# Patient Record
Sex: MALE | Race: WHITE | NOT HISPANIC OR LATINO | Employment: OTHER | URBAN - METROPOLITAN AREA
[De-identification: names, ages, dates, MRNs, and addresses within clinical notes are randomized per-mention and may not be internally consistent; named-entity substitution may affect disease eponyms.]

---

## 2017-04-04 ENCOUNTER — HOSPITAL ENCOUNTER (OUTPATIENT)
Dept: RADIOLOGY | Facility: HOSPITAL | Age: 52
Discharge: HOME | End: 2017-04-04
Attending: PSYCHIATRY & NEUROLOGY
Payer: MEDICARE

## 2017-04-04 DIAGNOSIS — G35 MULTIPLE SCLEROSIS: ICD-10-CM

## 2017-04-04 PROCEDURE — 25500020 PHARM REV CODE 255: Performed by: PSYCHIATRY & NEUROLOGY

## 2017-04-04 PROCEDURE — 70553 MRI BRAIN STEM W/O & W/DYE: CPT | Mod: TC

## 2017-04-04 PROCEDURE — 72157 MRI CHEST SPINE W/O & W/DYE: CPT | Mod: TC

## 2017-04-04 PROCEDURE — 72156 MRI NECK SPINE W/O & W/DYE: CPT | Mod: 26,,, | Performed by: RADIOLOGY

## 2017-04-04 PROCEDURE — A9585 GADOBUTROL INJECTION: HCPCS | Performed by: PSYCHIATRY & NEUROLOGY

## 2017-04-04 PROCEDURE — 72156 MRI NECK SPINE W/O & W/DYE: CPT | Mod: TC

## 2017-04-04 PROCEDURE — 70553 MRI BRAIN STEM W/O & W/DYE: CPT | Mod: 26,,, | Performed by: RADIOLOGY

## 2017-04-04 PROCEDURE — 72157 MRI CHEST SPINE W/O & W/DYE: CPT | Mod: 26,,, | Performed by: RADIOLOGY

## 2017-04-04 RX ORDER — GADOBUTROL 604.72 MG/ML
10 INJECTION INTRAVENOUS
Status: COMPLETED | OUTPATIENT
Start: 2017-04-04 | End: 2017-04-04

## 2017-04-04 RX ADMIN — GADOBUTROL 10 ML: 604.72 INJECTION INTRAVENOUS at 08:04

## 2017-05-30 RX ORDER — LACTULOSE 10 G/15ML
SOLUTION ORAL; RECTAL
Qty: 4050 ML | Refills: 1 | Status: SHIPPED | OUTPATIENT
Start: 2017-05-30 | End: 2017-09-01 | Stop reason: SDUPTHER

## 2017-08-02 RX ORDER — LINACLOTIDE 145 UG/1
CAPSULE, GELATIN COATED ORAL
Qty: 30 CAPSULE | Refills: 6 | Status: SHIPPED | OUTPATIENT
Start: 2017-08-02 | End: 2017-08-07 | Stop reason: SDUPTHER

## 2017-08-07 DIAGNOSIS — K59.00 CONSTIPATION, UNSPECIFIED CONSTIPATION TYPE: Primary | ICD-10-CM

## 2017-09-01 ENCOUNTER — TELEPHONE (OUTPATIENT)
Dept: GASTROENTEROLOGY | Facility: CLINIC | Age: 52
End: 2017-09-01

## 2017-09-01 RX ORDER — LACTULOSE 10 G/15ML
SOLUTION ORAL; RECTAL
Qty: 4050 ML | Refills: 1 | OUTPATIENT
Start: 2017-09-01

## 2017-09-01 RX ORDER — LACTULOSE 10 G/15ML
SOLUTION ORAL; RECTAL
Qty: 4050 ML | Refills: 3 | Status: SHIPPED | OUTPATIENT
Start: 2017-09-01 | End: 2018-04-02 | Stop reason: SDUPTHER

## 2017-09-01 NOTE — TELEPHONE ENCOUNTER
----- Message from Samantha Juarez sent at 9/1/2017 10:25 AM CDT -----  Contact: Self- [phone]  Patient came into office requesting refill on  Lactulose 10 gm/15 ml, take 45 ml by mouth three times a day. [facility], [phone].

## 2017-09-05 RX ORDER — SUCRALFATE 1 G/10ML
1 SUSPENSION ORAL
Qty: 3 BOTTLE | Refills: 3 | Status: SHIPPED | OUTPATIENT
Start: 2017-09-05 | End: 2018-02-15 | Stop reason: SDUPTHER

## 2018-02-08 ENCOUNTER — TELEPHONE (OUTPATIENT)
Dept: GASTROENTEROLOGY | Facility: CLINIC | Age: 53
End: 2018-02-08

## 2018-02-08 RX ORDER — POLYETHYLENE GLYCOL 3350, SODIUM SULFATE ANHYDROUS, SODIUM BICARBONATE, SODIUM CHLORIDE, POTASSIUM CHLORIDE 236; 22.74; 6.74; 5.86; 2.97 G/4L; G/4L; G/4L; G/4L; G/4L
4 POWDER, FOR SOLUTION ORAL ONCE
Qty: 4000 ML | Refills: 0 | Status: SHIPPED | OUTPATIENT
Start: 2018-02-08 | End: 2018-02-08

## 2018-02-08 NOTE — TELEPHONE ENCOUNTER
Presents to office reporting severe constipation x 1 week despite his regular medications.    He used mag citrate this week with minimal results.    Will rx golytely and he will contact us if this does not help

## 2018-02-15 RX ORDER — SUCRALFATE 1 G/10ML
SUSPENSION ORAL
Qty: 1260 ML | Refills: 3 | Status: SHIPPED | OUTPATIENT
Start: 2018-02-15 | End: 2018-02-16 | Stop reason: SDUPTHER

## 2018-02-16 RX ORDER — SUCRALFATE 1 G/10ML
SUSPENSION ORAL
Qty: 3600 ML | Refills: 3 | Status: SHIPPED | OUTPATIENT
Start: 2018-02-16 | End: 2018-12-21 | Stop reason: SDUPTHER

## 2018-02-27 ENCOUNTER — TELEPHONE (OUTPATIENT)
Dept: GASTROENTEROLOGY | Facility: CLINIC | Age: 53
End: 2018-02-27

## 2018-02-27 RX ORDER — POLYETHYLENE GLYCOL 3350, SODIUM SULFATE ANHYDROUS, SODIUM BICARBONATE, SODIUM CHLORIDE, POTASSIUM CHLORIDE 236; 22.74; 6.74; 5.86; 2.97 G/4L; G/4L; G/4L; G/4L; G/4L
POWDER, FOR SOLUTION ORAL
Refills: 0 | OUTPATIENT
Start: 2018-02-27

## 2018-02-27 NOTE — TELEPHONE ENCOUNTER
Spoke to pharmacist.  Carafate suspension is not on formulary and will not be covered.    I gave auth for carafate tablets to substitute.      Please let him know.  Also remind him that this med is for his upper abdominal pain, reflux, nausea complaints.  If he is not having these, he does not even have to use this medication.  This is not a constipation medication.

## 2018-03-01 NOTE — TELEPHONE ENCOUNTER
Informed Rosi that Jennifer authorized Carafate tablets. Also that this medication is for upper abd pain, nausea, and reflux.  Verbal Understanding.

## 2018-03-05 NOTE — TELEPHONE ENCOUNTER
Patient stopped by the clinic today. He said that he can not swallow the Sucralfate tablet.       LMOVM for Rosi to call the clinic back.     Patient can crush this pill into a medicine cup with a small amount of water. His insurance no longer covers the liquid form of this medication.

## 2018-03-06 ENCOUNTER — TELEPHONE (OUTPATIENT)
Dept: GASTROENTEROLOGY | Facility: CLINIC | Age: 53
End: 2018-03-06

## 2018-03-06 NOTE — TELEPHONE ENCOUNTER
Informed Rosi that patients insurance company no longer covers the liquid form of the Carafate. He will have to crush this medication in a medicine cup with a small amount of water. Also that this medication is not for constipation but for abd pain, nausea, and reflux. Verbal Understanding.

## 2018-03-06 NOTE — TELEPHONE ENCOUNTER
----- Message from STEPHEN Beltre sent at 3/6/2018 12:39 PM CST -----  Contact: Rosi/[phone]      ----- Message -----  From: Aicha Sinclair  Sent: 3/5/2018   1:50 PM  To: STEPHEN Beltre    Rosi is returning Kezia's call. Please advise.

## 2018-04-10 ENCOUNTER — TELEPHONE (OUTPATIENT)
Dept: GASTROENTEROLOGY | Facility: CLINIC | Age: 53
End: 2018-04-10

## 2018-04-10 RX ORDER — LACTULOSE 10 G/15ML
SOLUTION ORAL; RECTAL
Qty: 4050 ML | Refills: 3 | Status: SHIPPED | OUTPATIENT
Start: 2018-04-10 | End: 2018-07-09 | Stop reason: SDUPTHER

## 2018-04-10 NOTE — TELEPHONE ENCOUNTER
Pt stopped by the clinic today requesting a refill for the Lactulose.     Refill called into the [facility]     Disp 4050 mL  Refill 3

## 2018-05-18 RX ORDER — ALBUTEROL SULFATE 90 UG/1
AEROSOL, METERED RESPIRATORY (INHALATION) EVERY 4 HOURS
Qty: 18 G | Refills: 6 | Status: CANCELLED | OUTPATIENT
Start: 2018-05-18 | End: 2019-05-18

## 2018-05-21 RX ORDER — ALBUTEROL SULFATE 90 UG/1
AEROSOL, METERED RESPIRATORY (INHALATION) EVERY 4 HOURS
Qty: 18 G | Refills: 6 | Status: CANCELLED | OUTPATIENT
Start: 2018-05-18 | End: 2019-05-18

## 2018-06-05 DIAGNOSIS — G35 MULTIPLE SCLEROSIS: Primary | ICD-10-CM

## 2018-06-05 RX ORDER — LACTULOSE 10 G/15ML
SOLUTION ORAL; RECTAL 3 TIMES DAILY
Qty: 4050 ML | Refills: 3 | Status: SHIPPED | OUTPATIENT
Start: 2018-06-05 | End: 2018-11-15 | Stop reason: SDUPTHER

## 2018-07-09 ENCOUNTER — TELEPHONE (OUTPATIENT)
Dept: GASTROENTEROLOGY | Facility: CLINIC | Age: 53
End: 2018-07-09

## 2018-07-09 RX ORDER — DICYCLOMINE HYDROCHLORIDE 10 MG/1
CAPSULE ORAL
Qty: 90 CAPSULE | Refills: 3 | Status: CANCELLED | OUTPATIENT
Start: 2018-07-09 | End: 2019-07-09

## 2018-07-09 RX ORDER — LACTULOSE 10 G/15ML
SOLUTION ORAL; RECTAL
Qty: 4050 ML | Refills: 3 | OUTPATIENT
Start: 2018-07-09 | End: 2018-11-05 | Stop reason: SDUPTHER

## 2018-07-09 NOTE — TELEPHONE ENCOUNTER
----- Message from Alejandra Stover sent at 7/9/2018  1:22 PM CDT -----  Patient needs a refill on his lactulose

## 2018-08-01 ENCOUNTER — HOSPITAL ENCOUNTER (OUTPATIENT)
Dept: RADIOLOGY | Facility: HOSPITAL | Age: 53
Discharge: HOME | End: 2018-08-01
Attending: INTERNAL MEDICINE
Payer: MEDICARE

## 2018-08-01 DIAGNOSIS — G35 MULTIPLE SCLEROSIS: ICD-10-CM

## 2018-08-01 PROCEDURE — 70553 MRI BRAIN STEM W/O & W/DYE: CPT | Mod: TC

## 2018-08-01 PROCEDURE — 70553 MRI BRAIN STEM W/O & W/DYE: CPT | Mod: 26,,, | Performed by: RADIOLOGY

## 2018-08-01 PROCEDURE — 72156 MRI NECK SPINE W/O & W/DYE: CPT | Mod: TC

## 2018-08-01 PROCEDURE — A9585 GADOBUTROL INJECTION: HCPCS | Performed by: INTERNAL MEDICINE

## 2018-08-01 PROCEDURE — 25500020 PHARM REV CODE 255: Performed by: INTERNAL MEDICINE

## 2018-08-01 PROCEDURE — 72156 MRI NECK SPINE W/O & W/DYE: CPT | Mod: 26,,, | Performed by: RADIOLOGY

## 2018-08-01 RX ORDER — GADOBUTROL 604.72 MG/ML
10 INJECTION INTRAVENOUS
Status: COMPLETED | OUTPATIENT
Start: 2018-08-01 | End: 2018-08-01

## 2018-08-01 RX ADMIN — GADOBUTROL 10 ML: 604.72 INJECTION INTRAVENOUS at 10:08

## 2018-08-27 RX ORDER — TRAMADOL HYDROCHLORIDE 50 MG/1
50 TABLET ORAL EVERY 6 HOURS PRN
Qty: 10 TABLET | Refills: 0 | Status: CANCELLED | OUTPATIENT
Start: 2018-08-27

## 2018-09-04 DIAGNOSIS — G35 MULTIPLE SCLEROSIS: Primary | ICD-10-CM

## 2018-09-14 ENCOUNTER — HOSPITAL ENCOUNTER (OUTPATIENT)
Dept: RADIOLOGY | Facility: HOSPITAL | Age: 53
Discharge: HOME | End: 2018-09-14
Attending: PSYCHIATRY & NEUROLOGY
Payer: MEDICARE

## 2018-09-14 DIAGNOSIS — G35 MULTIPLE SCLEROSIS: ICD-10-CM

## 2018-09-14 PROCEDURE — A9585 GADOBUTROL INJECTION: HCPCS | Performed by: PSYCHIATRY & NEUROLOGY

## 2018-09-14 PROCEDURE — 72157 MRI CHEST SPINE W/O & W/DYE: CPT | Mod: 26,,, | Performed by: RADIOLOGY

## 2018-09-14 PROCEDURE — 25500020 PHARM REV CODE 255: Performed by: PSYCHIATRY & NEUROLOGY

## 2018-09-14 PROCEDURE — 72157 MRI CHEST SPINE W/O & W/DYE: CPT | Mod: TC

## 2018-09-14 RX ORDER — GADOBUTROL 604.72 MG/ML
10 INJECTION INTRAVENOUS
Status: COMPLETED | OUTPATIENT
Start: 2018-09-14 | End: 2018-09-14

## 2018-09-14 RX ADMIN — GADOBUTROL 10 ML: 604.72 INJECTION INTRAVENOUS at 09:09

## 2018-09-27 DIAGNOSIS — M62.838 MUSCLE SPASM: ICD-10-CM

## 2018-09-27 RX ORDER — BACLOFEN 10 MG/1
TABLET ORAL
Qty: 90 TABLET | Refills: 5 | Status: CANCELLED | OUTPATIENT
Start: 2018-09-27

## 2018-10-02 DIAGNOSIS — M62.838 MUSCLE SPASM: ICD-10-CM

## 2018-10-02 RX ORDER — BACLOFEN 10 MG/1
TABLET ORAL
Qty: 90 TABLET | Refills: 5 | Status: CANCELLED | OUTPATIENT
Start: 2018-09-27

## 2018-10-03 ENCOUNTER — LAB VISIT (OUTPATIENT)
Dept: LAB | Facility: HOSPITAL | Age: 53
End: 2018-10-03
Attending: PSYCHIATRY & NEUROLOGY
Payer: MEDICARE

## 2018-10-03 ENCOUNTER — OFFICE VISIT (OUTPATIENT)
Dept: NEUROLOGY | Facility: CLINIC | Age: 53
End: 2018-10-03
Payer: MEDICARE

## 2018-10-03 VITALS — HEART RATE: 80 BPM | DIASTOLIC BLOOD PRESSURE: 89 MMHG | SYSTOLIC BLOOD PRESSURE: 145 MMHG

## 2018-10-03 DIAGNOSIS — Z51.81 THERAPEUTIC DRUG MONITORING: ICD-10-CM

## 2018-10-03 DIAGNOSIS — Z00.00 PREVENTATIVE HEALTH CARE: ICD-10-CM

## 2018-10-03 DIAGNOSIS — G35 MULTIPLE SCLEROSIS: ICD-10-CM

## 2018-10-03 DIAGNOSIS — M54.50 LUMBAR BACK PAIN: Primary | ICD-10-CM

## 2018-10-03 DIAGNOSIS — M54.6 THORACIC SPINE PAIN: ICD-10-CM

## 2018-10-03 DIAGNOSIS — R29.898 WEAKNESS OF BOTH LOWER EXTREMITIES: ICD-10-CM

## 2018-10-03 LAB
ALBUMIN SERPL BCP-MCNC: 4.1 G/DL
ALP SERPL-CCNC: 76 U/L
ALT SERPL W/O P-5'-P-CCNC: 25 U/L
ANION GAP SERPL CALC-SCNC: 10 MMOL/L
AST SERPL-CCNC: 29 U/L
BASOPHILS # BLD AUTO: 0.01 K/UL
BASOPHILS NFR BLD: 0.2 %
BILIRUB SERPL-MCNC: 0.5 MG/DL
BUN SERPL-MCNC: 6 MG/DL
CALCIUM SERPL-MCNC: 9.8 MG/DL
CHLORIDE SERPL-SCNC: 103 MMOL/L
CO2 SERPL-SCNC: 25 MMOL/L
CREAT SERPL-MCNC: 0.9 MG/DL
DIFFERENTIAL METHOD: ABNORMAL
EOSINOPHIL # BLD AUTO: 0.2 K/UL
EOSINOPHIL NFR BLD: 4.1 %
ERYTHROCYTE [DISTWIDTH] IN BLOOD BY AUTOMATED COUNT: 16.4 %
EST. GFR  (AFRICAN AMERICAN): >60 ML/MIN/1.73 M^2
EST. GFR  (NON AFRICAN AMERICAN): >60 ML/MIN/1.73 M^2
GLUCOSE SERPL-MCNC: 87 MG/DL
HCT VFR BLD AUTO: 52 %
HGB BLD-MCNC: 17 G/DL
LYMPHOCYTES # BLD AUTO: 1 K/UL
LYMPHOCYTES NFR BLD: 23.5 %
MCH RBC QN AUTO: 25.3 PG
MCHC RBC AUTO-ENTMCNC: 32.7 G/DL
MCV RBC AUTO: 77 FL
MONOCYTES # BLD AUTO: 0.5 K/UL
MONOCYTES NFR BLD: 11.5 %
NEUTROPHILS # BLD AUTO: 2.7 K/UL
NEUTROPHILS NFR BLD: 60.5 %
PLATELET # BLD AUTO: 172 K/UL
PMV BLD AUTO: 9.6 FL
POTASSIUM SERPL-SCNC: 5.3 MMOL/L
PROT SERPL-MCNC: 7.5 G/DL
RBC # BLD AUTO: 6.72 M/UL
SODIUM SERPL-SCNC: 138 MMOL/L
WBC # BLD AUTO: 4.42 K/UL

## 2018-10-03 PROCEDURE — 3077F SYST BP >= 140 MM HG: CPT | Mod: CPTII,,, | Performed by: PSYCHIATRY & NEUROLOGY

## 2018-10-03 PROCEDURE — 99204 OFFICE O/P NEW MOD 45 MIN: CPT | Mod: S$PBB,,, | Performed by: PSYCHIATRY & NEUROLOGY

## 2018-10-03 PROCEDURE — 85025 COMPLETE CBC W/AUTO DIFF WBC: CPT

## 2018-10-03 PROCEDURE — 80053 COMPREHEN METABOLIC PANEL: CPT

## 2018-10-03 PROCEDURE — 3079F DIAST BP 80-89 MM HG: CPT | Mod: CPTII,,, | Performed by: PSYCHIATRY & NEUROLOGY

## 2018-10-03 PROCEDURE — 36415 COLL VENOUS BLD VENIPUNCTURE: CPT

## 2018-10-03 PROCEDURE — 99213 OFFICE O/P EST LOW 20 MIN: CPT | Mod: PBBFAC,PO | Performed by: PSYCHIATRY & NEUROLOGY

## 2018-10-03 PROCEDURE — 99999 PR PBB SHADOW E&M-EST. PATIENT-LVL III: CPT | Mod: PBBFAC,,, | Performed by: PSYCHIATRY & NEUROLOGY

## 2018-10-03 NOTE — LETTER
October 3, 2018      Bayron Ferguson MD  [address]  [address]  [facility] [address]           [facility]rology  [address] [address]  Phone: [phone]  Fax: [phone]          Patient: Tj Trammell   MR Number: [id number]   YOB: 1965   Date of Visit: 10/3/2018       Dear Dr. Bayron Ferguson:    Thank you for referring Tj Trammell to me for evaluation. Attached you will find relevant portions of my assessment and plan of care.    If you have questions, please do not hesitate to call me. I look forward to following Tj Trammell along with you.    Sincerely,    Michael Boyle MD    Enclosure  CC:  No Recipients    If you would like to receive this communication electronically, please contact [email or web address] or [phone] to request more information on [email or web address] Link access.    For providers and/or their staff who would like to refer a patient to [facility], please contact us through our one-stop-shop provider referral line, [facility], at [phone].    If you feel you have received this communication in error or would no longer like to receive these types of communications, please e-mail [email or web address]

## 2018-10-03 NOTE — ASSESSMENT & PLAN NOTE
-- questionably taking aubaugio, not clear who is prescribing  -- checking CBC/CMP today  -- neuraxis MRI brain, C, T spine reviewed today, multifocal demyelinating lesions throughout, persoanlly reviewed

## 2018-10-03 NOTE — PROGRESS NOTES
[facility]UROLOGY  [facility] [place]    Date: 10/3/18  Patient Name: Tj Trammell   MRN: [id number]   PCP: Krys Harrison (Inactive)  Referring Provider: Bayron Ferguson MD    Assessment:   Tj Trammell is a 53 y.o. male presenting for history of multiple sclerosis.  Patient is extremely difficult historian and is difficult to ascertain if he has experienced FLAIR recently.  Imaging appears overall stable.  Will make no changes to his Aubagio.  Attempting to determine who was prescribing this for him.  Reviewed imaging as below.  Obtaining lumbar spine imaging given persistent lower extremity weakness and pain. May ultimately benefit from involvement of pain management.  Plan:     Problem List Items Addressed This Visit        Neuro    Multiple sclerosis    Current Assessment & Plan     -- questionably taking aubaugio, not clear who is prescribing  -- checking CBC/CMP today  -- neuraxis MRI brain, C, T spine reviewed today, multifocal demyelinating lesions throughout, persoanlly reviewed            Orthopedic    Weakness of both lower extremities    Current Assessment & Plan     -- MRI L spine           Other Visit Diagnoses     Lumbar back pain    -  Primary    Thoracic spine pain        Relevant Orders    MRI Thoracic Spine Without Contrast    Therapeutic drug monitoring        Relevant Orders    CBC auto differential (Completed)    Comprehensive metabolic panel (Completed)    Preventative health care        Relevant Orders    Ambulatory consult to Family Practice          Michael Boyle MD  [facility]   Department of Neurology    Patient note was created using MModal Dictation.  Any errors in syntax or even information may not have been identified and edited on initial review prior to signing this note.  Subjective:   Patient seen in consultation at the request of Bayron Ferguson MD for the evaluation of multiple sclerosis. A copy of this note will be sent to  the referring physician.        HPI:   Mr. Tj Trammell is a 53 y.o. male  presenting for evaluation of multiple sclerosis.  The patient follows with Dr. oro on for primary care.  The patient is an extremely difficult historian.  He is deaf with marked dysphonia and dysarthria  and spends the entirety of his visit complaining that various doctors and family members have taken his money.  I am able to ascertain that he lives alone with a for [employer] on him.  Unable to offer any meaningful history regarding his multiple sclerosis.  Review of the record reveals a spinal tap positive for 6 oligoclonal bands in 2012.  He was initially evaluated by Neurology in 2012 per records at that time and was started on Avonex.  It is unclear when he was switched to Aubagio and who is prescribing it. He seems to indicate that he may not be taking it regularly. Today, he endorses extreme pain throughout the entirety of his body.  He focuses on this stating that the only thing that helps it is Norco.  He expresses his frustration that he has been unable to get refills of this medication.  He states that his pain is worse in his lower back but is unable to elaborate further.  He does exhibit left lower extremity weakness however it is unclear how much of this may be attributable to his multiple sclerosis.  He is unable to relate any specific symptoms or history consistent with flare.    PAST MEDICAL HISTORY:  Past Medical History:   Diagnosis Date    Asthma     Bilateral hearing loss 12/12/2012    Biliary acute pancreatitis     GERD (gastroesophageal reflux disease)     High cholesterol     Hypertension     Multiple sclerosis        PAST SURGICAL HISTORY:  Past Surgical History:   Procedure Laterality Date    ARTHROSCOPY, SHOULDER Left 4/24/2014    [facility]    CHOLECYSTECTOMY      CHOLECYSTECTOMY, LAPAROSCOPIC N/A 12/16/2013    [facility] at [facility] OR     ERCP N/A 9/2/2014    [facility] at [facility] ENDO    ERCP WITH STENT EXCHANGE N/A 4/8/2015    [facility] at [facility] ENDO    ESOPHAGOGASTRODUODENOSCOPY (EGD) N/A 7/13/2015    [facility]formed by [facility] at [facility] ENDO    FIXATION, TENDON Left 4/24/2014    [facility] by [facility] at [facility] OR    JOINT REPLACEMENT      arm    REPAIR ROTATOR CUFF ARTHROSCOPIC Left 4/30/2015    [facility]formed by [facility] at [facility] OR    REPAIR, ROTATOR CUFF, ARTHROSCOPIC Left 4/24/2014    [facility]formed by [facility] at [facility] OR    ULTRASOUND, UPPER GI TRACT, ENDOSCOPIC N/A 11/26/2013    Performed by L[facility]clean MD at [facility] ENDO       CURRENT MEDS:  Current Outpatient Medications   Medication Sig Dispense Refill    albuterol (VENTOLIN HFA) 90 mcg/actuation inhaler Inhale 2 puffs into the lungs every 4 (four) hours as needed for Wheezing or Shortness of Breath. 18 g 0    albuterol (VENTOLIN HFA) 90 mcg/actuation inhaler INHALE 2 PUFFS BY MOUTH EVERY 4 HOURS 18 g 5    alprazolam (XANAX) 0.5 MG tablet Take 0.5 mg by mouth 2 (two) times daily as needed.       atorvastatin (LIPITOR) 40 MG tablet Take 40 mg by mouth once daily.       baclofen (LIORESAL) 10 MG tablet TAKE ONE TABLET BY MOUTH THREE TIMES DAILY WITH FOOD OR MILK 90 tablet 5    baclofen (LIORESAL) 10 MG tablet TAKE ONE TABLET BY MOUTH THREE TIMES A DAY WITH FOOD OR MILK   90 270 tablet 4    baclofen (LIORESAL) 20 MG tablet TAKE 1 TABLET BY MOUTH AT BEDTIME 30 tablet 11    baclofen (LIORESAL) 20 MG tablet TAKE 1 TABLET BEDTIME 30 tablet 11    buPROPion (WELLBUTRIN SR) 150 MG TBSR 12 hr tablet TAKE 1 TABLET EVERY 12 HOURS DAILY. 60 tablet 11    buPROPion (WELLBUTRIN SR) 150 MG TBSR 12 hr tablet TAKE 1 TABLET EVERY 12 HOURS DAILY. 60 tablet 11    ciprofloxacin HCl (CILOXAN) 0.3 % ophthalmic solution       dicyclomine (BENTYL) 10 MG capsule 3 (three) times daily as needed (abominal pain).        dicyclomine (BENTYL) 10 MG capsule take one capsule by mouth  three times daily  for abdominal pain 90 capsule 3    dicyclomine (BENTYL) 10 MG capsule TAKE 1 CAPSULE BY MOUTH THREE TIMES A DAY FOR ABDOMINAL PAIN 90 capsule 4    docusate sodium (COLACE) 100 MG capsule Take 1 capsule (100 mg total) by mouth 3 (three) times daily as needed for Constipation. 60 capsule 6    docusate sodium (COLACE) 100 MG capsule Take 1 capsule by mouth as needed Once a day 30 capsule 4    DUREZOL 0.05 % Drop ophthalmic solution       FLU VACCIN [id number] 5YR UP/PF (AFLURIA [id number], PF, IM)       gabapentin (NEURONTIN) 300 MG capsule Take 300 mg by mouth every evening.       gabapentin (NEURONTIN) 300 MG capsule TAKE ONE CAPSULE BY MOUTH ONCE DAILY AT BEDTIME 30 capsule 11    gabapentin (NEURONTIN) 300 MG capsule TAKE ONE CAPSULE BY MOUTH ONCE DAILY AT BEDTIME 30 capsule 11    gabapentin (NEURONTIN) 300 MG capsule TAKE ONE CAPSULE BY MOUTH ONCE DAILY AT BEDTIME 30 capsule 11    HYDROcodone-acetaminophen (NORCO) 7.5-325 mg per tablet 1 tablet twice a day as needed for pain by mouth  60 tablet 0    lactulose (CHRONULAC) 10 gram/15 mL solution TAKE 45 ML  BY  MOUTH THREE TIMES DAILY 4050 mL 3    lactulose (CHRONULAC) 10 gram/15 mL solution TAKE 45 ML BY MOUTH THREE TIMES A DAY 4050 mL 3    linaclotide (LINZESS) 145 mcg Cap capsule Take 1 capsule (145 mcg total) by mouth once daily. 30 capsule 6    linaclotide (LINZESS) 145 mcg Cap capsule TAKE 1 CAPSULE BY MOUTH ONCE DAILY 30 capsule 6    lisinopril (PRINIVIL,ZESTRIL) 5 MG tablet Take 5 mg by mouth once daily.       lisinopril (PRINIVIL,ZESTRIL) 5 MG tablet TAKE 1 TABLET BY MOUTH DAILY 90 tablet 3    naproxen (NAPROSYN) 500 MG tablet Take 500 mg by mouth every 12 (twelve) hours as needed.       naproxen (NAPROSYN) 500 MG tablet TAKE 1 TABLET BY MOUTH TWO TIMES A  tablet 3    naproxen (NAPROSYN) 500 MG tablet TAKE 1 TABLET BY MOUTH TWO TIMES A  tablet 4     oxybutynin (DITROPAN-XL) 5 MG TR24 Take 1 tablet (5 mg total) by mouth once daily. 30 tablet 10    pantoprazole (PROTONIX) 40 MG tablet Take 1 tablet (40 mg total) by mouth once daily. 30 tablet 6    pantoprazole (PROTONIX) 40 MG tablet TAKE ONE TABLET BY MOUTH  ONCE DAILY 90 tablet 4    polyethylene glycol (GLYCOLAX) 17 gram/dose powder Take 17 g by mouth once daily. 510 g 3    sodium chloride 0.9% 0.9 % SolP 100 mL with natalizumab 300 mg/15 mL Soln 300 mg Inject 300 mg into the vein every 30 days.      sucralfate (CARAFATE) 100 mg/mL suspension TAKE  10 ML BY MOUTH 4 TIMES DAILY WITH MEALS AND  NIGHTLY 3600 mL 3    teriflunomide (AUBAGIO) 14 mg Tab Take by mouth.      tramadol (ULTRAM) 50 mg tablet Take 1 tablet (50 mg total) by mouth every 6 (six) hours as needed for Pain (pain). 10 tablet 0    triamcinolone acetonide 0.025% (KENALOG) 0.025 % cream        No current facility-administered medications for this visit.      Facility-Administered Medications Ordered in Other Visits   Medication Dose Route Frequency Provider Last Rate Last Dose    clonidine 1,000 mcg/10 mL (100 mcg/mL) injection    PRN Alexx Claire MD   25 mcg at 04/24/14 1103    dexamethasone sodium phos (PF) 10 mg/mL injection    PRN Alexx Claire MD   1 mg at 04/24/14 1103    midazolam injection   Intravenous PRN Alexx Claire MD   2 mg at 04/30/15 0700    ropivacaine (PF) 5 mg/ml (0.5%) injection    PRJOHNATHAN Claire MD   20 mL at 04/24/14 1103       ALLERGIES:  Review of patient's allergies indicates:  No Known Allergies    FAMILY HISTORY:  Family History   Problem Relation Age of Onset    Heart disease Mother     Heart disease Father        SOCIAL HISTORY:  Social History     Tobacco Use    Smoking status: Current Every Day Smoker     Packs/day: 1.00     Years: 32.00     Pack years: 32.00    Smokeless tobacco: Never Used   Substance Use Topics    Alcohol use: No    Drug use: No       Review of Systems:  12  review of systems is negative except for the symptoms mentioned in HPI.      Objective:     Vitals:    10/03/18 0925   BP: (!) 145/89   Pulse: 80     General: NAD, well nourished   Eyes: no tearing, discharge, no erythema   ENT: moist mucous membranes of the oral cavity, nares patent    Neck: Supple, full range of motion  Cardiovascular: Warm and well perfused, pulses equal and symmetrical  Lungs: Normal work of breathing, normal chest wall excursions  Skin: No rash, lesions, or breakdown on exposed skin  Psychiatry: Mood and affect are appropriate   Abdomen: soft, non tender, non distended  Extremeties: No cyanosis, clubbing or edema.    Neurological   MENTAL STATUS: Alert and oriented to person only. Speech dysphonic and dysarthric. Does not cooperate with naming/reptetion/memory assessment. Highly distractible.   CRANIAL NERVES: Visual fields grossly intact. PERRL. EOMI. Facial sensation intact. Face symmetrical. Hearing grossly intact. Full shoulder shrug bilaterally. Tongue protrudes midline   SENSORY: Sensation is intact to light touch throughout.   MOTOR: Normal bulk and tone. 5/5 deltoid, biceps, triceps, interosseous, hand  bilaterally. 5/5 R and 4/5 L iliopsoas, knee extension/flexion, foot dorsi/plantarflexion.    REFLEXES: Symmetric and 1+ throughout.   CEREBELLAR/COORDINATION/GAIT: Gait wide based and unsteady    th.  Finger to nose intact. Normal rapid alternating movements.

## 2018-10-10 ENCOUNTER — TELEPHONE (OUTPATIENT)
Dept: NEUROLOGY | Facility: CLINIC | Age: 53
End: 2018-10-10

## 2018-10-10 ENCOUNTER — HOSPITAL ENCOUNTER (OUTPATIENT)
Dept: RADIOLOGY | Facility: HOSPITAL | Age: 53
Discharge: HOME | End: 2018-10-10
Attending: PSYCHIATRY & NEUROLOGY
Payer: MEDICARE

## 2018-10-10 DIAGNOSIS — M54.6 THORACIC SPINE PAIN: ICD-10-CM

## 2018-10-10 PROCEDURE — 72146 MRI CHEST SPINE W/O DYE: CPT | Mod: TC

## 2018-10-10 PROCEDURE — 72146 MRI CHEST SPINE W/O DYE: CPT | Mod: 26,,, | Performed by: RADIOLOGY

## 2018-10-17 ENCOUNTER — HOSPITAL ENCOUNTER (EMERGENCY)
Facility: HOSPITAL | Age: 53
Discharge: HOME | End: 2018-10-17
Attending: EMERGENCY MEDICINE
Payer: MEDICARE

## 2018-10-17 VITALS
DIASTOLIC BLOOD PRESSURE: 82 MMHG | BODY MASS INDEX: 34.66 KG/M2 | OXYGEN SATURATION: 96 % | HEIGHT: 69 IN | HEART RATE: 74 BPM | TEMPERATURE: 98 F | WEIGHT: 234 LBS | SYSTOLIC BLOOD PRESSURE: 128 MMHG | RESPIRATION RATE: 18 BRPM

## 2018-10-17 DIAGNOSIS — S93.401A SPRAIN OF RIGHT ANKLE, UNSPECIFIED LIGAMENT, INITIAL ENCOUNTER: Primary | ICD-10-CM

## 2018-10-17 DIAGNOSIS — M79.673 FOOT PAIN: ICD-10-CM

## 2018-10-17 DIAGNOSIS — M25.579 ANKLE PAIN: ICD-10-CM

## 2018-10-17 PROCEDURE — 99283 EMERGENCY DEPT VISIT LOW MDM: CPT

## 2018-10-17 NOTE — DISCHARGE INSTRUCTIONS
Please keep walking boot until you follow-up with ortho. Follow-up with ortho tomorrow. Return to ED for any concerns or worsening symptoms.

## 2018-10-17 NOTE — ED PROVIDER NOTES
Encounter Date: 10/17/2018       History     Chief Complaint   Patient presents with    Ankle Injury     Twisted left ankle this morning, and complaints of swelling, and tenderness to area     Patient is a 53-year-old male with past medical history of asthma, bilateral hearing loss, GERD, high cholesterol, hypertension, and multiple sclerosis who presents to ED with left ankle pain from injury prior to arrival.  Patient reports that he normally rides his motorized wheelchair, was at [employer] getting some water, accidentally dropped the water and twisted left ankle. Denies falling and hitting head and LOC.  Reports pain is worse with palpation.  Denies any alleviating factors.  Denies fever, chills, neck pain/stiffness, numbness, and tingling.  Denies any other complaints at this time.      The history is provided by the patient.     Review of patient's allergies indicates:  No Known Allergies  Past Medical History:   Diagnosis Date    Asthma     Bilateral hearing loss 12/12/2012    Biliary acute pancreatitis     GERD (gastroesophageal reflux disease)     High cholesterol     Hypertension     Multiple sclerosis      Past Surgical History:   Procedure Laterality Date    ARTHROSCOPY, SHOULDER Left 4/24/2014    [facility]    CHOLECYSTECTOMY      CHOLECYSTECTOMY, LAPAROSCOPIC N/A 12/16/2013    [facility] at [facility] OR    ERCP N/A 9/2/2014    [facility] ENDO    ERCP WITH STENT EXCHANGE N/A 4/8/2015    [facility] at [facility] ENDO    ESOPHAGOGASTRODUODENOSCOPY (EGD) N/A 7/13/2015    [facility] at [facility] ENDO    FIXATION, TENDON Left 4/24/2014    [facility] at [facility] OR    JOINT REPLACEMENT      arm    REPAIR ROTATOR CUFF ARTHROSCOPIC Left 4/30/2015    [facility] at [facility] OR    REPAIR, ROTATOR CUFF, ARTHROSCOPIC Left 4/24/2014    Performed  by [facility] at [facility] OR    ULTRASOUND, UPPER GI TRACT, ENDOSCOPIC N/A 11/26/2013    Performed by [facility]clean MD at [facility] ENDO     Family History   Problem Relation Age of Onset    Heart disease Mother     Heart disease Father      Social History     Tobacco Use    Smoking status: Current Every Day Smoker     Packs/day: 1.00     Years: 32.00     Pack years: 32.00    Smokeless tobacco: Never Used   Substance Use Topics    Alcohol use: No    Drug use: No     Review of Systems   Constitutional: Negative for fever.   Musculoskeletal: Positive for arthralgias (left ankle pain) and gait problem. Negative for back pain, neck pain and neck stiffness.   Neurological: Negative for weakness and numbness.   Hematological: Does not bruise/bleed easily.   All other systems reviewed and are negative.      Physical Exam     Initial Vitals [10/17/18 1020]   BP Pulse Resp Temp SpO2   125/71 86 16 98.1 °F (36.7 °C) 96 %      MAP       --         Physical Exam    Vitals reviewed.  Constitutional: He appears well-developed and well-nourished. He is not diaphoretic.  Non-toxic appearance. He does not have a sickly appearance.   Hard of hearing, hearing aid noted in right ear.  Rides motorized wheelchair.     HENT:   Head: Atraumatic.   Mouth/Throat: Oropharynx is clear and moist.   Eyes: Pupils are equal, round, and reactive to light.   Neck: Normal range of motion, full passive range of motion without pain and phonation normal. Neck supple.   Cardiovascular:   Pulses:       Dorsalis pedis pulses are 2+ on the right side, and 2+ on the left side.   Pulmonary/Chest: No respiratory distress.   Musculoskeletal:   TTP to left lateral malleolus.  No swelling, warmth, or surrounding erythema.  Dorsalis pedis equal bilaterally.  Sensory-motor equal bilaterally.   Neurological: He is alert and oriented to person, place, and time. He has normal strength. No sensory deficit.   Skin: Skin is warm. No rash noted.   Psychiatric:  He has a normal mood and affect.         ED Course   Procedures  Labs Reviewed - No data to display       Imaging Results          X-Ray Foot Complete Left (Final result)  Result time 10/17/18 11:34:40    Final result by Max Rosales MD (10/17/18 11:34:40)                 Impression:      Soft tissue swelling at ankle.  No definite fracture.      Electronically signed by: Max Rosales MD  Date:    10/17/2018  Time:    11:34             Narrative:    EXAMINATION:  XR ANKLE COMPLETE 3 VIEW LEFT; XR FOOT COMPLETE 3 VIEW LEFT    CLINICAL HISTORY:  Pain in unspecified ankle and joints of unspecified foot; Pain in unspecified foot    TECHNIQUE:  AP, lateral and oblique views of the left ankle and foot were performed.    COMPARISON:  None    FINDINGS:  Skeletal structures at the ankle and foot are intact without dislocation or definite fracture.  Alignment and joint spaces are satisfactory at the ankle mortise with minimal bony degenerative changes.  Slight degenerative change is noted in the midfoot and 1st MTP joint.  The proximal phalanx of the great toe has a questionable nondisplaced fracture line on one view but not confirmed on the other views nor any adjacent soft tissue swelling.  The lesser toes have flexion consistent with hammertoe position.  No foreign body is detected.  Soft tissue swelling is seen at the ankle on the lateral side.                               X-Ray Ankle Complete Left (Final result)  Result time 10/17/18 11:34:40    Final result by Max Rosales MD (10/17/18 11:34:40)                 Impression:      Soft tissue swelling at ankle.  No definite fracture.      Electronically signed by: Max Rosales MD  Date:    10/17/2018  Time:    11:34             Narrative:    EXAMINATION:  XR ANKLE COMPLETE 3 VIEW LEFT; XR FOOT COMPLETE 3 VIEW LEFT    CLINICAL HISTORY:  Pain in unspecified ankle and joints of unspecified foot; Pain in unspecified foot    TECHNIQUE:  AP, lateral  and oblique views of the left ankle and foot were performed.    COMPARISON:  None    FINDINGS:  Skeletal structures at the ankle and foot are intact without dislocation or definite fracture.  Alignment and joint spaces are satisfactory at the ankle mortise with minimal bony degenerative changes.  Slight degenerative change is noted in the midfoot and 1st MTP joint.  The proximal phalanx of the great toe has a questionable nondisplaced fracture line on one view but not confirmed on the other views nor any adjacent soft tissue swelling.  The lesser toes have flexion consistent with hammertoe position.  No foreign body is detected.  Soft tissue swelling is seen at the ankle on the lateral side.                                 Medical Decision Making:   History:   Old Medical Records: I decided to obtain old medical records.  Initial Assessment:   Patient presents to ED with left ankle pain from injury prior to arrival.  Appears well, nontoxic.  Afebrile.  TTP to left lateral malleolus.  No swelling, warmth, or surrounding erythema.  Dorsalis pedis-equal bilaterally. Sensory-motor equal bilaterally.  Differential Diagnosis:   Sprain, strain, fracture  Clinical Tests:   Radiological Study: Ordered and Reviewed  ED Management:  Xrays, walking boot   X-ray normal.  No signs of cellulitis or septic joint.  Although x-ray was normal, patient has pain to area and will be placed in walking boot and instructed to follow up with Ortho tomorrow.  Patient is stable will be DC home.  Patient instructed to return to ED for any concerns.                      Clinical Impression:   The primary encounter diagnosis was Sprain of right ankle, unspecified ligament, initial encounter. Diagnoses of Ankle pain and Foot pain were also pertinent to this visit.                             Nelson Ortiz NP  10/17/18 9000

## 2018-10-18 ENCOUNTER — HOSPITAL ENCOUNTER (EMERGENCY)
Facility: HOSPITAL | Age: 53
Discharge: HOME | End: 2018-10-18
Attending: EMERGENCY MEDICINE
Payer: MEDICARE

## 2018-10-18 VITALS
TEMPERATURE: 99 F | SYSTOLIC BLOOD PRESSURE: 140 MMHG | WEIGHT: 234 LBS | HEART RATE: 86 BPM | HEIGHT: 69 IN | BODY MASS INDEX: 34.66 KG/M2 | DIASTOLIC BLOOD PRESSURE: 84 MMHG | OXYGEN SATURATION: 97 % | RESPIRATION RATE: 16 BRPM

## 2018-10-18 DIAGNOSIS — S60.512A ABRASION, HAND, LEFT, INITIAL ENCOUNTER: Primary | ICD-10-CM

## 2018-10-18 PROCEDURE — 99282 EMERGENCY DEPT VISIT SF MDM: CPT

## 2018-10-18 NOTE — ED PROVIDER NOTES
Encounter Date: 10/18/2018    SCRIBE #1 NOTE: I, Chasity Camacho, am scribing for, and in the presence of,  Dr. Campoverde. I have scribed the entire note.       History     Chief Complaint   Patient presents with    Fall     Seen in ED yesterday, placed in walking boot for L ankle sprain.  reports fell again, laceration to back of L hand, increased pain to L ankle.     SEEN YESTERDAY FOR TRIP AND FALL, X-RAY NO FRACTURE      The history is provided by the patient.   Fall   The accident occurred yesterday. The fall occurred while walking (WAS USING BOOT TO WALK). He fell from a height of 3 to 5 ft. He landed on carpet. The volume of blood lost was minimal. The point of impact was the left knee. The pain is present in the left hand. He was ambulatory at the scene. There was no entrapment after the fall. Pertinent negatives include no neck pain, no back pain and no fever. The symptoms are aggravated by pressure on the injury. He has tried nothing for the symptoms.     Review of patient's allergies indicates:  No Known Allergies  Past Medical History:   Diagnosis Date    Asthma     Bilateral hearing loss 12/12/2012    Biliary acute pancreatitis     GERD (gastroesophageal reflux disease)     High cholesterol     Hypertension     Multiple sclerosis      Past Surgical History:   Procedure Laterality Date    ARTHROSCOPY, SHOULDER Left 4/24/2014    [facility]    CHOLECYSTECTOMY      CHOLECYSTECTOMY, LAPAROSCOPIC N/A 12/16/2013    [facility] OR    ERCP N/A 9/2/2014    [facility] ENDO    ERCP WITH STENT EXCHANGE N/A 4/8/2015    [facility]    ESOPHAGOGASTRODUODENOSCOPY (EGD) N/A 7/13/2015    [facility] END[facility]    FIXATION, TENDON Left 4/24/2014    [facility] OR    JOINT REPLACEMENT      arm    REPAIR ROTATOR CUFF ARTHROSCOPIC  Left 4/30/2015    [facility]    REPAIR, ROTATOR CUFF, ARTHROSCOPIC Left 4/24/2014    [facility]    ULTRASOUND, UPPER GI TRACT, ENDOSCOPIC N/A 11/26/2013    [facility]     Family History   Problem Relation Age of Onset    Heart disease Mother     Heart disease Father      Social History     Tobacco Use    Smoking status: Current Every Day Smoker     Packs/day: 1.00     Years: 32.00     Pack years: 32.00    Smokeless tobacco: Never Used   Substance Use Topics    Alcohol use: No    Drug use: No     Review of Systems   Constitutional: Negative for fever.   Musculoskeletal: Negative for back pain and neck pain.        LEFT KNEE PAIN   Skin: Positive for wound (LEFT HAND).   Neurological: Negative for syncope.   All other systems reviewed and are negative.      Physical Exam     Initial Vitals [10/18/18 0914]   BP Pulse Resp Temp SpO2   (!) 141/90 90 19 98.7 °F (37.1 °C) 95 %      MAP       --         Physical Exam    Vitals reviewed.  Constitutional: He appears well-nourished.   HENT:   Head: Atraumatic.   Eyes: EOM are normal.   Neck: Neck supple.   NONTENDER MIDLINE CTL  PELVIS STABLE  STANDS UP WITHOUT ASSISTANCE   Cardiovascular: Normal rate.   Pulmonary/Chest: No respiratory distress.   Abdominal: Soft. There is no tenderness.   Musculoskeletal: Normal range of motion.   NONTENDER MALLEOLI DORSUM 5TH METATARSAL FIBULAR HEAD  EQUAL LEG LENGTH  FULL RANGE OF MOTION       Neurological: He is alert and oriented to person, place, and time. GCS score is 15. GCS eye subscore is 4. GCS verbal subscore is 5. GCS motor subscore is 6.   Skin: Skin is dry.   ABRASION TO LEFT DORSAL HAND, SKIN TEAR WITH FLAP, BONE MUSCLE NOT VISUALIZED APPROXIMATELY 2 CM.  NO PULSATILE BLEEDING  EQUAL  STRENGTH   Psychiatric: He has a normal mood and affect.         ED Course   Procedures  Labs Reviewed - No data to display        Imaging Results    None          Medical Decision Making:   ACE WRAPPED ANKLE FOR SUPPORT, BOOT IS UNCOMFORTABLE  NO SIGNS OF BASILAR FRACTURE OR ACUTE AIRWAY COMPROMISE                        Clinical Impression:    HAND ABRASION LEFT  NON SYNCOPAL FALL   Disposition:   Disposition: Discharged  Condition: Stable    I, Dr. Graeme Campoverde, personally performed the services described in this documentation. All medical record entries made by the scribe were at my direction and in my presence.  I have reviewed the chart and agree that the record reflects my personal performance.                    Graeme Campoverde, DO  10/18/18 1100

## 2018-10-18 NOTE — ED NOTES
Pt states he was seen yesterday in ED and given walking boot. Pt states boot is too heavy and he fell, on L side. Pt denies hitting head or LOC. Pt c/o skin tear to top of L hand.

## 2018-10-30 ENCOUNTER — OFFICE VISIT (OUTPATIENT)
Dept: FAMILY MEDICINE | Facility: HOSPITAL | Age: 53
End: 2018-10-30
Attending: FAMILY MEDICINE
Payer: MEDICARE

## 2018-10-30 VITALS
BODY MASS INDEX: 31.68 KG/M2 | DIASTOLIC BLOOD PRESSURE: 87 MMHG | WEIGHT: 214.5 LBS | SYSTOLIC BLOOD PRESSURE: 132 MMHG | HEART RATE: 98 BPM

## 2018-10-30 DIAGNOSIS — S93.402D SPRAIN OF LEFT ANKLE, UNSPECIFIED LIGAMENT, SUBSEQUENT ENCOUNTER: Primary | ICD-10-CM

## 2018-10-30 PROCEDURE — 96372 THER/PROPH/DIAG INJ SC/IM: CPT

## 2018-10-30 PROCEDURE — 99213 OFFICE O/P EST LOW 20 MIN: CPT | Mod: 25 | Performed by: STUDENT IN AN ORGANIZED HEALTH CARE EDUCATION/TRAINING PROGRAM

## 2018-10-30 RX ORDER — NAPROXEN 500 MG/1
500 TABLET ORAL 2 TIMES DAILY
Qty: 90 TABLET | Refills: 3 | Status: SHIPPED | OUTPATIENT
Start: 2018-10-30 | End: 2019-01-16

## 2018-10-30 RX ORDER — SUCRALFATE 1 G/1
TABLET ORAL
COMMUNITY
Start: 2018-09-10 | End: 2019-01-16 | Stop reason: SDUPTHER

## 2018-10-30 RX ORDER — KETOROLAC TROMETHAMINE 30 MG/ML
30 INJECTION, SOLUTION INTRAMUSCULAR; INTRAVENOUS
Status: COMPLETED | OUTPATIENT
Start: 2018-10-30 | End: 2018-10-30

## 2018-10-30 RX ORDER — NABUMETONE 500 MG/1
TABLET, FILM COATED ORAL
COMMUNITY
Start: 2018-09-28 | End: 2019-01-16 | Stop reason: SDUPTHER

## 2018-10-30 RX ADMIN — KETOROLAC TROMETHAMINE 30 MG: 30 INJECTION, SOLUTION INTRAMUSCULAR; INTRAVENOUS at 02:10

## 2018-10-30 NOTE — PROGRESS NOTES
I have reviewed the notes, assessments, and/or procedures performed by Dr. Dunn, I concur with her/his documentation of Tj Trammell.

## 2018-10-30 NOTE — PROGRESS NOTES
Subjective:       Patient ID: Tj Trammell is a 53 y.o. male.    Chief Complaint: ankle pain    HPI   54 yo WM with hx of MS, deafness, chronic knee pain, HTN, GERD presenting to clinic c/o ankle pain x 3 months. Pt was seen at ED with imaging negative for frax/dislocations. He was recommended nsaids and ice and has been taking naproxen on occasion and iced for first few days. He is able to bear weight. Pain is 6/10 with weight and 1/10 at rest, aching and located over lateral ankle and dorsum of foot. Improving somewhat since initial injury 3 weeks ago.   He has no other complaints.     Review of Systems   Constitutional: Negative for activity change, chills, fatigue and fever.   HENT: Negative for congestion, rhinorrhea and sore throat.    Eyes: Negative for visual disturbance.   Respiratory: Negative for cough, chest tightness and shortness of breath.    Cardiovascular: Negative for chest pain, palpitations and leg swelling.   Gastrointestinal: Negative for abdominal pain, constipation, diarrhea, nausea and vomiting.   Genitourinary: Negative for dysuria and hematuria.   Musculoskeletal: Positive for arthralgias and gait problem. Negative for myalgias.   Skin: Negative for rash.   Neurological: Negative for dizziness, light-headedness and headaches.   Psychiatric/Behavioral: Negative for agitation. The patient is not nervous/anxious.        Objective:      Vitals:    10/30/18 1346   BP: 132/87   Pulse: 98     Physical Exam   Constitutional: He is oriented to person, place, and time. He appears well-developed and well-nourished. No distress.   Siting in scooter   HENT:   Head: Normocephalic and atraumatic.   Mouth/Throat: Oropharynx is clear and moist.   Eyes: Conjunctivae and EOM are normal. Pupils are equal, round, and reactive to light.   Neck: Normal range of motion. Neck supple.   Cardiovascular: Normal rate, regular rhythm, normal heart sounds and intact distal pulses.   Pulmonary/Chest: Effort normal  and breath sounds normal.   Abdominal: Soft. Bowel sounds are normal. He exhibits no distension. There is no tenderness.   Musculoskeletal: Normal range of motion. He exhibits edema and tenderness. He exhibits no deformity.   Left ankle mild edema and ttp over lateral aspect  Full rom  Able to bear weight.    Neurological: He is alert and oriented to person, place, and time. He has normal reflexes.   Skin: Skin is warm and dry. He is not diaphoretic.   Nursing note and vitals reviewed.      Assessment:       1. Sprain of left ankle, unspecified ligament, subsequent encounter        Plan:       Sprain of left ankle, unspecified ligament, subsequent encounter  -     ketorolac injection 30 mg  -     naproxen (EC NAPROSYN) 500 MG EC tablet; Take 1 tablet (500 mg total) by mouth 2 (two) times daily.  Dispense: 90 tablet; Refill: 3  -     Ambulatory Referral to Physical/Occupational Therapy  -     ACE wrapped applied in clinic  -     RICE and naproxen 500mg BID x 2 weeks  -     Ankle exercise handout provided and exercises demonstrated.     RTC in 2-4 weeks or prn    Leonora Dunn D.O.  [facility] HO-3  10/30/2018

## 2018-11-05 ENCOUNTER — TELEPHONE (OUTPATIENT)
Dept: GASTROENTEROLOGY | Facility: CLINIC | Age: 53
End: 2018-11-05

## 2018-11-05 RX ORDER — LACTULOSE 10 G/15ML
SOLUTION ORAL; RECTAL
Qty: 4050 ML | Refills: 3 | OUTPATIENT
Start: 2018-11-05 | End: 2019-04-23 | Stop reason: SDUPTHER

## 2018-11-05 NOTE — TELEPHONE ENCOUNTER
Patient came into clinic today asking for a refill of lactulose.     Called [facility] and approved Lactulose with 3 refills again.

## 2018-11-07 RX ORDER — LISINOPRIL 5 MG/1
5 TABLET ORAL DAILY
Qty: 90 TABLET | Refills: 3 | Status: CANCELLED | OUTPATIENT
Start: 2018-11-07 | End: 2019-11-07

## 2018-11-15 ENCOUNTER — OFFICE VISIT (OUTPATIENT)
Dept: FAMILY MEDICINE | Facility: HOSPITAL | Age: 53
End: 2018-11-15
Attending: FAMILY MEDICINE
Payer: MEDICARE

## 2018-11-15 VITALS
SYSTOLIC BLOOD PRESSURE: 131 MMHG | BODY MASS INDEX: 32.03 KG/M2 | WEIGHT: 216.25 LBS | HEART RATE: 78 BPM | DIASTOLIC BLOOD PRESSURE: 79 MMHG | HEIGHT: 69 IN

## 2018-11-15 DIAGNOSIS — K59.00 CONSTIPATION, UNSPECIFIED CONSTIPATION TYPE: ICD-10-CM

## 2018-11-15 DIAGNOSIS — I10 ESSENTIAL HYPERTENSION: ICD-10-CM

## 2018-11-15 DIAGNOSIS — S93.401D SPRAIN OF RIGHT ANKLE, UNSPECIFIED LIGAMENT, SUBSEQUENT ENCOUNTER: Primary | ICD-10-CM

## 2018-11-15 DIAGNOSIS — G35 MULTIPLE SCLEROSIS: ICD-10-CM

## 2018-11-15 PROCEDURE — 99215 OFFICE O/P EST HI 40 MIN: CPT | Performed by: STUDENT IN AN ORGANIZED HEALTH CARE EDUCATION/TRAINING PROGRAM

## 2018-11-15 RX ORDER — FINGOLIMOD HCL 0.5 MG/1
CAPSULE ORAL
COMMUNITY
Start: 2018-10-30 | End: 2021-05-06 | Stop reason: SDUPTHER

## 2018-11-15 NOTE — PROGRESS NOTES
Subjective:       Patient ID: Tj Trammell is a 53 y.o. male.    Chief Complaint: Ankle sprain follow up    HPI   Patient is a 52 yo WM with PMH of MS, hearing loss, HTN, GERD, and chronic joint pain who presents for follow up after left ankle sprain about 4 months ago. He reports he has been performing exercises and RICE, as well as taking Naproxen as instructed with subsequent improvement. He reports the ankle is still sore and swollen but has gotten better over time, rates pain at 5/10. He is able to bear weight on the ankle, but spends much of his time in his scooter.    He has no other complaints at this time. He is still smoking, we discussed cessation but he is not ready to try at this time. He says he is taking all of his medicines but does not have a list or medication bottles. He will bring them next time.    Review of Systems   Constitutional: Negative for activity change, chills, fatigue and fever.   HENT: Negative for congestion, rhinorrhea and sore throat.    Eyes: Negative for visual disturbance.   Respiratory: Negative for cough, chest tightness and shortness of breath.    Cardiovascular: Negative for chest pain, palpitations and leg swelling.   Gastrointestinal: Negative for abdominal pain, constipation, diarrhea, nausea and vomiting.   Genitourinary: Negative for dysuria and hematuria.   Musculoskeletal: Positive for arthralgias (left ankle), gait problem (2/2 MS) and joint swelling (left ankle). Negative for myalgias.   Skin: Negative for rash.   Neurological: Negative for dizziness, light-headedness and headaches.   Psychiatric/Behavioral: Negative for agitation. The patient is not nervous/anxious.        Objective:      Vitals:    11/15/18 0851   BP: 131/79   Pulse: 78     Physical Exam   Constitutional: He is oriented to person, place, and time. He appears well-developed and well-nourished. No distress.   Seated in scooter   HENT:   Head: Normocephalic and atraumatic.   Mouth/Throat:  Oropharynx is clear and moist.   Eyes: Conjunctivae are normal. Pupils are equal, round, and reactive to light.   Neck: Normal range of motion. Neck supple.   Cardiovascular: Normal rate, regular rhythm, normal heart sounds and intact distal pulses.   Pulmonary/Chest: Effort normal and breath sounds normal.   Abdominal: Soft. Bowel sounds are normal. He exhibits no distension. There is no tenderness.   Musculoskeletal: Normal range of motion. He exhibits tenderness (L ankle mildly TTP) and deformity (Swelling of L ankle present). He exhibits no edema.   Neurological: He is alert and oriented to person, place, and time. He has normal reflexes.   Speech slurred, at baseline   Skin: Skin is warm and dry. He is not diaphoretic.   Psychiatric: He has a normal mood and affect. His behavior is normal. Judgment and thought content normal.   Nursing note and vitals reviewed.      Assessment:       1. Sprain of right ankle, unspecified ligament, subsequent encounter    2. Essential hypertension    3. Multiple sclerosis    4. Constipation, unspecified constipation type        Plan:       Sprain of right ankle, unspecified ligament, subsequent encounter  - pain mostly resolved with RICE and ankle exercises  - c/w current regimen  - pt declining pt/ot    Multiple sclerosis  - followed by Dr. Boy[facility]    Essential hypertension  - bp wnl  - stable  - c/w current rx   - The patient is asked to make an attempt to improve diet and exercise patterns to aid in medical management of this problem.    Constipation  - pt followed by GI  - will defer care to GI      - patient asked to bring all medication to clinic at next visit.     RTC in 2-3 months with all medications.    Leonora Dunn D.O.  [facility] HO-3  11/15/2018

## 2018-11-19 ENCOUNTER — HOSPITAL ENCOUNTER (EMERGENCY)
Facility: HOSPITAL | Age: 53
Discharge: HOME | End: 2018-11-19
Attending: EMERGENCY MEDICINE | Admitting: EMERGENCY MEDICINE
Payer: MEDICARE

## 2018-11-19 VITALS
TEMPERATURE: 98 F | HEART RATE: 85 BPM | BODY MASS INDEX: 31.99 KG/M2 | DIASTOLIC BLOOD PRESSURE: 69 MMHG | RESPIRATION RATE: 15 BRPM | OXYGEN SATURATION: 99 % | HEIGHT: 69 IN | WEIGHT: 216 LBS | SYSTOLIC BLOOD PRESSURE: 141 MMHG

## 2018-11-19 DIAGNOSIS — Z76.0 PRESCRIPTION REFILL: ICD-10-CM
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
## 2018-11-19 DIAGNOSIS — T07.XXXA MULTIPLE ABRASIONS: ICD-10-CM

## 2018-11-19 PROCEDURE — 99283 EMERGENCY DEPT VISIT LOW MDM: CPT

## 2018-11-19 RX ORDER — TRAMADOL HYDROCHLORIDE 50 MG/1
100 TABLET ORAL EVERY 6 HOURS PRN
Qty: 60 TABLET | Refills: 0 | Status: SHIPPED | OUTPATIENT
Start: 2018-11-19 | End: 2019-01-16 | Stop reason: SDUPTHER

## 2018-11-19 NOTE — ED PROVIDER NOTES
Encounter Date: 11/19/2018    SCRIBE #1 NOTE: I, Kristina Jauregui, am scribing for, and in the presence of,  Dr. Dewayne Pugh. I have scribed the entire note.       History     Chief Complaint   Patient presents with    Fall     Tripped and fell two days ago.  Has abrasions and redness to bilateral lower legs and right arm.  Out of Tramadol and is asking for refill     Tj Trammell is a 53 y.o. male who  has a past medical history of Asthma, Bilateral hearing loss (12/12/2012), Biliary acute pancreatitis, GERD (gastroesophageal reflux disease), High cholesterol, Hypertension, and Multiple sclerosis.    Pt is a 53 year old male who presents in the ED today with complaints of pain to right arm and left lower leg after he tripped and fell 2 days ago. He denies head trauma or LOC.  Pt has a previous Tramadol prescription that he is requesting refills for at this time to help w/ the pain.           The history is provided by the patient.     Review of patient's allergies indicates:  No Known Allergies  Past Medical History:   Diagnosis Date    Asthma     Bilateral hearing loss 12/12/2012    Biliary acute pancreatitis     GERD (gastroesophageal reflux disease)     High cholesterol     Hypertension     Multiple sclerosis      Past Surgical History:   Procedure Laterality Date    ARTHROSCOPY, SHOULDER Left 4/24/2014    [facility]    CHOLECYSTECTOMY      CHOLECYSTECTOMY, LAPAROSCOPIC N/A 12/16/2013    [facility] OR    ERCP N/A 9/2/2014    [facility] ENDO    ERCP WITH STENT EXCHANGE N/A 4/8/2015    [facility] END[facility]    ESOPHAGOGASTRODUODENOSCOPY (EGD) N/A 7/13/2015    [facility] END[facility]    FIXATION, TENDON Left 4/24/2014    [facility] OR    JOINT REPLACEMENT      arm    REPAIR ROTATOR CUFF ARTHROSCOPIC Left 4/30/2015     [facility]    REPAIR, ROTATOR CUFF, ARTHROSCOPIC Left 4/24/2014    [facility]    ULTRASOUND, UPPER GI TRACT, ENDOSCOPIC N/A 11/26/2013    Per[facility] by [facility] at [facility] ENDO     Family History   Problem Relation Age of Onset    Heart disease Mother     Heart disease Father      Social History     Tobacco Use    Smoking status: Current Every Day Smoker     Packs/day: 1.00     Years: 32.00     Pack years: 32.00     Types: Cigarettes    Smokeless tobacco: Never Used   Substance Use Topics    Alcohol use: No    Drug use: No     Review of Systems   Constitutional: Negative for chills and fever.   HENT: Negative for congestion, ear pain, rhinorrhea and sore throat.    Respiratory: Negative for cough, shortness of breath and wheezing.    Cardiovascular: Negative for chest pain and palpitations.   Gastrointestinal: Negative for abdominal pain, diarrhea, nausea and vomiting.   Genitourinary: Negative for dysuria and hematuria.   Musculoskeletal: Negative for back pain, myalgias and neck pain.        Right arm and left leg pain   Skin: Negative for rash.   Neurological: Negative for dizziness, weakness, light-headedness and headaches.   Psychiatric/Behavioral: Negative for confusion.   All other systems reviewed and are negative.      Physical Exam     Initial Vitals [11/19/18 1049]   BP Pulse Resp Temp SpO2   (!) 141/69 85 15 98.4 °F (36.9 °C) 99 %      MAP       --         Physical Exam    Nursing note and vitals reviewed.  Constitutional: He appears well-developed and well-nourished. He is not diaphoretic. No distress.   HENT:   Head: Normocephalic and atraumatic.   Mouth/Throat: Oropharynx is clear and moist.   Eyes: Conjunctivae and EOM are normal.   Neck: Normal range of motion. Neck supple.   Cardiovascular: Normal rate, regular rhythm and normal heart sounds. Exam reveals no gallop and no friction rub.    No murmur  heard.  Pulmonary/Chest: Breath sounds normal. He has no wheezes. He has no rhonchi. He has no rales.   Abdominal: Soft. There is no tenderness. There is no rebound and no guarding.   Musculoskeletal: Normal range of motion. He exhibits no edema.   No bony tenderness.    Lymphadenopathy:     He has no cervical adenopathy.   Neurological: He is alert and oriented to person, place, and time. He has normal strength.   Skin: Skin is warm and dry. No rash noted.   Abrasions on right forearm and legs bilaterally.         ED Course   Procedures  Labs Reviewed - No data to display       Imaging Results    None          Medical Decision Making:   ED Management:  53-year-old male who sustained a mechanical fall 2 days ago.  Patient has no appreciable bony tenderness and I do not see the need for any imaging at this time.  He is requesting a refill of his Ultram prescription which he takes for chronic pain. I will write him a prescription for this, but also urged to follow up with his primary physician for further refills.                      Clinical Impression:     1. Fall, initial encounter    2. Multiple abrasions    3. Prescription refill                  I, Dr. Dewayne Pugh, personally performed the services described in this documentation. All medical record entries made by the scribe were at my direction and in my presence. I have reviewed the chart and agree that the record reflects my personal performance and is accurate and complete. Dewayne Pugh MD.  11:39 AM 11/19/2018                             Dewayne Pugh MD  11/19/18 3122

## 2018-11-19 NOTE — ED TRIAGE NOTES
52 Y/O M with CC of fall. Pt reports fell 2 days ago. Has small abrasions to R shin and NOHEMI. States had gone to [facility] to refill Tramadol RX and was unable to get filled. Wants refill of medication. No other complaints verbalized. NAD noted. Will continue to monitor.

## 2018-12-04 NOTE — PROGRESS NOTES
I have reviewed the notes, assessments, and/or procedures performed, I concur with her/his documentation of Tj Trammell.

## 2018-12-12 ENCOUNTER — TELEPHONE (OUTPATIENT)
Dept: NEUROLOGY | Facility: CLINIC | Age: 53
End: 2018-12-12

## 2018-12-12 NOTE — TELEPHONE ENCOUNTER
----- Message from Wilian Capone sent at 12/12/2018  8:07 AM CST -----  Contact: self/[phone]  Patient needs to call back as soon as possible regarding some paperwork that he needs to have filled out.      He will not be at his appointment today due to the predicted weather.    Please call and advise.

## 2018-12-14 ENCOUNTER — OFFICE VISIT (OUTPATIENT)
Dept: GASTROENTEROLOGY | Facility: CLINIC | Age: 53
End: 2018-12-14
Payer: MEDICARE

## 2018-12-14 VITALS — HEART RATE: 81 BPM | DIASTOLIC BLOOD PRESSURE: 87 MMHG | SYSTOLIC BLOOD PRESSURE: 138 MMHG

## 2018-12-14 DIAGNOSIS — R14.0 ABDOMINAL BLOATING: ICD-10-CM

## 2018-12-14 DIAGNOSIS — K59.04 CHRONIC IDIOPATHIC CONSTIPATION: ICD-10-CM

## 2018-12-14 DIAGNOSIS — Z12.11 COLON CANCER SCREENING: Primary | ICD-10-CM

## 2018-12-14 PROCEDURE — 99203 OFFICE O/P NEW LOW 30 MIN: CPT | Mod: S$GLB,,, | Performed by: NURSE PRACTITIONER

## 2018-12-14 PROCEDURE — 3079F DIAST BP 80-89 MM HG: CPT | Mod: CPTII,S$GLB,, | Performed by: NURSE PRACTITIONER

## 2018-12-14 PROCEDURE — 3075F SYST BP GE 130 - 139MM HG: CPT | Mod: CPTII,S$GLB,, | Performed by: NURSE PRACTITIONER

## 2018-12-14 PROCEDURE — 99999 PR PBB SHADOW E&M-EST. PATIENT-LVL III: CPT | Mod: PBBFAC,,, | Performed by: NURSE PRACTITIONER

## 2018-12-14 RX ORDER — POLYETHYLENE GLYCOL 3350, SODIUM SULFATE ANHYDROUS, SODIUM BICARBONATE, SODIUM CHLORIDE, POTASSIUM CHLORIDE 236; 22.74; 6.74; 5.86; 2.97 G/4L; G/4L; G/4L; G/4L; G/4L
4 POWDER, FOR SOLUTION ORAL SEE ADMIN INSTRUCTIONS
Qty: 4000 ML | Refills: 0 | Status: SHIPPED | OUTPATIENT
Start: 2018-12-14 | End: 2019-01-14

## 2018-12-14 NOTE — PROGRESS NOTES
Subjective:       Patient ID: Tj Trammell is a 53 y.o. male.    Chief Complaint: Constipation and Bloated    HPI Continues to report constipation with difficulty having bowel movements and for getting complete emptying.  He reports he is currently only getting results with use of an Enema along with lactulose TID.  He has used MOM, miralax, Linzess in the past.    He continues to have abdominal discomfort, fullness and pressure    He denies blood with bowel movements or black stools.  Currently denies any reflux, heartburn, nausea, vomiting.  He has never had colonoscopy.     Review of Systems   Constitutional: Negative.  Negative for activity change, appetite change, fatigue, fever and unexpected weight change.   HENT: Positive for hearing loss.    Respiratory: Negative for shortness of breath.    Cardiovascular: Negative for chest pain.   Gastrointestinal: Positive for abdominal distention and constipation. Negative for diarrhea, nausea and vomiting.   Skin: Negative.        Objective:      Physical Exam   Constitutional: He is oriented to person, place, and time. He appears well-developed and well-nourished.   Eyes: No scleral icterus.   Cardiovascular: Normal rate.   Pulmonary/Chest: Effort normal. No respiratory distress.   Abdominal: Soft. Bowel sounds are normal. He exhibits no distension and no mass. There is tenderness (vague generalized discomfort wtih palpation). There is no guarding.   Neurological: He is alert and oriented to person, place, and time.   Skin: Skin is warm and dry. He is not diaphoretic.   Psychiatric: He has a normal mood and affect. His behavior is normal. Judgment and thought content normal.   Vitals reviewed.      Assessment:       1. Colon cancer screening    2. Abdominal bloating    3. Chronic idiopathic constipation        Plan:         Tj was seen today for constipation and bloated.    Diagnoses and all orders for this visit:    Colon cancer screening  -     Case request  GI: COLONOSCOPY 2 day  golytely    Abdominal bloating    Chronic idiopathic constipation    Other orders  -     polyethylene glycol (GOLYTELY,NULYTELY) 236-22.74-6.74 -5.86 gram suspension; Take 4,000 mLs (4 L total) by mouth As instructed.         I have explained the planned procedures to the patient.The risks, benefits and alternatives of the procedure were also explained in detail. Patient verbalized understanding, all questions were answered. The patient agrees to proceed as planned    Continue current medications.

## 2018-12-14 NOTE — PATIENT INSTRUCTIONS
2 DAY GOLYTELY Instructions    You are scheduled for a colonoscopy with Dr. Waddell on 1/22/19 at [facility] located at [address] [place] [address].  Check in at the admit desk, first floor of the hospital (which is the building on the left).     You will receive a call 2-3 days before your colonoscopy to tell you the time to arrive.  If you have not received a call by the day before your procedure, call the Endoscopy Lab at [phone].    To ensure that your test is accurate and complete, you MUST follow these instructions listed below.  If you have any questions, please call our office at [phone].  Plan on being at the hospital for your procedure for 3-4 hours.    2 DAYS BEFORE YOUR COLONOSCOPY:   1.  Eat a normal breakfast and lunch, but after lunch you will start your CLEAR LIQUID DIET.  FOR SUPPER/DINNER YOU WILL HAVE CLEAR LIQUIDS.    CLEAR LIQUID DIET:   - Avoid Red, Orange, Purple, and/or Blue food coloring   - NO DAIRY   - You can have:  Coffee with sugar (no creamer), tea, water, soda, apple or white grape juice, chicken or beef broth/bouillon (no meat, noodles, or veggies), green/yellow popsicles, green/yellow Jell-O, lemonade.     2.  At 4pm, drink ONE ENTIRE BOTTLE OF MAGNESIUM CITRATE     1 DAY BEFORE YOUR COLONOSCOPY:  1.  Follow a CLEAR LIQUID DIET for the entire day before your scheduled colonoscopy. This means no solid food the entire day starting when you wake.  You may have as much of the clear liquids as you want throughout the day.   CLEAR LIQUID DIET:   - Avoid Red, Orange, Purple, and/or Blue food coloring   - NO DAIRY   - You can have:  Coffee with sugar (no creamer), tea, water, soda, apple or white grape juice, chicken or beef broth/bouillon (no meat, noodles, or veggies), green/yellow popsicles, green/yellow Jell-O, lemonade.    2.  MIX GOLYTELY/COLYTE/NULYTELY (all names for same product) WITH ONE (1) GALLON OF WATER.  YOU MAY ADD A FLAVOR PACKET OR YELLOW/GREEN POWDER  DRINK MIX TO THIS.  PUT IN REFRIGERATOR.  This is easier to drink if this solution is cold, so you can mix the solution one day ahead of time and place in the refrigerator prior to drinking.  You have to drink the solution within 24-36 hours of mixing it.  Do NOT put this solution over ice.  It IS ok to drink with a straw.    3. AT 5 PM THE DAY BEFORE YOUR COLONOSCOPY, DRINK ONE (1) 8 OUNCE GLASS OF MIXTURE EVERY 10 MINUTES UNTIL HALF OF THE GALLON IS CONSUMED.  Keep this mixture cold and in refrigerator as much as you can while drinking it.  Place the remaining half of mixture in the refrigerator when you finish the first half.    4.  The endoscopy department will call you 2 days before your colonoscopy to tell you the exact time to arrive, AND to tell you the exact time to drink the 2nd portion of your prep (which will be FIVE HOURS BEFORE YOUR ARRIVAL TIME).  At this time given to you, DRINK ONE (1) 8 OUNCE GLASS OF MIXTURE EVERY 10 MINUTES UNTIL THE OTHER HALF IS CONSUMED. Keep the mixture cold while you are drinking it. Once this is complete, you may not have ANYTHING else by mouth!    5.  You must have someone with you to DRIVE YOU HOME since you will be receiving IV sedation for the colonoscopy.    6.  It is ok to take your heart, blood pressure, and seizure medications in the morning of your test with a SIP of water.  Hold other medications until after your procedure.  Do NOT have anything else to eat or drink the morning of your colonoscopy.  It is ok to brush your teeth.    7.  If you are on blood thinners THAT YOU HAVE BEEN INSTRUCTED TO HOLD BY YOUR DOCTOR FOR THIS PROCEDURE, then do NOT take this the morning of your colonoscopy.  Do NOT stop these medications on your own, they must be approved to be held by your doctor.  Your colonoscopy can NOT be done if you are on these medications.  Examples of blood thinners include: Coumadin, Aggrenox, Plavix, Pradaxa, Reapro, Pletal, Xarelto, Ticagrelor,  Brilinta, Eliquis, and high dose aspirin (325 mg).  You do not have to stop baby aspirin 81 mg.    8.  IF YOU ARE DIABETIC:  NO INSULIN OR ORAL MEDICATIONS THE MORNING OF THE COLONOSCOPY.  TAKE ONLY HALF THE DOSE OF YOUR INSULIN THE DAY BEFORE THE COLONOSCOPY.  DO NOT TAKE ANY ORAL DIABETIC MEDICATIONS THE DAY BEFORE THE COLONOSCOPY.  IF YOU ARE AN INSULIN DEPENDENT DIABETIC WITH UNSTABLE BLOOD SUGARDS, NOTIFY YOUR PRIMARY CARE PHYSICIAN FOR INSTRUCTIONS.

## 2018-12-17 ENCOUNTER — OFFICE VISIT (OUTPATIENT)
Dept: NEUROLOGY | Facility: CLINIC | Age: 53
End: 2018-12-17
Payer: MEDICARE

## 2018-12-17 VITALS
SYSTOLIC BLOOD PRESSURE: 136 MMHG | WEIGHT: 216.06 LBS | HEART RATE: 88 BPM | DIASTOLIC BLOOD PRESSURE: 92 MMHG | HEIGHT: 69 IN | BODY MASS INDEX: 32 KG/M2

## 2018-12-17 DIAGNOSIS — G35 MULTIPLE SCLEROSIS: ICD-10-CM

## 2018-12-17 PROCEDURE — 3008F BODY MASS INDEX DOCD: CPT | Mod: CPTII,S$GLB,, | Performed by: PSYCHIATRY & NEUROLOGY

## 2018-12-17 PROCEDURE — 99214 OFFICE O/P EST MOD 30 MIN: CPT | Mod: S$GLB,,, | Performed by: PSYCHIATRY & NEUROLOGY

## 2018-12-17 PROCEDURE — 3075F SYST BP GE 130 - 139MM HG: CPT | Mod: CPTII,S$GLB,, | Performed by: PSYCHIATRY & NEUROLOGY

## 2018-12-17 PROCEDURE — 3080F DIAST BP >= 90 MM HG: CPT | Mod: CPTII,S$GLB,, | Performed by: PSYCHIATRY & NEUROLOGY

## 2018-12-17 PROCEDURE — 99999 PR PBB SHADOW E&M-EST. PATIENT-LVL V: CPT | Mod: PBBFAC,,, | Performed by: PSYCHIATRY & NEUROLOGY

## 2018-12-17 NOTE — PATIENT INSTRUCTIONS
Fall Prevention  Falls often occur due to slipping, tripping or losing your balance. Millions of people fall every year and injure themselves. Here are ways to reduce your risk of falling again.  · Think about your fall, was there anything that caused your fall that can be fixed, removed, or replaced?  · Make your home safe by keeping walkways clear of objects you may trip over.  · Use non-slip pads under rugs. Do not use area rugs or small throw rugs.  · Use non-slip mats in bathtubs and showers.  · Install handrails and lights on staircases.  · Do not walk in poorly lit areas.  · Do not stand on chairs or wobbly ladders.  · Use caution when reaching overhead or looking upward. This position can cause a loss of balance.  · Be sure your shoes fit properly, have non-slip bottoms and are in good condition.   · Wear shoes both inside and out. Avoid going barefoot or wearing slippers.  · Be cautious when going up and down stairs, curbs, and when walking on uneven sidewalks.  · If your balance is poor, consider using a cane or walker.  · If your fall was related to alcohol use, stop or limit alcohol intake.   · If your fall was related to use of sleeping medicines, talk to your doctor about this. You may need to reduce your dosage at bedtime if you awaken during the night to go to the bathroom.    · To reduce the need for nighttime bathroom trips:  ¨ Avoid drinking fluids for several hours before going to bed  ¨ Empty your bladder before going to bed  ¨ Men can keep a urinal at the bedside  · Stay as active as you can. Balance, flexibility, strength, and endurance all come from exercise. They all play a role in preventing falls. Ask your healthcare provider which types of activity are right for you.  · Get your vision checked on a regular basis.  · If you have pets, know where they are before you stand up or walk so you don't trip over them.  · Use night lights.  Date Last Reviewed: 11/5/2015  © 8632-3823 [employer]. [address] [place] [address], [place] [address]. All rights reserved. This information is not intended as a substitute for professional medical care. Always follow your healthcare professional's instructions.

## 2018-12-17 NOTE — ASSESSMENT & PLAN NOTE
Remain unable to verify if patient is taking Aubagio  Attempted to call patient caregiver however number is disconnected  Patient declines [facility] Referral due to transportation issues
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
## 2018-12-21 RX ORDER — SUCRALFATE 1 G/10ML
SUSPENSION ORAL
Qty: 3600 ML | Refills: 3 | Status: CANCELLED | OUTPATIENT
Start: 2018-12-21

## 2018-12-21 RX ORDER — SUCRALFATE 1 G/1
TABLET ORAL
Qty: 360 TABLET | Refills: 3 | Status: SHIPPED | OUTPATIENT
Start: 2018-12-21 | End: 2021-09-20

## 2019-01-14 NOTE — TELEPHONE ENCOUNTER
Patient showed up in clinic today. Patient stated that he already drink the Golytely prep. Explained the 2day Golytely prep to him again and informed him that he should  the Prep on Friday and start with the Magnesium citrate on Sunday 1/20/19. Clear liquids on Monday 1/21/19 and start Drinking the Golytely at 5pm.    Informed him that we would send in the Golytely again and to pick it up Friday.

## 2019-01-15 RX ORDER — POLYETHYLENE GLYCOL 3350, SODIUM SULFATE ANHYDROUS, SODIUM BICARBONATE, SODIUM CHLORIDE, POTASSIUM CHLORIDE 236; 22.74; 6.74; 5.86; 2.97 G/4L; G/4L; G/4L; G/4L; G/4L
4 POWDER, FOR SOLUTION ORAL SEE ADMIN INSTRUCTIONS
Qty: 4000 ML | Refills: 0 | Status: SHIPPED | OUTPATIENT
Start: 2019-01-15 | End: 2019-04-09 | Stop reason: ALTCHOICE

## 2019-01-16 ENCOUNTER — OFFICE VISIT (OUTPATIENT)
Dept: FAMILY MEDICINE | Facility: HOSPITAL | Age: 54
End: 2019-01-16
Attending: FAMILY MEDICINE
Payer: MEDICARE

## 2019-01-16 VITALS
HEIGHT: 69 IN | BODY MASS INDEX: 31.38 KG/M2 | WEIGHT: 211.88 LBS | DIASTOLIC BLOOD PRESSURE: 82 MMHG | SYSTOLIC BLOOD PRESSURE: 133 MMHG | HEART RATE: 75 BPM

## 2019-01-16 DIAGNOSIS — R26.89 DECREASED FUNCTIONAL MOBILITY: ICD-10-CM

## 2019-01-16 DIAGNOSIS — G35 MULTIPLE SCLEROSIS: Primary | ICD-10-CM

## 2019-01-16 PROCEDURE — 99215 OFFICE O/P EST HI 40 MIN: CPT | Performed by: STUDENT IN AN ORGANIZED HEALTH CARE EDUCATION/TRAINING PROGRAM
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
## 2019-01-18 ENCOUNTER — TELEPHONE (OUTPATIENT)
Dept: ENDOSCOPY | Facility: HOSPITAL | Age: 54
End: 2019-01-18

## 2019-01-18 NOTE — TELEPHONE ENCOUNTER
Left message for Roosevelt instructing to call dept @ [phone] between 8am-4pm.  Patients care taker is roosevelt.

## 2019-01-21 ENCOUNTER — TELEPHONE (OUTPATIENT)
Dept: ENDOSCOPY | Facility: HOSPITAL | Age: 54
End: 2019-01-21

## 2019-01-21 NOTE — TELEPHONE ENCOUNTER
Spoke with patient.  He is coming tomorrow at 0900.  He does have prep and knows that he can't eat.  He has someone bringing him to the procedure.

## 2019-01-22 ENCOUNTER — ANESTHESIA (OUTPATIENT)
Dept: ENDOSCOPY | Facility: HOSPITAL | Age: 54
End: 2019-01-22
Payer: MEDICARE

## 2019-01-22 ENCOUNTER — ANESTHESIA EVENT (OUTPATIENT)
Dept: ENDOSCOPY | Facility: HOSPITAL | Age: 54
End: 2019-01-22
Payer: MEDICARE

## 2019-01-22 ENCOUNTER — HOSPITAL ENCOUNTER (OUTPATIENT)
Facility: HOSPITAL | Age: 54
Discharge: HOME | End: 2019-01-22
Attending: INTERNAL MEDICINE | Admitting: INTERNAL MEDICINE
Payer: MEDICARE

## 2019-01-22 VITALS
RESPIRATION RATE: 16 BRPM | BODY MASS INDEX: 34.8 KG/M2 | WEIGHT: 235 LBS | DIASTOLIC BLOOD PRESSURE: 93 MMHG | OXYGEN SATURATION: 97 % | HEIGHT: 69 IN | TEMPERATURE: 98 F | SYSTOLIC BLOOD PRESSURE: 143 MMHG | HEART RATE: 65 BPM

## 2019-01-22 DIAGNOSIS — Z12.11 SCREENING FOR MALIGNANT NEOPLASM OF COLON: ICD-10-CM

## 2019-01-22 PROCEDURE — 63600175 PHARM REV CODE 636 W HCPCS: Performed by: NURSE ANESTHETIST, CERTIFIED REGISTERED

## 2019-01-22 PROCEDURE — 88305 TISSUE EXAM BY PATHOLOGIST: CPT | Mod: 26,,, | Performed by: PATHOLOGY

## 2019-01-22 PROCEDURE — 37000009 HC ANESTHESIA EA ADD 15 MINS: Performed by: INTERNAL MEDICINE

## 2019-01-22 PROCEDURE — 45385 COLONOSCOPY W/LESION REMOVAL: CPT | Mod: PT,,, | Performed by: INTERNAL MEDICINE

## 2019-01-22 PROCEDURE — 37000008 HC ANESTHESIA 1ST 15 MINUTES: Performed by: INTERNAL MEDICINE

## 2019-01-22 PROCEDURE — 88305 TISSUE SPECIMEN TO PATHOLOGY - SURGERY: ICD-10-PCS | Mod: 26,,, | Performed by: PATHOLOGY

## 2019-01-22 PROCEDURE — 27201089 HC SNARE, DISP (ANY): Performed by: INTERNAL MEDICINE

## 2019-01-22 PROCEDURE — 45385 PR COLONOSCOPY,REMV LESN,SNARE: ICD-10-PCS | Mod: PT,,, | Performed by: INTERNAL MEDICINE

## 2019-01-22 PROCEDURE — 88305 TISSUE EXAM BY PATHOLOGIST: CPT | Performed by: PATHOLOGY

## 2019-01-22 PROCEDURE — 45385 COLONOSCOPY W/LESION REMOVAL: CPT | Performed by: INTERNAL MEDICINE

## 2019-01-22 PROCEDURE — 25000003 PHARM REV CODE 250: Performed by: INTERNAL MEDICINE

## 2019-01-22 RX ORDER — SODIUM CHLORIDE 9 MG/ML
INJECTION, SOLUTION INTRAVENOUS CONTINUOUS
Status: DISCONTINUED | OUTPATIENT
Start: 2019-01-22 | End: 2019-04-09

## 2019-01-22 RX ORDER — LIDOCAINE HCL/PF 100 MG/5ML
SYRINGE (ML) INTRAVENOUS
Status: DISCONTINUED | OUTPATIENT
Start: 2019-01-22 | End: 2019-01-22

## 2019-01-22 RX ORDER — PROPOFOL 10 MG/ML
VIAL (ML) INTRAVENOUS
Status: DISCONTINUED | OUTPATIENT
Start: 2019-01-22 | End: 2019-01-22

## 2019-01-22 RX ORDER — SODIUM CHLORIDE 0.9 % (FLUSH) 0.9 %
3 SYRINGE (ML) INJECTION
Status: DISCONTINUED | OUTPATIENT
Start: 2019-01-22 | End: 2019-04-09

## 2019-01-22 RX ORDER — PROPOFOL 10 MG/ML
VIAL (ML) INTRAVENOUS CONTINUOUS PRN
Status: DISCONTINUED | OUTPATIENT
Start: 2019-01-22 | End: 2019-01-22

## 2019-01-22 RX ADMIN — SODIUM CHLORIDE: 0.9 INJECTION, SOLUTION INTRAVENOUS at 09:01

## 2019-01-22 RX ADMIN — PROPOFOL 50 MG: 10 INJECTION, EMULSION INTRAVENOUS at 10:01

## 2019-01-22 RX ADMIN — LIDOCAINE HYDROCHLORIDE 50 MG: 20 INJECTION, SOLUTION INTRAVENOUS at 10:01

## 2019-01-22 RX ADMIN — PROPOFOL 30 MG: 10 INJECTION, EMULSION INTRAVENOUS at 10:01

## 2019-01-22 RX ADMIN — PROPOFOL 150 MCG/KG/MIN: 10 INJECTION, EMULSION INTRAVENOUS at 10:01

## 2019-01-22 NOTE — ANESTHESIA PREPROCEDURE EVALUATION
01/22/2019  Tj Trammell is a 53 y.o., male for screening colonoscopy.    PRIOR ANES (Epic)  20150713 EGD    prop 80 -> 150 mcg/kg/min VSS [facility]  [id number] Rotator_Cuff interscalblock/GA    [MAC for block; prop 160]    [sevo, prop 40. fent 250, ephed 25]    [easy mask; ETT 7.5, Mac4, 3 attempts lg floppy epiglottis]  [id number] ERCP GA    [mid 2, fent 50, prop 100] VSS    [sevo] VSS    [easy mask; CMAC intubation, large floppy epiglottis, Grade III]  other cases in epic    Patient Active Problem List   Diagnosis    Multiple sclerosis    Generalized osteoarthritis of multiple sites    Bilateral hearing loss    Incontinence of urine    Deaf    Tobacco abuse    HTN (hypertension)    HLD (hyperlipidemia)    Constipation    Pain, abdominal    Biliary stricture    GERD (gastroesophageal reflux disease)    Chronic pain of left knee    Weakness of both lower extremities    Decreased functional mobility        ~ Sicca syndrome is dry eyes & mouth; sometimes associated with arthritis & esophageal problems    Past Medical History:   Diagnosis Date    Asthma     Bilateral hearing loss 12/12/2012    Biliary acute pancreatitis     GERD (gastroesophageal reflux disease)     High cholesterol     Hypertension     Multiple sclerosis      Past Surgical History:   Procedure Laterality Date    ARTHROSCOPY, SHOULDER Left 4/24/2014    [facility]    CHOLECYSTECTOMY      CHOLECYSTECTOMY, LAPAROSCOPIC N/A 12/16/2013    [facility]on [facility] at [facility] OR    ERCP N/A 9/2/2014    [facility] at [facility] ENDO    ERCP WITH STENT EXCHANGE N/A 4/8/2015    [facility] at [facility] END[facility]    ESOPHAGOGASTRODUODENOSCOPY (EGD) N/A 7/13/2015    [facility] at [facility] END[facility]    FIXATION, TENDON Left 4/24/2014    Performed by  [facility] at [facility]    JOINT REPLACEMENT      arm    REPAIR ROTATOR CUFF ARTHROSCOPIC Left 4/30/2015    [facility] by [facility] at [facility]    REPAIR, ROTATOR CUFF, ARTHROSCOPIC Left 4/24/2014    [facility] by [facility] at [facility] OR    ULTRASOUND, UPPER GI TRACT, ENDOSCOPIC N/A 11/26/2013    Performed by L[facility] at [facility] ENDO     Review of patient's allergies indicates:  No Known Allergies    ANES-RELATED HOME Rx [id number]  lisinopril albuterol    Pantoprazole  sucralfate  Pre-op Assessment    I have reviewed the Patient Summary Reports.         Review of Systems  Anesthesia Hx:  No previous Anesthesia Denies Hx of Anesthetic complications  History of prior surgery of interest to airway management or planning:  Denies Personal Hx of Anesthesia complications.   Social:  Smoker    Hematology/Oncology:  Hematology Normal        EENT/Dental:   Edentulous  Hearing aid R ear   Cardiovascular:   Exercise tolerance: good Hypertension Cannot exercise (neurological disease)   Pulmonary:   [id number] denies current lung symptoms   Renal/:  Renal/ Normal     Hepatic/GI:  Hepatic/GI Normal pancreatitis   Neurological:   Neuromuscular Disease, (multiple sclerosis) Cannot walk  Very hard of hearing   Endocrine:  Endocrine Normal      Wt Readings from Last 1 Encounters:   01/16/19 96.1 kg (211 lb 13.8 oz)     Temp Readings from Last 1 Encounters:   11/19/18 36.9 °C (98.4 °F) (Oral)     BP Readings from Last 1 Encounters:   01/16/19 133/82     Pulse Readings from Last 1 Encounters:   01/16/19 75     SpO2 Readings from Last 1 Encounters:   11/19/18 99%         Physical Exam  General:  Obesity    Airway/Jaw/Neck:  AIRWAY FINDINGS: Normal Mallampati: II        Dental:  Dental Findings: Edentulous    Chest/Lungs:  Chest/Lungs Clear    Heart/Vascular:  Heart Findings: Normal       Mental Status:  Mental Status Findings: Normal      Lab Results   Component Value Date    WBC 4.42  10/03/2018    HGB 17.0 10/03/2018    HCT 52.0 10/03/2018     10/03/2018    CHOL 141 06/02/2016    TRIG 149 06/02/2016    HDL 31 (L) 06/02/2016    ALT 25 10/03/2018    AST 29 10/03/2018     10/03/2018    K 5.3 (H) 10/03/2018     10/03/2018    CREATININE 0.9 10/03/2018    BUN 6 10/03/2018    CO2 25 10/03/2018    TSH 0.829 07/15/2014    INR 1.0 07/15/2014    HGBA1C 6.1 06/02/2016     Lab Results   Component Value Date    ALBUMIN 4.1 10/03/2018     EKG 2015-04-30  Sinus rhythm with marked sinus arrhythmia 67  Nonspecific ST and T wave abnormality  Abnormal ECG  When compared with ECG of 15-JUL-2014 13:46,  Vent. rate has decreased BY 56 BPM  Confirmed by Maurizio     DOBUTAMINE STRESS TEST 2013-03-13  EKG portion  1 . ...suspicious for ischemia at a peak heart rate of 142 bpm (83% of predicted).   2. Blood pressure remained stable throughout the protocol (Presenting BP: 114/88 Peak BP: 182/59).   3. No significant arrhythmias were present.   4. There were no symptoms of chest discomfort or significant dyspnea throughout the protocol   ECHO portion  1 - Normal left ventricular function (EF 55%).   2 - Negative dobutamine stress echo by echocardiographic criteria.   Overall assessment  No evidence of stress-induced myocardial ischemia    Anesthesia Plan  Type of Anesthesia, risks & benefits discussed:  Anesthesia Type:  MAC  Patient's Preference:   Intra-op Monitoring Plan:   Intra-op Monitoring Plan Comments:   Post Op Pain Control Plan:   Post Op Pain Control Plan Comments:   Induction:   IV  Beta Blocker:  Patient is not currently on a Beta-Blocker (No further documentation required).       Informed Consent: Patient understands risks and agrees with Anesthesia plan.  Questions answered. Anesthesia consent signed with patient.  ASA Score: 3     Day of Surgery Review of History & Physical:            Ready For Surgery From Anesthesia Perspective.

## 2019-01-22 NOTE — TRANSFER OF CARE
"Anesthesia Transfer of Care Note    Patient: Tj Trammell    Procedure(s) Performed: Procedure(s) (LRB):  COLONOSCOPY 2 day  golytely (N/A)    Patient location: GI    Anesthesia Type: MAC    Transport from OR: Transported from OR on room air with adequate spontaneous ventilation    Post pain: adequate analgesia    Post assessment: no apparent anesthetic complications and tolerated procedure well    Post vital signs: stable    Level of consciousness: awake, alert and oriented    Nausea/Vomiting: no nausea/vomiting    Complications: none    Transfer of care protocol was followed      Last vitals:   Visit Vitals  BP (!) 158/85   Pulse 83   Temp 36.4 °C (97.6 °F)   Resp 18   Ht 5' 9" (1.753 m)   Wt 106.6 kg (235 lb)   SpO2 97%   BMI 34.70 kg/m²     "

## 2019-01-22 NOTE — ANESTHESIA POSTPROCEDURE EVALUATION
"Anesthesia Post Evaluation    Patient: Tj Trammell    Procedure(s) Performed: Procedure(s) (LRB):  COLONOSCOPY 2 day  golytely (N/A)    Final Anesthesia Type: MAC  Patient location during evaluation: GI PACU  Patient participation: Yes- Able to Participate  Level of consciousness: awake and alert and oriented  Post-procedure vital signs: reviewed and stable  Pain management: adequate  Airway patency: patent  PONV status at discharge: No PONV  Anesthetic complications: no      Cardiovascular status: blood pressure returned to baseline and hemodynamically stable  Respiratory status: unassisted, spontaneous ventilation and room air  Hydration status: euvolemic  Follow-up not needed.        Visit Vitals  BP (!) 158/85   Pulse 83   Temp 36.4 °C (97.6 °F)   Resp 18   Ht 5' 9" (1.753 m)   Wt 106.6 kg (235 lb)   SpO2 97%   BMI 34.70 kg/m²       Pain/Suki Score: No Data Recorded      "

## 2019-01-22 NOTE — [employer] PATIENT INSTRUCTIONS
Discharge Summary/Instructions after an Endoscopic Procedure  Patient Name: Tj Trammell  Patient MRN: [id number]  Patient YOB: 1965 Tuesday, January 22, 2019  Nathan Waddell MD  RESTRICTIONS:  During your procedure today, you received medications for sedation.  These   medications may affect your judgment, balance and coordination.  Therefore,   for 24 hours, you have the following restrictions:   - DO NOT drive a car, operate machinery, make legal/financial decisions,   sign important papers or drink alcohol.    ACTIVITY:  Today: no heavy lifting, straining or running due to procedural   sedation/anesthesia.  The following day: return to full activity including work.  DIET:  Eat and drink normally unless instructed otherwise.     TREATMENT FOR COMMON SIDE EFFECTS:  - Mild abdominal pain, nausea, belching, bloating or excessive gas:  rest,   eat lightly and use a heating pad.  - Sore Throat: treat with throat lozenges and/or gargle with warm salt   water.  - Because air was used during the procedure, expelling large amounts of air   from your rectum or belching is normal.  - If a bowel prep was taken, you may not have a bowel movement for 1-3 days.    This is normal.  SYMPTOMS TO WATCH FOR AND REPORT TO YOUR PHYSICIAN:  1. Abdominal pain or bloating, other than gas cramps.  2. Chest pain.  3. Back pain.  4. Signs of infection such as: chills or fever occurring within 24 hours   after the procedure.  5. Rectal bleeding, which would show as bright red, maroon, or black stools.   (A tablespoon of blood from the rectum is not serious, especially if   hemorrhoids are present.)  6. Vomiting.  7. Weakness or dizziness.  GO DIRECTLY TO THE NEAREST EMERGENCY ROOM IF YOU HAVE ANY OF THE FOLLOWING:      Difficulty breathing              Chills and/or fever over 101 F   Persistent vomiting and/or vomiting blood   Severe abdominal pain   Severe chest pain   Black, tarry stools   Bleeding- more than one  tablespoon   Any other symptom or condition that you feel may need urgent attention  Your doctor recommends these additional instructions:  If any biopsies were taken, your doctors clinic will contact you in 1 to 2   weeks with any results.  - Discharge patient to home (via cart).   - Patient has a contact number available for emergencies.  The signs and   symptoms of potential delayed complications were discussed with the   patient.  Return to normal activities tomorrow.  Written discharge   instructions were provided to the patient.   - Resume previous diet.   - Continue present medications.   - Await pathology results.   - Repeat colonoscopy date to be determined after pending pathology results   are reviewed for surveillance.  For questions, problems or results please call your physician - Nathan Waddell MD at Work:  ( [phone].  EMERGENCY PHONE NUMBER: [phone],  LAB RESULTS: [phone]  IF A COMPLICATION OR EMERGENCY SITUATION ARISES AND YOU ARE UNABLE TO REACH   YOUR PHYSICIAN - GO DIRECTLY TO THE EMERGENCY ROOM.  Nathan Waddell MD  1/22/2019 11:03:23 AM  This report has been verified and signed electronically.  [employer]

## 2019-01-22 NOTE — H&P
[facility]  Gastroenterology  H&P    Patient Name: Tj Trammell  MRN: [id number]  Admission Date: 1/22/2019  Code Status: Full Code    Attending Provider: Nathan Waddell MD   Primary Care Physician: Leonora Dunn DO  Principal Problem:<principal problem not specified>    Subjective:     History of Present Illness: Colon cancer screening    Past Medical History:   Diagnosis Date    Asthma     Bilateral hearing loss 12/12/2012    Biliary acute pancreatitis     GERD (gastroesophageal reflux disease)     High cholesterol     Hypertension     Multiple sclerosis        Past Surgical History:   Procedure Laterality Date    ARTHROSCOPY, SHOULDER Left 4/24/2014    [facility] OR    CHOLECYSTECTOMY      CHOLECYSTECTOMY, LAPAROSCOPIC N/A 12/16/2013    [facility] by [facility] at [facility] OR    ERCP N/A 9/2/2014    [facility]formed by L[facility] at [facility] ENDO    ERCP WITH STENT EXCHANGE N/A 4/8/2015    [facility]formed by [facility] at [facility] ENDO    ESOPHAGOGASTRODUODENOSCOPY (EGD) N/A 7/13/2015    [facility]formed by [facility] at [facility] ENDO    FIXATION, TENDON Left 4/24/2014    [facility]formed by [facility] at [facility] OR    JOINT REPLACEMENT      arm    REPAIR ROTATOR CUFF ARTHROSCOPIC Left 4/30/2015    [facility] by [facility] at [facility] OR    REPAIR, ROTATOR CUFF, ARTHROSCOPIC Left 4/24/2014    [facility]formed by [facility] at [facility] OR    ULTRASOUND, UPPER GI TRACT, ENDOSCOPIC N/A 11/26/2013    Performed by L[facility] at [facility] ENDO       Review of patient's allergies indicates:  No Known Allergies  Family History     Problem Relation (Age of Onset)    Heart disease Mother, Father        Tobacco Use    Smoking status: Current Every Day Smoker     Packs/day: 1.00     Years: 32.00     Pack years: 32.00     Types: Cigarettes    Smokeless tobacco: Never Used   Substance and Sexual Activity    Alcohol use: No    Drug use:  No    Sexual activity: Not on file     Review of Systems   Constitutional: Negative for appetite change and unexpected weight change.   Cardiovascular: Negative for chest pain and palpitations.   Gastrointestinal: Negative for abdominal distention and abdominal pain.     Objective:     Vital Signs (Most Recent):  Temp: 97.6 °F (36.4 °C) (01/22/19 0948)  Pulse: 83 (01/22/19 0948)  Resp: 18 (01/22/19 0948)  BP: (!) 158/85 (01/22/19 0948)  SpO2: 97 % (01/22/19 0948) Vital Signs (24h Range):  Temp:  [97.6 °F (36.4 °C)] 97.6 °F (36.4 °C)  Pulse:  [83] 83  Resp:  [18] 18  SpO2:  [97 %] 97 %  BP: (158)/(85) 158/85     Weight: 106.6 kg (235 lb) (01/22/19 0948)  Body mass index is 34.7 kg/m².    No intake or output data in the 24 hours ending 01/22/19 1010    Lines/Drains/Airways     Peripheral Intravenous Line                 Peripheral IV - Single Lumen 01/22/19 0951 Right Hand less than 1 day                Physical Exam   Constitutional: He is oriented to person, place, and time. He appears well-developed and well-nourished. No distress.   HENT:   Head: Normocephalic.   Eyes: Conjunctivae are normal. No scleral icterus.   Neck: No tracheal deviation present. No thyromegaly present.   Cardiovascular: Normal rate, regular rhythm and normal heart sounds. Exam reveals no gallop and no friction rub.   No murmur heard.  Pulmonary/Chest: Effort normal and breath sounds normal. He has no wheezes. He has no rales.   Abdominal: Soft. Bowel sounds are normal. He exhibits no distension. There is no tenderness. There is no rebound and no guarding.   Musculoskeletal: Normal range of motion. He exhibits no edema or tenderness.   Neurological: He is alert and oriented to person, place, and time.   Skin: He is not diaphoretic.   Psychiatric: He has a normal mood and affect. His behavior is normal.           Assessment/Plan:     Active Diagnoses:    Diagnosis Date Noted POA    Screening for malignant neoplasm of colon [Z12.11]  01/22/2019 Not Applicable      Problems Resolved During this Admission:       Plan  1. Colonoscopy    Nathan Waddell MD  Gastroenterology  [facility]

## 2019-01-31 RX ORDER — ALPRAZOLAM 0.5 MG/1
TABLET ORAL
Qty: 60 TABLET | Refills: 3 | Status: CANCELLED | OUTPATIENT
Start: 2019-01-31

## 2019-04-09 ENCOUNTER — INITIAL CONSULT (OUTPATIENT)
Dept: PHYSICAL MEDICINE AND REHAB | Facility: CLINIC | Age: 54
End: 2019-04-09
Payer: MEDICARE

## 2019-04-09 VITALS — SYSTOLIC BLOOD PRESSURE: 119 MMHG | HEART RATE: 88 BPM | DIASTOLIC BLOOD PRESSURE: 81 MMHG

## 2019-04-09 DIAGNOSIS — G35 MULTIPLE SCLEROSIS: Primary | ICD-10-CM

## 2019-04-09 DIAGNOSIS — R26.89 DECREASED FUNCTIONAL MOBILITY: ICD-10-CM

## 2019-04-09 DIAGNOSIS — R26.9 NEUROLOGIC GAIT DYSFUNCTION: ICD-10-CM

## 2019-04-09 PROCEDURE — 3074F SYST BP LT 130 MM HG: CPT | Mod: CPTII,S$GLB,, | Performed by: PHYSICAL MEDICINE & REHABILITATION

## 2019-04-09 PROCEDURE — 99204 OFFICE O/P NEW MOD 45 MIN: CPT | Mod: S$GLB,,, | Performed by: PHYSICAL MEDICINE & REHABILITATION

## 2019-04-09 PROCEDURE — 3074F PR MOST RECENT SYSTOLIC BLOOD PRESSURE < 130 MM HG: ICD-10-PCS | Mod: CPTII,S$GLB,, | Performed by: PHYSICAL MEDICINE & REHABILITATION

## 2019-04-09 PROCEDURE — 3079F DIAST BP 80-89 MM HG: CPT | Mod: CPTII,S$GLB,, | Performed by: PHYSICAL MEDICINE & REHABILITATION

## 2019-04-09 PROCEDURE — 99999 PR PBB SHADOW E&M-EST. PATIENT-LVL III: ICD-10-PCS | Mod: PBBFAC,,, | Performed by: PHYSICAL MEDICINE & REHABILITATION

## 2019-04-09 PROCEDURE — 99204 PR OFFICE/OUTPT VISIT, NEW, LEVL IV, 45-59 MIN: ICD-10-PCS | Mod: S$GLB,,, | Performed by: PHYSICAL MEDICINE & REHABILITATION

## 2019-04-09 PROCEDURE — 3079F PR MOST RECENT DIASTOLIC BLOOD PRESSURE 80-89 MM HG: ICD-10-PCS | Mod: CPTII,S$GLB,, | Performed by: PHYSICAL MEDICINE & REHABILITATION

## 2019-04-09 PROCEDURE — 99999 PR PBB SHADOW E&M-EST. PATIENT-LVL III: CPT | Mod: PBBFAC,,, | Performed by: PHYSICAL MEDICINE & REHABILITATION

## 2019-04-09 NOTE — PROGRESS NOTES
"Initial PM&R Clinic Evaluation  CC: Multipe Sclerosis, decreased functional mobility  Referral: Leonora Dunn DO    HPI: Tj Trammell is a left hand dominant 53 y.o. male who presents today as an initial consultation from consulting provider, Leonora Dunn DO, for multiple sclerosis and decreased functional mobility. He arrives with his brother today. "Born deaf", very hard of hearing, tries to read lips, but can understand what is being said. Dysarthric, can be difficult to understand at times, especially when off topic or speaking fast. He is a poor historian, does not know many details regarding his doctors or medications. A woman who used to help with his medications moved out of the house he shares with his brothers a few months ago. His brother states has had his own medical problems and is just now getting back to being able to help with his brother and figure out his doctors and medications he is taking currently. Went through his medication list with patient and he says he takes the tramadol and xanax prescribed by Dr. Ferguson, unclear when he last saw Dr. Ferguson though. His brother drives him to appointments, but can take him on Tuesdays. Unable to be seen in multidisciplinary MS clinic on Mondays due to this. Sees Dr. Michael Boyle for his MS but at last visit she was unsure if he was taking MS medications and, if so, who was prescribing them. Denies recent flare. Describes a mostly relapsing remitting course.    Biggest complaint today is falls he relates to poor balance. Has a cane he uses. Has walker at home but says it makes him fall more than the cane. Also has a scooter . Says he falls most days, describes mostly mechanical falls and sometimes feels his left leg isn't where he thinks it will be and he falls to his left. Has had 2-3 ED visits in past 6 months for fall and MSK complaints. States he is afraid of falling so he uses the scooter most frequently, likes his scooter. Brother made him " "use cane today to try to encourage him to walk more. Does not complain of pain today. Per last note by Dr. Dunn he had 10/10 LLE pain that is chronic. When asked about his pain he reports it is there, but today is a good day, and happy with treatments he is on. When asked what he would like most out of this visit, he is unsure as he did not really know what this visit was for. When asked if he would like to try to walk better, he says "that would be great."       Past Medical History:   Diagnosis Date    Asthma     Bilateral hearing loss 12/12/2012    Biliary acute pancreatitis     GERD (gastroesophageal reflux disease)     High cholesterol     Hypertension     Multiple sclerosis      Past Surgical History:   Procedure Laterality Date    ARTHROSCOPY, SHOULDER Left 4/24/2014    [facility]    CHOLECYSTECTOMY      CHOLECYSTECTOMY, LAPAROSCOPIC N/A 12/16/2013    [facility]    COLONOSCOPY 2 day  golytely N/A 1/22/2019    [facility]ell [facility] ENDO    ERCP N/A 9/2/2014    [facility]an [facility]    ERCP WITH STENT EXCHANGE N/A 4/8/2015    [facility]    ESOPHAGOGASTRODUODENOSCOPY (EGD) N/A 7/13/2015    [facility]    FIXATION, TENDON Left 4/24/2014    [facility]    JOINT REPLACEMENT      arm    REPAIR ROTATOR CUFF ARTHROSCOPIC Left 4/30/2015    [facility]    REPAIR, ROTATOR CUFF, ARTHROSCOPIC Left 4/24/2014    [facility]    ULTRASOUND, UPPER GI TRACT, ENDOSCOPIC N/A 11/26/2013    [facility]     Current Outpatient Medications on File Prior to Visit   Medication Sig Dispense Refill    albuterol (VENTOLIN HFA) 90 mcg/actuation inhaler INHALE 2 PUFFS BY MOUTH EVERY 4 HOURS 18 g 5    " ALPRAZolam (XANAX) 0.5 MG tablet TAKE ONE TABLET BY MOUTH TWICE DAILY AS NEEDED FOR ANXIETY    60 tablet 3    atorvastatin (LIPITOR) 40 MG tablet Take 1 tablet by mouth Once a day 90 tablet 4    baclofen (LIORESAL) 10 MG tablet TAKE ONE TABLET BY MOUTH THREE TIMES A DAY WITH FOOD OR MILK 270 tablet 4    buPROPion (WELLBUTRIN SR) 150 MG TBSR 12 hr tablet TAKE 1 TABLET EVERY 12 HOURS DAILY. 60 tablet 11    dicyclomine (BENTYL) 10 MG capsule TAKE 1 CAPSULE BY MOUTH THREE TIMES A DAY FOR ABDOMINAL PAIN 90 capsule 3    docusate sodium (COLACE) 100 MG capsule Take 1 capsule (100 mg total) by mouth once daily. 30 capsule 4    DUREZOL 0.05 % Drop ophthalmic solution       gabapentin (NEURONTIN) 300 MG capsule TAKE ONE CAPSULE BY MOUTH ONCE DAILY AT BEDTIME 30 capsule 11    GILENYA 0.5 mg Cap       lactulose (CHRONULAC) 10 gram/15 mL solution Take 45ml by mouth 3 times a day 1920 mL 3    linaclotide (LINZESS) 145 mcg Cap capsule TAKE 1 CAPSULE BY MOUTH ONCE DAILY 30 capsule 6    lisinopril (PRINIVIL,ZESTRIL) 5 MG tablet TAKE 1 TABLET BY MOUTH DAILY 90 tablet 3    nabumetone (RELAFEN) 500 MG tablet Take 1 tablet by mouth twice a day as needed for pain. 60 tablet 3    naproxen (NAPROSYN) 500 MG tablet Take 1 Tablet by mouth Twice a day 180 tablet 4    oxybutynin (DITROPAN-XL) 5 MG TR24 TAKE ONE TABLET BY MOUTH ONCE DAILY. 90 tablet 3    pantoprazole (PROTONIX) 40 MG tablet TAKE ONE TABLET BY MOUTH  ONCE DAILY 90 tablet 4    polyethylene glycol (GLYCOLAX) 17 gram/dose powder Take 17 g by mouth once daily. 510 g 3    sucralfate (CARAFATE) 1 gram tablet TAKE 1 TABLET BY MOUTH FOUR TIMES A  tablet 3    teriflunomide (AUBAGIO) 14 mg Tab Take by mouth.      traMADol (ULTRAM) 50 mg tablet Take 1 tablet (50 mg total) by mouth every 6 (six) hours as needed for pain 120 tablet 5    triamcinolone acetonide 0.025% (KENALOG) 0.025 % cream       [DISCONTINUED] ALPRAZolam (XANAX) 0.5 MG tablet TAKE 1 TABLET BY  MOUTH TWICE DAILY AS NEEDED FOR ANXIETY 60 tablet 3    [DISCONTINUED] baclofen (LIORESAL) 10 MG tablet TAKE ONE TABLET BY MOUTH THREE TIMES A DAY WITH FOOD OR MILK   90 270 tablet 4    [DISCONTINUED] FLUARIX QUAD 8243-1818, PF, 60 mcg (15 mcg x 4)/0.5 mL Syrg vaccine       [DISCONTINUED] HYDROcodone-acetaminophen (NORCO) 7.5-325 mg per tablet take 1 tablet by mouth  twice a day as needed for pain 60 tablet 0    [DISCONTINUED] lactulose (CHRONULAC) 10 gram/15 mL solution TAKE 45 ML BY MOUTH THREE TIMES A DAY 4050 mL 3    [DISCONTINUED] polyethylene glycol (GOLYTELY,NULYTELY) 236-22.74-6.74 -5.86 gram suspension Take 4,000 mLs (4 L total) by mouth As instructed. 4000 mL 0    [DISCONTINUED] sodium chloride 0.9% 0.9 % SolP 100 mL with natalizumab 300 mg/15 mL Soln 300 mg Inject 300 mg into the vein every 30 days.       Current Facility-Administered Medications on File Prior to Visit   Medication Dose Route Frequency Provider Last Rate Last Dose    [DISCONTINUED] 0.9%  NaCl infusion   Intravenous Continuous Nathan Waddell MD   Stopped at 01/22/19 1102    [DISCONTINUED] clonidine 1,000 mcg/10 mL (100 mcg/mL) injection    PRN Alexx Claire MD   25 mcg at 04/24/14 1103    [DISCONTINUED] dexamethasone sodium phos (PF) 10 mg/mL injection    PRN Alexx Claire MD   1 mg at 04/24/14 1103    [DISCONTINUED] midazolam injection   Intravenous PRN Alexx Claire MD   2 mg at 04/30/15 0700    [DISCONTINUED] ropivacaine (PF) 5 mg/ml (0.5%) injection    PRN Alexx Claire MD   20 mL at 04/24/14 1103    [DISCONTINUED] sodium chloride 0.9% flush 3 mL  3 mL Intravenous PRN Nathan Waddell MD         Review of patient's allergies indicates:  No Known Allergies    Family History:   Family History   Problem Relation Age of Onset    Heart disease Mother     Heart disease Father         Social History: Lives with his brothers in a house with 0 steps to enter and 0 to the bedroom. One brother on disability, one  just had MI; between them he has 24hr SV    He does not work    Mod I for mobility using cane, walker, or scooter depending on the day; Ind ADLs but requires assistance for iADLs     Social History     Tobacco Use    Smoking status: Current Every Day Smoker     Packs/day: 1.00     Years: 32.00     Pack years: 32.00     Types: Cigarettes    Smokeless tobacco: Never Used   Substance Use Topics    Alcohol use: Not Currently    Drug use: No       ROS:   CONSTITUTIONAL:  (-) weight change, fever, chills, night sweats   EYES:  (-)  double vision (-) blurry vision  EARS, NOSE, THROAT: (-) dysphagia (-) hearing loss  RESPIRATORY: (-)   shortness of breath  (-) cough  CARDIOVASCULAR (-) chest pain  (-) swelling  GENITOURINARY  (-) bladder incontinence  (-) hematuria  GASTROINTESTINAL(-)  bowel incontinence (-) nausea/vomiting   ENDOCRINE  (-)  heat or cold intolerance  (-) hair loss  PSYCHIATRIC  (-) depression  (-) anxiety  HEMATOL/LYMPHATIC (-) easy bruising (-) enlarged lymph nodes  ALLERGIC/IMMUN  (-) seasonal allergies (-) allergies to environmental stimuli    SKIN (-) changes (-) rashes (-) wounds  The rest of the review of systems is unremarkable.      (+) neuromusculoskeletal review (see Subjective).      Pertinent Prior Work Up (Imaging/EMGs):  Study Date Pertinent findings   MRI Brain and Spine 8/1/18 Findings consistent with known MS, no new lesions; degenerative changes in cervical and thoracolumbar spine                   Physical Exam  /81   Pulse 88   Gen: Well developed male in no acute distress  Cardiovascular: RRR, no edema  Pulmonary: comfortable on room air  Abdomen: soft, nt/nd  Skin: no rashes, erythema, ecchymosis, abrasions  Psych: calm and cooperative    Neuro: Awake, alert, oriented to self, place, but not month or year   Cerebellar: Finger to nose: intention tremor on left  CN: CN II-XII grossly intact with exception of hearing greatly diminished (since birth per history) and dysphonia  and dysarthria (which may have component of near deafness since birth)  Motor:      Right   Left   Shoulder Abduction (C5,6)   5/5   5/5   Elbow Flexion  (C5, 6)   5/5   5/5   Elbow Extension (C7, 8)  5/5   5/5   Wrist Extension (C6, 7)   5/5   5/5   Finger Flexion (C7,8)   5/5   5/5   Finger Abduction (C8, T1)  5/5   5/5     Hip flexion (L2, 3)   4+/5   4-/5   Knee extension (L3, 4)  5/5   4/5    Knee flexion (L5, S1)  5/5   4/5   Ankle dorsiflexion (L4, 5)  5/5   4/5   Great toe extension (L5)  5/5   4/5   Ankle plantarflexion (L5, S1)  5/5   4/5  Sensory   Light touch:  Intact in all major dermatomes of bilateral upper and lower limbs    Reflexes: 1+ and symmetric bilaterally  Tone: Normal tone in bilateral upper and lower extremities.   Gait: wide based with cane in left hand, unsteady; asked to switch hands with cane and remained wide based but slightly steadier    Musculoskeletal:   Inspection: No asymmetry, defects, masses, or atrophy noted in bilateral upper and lower extremities.   Palpation: no tenderness   ROM: within functional limits bilaterally and painless      Impression: 53 y.o. male with gait abnormality related to multiple sclerosis    Plan:    Therapy: Order trial of PT for gait, balance, determine LRAD, and strengthening. Instructed brother to join him for appointments to help with communication and keeping him on task when attention wanes. Chance that he will not tolerate therapy due to attention problems but is motivated to walk better and it is worth a shot    Medications: No new prescriptions. Instructed brother to determine list of every medication patient is taking and who is prescribing them, assist brother in medication management, and ensure f/u appointments with all his doctors. Especially Dr. Boyle for MS as he is likely not on anything for MS treatment currently.      Follow up: 3 months    A copy of this note with impression and plan will be sent to Leonora Dunn DO, consulting  provider      Rufus King MD

## 2019-04-09 NOTE — LETTER
April 12, 2019      Leonora Dunn, DO  [address]  [place] [facility] [address]           [facility]  [address]  [facility] [address]  Phone: [phone]          Patient: Tj Trammell   MR Number: [id number]   YOB: 1965   Date of Visit: 4/9/2019       Dear Dr. Leonora Dunn:    Thank you for referring Tj Trammell to me for evaluation. Attached you will find relevant portions of my assessment and plan of care.    If you have questions, please do not hesitate to call me. I look forward to following Tj Trammell along with you.    Sincerely,    Rufus King MD    Enclosure  CC:  No Recipients    If you would like to receive this communication electronically, please contact [email or web address] or [phone] to request more information on [employer] access.    For providers and/or their staff who would like to refer a patient to [facility], please contact us through our one-stop-shop provider referral line, [facility], at [phone].    If you feel you have received this communication in error or would no longer like to receive these types of communications, please e-mail [email or web address]

## 2019-04-22 ENCOUNTER — HOSPITAL ENCOUNTER (EMERGENCY)
Facility: HOSPITAL | Age: 54
Discharge: HOME | End: 2019-04-22
Attending: EMERGENCY MEDICINE
Payer: MEDICARE

## 2019-04-22 VITALS
SYSTOLIC BLOOD PRESSURE: 140 MMHG | HEIGHT: 67 IN | TEMPERATURE: 97 F | BODY MASS INDEX: 36.88 KG/M2 | WEIGHT: 235 LBS | DIASTOLIC BLOOD PRESSURE: 76 MMHG | OXYGEN SATURATION: 100 % | HEART RATE: 74 BPM | RESPIRATION RATE: 15 BRPM
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
## 2019-04-22 DIAGNOSIS — S01.01XA LACERATION OF SCALP, INITIAL ENCOUNTER: Primary | ICD-10-CM

## 2019-04-22 PROCEDURE — 25000003 PHARM REV CODE 250: Performed by: EMERGENCY MEDICINE

## 2019-04-22 PROCEDURE — 99284 EMERGENCY DEPT VISIT MOD MDM: CPT | Mod: 25

## 2019-04-22 PROCEDURE — 12002 RPR S/N/AX/GEN/TRNK2.6-7.5CM: CPT

## 2019-04-22 RX ORDER — LIDOCAINE HYDROCHLORIDE AND EPINEPHRINE 10; 10 MG/ML; UG/ML
1 INJECTION, SOLUTION INFILTRATION; PERINEURAL ONCE
Status: COMPLETED | OUTPATIENT
Start: 2019-04-22 | End: 2019-04-22

## 2019-04-22 RX ADMIN — LIDOCAINE HYDROCHLORIDE AND EPINEPHRINE 1 ML: 10; 10 INJECTION, SOLUTION INFILTRATION; PERINEURAL at 02:04

## 2019-04-22 NOTE — ED NOTES
Pt states to me his dog got in his way while he walking to the bathroom and tripped him. He denies dizziness or lightheadedness prior to falling. Hist his head-has laceration with small amount of bleeding. Had some dizziness and lightheadedness post fall-but denies any now. denies fevers,chest pain, SOB or other problems this week. States tetanus shot last given was about 1 year ago. Mild pain to laceration area. Cleansed area with chlorhexidine and saline and patted dry. Small amount of bleeding.

## 2019-04-22 NOTE — ED PROVIDER NOTES
Encounter Date: 4/22/2019    SCRIBE #1 NOTE: I, Fernando Cardona, am scribing for, and in the presence of,  Dr. Anderson. I have scribed the entire note.       History     Chief Complaint   Patient presents with    Laceration     Reports fell and hit head, denies LOC but had some dizziness. Laceration to L head, bleeding stopped. No other complaints verbalized. Also complaining of pain to LLE.      Time seen by provider: 2:05 PM    This is a 53 y.o. male with PMHx of MS who presents with complaint of laceration to left side of head s/p fall today. He tripped over his dog while ambulating with his walker, causing him to fall forward and hit his head on the corner of a wall. He reports no definite LOC, but does report some dizziness immediately following the fall. Patient denies any LOC, nausea, vomiting, or HA. He does not report any other complaints.    The history is provided by the patient.     Review of patient's allergies indicates:  No Known Allergies  Past Medical History:   Diagnosis Date    Asthma     Bilateral hearing loss 12/12/2012    Biliary acute pancreatitis     GERD (gastroesophageal reflux disease)     High cholesterol     Hypertension     Multiple sclerosis      Past Surgical History:   Procedure Laterality Date    ARTHROSCOPY, SHOULDER Left 4/24/2014    [facility]    CHOLECYSTECTOMY      CHOLECYSTECTOMY, LAPAROSCOPIC N/A 12/16/2013    [facility]    COLONOSCOPY 2 day  golytely N/A 1/22/2019    [facility] END[facility]    ERCP N/A 9/2/2014    [facility] END[facility]    ERCP WITH STENT EXCHANGE N/A 4/8/2015    [facility]    ESOPHAGOGASTRODUODENOSCOPY (EGD) N/A 7/13/2015    [facility]    FIXATION, TENDON Left 4/24/2014    [facility]    JOINT REPLACEMENT      arm    REPAIR  ROTATOR CUFF ARTHROSCOPIC Left 4/30/2015    [facility]    REPAIR, ROTATOR CUFF, ARTHROSCOPIC Left 4/24/2014    [facility]    ULTRASOUND, UPPER GI TRACT, ENDOSCOPIC N/A 11/26/2013    [facility] END[facility]     Family History   Problem Relation Age of Onset    Heart disease Mother     Heart disease Father     Heart disease Brother      Social History     Tobacco Use    Smoking status: Current Every Day Smoker     Packs/day: 1.00     Years: 32.00     Pack years: 32.00     Types: Cigarettes    Smokeless tobacco: Never Used   Substance Use Topics    Alcohol use: Not Currently    Drug use: No     Review of Systems   Skin: Positive for wound.   Neurological: Positive for dizziness.   All other systems reviewed and are negative.    Physical Exam     Initial Vitals [04/22/19 1238]   BP Pulse Resp Temp SpO2   (!) 142/83 96 17 98.4 °F (36.9 °C) 95 %      MAP       --         Physical Exam    Nursing note and vitals reviewed.  Constitutional: He appears well-developed and well-nourished. He is not diaphoretic. No distress.   HENT:   Head: Normocephalic.   Mouth/Throat: Oropharynx is clear and moist.   3 cm subcutaneous laceration to the left parietal scalp; no active bleeding   Eyes: Conjunctivae and EOM are normal.   Neck: Normal range of motion. Neck supple.   Cardiovascular: Normal rate, regular rhythm and normal heart sounds.   Pulmonary/Chest: Breath sounds normal. No respiratory distress.   Abdominal: Soft. There is no tenderness.   Musculoskeletal: Normal range of motion. He exhibits no edema or tenderness.   No posterior C-spine TTP   Neurological: He is alert and oriented to person, place, and time. A sensory deficit is present.   Confined to wheelchair  Bilateral LE weakness   Skin: Skin is warm and dry.       ED Course   Lac Repair  Date/Time: 4/22/2019 2:33 PM  Performed by: Jhon Anderson MD  Authorized by: Jhon  MORENITA Anderson MD   Body area: head/neck  Location details: scalp  Laceration length: 3 cm  Foreign bodies: no foreign bodies  Tendon involvement: none  Nerve involvement: none  Vascular damage: no  Anesthesia: local infiltration    Anesthesia:  Local Anesthetic: lidocaine 1% with epinephrine  Anesthetic total: 4 mL  Patient sedated: no  Preparation: Patient was prepped and draped in the usual sterile fashion.  Irrigation solution: saline  Irrigation method: syringe  Amount of cleaning: standard  Debridement: none  Degree of undermining: none  Skin closure: staples  Number of sutures: 4  Approximation: close  Approximation difficulty: simple  Dressing: antibiotic ointment and dressing applied  Patient tolerance: Patient tolerated the procedure well with no immediate complications        Labs Reviewed - No data to display       X-Rays:   Independently Interpreted Readings:   Other Readings:  Reviewed by myself, read by radiology.    Imaging Results          CT Head Without Contrast (Final result)  Result time 04/22/19 17:28:10    Final result by Kirt Bautista MD (04/22/19 17:28:10)                 Impression:      1. No acute intracranial abnormalities, noting soft tissue injury involving the left parietooccipital scalp.  2. Stable periventricular white matter changes, better evaluated on previous MRI.      Electronically signed by: Kirt Bautista MD  Date:    04/22/2019  Time:    17:28             Narrative:    EXAMINATION:  CT HEAD WITHOUT CONTRAST    CLINICAL HISTORY:  Headache, post trauma;    TECHNIQUE:  Low dose axial images were obtained through the head.  Coronal and sagittal reformations were also performed. Contrast was not administered.    COMPARISON:  MRI 08/01/2018    FINDINGS:  There is generalized cerebral volume loss.  There is hypoattenuation in a periventricular fashion, likely sequela of chronic microvascular ischemic change.  There is no evidence of acute major vascular territory infarct,  hemorrhage, or mass.  There is no hydrocephalus.  There are no abnormal extra-axial fluid collections.  The paranasal sinuses and mastoid air cells are clear, and there is no evidence of calvarial fracture.  The visualized soft tissues are remarkable for surgical staples involving the left posterior parietooccipital soft tissues..                              Medical Decision Making:   Clinical Tests:   Radiological Study: Ordered and Reviewed  ED Management:  5:48 PM  GCS remains 15. Will discharge to home and recommend close follow-up with PCP.               Attending Attestation:           Physician Attestation for Scribe:  Physician Attestation Statement for Scribe #1: I, Dr. Anderson, reviewed documentation, as scribed by Fernando Cardona in my presence, and it is both accurate and complete.                    Clinical Impression:       ICD-10-CM ICD-9-CM   1. Laceration of scalp, initial encounter S01.01XA 873.0   2. Fall, initial encounter W19.XXXA E888.9       Disposition:   Disposition: Discharged  Condition: Stable         Jhon Anderson MD  04/22/19 1507

## 2019-04-22 NOTE — DISCHARGE INSTRUCTIONS
Have staples removed in 7 days, return promptly if concerning symptoms arise, keep wound clean and dry

## 2019-04-23 RX ORDER — LACTULOSE 10 G/15ML
SOLUTION ORAL; RECTAL
Qty: 4050 ML | Refills: 5 | OUTPATIENT
Start: 2019-04-23 | End: 2020-01-13

## 2019-04-26 ENCOUNTER — HOSPITAL ENCOUNTER (EMERGENCY)
Facility: HOSPITAL | Age: 54
Discharge: HOME | End: 2019-04-26
Attending: EMERGENCY MEDICINE
Payer: MEDICARE

## 2019-04-26 VITALS
SYSTOLIC BLOOD PRESSURE: 142 MMHG | WEIGHT: 230 LBS | HEART RATE: 79 BPM | TEMPERATURE: 98 F | RESPIRATION RATE: 18 BRPM | DIASTOLIC BLOOD PRESSURE: 78 MMHG | BODY MASS INDEX: 34.07 KG/M2 | HEIGHT: 69 IN | OXYGEN SATURATION: 96 %

## 2019-04-26 DIAGNOSIS — Z48.02 ENCOUNTER FOR STAPLE REMOVAL: Primary | ICD-10-CM

## 2019-04-26 PROCEDURE — 99281 EMR DPT VST MAYX REQ PHY/QHP: CPT

## 2019-04-26 NOTE — ED TRIAGE NOTES
Pt presents to ED for removal of staples to L posterior scalp, placed last Monday after fall.  Dried blood noted to wound, edges well approximated, no s/s of infection.  Bruising noted to L lateral upper leg.

## 2019-04-26 NOTE — ED PROVIDER NOTES
Encounter Date: 4/26/2019       History     Chief Complaint   Patient presents with    Suture / Staple Removal     pt states staples noted to top of head need to be removed      Tj Trammell, a 53 y.o. male  has a past medical history of Asthma, Bilateral hearing loss (12/12/2012), Biliary acute pancreatitis, GERD (gastroesophageal reflux disease), High cholesterol, Hypertension, and Multiple sclerosis.     He presents to the ED evaluation of staples placed to the left posterior aspect of his scalp on 04/22.  He denies any drainage or redness noted.  Denies any fevers.  Has no complaints at this time.          The history is provided by the patient.     Review of patient's allergies indicates:  No Known Allergies  Past Medical History:   Diagnosis Date    Asthma     Bilateral hearing loss 12/12/2012    Biliary acute pancreatitis     GERD (gastroesophageal reflux disease)     High cholesterol     Hypertension     Multiple sclerosis      Past Surgical History:   Procedure Laterality Date    ARTHROSCOPY, SHOULDER Left 4/24/2014    Performed by [facility] at [facility] OR    CHOLECYSTECTOMY      CHOLECYSTECTOMY, LAPAROSCOPIC N/A 12/16/2013    Performed by JAS Calderon [facility] at [facility] OR    COLONOSCOPY 2 day  golytely N/A 1/22/2019    Performed by Nathan Waddell MD at [facility] ENDO    ERCP N/A 9/2/2014    Performed by Lalo Mclean MD at [facility] ENDO    ERCP WITH STENT EXCHANGE N/A 4/8/2015    Performed by [facility]clean [facility] at [facility] ENDO    ESOPHAGOGASTRODUODENOSCOPY (EGD) N/A 7/13/2015    Performed by [facility]alo [facility]clean [facility] at [facility] ENDO    FIXATION, TENDON Left 4/24/2014    [facility] by [facility] at [facility] OR    JOINT REPLACEMENT      arm    REPAIR ROTATOR CUFF ARTHROSCOPIC Left 4/30/2015    Per[facility] by [facility] at [facility] OR    REPAIR, ROTATOR CUFF, ARTHROSCOPIC Left 4/24/2014    [facility] by [facility] at [facility] OR    ULTRASOUND, UPPER GI TRACT, ENDOSCOPIC  N/A 11/26/2013    Performed by Lalo Mclean MD at [facility] ENDO     Family History   Problem Relation Age of Onset    Heart disease Mother     Heart disease Father     Heart disease Brother      Social History     Tobacco Use    Smoking status: Current Every Day Smoker     Packs/day: 1.00     Years: 32.00     Pack years: 32.00     Types: Cigarettes    Smokeless tobacco: Never Used   Substance Use Topics    Alcohol use: Not Currently    Drug use: No     Review of Systems   Constitutional: Negative for fever.   Musculoskeletal: Negative for neck pain and neck stiffness.   Skin: Positive for wound (To left scalp). Negative for color change.   All other systems reviewed and are negative.      Physical Exam     Initial Vitals [04/26/19 1348]   BP Pulse Resp Temp SpO2   (!) 142/78 79 18 98 °F (36.7 °C) 96 %      MAP       --         Physical Exam    Nursing note and vitals reviewed.  Constitutional: He appears well-developed and well-nourished.   HENT:   Head: Normocephalic and atraumatic.       Right Ear: External ear normal.   Left Ear: External ear normal.   Nose: Nose normal.   Healing laceration to left posterior scalp with 4 staples in place.  No induration or fluctuance noted.   Eyes: EOM are normal.   Neck: Normal range of motion. Neck supple.   Cardiovascular: Normal rate and regular rhythm.   Pulmonary/Chest: Breath sounds normal. No respiratory distress.   Musculoskeletal: Normal range of motion.   Neurological: He is alert.   Skin: Skin is warm and dry. No rash and no abscess noted. No erythema.   Psychiatric: He has a normal mood and affect. Thought content normal.         ED Course   Suture Removal  Date/Time: 4/26/2019 5:59 PM  Location procedure was performed: [facility] EMERGENCY DEPARTMENT  Performed by: Rose Mary Vasquez PA-C  Authorized by: Fam Ayala MD   Body area: head/neck  Location details: scalp  Wound Appearance: clean, well healed, nontender and no drainage  Staples Removed:  4  Facility: sutures placed in this facility  Complications: No  Specimens: No  Implants: No  Patient tolerance: Patient tolerated the procedure well with no immediate complications        Labs Reviewed - No data to display       Imaging Results    None          Medical Decision Making:   Initial Assessment:   Wound check  Differential Diagnosis:   Healing versus infected laceration to scalp  ED Management:  Patient presents to the ER for evaluation of staples placed to scalp 01/04/2022.  Patient has no complaints.  Area looks well healed.  Staples were removed without consequence to the patient.  No further follow-up needed at this time.                      Clinical Impression:       ICD-10-CM ICD-9-CM   1. Encounter for staple removal Z48.02 V58.32                                Rose Mary Vasquez PA-C  04/26/19 1800

## 2019-05-13 ENCOUNTER — OFFICE VISIT (OUTPATIENT)
Dept: FAMILY MEDICINE | Facility: HOSPITAL | Age: 54
End: 2019-05-13
Attending: SPECIALIST
Payer: MEDICARE

## 2019-05-13 VITALS
DIASTOLIC BLOOD PRESSURE: 87 MMHG | BODY MASS INDEX: 30.17 KG/M2 | SYSTOLIC BLOOD PRESSURE: 138 MMHG | HEART RATE: 88 BPM | HEIGHT: 69 IN | WEIGHT: 203.69 LBS

## 2019-05-13 DIAGNOSIS — R26.89 DECREASED FUNCTIONAL MOBILITY: ICD-10-CM

## 2019-05-13 DIAGNOSIS — G35 MULTIPLE SCLEROSIS: ICD-10-CM

## 2019-05-13 DIAGNOSIS — S01.01XD LACERATION OF SCALP, SUBSEQUENT ENCOUNTER: Primary | ICD-10-CM

## 2019-05-13 PROCEDURE — 99215 OFFICE O/P EST HI 40 MIN: CPT | Performed by: STUDENT IN AN ORGANIZED HEALTH CARE EDUCATION/TRAINING PROGRAM

## 2019-05-13 NOTE — PROGRESS NOTES
Subjective:       Patient ID: Tj Trammell is a 53 y.o. male.    Chief Complaint: Medication Refill    HPI   54 yo WM presenting for medication refill of tramadol.  Pt's PCP is Dr. Ferguson. Pt states that he slipped and fell on 4/22 and sustained a laceration to top of head. He was seen by ED, Ct head negative,  and required 4 staples and followed up for staple removal. He states that he feels full body pains and has been taking tramadol (prescibed by Dr. Ferguson) will minimal benefit. He was also seen by Pain Physical medicine on 4/9 and recommended to follow up with PT.   Laceration is well healing with out signs of infection.   He has no other complaints.       Review of Systems   Constitutional: Negative for activity change, chills, fatigue and fever.   HENT: Negative for congestion, rhinorrhea and sore throat.    Eyes: Negative for visual disturbance.   Respiratory: Negative for cough, chest tightness and shortness of breath.    Cardiovascular: Negative for chest pain, palpitations and leg swelling.   Gastrointestinal: Negative for abdominal pain, constipation, diarrhea, nausea and vomiting.   Genitourinary: Negative for dysuria and hematuria.   Musculoskeletal: Negative for arthralgias and myalgias.   Skin: Positive for wound. Negative for rash.   Neurological: Negative for dizziness, light-headedness and headaches.   Psychiatric/Behavioral: Negative for agitation. The patient is not nervous/anxious.        Objective:      Vitals:    05/13/19 0842   BP: 138/87   Pulse: 88     Physical Exam   Constitutional: He is oriented to person, place, and time. He appears well-developed and well-nourished. No distress.   Sitting in electric wheel chair   HENT:   Head: Normocephalic and atraumatic.   Mouth/Throat: Oropharynx is clear and moist.   Eyes: Pupils are equal, round, and reactive to light. Conjunctivae and EOM are normal.   Neck: Normal range of motion. Neck supple.   Cardiovascular: Normal rate, regular  rhythm, normal heart sounds and intact distal pulses.   Pulmonary/Chest: Effort normal and breath sounds normal.   Abdominal: Soft. Bowel sounds are normal. He exhibits no distension. There is no tenderness.   Musculoskeletal: Normal range of motion. He exhibits no edema, tenderness or deformity.   Neurological: He is alert and oriented to person, place, and time. He has normal reflexes.   Skin: Skin is warm and dry. He is not diaphoretic.   Well healing laceration on left side head   Psychiatric: He has a normal mood and affect. His behavior is normal. Judgment and thought content normal.   Nursing note and vitals reviewed.      Assessment:       1. Laceration of scalp, subsequent encounter    2. Multiple sclerosis    3. Decreased functional mobility        Plan:       Laceration of scalp, subsequent encounter  - wound healing well  - c/w nsaids and tramadol for pain as per pcp    Multiple sclerosis  Decreased functional mobility  - pt seen by PM&R with recs for PT, will order now  -     Ambulatory Referral to Physical/Occupational Therapy      Discussed with patient that he only needs one PCP and he would like to continue with Dr. Ferguson.  Advised patient to make all future appointments with PCP when possible.       RTC STEFANO Dunn D.O.  [facility] HO-3  05/13/2019

## 2019-05-17 ENCOUNTER — TELEPHONE (OUTPATIENT)
Dept: FAMILY MEDICINE | Facility: HOSPITAL | Age: 54
End: 2019-05-17

## 2019-05-17 NOTE — TELEPHONE ENCOUNTER
----- Message from Taylor Mcdaniel MA sent at 5/17/2019 10:34 AM CDT -----  Contact: Yohan Trammell brother  [phone]  Yohan called to say patient had a missed call; patient is deaf.  Please call Yohan at number above to discuss reason for call.  Thanks.

## 2019-06-04 ENCOUNTER — CLINICAL SUPPORT (OUTPATIENT)
Dept: REHABILITATION | Facility: HOSPITAL | Age: 54
End: 2019-06-04
Payer: MEDICARE

## 2019-06-04 DIAGNOSIS — R26.9 IMPAIRED GAIT: ICD-10-CM

## 2019-06-04 DIAGNOSIS — R29.898 LEFT LEG WEAKNESS: ICD-10-CM
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
## 2019-06-04 PROBLEM — R29.6 FALLS: Status: ACTIVE | Noted: 2019-06-04

## 2019-06-04 PROCEDURE — G8979 MOBILITY GOAL STATUS: HCPCS | Mod: CK,PN

## 2019-06-04 PROCEDURE — G8978 MOBILITY CURRENT STATUS: HCPCS | Mod: CK,PN

## 2019-06-04 PROCEDURE — 97163 PT EVAL HIGH COMPLEX 45 MIN: CPT | Mod: PN

## 2019-06-04 NOTE — PLAN OF CARE
[facility]  Physical Therapy Initial Evaluation    Name: Tj Trammell  Clinic Number: [id number]    Therapy Diagnosis:   Encounter Diagnoses   Name Primary?    Fall, sequela     Left leg weakness     Impaired gait      Physician: Leonora Dunn DO    Physician Orders: PT Eval and Treat   Medical Diagnosis from Referral:   G35 (ICD-10-CM) - Multiple sclerosis   R26.89 (ICD-10-CM) - Decreased functional mobility     Evaluation Date: 6/4/2019  Authorization Period Expiration: 08/4/19  Plan of Care Expiration: 8/2/19  Visit # / Visits authorized: 1/ 12    Time In: 14:00  Time Out: 1445  Total Billable Time: 45 minutes    Precautions: Fall, sensitivity to heat    Subjective   Date of onset: 2012 dx with MS but recent fall 4/22/19 with head laceration  History of current condition - TJ reports: he falls a lot and has L LE weakness, bucking,     Medical History:   Past Medical History:   Diagnosis Date    Asthma     Bilateral hearing loss 12/12/2012    Biliary acute pancreatitis     GERD (gastroesophageal reflux disease)     High cholesterol     Hypertension     Multiple sclerosis        Surgical History:   Tj Trammell  has a past surgical history that includes Joint replacement; Cholecystectomy; and Colonoscopy (N/A, 1/22/2019).    Medications:   Tj has a current medication list which includes the following prescription(s): albuterol, alprazolam, atorvastatin, baclofen, bupropion, dicyclomine, docusate sodium, durezol, gabapentin, gilenya, lactulose, linaclotide, lisinopril, nabumetone, nabumetone, naproxen, oxybutynin, pantoprazole, polyethylene glycol, sucralfate, teriflunomide, tramadol, tramadol, and triamcinolone acetonide 0.025%.    Allergies:   Review of patient's allergies indicates:  No Known Allergies     Imaging, CT scan films:   4/22/19   Impression       1. No acute intracranial abnormalities, noting soft tissue injury involving the left parietooccipital  "scalp.  2. Stable periventricular white matter changes, better evaluated on previous MRI.       DME: power chair, cane, RW  Prior Therapy: years ago for weakness 2016  Social History:  lives alone  Occupation: disabled since early 2000  Prior Level of Function: BRIANDA with SPC and Scooter  Current Level of Function: BRIANDA with SPC or scooter     Pain:  Current 8/10, worst 10/10, best 5/10   Location: left knee   Description: Sharp  Aggravating Factors: walking  Easing Factors: rest    Pts goals: walk short distances without falling    Objective     TINETTI BALANCE ASSESSMENT TOOL  BALANCE SECTION  1.Sitting Balance:   Steady; safe = 1  2.Rises from chair :  Able, uses arms to help = 1  3.Attempts to arise :  Able to arise, 1 attempt = 2  4.Immediate standing Balance (first 5 seconds) : Unsteady (swaggers, moves feet, trunk sway) = 0  5.Standing balance: Steady but wide stance (medal heel>4" apart) & uses cane or other support = 1  6.Nudged : Staggers, grabs, catches self = 1  7.Eyes closed: Unsteady = 0  8.Turning 360 degrees:  Continuous = 1 and Unsteady (grabs, staggers) = 0  9.Sitting Down : Uses arms or not a smooth motion = 1  Balance Score:  8/16    GAIT SECTION  10.Initiation of Gait (Immediately after told to go.): Any hesitancy or multiple attempts to start = 0  11.Step length and height :  Step through R=1 and Step through L=1  12.Foot Clearance :  R foot clears floor=1   13.Step symmetry: Right & Left step length appear equal = 1  14.Step continuity : Steps appear continuous = 1  15.Path: Mild/moderate deviation or uses walking aid = 1  16.Trunk : Marked sway or uses walking aid = 0  17.Walking Time: Heels amost touching while walking = 1    Gait Score: 7/12  Balance score:8/16  Total Score=Balance + Gait Score: 15/28      Risk Indicators:    Tinetti Tool Score   Risk of Falls   ?18    High     Range of Motion/Strength:      Hip Right   Left   Pain/Dysfunction with Movement     AROM MMT AROM MMT   "   Flexion WFL 4/5 limited 3-/5     Abduction NT 4-/5 limited 3-/5       Knee Right   Left   Pain/Dysfunction with Movement     AROM MMT AROM MMT     Flexion WFL 5/5 WFL 3/5     Extension 0 5/5 0 3/5        Ankle Right   Left   Pain/Dysfunction with Movement     AROM MMT AROM MMT     Plantarflexion WFL 5/5 WFL 3-/5     Dorsiflexion WFL 5/5 WFL 3+/5           GERMAIN  BALANCE ASSESSMENT TOOL  1. Sitting to Standing   3 - able to stand independely using hands  2. Standing Unsupported   4 - able to stand safely 2 minutes without hold  3. Sitting Unsupported   4 - able to sit safely and securely 2 minutes  4. Standing to Sitting   3 - controls descent by using hands  5. Pivot Transfer   3 - able to transfer safely with definite use of hands  6. Standing with Eyes Closed   2 - able to stand 3 seconds  7. Standing with Feet Together   1 - needs help to attain position but able to stand 15 seconds feet together  8. Reaching Forward with Outstretched Arm   2 - can reach forward 5 cm/2 inches safely  9. Retrieving Object from Floor   1 - unable to  and needs supervision while trying  10. Turning to Look Behind   2 - turns sideways only but maintains balance  11. Turning 360 Degrees   1 - needs close supervision or verbal cueing  12. Placing Alternate Foot on Step   1 - able to complete > 2 steps needs min assist  13. Standing with One Foot in Front   1 - need help to step but can hold 15 seconds  14. Standing on One Foot   0 - unable to try or needs assistance to prevent fall    TOTAL SCORE: 28  Maximum: 56  Score:   21-40 moderate fall risk     Fall risk cut-off scores:   Elderly and History of falls: <51/56     2 minute walk test: with SPC: 210 ft with cues and supervision, lots of sway    CMS Impairment/Limitation/Restriction for FOTO Multiple Sclerosis Survey    Therapist reviewed FOTO scores for Tj Guerinwiliam on 6/4/2019.   FOTO documents entered into [employer] - see Media section.    Limitation Score: 50%  Category:  Mobility       TREATMENT   Therapeutic exercise to be initiated at follow-up visit:    Supine LE strengthening, low eval balance and stability training  Home Exercises and Patient Education Provided    Education provided:   - need for PT  - PT role    Assessment   Tj is a 54 y.o. male referred to outpatient Physical Therapy with a medical diagnosis of MS and decreased mobility. Pt presents with L LE weakness, decreased L LE stability, impaired gait and moderate fall risk without AD.  He falls often and has had a head injury with laceration.  He would benefit from PT to reduce fall risk to prevent injury after falls.     Pt prognosis is Good.   Pt will benefit from skilled outpatient Physical Therapy to address the deficits stated above and in the chart below, provide pt/family education, and to maximize pt's level of independence.     Plan of care discussed with patient: Yes  Pt's spiritual, cultural and educational needs considered and patient is agreeable to the plan of care and goals as stated below:     Anticipated Barriers for therapy: none    Medical Necessity is demonstrated by the following  History  Co-morbidities and personal factors that may impact the plan of care Co-morbidities:   Asthma   Bilateral hearing loss   Biliary acute pancreatitis   GERD (gastroesophageal reflux disease)   High cholesterol   Hypertension   Multiple sclerosis     Personal Factors:   deafness  Moderate fall risk  Lives alone   high   Examination  Body Structures and Functions, activity limitations and participation restrictions that may impact the plan of care Body Regions:   lower extremities  trunk    Body Systems:    gross symmetry  ROM  strength  gross coordinated movement  balance  gait  transfers  transitions  motor control  motor learning    Participation Restrictions:   Driving, higher level ADL,     Activity limitations:   Learning and applying knowledge  learning to read    General Tasks and Commands  undertaking  multiple tasks    Communication  communicating with/receiving spoken language  communicating with/receiving non-verbal language    Mobility  lifting and carrying objects  walking  driving (bike, car, motorcycle)    Self care  washing oneself (bathing, drying, washing hands)    Domestic Life  shopping  cooking  doing house work (cleaning house, washing dishes, laundry)    Interactions/Relationships  family relationships    Life Areas  employment    Community and Social Life  community life  recreation and leisure       high   Clinical Presentation unstable clinical presentation with unpredictable characteristics high   Decision Making/ Complexity Score: high     Short Term Goals (4 weeks)   1.  Independent with initial HEP.  2.  Pt will score greater than or equal to 19/28 on the Tinetti balance scale with use of AD  demonstrating overall improved functional mobility and balance.   3. Pt will walk < than or equal to 230 ft on the 2 minute walk test indoor without AD with 0 LOB.     Long Term Goals (8 Weeks):   1. Pt will walk < than or equal to 600 ft on the 6 minute walk test indoor with AD with 0 LOB.  2.  Pt will be able to safely perform and tolerate high level ADL's like cleaning without LOB.   5. Pt will have 0 falls from start of PT sessions.  6. Pt will have MMT score of 4-/5 in all major ms groups in [facility].  7.Patient will be able to achieve greater than or equal to 40/56 on the Olivo balance test without AD decreasing patient's risk for falling and improving balance  8. Pt will report 43% on the FOTO Functional Assessment indicating increased functional balance and mobility. [facility] 8979 40-60%       Plan   Plan of care Certification: 6/4/2019 to 8/2/19.    Outpatient Physical Therapy 2 times weekly for 8 weeks to include the following interventions: Gait Training, Manual Therapy, Moist Heat/ Ice, Neuromuscular Re-ed, Patient Education, Self Care, Therapeutic Activites and Therapeutic Exercise.     Teresa SEVILLA  Wily, PT

## 2019-06-12 ENCOUNTER — CLINICAL SUPPORT (OUTPATIENT)
Dept: REHABILITATION | Facility: HOSPITAL | Age: 54
End: 2019-06-12
Payer: MEDICARE

## 2019-06-12 DIAGNOSIS — R26.9 IMPAIRED GAIT: ICD-10-CM
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
## 2019-06-12 DIAGNOSIS — R29.898 LEFT LEG WEAKNESS: ICD-10-CM

## 2019-06-12 PROCEDURE — 97110 THERAPEUTIC EXERCISES: CPT | Mod: PN

## 2019-06-12 NOTE — PROGRESS NOTES
"  Physical Therapy Daily Treatment Note     Name: Rick Trammell  Clinic Number: [id number]    Therapy Diagnosis:   Encounter Diagnoses   Name Primary?    Fall, sequela     Left leg weakness     Impaired gait      Physician: Leonora Dunn DO    Visit Date: 6/12/2019    Physician Orders: PT Eval and Treat   Medical Diagnosis from Referral:   G35 (ICD-10-CM) - Multiple sclerosis   R26.89 (ICD-10-CM) - Decreased functional mobility      Evaluation Date: 6/4/2019  Authorization Period Expiration: 08/4/19  Plan of Care Expiration: 8/2/19  Visit # / Visits authorized: 2/ 12  FOTO: 1/5  Cost: 90.96  Total:173.84    Time In: 5:00 PM   Time Out: 5:50 PM   Total Billable Time: 50 minutes    Precautions:  Fall, sensitivity to heat, [place]    Subjective   Samreen present as sign language translation.   Pt reports: "Peggy fallen a few times". Pt inquired about a knee brace for Left knee to prevent hyperextension  He Will be compliant with home exercise program.  Response to previous treatment: evaluation last session   Functional change: none stated    Pain: 5/10  Location: left knee      Objective     RICK received therapeutic exercises to develop strength, endurance and ROM for 42 minutes including:  - Steamboats   2x10 RTB B //  - heel raises   Seated 1 min B on rocker board  - SLR    2x10 B  - Short Arc Quadriceps 2x10 B  - Long Arc Quadriceps 2x10 B  - hip adduction isometric 5"x20  - hip abduction   Supine tband, 2x15  RICK participated in neuromuscular re-education activities to improve: Balance, Coordination and Proprioception for 8 minutes. The following activities were included:  -Nustep x 8 min for B UE / LE reciprocal AROM for over 500 steps target. (prevent hyperextension)  -    Home Exercises Provided and Patient Education Provided     Education provided:   - initiate HEP  - ambulate with caution and safety to prevent Left knee hyperextension.     Written Home Exercises Provided: yes.  Exercises were reviewed and " RICK was able to demonstrate them prior to the end of the session.  RICK demonstrated good  understanding of the education provided.     See EMR under Patient Instructions for exercises provided 6/12/2019.    Assessment     Pt able to complete session with reports of Left knee pain due to hyperextension with standing activities.  present for session. Pt required verbal instruction to complete session with slow and proper technique.   RICK is progressing well towards his goals.   Pt prognosis is Good.     Pt will continue to benefit from skilled outpatient physical therapy to address the deficits listed in the problem list box on initial evaluation, provide pt/family education and to maximize pt's level of independence in the home and community environment.     Pt's spiritual, cultural and educational needs considered and pt agreeable to plan of care and goals.     Anticipated barriers to physical therapy: see precautions    Short Term Goals (4 weeks)   1.  Independent with initial HEP.  2.  Pt will score greater than or equal to 19/28 on the Tinetti balance scale with use of AD  demonstrating overall improved functional mobility and balance.   3. Pt will walk < than or equal to 230 ft on the 2 minute walk test indoor without AD with 0 LOB.     Long Term Goals (8 Weeks):   1. Pt will walk < than or equal to 600 ft on the 6 minute walk test indoor with AD with 0 LOB.  2.  Pt will be able to safely perform and tolerate high level ADL's like cleaning without LOB.   5. Pt will have 0 falls from start of PT sessions.  6. Pt will have MMT score of 4-/5 in all major ms groups in L LE.  7.Patient will be able to achieve greater than or equal to 40/56 on the Olivo balance test without AD decreasing patient's risk for falling and improving balance  8. Pt will report 43% on the FOTO Functional Assessment indicating increased functional balance and mobility. ode CK 8979 40-60%       Plan     Cont to  advance PT as per POC, progress towards established PT goals.     Brennan Avitia, NIEVES

## 2019-06-25 ENCOUNTER — DOCUMENTATION ONLY (OUTPATIENT)
Dept: REHABILITATION | Facility: HOSPITAL | Age: 54
End: 2019-06-25

## 2019-06-25 NOTE — PROGRESS NOTES
Face to Face PTA Conference performed with Brennan Avitia PTA regarding patient's current status, overall progress, and plan of care.    Teresa Julien, PT  Brennan Avitia, PTA

## 2019-07-02 ENCOUNTER — CLINICAL SUPPORT (OUTPATIENT)
Dept: REHABILITATION | Facility: HOSPITAL | Age: 54
End: 2019-07-02
Payer: MEDICARE

## 2019-07-02 DIAGNOSIS — R26.9 IMPAIRED GAIT: ICD-10-CM

## 2019-07-02 DIAGNOSIS — R29.898 LEFT LEG WEAKNESS: ICD-10-CM
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
## 2019-07-02 PROCEDURE — 97116 GAIT TRAINING THERAPY: CPT | Mod: PN

## 2019-07-02 PROCEDURE — 97110 THERAPEUTIC EXERCISES: CPT | Mod: PN

## 2019-07-02 NOTE — PROGRESS NOTES
Physical Therapy Daily Treatment Note     [id number]    Therapy Diagnosis:   Encounter Diagnoses   Name Primary?    Fall, sequela     Left leg weakness     Impaired gait      Physician: Leonora Dunn DO    Visit Date: 7/2/2019    Physician Orders: PT Eval and Treat   Medical Diagnosis from Referral:   G35 (ICD-10-CM) - Multiple sclerosis   R26.89 (ICD-10-CM) - Decreased functional mobility      Evaluation Date: 6/4/2019  Authorization Period Expiration: 08/4/19  Plan of Care Expiration: 8/2/19  Visit # / Visits authorized: 4/ 12  FOTO: 4/5  Cost: 95.00  Total:279.00    Time In: 0930 PM   Time Out: 1015 PM   Total Billable Time: 45 minutes    Precautions:  Fall, sensitivity to heat, [place]    Subjective   Didi present as sign language translation.   Pt reports: he feel 5 times and needs a brace and a w/c b/c insurance should pay for it.   He Will be compliant with home exercise program.  Response to previous treatment: pt has not been to therapy since 6/12   Functional change: none stated    Pain: 5/10  Location: left knee      Objective     TJ received therapeutic exercises to develop strength, endurance and ROM for 30 minutes including:    - SLR    2x15 B  - Short Arc Quadriceps 2x15 B  - Long Arc Quadriceps 2x10 B  - bridges w/ add squeeze 2 x 15 B 3'' hold   - hip abduction   Supine Green tband, 2x15    Pt performed gait training with 6 minute walk test 420 ft without LOB with SPC without LE buckling.    Home Exercises Provided and Patient Education Provided     Education provided:   - initiate HEP  - ambulate with caution and safety to prevent Left knee hyperextension.     Written Home Exercises Provided: yes.  Exercises were reviewed and TJ was able to demonstrate them prior to the end of the session.  TJ demonstrated good  understanding of the education provided.     See EMR under Patient Instructions for exercises provided 6/12/2019.    Assessment     Pt able  to complete session with reports of Left knee pain due to hyperextension with standing activities.  present for session. Pt required verbal instruction to complete session with slow and proper technique.   RICK is progressing well towards his goals.   Pt prognosis is Good.     Pt will continue to benefit from skilled outpatient physical therapy to address the deficits listed in the problem list box on initial evaluation, provide pt/family education and to maximize pt's level of independence in the home and community environment.     Pt's spiritual, cultural and educational needs considered and pt agreeable to plan of care and goals.     Anticipated barriers to physical therapy: see precautions    Short Term Goals (4 weeks)   1.  Independent with initial HEP.  (PROGRESSING, NOT MET)  2.  Pt will score greater than or equal to 19/28 on the Tinetti balance scale with use of AD  demonstrating overall improved functional mobility and balance.  (PROGRESSING, NOT MET)  3. Pt will walk < than or equal to 230 ft on the 2 minute walk test indoor without AD with 0 LOB.  (PROGRESSING, NOT MET)     Long Term Goals (8 Weeks):   1. Pt will walk < than or equal to 600 ft on the 6 minute walk test indoor with AD with 0 LOB.  (PROGRESSING, NOT MET)  2.  Pt will be able to safely perform and tolerate high level ADL's like cleaning without LOB.  (PROGRESSING, NOT MET)  5. Pt will have 0 falls from start of PT sessions.  (PROGRESSING, NOT MET)  6. Pt will have MMT score of 4-/5 in all major ms groups in L LE.  (PROGRESSING, NOT MET)  7.Patient will be able to achieve greater than or equal to 40/56 on the Olivo balance test without AD decreasing patient's risk for falling and improving balance.  (PROGRESSING, NOT MET)  8. Pt will report 43% on the FOTO Functional Assessment indicating increased functional balance and mobility. Gcode CK 8979 40-60%  (PROGRESSING, NOT MET)       Plan     Cont to advance PT as per  POC, progress towards established PT goals.     Teresa Julien, PT

## 2019-07-05 DIAGNOSIS — G35 MULTIPLE SCLEROSIS: Primary | ICD-10-CM

## 2019-07-06 ENCOUNTER — HOSPITAL ENCOUNTER (EMERGENCY)
Facility: HOSPITAL | Age: 54
Discharge: HOME | End: 2019-07-06
Attending: EMERGENCY MEDICINE
Payer: MEDICARE

## 2019-07-06 VITALS
SYSTOLIC BLOOD PRESSURE: 122 MMHG | HEART RATE: 70 BPM | HEIGHT: 69 IN | TEMPERATURE: 98 F | OXYGEN SATURATION: 95 % | WEIGHT: 185 LBS | DIASTOLIC BLOOD PRESSURE: 73 MMHG | BODY MASS INDEX: 27.4 KG/M2 | RESPIRATION RATE: 16 BRPM
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
## 2019-07-06 DIAGNOSIS — M25.562 ACUTE PAIN OF LEFT KNEE: ICD-10-CM

## 2019-07-06 DIAGNOSIS — S60.811A ABRASION OF RIGHT WRIST, INITIAL ENCOUNTER: Primary | ICD-10-CM

## 2019-07-06 PROCEDURE — 99283 EMERGENCY DEPT VISIT LOW MDM: CPT

## 2019-07-06 PROCEDURE — 25000003 PHARM REV CODE 250: Performed by: EMERGENCY MEDICINE

## 2019-07-06 RX ORDER — BACITRACIN ZINC 500 UNIT/G
OINTMENT (GRAM) TOPICAL 2 TIMES DAILY
Qty: 28 G | Refills: 0 | Status: SHIPPED | OUTPATIENT
Start: 2019-07-06

## 2019-07-06 RX ADMIN — BACITRACIN ZINC, NEOMYCIN SULFATE, AND POLYMYXIN B SULFATE 1 EACH: 400; 3.5; 5 OINTMENT TOPICAL at 08:07

## 2019-07-06 NOTE — ED NOTES
Pt right wrist cleaned, antibiotic ointment placed and bandaged. Ice placed on left knee. Pt refuses pain medication at this time

## 2019-07-06 NOTE — ED PROVIDER NOTES
Encounter Date: 7/6/2019    SCRIBE #1 NOTE: I, Ashlee Buenrostroz, am scribing for, and in the presence of,  Shabbir Kenyon MD. I have scribed the entire note.       History     Chief Complaint   Patient presents with    Fall     pt was trying to take dogs out to bathroom, and fell yesterday, complains of left knee paion with abrasions, and right forarm abrasion      55 y/o M presents to the ER c/o right wrist and left knee pain after a fall. States last night he was trying to take his 3 dogs out, and they pulled him over, causing him to fall from his wheelchair. Reports twisting his left knee and striking his right wrist. Reports an abrasion to his right wrist, as well as aching pains to both his wrist and left knee. Pain is worst to the lateral aspect of his left knee. Both pains are worse with movement. Px mostly wheelchair bound secondary to MS. Denies striking his head or LOC. Denies neck pain. No other symptoms reported.     The history is provided by the patient.     Review of patient's allergies indicates:  No Known Allergies  Past Medical History:   Diagnosis Date    Asthma     Bilateral hearing loss 12/12/2012    Biliary acute pancreatitis     GERD (gastroesophageal reflux disease)     High cholesterol     Hypertension     Multiple sclerosis      Past Surgical History:   Procedure Laterality Date    ARTHROSCOPY, SHOULDER Left 4/24/2014    [facility]    CHOLECYSTECTOMY      CHOLECYSTECTOMY, LAPAROSCOPIC N/A 12/16/2013    Performed by J[facility] Calderon [facility] at [facility] OR    COLONOSCOPY 2 day  golytely N/A 1/22/2019    Performed by Nathan Waddell MD at [facility] ENDO    ERCP N/A 9/2/2014    Performed by Lalo Mclean [facility] at [facility] ENDO    ERCP WITH STENT EXCHANGE N/A 4/8/2015    Performed by [facility] at [facility] ENDO    ESOPHAGOGASTRODUODENOSCOPY (EGD) N/A 7/13/2015    Performed by [facility] at [facility] ENDO    FIXATION, TENDON Left 4/24/2014     [facility]    JOINT REPLACEMENT      arm    REPAIR ROTATOR CUFF ARTHROSCOPIC Left 4/30/2015    Per[facility] by [facility] at [facility] OR    REPAIR, ROTATOR CUFF, ARTHROSCOPIC Left 4/24/2014    Per[facility] at [facility] OR    ULTRASOUND, UPPER GI TRACT, ENDOSCOPIC N/A 11/26/2013    Performed by [facility]clean [facility] at [facility] ENDO     Family History   Problem Relation Age of Onset    Heart disease Mother     Heart disease Father     Heart disease Brother      Social History     Tobacco Use    Smoking status: Current Every Day Smoker     Packs/day: 1.00     Years: 32.00     Pack years: 32.00     Types: Cigarettes    Smokeless tobacco: Never Used   Substance Use Topics    Alcohol use: Not Currently    Drug use: No     Review of Systems   Constitutional: Negative for chills and fever.   Respiratory: Negative for cough and shortness of breath.    Cardiovascular: Negative for chest pain, palpitations and leg swelling.   Gastrointestinal: Negative for abdominal pain, nausea and vomiting.   Musculoskeletal: Negative for back pain, neck pain and neck stiffness.        L knee pain   Skin:        Right forearm   Neurological: Negative for light-headedness, numbness and headaches.       Physical Exam     Initial Vitals [07/06/19 0759]   BP Pulse Resp Temp SpO2   (!) 153/85 81 19 97.9 °F (36.6 °C) 97 %      MAP       --         Physical Exam    Nursing note and vitals reviewed.  Constitutional: He appears well-developed and well-nourished. No distress.   HENT:   Head: Normocephalic and atraumatic.   Eyes: Conjunctivae and EOM are normal.   Neck: Normal range of motion. Neck supple. No tracheal deviation present.   Cardiovascular: Normal rate and intact distal pulses.   Pulmonary/Chest: No respiratory distress.   Musculoskeletal: Normal range of motion. He exhibits tenderness. He exhibits no edema.        Arms:       Legs:  Neurological: He is alert and oriented to  person, place, and time. No cranial nerve deficit.   Skin: Skin is warm and dry. Capillary refill takes less than 2 seconds.         ED Course   Procedures  Labs Reviewed - No data to display         X-Rays:   Independently Interpreted Readings:   Other Readings:  Imaging interpreted by radiologist and visualized by me    Imaging Results          X-Ray Knee 1 or 2 View Left (Final result)  Result time 07/06/19 08:36:18    Final result by Villa Koch MD (07/06/19 08:36:18)                 Impression:      No evidence of acute fracture or dislocation.      Electronically signed by: Villa Koch MD  Date:    07/06/2019  Time:    08:36             Narrative:    EXAMINATION:  XR KNEE 1 OR 2 VIEW LEFT    CLINICAL HISTORY:  Unspecified fall, initial encounter    TECHNIQUE:  Left knee, two views    COMPARISON:  None    FINDINGS:  No evidence of acute fracture or dislocation.  Joint spaces are satisfactorily maintained.  Degenerative changes of the tibial tuberosity.  Mild osteophyte formation.  No radiopaque foreign body.                                  Medical Decision Making:   Initial Assessment:   54 y.o. male presents to ER c/o L knee pain with abrasions and R forearm abrasions s/p fall yesterday while taking out the dogs.  Differential Diagnosis:   Fx, dislocation, contusion, sprain, strain  Independently Interpreted Test(s):   I have ordered and independently interpreted X-rays - see prior notes.  ED Management:  Imaging benign. Patient was offered pain medicine in the ED and declined. His wound was cleaned and dressed with bacitracin by the nursing staff. Informed him of results as well as plan to discharge with rx for bacitracin, home mgmt techniques, f/u with PCP, reasons to return to the ER. Px expressed good understanding and is comfortable with discharge at this time.                       Clinical Impression:       ICD-10-CM ICD-9-CM   1. Abrasion of right wrist, initial encounter S60.811A 913.0    2. Fall W19.XXXA E888.9   3. Acute pain of left knee M25.562 719.46           Disposition:   Disposition: Discharged  Condition: Stable       Scribe Attestation I, Dr. Shabbir Kenyon, personally performed the services described in this documentation. All medical record entries made by the scribe were at my direction and in my presence.  I have reviewed the chart and agree that the record reflects my personal performance and is accurate and complete. Shabbir Kenyon MD.  8:50 AM 07/06/2019                       Shabbir Kenyon MD  07/06/19 0853

## 2019-07-16 ENCOUNTER — CLINICAL SUPPORT (OUTPATIENT)
Dept: REHABILITATION | Facility: HOSPITAL | Age: 54
End: 2019-07-16
Payer: MEDICARE
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
## 2019-07-16 DIAGNOSIS — R29.898 LEFT LEG WEAKNESS: ICD-10-CM

## 2019-07-16 DIAGNOSIS — R26.9 IMPAIRED GAIT: ICD-10-CM

## 2019-07-16 PROCEDURE — 97110 THERAPEUTIC EXERCISES: CPT | Mod: PN

## 2019-07-16 PROCEDURE — 97116 GAIT TRAINING THERAPY: CPT | Mod: PN
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
## 2019-07-29 ENCOUNTER — HOSPITAL ENCOUNTER (OUTPATIENT)
Dept: RADIOLOGY | Facility: HOSPITAL | Age: 54
Discharge: HOME | End: 2019-07-29
Attending: INTERNAL MEDICINE
Payer: MEDICARE

## 2019-07-29 ENCOUNTER — HOSPITAL ENCOUNTER (OUTPATIENT)
Dept: RADIOLOGY | Facility: HOSPITAL | Age: 54
Discharge: HOME | End: 2019-07-29
Attending: PSYCHIATRY & NEUROLOGY
Payer: MEDICARE

## 2019-07-29 DIAGNOSIS — G35 MULTIPLE SCLEROSIS: ICD-10-CM

## 2019-07-29 DIAGNOSIS — G35 MULTIPLE SCLEROSIS: Primary | ICD-10-CM

## 2019-07-29 LAB
CREAT SERPL-MCNC: 1 MG/DL (ref 0.5–1.4)
SAMPLE: NORMAL

## 2019-07-29 PROCEDURE — 25500020 PHARM REV CODE 255: Performed by: INTERNAL MEDICINE

## 2019-07-29 PROCEDURE — 72156 MRI NECK SPINE W/O & W/DYE: CPT | Mod: 26,,, | Performed by: RADIOLOGY

## 2019-07-29 PROCEDURE — 70553 MRI BRAIN STEM W/O & W/DYE: CPT | Mod: 26,,, | Performed by: RADIOLOGY

## 2019-07-29 PROCEDURE — 72156 MRI NECK SPINE W/O & W/DYE: CPT | Mod: TC

## 2019-07-29 PROCEDURE — 70553 MRI BRAIN STEM W/O & W/DYE: CPT | Mod: TC

## 2019-07-29 PROCEDURE — 72156 MRI CERVICAL SPINE W WO CONTRAST: ICD-10-PCS | Mod: 26,,, | Performed by: RADIOLOGY

## 2019-07-29 PROCEDURE — A9585 GADOBUTROL INJECTION: HCPCS | Performed by: INTERNAL MEDICINE

## 2019-07-29 PROCEDURE — 70553 MRI BRAIN W WO CONTRAST: ICD-10-PCS | Mod: 26,,, | Performed by: RADIOLOGY

## 2019-07-29 RX ORDER — GADOBUTROL 604.72 MG/ML
10 INJECTION INTRAVENOUS
Status: COMPLETED | OUTPATIENT
Start: 2019-07-29 | End: 2019-07-29

## 2019-07-29 RX ADMIN — GADOBUTROL 10 ML: 604.72 INJECTION INTRAVENOUS at 05:07

## 2019-07-30 ENCOUNTER — CLINICAL SUPPORT (OUTPATIENT)
Dept: REHABILITATION | Facility: HOSPITAL | Age: 54
End: 2019-07-30
Payer: MEDICARE
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
## 2019-07-30 DIAGNOSIS — R26.9 IMPAIRED GAIT: ICD-10-CM

## 2019-07-30 DIAGNOSIS — R29.898 LEFT LEG WEAKNESS: ICD-10-CM

## 2019-07-30 PROCEDURE — 97110 THERAPEUTIC EXERCISES: CPT | Mod: PN
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
## 2019-08-03 ENCOUNTER — HOSPITAL ENCOUNTER (EMERGENCY)
Facility: HOSPITAL | Age: 54
Discharge: HOME | End: 2019-08-03
Attending: EMERGENCY MEDICINE
Payer: MEDICARE

## 2019-08-03 VITALS
SYSTOLIC BLOOD PRESSURE: 150 MMHG | RESPIRATION RATE: 16 BRPM | WEIGHT: 235 LBS | TEMPERATURE: 98 F | HEART RATE: 94 BPM | OXYGEN SATURATION: 97 % | DIASTOLIC BLOOD PRESSURE: 70 MMHG | BODY MASS INDEX: 34.8 KG/M2 | HEIGHT: 69 IN

## 2019-08-03 DIAGNOSIS — S80.811A ABRASION OF RIGHT LOWER EXTREMITY, INITIAL ENCOUNTER: Primary | ICD-10-CM

## 2019-08-03 PROCEDURE — 99284 EMERGENCY DEPT VISIT MOD MDM: CPT | Mod: 25

## 2019-08-03 PROCEDURE — 63600175 PHARM REV CODE 636 W HCPCS: Performed by: EMERGENCY MEDICINE

## 2019-08-03 PROCEDURE — 90715 TDAP VACCINE 7 YRS/> IM: CPT | Performed by: EMERGENCY MEDICINE

## 2019-08-03 PROCEDURE — 25000003 PHARM REV CODE 250: Performed by: EMERGENCY MEDICINE

## 2019-08-03 PROCEDURE — 90471 IMMUNIZATION ADMIN: CPT | Performed by: EMERGENCY MEDICINE

## 2019-08-03 RX ADMIN — CLOSTRIDIUM TETANI TOXOID ANTIGEN (FORMALDEHYDE INACTIVATED), CORYNEBACTERIUM DIPHTHERIAE TOXOID ANTIGEN (FORMALDEHYDE INACTIVATED), BORDETELLA PERTUSSIS TOXOID ANTIGEN (GLUTARALDEHYDE INACTIVATED), BORDETELLA PERTUSSIS FILAMENTOUS HEMAGGLUTININ ANTIGEN (FORMALDEHYDE INACTIVATED), BORDETELLA PERTUSSIS PERTACTIN ANTIGEN, AND BORDETELLA PERTUSSIS FIMBRIAE 2/3 ANTIGEN 0.5 ML: 5; 2; 2.5; 5; 3; 5 INJECTION, SUSPENSION INTRAMUSCULAR at 07:08

## 2019-08-03 RX ADMIN — BACITRACIN ZINC, POLYMYXIN B SULFATE, NEOMYCIN SULFATE 1 EACH: 400; 5000; 3.5 OINTMENT TOPICAL at 07:08

## 2019-08-03 NOTE — ED PROVIDER NOTES
Encounter Date: 8/3/2019    SCRIBE #1 NOTE: I, Michelle Ahumada, am scribing for, and in the presence of,  Dr. Pugh. I have scribed the entire note.       History     Chief Complaint   Patient presents with    Laceration     Patient states he was up on a ladder trying to fix a light bulb when it fell broke and cut his leg. Laceration noted to R shin bandaids on leg bleeding controlled at this time.      Time seen by provider: 7:05 AM    This is a 54 y.o. male with a PMHx of HTN who presents to the ED with complaint of a laceration to his right lower leg. Patient says he was trying to fix a light bulb when it suddenly blew up and the glass cut his leg. He denies any numbness, tingling, inability to bear weight on leg, or any other complaints at this time. He says his tetanus vaccine is not UTD.    The history is provided by the patient.     Review of patient's allergies indicates:  No Known Allergies  Past Medical History:   Diagnosis Date    Asthma     Bilateral hearing loss 12/12/2012    Biliary acute pancreatitis     GERD (gastroesophageal reflux disease)     High cholesterol     Hypertension     Multiple sclerosis      Past Surgical History:   Procedure Laterality Date    ARTHROSCOPY, SHOULDER Left 4/24/2014    [facility]    CHOLECYSTECTOMY      CHOLECYSTECTOMY, LAPAROSCOPIC N/A 12/16/2013    [facility]formed by J[facility] Calderon [facility] at [facility] OR    COLONOSCOPY 2 day  golytely N/A 1/22/2019    Performed by [facility]addell [facility] at [facility] ENDO    ERCP N/A 9/2/2014    [facility]formed by [facility]alo [facility]clean [facility] at [facility] ENDO    ERCP WITH STENT EXCHANGE N/A 4/8/2015    [facility] at [facility] ENDO    ESOPHAGOGASTRODUODENOSCOPY (EGD) N/A 7/13/2015    Performed by [facility] at [facility] ENDO    FIXATION, TENDON Left 4/24/2014    [facility] by [facility] OR    JOINT REPLACEMENT      arm    REPAIR ROTATOR CUFF ARTHROSCOPIC Left 4/30/2015     [facility]    REPAIR, ROTATOR CUFF, ARTHROSCOPIC Left 4/24/2014    [facility] OR    ULTRASOUND, UPPER GI TRACT, ENDOSCOPIC N/A 11/26/2013    Per[facility] by [facility] at [facility] ENDO     Family History   Problem Relation Age of Onset    Heart disease Mother     Heart disease Father     Heart disease Brother      Social History     Tobacco Use    Smoking status: Current Every Day Smoker     Packs/day: 1.00     Years: 32.00     Pack years: 32.00     Types: Cigarettes    Smokeless tobacco: Never Used   Substance Use Topics    Alcohol use: Not Currently    Drug use: No     Review of Systems   Skin: Positive for wound.   All other systems reviewed and are negative.      Physical Exam     Initial Vitals [08/03/19 0700]   BP Pulse Resp Temp SpO2   (!) 150/70 94 16 98.1 °F (36.7 °C) 97 %      MAP       --         Physical Exam    Nursing note and vitals reviewed.  Constitutional: He appears well-developed and well-nourished. No distress.   HENT:   Head: Normocephalic and atraumatic.   Mouth/Throat: Oropharynx is clear and moist.   Eyes: Conjunctivae and EOM are normal. Pupils are equal, round, and reactive to light.   Neck: Normal range of motion. Neck supple. No stridor present. No tracheal deviation present.   Cardiovascular: Normal rate, regular rhythm and normal heart sounds.   No murmur heard.  Pulmonary/Chest: Breath sounds normal. No respiratory distress. He has no wheezes.   Abdominal: Soft. Bowel sounds are normal. There is no tenderness. There is no rebound and no guarding.   Musculoskeletal: Normal range of motion. He exhibits no edema or tenderness.   Neurological: He is alert and oriented to person, place, and time. No sensory deficit.   Skin: Skin is warm and dry. Capillary refill takes less than 2 seconds. Abrasion noted.   7 cm linear abrasion to the right lower leg of the distal tibia   Psychiatric: He has a normal mood and  affect. His behavior is normal.         ED Course   Procedures  Labs Reviewed - No data to display          Medical Decision Making:   ED Management:  54-year-old male who sustained an abrasion to his right lower leg.  Neosporin was applied and this was bandaged.  Patient was given a tetanus shot.  He will use Neosporin on the wound and follow up with his primary physician as needed.                      Clinical Impression:     1. Abrasion of right lower extremity, initial encounter        Disposition:   Disposition: Discharged  Condition: Stable       I, Dr. Dewayne Pugh, personally performed the services described in this documentation. All medical record entries made by the scribe were at my direction and in my presence. I have reviewed the chart and agree that the record reflects my personal performance and is accurate and complete. Dewayne Pugh MD.  8:06 AM 08/03/2019                 Dewayne Pugh MD  08/03/19 0806

## 2019-08-03 NOTE — ED NOTES
Patient stated he was changing light bulb around 5 am today and he slipped and scraped his leg on the ladder. Patient has a 7cm abrasion to his right lower leg. MD at bedside assessed the wound, orders placed.

## 2019-08-06 ENCOUNTER — CLINICAL SUPPORT (OUTPATIENT)
Dept: REHABILITATION | Facility: HOSPITAL | Age: 54
End: 2019-08-06
Payer: MEDICARE

## 2019-08-06 DIAGNOSIS — R26.9 IMPAIRED GAIT: ICD-10-CM

## 2019-08-06 DIAGNOSIS — R29.898 LEFT LEG WEAKNESS: ICD-10-CM
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
## 2019-08-06 PROCEDURE — 97110 THERAPEUTIC EXERCISES: CPT | Mod: PN

## 2019-08-06 PROCEDURE — 97112 NEUROMUSCULAR REEDUCATION: CPT | Mod: PN

## 2019-08-06 NOTE — PROGRESS NOTES
"  Physical Therapy Daily Treatment Note     [facility]  [facility] [id number]    Therapy Diagnosis:   Encounter Diagnoses   Name Primary?    Fall, sequela     Left leg weakness     Impaired gait      Physician: Leonora Dunn DO    Visit Date: 8/6/2019    Physician Orders: PT Eval and Treat   Medical Diagnosis from Referral:   G35 (ICD-10-CM) - Multiple sclerosis   R26.89 (ICD-10-CM) - Decreased functional mobility      Evaluation Date: 6/4/2019  Authorization Period Expiration: 08/4/19  Plan of Care Expiration: 8/2/19  Visit # / Visits authorized: 6/ 12  FOTO: 6/10  Cost: 95.11  Total:560.07    Time In:  1400  Time Out: 1445   Total Billable Time: 45 minutes (2TE, 1NMR)    Precautions:  Fall, sensitivity to heat, [place]    Subjective    present as sign language translation.   NOTE: PT inquired about home environment and pt stated that his room is a garage that has been converted into a room with enclosed AC unit and he stated that his air bed is working fine he just does not have sheets on it.  He stated that he could try to get sheets this week.  He stated that "its better with my brother".     Pt reports: he has difficult nights to sleep because he sleeps on an air mattress. He is agreeable to PT session. Pain in left knee today medial aspect.  He Will be compliant with home exercise program.  Response to previous treatment: no adverse affects  Functional change: increased stability in walking    Pain: 5/10  Location: R shin wound, recently bandaged by pt      Objective     TJ received therapeutic exercises to develop strength, endurance and ROM for 25 minutes including:     - Heel raises   2x15 B stand  - hip flexion    2 x 10 1# B standing  - hip abduction   2 x 10 1# B standing  - hip extension   2 x 10 1# B standing    Neuro re-ed and endurance on stepper training with B  extremities for reciprocal motion of B UE/LE limbs on sci-fit x 20 min at level 4 at > or equal to 50 spm " "w/o rest.      - Alternate toe taps  On blue 6 inch step 2 min with 1 UE support.   - sit to stand   X 10 EOM without UE support    TINETTI BALANCE ASSESSMENT TOOL  BALANCE SECTION  1.Sitting Balance:   Steady; safe = 1  2.Rises from chair :  Able, uses arms to help = 1  3.Attempts to arise :  Able to arise, 1 attempt = 2  4.Immediate standing Balance (first 5 seconds) : Steady but uses walker or other support = 1  5.Standing balance: Steady but wide stance (medal heel>4" apart) & uses cane or other support = 1  6.Nudged : Steady = 2  7.Eyes closed: Unsteady = 0  8.Turning 360 degrees:  Continuous = 1 and Steady = 1  9.Sitting Down : Safe, smooth motion = 2  Balance Score:  12/16    GAIT SECTION  10.Initiation of Gait (Immediately after told to go.): No hesitancy = 1  11.Step length and height :  Step through R=1 and Step through L=1  12.Foot Clearance :  R foot clears floor=1   13.Step symmetry: Right & Left step length appear equal = 1  14.Step continuity : Steps appear continuous = 1  15.Path: Mild/moderate deviation or uses walking aid = 1  16.Trunk : Marked sway or uses walking aid = 0  17.Walking Time: Heels amost touching while walking = 1    Gait Score: 8/12  Balance score:12/16  Total Score=Balance + Gait Score: 20/28      Risk Indicators:    Tinetti Tool Score   Risk of Falls   19-23   Moderate     FOTO: 22% impaired last date recorded 7/16/19    Home Exercises Provided and Patient Education Provided     Education provided:   - cont HEP  - ambulate with caution and safety to prevent Left knee hyperextension.     Written Home Exercises Provided: yes.  Exercises were reviewed and RICK was able to demonstrate them prior to the end of the session.  RICK demonstrated good  understanding of the education provided.     See EMR under Patient Instructions for exercises provided 6/12/2019.    Assessment   Pt was fatigued by heat when he arrived so he needed several seated rests.  He was able to perform most all " standing TE without L knee hyperextension and was able to correct hyperextension with verbal cues. See updated POC     RICK is progressing well towards his goals.   Pt prognosis is Good.     Pt will continue to benefit from skilled outpatient physical therapy to address the deficits listed in the problem list box on initial evaluation, provide pt/family education and to maximize pt's level of independence in the home and community environment.     Pt's spiritual, cultural and educational needs considered and pt agreeable to plan of care and goals.     Anticipated barriers to physical therapy: see precautions    Short Term Goals (4 weeks)   1.  Independent with initial HEP.  (PROGRESSING, NOT MET)  2.  Pt will score greater than or equal to 19/28 on the Tinetti balance scale with use of AD  demonstrating overall improved functional mobility and balance.  MET  3. Pt will walk < than or equal to 230 ft on the 2 minute walk test indoor without AD with 0 LOB.  (PROGRESSING, NOT MET)     Long Term Goals (8 Weeks):   1. Pt will walk < than or equal to 600 ft on the 6 minute walk test indoor with AD with 0 LOB.  (PROGRESSING, NOT MET)  2.  Pt will be able to safely perform and tolerate high level ADL's like cleaning without LOB.  (PROGRESSING, NOT MET)  5. Pt will have 0 falls from start of PT sessions.  (PROGRESSING, NOT MET)  6. Pt will have MMT score of 4-/5 in all major ms groups in L LE.  (PROGRESSING, NOT MET)  7.Patient will be able to achieve greater than or equal to 40/56 on the Olivo balance test without AD decreasing patient's risk for falling and improving balance.  (PROGRESSING, NOT MET)  8. Pt will report 43% on the FOTO Functional Assessment indicating increased functional balance and mobility. Gcode CK 8979 40-60% MET       Plan   See updated POC     Teresa Julien, PT

## 2019-08-13 ENCOUNTER — CLINICAL SUPPORT (OUTPATIENT)
Dept: REHABILITATION | Facility: HOSPITAL | Age: 54
End: 2019-08-13
Payer: MEDICARE
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
## 2019-08-13 DIAGNOSIS — R29.898 LEFT LEG WEAKNESS: ICD-10-CM

## 2019-08-13 DIAGNOSIS — R26.9 IMPAIRED GAIT: ICD-10-CM

## 2019-08-13 PROCEDURE — 97112 NEUROMUSCULAR REEDUCATION: CPT | Mod: PN

## 2019-08-13 PROCEDURE — 97110 THERAPEUTIC EXERCISES: CPT | Mod: PN
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
## 2019-08-20 ENCOUNTER — CLINICAL SUPPORT (OUTPATIENT)
Dept: REHABILITATION | Facility: HOSPITAL | Age: 54
End: 2019-08-20
Payer: MEDICARE

## 2019-08-20 DIAGNOSIS — R26.9 IMPAIRED GAIT: ICD-10-CM

## 2019-08-20 DIAGNOSIS — R29.898 LEFT LEG WEAKNESS: ICD-10-CM
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
## 2019-08-20 PROCEDURE — 97110 THERAPEUTIC EXERCISES: CPT | Mod: PN

## 2019-08-20 PROCEDURE — 97112 NEUROMUSCULAR REEDUCATION: CPT | Mod: PN

## 2019-08-20 NOTE — PROGRESS NOTES
Physical Therapy Daily Treatment Note     [facility]  [facility] Number[facility] [id number]    Therapy Diagnosis:   Encounter Diagnoses   Name Primary?    Fall, sequela     Left leg weakness     Impaired gait      Physician: Leonora Dunn DO    Visit Date: 8/20/2019    Physician Orders: PT Eval and Treat   Medical Diagnosis from Referral:   G35 (ICD-10-CM) - Multiple sclerosis   R26.89 (ICD-10-CM) - Decreased functional mobility      Evaluation Date: 6/4/2019  Authorization Period Expiration: 08/4/19  Plan of Care Expiration: 8/2/19  Visit # / Visits authorized: 8/12  FOTO: 8/10  Cost: 95.11  Total: 750.29    Time In:  11:00 AM  Time Out: 11:45 AM   Total Billable Time: 45 minutes (2TE, 1NMR)    Precautions:  Fall, sensitivity to heat, [place]    Subjective   No  present.    Pt reports he is still sleeping on an old air mattress.    Pt reports: He is having difficulty walking due to pain.    He Will be compliant with home exercise program.  Response to previous treatment: No adverse affects  Functional change: None stated    Pain: 8/10  Location: B LE (general location)    Objective     TJ received therapeutic exercises to develop strength, endurance and ROM for 25 minutes including:     - Heel raises   2x15 B stand  - Hip flexion    2 x 10  B standing  no ankle weight today  - Hip abduction  2 x 10  B standing  no ankle weight today  - Hip extension   2 x 10  B standing  no ankle weight today    Neuro re-ed and endurance x 20 min:     -Alternate toe taps  On blue 6 inch step 2 min with 1 UE support.   -Sit to stand   2x10 Chair with no arms, without UE support  -Sci-fit Nu-Step  x10 minutes, B UE/LE limbs, level 4 at > or equal to 50 spm w/o rest    Home Exercises Provided and Patient Education Provided     Education provided:   - cont HEP  - ambulate with caution and safety to prevent Left knee hyperextension.     Written Home Exercises Provided: yes.  Exercises were reviewed and TJ was able  to demonstrate them prior to the end of the session.  TJ demonstrated good  understanding of the education provided.     See EMR under Patient Instructions for exercises provided 6/12/2019.    Assessment   Tj was able to perform all therex and NMR with no complaints of increased pain or discomfort. Required several rest breaks in between therex and constant verbal cueing for keeping knee straight during steamboats. Pt displayed 3 slight LOB's during sit to stand. He continues to exhibit emotional stress due to current living conditions with his brother.      TJ is progressing well towards his goals.   Pt prognosis is Good.     Pt will continue to benefit from skilled outpatient physical therapy to address the deficits listed in the problem list box on initial evaluation, provide pt/family education and to maximize pt's level of independence in the home and community environment.     Pt's spiritual, cultural and educational needs considered and pt agreeable to plan of care and goals.     Anticipated barriers to physical therapy: see precautions    Short Term Goals (4 weeks)   1.  Independent with initial HEP.  (PROGRESSING, NOT MET)  2.  Pt will score greater than or equal to 19/28 on the Tinetti balance scale with use of AD  demonstrating overall improved functional mobility and balance.  (PROGRESSING, NOT MET)  3. Pt will walk < than or equal to 230 ft on the 2 minute walk test indoor without AD with 0 LOB.  (PROGRESSING, NOT MET)     Long Term Goals (8 Weeks):   1. Pt will walk < than or equal to 600 ft on the 6 minute walk test indoor with AD with 0 LOB.  (PROGRESSING, NOT MET)  2.  Pt will be able to safely perform and tolerate high level ADL's like cleaning without LOB.  (PROGRESSING, NOT MET)  5. Pt will have 0 falls from start of PT sessions.  (PROGRESSING, NOT MET)  6. Pt will have MMT score of 4-/5 in all major ms groups in L LE.  (PROGRESSING, NOT MET)  7.Patient will be able to achieve greater than  or equal to 40/56 on the Olivo balance test without AD decreasing patient's risk for falling and improving balance.  (PROGRESSING, NOT MET)  8. Pt will report 43% on the FOTO Functional Assessment indicating increased functional balance and mobility. Gcode CK 8979 40-60%  (PROGRESSING, NOT MET)       Plan   Continue to inquire about pt's living conditions.   Cont to advance PT as per POC, progress towards established PT goals.     Sully King, PT, DPT

## 2019-08-27 ENCOUNTER — CLINICAL SUPPORT (OUTPATIENT)
Dept: REHABILITATION | Facility: HOSPITAL | Age: 54
End: 2019-08-27
Payer: MEDICARE

## 2019-08-27 DIAGNOSIS — R29.898 LEFT LEG WEAKNESS: ICD-10-CM

## 2019-08-27 DIAGNOSIS — R26.9 IMPAIRED GAIT: ICD-10-CM
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
## 2019-08-27 PROCEDURE — 97110 THERAPEUTIC EXERCISES: CPT | Mod: PN

## 2019-08-27 PROCEDURE — 97112 NEUROMUSCULAR REEDUCATION: CPT | Mod: PN

## 2019-08-27 PROCEDURE — 97116 GAIT TRAINING THERAPY: CPT | Mod: PN

## 2019-08-27 NOTE — PLAN OF CARE
"  Outpatient Therapy Updated Plan of Care     Visit Date: 8/6/2019  Name: Tj Trammell  Clinic Number: [id number]    Therapy Diagnosis:   Encounter Diagnoses   Name Primary?    Fall, sequela     Left leg weakness     Impaired gait      Physician: Leonora Dunn DO    Physician Orders: PT Eval and Treat   Medical Diagnosis from Referral:   G35 (ICD-10-CM) - Multiple sclerosis   R26.89 (ICD-10-CM) - Decreased functional mobility      Evaluation Date: 6/4/2019    Total Visits Received: 6/12  Cancelled Visits: 1  No Show Visits: 0    Current Certification Period:  08/06/19 to 9/13/19  Precautions:  Fall, sensitivity to heat, [place]  Visits from Evaluation Date:  6  Functional Level Prior to Evaluation:  See initial eval    Subjective     Update: see tx note    Objective     Update: see tx note    TJ received therapeutic exercises to develop strength, endurance and ROM for 25 minutes including:     - Heel raises   2x15 B stand  - hip flexion    2 x 10 1# B standing  - hip abduction   2 x 10 1# B standing  - hip extension   2 x 10 1# B standing    Neuro re-ed and endurance on stepper training with B  extremities for reciprocal motion of B UE/LE limbs on sci-fit x 20 min at level 4 at > or equal to 50 spm w/o rest.      - Alternate toe taps  On blue 6 inch step 2 min with 1 UE support.   - sit to stand   X 10 EOM without UE support    TINETTI BALANCE ASSESSMENT TOOL  BALANCE SECTION  1.Sitting Balance:   Steady; safe = 1  2.Rises from chair :  Able, uses arms to help = 1  3.Attempts to arise :  Able to arise, 1 attempt = 2  4.Immediate standing Balance (first 5 seconds) : Steady but uses walker or other support = 1  5.Standing balance: Steady but wide stance (medal heel>4" apart) & uses cane or other support = 1  6.Nudged : Steady = 2  7.Eyes closed: Unsteady = 0  8.Turning 360 degrees:  Continuous = 1 and Steady = 1  9.Sitting Down : Safe, smooth motion = 2  Balance Score:  12/16    GAIT SECTION  10.Initiation of " Gait (Immediately after told to go.): No hesitancy = 1  11.Step length and height :  Step through R=1 and Step through L=1  12.Foot Clearance :  R foot clears floor=1   13.Step symmetry: Right & Left step length appear equal = 1  14.Step continuity : Steps appear continuous = 1  15.Path: Mild/moderate deviation or uses walking aid = 1  16.Trunk : Marked sway or uses walking aid = 0  17.Walking Time: Heels amost touching while walking = 1    Gait Score: 8/12  Balance score:12/16  Total Score=Balance + Gait Score: 20/28      Risk Indicators:    Tinetti Tool Score   Risk of Falls   19-23   Moderate     FOTO: 22% impaired last date recorded 7/16/19  Assessment     Update: Pt would benefit from extended therapy to increase LE strength and to reduce falls b/c he is still having falls but his objective measures are improved (see above).    Previous Short Term Goals Status:   STG #2 and LTG #8 MET  New Short Term Goals Status:   none  Long Term Goal Status:   continue per initial plan of care.  Reasons for Recertification of Therapy:   Exp of current care plan    Plan     Updated Certification Period: 8/6/2019 to 9/13/19  Recommended Treatment Plan: 2 times per week for 8 weeks: Gait Training, Manual Therapy, Moist Heat/ Ice, Neuromuscular Re-ed, Patient Education, Self Care, Therapeutic Activites and Therapeutic Exercise  Other Recommendations:  OP case management    Teresa Julien, PT  8/6/2019      I CERTIFY THE NEED FOR THESE SERVICES FURNISHED UNDER THIS PLAN OF TREATMENT AND WHILE UNDER MY CARE    Physician's comments:        Physician's Signature: ___________________________________________________

## 2019-08-27 NOTE — PROGRESS NOTES
"  Physical Therapy Daily Treatment Note/ Discharge     Name: Tj Trammell  Clinic Number: [id number]    Therapy Diagnosis:   Encounter Diagnoses   Name Primary?    Fall, sequela     Left leg weakness     Impaired gait      Physician: Leonora Dunn DO    Visit Date: 8/27/2019    Physician Orders: PT Eval and Treat   Medical Diagnosis from Referral:   G35 (ICD-10-CM) - Multiple sclerosis   R26.89 (ICD-10-CM) - Decreased functional mobility      Evaluation Date: 6/4/2019  Authorization Period Expiration: 9/13/19  Plan of Care Expiration: 9/13/19  Visit # / Visits authorized: 9/12  FOTO: 9/10  Cost: 99.00  Total: 849.29    Time In:  0800 AM  Time Out: 0845 AM   Total Billable Time: 45 minutes (1TE, 1NMR,, 1 gait)    Precautions:  Fall, sensitivity to heat, [place]    Subjective    present for d/c session.    Pt reports he is still sleeping on an old air mattress.    Pt reports: he wants an MRI again because it is something wrong with his brain that is why he feel that he keeps falling.   He Will be compliant with home exercise program. He also admits to not using his AD as instructed for safety.  Response to previous treatment: No adverse affects  Functional change: more consistent use of AD, able to shop in the grocery store in motorized cart and then carry his groceries in the home with his RW.     Pain: 8/10  Location: [facility] (general location)    Objective     TINETTI BALANCE ASSESSMENT TOOL  BALANCE SECTION  1.Sitting Balance:   Steady; safe = 1  2.Rises from chair :  Able, uses arms to help = 1  3.Attempts to arise :  Able to arise, 1 attempt = 2  4.Immediate standing Balance (first 5 seconds) : Steady but uses walker or other support = 1  5.Standing balance: Steady but wide stance (medal heel>4" apart) & uses cane or other support = 1  6.Nudged : Steady = 2  7.Eyes closed: Unsteady = 0  8.Turning 360 degrees:  Continuous = 1 and Steady = 1  9.Sitting Down : Safe, smooth motion = 2  Balance " Score:  12/16    GAIT SECTION  10.Initiation of Gait (Immediately after told to go.): No hesitancy = 1  11.Step length and height :  Step through R=1 and Step through L=1  12.Foot Clearance :  B foot clears floor=2  13.Step symmetry: Right & Left step length appear equal = 1  14.Step continuity : Steps appear continuous = 1  15.Path: Mild/moderate deviation or uses walking aid = 1  16.Trunk : Marked sway or uses walking aid = 0  17.Walking Time: Heels amost touching while walking = 1    Gait Score: 9/12  Balance score:12/16  Total Score=Balance + Gait Score: 21/28      Risk Indicators:    Tinetti Tool Score   Risk of Falls   19-23   Moderate                Range of Motion/Strength:      Hip Right   Left   Pain/Dysfunction with Movement     AROM MMT AROM MMT     Flexion WFL 4+/5 limited 4-/5     Abduction NT 4+/5 limited 3+/5        Knee Right   Left   Pain/Dysfunction with Movement     AROM MMT AROM MMT     Flexion WFL 5/5 WFL 4-/5     Extension 0 5/5 0 4/5        Ankle Right   Left   Pain/Dysfunction with Movement     AROM MMT AROM MMT     Plantarflexion WFL 5/5 WFL 3+/5     Dorsiflexion WFL 5/5 WFL 4-/5           GERMAIN  BALANCE ASSESSMENT TOOL  1. Sitting to Standing              4  2. Standing Unsupported              4 - able to stand safely 2 minutes without hold  3. Sitting Unsupported              4 - able to sit safely and securely 2 minutes  4. Standing to Sitting              3 - controls descent by using hands  5. Pivot Transfer              3 - able to transfer safely with definite use of hands  6. Standing with Eyes Closed              3  7. Standing with Feet Together              1 - needs help to attain position but able to stand 15 seconds feet together  8. Reaching Forward with Outstretched Arm             3 `  9. Retrieving Object from Floor             2  10. Turning to Look Behind              2 - turns sideways only but maintains balance  11. Turning 360 Degrees              2  12. Placing  "Alternate Foot on Step              1 - able to complete > 2 steps needs min assist  13. Standing with One Foot in Front              1 - need help to step but can hold 15 seconds  14. Standing on One Foot              0 - unable to try or needs assistance to prevent fall     TOTAL SCORE: 33  Maximum: 56  Score:   21-40 moderate fall risk      Fall risk cut-off scores:   Elderly and History of falls: <51/56      2 minute walk test: with SPC: 240 ft with supervision, 1 moment of sway self corrected  6 minute walk test: with SPC: 650 ft with supervision       Home Exercises Provided and Patient Education Provided     Education provided:   - cont HEP daily  - ambulate with caution and safety to prevent Left knee hyperextension.   - ambulate with AD at all times  - the lack of need for a knee brace due to increased strength in his weak leg  - need for an OP     Written Home Exercises Provided: yes.  Exercises were reviewed and TJ was able to demonstrate them prior to the end of the session.  TJ demonstrated good  understanding of the education provided.     See EMR under Patient Instructions for exercises provided 6/12/2019.    Assessment   Tj has many social and environmental issues impacting his home life outside of therapy.  He seems to have a very tense relationship with his brother who is his primary caregiver. He has improved in balance, function, strength and gait but he continue do ambulate and home and in the community without an AD and that is when he is falling.  He has been educated several times on fall prevention and safety with use of AD and he always responds "Ill try".  He needs constant reminders of the same things and he tends to perseverate only on the things that he wants to talk about.  PT is concerned about his home life and environment. Pt was educated to continue HEP daily.        TJ is progressing well towards his goals.   Pt prognosis is Good.     Pt will continue to benefit " from skilled outpatient physical therapy to address the deficits listed in the problem list box on initial evaluation, provide pt/family education and to maximize pt's level of independence in the home and community environment.     Pt's spiritual, cultural and educational needs considered and pt agreeable to plan of care and goals.     Anticipated barriers to physical therapy: see precautions    Short Term Goals (4 weeks)   1.  Independent with initial HEP. MET  2.  Pt will score greater than or equal to 19/28 on the Tinetti balance scale with use of AD  demonstrating overall improved functional mobility and balance.  MET  3. Pt will walk < than or equal to 230 ft on the 2 minute walk test indoor without AD with 0 LOB.  MET     Long Term Goals (8 Weeks):   1. Pt will walk < than or equal to 600 ft on the 6 minute walk test indoor with AD with 0 LOB.  650 ft with SPC  2.  Pt will be able to safely perform and tolerate high level ADL's like cleaning without LOB. MET  5. Pt will have 0 falls from start of PT sessions.  NOT MET  6. Pt will have MMT score of 4-/5 in all major ms groups in L LE.  (PROGRESSING, NOT MET)  7.Patient will be able to achieve greater than or equal to 40/56 on the Olivo balance test without AD decreasing patient's risk for falling and improving balance.  MET   8. Pt will report 43% on the FOTO Functional Assessment indicating increased functional balance and mobility. Gcode CK 8979 40-60%  MET     Plan   D/C today and PT recommends OP  for pts    Teresa Julien, PT, DPT

## 2019-10-03 DIAGNOSIS — F33.1 MAJOR DEPRESSIVE DISORDER, RECURRENT, MODERATE: ICD-10-CM

## 2019-10-03 RX ORDER — BUPROPION HYDROCHLORIDE 150 MG/1
TABLET, EXTENDED RELEASE ORAL
Qty: 60 TABLET | Refills: 11 | Status: CANCELLED | OUTPATIENT
Start: 2019-10-03

## 2019-10-09 ENCOUNTER — IMMUNIZATION (OUTPATIENT)
Dept: PHARMACY | Facility: CLINIC | Age: 54
End: 2019-10-09
Payer: MEDICARE

## 2019-10-11 ENCOUNTER — HOSPITAL ENCOUNTER (EMERGENCY)
Facility: HOSPITAL | Age: 54
Discharge: HOME | End: 2019-10-11
Attending: EMERGENCY MEDICINE
Payer: MEDICARE

## 2019-10-11 VITALS
BODY MASS INDEX: 34.07 KG/M2 | RESPIRATION RATE: 18 BRPM | HEART RATE: 74 BPM | HEIGHT: 69 IN | DIASTOLIC BLOOD PRESSURE: 79 MMHG | TEMPERATURE: 98 F | OXYGEN SATURATION: 95 % | SYSTOLIC BLOOD PRESSURE: 158 MMHG | WEIGHT: 230 LBS

## 2019-10-11 DIAGNOSIS — M25.512 LEFT SHOULDER PAIN: Primary | ICD-10-CM

## 2019-10-11 DIAGNOSIS — M79.602 PAIN OF LEFT UPPER EXTREMITY: Primary | ICD-10-CM

## 2019-10-11 PROCEDURE — 99283 EMERGENCY DEPT VISIT LOW MDM: CPT | Mod: 25

## 2019-10-11 PROCEDURE — 93010 EKG 12-LEAD: ICD-10-PCS | Mod: ,,, | Performed by: INTERNAL MEDICINE

## 2019-10-11 PROCEDURE — 93010 ELECTROCARDIOGRAM REPORT: CPT | Mod: ,,, | Performed by: INTERNAL MEDICINE

## 2019-10-11 PROCEDURE — 25000003 PHARM REV CODE 250: Performed by: PHYSICIAN ASSISTANT

## 2019-10-11 PROCEDURE — 93005 ELECTROCARDIOGRAM TRACING: CPT

## 2019-10-11 RX ORDER — KETOROLAC TROMETHAMINE 10 MG/1
10 TABLET, FILM COATED ORAL
Status: COMPLETED | OUTPATIENT
Start: 2019-10-11 | End: 2019-10-11

## 2019-10-11 RX ADMIN — KETOROLAC TROMETHAMINE 10 MG: 10 TABLET, FILM COATED ORAL at 11:10

## 2019-10-11 NOTE — ED PROVIDER NOTES
Encounter Date: 10/11/2019       History     Chief Complaint   Patient presents with    Arm Pain     Pain to L arm x 1 month.      Tj Trammell, a 54 y.o. male  has a past medical history of Asthma, Bilateral hearing loss (12/12/2012), Biliary acute pancreatitis, GERD (gastroesophageal reflux disease), High cholesterol, Hypertension, and Multiple sclerosis.     He presents to the ED evaluation of left arm pain x 1 -2 months.  Patient states that he sustained a fall about 4 years and was supposed to have surgery to this arm, however he is not sure what type of surgery he needed.  States that he had a fall about 2 months ago and the pain to this arm return.  Pain with reaching overhead.  He is left hand dominant.  No treatments tried at home.  History is somewhat limited d/t patient's hearing loss.  PEPperPRINT was used for ASL.        The history is provided by the patient.     Review of patient's allergies indicates:  No Known Allergies  Past Medical History:   Diagnosis Date    Asthma     Bilateral hearing loss 12/12/2012    Biliary acute pancreatitis     GERD (gastroesophageal reflux disease)     High cholesterol     Hypertension     Multiple sclerosis      Past Surgical History:   Procedure Laterality Date    CHOLECYSTECTOMY      COLONOSCOPY N/A 1/22/2019    Procedure: COLONOSCOPY 2 day  golytely;  Surgeon: Nathan Waddell MD;  Location: [facility];  Service: Endoscopy;  Laterality: N/A;    JOINT REPLACEMENT      arm     Family History   Problem Relation Age of Onset    Heart disease Mother     Heart disease Father     Heart disease Brother      Social History     Tobacco Use    Smoking status: Current Every Day Smoker     Packs/day: 1.00     Years: 32.00     Pack years: 32.00     Types: Cigarettes    Smokeless tobacco: Never Used   Substance Use Topics    Alcohol use: Not Currently    Drug use: No     Review of Systems   Constitutional: Negative for fever.   Gastrointestinal: Negative for  nausea and vomiting.   Musculoskeletal: Positive for arthralgias (right arm pain).   Skin: Negative for color change and wound.   Neurological: Negative for weakness and numbness.   All other systems reviewed and are negative.      Physical Exam     Initial Vitals [10/11/19 1020]   BP Pulse Resp Temp SpO2   (!) 158/79 74 18 97.8 °F (36.6 °C) 95 %      MAP       --         Physical Exam    Nursing note and vitals reviewed.  Constitutional: He appears well-developed and well-nourished.   HENT:   Head: Normocephalic and atraumatic.   Right Ear: External ear normal.   Left Ear: External ear normal.   Nose: Nose normal.   Eyes: EOM are normal.   Neck: Normal range of motion. Neck supple.   Cardiovascular: Normal rate and regular rhythm.   Pulmonary/Chest: Breath sounds normal. No respiratory distress.   Musculoskeletal: Normal range of motion.        Left shoulder: He exhibits tenderness, bony tenderness and pain. He exhibits normal range of motion, no swelling, no effusion, no crepitus, no deformity, no laceration, no spasm, normal pulse and normal strength.        Left elbow: Normal.   Right shoulder:  NVI.  Increased pain with overhead movements.  Negative drop arm.    Neurological: He is alert and oriented to person, place, and time. No cranial nerve deficit.   Skin: Skin is warm and dry. Capillary refill takes less than 2 seconds. No rash and no abscess noted. No erythema.   Psychiatric: He has a normal mood and affect. Thought content normal.         ED Course   Procedures  Labs Reviewed - No data to display       Imaging Results          X-Ray Shoulder Trauma Left (Final result)  Result time 10/11/19 11:21:13    Final result by Ronald Wilson MD (10/11/19 11:21:13)                 Impression:      1. No acute displaced fracture, dislocation or suspicious osseous lesion.      Electronically signed by: Ronald Wilson  Date:    10/11/2019  Time:    11:21             Narrative:    EXAMINATION:  XR SHOULDER TRAUMA 3  VIEW LEFT    CLINICAL HISTORY:  Left shoulder pain    TECHNIQUE:  3 views of the left shoulder    COMPARISON  None    FINDINGS:  No acute displaced fracture, dislocation or suspicious osseous lesion. Postsurgical changes of the proximal humerus, unchanged.  Anatomic alignment is maintained.    Regional soft tissues are grossly unremarkable.                                 Medical Decision Making:   Initial Assessment:   Left arm pain   Differential Diagnosis:   Fracture, dislocation, RCT   Clinical Tests:   Radiological Study: Ordered and Reviewed  ED Management:  Patient presents to the ER for evaluation of chronic right arm pain. Increased pain with overhead movements.  Negative drop-arm.  Patient is neurovascularly intact. Toradol given in the ER.  Upon  review, patient had 120 tramadol 50 mg tablets prescribed on September 17th.  Patient stated that he was taking no prescriptions for pain. He was instructed to continue taking the tramadol for his pain and follow up with his PCP for further evaluation.  Patient stated that his primary care doctor was mad with him and he did not know why.  Stated he had difficulty communicating with the primary care doctor due to his hearing loss.  Offered referral to separate office, patient stated that he likes his primary care and he was a good doctor.  Continued to enforce further follow-up.  Patient expressed understanding and agreement plan.                      Clinical Impression:       ICD-10-CM ICD-9-CM   1. Left shoulder pain M25.512 719.41                                Rose Mary Vasquez PA-C  10/11/19 1578

## 2019-10-11 NOTE — ED NOTES
Patient identifiers for Tj Trammell checked and correct.    LOC: The patient is awake, alert and aware of environment with an appropriate affect, the patient is oriented x 3 and speaking appropriately.  APPEARANCE: Patient resting comfortably and in no acute distress, patient is clean and well groomed, patient's clothing are properly fastened.  SKIN: The skin is warm and dry, patient has age appropriate skin turgor and moist mucus membranes, skin intact, no breakdown or bruising noted.  MUSCULOSKELETAL: Patient moving all extremities well, no obvious swelling or deformities noted.  RESPIRATORY: Airway is open and patent, respirations are spontaneous, patient has a normal effort and rate, no accessory muscle use noted.  Clear breath sounds bilaterally.  CARDIAC: Patient has a normal rate and rhythm, no periphreal edema noted, capillary refill < 3 seconds.  ABDOMEN: Soft and non tender to palpation, no distention noted.  Normoactive bowel sounds x4.  NEUROLOGIC: PERRL, facial expression is symmetrical, bilateral hand grasp equal and even, normal sensation in all extremities when touched with a finger.

## 2019-10-15 ENCOUNTER — OFFICE VISIT (OUTPATIENT)
Dept: FAMILY MEDICINE | Facility: HOSPITAL | Age: 54
End: 2019-10-15
Attending: FAMILY MEDICINE
Payer: MEDICARE

## 2019-10-15 VITALS
HEIGHT: 70 IN | SYSTOLIC BLOOD PRESSURE: 124 MMHG | WEIGHT: 212.31 LBS | DIASTOLIC BLOOD PRESSURE: 70 MMHG | BODY MASS INDEX: 30.39 KG/M2 | HEART RATE: 68 BPM

## 2019-10-15 DIAGNOSIS — S43.402A SPRAIN OF LEFT SHOULDER, UNSPECIFIED SHOULDER SPRAIN TYPE, INITIAL ENCOUNTER: Primary | ICD-10-CM

## 2019-10-15 PROCEDURE — 99215 OFFICE O/P EST HI 40 MIN: CPT | Performed by: STUDENT IN AN ORGANIZED HEALTH CARE EDUCATION/TRAINING PROGRAM

## 2019-10-15 NOTE — PROGRESS NOTES
I have reviewed the notes, assessments, and/or procedures performed by Dr. Castellon, I concur with her/his documentation of Tj Trammell.

## 2019-10-15 NOTE — PROGRESS NOTES
Subjective:       Patient ID: Tj Trammell is a 54 y.o. male.    Chief Complaint: left upper arm pain.    HPI     The patient said he had a fall and landed on his left shoulder 1~2 mos ago. He went to ED 4 days ago and presents today for a followup. He said he still has tramadol at home and does want anything. He is using his scooter. He rated the pain 6/10. Kind of radiating to his elbow. History taken is difficult because he is hard hearing and has multiple sclerosis. He said he wants to see a PT instead of an orthopedist.     Review of Systems   Constitutional: Negative for activity change, chills, fatigue and fever.   HENT: Negative for hearing loss.    Eyes: Negative for visual disturbance.   Respiratory: Negative for cough, chest tightness and shortness of breath.    Cardiovascular: Negative for chest pain, palpitations and leg swelling.   Gastrointestinal: Negative for abdominal distention, abdominal pain, blood in stool, constipation, diarrhea, nausea and vomiting.   Genitourinary: Negative for dysuria.   Musculoskeletal: Positive for arthralgias and myalgias.   Neurological: Negative for headaches.         Objective:      Vitals:    10/15/19 1534   BP: 124/70   Pulse: 68     Physical Exam   Constitutional: He appears well-developed and well-nourished. No distress.   HENT:   Head: Normocephalic and atraumatic.   Nose: Nose normal.   Eyes: Conjunctivae and EOM are normal. No scleral icterus.   Neck: Normal range of motion. Neck supple.   Cardiovascular: Normal rate, regular rhythm and normal heart sounds. Exam reveals no gallop and no friction rub.   No murmur heard.  Pulmonary/Chest: Effort normal and breath sounds normal. No respiratory distress. He has no wheezes. He has no rales. He exhibits no tenderness.   Abdominal: Soft. Bowel sounds are normal. He exhibits no distension and no mass. There is no tenderness. There is no rebound and no guarding.   Musculoskeletal: He exhibits tenderness. He  exhibits no edema or deformity.   Limited ROM to pain. Cannot go above 90 degrees on abduction. Flexion and extension of the elbow are intact. Point tenderness along the inner upper extremity.   Neurological: He is alert. No cranial nerve deficit.   Skin: Skin is warm. No rash noted. He is not diaphoretic. No erythema.       Assessment:       1. Sprain of left shoulder, unspecified shoulder sprain type, initial encounter        Plan:       Sprain of left shoulder, unspecified shoulder sprain type, initial encounter  -     Ambulatory Referral to Physical/Occupational Therapy      Follow up for f/u w Dr. Ferguson.

## 2019-11-05 ENCOUNTER — CLINICAL SUPPORT (OUTPATIENT)
Dept: REHABILITATION | Facility: HOSPITAL | Age: 54
End: 2019-11-05
Payer: MEDICARE

## 2019-11-05 DIAGNOSIS — M79.602 PAIN OF LEFT UPPER EXTREMITY: ICD-10-CM

## 2019-11-05 DIAGNOSIS — M25.60 STIFFNESS DUE TO IMMOBILITY: ICD-10-CM

## 2019-11-05 DIAGNOSIS — R53.1 WEAKNESS: ICD-10-CM

## 2019-11-05 DIAGNOSIS — Z74.09 STIFFNESS DUE TO IMMOBILITY: ICD-10-CM

## 2019-11-05 PROCEDURE — G8987 SELF CARE CURRENT STATUS: HCPCS | Mod: CK,PN

## 2019-11-05 PROCEDURE — 97165 OT EVAL LOW COMPLEX 30 MIN: CPT | Mod: PN

## 2019-11-05 PROCEDURE — G8988 SELF CARE GOAL STATUS: HCPCS | Mod: CI,PN

## 2019-11-05 PROCEDURE — 97110 THERAPEUTIC EXERCISES: CPT | Mod: PN

## 2019-11-05 NOTE — PLAN OF CARE
[facility] Occupational Therapy  Initial Evaluation     Date: 2019  Patient: Tj Trammell  Chart Number: [id number]    Therapy Diagnosis:   Encounter Diagnoses   Name Primary?    Pain of left upper extremity     Weakness     Stiffness due to immobility      Physician: Yuniel Castellon MD    Physician Orders: OT evaluate and treat  Medical Diagnosis: S43.402A (ICD-10-CM) - Sprain of left shoulder, unspecified shoulder sprain type, initial encounter  Evaluation Date: 2019  Insurance Authorization period Expiration: 2020  Plan of Care Expiration Period: 1/3/2020  Next MD appointment:none scheduled    Visit # / Visits Authorized:   Time In:1100  Time Out: 1145  Total Billable Time: 45 minutes  LCE1 TE  Medicare: $120    Precautions: Standard and Fall and MS, ASL    Subjective     Involved Side: Left side  Dominant Side: Right  Date of Onset: 10/11/2019  Mechanism of Injury: s/p fall onto left side resulting in shoulder pain, weakness and stiffness   History of Current Condition: pt presents today with decreased ROM, weakness and pain all of which limit him functionally   Surgical Procedure: none  Imagin. No acute displaced fracture, dislocation or suspicious osseous lesion.  Previous Therapy: none for shoulder. He has received therapy for gait and LE weakness secondary to MS    Patient's Goals for Therapy: to decrease pain and increase functional use Left shoulder    Pain:  Functional Pain Scale Rating 0-10:   6/10 on average  4/10 at best  8/10 at worst  Location: Biceps left side  Description: Aching  Aggravating Factors: Night Time and Lifting  Easing Factors: pain medication    Occupation:  On disability  Working presently: disability    Functional Limitations/Social History:    Previous functional status includes: Mod Independent with all ADLs.     Current FunctionalStatus   Home/Living environment : lives alone      Limitation of Functional Status as  follows:   ADLs/IADLs:     - Feeding: Independent    - Bathing: mod Independent    - Dressing/Grooming: mod Independent    - Driving: brother drives him     Leisure: not appicable    Past Medical History/Physical Systems Review:   Tj Trammell  has a past medical history of Asthma, Bilateral hearing loss, Biliary acute pancreatitis, GERD (gastroesophageal reflux disease), High cholesterol, Hypertension, and Multiple sclerosis.    Tj Trammell  has a past surgical history that includes Joint replacement; Cholecystectomy; and Colonoscopy (N/A, 1/22/2019).    Tj has a current medication list which includes the following prescription(s): acetazolamide, albuterol, alprazolam, atorvastatin, bacitracin, baclofen, bupropion, dicyclomine, dicyclomine, dicyclomine, difluprednate, docusate sodium, durezol, ergocalciferol, gabapentin, gilenya, lactulose, linaclotide, lisinopril, nabumetone, nabumetone, naproxen, erythromycin, ofloxacin, oxybutynin, pantoprazole, polyethylene glycol, sucralfate, teriflunomide, tramadol, tramadol, tramadol, tramadol, and triamcinolone acetonide 0.025%.    Review of patient's allergies indicates:  No Known Allergies     Objective     Sensation Test: Patient denies any numbness/tingling    Observation/Inspection: rounded shoulders, forward head    Range of Motion/Strength:   Shoulder  Left   Right  Pain/Dysfunction with Movement    AROM PROM MMT AROM PROM MMT    Flexion 85 WNL 3-/5 WNL WNL WNL    Extension 60 WNL 4-/5 WNL WNL WNL    Abduction 70 WNL 3-/5 WNL WNL WNL    HorizAdduction 10 WNL 3-/5 WNL WNL WNL    Internal rotation PSIS 40 3-/5 WNL WNL WNL    ER at 90° abd NT WNL NT WNL WNL WNL    ER at 0° abd 50 WNL 3-/5 WNL WNL WNL    NT=Not tested    ROM Comments: Pain at end range    Painful Arc: none noted    Tenderness upon Palpation:      Positive: Bicipital Groove    Special Tests:  AC Joint Left Right   Empty Can Test + -   Drop Arm test - -   Hawkin's Kenndy + -   Neer's Test +  "-     Scapular Control/Dyskinesis:    Normal / Subtle / Obvious  Comments    Left  obvious -    Right  Subtle -       CMS Impairment/Limitation/Restriction for FOTO Shoulder Survey    Therapist reviewed FOTO scores for Tj Trammell on 11/5/2019.   FOTO documents entered into [employer] - see Media section.    Limitation Score: 44%  Category: Self Care    Current : CK = at least 40% but < 60% impaired, limited or restricted  Goal: CI = at least 1% but < 20% impaired, limited or restricted         Treatment     Treatment Time In: 1135  Treatment Time Out: 1145  Total Treatment time separate from Evaluation time:10    TJ received therapeutic exercises for 10 minutes including:  -Shoulder flexion with the wand 5 repetitions, Sidelying Abduction 5 repetitions, Sidelying External Rotation 5 repetitions, Theraband pull downs 5 repetitions, Theraband Rows 5 repetitions, Theraband External Rotation 5 repetitions, Theraband Internal Rotation 5 repetitions, Internal rotation pulley stretch 1/30"    Home Exercise Program/Education:  Issued HEP (see patient instructions in EMR) and educated on modality use for pain management . Exercises were reviewed and TJ was able to demonstrate them prior to the end of the session.   Pt received a written copy of exercises to perform at home. TJ demonstrated good  understanding of the education provided.  Pt was advised to perform these exercises free of pain, and to stop performing them if pain occurs.    Patient/Family Education: role of OT, goals for OT, scheduling/cancellations - pt verbalized understanding. Discussed insurance limitations with patient.    Assessment     Tj Trammell is a 54 y.o. male referred to outpatient occupational therapy and presents with a medical diagnosis of Left shoulder pain, resulting in Decreased ROM, Decreased muscle strength, Increased pain and Joint Stiffness and demonstrates limitations as described in the chart below. Following medical " record review it is determined that pt will benefit from occupational therapy services in order to maximize pain free and/or functional use of left shoulder. The following goals were discussed with the patient and patient is in agreement with them as to be addressed in the treatment plan. The patient's rehab potential is Good.     Anticipated barriers to occupational therapy: none  Pt has no cultural, educational or language barriers to learning provided.    Profile and History Assessment of Occupational Performance Level of Clinical Decision Making Complexity Score   Occupational Profile:   Tj Trammell is a 54 y.o. male who lives alone and is currently disability. Tj Trammell has difficulty with  grooming and dressing  driving/transportation management and housework/household chores  affecting his/her daily functional abilities. His/her main goal for therapy is to decrease pain and increase functional use Left shoulder.     Comorbidities:   COPD/asthma, HTN and multiple sclerosis    Medical and Therapy History Review:   Expanded               Performance Deficits    Physical:  Joint Mobility  Joint Stability  Muscle Power/Strength  Muscle Endurance  Muscle Tone  Postural Control  Pain    Cognitive:  No Deficits    Psychosocial:    No Deficits     Clinical Decision Making:  low    Assessment Process:  Problem-Focused Assessments    Modification/Need for Assistance:  Not Necessary    Intervention Selection:  Several Treatment Options       low  Based on PMHX, co morbidities , data from assessments and functional level of assistance required with task and clinical presentation directly impacting function.       The following goals were discussed with the patient and patient is in agreement with them as to be addressed in the treatment plan.     Goals:     Short Term Goals to be met in 4 weeks: (12/5/2019)  1) Initiate Hep   2) Pt will increase Left shoulder AROM by 10 degrees grossly for improved  performance with overhead ADL's  3) Pt will report 5/10 pain in (Left)shoulder at worst  4) Pt will demonstrate increased MMT to 4-/5 grossly Left shoulder  5) Patient will be able to achieve less than or equal to 35% on FOTO shoulder survey demonstrating overall improved functional ability with upper extremity.     Long Term Goals to be met by discharge:  1) Independent with HEP  2) Pt will demonstrate (Left) shoulder AROM WFL grossly for [place] with ADL's  3) Pt will demonstrate (Left) shoulder MMT WFL grossly for [place] with functional activities  4) Independent and pain free with ADL's and IADL's  5) Patient will be able to achieve less than or equal to 15% on FOTO shoulder survey demonstrating overall improved functional ability with upper extremity.       Plan   Certification Period/Plan of care expiration: 11/5/2019 to 1/3/2020.    Outpatient Occupational Therapy 1 times weekly for 8 weeks to include the following interventions: Manual therapy/joint mobilizations, Modalities for pain management, Therapeutic exercises/activities., Strengthening, Joint Protection and Energy Conservation.      MOISE Franks

## 2019-11-19 ENCOUNTER — CLINICAL SUPPORT (OUTPATIENT)
Dept: REHABILITATION | Facility: HOSPITAL | Age: 54
End: 2019-11-19
Payer: MEDICARE

## 2019-11-19 DIAGNOSIS — M79.602 PAIN OF LEFT UPPER EXTREMITY: ICD-10-CM

## 2019-11-19 DIAGNOSIS — R53.1 WEAKNESS: ICD-10-CM

## 2019-11-19 DIAGNOSIS — M25.60 STIFFNESS DUE TO IMMOBILITY: ICD-10-CM

## 2019-11-19 DIAGNOSIS — Z74.09 STIFFNESS DUE TO IMMOBILITY: ICD-10-CM

## 2019-11-19 PROCEDURE — 97110 THERAPEUTIC EXERCISES: CPT | Mod: PN

## 2019-11-19 PROCEDURE — 97140 MANUAL THERAPY 1/> REGIONS: CPT | Mod: PN

## 2019-11-19 NOTE — PROGRESS NOTES
"  Occupational Therapy Daily Treatment Note     [facility]  [facility] [id number]    Therapy Diagnosis: No diagnosis found.  Physician: Yuniel Castellon MD    Visit Date: 11/19/2019  Physician Orders: OT evaluate and treat  Medical Diagnosis: S43.402A (ICD-10-CM) - Sprain of left shoulder, unspecified shoulder sprain type, initial encounter  Evaluation Date: 11/5/2019  Insurance Authorization period Expiration: 1/5/2020  Plan of Care Expiration Period: 1/3/2020  Next MD appointment:none scheduled     Visit # / Visits Authorized: 2 / 12  Time In:2:45 pm   Time Out: 3:30 pm  Total Billable Time: 45 minutes  1 MT 2 TE  Medicare: $208     Precautions: Standard and Fall and MS, ASL      Subjective     Pt reports: " At home I been doing the exercises it just hurts so bad and I had a few falls since then"  he was compliant with home exercise program given last session.   Response to previous treatment:Poor  Functional change: None    Pain: 8/10  Location: left shoulder all over    Objective     TJ received the following supervised modalities after being cleared for contradictions for 5 minutes:   -MHP to L x 5 minutes in order to increase extensibility of tissues prior to MT.    TJ received the following manual therapy techniques for 10 minutes:   -Mt: Pt received manual therapy consisting of PROM in all planes, joint mobilization (grades I-II) with gentle oscillations at acromioclavicular and glenohumeral joint along with myofascial release and STM to surrounding musculature (biceps, pects, deltoids, traps, triceps etc.) to decrease stiffness and pain with movements. LALI CABRERA received therapeutic exercises for 30 minutes including:  -  Exercises        PROM (L) Shoulder Flexion/Abduction/Internal rotation/External Rotation 10x   Supine dowel Flexion/Chest Press 1#  2/10   Sidelying Abduction 0#  2/15   Sidelying External Rotation 0#  2/15           pulleys 3'   Wall slides towel  2/15         " "  Theraband Lats Red  2/15   Theraband Rows Red  2/15       Internal Rotation pulley stretch 3/30"           Home Exercises and Education Provided     Education provided:   - Continue HEP   - Progress towards goals     Written Home Exercises Provided: Patient instructed to cont prior HEP.  Exercises were reviewed and RICK was able to demonstrate them prior to the end of the session.  RICK demonstrated fair  understanding of the HEP provided.   .   See EMR under Patient Instructions for exercises provided prior visit.        Assessment     Pt would continue to benefit from skilled OT. Pt tolerated session fairly well. Still having increased pain and weakness in the L shoulder. He states not being able to do many exercises due to the pain and has been falling lately. He tolerated exercises during session well with only min pain. He was educated of pain free range via . Only HEP exercises completed to ensure good carryover. Pt would benefit from continued strengthening and AROM exercises in future sessions.      RICK is progressing well towards his goals and there are no updates to goals at this time. Pt prognosis is Fair.     Pt will continue to benefit from skilled outpatient occupational therapy to address the deficits listed in the problem list on initial evaluation provide pt/family education and to maximize pt's level of independence in the home and community environment.     Anticipated barriers to occupational therapy: falls, communication barrier    Pt's spiritual, cultural and educational needs considered and pt agreeable to plan of care and goals.    Goals     Short Term Goals to be met in 4 weeks: (12/5/2019)  1) Initiate Hep MET 11/5/19  2) Pt will increase Left shoulder AROM by 10 degrees grossly for improved performance with overhead ADL's Progressing 11/19/2019  3) Pt will report 5/10 pain in (Left)shoulder at worst Progressing 11/19/2019  4) Pt will demonstrate increased MMT to 4-/5 grossly " Left shoulder Progressing 11/19/2019  5) Patient will be able to achieve less than or equal to 35% on FOTO shoulder survey demonstrating overall improved functional ability with upper extremity. Progressing 11/19/2019      Long Term Goals to be met by discharge:  1) Independent with HEP Progressing 11/19/2019  2) Pt will demonstrate (Left) shoulder AROM WFL grossly for [place] with ADL's Progressing 11/19/2019  3) Pt will demonstrate (Left) shoulder MMT WFL grossly for [place] with functional activities Progressing 11/19/2019  4) Independent and pain free with ADL's and IADL's Progressing 11/19/2019  5) Patient will be able to achieve less than or equal to 15% on FOTO shoulder survey demonstrating overall improved functional ability with upper extremity. Progressing 11/19/2019        Plan   Continue per initial POC.  Updates/Grading for next session: Progress as tolerated.       Enrique Powell, OT

## 2019-12-02 DIAGNOSIS — E55.9 VITAMIN D DEFICIENCY: ICD-10-CM

## 2019-12-02 RX ORDER — ERGOCALCIFEROL 1.25 MG/1
50000 CAPSULE ORAL
Qty: 12 CAPSULE | Refills: 0 | Status: CANCELLED | OUTPATIENT
Start: 2019-12-02

## 2019-12-10 ENCOUNTER — CLINICAL SUPPORT (OUTPATIENT)
Dept: REHABILITATION | Facility: HOSPITAL | Age: 54
End: 2019-12-10
Payer: MEDICARE

## 2019-12-10 DIAGNOSIS — Z78.9 IMPAIRED MOBILITY AND ACTIVITIES OF DAILY LIVING: Primary | ICD-10-CM

## 2019-12-10 DIAGNOSIS — Z74.09 IMPAIRED MOBILITY AND ACTIVITIES OF DAILY LIVING: Primary | ICD-10-CM

## 2019-12-10 DIAGNOSIS — M62.81 MUSCLE WEAKNESS: ICD-10-CM

## 2019-12-10 NOTE — PROGRESS NOTES
Pt unable to be seen this day. He had shown up for session but left before treatment was provided.

## 2020-03-14 ENCOUNTER — HOSPITAL ENCOUNTER (EMERGENCY)
Facility: HOSPITAL | Age: 55
Discharge: HOME | End: 2020-03-14
Attending: EMERGENCY MEDICINE
Payer: MEDICARE

## 2020-03-14 VITALS
HEIGHT: 70 IN | OXYGEN SATURATION: 96 % | SYSTOLIC BLOOD PRESSURE: 130 MMHG | WEIGHT: 212 LBS | DIASTOLIC BLOOD PRESSURE: 72 MMHG | TEMPERATURE: 98 F | RESPIRATION RATE: 18 BRPM | BODY MASS INDEX: 30.35 KG/M2 | HEART RATE: 75 BPM

## 2020-03-14 DIAGNOSIS — M25.512 CHRONIC LEFT SHOULDER PAIN: Primary | ICD-10-CM

## 2020-03-14 DIAGNOSIS — G89.29 CHRONIC LEFT SHOULDER PAIN: Primary | ICD-10-CM

## 2020-03-14 PROCEDURE — 99284 EMERGENCY DEPT VISIT MOD MDM: CPT | Mod: 25

## 2020-03-14 PROCEDURE — 96372 THER/PROPH/DIAG INJ SC/IM: CPT

## 2020-03-14 PROCEDURE — 63600175 PHARM REV CODE 636 W HCPCS: Performed by: EMERGENCY MEDICINE

## 2020-03-14 RX ORDER — KETOROLAC TROMETHAMINE 30 MG/ML
60 INJECTION, SOLUTION INTRAMUSCULAR; INTRAVENOUS
Status: COMPLETED | OUTPATIENT
Start: 2020-03-14 | End: 2020-03-14

## 2020-03-14 RX ADMIN — KETOROLAC TROMETHAMINE 60 MG: 30 INJECTION, SOLUTION INTRAMUSCULAR at 08:03

## 2020-03-14 NOTE — ED PROVIDER NOTES
Encounter Date: 3/14/2020    SCRIBE #1 NOTE: I, Michelle Ahumada, am scribing for, and in the presence of,  Dr. Pugh. I have scribed the entire note.       History     Chief Complaint   Patient presents with    Shoulder Pain     Pt presents to ED today c/o chronic left shoulder pain, he denies taking medication for pain today        Time seen by provider: 8:29 AM on 03/14/2020    The patient is a 54 y.o. male with a history of HTN and HLD who presents to the ED with complaint of worsening left shoulder pain for about a week. Patient says he has been working at home more than usual and feels he exacerbated his shoulder pain. He denies pain radiates. Patient is currently in physical therapy for his shoulder. Reports he is taking Tramadol for his pain but has minimal temporary relief. Patient has a history of chronic left shoulder pain.    The history is provided by the patient.     Review of patient's allergies indicates:  No Known Allergies  Past Medical History:   Diagnosis Date    Asthma     Bilateral hearing loss 12/12/2012    Biliary acute pancreatitis     GERD (gastroesophageal reflux disease)     High cholesterol     Hypertension     Multiple sclerosis      Past Surgical History:   Procedure Laterality Date    CHOLECYSTECTOMY      COLONOSCOPY N/A 1/22/2019    Procedure: COLONOSCOPY 2 day  golytely;  Surgeon: Nathan Waddell MD;  Location: [facility];  Service: Endoscopy;  Laterality: N/A;    JOINT REPLACEMENT      arm     Family History   Problem Relation Age of Onset    Heart disease Mother     Heart disease Father     Heart disease Brother      Social History     Tobacco Use    Smoking status: Current Every Day Smoker     Packs/day: 1.00     Years: 32.00     Pack years: 32.00     Types: Cigarettes    Smokeless tobacco: Never Used   Substance Use Topics    Alcohol use: Not Currently    Drug use: No     Review of Systems   Musculoskeletal: Positive for arthralgias.   All other systems  reviewed and are negative.      Physical Exam     Initial Vitals [03/14/20 0808]   BP Pulse Resp Temp SpO2   132/78 78 19 97.6 °F (36.4 °C) 96 %      MAP       --         Physical Exam    Nursing note and vitals reviewed.  Constitutional: He appears well-developed and well-nourished. No distress.   HENT:   Head: Normocephalic and atraumatic.   Mouth/Throat: Oropharynx is clear and moist.   Eyes: Conjunctivae and EOM are normal. Pupils are equal, round, and reactive to light.   Neck: Normal range of motion. Neck supple. No stridor present. No tracheal deviation present.   Cardiovascular: Normal rate, regular rhythm and normal heart sounds.   No murmur heard.  Pulmonary/Chest: Breath sounds normal. No respiratory distress.   Abdominal: Soft. Bowel sounds are normal. There is no tenderness. There is no rebound and no guarding.   Musculoskeletal: Normal range of motion. He exhibits tenderness. He exhibits no edema.   Tenderness to superior aspect of left shoulder. Left shoulder with full ROM. No obvious deformity.   Neurological: He is alert and oriented to person, place, and time. He has normal strength. No cranial nerve deficit or sensory deficit.   Skin: Skin is warm and dry. Capillary refill takes less than 2 seconds.   Psychiatric: He has a normal mood and affect. His behavior is normal.         ED Course   Procedures  Labs Reviewed - No data to display          Medical Decision Making:   Initial Assessment:   Past medical records queried and reviewed, including  a history of chronic left shoulder pain, HTN and HLD.     54 year old male presents to the ED complaining of worsening left shoulder pain for about a week. The patient was seen and examined. The history and physical exam was obtained. The nursing notes and vital signs were reviewed.     Secondary to symptoms and exam findings we ordered:    Labs: N/A    Imaging: N/A      Patient will be administered ketorolac injection.  Patient will be monitored here.                                  Clinical Impression:     1. Chronic left shoulder pain                  I, Dr. Dewayne Pugh, personally performed the services described in this documentation. All medical record entries made by the scribe were at my direction and in my presence. I have reviewed the chart and agree that the record reflects my personal performance and is accurate and complete. Dewayne Pugh MD.  4:25 PM 03/14/2020               Dewayne Pugh MD  03/14/20 [id number]

## 2020-03-14 NOTE — ED NOTES
Left shoulder pain since shoulder surgery. Pt has not taken any at home medications for pain. Pt requesting a new orthopedist since last one who completed surgery moved. Pt denies numbness or tingling to extremety

## 2020-03-25 ENCOUNTER — DOCUMENTATION ONLY (OUTPATIENT)
Dept: REHABILITATION | Facility: HOSPITAL | Age: 55
End: 2020-03-25

## 2020-03-25 DIAGNOSIS — M25.60 STIFFNESS DUE TO IMMOBILITY: ICD-10-CM

## 2020-03-25 DIAGNOSIS — R53.1 WEAKNESS: ICD-10-CM

## 2020-03-25 DIAGNOSIS — Z74.09 STIFFNESS DUE TO IMMOBILITY: ICD-10-CM

## 2020-03-25 DIAGNOSIS — M79.602 PAIN OF LEFT UPPER EXTREMITY: ICD-10-CM

## 2020-03-25 NOTE — PROGRESS NOTES
Outpatient Therapy Discharge Summary     Name: Tj Trammell  Clinic Number: [id number]    Therapy Diagnosis:   Encounter Diagnoses   Name Primary?    Pain of left upper extremity     Weakness     Stiffness due to immobility      Physician: Yuniel Castellon MD     Visit Date: 11/19/2019  Physician Orders: OT evaluate and treat  Medical Diagnosis: S43.402A (ICD-10-CM) - Sprain of left shoulder, unspecified shoulder sprain type, initial encounter  Evaluation Date: 11/5/2019  Insurance Authorization period Expiration: 1/5/2020  Plan of Care Expiration Period: 1/3/2020  Next MD appointment:none scheduled     Visit # / Visits Authorized: 2 / 12     Precautions: Standard and Fall and MS, ASL  Date of Last visit: 11/19/2019  Total Visits Received: 2    No Show Visits: multiple    Assessment    Goals: Short Term Goals to be met in 4 weeks: (12/5/2019)  1) Initiate Hep MET 11/5/19  2) Pt will increase Left shoulder AROM by 10 degrees grossly for improved performance with overhead ADL's Progressing 11/19/2019  3) Pt will report 5/10 pain in (Left)shoulder at worst Progressing 11/19/2019  4) Pt will demonstrate increased MMT to 4-/5 grossly Left shoulder Progressing 11/19/2019  5) Patient will be able to achieve less than or equal to 35% on FOTO shoulder survey demonstrating overall improved functional ability with upper extremity. Progressing 11/19/2019      Long Term Goals to be met by discharge:  1) Independent with HEP Progressing 11/19/2019  2) Pt will demonstrate (Left) shoulder AROM WFL grossly for [place] with ADL's Progressing 11/19/2019  3) Pt will demonstrate (Left) shoulder MMT WFL grossly for [place] with functional activities Progressing 11/19/2019  4) Independent and pain free with ADL's and IADL's Progressing 11/19/2019  5) Patient will be able to achieve less than or equal to 15% on FOTO shoulder survey demonstrating overall improved functional ability with upper extremity. Progressing  11/19/2019     Discharge reason: Patient has not attended therapy since 11/19/2019    Plan   This patient is discharged from Occupational Therapy

## 2020-04-02 ENCOUNTER — TELEPHONE (OUTPATIENT)
Dept: PHARMACY | Facility: CLINIC | Age: 55
End: 2020-04-02

## 2020-04-02 NOTE — TELEPHONE ENCOUNTER
LVM for callback to inform patient that [facility] received prescription for Gilenya and benefits investigation is required.  [facility] will be back in touch once insurance determination is received.

## 2020-04-16 ENCOUNTER — HOSPITAL ENCOUNTER (EMERGENCY)
Facility: HOSPITAL | Age: 55
Discharge: HOME | End: 2020-04-16
Attending: EMERGENCY MEDICINE
Payer: MEDICARE

## 2020-04-16 VITALS
OXYGEN SATURATION: 98 % | RESPIRATION RATE: 18 BRPM | WEIGHT: 165 LBS | DIASTOLIC BLOOD PRESSURE: 62 MMHG | TEMPERATURE: 98 F | BODY MASS INDEX: 24.44 KG/M2 | SYSTOLIC BLOOD PRESSURE: 138 MMHG | HEART RATE: 82 BPM | HEIGHT: 69 IN

## 2020-04-16 DIAGNOSIS — M25.539 WRIST PAIN: ICD-10-CM

## 2020-04-16 DIAGNOSIS — M25.332 SCAPHOLUNATE DISSOCIATION OF LEFT WRIST: ICD-10-CM
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
## 2020-04-16 DIAGNOSIS — T14.8XXA ABRASION: ICD-10-CM

## 2020-04-16 PROCEDURE — 99283 EMERGENCY DEPT VISIT LOW MDM: CPT | Mod: 25

## 2020-04-16 PROCEDURE — 29125 APPL SHORT ARM SPLINT STATIC: CPT | Mod: LT
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
## 2020-04-16 NOTE — ED NOTES
Pt abrasions cleaned w NS and wrapped in non-adhesive dressings. Pt tolerated well. Dressing c/d/i.

## 2020-04-16 NOTE — DISCHARGE INSTRUCTIONS
Tylenol or ibuprofen as needed for pain  Wear splint at all times except when bathing  You have been referred to hand orthopedist for further evaluation
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
## 2020-04-17 ENCOUNTER — TELEPHONE (OUTPATIENT)
Dept: INTERVENTIONAL RADIOLOGY/VASCULAR | Facility: HOSPITAL | Age: 55
End: 2020-04-17

## 2020-04-17 ENCOUNTER — HOSPITAL ENCOUNTER (EMERGENCY)
Facility: HOSPITAL | Age: 55
Discharge: HOME | End: 2020-04-17
Attending: EMERGENCY MEDICINE
Payer: MEDICARE

## 2020-04-17 VITALS
HEIGHT: 69 IN | SYSTOLIC BLOOD PRESSURE: 127 MMHG | RESPIRATION RATE: 18 BRPM | DIASTOLIC BLOOD PRESSURE: 75 MMHG | BODY MASS INDEX: 34.07 KG/M2 | WEIGHT: 230 LBS | HEART RATE: 88 BPM | OXYGEN SATURATION: 98 % | TEMPERATURE: 99 F
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
## 2020-04-17 DIAGNOSIS — S82.001A CLOSED NONDISPLACED FRACTURE OF RIGHT PATELLA, UNSPECIFIED FRACTURE MORPHOLOGY, INITIAL ENCOUNTER: Primary | ICD-10-CM

## 2020-04-17 PROCEDURE — 29505 APPLICATION LONG LEG SPLINT: CPT | Mod: RT

## 2020-04-17 PROCEDURE — 99284 EMERGENCY DEPT VISIT MOD MDM: CPT | Mod: 25

## 2020-04-17 NOTE — ED NOTES
PT c/o R Knee pain after mechanical trip and fall. PT denies hitting head/LOC. Hx of MS, hearing loss. Pt uses electric wheelcahir at baseline. PT instructed not to ambulate without assistance. Call bell within reach
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
## 2020-04-28 ENCOUNTER — TELEPHONE (OUTPATIENT)
Dept: INTERVENTIONAL RADIOLOGY/VASCULAR | Facility: HOSPITAL | Age: 55
End: 2020-04-28

## 2020-05-01 ENCOUNTER — TELEPHONE (OUTPATIENT)
Dept: PHARMACY | Facility: CLINIC | Age: 55
End: 2020-05-01

## 2020-05-08 ENCOUNTER — LAB VISIT (OUTPATIENT)
Dept: PRIMARY CARE CLINIC | Facility: CLINIC | Age: 55
End: 2020-05-08
Payer: MEDICARE

## 2020-05-08 DIAGNOSIS — R05.9 COUGH: Primary | ICD-10-CM

## 2020-05-08 PROCEDURE — U0002 COVID-19 LAB TEST NON-CDC: HCPCS

## 2020-05-09 LAB — SARS-COV-2 RNA RESP QL NAA+PROBE: NOT DETECTED

## 2020-05-15 ENCOUNTER — HOSPITAL ENCOUNTER (EMERGENCY)
Facility: HOSPITAL | Age: 55
Discharge: HOME | End: 2020-05-15
Attending: EMERGENCY MEDICINE
Payer: MEDICARE

## 2020-05-15 VITALS
SYSTOLIC BLOOD PRESSURE: 141 MMHG | TEMPERATURE: 98 F | DIASTOLIC BLOOD PRESSURE: 81 MMHG | HEIGHT: 69 IN | RESPIRATION RATE: 20 BRPM | WEIGHT: 229.94 LBS | BODY MASS INDEX: 34.06 KG/M2 | OXYGEN SATURATION: 97 % | HEART RATE: 81 BPM

## 2020-05-15 DIAGNOSIS — T16.1XXA ACUTE FOREIGN BODY OF RIGHT EAR CANAL, INITIAL ENCOUNTER: Primary | ICD-10-CM

## 2020-05-15 PROCEDURE — 69200 CLEAR OUTER EAR CANAL: CPT

## 2020-05-15 PROCEDURE — 99282 EMERGENCY DEPT VISIT SF MDM: CPT | Mod: 25

## 2020-05-15 NOTE — ED PROVIDER NOTES
Encounter Date: 5/15/2020       History     Chief Complaint   Patient presents with    Foreign Body in Ear     pt reports piece of hearing aid got stuck in right ear      The patient is a 54-year-old male.  He presents with a foreign body sensation to his right ear canal after getting a piece of his hearing aid stuck in the canal.    The history is provided by the patient. No  was used.     Review of patient's allergies indicates:  No Known Allergies  Past Medical History:   Diagnosis Date    Asthma     Bilateral hearing loss 12/12/2012    Biliary acute pancreatitis     GERD (gastroesophageal reflux disease)     High cholesterol     Hypertension     Multiple sclerosis      Past Surgical History:   Procedure Laterality Date    CHOLECYSTECTOMY      COLONOSCOPY N/A 1/22/2019    Procedure: COLONOSCOPY 2 day  golytely;  Surgeon: Nathan Waddell MD;  Location[facility];  Service: Endoscopy;  Laterality: N/A;    JOINT REPLACEMENT      arm     Family History   Problem Relation Age of Onset    Heart disease Mother     Heart disease Father     Heart disease Brother      Social History     Tobacco Use    Smoking status: Current Every Day Smoker     Packs/day: 1.00     Years: 32.00     Pack years: 32.00     Types: Cigarettes    Smokeless tobacco: Never Used   Substance Use Topics    Alcohol use: Not Currently    Drug use: No     Review of Systems   HENT:        + foreign body sensation right ear canal       Physical Exam     Initial Vitals [05/15/20 1619]   BP Pulse Resp Temp SpO2   (!) 141/81 81 20 98.3 °F (36.8 °C) 97 %      MAP       --         Physical Exam    Nursing note and vitals reviewed.  Constitutional: He appears well-developed and well-nourished. He is not diaphoretic. No distress.   Neurological: He is alert and oriented to person, place, and time. Coordination and gait normal. GCS score is 15. GCS eye subscore is 4. GCS verbal subscore is 5. GCS motor subscore is 6.          ED Course   Procedures  Labs Reviewed - No data to display       Imaging Results    None          Medical Decision Making:   Foreign body easily visualized and removed by the patient's nurse.                                 Clinical Impression:       ICD-10-CM ICD-9-CM   1. Acute foreign body of right ear canal, initial encounter T16.1XXA 931     E915         Disposition:   Disposition: Discharged  Condition: Stable                        Sloan Villanueva III, MD  05/18/20 7228

## 2020-05-26 ENCOUNTER — TELEPHONE (OUTPATIENT)
Dept: PHARMACY | Facility: CLINIC | Age: 55
End: 2020-05-26

## 2020-05-26 NOTE — TELEPHONE ENCOUNTER
Refill and followup call for Jose. Patient confirmed need of the refill. Will deliver via [employer] on  to arrive on  with patient consent. Copay $0.00 at 004. Address confirmed. Patient has 4 doses remaining (4 day supply). Patient denies missed doses and no side effects.  No new medications/allergies/medical conditions. Labs are stable. No ER/Urgent care visits in past month. Patient taking the medication as directed. Patient denies any further questions. Confirmed 2 patient identifiers - Name and .    Jorden Mills, PharmD  Clinical Pharmacist  [facility]  P: [phone]

## 2020-05-27 ENCOUNTER — TELEPHONE (OUTPATIENT)
Dept: OTOLARYNGOLOGY | Facility: CLINIC | Age: 55
End: 2020-05-27
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
## 2020-05-27 NOTE — TELEPHONE ENCOUNTER
----- Message from Ana María Hogue sent at 5/27/2020  3:27 PM CDT -----  Contact: Pt's Friend Lilian [phone]  Pt's Friend Lilian is requesting a callback in regards to scheduling a NP appt with the Doctor.    Please call and advise.

## 2020-06-14 ENCOUNTER — HOSPITAL ENCOUNTER (EMERGENCY)
Facility: HOSPITAL | Age: 55
Discharge: HOME | End: 2020-06-14
Attending: EMERGENCY MEDICINE
Payer: MEDICARE

## 2020-06-14 VITALS
DIASTOLIC BLOOD PRESSURE: 95 MMHG | SYSTOLIC BLOOD PRESSURE: 122 MMHG | BODY MASS INDEX: 34.07 KG/M2 | WEIGHT: 230 LBS | TEMPERATURE: 99 F | RESPIRATION RATE: 17 BRPM | OXYGEN SATURATION: 98 % | HEART RATE: 89 BPM | HEIGHT: 69 IN

## 2020-06-14 DIAGNOSIS — M25.561 ACUTE PAIN OF RIGHT KNEE: Primary | ICD-10-CM

## 2020-06-14 PROCEDURE — 99284 EMERGENCY DEPT VISIT MOD MDM: CPT | Mod: 25

## 2020-06-14 PROCEDURE — 63600175 PHARM REV CODE 636 W HCPCS: Performed by: NURSE PRACTITIONER

## 2020-06-14 PROCEDURE — 96372 THER/PROPH/DIAG INJ SC/IM: CPT

## 2020-06-14 RX ORDER — DICLOFENAC SODIUM 10 MG/G
2 GEL TOPICAL 4 TIMES DAILY
Qty: 1 TUBE | Refills: 0 | Status: SHIPPED | OUTPATIENT
Start: 2020-06-14 | End: 2021-06-01 | Stop reason: ALTCHOICE

## 2020-06-14 RX ORDER — DICLOFENAC SODIUM 10 MG/G
2 GEL TOPICAL 4 TIMES DAILY
Qty: 1 TUBE | Refills: 0 | Status: SHIPPED | OUTPATIENT
Start: 2020-06-14 | End: 2020-06-14 | Stop reason: SDUPTHER

## 2020-06-14 RX ORDER — KETOROLAC TROMETHAMINE 30 MG/ML
30 INJECTION, SOLUTION INTRAMUSCULAR; INTRAVENOUS
Status: COMPLETED | OUTPATIENT
Start: 2020-06-14 | End: 2020-06-14

## 2020-06-14 RX ADMIN — KETOROLAC TROMETHAMINE 30 MG: 30 INJECTION, SOLUTION INTRAMUSCULAR at 02:06

## 2020-06-14 NOTE — ED PROVIDER NOTES
Encounter Date: 6/14/2020       History     Chief Complaint   Patient presents with    Knee Pain     rihgt knee pain x 2 weeks no relief from pain meds, denies trauma      55-year-old male presents to the ED for right knee pain for 2 weeks.  The patient reports that he awoke around 3:00 a.m. in the morning about 2 weeks ago with right lateral knee pain.  Since that time the pain has persisted.  It is worse with walking.  He has tried an over-the-counter medication for the pain although he cannot recall the name of the medication.  He denies fever/chills, weakness, numbness/tingling, and swelling.  No known trauma.  No other complaints at this time.    The history is provided by the patient.     Review of patient's allergies indicates:  No Known Allergies  Past Medical History:   Diagnosis Date    Asthma     Bilateral hearing loss 12/12/2012    Biliary acute pancreatitis     GERD (gastroesophageal reflux disease)     High cholesterol     Hypertension     Multiple sclerosis      Past Surgical History:   Procedure Laterality Date    CHOLECYSTECTOMY      COLONOSCOPY N/A 1/22/2019    Procedure: COLONOSCOPY 2 day  golytely;  Surgeon: Nathan Waddell MD;  Location: [facility];  Service: Endoscopy;  Laterality: N/A;    JOINT REPLACEMENT      arm     Family History   Problem Relation Age of Onset    Heart disease Mother     Heart disease Father     Heart disease Brother      Social History     Tobacco Use    Smoking status: Current Every Day Smoker     Packs/day: 1.00     Years: 32.00     Pack years: 32.00     Types: Cigarettes    Smokeless tobacco: Never Used   Substance Use Topics    Alcohol use: Not Currently    Drug use: No     Review of Systems   Constitutional: Negative for activity change and fever.   HENT: Negative for congestion.    Respiratory: Negative for cough.    Cardiovascular: Negative for leg swelling.   Musculoskeletal: Positive for arthralgias. Negative for back pain, joint swelling,  myalgias and neck pain.   Skin: Negative.  Negative for rash and wound.   Neurological: Negative for weakness and numbness.   Psychiatric/Behavioral: Negative for confusion.   All other systems reviewed and are negative.      Physical Exam     Initial Vitals [06/14/20 1437]   BP Pulse Resp Temp SpO2   (!) 122/95 89 17 98.5 °F (36.9 °C) 98 %      MAP       --         Physical Exam    Nursing note and vitals reviewed.  Constitutional: Vital signs are normal. He appears well-developed and well-nourished. He is active and cooperative. He is easily aroused.  Non-toxic appearance. He does not have a sickly appearance. He does not appear ill. No distress.   HENT:   Head: Normocephalic and atraumatic.   Mouth/Throat: Mucous membranes are normal.   Eyes: Conjunctivae are normal.   Neck: Normal range of motion and phonation normal.   Cardiovascular: Normal rate.   Pulses:       Posterior tibial pulses are 2+ on the right side and 2+ on the left side.   Pulmonary/Chest: Effort normal.   Abdominal: Normal appearance.   Musculoskeletal:      Right knee: He exhibits normal range of motion, no swelling, no effusion, no ecchymosis, no deformity, no laceration, no erythema, normal alignment, no LCL laxity, normal patellar mobility, no bony tenderness, normal meniscus and no MCL laxity. No tenderness found.      Right lower leg: Normal.        Legs:       Comments: Negative Homans sign bilaterally.  No joint swelling.  Full active range of motion of bilateral knees.     Neurological: He is alert, oriented to person, place, and time and easily aroused. He has normal strength. No sensory deficit. Coordination normal. GCS eye subscore is 4. GCS verbal subscore is 5. GCS motor subscore is 6.   Skin: Skin is warm, dry and intact. Capillary refill takes less than 2 seconds. No bruising and no rash noted. No erythema. No pallor.   Psychiatric: He has a normal mood and affect. His speech is normal and behavior is normal. Judgment and thought  content normal. Cognition and memory are normal.         ED Course   Procedures  Labs Reviewed - No data to display       Imaging Results    None          Medical Decision Making:   Differential Diagnosis:   Strain, sprain, spasm, OA  ED Management:  Toradol    No indication for labs or imaging at this time.  There is no sign of septic joint, compartment syndrome, or DVT.  Likely knee sprain as he reports he woke with knee pain while sleeping 1 night.  He likely twisted it while sleeping.  Encouraged RICE therapy.  Pt to follow up with PCP within 5 days.  I reviewed strict return precautions. In addition, pt is to return to the ED if condition changes, progresses, or if there are any concerns.  Pt verbalized understanding, compliance, and agreement with the treatment plan.                                     Clinical Impression:       ICD-10-CM ICD-9-CM   1. Acute pain of right knee  M25.561 719.46                                Clara Mills, STEPHEN  06/14/20 1508

## 2020-06-14 NOTE — ED NOTES
Patient presents to ER with c/o right knee pain x 2 weeks. Pt states that he took pain medication by mouth and it didn't work, and that the only thing that work in an injection in the knee. 10/10 on pain scale

## 2020-06-14 NOTE — ED NOTES
APPEARANCE: Alert, oriented and in no acute distress.  CARDIAC: Normal rate and rhythm, no murmur heard.   PERIPHERAL VASCULAR: peripheral pulses present. Normal cap refill. No edema. Warm to touch.    RESPIRATORY:Normal rate and effort, breath sounds clear bilaterally throughout chest. Respirations are equal and unlabored no obvious signs of distress.  GASTRO: soft, bowel sounds normal, no tenderness, no abdominal distention.  MUSC: Full ROM. No bony tenderness or soft tissue tenderness. No obvious deformity.  SKIN: Skin is warm and dry, normal skin turgor, mucous membranes moist.  NEURO: 5/5 strength major flexors/extensors bilaterally. Sensory intact to light touch bilaterally. [place] coma scale: eyes open spontaneously-4, oriented & converses-5, obeys commands-6. No neurological abnormalities.   MENTAL STATUS: awake, alert and aware of environment.  EYE: PERRL, both eyes: pupils brisk and reactive to light. Normal size.  ENT: EARS: no obvious drainage. NOSE: no active bleeding.

## 2020-06-23 ENCOUNTER — TELEPHONE (OUTPATIENT)
Dept: PHARMACY | Facility: CLINIC | Age: 55
End: 2020-06-23

## 2020-06-23 NOTE — TELEPHONE ENCOUNTER
Refill and followup call for Jose. No answer, LVM.    Jorden Mills, PharmD  Clinical Pharmacist  [facility]  P: [phone]

## 2020-06-24 NOTE — TELEPHONE ENCOUNTER
Patient's brother Yohan called [facility] who transferred to [facility]. Brother verified patient name and . Brother states that patient has about 9 Gilenya left. He states that he does not think that patient has started any new medication. Will deliver  via [employer] for arrival on  with brother's consent. Address confirmed. No other questions or concerns.     Jorden Mills, PharmD  Clinical Pharmacist  [facility]  P: [phone]

## 2020-06-30 DIAGNOSIS — G35 MULTIPLE SCLEROSIS: Primary | ICD-10-CM

## 2020-07-22 ENCOUNTER — TELEPHONE (OUTPATIENT)
Dept: PHARMACY | Facility: CLINIC | Age: 55
End: 2020-07-22

## 2020-07-22 NOTE — TELEPHONE ENCOUNTER
Refill and followup call for Jose. No answer, LVM.     Jorden Mills, PharmD  Clinical Pharmacist  [facility]  P: [phone]

## 2020-07-24 NOTE — TELEPHONE ENCOUNTER
Refill and followup call for Jose. Patient and his brother Alon confirmed need of the refill. Will deliver via [employer] on  to arrive on  with patient consent. Copay $0.00 at 004. Address confirmed. Patient has 12 doses remaining (12 day supply). Patient denies missed doses and no side effects. Patient reports that he had a fall recently, but it was not serious. Patient's brother states that patient is having some memory issues, as he will forget things on the stove. Labs are stable. No ER/Urgent care visits in past month. Patient taking the medication as directed. Patient denies any further questions. Confirmed 2 patient identifiers - Name and .     Jorden Mills, PharmD  Clinical Pharmacist  [facility]  P: [phone]

## 2020-08-21 NOTE — TELEPHONE ENCOUNTER
Call attempt 1-Gilenya refill. No Answer. LVM. MyChart not active.       Ryan Conde, PharmD  [facility]

## 2020-09-11 ENCOUNTER — HOSPITAL ENCOUNTER (OUTPATIENT)
Dept: RADIOLOGY | Facility: HOSPITAL | Age: 55
Discharge: HOME | End: 2020-09-11
Attending: PSYCHIATRY & NEUROLOGY
Payer: MEDICARE

## 2020-09-11 DIAGNOSIS — G35 MULTIPLE SCLEROSIS: ICD-10-CM

## 2020-09-11 PROCEDURE — 72141 MRI NECK SPINE W/O DYE: CPT | Mod: 26,,, | Performed by: RADIOLOGY

## 2020-09-11 PROCEDURE — 70551 MRI BRAIN STEM W/O DYE: CPT | Mod: TC

## 2020-09-11 PROCEDURE — 72141 MRI CERVICAL SPINE WITHOUT CONTRAST: ICD-10-PCS | Mod: 26,,, | Performed by: RADIOLOGY

## 2020-09-11 PROCEDURE — 72146 MRI CHEST SPINE W/O DYE: CPT | Mod: TC

## 2020-09-11 PROCEDURE — 72146 MRI THORACIC SPINE WITHOUT CONTRAST: ICD-10-PCS | Mod: 26,,, | Performed by: RADIOLOGY

## 2020-09-11 PROCEDURE — 70551 MRI BRAIN WITHOUT CONTRAST: ICD-10-PCS | Mod: 26,,, | Performed by: RADIOLOGY

## 2020-09-11 PROCEDURE — 70551 MRI BRAIN STEM W/O DYE: CPT | Mod: 26,,, | Performed by: RADIOLOGY

## 2020-09-11 PROCEDURE — 72146 MRI CHEST SPINE W/O DYE: CPT | Mod: 26,,, | Performed by: RADIOLOGY

## 2020-09-11 PROCEDURE — 72141 MRI NECK SPINE W/O DYE: CPT | Mod: TC

## 2020-09-18 ENCOUNTER — IMMUNIZATION (OUTPATIENT)
Dept: PHARMACY | Facility: CLINIC | Age: 55
End: 2020-09-18
Payer: MEDICARE

## 2020-09-18 ENCOUNTER — TELEPHONE (OUTPATIENT)
Dept: PHARMACY | Facility: CLINIC | Age: 55
End: 2020-09-18

## 2020-09-18 NOTE — TELEPHONE ENCOUNTER
Refill and followup call for Jose. No answer, LVM.     Jorden Mills, PharmD  Clinical Pharmacist  [facility]  P: [phone]

## 2020-09-21 ENCOUNTER — TELEPHONE (OUTPATIENT)
Dept: PHARMACY | Facility: CLINIC | Age: 55
End: 2020-09-21

## 2020-09-21 NOTE — TELEPHONE ENCOUNTER
Call for Gilenya refill. No answer, LVM.     Jorden Mills, PharmD  Clinical Pharmacist  [facility]  P: [phone]

## 2020-09-25 NOTE — TELEPHONE ENCOUNTER
2nd call for Gilenya refill. No answer, LVM.     Jorden Mills, PharmD  Clinical Pharmacist  [facility]  P: [phone]

## 2020-10-06 ENCOUNTER — DOCUMENTATION ONLY (OUTPATIENT)
Dept: NEUROLOGY | Facility: HOSPITAL | Age: 55
End: 2020-10-06

## 2020-10-15 ENCOUNTER — TELEPHONE (OUTPATIENT)
Dept: PHARMACY | Facility: CLINIC | Age: 55
End: 2020-10-15

## 2020-10-15 NOTE — TELEPHONE ENCOUNTER
"Call for Gilenya. Patient answered, verified name and ,. Patient reports no issues or side effects with Gilenya. Patient reports no new medications. Patient reports no missed doses. Patient reports that everything is "the same". Patient declined QoL questions and measures. Patient confirms that he has been following up with the pharmacy team at [facility]. Patient reports no change to his MS. No other questions or concerns.     Jorden Mills, PharmD  Clinical Pharmacist  [facility]  P: [phone]    "

## 2020-10-23 ENCOUNTER — SPECIALTY PHARMACY (OUTPATIENT)
Dept: PHARMACY | Facility: CLINIC | Age: 55
End: 2020-10-23

## 2020-10-23 DIAGNOSIS — G35 MULTIPLE SCLEROSIS: Primary | ICD-10-CM
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
## 2020-11-05 RX ORDER — TRAMADOL HYDROCHLORIDE 50 MG/1
50 TABLET ORAL EVERY 6 HOURS PRN
Qty: 120 TABLET | Refills: 4 | Status: SHIPPED | OUTPATIENT
Start: 2020-11-05 | End: 2021-05-04 | Stop reason: SDUPTHER

## 2020-11-10 RX ORDER — DICYCLOMINE HYDROCHLORIDE 10 MG/1
CAPSULE ORAL
Qty: 90 CAPSULE | Refills: 3 | Status: SHIPPED | OUTPATIENT
Start: 2020-11-10 | End: 2021-03-10

## 2020-11-13 ENCOUNTER — TELEPHONE (OUTPATIENT)
Dept: HEMATOLOGY/ONCOLOGY | Facility: CLINIC | Age: 55
End: 2020-11-13

## 2020-11-13 NOTE — TELEPHONE ENCOUNTER
appt scheduled 11/19----- Message from Nely Bowman sent at 11/13/2020 12:57 PM CST -----  Contact: [phone]/Self  Patient states he is returning your call. Please advise.

## 2020-11-19 ENCOUNTER — LAB VISIT (OUTPATIENT)
Dept: LAB | Facility: HOSPITAL | Age: 55
End: 2020-11-19
Attending: INTERNAL MEDICINE
Payer: MEDICARE

## 2020-11-19 ENCOUNTER — OFFICE VISIT (OUTPATIENT)
Dept: HEMATOLOGY/ONCOLOGY | Facility: CLINIC | Age: 55
End: 2020-11-19
Payer: MEDICARE

## 2020-11-19 VITALS
WEIGHT: 205 LBS | RESPIRATION RATE: 18 BRPM | HEART RATE: 91 BPM | SYSTOLIC BLOOD PRESSURE: 111 MMHG | OXYGEN SATURATION: 97 % | TEMPERATURE: 98 F | BODY MASS INDEX: 30.28 KG/M2 | DIASTOLIC BLOOD PRESSURE: 80 MMHG

## 2020-11-19 DIAGNOSIS — D75.1 POLYCYTHEMIA: Primary | ICD-10-CM

## 2020-11-19 DIAGNOSIS — R79.9 ABNORMAL FINDING OF BLOOD CHEMISTRY, UNSPECIFIED: ICD-10-CM

## 2020-11-19 DIAGNOSIS — G35 MULTIPLE SCLEROSIS: ICD-10-CM

## 2020-11-19 DIAGNOSIS — D75.1 POLYCYTHEMIA: ICD-10-CM

## 2020-11-19 LAB
ALBUMIN SERPL BCP-MCNC: 4.1 G/DL (ref 3.5–5.2)
ALP SERPL-CCNC: 94 U/L (ref 55–135)
ALT SERPL W/O P-5'-P-CCNC: 35 U/L (ref 10–44)
ANION GAP SERPL CALC-SCNC: 12 MMOL/L (ref 8–16)
AST SERPL-CCNC: 35 U/L (ref 10–40)
BASOPHILS # BLD AUTO: 0.03 K/UL (ref 0–0.2)
BASOPHILS NFR BLD: 0.6 % (ref 0–1.9)
BILIRUB SERPL-MCNC: 0.6 MG/DL (ref 0.1–1)
BUN SERPL-MCNC: 10 MG/DL (ref 6–20)
CALCIUM SERPL-MCNC: 9.3 MG/DL (ref 8.7–10.5)
CHLORIDE SERPL-SCNC: 101 MMOL/L (ref 95–110)
CO2 SERPL-SCNC: 22 MMOL/L (ref 23–29)
CREAT SERPL-MCNC: 1.1 MG/DL (ref 0.5–1.4)
DIFFERENTIAL METHOD: ABNORMAL
EOSINOPHIL # BLD AUTO: 0.2 K/UL (ref 0–0.5)
EOSINOPHIL NFR BLD: 4.2 % (ref 0–8)
ERYTHROCYTE [DISTWIDTH] IN BLOOD BY AUTOMATED COUNT: 17.4 % (ref 11.5–14.5)
ERYTHROCYTE [SEDIMENTATION RATE] IN BLOOD BY WESTERGREN METHOD: 2 MM/HR (ref 0–10)
EST. GFR  (AFRICAN AMERICAN): >60 ML/MIN/1.73 M^2
EST. GFR  (NON AFRICAN AMERICAN): >60 ML/MIN/1.73 M^2
FERRITIN SERPL-MCNC: 38 NG/ML (ref 20–300)
GLUCOSE SERPL-MCNC: 136 MG/DL (ref 70–110)
HCT VFR BLD AUTO: 56 % (ref 40–54)
HGB BLD-MCNC: 17.6 G/DL (ref 14–18)
IMM GRANULOCYTES # BLD AUTO: 0.01 K/UL (ref 0–0.04)
IMM GRANULOCYTES NFR BLD AUTO: 0.2 % (ref 0–0.5)
IRON SERPL-MCNC: 97 UG/DL (ref 45–160)
LYMPHOCYTES # BLD AUTO: 1.1 K/UL (ref 1–4.8)
LYMPHOCYTES NFR BLD: 21.3 % (ref 18–48)
MCH RBC QN AUTO: 23.8 PG (ref 27–31)
MCHC RBC AUTO-ENTMCNC: 31.4 G/DL (ref 32–36)
MCV RBC AUTO: 76 FL (ref 82–98)
MONOCYTES # BLD AUTO: 0.6 K/UL (ref 0.3–1)
MONOCYTES NFR BLD: 11.5 % (ref 4–15)
NEUTROPHILS # BLD AUTO: 3.1 K/UL (ref 1.8–7.7)
NEUTROPHILS NFR BLD: 62.2 % (ref 38–73)
NRBC BLD-RTO: 0 /100 WBC
PLATELET # BLD AUTO: 199 K/UL (ref 150–350)
PMV BLD AUTO: 10.2 FL (ref 9.2–12.9)
POTASSIUM SERPL-SCNC: 4.3 MMOL/L (ref 3.5–5.1)
PROT SERPL-MCNC: 7.5 G/DL (ref 6–8.4)
RBC # BLD AUTO: 7.39 M/UL (ref 4.6–6.2)
SATURATED IRON: 23 % (ref 20–50)
SODIUM SERPL-SCNC: 135 MMOL/L (ref 136–145)
TOTAL IRON BINDING CAPACITY: 428 UG/DL (ref 250–450)
TRANSFERRIN SERPL-MCNC: 289 MG/DL (ref 200–375)
WBC # BLD AUTO: 4.97 K/UL (ref 3.9–12.7)

## 2020-11-19 PROCEDURE — 99999 PR PBB SHADOW E&M-EST. PATIENT-LVL V: ICD-10-PCS | Mod: PBBFAC,,, | Performed by: INTERNAL MEDICINE

## 2020-11-19 PROCEDURE — 3008F BODY MASS INDEX DOCD: CPT | Mod: CPTII,S$GLB,, | Performed by: INTERNAL MEDICINE

## 2020-11-19 PROCEDURE — 99204 OFFICE O/P NEW MOD 45 MIN: CPT | Mod: S$GLB,,, | Performed by: INTERNAL MEDICINE

## 2020-11-19 PROCEDURE — 99999 PR PBB SHADOW E&M-EST. PATIENT-LVL V: CPT | Mod: PBBFAC,,, | Performed by: INTERNAL MEDICINE

## 2020-11-19 PROCEDURE — 83540 ASSAY OF IRON: CPT

## 2020-11-19 PROCEDURE — 3008F PR BODY MASS INDEX (BMI) DOCUMENTED: ICD-10-PCS | Mod: CPTII,S$GLB,, | Performed by: INTERNAL MEDICINE

## 2020-11-19 PROCEDURE — 3079F DIAST BP 80-89 MM HG: CPT | Mod: CPTII,S$GLB,, | Performed by: INTERNAL MEDICINE

## 2020-11-19 PROCEDURE — 80053 COMPREHEN METABOLIC PANEL: CPT

## 2020-11-19 PROCEDURE — 81270 JAK2 GENE: CPT

## 2020-11-19 PROCEDURE — 85652 RBC SED RATE AUTOMATED: CPT

## 2020-11-19 PROCEDURE — 3074F PR MOST RECENT SYSTOLIC BLOOD PRESSURE < 130 MM HG: ICD-10-PCS | Mod: CPTII,S$GLB,, | Performed by: INTERNAL MEDICINE

## 2020-11-19 PROCEDURE — 3079F PR MOST RECENT DIASTOLIC BLOOD PRESSURE 80-89 MM HG: ICD-10-PCS | Mod: CPTII,S$GLB,, | Performed by: INTERNAL MEDICINE

## 2020-11-19 PROCEDURE — 1125F AMNT PAIN NOTED PAIN PRSNT: CPT | Mod: S$GLB,,, | Performed by: INTERNAL MEDICINE

## 2020-11-19 PROCEDURE — 3074F SYST BP LT 130 MM HG: CPT | Mod: CPTII,S$GLB,, | Performed by: INTERNAL MEDICINE

## 2020-11-19 PROCEDURE — 82668 ASSAY OF ERYTHROPOIETIN: CPT

## 2020-11-19 PROCEDURE — 85025 COMPLETE CBC W/AUTO DIFF WBC: CPT

## 2020-11-19 PROCEDURE — 1125F PR PAIN SEVERITY QUANTIFIED, PAIN PRESENT: ICD-10-PCS | Mod: S$GLB,,, | Performed by: INTERNAL MEDICINE

## 2020-11-19 PROCEDURE — 99204 PR OFFICE/OUTPT VISIT, NEW, LEVL IV, 45-59 MIN: ICD-10-PCS | Mod: S$GLB,,, | Performed by: INTERNAL MEDICINE

## 2020-11-19 PROCEDURE — 82728 ASSAY OF FERRITIN: CPT

## 2020-11-19 NOTE — LETTER
November 19, 2020      Dawood Castillo MD  [address] [facility]  [address] [facility] [address]           [facility]  [address], [facility] [address]  [facility] [address]  Phone: [phone]          Patient: Tj Trammell   MR Number: [id number]   YOB: 1965   Date of Visit: 11/19/2020       Dear Dr. Dawood Castillo:    Thank you for referring Tj Trammell to me for evaluation. Attached you will find relevant portions of my assessment and plan of care.    If you have questions, please do not hesitate to call me. I look forward to following Tj Trammell along with you.    Sincerely,    Bryson Valentin MD    Enclosure  CC:  No Recipients    If you would like to receive this communication electronically, please contact [email or web address] or [phone] to request more information on [employer] access.    For providers and/or their staff who would like to refer a patient to [facility], please contact us through our one-stop-shop provider referral line, [facility], at [phone].    If you feel you have received this communication in error or would no longer like to receive these types of communications, please e-mail [email or web address]

## 2020-11-19 NOTE — PROGRESS NOTES
PATIENT: Tj Trammell  MRN: [id number]  DATE: 11/19/2020    Diagnosis:   1. Polycythemia    2. Multiple sclerosis      Chief Complaint: polycythemia    Subjective:     History of Present Illness:     55-year-old male referred for polycythemia evaluation detected on routine labs.    CBC from July 2020 shows hemoglobin 17.2, hematocrit 54.8, platelets 180, WBC 5.4, differential unremarkable, glucose 90, creatinine 0.9, LFTs unremarkable, vitamin-D 43    He has relapsing remitting multiple sclerosis and sees Dr. Castillo and is on gilenya since feb 2017.    Component Hemoglobin Hematocrit   Latest Ref Rng & Units 14.0 - 18.0 g/dL 40.0 - 54.0 %   10/3/2018 17.0 52.0   5/25/2016 16.9 49.6   10/1/2015 16.7 50.4   6/19/2015 15.8 48.4   4/17/2015 16.7 50.8   3/18/2015 16.9 50.8   10/28/2014 15.8 48.4   7/31/2014 15.3 47.3   7/18/2014 16.3 48.6   7/17/2014 15.7 47.9   7/16/2014 14.7 44.5   7/15/2014 16.9 51.0   4/10/2014 16.7 49.9   12/21/2013 15.3 46.3   12/16/2013 14.2 44.2   11/27/2013 14.3 42.4   11/26/2013 14.9 45.6   11/25/2013 13.9 (L) 42.1   11/24/2013 14.8 44.1   11/23/2013 12.7 (L) 37.9 (L)   11/22/2013 15.8 47.4   11/21/2013 14.9 44.9   11/14/2013 16.5 49.0   11/3/2013 16.9 49.2   10/22/2013 16.6 48.7   3/13/2013 16.6 50.7   3/12/2013 17.0 50.8   12/12/2012 17.3 50.7   10/8/2012 16.7 49.2   9/11/2012 17.1 53.3   8/9/2012 15.7 47.2   10/11/2011 16.4 50.4       Past Medical History:   Past Medical History:   Diagnosis Date    Asthma     Bilateral hearing loss 12/12/2012    Biliary acute pancreatitis     GERD (gastroesophageal reflux disease)     High cholesterol     Hypertension     Multiple sclerosis        Past Surgical History:   Past Surgical History:   Procedure Laterality Date    CHOLECYSTECTOMY      COLONOSCOPY N/A 1/22/2019    Procedure: COLONOSCOPY 2 day  golytely;  Surgeon: Nathan Waddell MD;  [facility];  Service: Endoscopy;  Laterality: N/A;    JOINT REPLACEMENT      arm        Family History:   Family History   Problem Relation Age of Onset    Heart disease Mother     Heart disease Father     Heart disease Brother        Social History:  reports that he has been smoking cigarettes. He has a 32.00 pack-year smoking history. He has never used smokeless tobacco. He reports previous alcohol use. He reports that he does not use drugs.    Allergies:  Review of patient's allergies indicates:  No Known Allergies    Medications:  Current Outpatient Medications   Medication Sig Dispense Refill    acetaZOLAMIDE (DIAMOX) 250 MG tablet Take 2 TABLETS BY MOUTH ONLY 8 HOURS AFTER SURGERY 2 tablet 0    albuterol (PROVENTIL/VENTOLIN HFA) 90 mcg/actuation inhaler Inhale 2 puffs by mouth every 4 hours. 18 g 3    ALPRAZolam (XANAX) 0.5 MG tablet TAKE ONE TABLET BY MOUTH TWICE DAILY AS NEEDED FOR ANXIETY 60 tablet 4    atorvastatin (LIPITOR) 40 MG tablet Take 1 tablet (40 mg total) by mouth once daily. 90 tablet 4    bacitracin 500 unit/gram Oint Apply topically 2 (two) times daily. 28 g 0    baclofen (LIORESAL) 10 MG tablet TAKE 1 TABLET BY MOUTH 3 TIMES DAILY. 90 tablet 11    buPROPion (WELLBUTRIN SR) 150 MG TBSR 12 hr tablet TAKE 1 TABLET BY MOUTH EVERY 12 HOURS DAILY. 60 tablet 11    dicyclomine (BENTYL) 10 MG capsule TAKE 1 CAPSULE BY MOUTH THREE TIMES A DAY FOR ABDOMINAL PAIN 90 capsule 3    difluprednate (DUREZOL) 0.05 % Drop ophthalmic solution Instill one drop TO SURGERY EYE four times a day AFTER SURGERY X 30 DAYS 5 mL 4    docusate sodium (STOOL SOFTENER) 100 MG capsule Take 1 capsule (100 mg total) by mouth once daily. 30 capsule 5    ergocalciferol (ERGOCALCIFEROL) 50,000 unit Cap TAKE 1 CAPSULE BY MOUTH Weekly 12 capsule 0    fingolimod (GILENYA) 0.5 mg Cap TAKE ONE CAPSULE (0.5 MG) BY MOUTH ONCE DAILY. MAY TAKE WITH OR WITHOUT FOOD. STORE AT ROOM TEMPERATURE. 90 capsule 2    gabapentin (NEURONTIN) 300 MG capsule TAKE 1 CAPSULE BY MOUTH AT BEDTIME 30 capsule 11     lactulose (CHRONULAC) 10 gram/15 mL solution TAKE 45ML BY MOUTH THREE TIMES A DAY 4050 mL 3    linaclotide (LINZESS) 145 mcg Cap capsule TAKE 1 CAPSULE BY MOUTH ONCE DAILY 30 capsule 6    lisinopriL (PRINIVIL,ZESTRIL) 5 MG tablet TAKE 1 TABLET BY MOUTH DAILY 90 tablet 3    nabumetone (RELAFEN) 500 MG tablet TAKE 1 TABLET BY MOUTH TWICE DAILY AS NEEDED FOR PAIN 60 tablet 4    naproxen (NAPROSYN) 500 MG tablet Take 1 Tablet by mouth Twice a day 180 tablet 4    ofloxacin (OCUFLOX) 0.3 % ophthalmic solution Instill 1 drop TO SURGERY EYE four times a day STARTING 2 DAYS BEFORE SURGERY 5 mL 3    oxybutynin (DITROPAN-XL) 5 MG TR24 Take 1 tablet (5 mg total) by mouth once daily. 90 tablet 3    pantoprazole (PROTONIX) 40 MG tablet TAKE ONE TABLET BY MOUTH  ONCE DAILY 90 tablet 3    polyethylene glycol (GLYCOLAX) 17 gram/dose powder Take 17 g by mouth once daily. 510 g 3    sucralfate (CARAFATE) 1 gram tablet TAKE 1 TABLET BY MOUTH FOUR TIMES A  tablet 3    teriflunomide (AUBAGIO) 14 mg Tab Take by mouth.      traMADoL (ULTRAM) 50 mg tablet Take 1 tablet (50 mg total) by mouth every 6 (six) hours as needed for pain 120 tablet 4    baclofen (LIORESAL) 10 MG tablet TAKE 1 TABLET BY MOUTH THREE TIMES A DAY WITH FOOD OR MILK (Patient not taking: Reported on 11/19/2020) 270 tablet 3    buPROPion (WELLBUTRIN SR) 150 MG TBSR 12 hr tablet TAKE 1 TABLET EVERY 12 HOURS DAILY. (Patient not taking: Reported on 11/19/2020) 60 tablet 11    buPROPion (WELLBUTRIN SR) 150 MG TBSR 12 hr tablet TAKE 1 TABLET EVERY 12 HOURS DAILY. (Patient not taking: Reported on 11/19/2020) 60 tablet 11    buPROPion (WELLBUTRIN SR) 150 MG TBSR 12 hr tablet TAKE 1 BY MOUTH TABLET EVERY 12 HOURS DAILY. (Patient not taking: Reported on 11/19/2020) 60 tablet 11    diclofenac sodium (VOLTAREN) 1 % Gel Apply 2 g topically 4 (four) times daily. As needed for pain. 1 Tube 0    DUREZOL 0.05 % Drop ophthalmic solution       ergocalciferol  (ERGOCALCIFEROL) 50,000 unit Cap TAKE 1 CAPSULE  BY MOUTH ONCE  Weekly (Patient not taking: Reported on 11/19/2020) 12 capsule 3    ergocalciferol (ERGOCALCIFEROL) 50,000 unit Cap TAKE 1 CAPSULE  by mouth once Weekly (Patient not taking: Reported on 11/19/2020) 12 capsule 0    fingolimod (GILENYA) 0.5 mg Cap TAKE ONE CAPSULE (0.5 MG) BY MOUTH ONCE DAILY. MAY TAKE WITH OR WITHOUT FOOD. STORE AT ROOM TEMPERATURE. 90 capsule 2    fingolimod (GILENYA) 0.5 mg Cap TAKE ONE CAPSULE (0.5 MG) BY MOUTH ONCE DAILY. MAY TAKE WITH OR WITHOUT FOOD. STORE AT ROOM TEMPERATURE. 90 capsule 2    fingolimod (GILENYA) 0.5 mg Cap TAKE ONE CAPSULE (0.5 MG) BY MOUTH ONCE DAILY. MAY TAKE WITH OR WITHOUT FOOD. STORE AT ROOM TEMPERATURE. (Patient not taking: Reported on 11/19/2020) 90 capsule 2    gabapentin (NEURONTIN) 300 MG capsule TAKE ONE CAPSULE BY MOUTH ONCE DAILY AT BEDTIME (Patient not taking: Reported on 11/19/2020) 30 capsule 11    gabapentin (NEURONTIN) 300 MG capsule TAKE ONE CAPSULE BY MOUTH ONCE DAILY AT BEDTIME (Patient not taking: Reported on 11/19/2020) 30 capsule 11    gabapentin (NEURONTIN) 300 MG capsule TAKE ONE CAPSULE BY MOUTH ONCE DAILY AT BEDTIME (Patient not taking: Reported on 11/19/2020) 30 capsule 11    GILENYA 0.5 mg Cap       lisinopriL (PRINIVIL,ZESTRIL) 5 MG tablet TAKE 1 TABLET BY MOUTH DAILY (Patient not taking: Reported on 11/19/2020) 90 tablet 3    nabumetone (RELAFEN) 500 MG tablet Take 1 tablet by mouth twice a day as needed for pain. (Patient not taking: Reported on 11/19/2020) 60 tablet 3    nabumetone (RELAFEN) 500 MG tablet Take 1 tablet (500 mg total) by mouth 2 (two) times daily as needed. (Patient not taking: Reported on 11/19/2020) 60 tablet 3    nabumetone (RELAFEN) 500 MG tablet TAKE 1 TABLET BY MOUTH TWICE DAILY AS NEEDED FOR PAIN (Patient not taking: Reported on 11/19/2020) 60 tablet 4    nabumetone (RELAFEN) 500 MG tablet TAKE 1 TABLET BY MOUTH TWICE DAILY AS NEEDED (Patient  not taking: Reported on 11/19/2020) 60 tablet 4    nabumetone (RELAFEN) 500 MG tablet Take 1 tablet (500 mg total) by mouth 2 (two) times daily as needed. (Patient not taking: Reported on 11/19/2020) 60 tablet 3    triamcinolone acetonide 0.025% (KENALOG) 0.025 % cream        No current facility-administered medications for this visit.        Review of Systems   Constitutional: Negative for fever and unexpected weight change.   HENT: Negative for mouth sores and nosebleeds.    Eyes: Negative for photophobia and visual disturbance.   Cardiovascular: Negative for chest pain and palpitations.   Gastrointestinal: Negative for abdominal pain and vomiting.   Musculoskeletal: Positive for arthralgias.   Hematological: Negative for adenopathy. Does not bruise/bleed easily.   Psychiatric/Behavioral: The patient is nervous/anxious.        Objective:     Vitals:    11/19/20 0815   BP: 111/80   Pulse: 91   Resp: 18   Temp: 98 °F (36.7 °C)   SpO2: 97%   Weight: 93 kg (205 lb 0.4 oz)     BMI: Body mass index is 30.28 kg/m².    Physical Exam  Vitals signs reviewed.   Constitutional:       Appearance: He is not toxic-appearing.   HENT:      Mouth/Throat:      Mouth: No oral lesions.      Pharynx: No oropharyngeal exudate.   Neck:      Musculoskeletal: Neck supple.      Thyroid: No thyroid mass or thyromegaly.   Cardiovascular:      Rate and Rhythm: Normal rate and regular rhythm.   Pulmonary:      Effort: Pulmonary effort is normal. No accessory muscle usage or respiratory distress.      Breath sounds: Normal breath sounds. No wheezing or rales.   Abdominal:      General: Bowel sounds are normal.      Palpations: There is no mass.      Tenderness: There is no abdominal tenderness.   Lymphadenopathy:      Cervical: No cervical adenopathy.   Skin:     Findings: No bruising, lesion or rash.   Neurological:      Mental Status: He is alert.   Psychiatric:         Thought Content: Thought content normal.         Judgment: Judgment  normal.         Assessment:       1. Polycythemia    2. Multiple sclerosis      Plan:   Polycythemia  -recent and past labs reviewed  -he has polycythemia  -will initiate a workup to see if he has polycythemia vera  -will check EPO level, ESR, JAK2 mutation testing  -will draw these labs today and I will call him with the test results    All questions answered    Addendum 12/11/2020 - called patient to review test results.  EPO level within normal limits.  JAK2 testing negative.  Recommended he donate 2 units of blood, 1 unit sometime this month and 1 unit in January.  Will repeat labs and see him back for follow-up in February

## 2020-11-20 LAB — EPO SERPL-ACNC: 9.1 MIU/ML (ref 2.6–18.5)

## 2020-11-30 ENCOUNTER — SPECIALTY PHARMACY (OUTPATIENT)
Dept: PHARMACY | Facility: CLINIC | Age: 55
End: 2020-11-30

## 2020-11-30 NOTE — TELEPHONE ENCOUNTER
Specialty Pharmacy - Refill Coordination    Specialty Medication Orders Linked to Encounter      Most Recent Value   Medication #1  fingolimod (GILENYA) 0.5 mg Cap (Order#[id number], Rx#[id number])          Refill Questions - Documented Responses      Most Recent Value   Relationship to patient of person spoken to?  Self   HIPAA/medical authority confirmed?  Yes   Any changes in contact preferences or allowed representatives?  No   Has the patient had any insurance changes?  No   Has the patient had any changes to specialty medication, dose, or instructions?  No   Has the patient started taking any new medications, herbals, or supplements?  No   Has the patient been diagnosed with any new medical conditions?  No   Does the patient have any new allergies to medications or foods?  No   Does the patient have any concerns about side effects?  No   Can the patient store medication/sharps container properly (at the correct temperature, away from children/pets, etc.)?  Yes   Can the patient call emergency services (911) in the event of an emergency?  Yes   Does the patient have any concerns or questions about taking or administering this medication as prescribed?  No   How many doses did the patient miss in the past 4 weeks or since the last fill?  0   How many doses does the patient have on hand?  1   How many days does the patient report on hand quantity will last?  1   Does the number of doses/days supply remaining match pharmacy expected amounts?  Yes   Does the patient feel that this medication is effective?  Yes   During the past 4 weeks, has patient missed any activities due to condition or medication?  No   During the past 4 weeks, did patient have any of the following urgent care visits?  None   How will the patient receive the medication?  Mail   When does the patient need to receive the medication?  12/01/20   Shipping Address  Home   Address in [facility] confirmed and updated if neccessary?  Yes    Expected Copay ($)  0   Is the patient able to afford the medication copay?  Yes   Payment Method  zero copay   Days supply of Refill  30   Would patient like to speak to a pharmacist?  Yes   Do you want to trigger an intervention?  Yes   Do you want to trigger an additional referral task?  No   Refill activity completed?  Yes   Refill activity plan  Refill scheduled   Shipment/Pickup Date:  11/30/20          Current Outpatient Medications   Medication Sig    acetaZOLAMIDE (DIAMOX) 250 MG tablet Take 2 TABLETS BY MOUTH ONLY 8 HOURS AFTER SURGERY    albuterol (PROVENTIL/VENTOLIN HFA) 90 mcg/actuation inhaler Inhale 2 puffs by mouth every 4 hours.    ALPRAZolam (XANAX) 0.5 MG tablet TAKE ONE TABLET BY MOUTH TWICE DAILY AS NEEDED FOR ANXIETY    atorvastatin (LIPITOR) 40 MG tablet Take 1 tablet (40 mg total) by mouth once daily.    bacitracin 500 unit/gram Oint Apply topically 2 (two) times daily.    baclofen (LIORESAL) 10 MG tablet TAKE 1 TABLET BY MOUTH THREE TIMES A DAY WITH FOOD OR MILK (Patient not taking: Reported on 11/19/2020)    baclofen (LIORESAL) 10 MG tablet TAKE 1 TABLET BY MOUTH 3 TIMES DAILY.    buPROPion (WELLBUTRIN SR) 150 MG TBSR 12 hr tablet TAKE 1 TABLET BY MOUTH EVERY 12 HOURS DAILY.    buPROPion (WELLBUTRIN SR) 150 MG TBSR 12 hr tablet TAKE 1 TABLET EVERY 12 HOURS DAILY. (Patient not taking: Reported on 11/19/2020)    buPROPion (WELLBUTRIN SR) 150 MG TBSR 12 hr tablet TAKE 1 TABLET EVERY 12 HOURS DAILY. (Patient not taking: Reported on 11/19/2020)    buPROPion (WELLBUTRIN SR) 150 MG TBSR 12 hr tablet TAKE 1 BY MOUTH TABLET EVERY 12 HOURS DAILY. (Patient not taking: Reported on 11/19/2020)    diclofenac sodium (VOLTAREN) 1 % Gel Apply 2 g topically 4 (four) times daily. As needed for pain.    dicyclomine (BENTYL) 10 MG capsule TAKE 1 CAPSULE BY MOUTH THREE TIMES A DAY FOR ABDOMINAL PAIN    difluprednate (DUREZOL) 0.05 % Drop ophthalmic solution Instill one drop TO SURGERY EYE four  times a day AFTER SURGERY X 30 DAYS    docusate sodium (STOOL SOFTENER) 100 MG capsule Take 1 capsule (100 mg total) by mouth once daily.    DUREZOL 0.05 % Drop ophthalmic solution     ergocalciferol (ERGOCALCIFEROL) 50,000 unit Cap TAKE 1 CAPSULE  BY MOUTH ONCE  Weekly (Patient not taking: Reported on 11/19/2020)    ergocalciferol (ERGOCALCIFEROL) 50,000 unit Cap TAKE 1 CAPSULE  by mouth once Weekly (Patient not taking: Reported on 11/19/2020)    ergocalciferol (ERGOCALCIFEROL) 50,000 unit Cap TAKE 1 CAPSULE BY MOUTH Weekly    fingolimod (GILENYA) 0.5 mg Cap TAKE ONE CAPSULE (0.5 MG) BY MOUTH ONCE DAILY. MAY TAKE WITH OR WITHOUT FOOD. STORE AT ROOM TEMPERATURE.    fingolimod (GILENYA) 0.5 mg Cap TAKE ONE CAPSULE (0.5 MG) BY MOUTH ONCE DAILY. MAY TAKE WITH OR WITHOUT FOOD. STORE AT ROOM TEMPERATURE.    fingolimod (GILENYA) 0.5 mg Cap TAKE ONE CAPSULE (0.5 MG) BY MOUTH ONCE DAILY. MAY TAKE WITH OR WITHOUT FOOD. STORE AT ROOM TEMPERATURE. (Patient not taking: Reported on 11/19/2020)    fingolimod (GILENYA) 0.5 mg Cap TAKE ONE CAPSULE (0.5 MG) BY MOUTH ONCE DAILY. MAY TAKE WITH OR WITHOUT FOOD. STORE AT ROOM TEMPERATURE.    gabapentin (NEURONTIN) 300 MG capsule TAKE ONE CAPSULE BY MOUTH ONCE DAILY AT BEDTIME (Patient not taking: Reported on 11/19/2020)    gabapentin (NEURONTIN) 300 MG capsule TAKE ONE CAPSULE BY MOUTH ONCE DAILY AT BEDTIME (Patient not taking: Reported on 11/19/2020)    gabapentin (NEURONTIN) 300 MG capsule TAKE ONE CAPSULE BY MOUTH ONCE DAILY AT BEDTIME (Patient not taking: Reported on 11/19/2020)    gabapentin (NEURONTIN) 300 MG capsule TAKE 1 CAPSULE BY MOUTH AT BEDTIME    GILENYA 0.5 mg Cap     lactulose (CHRONULAC) 10 gram/15 mL solution TAKE 45ML BY MOUTH THREE TIMES A DAY    linaclotide (LINZESS) 145 mcg Cap capsule TAKE 1 CAPSULE BY MOUTH ONCE DAILY    lisinopriL (PRINIVIL,ZESTRIL) 5 MG tablet TAKE 1 TABLET BY MOUTH DAILY    lisinopriL (PRINIVIL,ZESTRIL) 5 MG tablet TAKE 1  TABLET BY MOUTH DAILY (Patient not taking: Reported on 11/19/2020)    nabumetone (RELAFEN) 500 MG tablet Take 1 tablet by mouth twice a day as needed for pain. (Patient not taking: Reported on 11/19/2020)    nabumetone (RELAFEN) 500 MG tablet Take 1 tablet (500 mg total) by mouth 2 (two) times daily as needed. (Patient not taking: Reported on 11/19/2020)    nabumetone (RELAFEN) 500 MG tablet TAKE 1 TABLET BY MOUTH TWICE DAILY AS NEEDED FOR PAIN (Patient not taking: Reported on 11/19/2020)    nabumetone (RELAFEN) 500 MG tablet TAKE 1 TABLET BY MOUTH TWICE DAILY AS NEEDED (Patient not taking: Reported on 11/19/2020)    nabumetone (RELAFEN) 500 MG tablet Take 1 tablet (500 mg total) by mouth 2 (two) times daily as needed. (Patient not taking: Reported on 11/19/2020)    nabumetone (RELAFEN) 500 MG tablet TAKE 1 TABLET BY MOUTH TWICE DAILY AS NEEDED FOR PAIN    naproxen (NAPROSYN) 500 MG tablet Take 1 Tablet by mouth Twice a day    ofloxacin (OCUFLOX) 0.3 % ophthalmic solution Instill 1 drop TO SURGERY EYE four times a day STARTING 2 DAYS BEFORE SURGERY    oxybutynin (DITROPAN-XL) 5 MG TR24 Take 1 tablet (5 mg total) by mouth once daily.    pantoprazole (PROTONIX) 40 MG tablet TAKE ONE TABLET BY MOUTH  ONCE DAILY    polyethylene glycol (GLYCOLAX) 17 gram/dose powder Take 17 g by mouth once daily.    sucralfate (CARAFATE) 1 gram tablet TAKE 1 TABLET BY MOUTH FOUR TIMES A DAY    teriflunomide (AUBAGIO) 14 mg Tab Take by mouth.    traMADoL (ULTRAM) 50 mg tablet Take 1 tablet (50 mg total) by mouth every 6 (six) hours as needed for pain    triamcinolone acetonide 0.025% (KENALOG) 0.025 % cream    Last reviewed on 11/19/2020  8:17 AM by Bryson Valentin MD    Review of patient's allergies indicates:  No Known Allergies Last reviewed on  11/19/2020 8:17 AM by Bryson Valentin      Tasks added this encounter   12/23/2020 - Refill Call (Auto Added)   Tasks due within next 3 months   No tasks due.     Jorden Mills,  PharmD  [facility]  [address] [facility] [address]  Phone: [phone]  Fax: [phone]

## 2020-12-01 LAB
MPNR  FINAL DIAGNOSIS: NORMAL
MPNR  SPECIMEN TYPE: NORMAL
MPNR RESULT: NORMAL

## 2020-12-16 RX ORDER — LACTULOSE 10 G/15ML
SOLUTION ORAL; RECTAL
Qty: 3784 ML | Refills: 3 | Status: SHIPPED | OUTPATIENT
Start: 2020-12-16 | End: 2021-05-04 | Stop reason: SDUPTHER

## 2020-12-24 ENCOUNTER — SPECIALTY PHARMACY (OUTPATIENT)
Dept: PHARMACY | Facility: CLINIC | Age: 55
End: 2020-12-24

## 2020-12-24 NOTE — TELEPHONE ENCOUNTER
Specialty Pharmacy - Refill Coordination    Specialty Medication Orders Linked to Encounter      Most Recent Value   Medication #1  fingolimod (GILENYA) 0.5 mg Cap (Order#[id number], Rx#[id number])          Refill Questions - Documented Responses      Most Recent Value   Relationship to patient of person spoken to?  Self   HIPAA/medical authority confirmed?  Yes   Any changes in contact preferences or allowed representatives?  No   Has the patient had any insurance changes?  No   Has the patient had any changes to specialty medication, dose, or instructions?  No   Has the patient started taking any new medications, herbals, or supplements?  No   Has the patient been diagnosed with any new medical conditions?  No   Does the patient have any new allergies to medications or foods?  No   Does the patient have any concerns about side effects?  No   Can the patient store medication/sharps container properly (at the correct temperature, away from children/pets, etc.)?  Yes   Can the patient call emergency services (911) in the event of an emergency?  Yes   Does the patient have any concerns or questions about taking or administering this medication as prescribed?  No   How many doses did the patient miss in the past 4 weeks or since the last fill?  0   How many doses does the patient have on hand?  8   How many days does the patient report on hand quantity will last?  8   Does the number of doses/days supply remaining match pharmacy expected amounts?  Yes   Does the patient feel that this medication is effective?  Yes   During the past 4 weeks, has patient missed any activities due to condition or medication?  No   During the past 4 weeks, did patient have any of the following urgent care visits?  None   How will the patient receive the medication?  Mail   When does the patient need to receive the medication?  12/29/20   Shipping Address  Home   Address in [facility] confirmed and updated if neccessary?  Yes    Expected Copay ($)  0   Is the patient able to afford the medication copay?  Yes   Payment Method  zero copay   Days supply of Refill  30   Would patient like to speak to a pharmacist?  No   Do you want to trigger an intervention?  No   Do you want to trigger an additional referral task?  No   Refill activity completed?  Yes   Refill activity plan  Refill scheduled   Shipment/Pickup Date:  12/28/20          Current Outpatient Medications   Medication Sig    acetaZOLAMIDE (DIAMOX) 250 MG tablet Take 2 TABLETS BY MOUTH ONLY 8 HOURS AFTER SURGERY    albuterol (PROVENTIL/VENTOLIN HFA) 90 mcg/actuation inhaler Inhale 2 puffs by mouth every 4 hours.    ALPRAZolam (XANAX) 0.5 MG tablet TAKE ONE TABLET BY MOUTH TWICE DAILY AS NEEDED FOR ANXIETY    atorvastatin (LIPITOR) 40 MG tablet Take 1 tablet (40 mg total) by mouth once daily.    bacitracin 500 unit/gram Oint Apply topically 2 (two) times daily.    baclofen (LIORESAL) 10 MG tablet TAKE 1 TABLET BY MOUTH THREE TIMES A DAY WITH FOOD OR MILK (Patient not taking: Reported on 11/19/2020)    baclofen (LIORESAL) 10 MG tablet TAKE 1 TABLET BY MOUTH 3 TIMES DAILY.    buPROPion (WELLBUTRIN SR) 150 MG TBSR 12 hr tablet TAKE 1 TABLET BY MOUTH EVERY 12 HOURS DAILY.    buPROPion (WELLBUTRIN SR) 150 MG TBSR 12 hr tablet TAKE 1 TABLET EVERY 12 HOURS DAILY. (Patient not taking: Reported on 11/19/2020)    buPROPion (WELLBUTRIN SR) 150 MG TBSR 12 hr tablet TAKE 1 TABLET EVERY 12 HOURS DAILY. (Patient not taking: Reported on 11/19/2020)    buPROPion (WELLBUTRIN SR) 150 MG TBSR 12 hr tablet TAKE 1 TABLET BY MOUTH EVERY 12 HOURS DAILY.    diclofenac sodium (VOLTAREN) 1 % Gel Apply 2 g topically 4 (four) times daily. As needed for pain.    dicyclomine (BENTYL) 10 MG capsule TAKE 1 CAPSULE BY MOUTH THREE TIMES A DAY FOR ABDOMINAL PAIN    difluprednate (DUREZOL) 0.05 % Drop ophthalmic solution Instill one drop TO SURGERY EYE four times a day AFTER SURGERY X 30 DAYS    docusate  sodium (STOOL SOFTENER) 100 MG capsule Take 1 capsule (100 mg total) by mouth once daily.    DUREZOL 0.05 % Drop ophthalmic solution     ergocalciferol (ERGOCALCIFEROL) 50,000 unit Cap TAKE 1 CAPSULE  BY MOUTH ONCE  Weekly (Patient not taking: Reported on 11/19/2020)    ergocalciferol (ERGOCALCIFEROL) 50,000 unit Cap TAKE 1 CAPSULE  by mouth once Weekly (Patient not taking: Reported on 11/19/2020)    ergocalciferol (ERGOCALCIFEROL) 50,000 unit Cap TAKE 1 CAPSULE BY MOUTH Weekly    fingolimod (GILENYA) 0.5 mg Cap TAKE ONE CAPSULE (0.5 MG) BY MOUTH ONCE DAILY. MAY TAKE WITH OR WITHOUT FOOD. STORE AT ROOM TEMPERATURE.    fingolimod (GILENYA) 0.5 mg Cap TAKE ONE CAPSULE (0.5 MG) BY MOUTH ONCE DAILY. MAY TAKE WITH OR WITHOUT FOOD. STORE AT ROOM TEMPERATURE.    fingolimod (GILENYA) 0.5 mg Cap TAKE ONE CAPSULE (0.5 MG) BY MOUTH ONCE DAILY. MAY TAKE WITH OR WITHOUT FOOD. STORE AT ROOM TEMPERATURE. (Patient not taking: Reported on 11/19/2020)    fingolimod (GILENYA) 0.5 mg Cap TAKE ONE CAPSULE (0.5 MG) BY MOUTH ONCE DAILY. MAY TAKE WITH OR WITHOUT FOOD. STORE AT ROOM TEMPERATURE.    gabapentin (NEURONTIN) 300 MG capsule TAKE ONE CAPSULE BY MOUTH ONCE DAILY AT BEDTIME (Patient not taking: Reported on 11/19/2020)    gabapentin (NEURONTIN) 300 MG capsule TAKE ONE CAPSULE BY MOUTH ONCE DAILY AT BEDTIME (Patient not taking: Reported on 11/19/2020)    gabapentin (NEURONTIN) 300 MG capsule TAKE ONE CAPSULE BY MOUTH ONCE DAILY AT BEDTIME (Patient not taking: Reported on 11/19/2020)    gabapentin (NEURONTIN) 300 MG capsule TAKE 1 CAPSULE BY MOUTH AT BEDTIME    GILENYA 0.5 mg Cap     lactulose (CHRONULAC) 10 gram/15 mL solution TAKE 45ML BY MOUTH THREE TIMES A DAY    linaclotide (LINZESS) 145 mcg Cap capsule TAKE 1 CAPSULE BY MOUTH ONCE DAILY    lisinopriL (PRINIVIL,ZESTRIL) 5 MG tablet TAKE 1 TABLET BY MOUTH DAILY    lisinopriL (PRINIVIL,ZESTRIL) 5 MG tablet TAKE 1 TABLET BY MOUTH DAILY (Patient not taking: Reported  on 11/19/2020)    nabumetone (RELAFEN) 500 MG tablet Take 1 tablet by mouth twice a day as needed for pain. (Patient not taking: Reported on 11/19/2020)    nabumetone (RELAFEN) 500 MG tablet Take 1 tablet (500 mg total) by mouth 2 (two) times daily as needed. (Patient not taking: Reported on 11/19/2020)    nabumetone (RELAFEN) 500 MG tablet TAKE 1 TABLET BY MOUTH TWICE DAILY AS NEEDED FOR PAIN (Patient not taking: Reported on 11/19/2020)    nabumetone (RELAFEN) 500 MG tablet TAKE 1 TABLET BY MOUTH TWICE DAILY AS NEEDED (Patient not taking: Reported on 11/19/2020)    nabumetone (RELAFEN) 500 MG tablet Take 1 tablet (500 mg total) by mouth 2 (two) times daily as needed. (Patient not taking: Reported on 11/19/2020)    nabumetone (RELAFEN) 500 MG tablet TAKE 1 TABLET BY MOUTH TWICE DAILY AS NEEDED FOR PAIN    naproxen (NAPROSYN) 500 MG tablet Take 1 Tablet by mouth Twice a day    ofloxacin (OCUFLOX) 0.3 % ophthalmic solution Instill 1 drop TO SURGERY EYE four times a day STARTING 2 DAYS BEFORE SURGERY    oxybutynin (DITROPAN-XL) 5 MG TR24 Take 1 tablet (5 mg total) by mouth once daily.    pantoprazole (PROTONIX) 40 MG tablet TAKE ONE TABLET BY MOUTH  ONCE DAILY    polyethylene glycol (GLYCOLAX) 17 gram/dose powder Take 17 g by mouth once daily.    sucralfate (CARAFATE) 1 gram tablet TAKE 1 TABLET BY MOUTH FOUR TIMES A DAY    teriflunomide (AUBAGIO) 14 mg Tab Take by mouth.    traMADoL (ULTRAM) 50 mg tablet Take 1 tablet (50 mg total) by mouth every 6 (six) hours as needed for pain    triamcinolone acetonide 0.025% (KENALOG) 0.025 % cream    Last reviewed on 11/19/2020  8:17 AM by Bryson Valentin MD    Review of patient's allergies indicates:  No Known Allergies Last reviewed on  11/19/2020 8:17 AM by Bryson Valentin      Tasks added this encounter   1/21/2021 - Refill Call (Auto Added)   Tasks due within next 3 months   No tasks due.     Jorden Mills, PharmD  [facility]  [address]  [facility] [address]  Phone: [phone]  Fax: [phone]

## 2021-01-05 RX ORDER — DOCUSATE SODIUM 100 MG/1
CAPSULE, LIQUID FILLED ORAL
Qty: 30 CAPSULE | Refills: 5 | Status: SHIPPED | OUTPATIENT
Start: 2021-01-05 | End: 2021-07-06 | Stop reason: SDUPTHER

## 2021-01-22 ENCOUNTER — SPECIALTY PHARMACY (OUTPATIENT)
Dept: PHARMACY | Facility: CLINIC | Age: 56
End: 2021-01-22

## 2021-02-03 ENCOUNTER — LAB VISIT (OUTPATIENT)
Dept: LAB | Facility: HOSPITAL | Age: 56
End: 2021-02-03
Attending: INTERNAL MEDICINE
Payer: MEDICARE

## 2021-02-03 DIAGNOSIS — D75.1 POLYCYTHEMIA: ICD-10-CM

## 2021-02-03 LAB
ALBUMIN SERPL BCP-MCNC: 4 G/DL (ref 3.5–5.2)
ALP SERPL-CCNC: 95 U/L (ref 55–135)
ALT SERPL W/O P-5'-P-CCNC: 34 U/L (ref 10–44)
ANION GAP SERPL CALC-SCNC: 10 MMOL/L (ref 8–16)
AST SERPL-CCNC: 31 U/L (ref 10–40)
BASOPHILS # BLD AUTO: 0.04 K/UL (ref 0–0.2)
BASOPHILS NFR BLD: 0.7 % (ref 0–1.9)
BILIRUB SERPL-MCNC: 0.5 MG/DL (ref 0.1–1)
BUN SERPL-MCNC: 9 MG/DL (ref 6–20)
CALCIUM SERPL-MCNC: 8.9 MG/DL (ref 8.7–10.5)
CHLORIDE SERPL-SCNC: 102 MMOL/L (ref 95–110)
CO2 SERPL-SCNC: 25 MMOL/L (ref 23–29)
CREAT SERPL-MCNC: 1.1 MG/DL (ref 0.5–1.4)
DIFFERENTIAL METHOD: ABNORMAL
EOSINOPHIL # BLD AUTO: 0.2 K/UL (ref 0–0.5)
EOSINOPHIL NFR BLD: 3.9 % (ref 0–8)
ERYTHROCYTE [DISTWIDTH] IN BLOOD BY AUTOMATED COUNT: 17.1 % (ref 11.5–14.5)
EST. GFR  (AFRICAN AMERICAN): >60 ML/MIN/1.73 M^2
EST. GFR  (NON AFRICAN AMERICAN): >60 ML/MIN/1.73 M^2
GLUCOSE SERPL-MCNC: 119 MG/DL (ref 70–110)
HCT VFR BLD AUTO: 54.2 % (ref 40–54)
HGB BLD-MCNC: 17.4 G/DL (ref 14–18)
IMM GRANULOCYTES # BLD AUTO: 0.01 K/UL (ref 0–0.04)
IMM GRANULOCYTES NFR BLD AUTO: 0.2 % (ref 0–0.5)
LYMPHOCYTES # BLD AUTO: 1.4 K/UL (ref 1–4.8)
LYMPHOCYTES NFR BLD: 25.4 % (ref 18–48)
MCH RBC QN AUTO: 24.2 PG (ref 27–31)
MCHC RBC AUTO-ENTMCNC: 32.1 G/DL (ref 32–36)
MCV RBC AUTO: 75 FL (ref 82–98)
MONOCYTES # BLD AUTO: 0.7 K/UL (ref 0.3–1)
MONOCYTES NFR BLD: 12.9 % (ref 4–15)
NEUTROPHILS # BLD AUTO: 3.2 K/UL (ref 1.8–7.7)
NEUTROPHILS NFR BLD: 56.9 % (ref 38–73)
NRBC BLD-RTO: 0 /100 WBC
PLATELET # BLD AUTO: 173 K/UL (ref 150–350)
PMV BLD AUTO: 10.1 FL (ref 9.2–12.9)
POTASSIUM SERPL-SCNC: 4.1 MMOL/L (ref 3.5–5.1)
PROT SERPL-MCNC: 7.1 G/DL (ref 6–8.4)
RBC # BLD AUTO: 7.19 M/UL (ref 4.6–6.2)
SODIUM SERPL-SCNC: 137 MMOL/L (ref 136–145)
WBC # BLD AUTO: 5.58 K/UL (ref 3.9–12.7)

## 2021-02-03 PROCEDURE — 36415 COLL VENOUS BLD VENIPUNCTURE: CPT

## 2021-02-03 PROCEDURE — 85025 COMPLETE CBC W/AUTO DIFF WBC: CPT

## 2021-02-03 PROCEDURE — 80053 COMPREHEN METABOLIC PANEL: CPT

## 2021-02-08 ENCOUNTER — OFFICE VISIT (OUTPATIENT)
Dept: HEMATOLOGY/ONCOLOGY | Facility: CLINIC | Age: 56
End: 2021-02-08
Payer: MEDICARE

## 2021-02-08 VITALS
TEMPERATURE: 98 F | RESPIRATION RATE: 18 BRPM | WEIGHT: 207.44 LBS | DIASTOLIC BLOOD PRESSURE: 69 MMHG | OXYGEN SATURATION: 97 % | BODY MASS INDEX: 30.64 KG/M2 | HEART RATE: 73 BPM | SYSTOLIC BLOOD PRESSURE: 112 MMHG

## 2021-02-08 DIAGNOSIS — D75.1 POLYCYTHEMIA: Primary | ICD-10-CM

## 2021-02-08 PROCEDURE — 3008F BODY MASS INDEX DOCD: CPT | Mod: CPTII,S$GLB,, | Performed by: INTERNAL MEDICINE

## 2021-02-08 PROCEDURE — 99999 PR PBB SHADOW E&M-EST. PATIENT-LVL III: ICD-10-PCS | Mod: PBBFAC,,, | Performed by: INTERNAL MEDICINE

## 2021-02-08 PROCEDURE — 3074F SYST BP LT 130 MM HG: CPT | Mod: CPTII,S$GLB,, | Performed by: INTERNAL MEDICINE

## 2021-02-08 PROCEDURE — 3008F PR BODY MASS INDEX (BMI) DOCUMENTED: ICD-10-PCS | Mod: CPTII,S$GLB,, | Performed by: INTERNAL MEDICINE

## 2021-02-08 PROCEDURE — 1126F AMNT PAIN NOTED NONE PRSNT: CPT | Mod: S$GLB,,, | Performed by: INTERNAL MEDICINE

## 2021-02-08 PROCEDURE — 99213 OFFICE O/P EST LOW 20 MIN: CPT | Mod: S$GLB,,, | Performed by: INTERNAL MEDICINE

## 2021-02-08 PROCEDURE — 99999 PR PBB SHADOW E&M-EST. PATIENT-LVL III: CPT | Mod: PBBFAC,,, | Performed by: INTERNAL MEDICINE

## 2021-02-08 PROCEDURE — 3078F PR MOST RECENT DIASTOLIC BLOOD PRESSURE < 80 MM HG: ICD-10-PCS | Mod: CPTII,S$GLB,, | Performed by: INTERNAL MEDICINE

## 2021-02-08 PROCEDURE — 99213 PR OFFICE/OUTPT VISIT, EST, LEVL III, 20-29 MIN: ICD-10-PCS | Mod: S$GLB,,, | Performed by: INTERNAL MEDICINE

## 2021-02-08 PROCEDURE — 3078F DIAST BP <80 MM HG: CPT | Mod: CPTII,S$GLB,, | Performed by: INTERNAL MEDICINE

## 2021-02-08 PROCEDURE — 3074F PR MOST RECENT SYSTOLIC BLOOD PRESSURE < 130 MM HG: ICD-10-PCS | Mod: CPTII,S$GLB,, | Performed by: INTERNAL MEDICINE

## 2021-02-08 PROCEDURE — 1126F PR PAIN SEVERITY QUANTIFIED, NO PAIN PRESENT: ICD-10-PCS | Mod: S$GLB,,, | Performed by: INTERNAL MEDICINE

## 2021-02-25 ENCOUNTER — SPECIALTY PHARMACY (OUTPATIENT)
Dept: PHARMACY | Facility: CLINIC | Age: 56
End: 2021-02-25

## 2021-03-25 RX ORDER — ATORVASTATIN CALCIUM 40 MG/1
40 TABLET, FILM COATED ORAL DAILY
Qty: 90 TABLET | Refills: 4 | Status: CANCELLED | OUTPATIENT
Start: 2021-03-25

## 2021-03-26 ENCOUNTER — IMMUNIZATION (OUTPATIENT)
Dept: PHARMACY | Facility: CLINIC | Age: 56
End: 2021-03-26
Payer: MEDICARE

## 2021-03-26 DIAGNOSIS — Z23 NEED FOR VACCINATION: Primary | ICD-10-CM

## 2021-03-29 ENCOUNTER — OFFICE VISIT (OUTPATIENT)
Dept: GASTROENTEROLOGY | Facility: CLINIC | Age: 56
End: 2021-03-29
Payer: MEDICARE

## 2021-03-29 ENCOUNTER — SPECIALTY PHARMACY (OUTPATIENT)
Dept: PHARMACY | Facility: CLINIC | Age: 56
End: 2021-03-29

## 2021-03-29 DIAGNOSIS — K59.00 CONSTIPATION, UNSPECIFIED CONSTIPATION TYPE: Primary | ICD-10-CM

## 2021-03-29 DIAGNOSIS — R14.0 ABDOMINAL BLOATING: ICD-10-CM

## 2021-03-29 PROCEDURE — 99999 PR PBB SHADOW E&M-EST. PATIENT-LVL IV: ICD-10-PCS | Mod: PBBFAC,,, | Performed by: NURSE PRACTITIONER

## 2021-03-29 PROCEDURE — 1126F PR PAIN SEVERITY QUANTIFIED, NO PAIN PRESENT: ICD-10-PCS | Mod: S$GLB,,, | Performed by: NURSE PRACTITIONER

## 2021-03-29 PROCEDURE — 1126F AMNT PAIN NOTED NONE PRSNT: CPT | Mod: S$GLB,,, | Performed by: NURSE PRACTITIONER

## 2021-03-29 PROCEDURE — 99999 PR PBB SHADOW E&M-EST. PATIENT-LVL IV: CPT | Mod: PBBFAC,,, | Performed by: NURSE PRACTITIONER

## 2021-03-29 PROCEDURE — 99214 OFFICE O/P EST MOD 30 MIN: CPT | Mod: S$GLB,,, | Performed by: NURSE PRACTITIONER

## 2021-03-29 PROCEDURE — 99214 PR OFFICE/OUTPT VISIT, EST, LEVL IV, 30-39 MIN: ICD-10-PCS | Mod: S$GLB,,, | Performed by: NURSE PRACTITIONER

## 2021-04-15 DIAGNOSIS — E55.9 VITAMIN D DEFICIENCY, UNSPECIFIED: ICD-10-CM

## 2021-04-15 RX ORDER — LISINOPRIL 5 MG/1
TABLET ORAL
Qty: 90 TABLET | Refills: 3 | Status: CANCELLED | OUTPATIENT
Start: 2021-04-15

## 2021-04-19 DIAGNOSIS — G35 MULTIPLE SCLEROSIS: ICD-10-CM

## 2021-04-19 DIAGNOSIS — E55.9 VITAMIN D DEFICIENCY: Primary | ICD-10-CM

## 2021-04-20 RX ORDER — ALBUTEROL SULFATE 90 UG/1
AEROSOL, METERED RESPIRATORY (INHALATION)
Qty: 8.5 G | Refills: 3 | Status: SHIPPED | OUTPATIENT
Start: 2021-04-20

## 2021-04-23 ENCOUNTER — IMMUNIZATION (OUTPATIENT)
Dept: PHARMACY | Facility: CLINIC | Age: 56
End: 2021-04-23
Payer: MEDICARE

## 2021-04-23 DIAGNOSIS — Z23 NEED FOR VACCINATION: Primary | ICD-10-CM

## 2021-04-26 ENCOUNTER — SPECIALTY PHARMACY (OUTPATIENT)
Dept: PHARMACY | Facility: CLINIC | Age: 56
End: 2021-04-26

## 2021-04-29 ENCOUNTER — TELEPHONE (OUTPATIENT)
Dept: NEUROLOGY | Facility: CLINIC | Age: 56
End: 2021-04-29

## 2021-05-04 RX ORDER — TRAMADOL HYDROCHLORIDE 50 MG/1
50 TABLET ORAL EVERY 6 HOURS PRN
Qty: 120 TABLET | Refills: 4 | Status: SHIPPED | OUTPATIENT
Start: 2021-05-04 | End: 2021-11-05

## 2021-05-04 RX ORDER — LACTULOSE 10 G/15ML
SOLUTION ORAL; RECTAL
Qty: 3784 ML | Refills: 3 | Status: SHIPPED | OUTPATIENT
Start: 2021-05-04 | End: 2021-09-09 | Stop reason: SDUPTHER

## 2021-05-06 PROBLEM — F11.20 OPIOID DEPENDENCE: Status: ACTIVE | Noted: 2017-11-10

## 2021-05-06 PROBLEM — E88.810 METABOLIC SYNDROME: Status: ACTIVE | Noted: 2017-11-10

## 2021-05-06 PROBLEM — F41.9 ANXIETY DISORDER: Status: ACTIVE | Noted: 2017-01-03

## 2021-05-19 ENCOUNTER — LAB VISIT (OUTPATIENT)
Dept: LAB | Facility: HOSPITAL | Age: 56
End: 2021-05-19
Attending: PSYCHIATRY & NEUROLOGY
Payer: MEDICARE

## 2021-05-19 DIAGNOSIS — G35 MULTIPLE SCLEROSIS: ICD-10-CM

## 2021-05-19 LAB
ALBUMIN SERPL BCP-MCNC: 4 G/DL (ref 3.5–5.2)
ALP SERPL-CCNC: 79 U/L (ref 55–135)
ALT SERPL W/O P-5'-P-CCNC: 33 U/L (ref 10–44)
ANION GAP SERPL CALC-SCNC: 9 MMOL/L (ref 8–16)
AST SERPL-CCNC: 32 U/L (ref 10–40)
BASOPHILS # BLD AUTO: 0.03 K/UL (ref 0–0.2)
BASOPHILS NFR BLD: 0.6 % (ref 0–1.9)
BILIRUB SERPL-MCNC: 0.6 MG/DL (ref 0.1–1)
BUN SERPL-MCNC: 8 MG/DL (ref 6–20)
CALCIUM SERPL-MCNC: 9.2 MG/DL (ref 8.7–10.5)
CHLORIDE SERPL-SCNC: 100 MMOL/L (ref 95–110)
CO2 SERPL-SCNC: 28 MMOL/L (ref 23–29)
CREAT SERPL-MCNC: 1.1 MG/DL (ref 0.5–1.4)
DIFFERENTIAL METHOD: ABNORMAL
EOSINOPHIL # BLD AUTO: 0.2 K/UL (ref 0–0.5)
EOSINOPHIL NFR BLD: 4.3 % (ref 0–8)
ERYTHROCYTE [DISTWIDTH] IN BLOOD BY AUTOMATED COUNT: 17.9 % (ref 11.5–14.5)
EST. GFR  (AFRICAN AMERICAN): >60 ML/MIN/1.73 M^2
EST. GFR  (NON AFRICAN AMERICAN): >60 ML/MIN/1.73 M^2
GLUCOSE SERPL-MCNC: 122 MG/DL (ref 70–110)
HCT VFR BLD AUTO: 55 % (ref 40–54)
HGB BLD-MCNC: 17.4 G/DL (ref 14–18)
IMM GRANULOCYTES # BLD AUTO: 0.01 K/UL (ref 0–0.04)
IMM GRANULOCYTES NFR BLD AUTO: 0.2 % (ref 0–0.5)
LYMPHOCYTES # BLD AUTO: 1.2 K/UL (ref 1–4.8)
LYMPHOCYTES NFR BLD: 22.9 % (ref 18–48)
MCH RBC QN AUTO: 23.8 PG (ref 27–31)
MCHC RBC AUTO-ENTMCNC: 31.6 G/DL (ref 32–36)
MCV RBC AUTO: 75 FL (ref 82–98)
MONOCYTES # BLD AUTO: 0.6 K/UL (ref 0.3–1)
MONOCYTES NFR BLD: 11.9 % (ref 4–15)
NEUTROPHILS # BLD AUTO: 3.2 K/UL (ref 1.8–7.7)
NEUTROPHILS NFR BLD: 60.1 % (ref 38–73)
NRBC BLD-RTO: 0 /100 WBC
PLATELET # BLD AUTO: 188 K/UL (ref 150–450)
PMV BLD AUTO: 10.6 FL (ref 9.2–12.9)
POTASSIUM SERPL-SCNC: 4.6 MMOL/L (ref 3.5–5.1)
PROT SERPL-MCNC: 7.4 G/DL (ref 6–8.4)
RBC # BLD AUTO: 7.3 M/UL (ref 4.6–6.2)
SODIUM SERPL-SCNC: 137 MMOL/L (ref 136–145)
WBC # BLD AUTO: 5.29 K/UL (ref 3.9–12.7)

## 2021-05-19 PROCEDURE — 36415 COLL VENOUS BLD VENIPUNCTURE: CPT | Performed by: PSYCHIATRY & NEUROLOGY

## 2021-05-19 PROCEDURE — 80053 COMPREHEN METABOLIC PANEL: CPT | Performed by: PSYCHIATRY & NEUROLOGY

## 2021-05-19 PROCEDURE — 85025 COMPLETE CBC W/AUTO DIFF WBC: CPT | Performed by: PSYCHIATRY & NEUROLOGY

## 2021-05-20 ENCOUNTER — SPECIALTY PHARMACY (OUTPATIENT)
Dept: PHARMACY | Facility: CLINIC | Age: 56
End: 2021-05-20

## 2021-06-17 ENCOUNTER — OFFICE VISIT (OUTPATIENT)
Dept: NEUROLOGY | Facility: CLINIC | Age: 56
End: 2021-06-17
Payer: MEDICARE

## 2021-06-17 VITALS
WEIGHT: 207.31 LBS | BODY MASS INDEX: 30.7 KG/M2 | SYSTOLIC BLOOD PRESSURE: 126 MMHG | DIASTOLIC BLOOD PRESSURE: 83 MMHG | HEIGHT: 69 IN | HEART RATE: 80 BPM

## 2021-06-17 DIAGNOSIS — E55.9 VITAMIN D DEFICIENCY, UNSPECIFIED: ICD-10-CM

## 2021-06-17 DIAGNOSIS — F41.9 ANXIETY DISORDER, UNSPECIFIED: ICD-10-CM

## 2021-06-17 DIAGNOSIS — G35 MULTIPLE SCLEROSIS: Primary | ICD-10-CM

## 2021-06-17 DIAGNOSIS — E55.9 VITAMIN D DEFICIENCY: ICD-10-CM

## 2021-06-17 PROCEDURE — 3008F BODY MASS INDEX DOCD: CPT | Mod: CPTII,S$GLB,, | Performed by: PSYCHIATRY & NEUROLOGY

## 2021-06-17 PROCEDURE — 3008F PR BODY MASS INDEX (BMI) DOCUMENTED: ICD-10-PCS | Mod: CPTII,S$GLB,, | Performed by: PSYCHIATRY & NEUROLOGY

## 2021-06-17 PROCEDURE — 99213 PR OFFICE/OUTPT VISIT, EST, LEVL III, 20-29 MIN: ICD-10-PCS | Mod: S$GLB,,, | Performed by: PSYCHIATRY & NEUROLOGY

## 2021-06-17 PROCEDURE — 99999 PR PBB SHADOW E&M-EST. PATIENT-LVL IV: CPT | Mod: PBBFAC,,, | Performed by: PSYCHIATRY & NEUROLOGY

## 2021-06-17 PROCEDURE — 99213 OFFICE O/P EST LOW 20 MIN: CPT | Mod: S$GLB,,, | Performed by: PSYCHIATRY & NEUROLOGY

## 2021-06-17 PROCEDURE — 99999 PR PBB SHADOW E&M-EST. PATIENT-LVL IV: ICD-10-PCS | Mod: PBBFAC,,, | Performed by: PSYCHIATRY & NEUROLOGY

## 2021-06-17 RX ORDER — GABAPENTIN 300 MG/1
CAPSULE ORAL
Qty: 30 CAPSULE | Refills: 11 | Status: SHIPPED | OUTPATIENT
Start: 2021-06-17 | End: 2021-08-04

## 2021-06-17 RX ORDER — ERGOCALCIFEROL 1.25 MG/1
50000 CAPSULE ORAL
Qty: 12 CAPSULE | Refills: 0 | Status: SHIPPED | OUTPATIENT
Start: 2021-06-17

## 2021-06-17 RX ORDER — FINGOLIMOD HYDROCHLORIDE 0.5 MG/1
CAPSULE ORAL
Qty: 90 CAPSULE | Refills: 2 | Status: SHIPPED | OUTPATIENT
Start: 2021-06-17 | End: 2022-03-11 | Stop reason: SDUPTHER

## 2021-06-17 RX ORDER — BACLOFEN 10 MG/1
10 TABLET ORAL 3 TIMES DAILY
Qty: 90 TABLET | Refills: 11 | Status: SHIPPED | OUTPATIENT
Start: 2021-06-17 | End: 2022-07-22 | Stop reason: SDUPTHER

## 2021-06-25 ENCOUNTER — SPECIALTY PHARMACY (OUTPATIENT)
Dept: PHARMACY | Facility: CLINIC | Age: 56
End: 2021-06-25

## 2021-07-13 ENCOUNTER — HOSPITAL ENCOUNTER (EMERGENCY)
Facility: HOSPITAL | Age: 56
Discharge: HOME | End: 2021-07-13
Attending: EMERGENCY MEDICINE
Payer: MEDICARE

## 2021-07-13 VITALS
DIASTOLIC BLOOD PRESSURE: 68 MMHG | SYSTOLIC BLOOD PRESSURE: 148 MMHG | HEART RATE: 82 BPM | WEIGHT: 207 LBS | HEIGHT: 69 IN | OXYGEN SATURATION: 99 % | RESPIRATION RATE: 16 BRPM | TEMPERATURE: 99 F | BODY MASS INDEX: 30.66 KG/M2

## 2021-07-13 DIAGNOSIS — L30.9 ECZEMA, UNSPECIFIED TYPE: Primary | ICD-10-CM

## 2021-07-13 LAB
ANION GAP SERPL CALC-SCNC: 11 MMOL/L (ref 8–16)
BASOPHILS # BLD AUTO: 0.02 K/UL (ref 0–0.2)
BASOPHILS NFR BLD: 0.4 % (ref 0–1.9)
BUN SERPL-MCNC: 8 MG/DL (ref 6–20)
CALCIUM SERPL-MCNC: 9.5 MG/DL (ref 8.7–10.5)
CHLORIDE SERPL-SCNC: 104 MMOL/L (ref 95–110)
CO2 SERPL-SCNC: 19 MMOL/L (ref 23–29)
CREAT SERPL-MCNC: 1 MG/DL (ref 0.5–1.4)
DIFFERENTIAL METHOD: ABNORMAL
EOSINOPHIL # BLD AUTO: 0.4 K/UL (ref 0–0.5)
EOSINOPHIL NFR BLD: 7.5 % (ref 0–8)
ERYTHROCYTE [DISTWIDTH] IN BLOOD BY AUTOMATED COUNT: 17.7 % (ref 11.5–14.5)
EST. GFR  (AFRICAN AMERICAN): >60 ML/MIN/1.73 M^2
EST. GFR  (NON AFRICAN AMERICAN): >60 ML/MIN/1.73 M^2
GLUCOSE SERPL-MCNC: 126 MG/DL (ref 70–110)
HCT VFR BLD AUTO: 52 % (ref 40–54)
HGB BLD-MCNC: 16.5 G/DL (ref 14–18)
IMM GRANULOCYTES # BLD AUTO: 0.01 K/UL (ref 0–0.04)
IMM GRANULOCYTES NFR BLD AUTO: 0.2 % (ref 0–0.5)
INR PPP: 1 (ref 0.8–1.2)
LYMPHOCYTES # BLD AUTO: 0.9 K/UL (ref 1–4.8)
LYMPHOCYTES NFR BLD: 19.8 % (ref 18–48)
MCH RBC QN AUTO: 23.5 PG (ref 27–31)
MCHC RBC AUTO-ENTMCNC: 31.7 G/DL (ref 32–36)
MCV RBC AUTO: 74 FL (ref 82–98)
MONOCYTES # BLD AUTO: 0.5 K/UL (ref 0.3–1)
MONOCYTES NFR BLD: 11.4 % (ref 4–15)
NEUTROPHILS # BLD AUTO: 2.8 K/UL (ref 1.8–7.7)
NEUTROPHILS NFR BLD: 60.7 % (ref 38–73)
NRBC BLD-RTO: 0 /100 WBC
PLATELET # BLD AUTO: 183 K/UL (ref 150–450)
PMV BLD AUTO: 10.1 FL (ref 9.2–12.9)
POTASSIUM SERPL-SCNC: 4.2 MMOL/L (ref 3.5–5.1)
PROTHROMBIN TIME: 10.7 SEC (ref 9–12.5)
RBC # BLD AUTO: 7.01 M/UL (ref 4.6–6.2)
SODIUM SERPL-SCNC: 134 MMOL/L (ref 136–145)
WBC # BLD AUTO: 4.64 K/UL (ref 3.9–12.7)

## 2021-07-13 PROCEDURE — 99283 EMERGENCY DEPT VISIT LOW MDM: CPT

## 2021-07-13 PROCEDURE — 85025 COMPLETE CBC W/AUTO DIFF WBC: CPT | Performed by: PHYSICIAN ASSISTANT

## 2021-07-13 PROCEDURE — 80048 BASIC METABOLIC PNL TOTAL CA: CPT | Performed by: PHYSICIAN ASSISTANT

## 2021-07-13 PROCEDURE — 85610 PROTHROMBIN TIME: CPT | Performed by: PHYSICIAN ASSISTANT

## 2021-07-13 RX ORDER — TRIAMCINOLONE ACETONIDE 1 MG/G
CREAM TOPICAL 2 TIMES DAILY
Qty: 80 G | Refills: 0 | Status: SHIPPED | OUTPATIENT
Start: 2021-07-13 | End: 2024-01-08

## 2021-07-21 ENCOUNTER — SPECIALTY PHARMACY (OUTPATIENT)
Dept: PHARMACY | Facility: CLINIC | Age: 56
End: 2021-07-21

## 2021-08-18 ENCOUNTER — SPECIALTY PHARMACY (OUTPATIENT)
Dept: PHARMACY | Facility: CLINIC | Age: 56
End: 2021-08-18

## 2021-09-04 ENCOUNTER — HOSPITAL ENCOUNTER (EMERGENCY)
Facility: HOSPITAL | Age: 56
Discharge: HOME | End: 2021-09-04
Attending: EMERGENCY MEDICINE
Payer: MEDICARE

## 2021-09-04 VITALS
WEIGHT: 207 LBS | HEART RATE: 75 BPM | HEIGHT: 69 IN | RESPIRATION RATE: 16 BRPM | TEMPERATURE: 99 F | OXYGEN SATURATION: 97 % | DIASTOLIC BLOOD PRESSURE: 70 MMHG | BODY MASS INDEX: 30.66 KG/M2 | SYSTOLIC BLOOD PRESSURE: 135 MMHG

## 2021-09-04 DIAGNOSIS — R05.9 COUGH: ICD-10-CM

## 2021-09-04 DIAGNOSIS — R10.9 ABDOMINAL PAIN: ICD-10-CM

## 2021-09-04 DIAGNOSIS — M62.82 NON-TRAUMATIC RHABDOMYOLYSIS: Primary | ICD-10-CM

## 2021-09-04 LAB
ALBUMIN SERPL BCP-MCNC: 4.5 G/DL (ref 3.5–5.2)
ALP SERPL-CCNC: 86 U/L (ref 55–135)
ALT SERPL W/O P-5'-P-CCNC: 47 U/L (ref 10–44)
ANION GAP SERPL CALC-SCNC: 15 MMOL/L (ref 8–16)
AST SERPL-CCNC: 83 U/L (ref 10–40)
BASOPHILS # BLD AUTO: 0.02 K/UL (ref 0–0.2)
BASOPHILS NFR BLD: 0.3 % (ref 0–1.9)
BILIRUB SERPL-MCNC: 1 MG/DL (ref 0.1–1)
BUN SERPL-MCNC: 22 MG/DL (ref 6–20)
CALCIUM SERPL-MCNC: 9.6 MG/DL (ref 8.7–10.5)
CHLORIDE SERPL-SCNC: 97 MMOL/L (ref 95–110)
CK SERPL-CCNC: 1101 U/L (ref 20–200)
CK SERPL-CCNC: 1450 U/L (ref 20–200)
CO2 SERPL-SCNC: 20 MMOL/L (ref 23–29)
CREAT SERPL-MCNC: 1.2 MG/DL (ref 0.5–1.4)
CTP QC/QA: YES
DIFFERENTIAL METHOD: ABNORMAL
EOSINOPHIL # BLD AUTO: 0.1 K/UL (ref 0–0.5)
EOSINOPHIL NFR BLD: 1.2 % (ref 0–8)
ERYTHROCYTE [DISTWIDTH] IN BLOOD BY AUTOMATED COUNT: 16.6 % (ref 11.5–14.5)
EST. GFR  (AFRICAN AMERICAN): >60 ML/MIN/1.73 M^2
EST. GFR  (NON AFRICAN AMERICAN): >60 ML/MIN/1.73 M^2
GLUCOSE SERPL-MCNC: 152 MG/DL (ref 70–110)
HCT VFR BLD AUTO: 52 % (ref 40–54)
HGB BLD-MCNC: 17.1 G/DL (ref 14–18)
IMM GRANULOCYTES # BLD AUTO: 0.02 K/UL (ref 0–0.04)
IMM GRANULOCYTES NFR BLD AUTO: 0.3 % (ref 0–0.5)
LIPASE SERPL-CCNC: 45 U/L (ref 4–60)
LYMPHOCYTES # BLD AUTO: 1 K/UL (ref 1–4.8)
LYMPHOCYTES NFR BLD: 13 % (ref 18–48)
MCH RBC QN AUTO: 24.2 PG (ref 27–31)
MCHC RBC AUTO-ENTMCNC: 32.9 G/DL (ref 32–36)
MCV RBC AUTO: 73 FL (ref 82–98)
MONOCYTES # BLD AUTO: 1 K/UL (ref 0.3–1)
MONOCYTES NFR BLD: 13 % (ref 4–15)
NEUTROPHILS # BLD AUTO: 5.5 K/UL (ref 1.8–7.7)
NEUTROPHILS NFR BLD: 72.2 % (ref 38–73)
NRBC BLD-RTO: 0 /100 WBC
PLATELET # BLD AUTO: 202 K/UL (ref 150–450)
PMV BLD AUTO: 10.7 FL (ref 9.2–12.9)
POTASSIUM SERPL-SCNC: 3.5 MMOL/L (ref 3.5–5.1)
PROT SERPL-MCNC: 8.2 G/DL (ref 6–8.4)
RBC # BLD AUTO: 7.08 M/UL (ref 4.6–6.2)
SARS-COV-2 RDRP RESP QL NAA+PROBE: NEGATIVE
SODIUM SERPL-SCNC: 132 MMOL/L (ref 136–145)
WBC # BLD AUTO: 7.54 K/UL (ref 3.9–12.7)

## 2021-09-04 PROCEDURE — 99285 EMERGENCY DEPT VISIT HI MDM: CPT | Mod: 25

## 2021-09-04 PROCEDURE — 96361 HYDRATE IV INFUSION ADD-ON: CPT

## 2021-09-04 PROCEDURE — 83690 ASSAY OF LIPASE: CPT | Performed by: EMERGENCY MEDICINE

## 2021-09-04 PROCEDURE — 80053 COMPREHEN METABOLIC PANEL: CPT | Performed by: EMERGENCY MEDICINE

## 2021-09-04 PROCEDURE — 85025 COMPLETE CBC W/AUTO DIFF WBC: CPT | Performed by: EMERGENCY MEDICINE

## 2021-09-04 PROCEDURE — 82550 ASSAY OF CK (CPK): CPT | Mod: 91 | Performed by: EMERGENCY MEDICINE

## 2021-09-04 PROCEDURE — 25000003 PHARM REV CODE 250: Performed by: EMERGENCY MEDICINE

## 2021-09-04 PROCEDURE — U0002 COVID-19 LAB TEST NON-CDC: HCPCS | Performed by: EMERGENCY MEDICINE

## 2021-09-04 PROCEDURE — 96360 HYDRATION IV INFUSION INIT: CPT

## 2021-09-04 RX ORDER — FAMOTIDINE 20 MG/1
20 TABLET, FILM COATED ORAL
Status: COMPLETED | OUTPATIENT
Start: 2021-09-04 | End: 2021-09-04

## 2021-09-04 RX ORDER — FAMOTIDINE 20 MG/1
20 TABLET, FILM COATED ORAL 2 TIMES DAILY
Qty: 20 TABLET | Refills: 0 | Status: SHIPPED | OUTPATIENT
Start: 2021-09-04 | End: 2021-09-20 | Stop reason: SDUPTHER

## 2021-09-04 RX ADMIN — SODIUM CHLORIDE 1000 ML: 0.9 INJECTION, SOLUTION INTRAVENOUS at 02:09

## 2021-09-04 RX ADMIN — SODIUM CHLORIDE 1000 ML: 0.9 INJECTION, SOLUTION INTRAVENOUS at 03:09

## 2021-09-04 RX ADMIN — FAMOTIDINE 20 MG: 20 TABLET ORAL at 02:09

## 2021-09-07 ENCOUNTER — TELEPHONE (OUTPATIENT)
Dept: GASTROENTEROLOGY | Facility: CLINIC | Age: 56
End: 2021-09-07

## 2021-09-08 ENCOUNTER — TELEPHONE (OUTPATIENT)
Dept: GASTROENTEROLOGY | Facility: CLINIC | Age: 56
End: 2021-09-08

## 2021-09-20 ENCOUNTER — OFFICE VISIT (OUTPATIENT)
Dept: GASTROENTEROLOGY | Facility: CLINIC | Age: 56
End: 2021-09-20
Payer: MEDICARE

## 2021-09-20 VITALS — BODY MASS INDEX: 30.66 KG/M2 | HEIGHT: 69 IN | WEIGHT: 207 LBS

## 2021-09-20 DIAGNOSIS — R14.0 ABDOMINAL BLOATING: ICD-10-CM

## 2021-09-20 DIAGNOSIS — K92.1 BLACK STOOL: ICD-10-CM

## 2021-09-20 DIAGNOSIS — K59.04 CHRONIC IDIOPATHIC CONSTIPATION: ICD-10-CM

## 2021-09-20 DIAGNOSIS — R10.9 ABDOMINAL PAIN, UNSPECIFIED ABDOMINAL LOCATION: Primary | ICD-10-CM

## 2021-09-20 PROCEDURE — 99999 PR PBB SHADOW E&M-EST. PATIENT-LVL IV: CPT | Mod: PBBFAC,,, | Performed by: NURSE PRACTITIONER

## 2021-09-20 PROCEDURE — 1159F MED LIST DOCD IN RCRD: CPT | Mod: CPTII,S$GLB,, | Performed by: NURSE PRACTITIONER

## 2021-09-20 PROCEDURE — 4010F ACE/ARB THERAPY RXD/TAKEN: CPT | Mod: CPTII,S$GLB,, | Performed by: NURSE PRACTITIONER

## 2021-09-20 PROCEDURE — 99999 PR PBB SHADOW E&M-EST. PATIENT-LVL IV: ICD-10-PCS | Mod: PBBFAC,,, | Performed by: NURSE PRACTITIONER

## 2021-09-20 PROCEDURE — 3008F PR BODY MASS INDEX (BMI) DOCUMENTED: ICD-10-PCS | Mod: CPTII,S$GLB,, | Performed by: NURSE PRACTITIONER

## 2021-09-20 PROCEDURE — 1159F PR MEDICATION LIST DOCUMENTED IN MEDICAL RECORD: ICD-10-PCS | Mod: CPTII,S$GLB,, | Performed by: NURSE PRACTITIONER

## 2021-09-20 PROCEDURE — 99214 OFFICE O/P EST MOD 30 MIN: CPT | Mod: S$GLB,,, | Performed by: NURSE PRACTITIONER

## 2021-09-20 PROCEDURE — 4010F PR ACE/ARB THEARPY RXD/TAKEN: ICD-10-PCS | Mod: CPTII,S$GLB,, | Performed by: NURSE PRACTITIONER

## 2021-09-20 PROCEDURE — 3008F BODY MASS INDEX DOCD: CPT | Mod: CPTII,S$GLB,, | Performed by: NURSE PRACTITIONER

## 2021-09-20 PROCEDURE — 99214 PR OFFICE/OUTPT VISIT, EST, LEVL IV, 30-39 MIN: ICD-10-PCS | Mod: S$GLB,,, | Performed by: NURSE PRACTITIONER

## 2021-09-20 RX ORDER — FAMOTIDINE 20 MG/1
20 TABLET, FILM COATED ORAL 2 TIMES DAILY
Qty: 60 TABLET | Refills: 3 | OUTPATIENT
Start: 2021-09-20 | End: 2021-09-20

## 2021-09-20 RX ORDER — FAMOTIDINE 20 MG/1
20 TABLET, FILM COATED ORAL 2 TIMES DAILY
Qty: 60 TABLET | Refills: 3 | Status: SHIPPED | OUTPATIENT
Start: 2021-09-20 | End: 2022-02-02 | Stop reason: SDUPTHER

## 2021-09-21 ENCOUNTER — SPECIALTY PHARMACY (OUTPATIENT)
Dept: PHARMACY | Facility: CLINIC | Age: 56
End: 2021-09-21

## 2021-09-23 ENCOUNTER — HOSPITAL ENCOUNTER (OUTPATIENT)
Dept: RADIOLOGY | Facility: HOSPITAL | Age: 56
Discharge: HOME | End: 2021-09-23
Attending: PSYCHIATRY & NEUROLOGY
Payer: MEDICARE

## 2021-09-23 DIAGNOSIS — G35 MULTIPLE SCLEROSIS: ICD-10-CM

## 2021-09-23 PROCEDURE — 70551 MRI BRAIN STEM W/O DYE: CPT | Mod: 26,,, | Performed by: RADIOLOGY

## 2021-09-23 PROCEDURE — 70551 MRI BRAIN STEM W/O DYE: CPT | Mod: TC

## 2021-09-23 PROCEDURE — 70551 MRI BRAIN WITHOUT CONTRAST: ICD-10-PCS | Mod: 26,,, | Performed by: RADIOLOGY

## 2021-09-27 ENCOUNTER — TELEPHONE (OUTPATIENT)
Dept: ENDOSCOPY | Facility: HOSPITAL | Age: 56
End: 2021-09-27

## 2021-09-27 ENCOUNTER — SPECIALTY PHARMACY (OUTPATIENT)
Dept: PHARMACY | Facility: CLINIC | Age: 56
End: 2021-09-27

## 2021-09-29 ENCOUNTER — TELEPHONE (OUTPATIENT)
Dept: GASTROENTEROLOGY | Facility: CLINIC | Age: 56
End: 2021-09-29

## 2021-09-29 ENCOUNTER — ANESTHESIA (OUTPATIENT)
Dept: ENDOSCOPY | Facility: HOSPITAL | Age: 56
End: 2021-09-29
Payer: MEDICARE

## 2021-09-29 ENCOUNTER — ANESTHESIA EVENT (OUTPATIENT)
Dept: ENDOSCOPY | Facility: HOSPITAL | Age: 56
End: 2021-09-29
Payer: MEDICARE

## 2021-09-29 ENCOUNTER — HOSPITAL ENCOUNTER (OUTPATIENT)
Facility: HOSPITAL | Age: 56
Discharge: HOME | End: 2021-09-29
Attending: INTERNAL MEDICINE | Admitting: INTERNAL MEDICINE
Payer: MEDICARE

## 2021-09-29 VITALS
BODY MASS INDEX: 29.62 KG/M2 | HEIGHT: 69 IN | DIASTOLIC BLOOD PRESSURE: 92 MMHG | TEMPERATURE: 98 F | RESPIRATION RATE: 16 BRPM | WEIGHT: 200 LBS | SYSTOLIC BLOOD PRESSURE: 111 MMHG | OXYGEN SATURATION: 98 % | HEART RATE: 74 BPM

## 2021-09-29 DIAGNOSIS — R14.0 ABDOMINAL BLOATING: ICD-10-CM

## 2021-09-29 LAB — SARS-COV-2 RDRP RESP QL NAA+PROBE: NEGATIVE

## 2021-09-29 PROCEDURE — 88342 CHG IMMUNOCYTOCHEMISTRY: ICD-10-PCS | Mod: 26,,, | Performed by: PATHOLOGY

## 2021-09-29 PROCEDURE — 88305 TISSUE EXAM BY PATHOLOGIST: CPT | Performed by: PATHOLOGY

## 2021-09-29 PROCEDURE — 27201012 HC FORCEPS, HOT/COLD, DISP: Performed by: INTERNAL MEDICINE

## 2021-09-29 PROCEDURE — 25000003 PHARM REV CODE 250: Performed by: INTERNAL MEDICINE

## 2021-09-29 PROCEDURE — 88305 TISSUE EXAM BY PATHOLOGIST: CPT | Mod: 26,,, | Performed by: PATHOLOGY

## 2021-09-29 PROCEDURE — 37000009 HC ANESTHESIA EA ADD 15 MINS: Performed by: INTERNAL MEDICINE

## 2021-09-29 PROCEDURE — 43239 PR EGD, FLEX, W/BIOPSY, SGL/MULTI: ICD-10-PCS | Mod: ,,, | Performed by: INTERNAL MEDICINE

## 2021-09-29 PROCEDURE — 37000008 HC ANESTHESIA 1ST 15 MINUTES: Performed by: INTERNAL MEDICINE

## 2021-09-29 PROCEDURE — 63600175 PHARM REV CODE 636 W HCPCS: Performed by: NURSE ANESTHETIST, CERTIFIED REGISTERED

## 2021-09-29 PROCEDURE — 25000003 PHARM REV CODE 250: Performed by: NURSE ANESTHETIST, CERTIFIED REGISTERED

## 2021-09-29 PROCEDURE — U0002 COVID-19 LAB TEST NON-CDC: HCPCS | Performed by: INTERNAL MEDICINE

## 2021-09-29 PROCEDURE — 88305 TISSUE EXAM BY PATHOLOGIST: ICD-10-PCS | Mod: 26,,, | Performed by: PATHOLOGY

## 2021-09-29 PROCEDURE — 43239 EGD BIOPSY SINGLE/MULTIPLE: CPT | Performed by: INTERNAL MEDICINE

## 2021-09-29 PROCEDURE — 88342 IMHCHEM/IMCYTCHM 1ST ANTB: CPT | Mod: 26,,, | Performed by: PATHOLOGY

## 2021-09-29 PROCEDURE — 88342 IMHCHEM/IMCYTCHM 1ST ANTB: CPT | Performed by: PATHOLOGY

## 2021-09-29 PROCEDURE — 43239 EGD BIOPSY SINGLE/MULTIPLE: CPT | Mod: ,,, | Performed by: INTERNAL MEDICINE

## 2021-09-29 RX ORDER — SODIUM CHLORIDE 9 MG/ML
INJECTION, SOLUTION INTRAVENOUS CONTINUOUS
Status: DISCONTINUED | OUTPATIENT
Start: 2021-09-29 | End: 2021-09-29 | Stop reason: HOSPADM

## 2021-09-29 RX ORDER — PROPOFOL 10 MG/ML
VIAL (ML) INTRAVENOUS
Status: DISCONTINUED | OUTPATIENT
Start: 2021-09-29 | End: 2021-09-29

## 2021-09-29 RX ORDER — LIDOCAINE HYDROCHLORIDE 20 MG/ML
INJECTION INTRAVENOUS
Status: DISCONTINUED | OUTPATIENT
Start: 2021-09-29 | End: 2021-09-29

## 2021-09-29 RX ORDER — SODIUM CHLORIDE 0.9 % (FLUSH) 0.9 %
3 SYRINGE (ML) INJECTION
Status: DISCONTINUED | OUTPATIENT
Start: 2021-09-29 | End: 2021-09-29 | Stop reason: HOSPADM

## 2021-09-29 RX ADMIN — SODIUM CHLORIDE: 0.9 INJECTION, SOLUTION INTRAVENOUS at 12:09

## 2021-09-29 RX ADMIN — PROPOFOL 80 MG: 10 INJECTION, EMULSION INTRAVENOUS at 12:09

## 2021-09-29 RX ADMIN — LIDOCAINE HYDROCHLORIDE 100 MG: 20 INJECTION, SOLUTION INTRAVENOUS at 12:09

## 2021-09-30 ENCOUNTER — TELEPHONE (OUTPATIENT)
Dept: ENDOSCOPY | Facility: HOSPITAL | Age: 56
End: 2021-09-30

## 2021-10-04 LAB
FINAL PATHOLOGIC DIAGNOSIS: NORMAL
GROSS: NORMAL
Lab: NORMAL

## 2021-10-06 ENCOUNTER — TELEPHONE (OUTPATIENT)
Dept: GASTROENTEROLOGY | Facility: CLINIC | Age: 56
End: 2021-10-06

## 2021-10-18 RX ORDER — PANTOPRAZOLE SODIUM 40 MG/1
TABLET, DELAYED RELEASE ORAL DAILY
Qty: 90 TABLET | Refills: 3 | Status: SHIPPED | OUTPATIENT
Start: 2021-10-18 | End: 2022-09-19 | Stop reason: SDUPTHER

## 2021-10-20 ENCOUNTER — SPECIALTY PHARMACY (OUTPATIENT)
Dept: PHARMACY | Facility: CLINIC | Age: 56
End: 2021-10-20
Payer: MEDICARE

## 2021-11-05 RX ORDER — TRAMADOL HYDROCHLORIDE 50 MG/1
50 TABLET ORAL EVERY 6 HOURS PRN
Qty: 120 TABLET | Refills: 4 | Status: CANCELLED | OUTPATIENT
Start: 2021-11-05

## 2021-11-08 ENCOUNTER — LAB VISIT (OUTPATIENT)
Dept: LAB | Facility: HOSPITAL | Age: 56
End: 2021-11-08
Attending: INTERNAL MEDICINE
Payer: MEDICARE

## 2021-11-08 DIAGNOSIS — E88.810 METABOLIC SYNDROME: ICD-10-CM

## 2021-11-08 DIAGNOSIS — Z12.5 PROSTATE CANCER SCREENING: ICD-10-CM

## 2021-11-08 DIAGNOSIS — R73.9 HYPERGLYCEMIA: ICD-10-CM

## 2021-11-08 DIAGNOSIS — I10 ESSENTIAL HYPERTENSION: ICD-10-CM

## 2021-11-08 DIAGNOSIS — E87.1 HYPONATREMIA: ICD-10-CM

## 2021-11-08 DIAGNOSIS — G35 MULTIPLE SCLEROSIS: ICD-10-CM

## 2021-11-08 LAB
ALBUMIN SERPL BCP-MCNC: 3.6 G/DL (ref 3.5–5.2)
ALP SERPL-CCNC: 83 U/L (ref 55–135)
ALT SERPL W/O P-5'-P-CCNC: 19 U/L (ref 10–44)
ANION GAP SERPL CALC-SCNC: 12 MMOL/L (ref 8–16)
AST SERPL-CCNC: 24 U/L (ref 10–40)
BASOPHILS # BLD AUTO: 0.03 K/UL (ref 0–0.2)
BASOPHILS NFR BLD: 0.7 % (ref 0–1.9)
BILIRUB SERPL-MCNC: 0.4 MG/DL (ref 0.1–1)
BUN SERPL-MCNC: 10 MG/DL (ref 6–20)
CALCIUM SERPL-MCNC: 8.8 MG/DL (ref 8.7–10.5)
CHLORIDE SERPL-SCNC: 105 MMOL/L (ref 95–110)
CHOLEST SERPL-MCNC: 164 MG/DL (ref 120–199)
CHOLEST/HDLC SERPL: 5.3 {RATIO} (ref 2–5)
CO2 SERPL-SCNC: 20 MMOL/L (ref 23–29)
COMPLEXED PSA SERPL-MCNC: 0.23 NG/ML (ref 0–4)
CREAT SERPL-MCNC: 1 MG/DL (ref 0.5–1.4)
DIFFERENTIAL METHOD: ABNORMAL
EOSINOPHIL # BLD AUTO: 0.3 K/UL (ref 0–0.5)
EOSINOPHIL NFR BLD: 5.7 % (ref 0–8)
ERYTHROCYTE [DISTWIDTH] IN BLOOD BY AUTOMATED COUNT: 17.9 % (ref 11.5–14.5)
EST. GFR  (AFRICAN AMERICAN): >60 ML/MIN/1.73 M^2
EST. GFR  (NON AFRICAN AMERICAN): >60 ML/MIN/1.73 M^2
ESTIMATED AVG GLUCOSE: 131 MG/DL (ref 68–131)
GLUCOSE SERPL-MCNC: 106 MG/DL (ref 70–110)
HBA1C MFR BLD: 6.2 % (ref 4–5.6)
HCT VFR BLD AUTO: 50.6 % (ref 40–54)
HDLC SERPL-MCNC: 31 MG/DL (ref 40–75)
HDLC SERPL: 18.9 % (ref 20–50)
HGB BLD-MCNC: 15.9 G/DL (ref 14–18)
IMM GRANULOCYTES # BLD AUTO: 0.02 K/UL (ref 0–0.04)
IMM GRANULOCYTES NFR BLD AUTO: 0.5 % (ref 0–0.5)
LDLC SERPL CALC-MCNC: 92.2 MG/DL (ref 63–159)
LYMPHOCYTES # BLD AUTO: 1.1 K/UL (ref 1–4.8)
LYMPHOCYTES NFR BLD: 24 % (ref 18–48)
MCH RBC QN AUTO: 23.5 PG (ref 27–31)
MCHC RBC AUTO-ENTMCNC: 31.4 G/DL (ref 32–36)
MCV RBC AUTO: 75 FL (ref 82–98)
MONOCYTES # BLD AUTO: 0.7 K/UL (ref 0.3–1)
MONOCYTES NFR BLD: 14.7 % (ref 4–15)
NEUTROPHILS # BLD AUTO: 2.4 K/UL (ref 1.8–7.7)
NEUTROPHILS NFR BLD: 54.4 % (ref 38–73)
NONHDLC SERPL-MCNC: 133 MG/DL
NRBC BLD-RTO: 0 /100 WBC
PLATELET # BLD AUTO: 190 K/UL (ref 150–450)
PMV BLD AUTO: 9.5 FL (ref 9.2–12.9)
POTASSIUM SERPL-SCNC: 4.1 MMOL/L (ref 3.5–5.1)
PROT SERPL-MCNC: 7 G/DL (ref 6–8.4)
RBC # BLD AUTO: 6.76 M/UL (ref 4.6–6.2)
SODIUM SERPL-SCNC: 137 MMOL/L (ref 136–145)
TRIGL SERPL-MCNC: 204 MG/DL (ref 30–150)
WBC # BLD AUTO: 4.42 K/UL (ref 3.9–12.7)

## 2021-11-08 PROCEDURE — 83036 HEMOGLOBIN GLYCOSYLATED A1C: CPT | Performed by: INTERNAL MEDICINE

## 2021-11-08 PROCEDURE — 80061 LIPID PANEL: CPT | Performed by: INTERNAL MEDICINE

## 2021-11-08 PROCEDURE — 85025 COMPLETE CBC W/AUTO DIFF WBC: CPT | Performed by: INTERNAL MEDICINE

## 2021-11-08 PROCEDURE — 84153 ASSAY OF PSA TOTAL: CPT | Performed by: INTERNAL MEDICINE

## 2021-11-08 PROCEDURE — 36415 COLL VENOUS BLD VENIPUNCTURE: CPT | Performed by: INTERNAL MEDICINE

## 2021-11-08 PROCEDURE — 80053 COMPREHEN METABOLIC PANEL: CPT | Performed by: INTERNAL MEDICINE

## 2021-11-18 ENCOUNTER — SPECIALTY PHARMACY (OUTPATIENT)
Dept: PHARMACY | Facility: CLINIC | Age: 56
End: 2021-11-18
Payer: MEDICARE

## 2021-12-15 RX ORDER — OXYBUTYNIN CHLORIDE 5 MG/1
5 TABLET, EXTENDED RELEASE ORAL DAILY
Qty: 90 TABLET | Refills: 3 | Status: CANCELLED | OUTPATIENT
Start: 2021-12-15

## 2021-12-17 ENCOUNTER — SPECIALTY PHARMACY (OUTPATIENT)
Dept: PHARMACY | Facility: CLINIC | Age: 56
End: 2021-12-17
Payer: MEDICARE

## 2022-01-05 ENCOUNTER — HOSPITAL ENCOUNTER (OUTPATIENT)
Dept: RADIOLOGY | Facility: HOSPITAL | Age: 57
Discharge: HOME | End: 2022-01-05
Attending: INTERNAL MEDICINE
Payer: MEDICARE

## 2022-01-05 DIAGNOSIS — R04.2 HEMOPTYSIS: ICD-10-CM

## 2022-01-05 DIAGNOSIS — J44.9 CHRONIC OBSTRUCTIVE PULMONARY DISEASE, UNSPECIFIED COPD TYPE: ICD-10-CM

## 2022-01-05 PROCEDURE — 71046 XR CHEST PA AND LATERAL: ICD-10-PCS | Mod: 26,,, | Performed by: RADIOLOGY

## 2022-01-05 PROCEDURE — 71046 X-RAY EXAM CHEST 2 VIEWS: CPT | Mod: 26,,, | Performed by: RADIOLOGY

## 2022-01-05 PROCEDURE — 71046 X-RAY EXAM CHEST 2 VIEWS: CPT | Mod: TC,FY

## 2022-01-20 ENCOUNTER — SPECIALTY PHARMACY (OUTPATIENT)
Dept: PHARMACY | Facility: CLINIC | Age: 57
End: 2022-01-20
Payer: MEDICARE

## 2022-01-20 NOTE — TELEPHONE ENCOUNTER
Specialty Pharmacy - Refill Coordination    Specialty Medication Orders Linked to Encounter    Flowsheet Row Most Recent Value   Medication #1 fingolimod (GILENYA) 0.5 mg Cap (Order#[id number], Rx#[id number])          Refill Questions - Documented Responses    Flowsheet Row Most Recent Value   Refill Screening Questions    Changes to allergies? No   Changes to medications? No   New conditions since last clinic visit? No   Unplanned office visit, urgent care, ED, or hospital admission in the last 4 weeks? No   How does patient/caregiver feel medication is working? Excellent   Financial problems or insurance changes? No   How many doses of your specialty medications were missed in the last 4 weeks? 0   Would patient like to speak to a pharmacist? No   When does the patient need to receive the medication? 01/21/22   Refill Delivery Questions    How will the patient receive the medication? Delivery [facility]   When does the patient need to receive the medication? 01/21/22   Shipping Address Home   Address in [facility] confirmed and updated if neccessary? Yes   Expected Copay ($) 0   Is the patient able to afford the medication copay? Yes   Payment Method zero copay   Days supply of Refill 30   Supplies needed? No supplies needed   Refill activity completed? Yes   Refill activity plan Refill scheduled   Shipment/Pickup Date: 01/21/22          Current Outpatient Medications   Medication Sig    acetaZOLAMIDE (DIAMOX) 250 MG tablet Take 2 TABLETS BY MOUTH ONLY 8 HOURS AFTER SURGERY    albuterol (PROAIR HFA) 90 mcg/actuation inhaler Inhale 2 puffs by mouth every 4 hours.    albuterol (PROVENTIL) 2.5 mg /3 mL (0.083 %) nebulizer solution Take 3 mLs (2.5 mg total) by nebulization every 6 (six) hours as needed for Wheezing or Shortness of Breath. Rescue    ALPRAZolam (XANAX) 0.5 MG tablet TAKE ONE TABLET BY MOUTH TWICE DAILY AS NEEDED FOR ANXIETY    atorvastatin (LIPITOR) 40 MG tablet Take 1 tablet (40 mg total) by  mouth once daily.    bacitracin 500 unit/gram Oint Apply topically 2 (two) times daily.    baclofen (LIORESAL) 10 MG tablet TAKE 1 TABLET BY MOUTH 3 TIMES DAILY.    buPROPion (WELLBUTRIN SR) 150 MG TBSR 12 hr tablet TAKE 1 TABLET BY MOUTH EVERY 12 HOURS DAILY.    dicyclomine (BENTYL) 10 MG capsule TAKE 1 CAPSULE BY MOUTH THREE TIMES A DAY FOR ABDOMINAL PAIN    difluprednate (DUREZOL) 0.05 % Drop ophthalmic solution Instill one drop TO SURGERY EYE four times a day AFTER SURGERY X 30 DAYS    docusate sodium (STOOL SOFTENER) 100 MG capsule Take 1 capsule (100 mg total) by mouth once daily.    ergocalciferol (ERGOCALCIFEROL) 50,000 unit Cap TAKE 1 CAPSULE BY MOUTH Weekly    famotidine (PEPCID) 20 MG tablet Take 1 tablet (20 mg total) by mouth 2 (two) times daily.    fingolimod (GILENYA) 0.5 mg Cap TAKE ONE CAPSULE (0.5 MG) BY MOUTH ONCE DAILY. MAY TAKE WITH OR WITHOUT FOOD. STORE AT ROOM TEMPERATURE.    gabapentin (NEURONTIN) 300 MG capsule TAKE 1 CAPSULE BY MOUTH AT BEDTIME    lactulose (CHRONULAC) 10 gram/15 mL solution TAKE 45ML BY MOUTH THREE TIMES A DAY    lisinopriL (PRINIVIL,ZESTRIL) 5 MG tablet TAKE 1 TABLET BY MOUTH DAILY    nabumetone (RELAFEN) 500 MG tablet Take 1 tablet (500 mg total) by mouth 2 (two) times daily as needed.    ofloxacin (OCUFLOX) 0.3 % ophthalmic solution Instill 1 drop TO SURGERY EYE four times a day STARTING 2 DAYS BEFORE SURGERY    oxybutynin (DITROPAN-XL) 5 MG TR24 Take 1 tablet (5 mg total) by mouth once daily.    pantoprazole (PROTONIX) 40 MG tablet TAKE ONE TABLET BY MOUTH  ONCE DAILY    polyethylene glycol (GLYCOLAX) 17 gram/dose powder Take 17 g by mouth once daily.    traMADoL (ULTRAM) 50 mg tablet Take 1 tablet (50 mg total) by mouth every 6 (six) hours as needed for pain    triamcinolone acetonide 0.1% (KENALOG) 0.1 % cream Apply topically 2 (two) times daily. for 10 days   Last reviewed on 1/5/2022 10:30 AM by Bayron Ferguson MD    Review of patient's  allergies indicates:  No Known Allergies Last reviewed on  1/5/2022 10:30 AM by Bayron Ferguson      Tasks added this encounter   2/13/2022 - Refill Call (Auto Added)   Tasks due within next 3 months   No tasks due.     Jorden Mills, PharmD  [facility]  [address] [place], [place] [address]  [facility] [address]  Phone: [phone]  Fax: [phone]

## 2022-02-02 DIAGNOSIS — R10.9 ABDOMINAL PAIN, UNSPECIFIED ABDOMINAL LOCATION: ICD-10-CM

## 2022-02-02 DIAGNOSIS — R14.0 ABDOMINAL BLOATING: ICD-10-CM

## 2022-02-02 RX ORDER — FAMOTIDINE 20 MG/1
20 TABLET, FILM COATED ORAL 2 TIMES DAILY
Qty: 60 TABLET | Refills: 3 | Status: SHIPPED | OUTPATIENT
Start: 2022-02-02 | End: 2022-06-07

## 2022-02-15 ENCOUNTER — SPECIALTY PHARMACY (OUTPATIENT)
Dept: PHARMACY | Facility: CLINIC | Age: 57
End: 2022-02-15
Payer: MEDICARE

## 2022-02-15 NOTE — TELEPHONE ENCOUNTER
Specialty Pharmacy - Refill Coordination    Specialty Medication Orders Linked to Encounter    Flowsheet Row Most Recent Value   Medication #1 fingolimod (GILENYA) 0.5 mg Cap (Order#[id number], Rx#[id number])          Refill Questions - Documented Responses    Flowsheet Row Most Recent Value   Patient Availability and HIPAA Verification    Does patient want to proceed with activity? Yes   HIPAA/medical authority confirmed? Yes   Relationship to patient of person spoken to? Self   Refill Screening Questions    Changes to allergies? No   Changes to medications? No   New conditions since last clinic visit? No   Unplanned office visit, urgent care, ED, or hospital admission in the last 4 weeks? No   How does patient/caregiver feel medication is working? Excellent   Financial problems or insurance changes? No   How many doses of your specialty medications were missed in the last 4 weeks? 0   Would patient like to speak to a pharmacist? No   When does the patient need to receive the medication? 02/17/22   Refill Delivery Questions    When does the patient need to receive the medication? 02/17/22          Current Outpatient Medications   Medication Sig    acetaZOLAMIDE (DIAMOX) 250 MG tablet Take 2 TABLETS BY MOUTH ONLY 8 HOURS AFTER SURGERY    albuterol (PROAIR HFA) 90 mcg/actuation inhaler Inhale 2 puffs by mouth every 4 hours.    albuterol (PROVENTIL) 2.5 mg /3 mL (0.083 %) nebulizer solution Take 3 mLs (2.5 mg total) by nebulization every 6 (six) hours as needed for Wheezing or Shortness of Breath. Rescue    ALPRAZolam (XANAX) 0.5 MG tablet TAKE ONE TABLET BY MOUTH TWICE DAILY AS NEEDED FOR ANXIETY    atorvastatin (LIPITOR) 40 MG tablet Take 1 tablet (40 mg total) by mouth once daily.    bacitracin 500 unit/gram Oint Apply topically 2 (two) times daily.    baclofen (LIORESAL) 10 MG tablet TAKE 1 TABLET BY MOUTH 3 TIMES DAILY.    buPROPion (WELLBUTRIN SR) 150 MG TBSR 12 hr tablet TAKE 1 TABLET BY MOUTH EVERY 12  HOURS DAILY.    dicyclomine (BENTYL) 10 MG capsule TAKE 1 CAPSULE BY MOUTH THREE TIMES A DAY FOR ABDOMINAL PAIN    difluprednate (DUREZOL) 0.05 % Drop ophthalmic solution Instill one drop TO SURGERY EYE four times a day AFTER SURGERY X 30 DAYS    docusate sodium (STOOL SOFTENER) 100 MG capsule Take 1 capsule (100 mg total) by mouth once daily.    ergocalciferol (ERGOCALCIFEROL) 50,000 unit Cap TAKE 1 CAPSULE BY MOUTH Weekly    famotidine (PEPCID) 20 MG tablet Take 1 tablet (20 mg total) by mouth 2 (two) times daily.    fingolimod (GILENYA) 0.5 mg Cap TAKE ONE CAPSULE (0.5 MG) BY MOUTH ONCE DAILY. MAY TAKE WITH OR WITHOUT FOOD. STORE AT ROOM TEMPERATURE.    gabapentin (NEURONTIN) 300 MG capsule TAKE 1 CAPSULE BY MOUTH AT BEDTIME    lactulose (CHRONULAC) 10 gram/15 mL solution TAKE 45ML BY MOUTH THREE TIMES A DAY    lisinopriL (PRINIVIL,ZESTRIL) 5 MG tablet TAKE 1 TABLET BY MOUTH DAILY    nabumetone (RELAFEN) 500 MG tablet Take 1 tablet (500 mg total) by mouth 2 (two) times daily as needed.    ofloxacin (OCUFLOX) 0.3 % ophthalmic solution Instill 1 drop TO SURGERY EYE four times a day STARTING 2 DAYS BEFORE SURGERY    oxybutynin (DITROPAN-XL) 5 MG TR24 Take 1 tablet (5 mg total) by mouth once daily.    pantoprazole (PROTONIX) 40 MG tablet TAKE ONE TABLET BY MOUTH  ONCE DAILY    polyethylene glycol (GLYCOLAX) 17 gram/dose powder Take 17 g by mouth once daily.    traMADoL (ULTRAM) 50 mg tablet Take 1 tablet (50 mg total) by mouth every 6 (six) hours as needed for pain    triamcinolone acetonide 0.1% (KENALOG) 0.1 % cream Apply topically 2 (two) times daily. for 10 days   Last reviewed on 1/21/2022  2:59 PM by Bayron Ferguson MD    Review of patient's allergies indicates:  No Known Allergies Last reviewed on  1/21/2022 2:59 PM by Bayron Ferguson      Tasks added this encounter   3/11/2022 - Refill Call (Auto Added)   Tasks due within next 3 months   No tasks due.     Jorden Mills, PharmD  [facility]  [facility] [facility] [address]  Phone: [phone]  Fax: [phone]

## 2022-03-04 ENCOUNTER — HOSPITAL ENCOUNTER (EMERGENCY)
Facility: HOSPITAL | Age: 57
Discharge: HOME | End: 2022-03-04
Attending: EMERGENCY MEDICINE
Payer: MEDICARE

## 2022-03-04 VITALS
RESPIRATION RATE: 20 BRPM | SYSTOLIC BLOOD PRESSURE: 129 MMHG | OXYGEN SATURATION: 98 % | HEART RATE: 86 BPM | TEMPERATURE: 98 F | DIASTOLIC BLOOD PRESSURE: 71 MMHG

## 2022-03-04 DIAGNOSIS — B35.1 ONYCHOMYCOSIS OF TOENAIL: ICD-10-CM

## 2022-03-04 DIAGNOSIS — M79.605 PAIN IN BOTH LOWER EXTREMITIES: Primary | ICD-10-CM

## 2022-03-04 DIAGNOSIS — M79.604 PAIN IN BOTH LOWER EXTREMITIES: Primary | ICD-10-CM

## 2022-03-04 LAB — POCT GLUCOSE: 145 MG/DL (ref 70–110)

## 2022-03-04 PROCEDURE — 82962 GLUCOSE BLOOD TEST: CPT

## 2022-03-04 PROCEDURE — 99282 EMERGENCY DEPT VISIT SF MDM: CPT | Mod: 25

## 2022-03-04 NOTE — ED TRIAGE NOTES
Complains of bilateral lower leg pain L> R, pt has discoloration noted to left foot and left lower extremity, pt also reports pain noted to left calf that began this am + 2 DP pulse noted, denies SOB

## 2022-03-04 NOTE — ED PROVIDER NOTES
Encounter Date: 3/4/2022       History     Chief Complaint   Patient presents with    Leg Pain     Pt c/o L foot pain for several weeks, but states now his calf is tender and noticed today it appeared red. Hx of DM. +2 pedal pulse present in LLE     Patient is a 56-year-old male who complains of pain to his left foot and left lower leg.  This pain has been present for the past several weeks.  Patient says he is unable to cut his toenails due to thickening.  No numbness.  No fever or chills.        Review of patient's allergies indicates:  No Known Allergies  Past Medical History:   Diagnosis Date    Asthma     Bilateral hearing loss 2012    Biliary acute pancreatitis     GERD (gastroesophageal reflux disease)     High cholesterol     Hypertension     Multiple sclerosis      Past Surgical History:   Procedure Laterality Date    APPENDECTOMY      CHOLECYSTECTOMY      COLONOSCOPY N/A 2019    Procedure: COLONOSCOPY 2 day  golytely;  Surgeon: Nathan Waddell MD;  Location: [facility];  Service: Endoscopy;  Laterality: N/A;    ESOPHAGOGASTRODUODENOSCOPY N/A 2021    Procedure: EGD (ESOPHAGOGASTRODUODENOSCOPY) covid test Rapid;  Surgeon: Jared Loredo MD;  Location: [facility];  Service: Endoscopy;  Laterality: N/A;    JOINT REPLACEMENT      arm    TONSILLECTOMY       Family History   Problem Relation Age of Onset    Heart disease Mother     Heart disease Father     Heart disease Brother      Social History     Tobacco Use    Smoking status: Former Smoker     Packs/day: 1.00     Years: 32.00     Pack years: 32.00     Types: Cigarettes     Quit date: 2022     Years since quittin.1    Smokeless tobacco: Never Used   Substance Use Topics    Alcohol use: Not Currently    Drug use: No     Review of Systems   Constitutional: Negative for fever.   Respiratory: Negative for shortness of breath.    Cardiovascular: Negative for chest pain.   Gastrointestinal: Negative for  abdominal pain.   Musculoskeletal:        Foot/leg pain.   Skin: Negative for rash.   Neurological: Negative for numbness.       Physical Exam     Initial Vitals [03/04/22 1108]   BP Pulse Resp Temp SpO2   129/71 86 20 98.4 °F (36.9 °C) 98 %      MAP       --         Physical Exam    Nursing note and vitals reviewed.  Constitutional: No distress.   HENT:   Head: Atraumatic.   Neck: Neck supple.   Cardiovascular: Normal rate, regular rhythm and normal heart sounds.   Pulmonary/Chest: Breath sounds normal.   Musculoskeletal:         General: No edema.      Cervical back: Neck supple.      Comments: All toenails of the left foot show thickening, discoloration and deformity.  There is no left foot or leg edema.  No erythema.     Neurological: He is alert.   Skin: Skin is warm and dry.   Psychiatric: Thought content normal.         ED Course   Procedures  Labs Reviewed   POCT GLUCOSE - Abnormal; Notable for the following components:       Result Value    POCT Glucose 145 (*)     All other components within normal limits          Imaging Results    None          Medications - No data to display  Medical Decision Making:   ED Management:  Ambulatory referral to podiatry placed for treatment and evaluation of severe onychomycosis.  I see no emergent medical condition requiring ED treatment.                        Clinical Impression:   Final diagnoses:  [M79.604, M79.605] Pain in both lower extremities (Primary)  [B35.1] Onychomycosis of toenail          ED Disposition Condition    Discharge Stable        ED Prescriptions     None        Follow-up Information     Follow up With Specialties Details Why Contact Info    Podiatrist  Schedule an appointment as soon as possible for a visit              Dewayne Pugh MD  03/04/22 0679

## 2022-03-11 ENCOUNTER — SPECIALTY PHARMACY (OUTPATIENT)
Dept: PHARMACY | Facility: CLINIC | Age: 57
End: 2022-03-11
Payer: MEDICARE

## 2022-03-11 DIAGNOSIS — G35 MULTIPLE SCLEROSIS: ICD-10-CM

## 2022-03-11 NOTE — TELEPHONE ENCOUNTER
Patient requires new Gilenya prescription. Rx out of refills. Sending refill request to provider.     Jorden Mills, PharmD  Clinical Pharmacist  [facility]  P: [phone]

## 2022-03-14 DIAGNOSIS — G35 MULTIPLE SCLEROSIS: Primary | ICD-10-CM

## 2022-03-14 RX ORDER — FINGOLIMOD HCL 0.5 MG/1
CAPSULE ORAL
Qty: 90 CAPSULE | Refills: 2 | Status: SHIPPED | OUTPATIENT
Start: 2022-03-14 | End: 2022-12-29 | Stop reason: SDUPTHER

## 2022-03-14 NOTE — TELEPHONE ENCOUNTER
Specialty Pharmacy - Refill Coordination    Specialty Medication Orders Linked to Encounter    Flowsheet Row Most Recent Value   Medication #1 fingolimod (GILENYA) 0.5 mg Cap (Order#[id number], Rx#[id number])          Refill Questions - Documented Responses    Flowsheet Row Most Recent Value   Refill Screening Questions    Changes to allergies? No   Changes to medications? No   New conditions since last clinic visit? No   Unplanned office visit, urgent care, ED, or hospital admission in the last 4 weeks? No   How does patient/caregiver feel medication is working? Excellent   Financial problems or insurance changes? No   How many doses of your specialty medications were missed in the last 4 weeks? 0   Would patient like to speak to a pharmacist? No   When does the patient need to receive the medication? 03/16/22   Refill Delivery Questions    How will the patient receive the medication? Delivery Yanet   When does the patient need to receive the medication? 03/16/22   Shipping Address Home   Address in [facility] confirmed and updated if neccessary? Yes   Expected Copay ($) 0   Is the patient able to afford the medication copay? Yes   Payment Method zero copay   Days supply of Refill 30   Supplies needed? No supplies needed   Refill activity completed? Yes   Refill activity plan Refill scheduled   Shipment/Pickup Date: 03/16/22          Current Outpatient Medications   Medication Sig    acetaZOLAMIDE (DIAMOX) 250 MG tablet Take 2 TABLETS BY MOUTH ONLY 8 HOURS AFTER SURGERY    albuterol (PROAIR HFA) 90 mcg/actuation inhaler Inhale 2 puffs by mouth every 4 hours.    albuterol (PROVENTIL) 2.5 mg /3 mL (0.083 %) nebulizer solution Take 3 mLs (2.5 mg total) by nebulization every 6 (six) hours as needed for Wheezing or Shortness of Breath. Rescue    ALPRAZolam (XANAX) 0.5 MG tablet TAKE ONE TABLET BY MOUTH TWICE DAILY AS NEEDED FOR ANXIETY    atorvastatin (LIPITOR) 40 MG tablet Take 1 tablet (40 mg total) by  mouth once daily.    bacitracin 500 unit/gram Oint Apply topically 2 (two) times daily.    baclofen (LIORESAL) 10 MG tablet TAKE 1 TABLET BY MOUTH 3 TIMES DAILY.    buPROPion (WELLBUTRIN SR) 150 MG TBSR 12 hr tablet TAKE 1 TABLET BY MOUTH EVERY 12 HOURS DAILY.    dicyclomine (BENTYL) 10 MG capsule TAKE 1 CAPSULE BY MOUTH THREE TIMES A DAY FOR ABDOMINAL PAIN    difluprednate (DUREZOL) 0.05 % Drop ophthalmic solution Instill one drop TO SURGERY EYE four times a day AFTER SURGERY X 30 DAYS    docusate sodium (STOOL SOFTENER) 100 MG capsule Take 1 capsule (100 mg total) by mouth once daily.    ergocalciferol (ERGOCALCIFEROL) 50,000 unit Cap TAKE 1 CAPSULE BY MOUTH Weekly    famotidine (PEPCID) 20 MG tablet Take 1 tablet (20 mg total) by mouth 2 (two) times daily.    fingolimod (GILENYA) 0.5 mg Cap TAKE ONE CAPSULE (0.5 MG) BY MOUTH ONCE DAILY. MAY TAKE WITH OR WITHOUT FOOD. STORE AT ROOM TEMPERATURE.    gabapentin (NEURONTIN) 300 MG capsule TAKE 1 CAPSULE BY MOUTH AT BEDTIME    lactulose (CHRONULAC) 10 gram/15 mL solution TAKE 45ML BY MOUTH THREE TIMES A DAY    lisinopriL (PRINIVIL,ZESTRIL) 5 MG tablet TAKE 1 TABLET BY MOUTH DAILY    nabumetone (RELAFEN) 500 MG tablet Take 1 tablet (500 mg total) by mouth 2 (two) times daily as needed.    nebulizer and compressor (HOME NEBULIZER PLUS SIDESTREAM) Lupe use as directed    ofloxacin (OCUFLOX) 0.3 % ophthalmic solution Instill 1 drop TO SURGERY EYE four times a day STARTING 2 DAYS BEFORE SURGERY    oxybutynin (DITROPAN-XL) 5 MG TR24 Take 1 tablet (5 mg total) by mouth once daily.    pantoprazole (PROTONIX) 40 MG tablet TAKE ONE TABLET BY MOUTH  ONCE DAILY    polyethylene glycol (GLYCOLAX) 17 gram/dose powder Take 17 g by mouth once daily.    traMADoL (ULTRAM) 50 mg tablet Take 1 tablet (50 mg total) by mouth every 6 (six) hours as needed for pain    triamcinolone acetonide 0.1% (KENALOG) 0.1 % cream Apply topically 2 (two) times daily. for 10 days   Last  reviewed on 3/4/2022 11:20 AM by Dewayne Pugh MD    Review of patient's allergies indicates:  No Known Allergies Last reviewed on  3/4/2022 11:20 AM by Dewayne Pugh      Tasks added this encounter   4/7/2022 - Refill Call (Auto Added)   Tasks due within next 3 months   No tasks due.     Jorden Mills, PharmD  [facility]  [address] [place], [place] [address]  [facility] [address]  Phone: [phone]  Fax: [phone]

## 2022-03-14 NOTE — TELEPHONE ENCOUNTER
New refill received from provider, calling patient for refill. Patient answered, informed patient that his provider wanted him to make an appointment for lab work. Patient acknowledged, he stated that he would call his clinic.     Jorden Mills, PharmD  Clinical Pharmacist  [facility]  P: [phone]

## 2022-04-08 ENCOUNTER — SPECIALTY PHARMACY (OUTPATIENT)
Dept: PHARMACY | Facility: CLINIC | Age: 57
End: 2022-04-08
Payer: MEDICARE

## 2022-04-08 NOTE — TELEPHONE ENCOUNTER
Call for Gilenya refill. Patient reports that he has plenty of medication for now, and will call [facility] or his [place] pharmacy. Advised patient that [facility] will call back next week instead to assess.    Jorden Mills, PharmD  Clinical Pharmacist  [facility]  P: [phone]

## 2022-04-18 NOTE — TELEPHONE ENCOUNTER
Call for Gilenya refill. Phone was busy, will return call.     Jorden Mills, PharmD  Clinical Pharmacist  [facility]  P: [phone]

## 2022-04-19 ENCOUNTER — OFFICE VISIT (OUTPATIENT)
Dept: PODIATRY | Facility: CLINIC | Age: 57
End: 2022-04-19
Payer: MEDICARE

## 2022-04-19 VITALS
HEIGHT: 69 IN | BODY MASS INDEX: 28.14 KG/M2 | SYSTOLIC BLOOD PRESSURE: 140 MMHG | HEART RATE: 71 BPM | WEIGHT: 190 LBS | DIASTOLIC BLOOD PRESSURE: 81 MMHG

## 2022-04-19 DIAGNOSIS — M25.511 RIGHT SHOULDER PAIN, UNSPECIFIED CHRONICITY: Primary | ICD-10-CM

## 2022-04-19 DIAGNOSIS — B35.1 ONYCHOMYCOSIS OF TOENAIL: ICD-10-CM

## 2022-04-19 DIAGNOSIS — G60.9 IDIOPATHIC PERIPHERAL NEUROPATHY: Primary | ICD-10-CM

## 2022-04-19 PROCEDURE — 99999 PR PBB SHADOW E&M-EST. PATIENT-LVL V: ICD-10-PCS | Mod: PBBFAC,,, | Performed by: STUDENT IN AN ORGANIZED HEALTH CARE EDUCATION/TRAINING PROGRAM

## 2022-04-19 PROCEDURE — 3008F PR BODY MASS INDEX (BMI) DOCUMENTED: ICD-10-PCS | Mod: CPTII,S$GLB,, | Performed by: STUDENT IN AN ORGANIZED HEALTH CARE EDUCATION/TRAINING PROGRAM

## 2022-04-19 PROCEDURE — 99203 PR OFFICE/OUTPT VISIT, NEW, LEVL III, 30-44 MIN: ICD-10-PCS | Mod: 25,S$GLB,, | Performed by: STUDENT IN AN ORGANIZED HEALTH CARE EDUCATION/TRAINING PROGRAM

## 2022-04-19 PROCEDURE — 3077F PR MOST RECENT SYSTOLIC BLOOD PRESSURE >= 140 MM HG: ICD-10-PCS | Mod: CPTII,S$GLB,, | Performed by: STUDENT IN AN ORGANIZED HEALTH CARE EDUCATION/TRAINING PROGRAM

## 2022-04-19 PROCEDURE — 99203 OFFICE O/P NEW LOW 30 MIN: CPT | Mod: 25,S$GLB,, | Performed by: STUDENT IN AN ORGANIZED HEALTH CARE EDUCATION/TRAINING PROGRAM

## 2022-04-19 PROCEDURE — 1160F RVW MEDS BY RX/DR IN RCRD: CPT | Mod: CPTII,S$GLB,, | Performed by: STUDENT IN AN ORGANIZED HEALTH CARE EDUCATION/TRAINING PROGRAM

## 2022-04-19 PROCEDURE — 4010F PR ACE/ARB THEARPY RXD/TAKEN: ICD-10-PCS | Mod: CPTII,S$GLB,, | Performed by: STUDENT IN AN ORGANIZED HEALTH CARE EDUCATION/TRAINING PROGRAM

## 2022-04-19 PROCEDURE — 3077F SYST BP >= 140 MM HG: CPT | Mod: CPTII,S$GLB,, | Performed by: STUDENT IN AN ORGANIZED HEALTH CARE EDUCATION/TRAINING PROGRAM

## 2022-04-19 PROCEDURE — 3079F PR MOST RECENT DIASTOLIC BLOOD PRESSURE 80-89 MM HG: ICD-10-PCS | Mod: CPTII,S$GLB,, | Performed by: STUDENT IN AN ORGANIZED HEALTH CARE EDUCATION/TRAINING PROGRAM

## 2022-04-19 PROCEDURE — 11721 ROUTINE FOOT CARE: ICD-10-PCS | Mod: Q9,S$GLB,, | Performed by: STUDENT IN AN ORGANIZED HEALTH CARE EDUCATION/TRAINING PROGRAM

## 2022-04-19 PROCEDURE — 1160F PR REVIEW ALL MEDS BY PRESCRIBER/CLIN PHARMACIST DOCUMENTED: ICD-10-PCS | Mod: CPTII,S$GLB,, | Performed by: STUDENT IN AN ORGANIZED HEALTH CARE EDUCATION/TRAINING PROGRAM

## 2022-04-19 PROCEDURE — 1159F PR MEDICATION LIST DOCUMENTED IN MEDICAL RECORD: ICD-10-PCS | Mod: CPTII,S$GLB,, | Performed by: STUDENT IN AN ORGANIZED HEALTH CARE EDUCATION/TRAINING PROGRAM

## 2022-04-19 PROCEDURE — 4010F ACE/ARB THERAPY RXD/TAKEN: CPT | Mod: CPTII,S$GLB,, | Performed by: STUDENT IN AN ORGANIZED HEALTH CARE EDUCATION/TRAINING PROGRAM

## 2022-04-19 PROCEDURE — 3008F BODY MASS INDEX DOCD: CPT | Mod: CPTII,S$GLB,, | Performed by: STUDENT IN AN ORGANIZED HEALTH CARE EDUCATION/TRAINING PROGRAM

## 2022-04-19 PROCEDURE — 3079F DIAST BP 80-89 MM HG: CPT | Mod: CPTII,S$GLB,, | Performed by: STUDENT IN AN ORGANIZED HEALTH CARE EDUCATION/TRAINING PROGRAM

## 2022-04-19 PROCEDURE — 1159F MED LIST DOCD IN RCRD: CPT | Mod: CPTII,S$GLB,, | Performed by: STUDENT IN AN ORGANIZED HEALTH CARE EDUCATION/TRAINING PROGRAM

## 2022-04-19 PROCEDURE — 11721 DEBRIDE NAIL 6 OR MORE: CPT | Mod: Q9,S$GLB,, | Performed by: STUDENT IN AN ORGANIZED HEALTH CARE EDUCATION/TRAINING PROGRAM

## 2022-04-19 PROCEDURE — 99999 PR PBB SHADOW E&M-EST. PATIENT-LVL V: CPT | Mod: PBBFAC,,, | Performed by: STUDENT IN AN ORGANIZED HEALTH CARE EDUCATION/TRAINING PROGRAM

## 2022-04-19 NOTE — PROGRESS NOTES
Subjective:      Patient ID: Tj Trammell is a 56 y.o. male.    Chief Complaint: Nail Problem    Tj is a 56 y.o. male who presents to the clinic for evaluation and treatment of high risk feet. Tj has a past medical history of Asthma, Bilateral hearing loss (12/12/2012), Biliary acute pancreatitis, GERD (gastroesophageal reflux disease), High cholesterol, Hypertension, and Multiple sclerosis. The patient's chief complaint is long, thick toenails. This patient has documented high risk feet requiring routine maintenance secondary to peripheral neuropathy.    PCP: Bayron Ferguson MD    Date Last Seen by PCP: 1/21/22    Current shoe gear:  Tennis shoes    Last encounter in this department: Visit date not found    Hemoglobin A1C   Date Value Ref Range Status   11/08/2021 6.2 (H) 4.0 - 5.6 % Final     Comment:     ADA Screening Guidelines:  5.7-6.4%  Consistent with prediabetes  >or=6.5%  Consistent with diabetes    High levels of fetal hemoglobin interfere with the HbA1C  assay. Heterozygous hemoglobin variants (HbS, HgC, etc)do  not significantly interfere with this assay.   However, presence of multiple variants may affect accuracy.     06/02/2016 6.1 4.5 - 6.2 % Final   07/15/2014 6.1 4.5 - 6.2 % Final       Review of Systems   Constitutional: Negative for chills, decreased appetite, diaphoresis and fever.   HENT: Negative for congestion and hearing loss.    Cardiovascular: Negative for chest pain, claudication, leg swelling and syncope.   Respiratory: Negative for cough and shortness of breath.    Skin: Positive for dry skin and nail changes. Negative for color change, flushing, itching, poor wound healing and rash.   Musculoskeletal: Negative for arthritis, back pain, joint pain and joint swelling.   Gastrointestinal: Negative for nausea and vomiting.   Neurological: Positive for numbness. Negative for focal weakness, paresthesias and weakness.   Psychiatric/Behavioral: Negative for altered mental status.  The patient is not nervous/anxious.            Objective:      Physical Exam  Constitutional:       General: He is not in acute distress.     Appearance: Normal appearance. He is well-developed. He is not diaphoretic.   Cardiovascular:      Comments: Dorsalis pedis and posterior tibial pulses are within normal limits. Skin temperature is within normal limits. Toes are cool to touch and feet are warm proximally. Hair growth is within normal limits. Skin is normotrophic and without hyperpigmentation. No edema noted. No varicosities or spider veins noted, bilaterally.     Musculoskeletal:         General: No tenderness.      Comments: Adequate joint range of motion without pain, limitation, nor crepitation to foot and ankle joints. Muscle strength is 4/5 in all groups bilaterally.       Feet:      Right foot:      Skin integrity: Dry skin present. No ulcer, blister, erythema or warmth.      Left foot:      Skin integrity: Dry skin present. No ulcer, blister, erythema or warmth.   Skin:     General: Skin is warm and dry.      Capillary Refill: Capillary refill takes less than 2 seconds.      Comments: Skin is warm and dry, no acute signs of infection noted bilaterally      Toenails are thickened by 2-4 mm's, dystrophic, and are darkened in coloration with subungual fungal debris.     Otherwise, no open wounds, macerations or hyperkeratotic lesions, bilaterally            Neurological:      Mental Status: He is alert and oriented to person, place, and time.      Sensory: Sensory deficit present.      Motor: No abnormal muscle tone.      Comments: Light touch is diminished. [place] 5.07 monofilamant testing is diminished. Vibratory sensation is diminished bilaterally.             Assessment:       Encounter Diagnoses   Name Primary?    Onychomycosis of toenail     Idiopathic peripheral neuropathy Yes         Plan:       Tj was seen today for nail problem.    Diagnoses and all orders for this  visit:    Idiopathic peripheral neuropathy    Onychomycosis of toenail  -     Ambulatory referral/consult to Podiatry      I counseled the patient on his conditions, their implications and medical management.    RFC per attached note    Shoe inspection. General Foot Education. Patient instructed on proper foot hygeine. We discussed wearing proper shoe gear, daily foot inspections, never walking without protective shoe gear, never putting sharp instruments to feet. Patient reminded of the importance of good nutrition, weight loss if needed to help alleviate foot pressure/pain     RTC in 2-3 mo, sooner PRN

## 2022-04-19 NOTE — PROCEDURES
"Routine Foot Care    Date/Time: 4/19/2022 8:15 AM  Performed by: Dari Finch DPM  Authorized by: Dari Finch DPM     Time out: Immediately prior to procedure a "time out" was called to verify the correct patient, procedure, equipment, support staff and site/side marked as required.    Consent Done?:  Yes (Verbal)  Hyperkeratotic Skin Lesions?: No      Nail Care Type:  Debride  Location(s): All  (Left 1st Toe, Left 3rd Toe, Left 2nd Toe, Left 4th Toe, Left 5th Toe, Right 1st Toe, Right 2nd Toe, Right 3rd Toe, Right 4th Toe and Right 5th Toe)  Patient tolerance:  Patient tolerated the procedure well with no immediate complications      "

## 2022-04-20 ENCOUNTER — HOSPITAL ENCOUNTER (OUTPATIENT)
Dept: RADIOLOGY | Facility: HOSPITAL | Age: 57
Discharge: HOME | End: 2022-04-20
Attending: ORTHOPAEDIC SURGERY
Payer: MEDICARE

## 2022-04-20 ENCOUNTER — OFFICE VISIT (OUTPATIENT)
Dept: ORTHOPEDICS | Facility: CLINIC | Age: 57
End: 2022-04-20
Payer: MEDICARE

## 2022-04-20 VITALS — BODY MASS INDEX: 28.14 KG/M2 | WEIGHT: 190 LBS | HEIGHT: 69 IN

## 2022-04-20 DIAGNOSIS — M25.522 LEFT ELBOW PAIN: ICD-10-CM

## 2022-04-20 DIAGNOSIS — M25.522 LEFT ELBOW PAIN: Primary | ICD-10-CM

## 2022-04-20 DIAGNOSIS — S50.02XA CONTUSION OF LEFT ELBOW, INITIAL ENCOUNTER: ICD-10-CM

## 2022-04-20 DIAGNOSIS — M25.511 RIGHT SHOULDER PAIN, UNSPECIFIED CHRONICITY: ICD-10-CM

## 2022-04-20 PROCEDURE — 73080 X-RAY EXAM OF ELBOW: CPT | Mod: TC,FY,LT

## 2022-04-20 PROCEDURE — 73030 X-RAY EXAM OF SHOULDER: CPT | Mod: TC,FY,LT

## 2022-04-20 PROCEDURE — 4010F PR ACE/ARB THEARPY RXD/TAKEN: ICD-10-PCS | Mod: CPTII,S$GLB,, | Performed by: ORTHOPAEDIC SURGERY

## 2022-04-20 PROCEDURE — 73030 X-RAY EXAM OF SHOULDER: CPT | Mod: 26,LT,, | Performed by: RADIOLOGY

## 2022-04-20 PROCEDURE — 99204 OFFICE O/P NEW MOD 45 MIN: CPT | Mod: S$GLB,,, | Performed by: ORTHOPAEDIC SURGERY

## 2022-04-20 PROCEDURE — 99999 PR PBB SHADOW E&M-EST. PATIENT-LVL IV: CPT | Mod: PBBFAC,,, | Performed by: ORTHOPAEDIC SURGERY

## 2022-04-20 PROCEDURE — 73080 X-RAY EXAM OF ELBOW: CPT | Mod: 26,LT,, | Performed by: RADIOLOGY

## 2022-04-20 PROCEDURE — 1160F RVW MEDS BY RX/DR IN RCRD: CPT | Mod: CPTII,S$GLB,, | Performed by: ORTHOPAEDIC SURGERY

## 2022-04-20 PROCEDURE — 1159F PR MEDICATION LIST DOCUMENTED IN MEDICAL RECORD: ICD-10-PCS | Mod: CPTII,S$GLB,, | Performed by: ORTHOPAEDIC SURGERY

## 2022-04-20 PROCEDURE — 3008F BODY MASS INDEX DOCD: CPT | Mod: CPTII,S$GLB,, | Performed by: ORTHOPAEDIC SURGERY

## 2022-04-20 PROCEDURE — 99204 PR OFFICE/OUTPT VISIT, NEW, LEVL IV, 45-59 MIN: ICD-10-PCS | Mod: S$GLB,,, | Performed by: ORTHOPAEDIC SURGERY

## 2022-04-20 PROCEDURE — 4010F ACE/ARB THERAPY RXD/TAKEN: CPT | Mod: CPTII,S$GLB,, | Performed by: ORTHOPAEDIC SURGERY

## 2022-04-20 PROCEDURE — 1159F MED LIST DOCD IN RCRD: CPT | Mod: CPTII,S$GLB,, | Performed by: ORTHOPAEDIC SURGERY

## 2022-04-20 PROCEDURE — 73080 XR ELBOW COMPLETE 3 VIEW LEFT: ICD-10-PCS | Mod: 26,LT,, | Performed by: RADIOLOGY

## 2022-04-20 PROCEDURE — 1160F PR REVIEW ALL MEDS BY PRESCRIBER/CLIN PHARMACIST DOCUMENTED: ICD-10-PCS | Mod: CPTII,S$GLB,, | Performed by: ORTHOPAEDIC SURGERY

## 2022-04-20 PROCEDURE — 73030 XR SHOULDER COMPLETE 2 OR MORE VIEWS LEFT: ICD-10-PCS | Mod: 26,LT,, | Performed by: RADIOLOGY

## 2022-04-20 PROCEDURE — 99999 PR PBB SHADOW E&M-EST. PATIENT-LVL IV: ICD-10-PCS | Mod: PBBFAC,,, | Performed by: ORTHOPAEDIC SURGERY

## 2022-04-20 PROCEDURE — 3008F PR BODY MASS INDEX (BMI) DOCUMENTED: ICD-10-PCS | Mod: CPTII,S$GLB,, | Performed by: ORTHOPAEDIC SURGERY

## 2022-04-20 NOTE — PROGRESS NOTES
Patient ID:   Tj Trammell is a 56 y.o. male.    Chief Complaint:   Left elbow pain    HPI:   Patient is a 56-year-old very poor historian who is very difficult to understand who is presenting today with apparent left elbow pain.  Operated on his left shoulder about 8 years ago.  He underwent rotator cuff repair and subpectoral biceps tenodesis.  The shoulder is doing well.  He is pointing to a bruised area over the medial aspect of his left elbow.  I asked him how he hurt himself but he cannot give me a straight answer.  It does keep pointing to his elbow.  He is booked today for left shoulder pain but denies any pain in the shoulder.    Medications:    Current Outpatient Medications:     acetaZOLAMIDE (DIAMOX) 250 MG tablet, Take 2 TABLETS BY MOUTH ONLY 8 HOURS AFTER SURGERY, Disp: 2 tablet, Rfl: 0    albuterol (PROAIR HFA) 90 mcg/actuation inhaler, Inhale 2 puffs by mouth every 4 hours., Disp: 8.5 g, Rfl: 3    albuterol (PROVENTIL) 2.5 mg /3 mL (0.083 %) nebulizer solution, Take 3 mLs (2.5 mg total) by nebulization every 6 (six) hours as needed for Wheezing or Shortness of Breath. Rescue, Disp: 180 mL, Rfl: 6    ALPRAZolam (XANAX) 0.5 MG tablet, TAKE ONE TABLET BY MOUTH TWICE DAILY AS NEEDED FOR ANXIETY, Disp: 60 tablet, Rfl: 4    atorvastatin (LIPITOR) 40 MG tablet, Take 1 tablet (40 mg total) by mouth once daily., Disp: 90 tablet, Rfl: 4    bacitracin 500 unit/gram Oint, Apply topically 2 (two) times daily., Disp: 28 g, Rfl: 0    baclofen (LIORESAL) 10 MG tablet, TAKE 1 TABLET BY MOUTH 3 TIMES DAILY., Disp: 90 tablet, Rfl: 11    buPROPion (WELLBUTRIN SR) 150 MG TBSR 12 hr tablet, TAKE 1 TABLET BY MOUTH EVERY 12 HOURS DAILY., Disp: 60 tablet, Rfl: 11    dicyclomine (BENTYL) 10 MG capsule, TAKE 1 CAPSULE BY MOUTH THREE TIMES A DAY FOR ABDOMINAL PAIN, Disp: 90 capsule, Rfl: 3    difluprednate (DUREZOL) 0.05 % Drop ophthalmic solution, Instill one drop TO SURGERY EYE four times a day AFTER SURGERY X  30 DAYS, Disp: 5 mL, Rfl: 4    docusate sodium (STOOL SOFTENER) 100 MG capsule, Take 1 capsule (100 mg total) by mouth once daily., Disp: 30 capsule, Rfl: 5    ergocalciferol (ERGOCALCIFEROL) 50,000 unit Cap, TAKE 1 CAPSULE BY MOUTH Weekly, Disp: 12 capsule, Rfl: 0    famotidine (PEPCID) 20 MG tablet, Take 1 tablet (20 mg total) by mouth 2 (two) times daily., Disp: 60 tablet, Rfl: 3    fingolimod (GILENYA) 0.5 mg Cap, TAKE ONE CAPSULE (0.5 MG) BY MOUTH ONCE DAILY. MAY TAKE WITH OR WITHOUT FOOD. STORE AT ROOM TEMPERATURE., Disp: 90 capsule, Rfl: 2    gabapentin (NEURONTIN) 300 MG capsule, TAKE 1 CAPSULE BY MOUTH AT BEDTIME, Disp: 30 capsule, Rfl: 11    lactulose (CHRONULAC) 10 gram/15 mL solution, TAKE 45ML BY MOUTH THREE TIMES A DAY, Disp: 3784 mL, Rfl: 3    lisinopriL (PRINIVIL,ZESTRIL) 5 MG tablet, TAKE 1 TABLET BY MOUTH DAILY, Disp: 90 tablet, Rfl: 3    nabumetone (RELAFEN) 500 MG tablet, Take 1 tablet (500 mg total) by mouth 2 (two) times daily as needed., Disp: 60 tablet, Rfl: 6    nebulizer and compressor (HOME NEBULIZER PLUS SIDESTREAM) Lupe, use as directed, Disp: 1 each, Rfl: 0    ofloxacin (OCUFLOX) 0.3 % ophthalmic solution, Instill 1 drop TO SURGERY EYE four times a day STARTING 2 DAYS BEFORE SURGERY, Disp: 5 mL, Rfl: 3    oxybutynin (DITROPAN-XL) 5 MG TR24, Take 1 tablet (5 mg total) by mouth once daily., Disp: 90 tablet, Rfl: 3    pantoprazole (PROTONIX) 40 MG tablet, TAKE ONE TABLET BY MOUTH  ONCE DAILY, Disp: 90 tablet, Rfl: 3    polyethylene glycol (GLYCOLAX) 17 gram/dose powder, Take 17 g by mouth once daily., Disp: 510 g, Rfl: 3    traMADoL (ULTRAM) 50 mg tablet, Take 1 tablet (50 mg total) by mouth every 6 (six) hours as needed for pain, Disp: 120 tablet, Rfl: 4    triamcinolone acetonide 0.1% (KENALOG) 0.1 % cream, Apply topically 2 (two) times daily. for 10 days, Disp: 80 g, Rfl: 0    Allergies:  Review of patient's allergies indicates:  No Known Allergies    Past Medical  "History:  Past Medical History:   Diagnosis Date    Asthma     Bilateral hearing loss 2012    Biliary acute pancreatitis     GERD (gastroesophageal reflux disease)     High cholesterol     Hypertension     Multiple sclerosis         Past Surgical History:  Past Surgical History:   Procedure Laterality Date    APPENDECTOMY      CHOLECYSTECTOMY      COLONOSCOPY N/A 2019    Procedure: COLONOSCOPY 2 day  golytely;  Surgeon: Nathan Waddell MD;  Location: [facility];  Service: Endoscopy;  Laterality: N/A;    ESOPHAGOGASTRODUODENOSCOPY N/A 2021    Procedure: EGD (ESOPHAGOGASTRODUODENOSCOPY) covid test Rapid;  Surgeon: Jared Loredo MD;  Location: [facility];  Service: Endoscopy;  Laterality: N/A;    JOINT REPLACEMENT      arm    TONSILLECTOMY         Social History:  Social History     Occupational History    Not on file   Tobacco Use    Smoking status: Former Smoker     Packs/day: 1.00     Years: 32.00     Pack years: 32.00     Types: Cigarettes     Quit date: 2022     Years since quittin.2    Smokeless tobacco: Never Used   Substance and Sexual Activity    Alcohol use: Not Currently    Drug use: No    Sexual activity: Not on file       Family History:  Family History   Problem Relation Age of Onset    Heart disease Mother     Heart disease Father     Heart disease Brother         ROS:  Review of Systems   Musculoskeletal: Positive for joint pain.   Neurological: Negative for numbness and paresthesias.   All other systems reviewed and are negative.      Vitals:  Ht 5' 9" (1.753 m)   Wt 86.2 kg (190 lb)   BMI 28.06 kg/m²     Physical Examination:  Comprehensive Orthopaedic Musculoskeletal Exam    General        Constitutional: appears stated age, well-developed and well-nourished    Scleral icterus: no    Labored breathing: no    Psychiatric: normal mood and affect and no acute distress    Neurological: alert and oriented x3    Skin: intact    Lymphadenopathy: " none     Ortho Exam   Left upper extremity exam:  There is ecchymoses present over the medial aspect of the distal arm, elbow, and forearm.  There is tenderness over the same distribution.  Elbow range of motion is about 0 degrees of extension to 140° of flexion.  Full pronation and supination of the forearm.  Negative hook test.  Biceps and triceps strength is normal.    Imaging:  I have ordered and independently reviewed the following imaging studies performed at [facility] today:    Left shoulder x-ray series demonstrates findings consistent with prior subacromial decompression, rotator cuff repair, and subpectoral biceps tenodesis.    Left elbow x-ray series are unremarkable.    Assessment:  1. Left elbow pain    2. Contusion of left elbow, initial encounter      Plan:  I recommended observation of the left elbow contusion.  He will follow up as needed.    Orders Placed This Encounter    X-Ray Elbow Complete Left     No follow-ups on file.

## 2022-04-21 NOTE — TELEPHONE ENCOUNTER
Call for Gilenya refill. No answer, LVM. Will message patient's pharmacist at [facility] to have patient call [facility] for refill.     Jorden Mills, PharmD  Clinical Pharmacist  [facility]  P: [phone]

## 2022-04-25 ENCOUNTER — IMMUNIZATION (OUTPATIENT)
Dept: PHARMACY | Facility: CLINIC | Age: 57
End: 2022-04-25
Payer: MEDICARE

## 2022-04-25 ENCOUNTER — SPECIALTY PHARMACY (OUTPATIENT)
Dept: PHARMACY | Facility: CLINIC | Age: 57
End: 2022-04-25
Payer: MEDICARE

## 2022-04-25 DIAGNOSIS — Z23 NEED FOR VACCINATION: Primary | ICD-10-CM

## 2022-04-25 NOTE — TELEPHONE ENCOUNTER
Specialty Pharmacy - Refill Coordination    Specialty Medication Orders Linked to Encounter    Flowsheet Row Most Recent Value   Medication #1 fingolimod (GILENYA) 0.5 mg Cap (Order#[id number], Rx#[id number])          Refill Questions - Documented Responses    Flowsheet Row Most Recent Value   Patient Availability and HIPAA Verification    Does patient want to proceed with activity? Yes   HIPAA/medical authority confirmed? Yes   Relationship to patient of person spoken to? Self   Refill Screening Questions    Changes to allergies? No   Changes to medications? No   New conditions since last clinic visit? No   Unplanned office visit, urgent care, ED, or hospital admission in the last 4 weeks? No   How does patient/caregiver feel medication is working? Excellent   Financial problems or insurance changes? No   How many doses of your specialty medications were missed in the last 4 weeks? 0   Would patient like to speak to a pharmacist? No   When does the patient need to receive the medication? 04/26/22   Refill Delivery Questions    How will the patient receive the medication? Delivery Yanet   When does the patient need to receive the medication? 04/26/22   Shipping Address Home   Address in [facility] confirmed and updated if neccessary? Yes   Expected Copay ($) 0   Is the patient able to afford the medication copay? Yes   Payment Method zero copay   Days supply of Refill 30   Supplies needed? No supplies needed   Refill activity completed? Yes   Refill activity plan Refill scheduled   Shipment/Pickup Date: 04/27/22          Current Outpatient Medications   Medication Sig    acetaZOLAMIDE (DIAMOX) 250 MG tablet Take 2 TABLETS BY MOUTH ONLY 8 HOURS AFTER SURGERY    albuterol (PROAIR HFA) 90 mcg/actuation inhaler Inhale 2 puffs by mouth every 4 hours.    albuterol (PROVENTIL) 2.5 mg /3 mL (0.083 %) nebulizer solution Take 3 mLs (2.5 mg total) by nebulization every 6 (six) hours as needed for Wheezing or Shortness  of Breath. Rescue    ALPRAZolam (XANAX) 0.5 MG tablet TAKE ONE TABLET BY MOUTH TWICE DAILY AS NEEDED FOR ANXIETY    atorvastatin (LIPITOR) 40 MG tablet Take 1 tablet (40 mg total) by mouth once daily.    bacitracin 500 unit/gram Oint Apply topically 2 (two) times daily.    baclofen (LIORESAL) 10 MG tablet TAKE 1 TABLET BY MOUTH 3 TIMES DAILY.    buPROPion (WELLBUTRIN SR) 150 MG TBSR 12 hr tablet TAKE 1 TABLET BY MOUTH EVERY 12 HOURS DAILY.    dicyclomine (BENTYL) 10 MG capsule TAKE 1 CAPSULE BY MOUTH THREE TIMES A DAY FOR ABDOMINAL PAIN    difluprednate (DUREZOL) 0.05 % Drop ophthalmic solution Instill one drop TO SURGERY EYE four times a day AFTER SURGERY X 30 DAYS    docusate sodium (STOOL SOFTENER) 100 MG capsule Take 1 capsule (100 mg total) by mouth once daily.    ergocalciferol (ERGOCALCIFEROL) 50,000 unit Cap TAKE 1 CAPSULE BY MOUTH Weekly    famotidine (PEPCID) 20 MG tablet Take 1 tablet (20 mg total) by mouth 2 (two) times daily.    fingolimod (GILENYA) 0.5 mg Cap TAKE ONE CAPSULE (0.5 MG) BY MOUTH ONCE DAILY. MAY TAKE WITH OR WITHOUT FOOD. STORE AT ROOM TEMPERATURE.    gabapentin (NEURONTIN) 300 MG capsule TAKE 1 CAPSULE BY MOUTH AT BEDTIME    lactulose (CHRONULAC) 10 gram/15 mL solution TAKE 45ML BY MOUTH THREE TIMES A DAY    lisinopriL (PRINIVIL,ZESTRIL) 5 MG tablet TAKE 1 TABLET BY MOUTH DAILY    nabumetone (RELAFEN) 500 MG tablet Take 1 tablet (500 mg total) by mouth 2 (two) times daily as needed.    nebulizer and compressor (HOME NEBULIZER PLUS SIDESTREAM) Lupe use as directed    ofloxacin (OCUFLOX) 0.3 % ophthalmic solution Instill 1 drop TO SURGERY EYE four times a day STARTING 2 DAYS BEFORE SURGERY    oxybutynin (DITROPAN-XL) 5 MG TR24 Take 1 tablet (5 mg total) by mouth once daily.    pantoprazole (PROTONIX) 40 MG tablet TAKE ONE TABLET BY MOUTH  ONCE DAILY    polyethylene glycol (GLYCOLAX) 17 gram/dose powder Take 17 g by mouth once daily.    sars-cov-2, covid-19, (MODERNA  COVID-19) 50 mcg/0.25 ml injection (BOOSTER) Inject 0.25 mLs into the muscle once. for 1 dose    traMADoL (ULTRAM) 50 mg tablet Take 1 tablet (50 mg total) by mouth every 6 (six) hours as needed for pain    triamcinolone acetonide 0.1% (KENALOG) 0.1 % cream Apply topically 2 (two) times daily. for 10 days   Last reviewed on 4/20/2022 12:31 PM by Lion Case MD    Review of patient's allergies indicates:  No Known Allergies Last reviewed on  4/20/2022 12:31 PM by Lion Case      Tasks added this encounter   No tasks added.   Tasks due within next 3 months   4/8/2022 - Refill Call (Auto Added)     Jorden Mills, PharmD  [facility]  [address] [place] [id number]  Phone: [phone]  Fax: [phone]

## 2022-04-25 NOTE — TELEPHONE ENCOUNTER
Call for Gilenya refill. No answer, LVM. Called to [facility] where patient picks up his other medications. Requested that pharmacy team there inform patient to call [facility] so that patient can continue Gilenya therapy.     Jorden Mills, PharmD  Clinical Pharmacist  [facility]  P: [phone]

## 2022-04-27 ENCOUNTER — HOSPITAL ENCOUNTER (EMERGENCY)
Facility: HOSPITAL | Age: 57
Discharge: HOME | End: 2022-04-27
Attending: EMERGENCY MEDICINE
Payer: MEDICARE

## 2022-04-27 VITALS
TEMPERATURE: 99 F | SYSTOLIC BLOOD PRESSURE: 139 MMHG | DIASTOLIC BLOOD PRESSURE: 70 MMHG | HEART RATE: 77 BPM | RESPIRATION RATE: 16 BRPM | BODY MASS INDEX: 30.42 KG/M2 | HEIGHT: 69 IN | OXYGEN SATURATION: 97 %

## 2022-04-27 DIAGNOSIS — S89.91XA INJURY OF RIGHT KNEE, INITIAL ENCOUNTER: ICD-10-CM

## 2022-04-27 DIAGNOSIS — R93.89 ABNORMAL X-RAY: ICD-10-CM
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
## 2022-04-27 DIAGNOSIS — T14.8XXA ABRASION: Primary | ICD-10-CM

## 2022-04-27 PROCEDURE — 99283 EMERGENCY DEPT VISIT LOW MDM: CPT

## 2022-04-27 RX ORDER — MUPIROCIN 20 MG/G
OINTMENT TOPICAL 3 TIMES DAILY
Qty: 22 G | Refills: 0 | Status: SHIPPED | OUTPATIENT
Start: 2022-04-27 | End: 2023-08-18

## 2022-04-27 NOTE — DISCHARGE INSTRUCTIONS

## 2022-04-27 NOTE — ED PROVIDER NOTES
Encounter Date: 4/27/2022    SCRIBE #1 NOTE: IRody and alina scribing for, and in the presence of, Ritu Zamudio NP.       History     Chief Complaint   Patient presents with    Fall     Pt presents to ED today reports trip and fall abrasion to right knee, covered by Band-Aid and pain to right arm. No noted deformity. Pt denies head trauma      Tj Trammell is a 56 y.o. male who  has a past medical history of Asthma, Bilateral hearing loss (12/12/2012), Biliary acute pancreatitis, GERD (gastroesophageal reflux disease), High cholesterol, Hypertension, and Multiple sclerosis.    The patient presents to the ED due to a fall.  Patient reports he tripped and fell just prior to arrival. Associated symptoms include bilateral  knee pain and a wound on his right knee cap. Patient denies head trauma, loss of consciousness, fever, neck stiffness, chest pain, or shortness of breath. The patient notes that he is up-to-date on his tetanus immunization.       The history is provided by the patient.     Review of patient's allergies indicates:  No Known Allergies  Past Medical History:   Diagnosis Date    Asthma     Bilateral hearing loss 12/12/2012    Biliary acute pancreatitis     GERD (gastroesophageal reflux disease)     High cholesterol     Hypertension     Multiple sclerosis      Past Surgical History:   Procedure Laterality Date    APPENDECTOMY      CHOLECYSTECTOMY      COLONOSCOPY N/A 1/22/2019    Procedure: COLONOSCOPY 2 day  golytely;  Surgeon: Nathan Waddell MD;  Location: [facility];  Service: Endoscopy;  Laterality: N/A;    ESOPHAGOGASTRODUODENOSCOPY N/A 9/29/2021    Procedure: EGD (ESOPHAGOGASTRODUODENOSCOPY) covid test Rapid;  Surgeon: Jared Loredo MD;  Location: [facility];  Service: Endoscopy;  Laterality: N/A;    JOINT REPLACEMENT      arm    TONSILLECTOMY       Family History   Problem Relation Age of Onset    Heart disease Mother     Heart disease Father      Heart disease Brother      Social History     Tobacco Use    Smoking status: Former Smoker     Packs/day: 1.00     Years: 32.00     Pack years: 32.00     Types: Cigarettes     Quit date: 2022     Years since quittin.3    Smokeless tobacco: Never Used   Substance Use Topics    Alcohol use: Not Currently    Drug use: No     Review of Systems   Constitutional: Negative for fever.   HENT: Negative for sore throat.         Negative head trauma   Respiratory: Negative for shortness of breath.    Cardiovascular: Negative for chest pain.   Gastrointestinal: Negative for nausea.   Genitourinary: Negative for dysuria.   Musculoskeletal: Positive for arthralgias. Negative for back pain and neck stiffness.   Skin: Positive for wound. Negative for rash.   Neurological: Negative for syncope and weakness.   Hematological: Does not bruise/bleed easily.       Physical Exam     Initial Vitals [22 1049]   BP Pulse Resp Temp SpO2   139/70 77 16 98.7 °F (37.1 °C) 97 %      MAP       --         Physical Exam    Nursing note and vitals reviewed.  Constitutional: He appears well-developed and well-nourished. He is not diaphoretic. No distress.   HENT:   Head: Normocephalic and atraumatic.   Mouth/Throat: Oropharynx is clear and moist.   Hard of hearing. Required hearing aids. Speech impediment.    Eyes: EOM are normal. Pupils are equal, round, and reactive to light.   Neck: Neck supple. No tracheal deviation present.   No erythema or edema.   Normal range of motion.  Cardiovascular: Normal rate, regular rhythm, normal heart sounds and intact distal pulses.   Pulmonary/Chest: Breath sounds normal. No stridor. No respiratory distress.   Abdominal: Abdomen is soft. He exhibits no distension and no mass. There is no abdominal tenderness.   Musculoskeletal:         General: No edema. Normal range of motion.      Cervical back: Normal range of motion and neck supple.      Comments: Full range of motion, yet painful, of the  right knee. + pt/dp noted    Full rom of both upper exts with no complaints at present. No gross deformities.      Neurological: He is alert and oriented to person, place, and time. No cranial nerve deficit or sensory deficit.   Skin: Skin is warm and dry. Capillary refill takes less than 2 seconds. No rash noted.   Abrasion to the right knee.   Psychiatric: He has a normal mood and affect. His behavior is normal. Thought content normal.         ED Course   Procedures  Labs Reviewed - No data to display       Imaging Results          X-Ray Knee 3 View Right (Final result)  Result time 04/27/22 13:26:56    Final result by Warren Murray MD (04/27/22 13:26:56)                 Impression:      As above.      Electronically signed by: Warren Murray MD  Date:    04/27/2022  Time:    13:26             Narrative:    EXAMINATION:  XR KNEE 3 VIEW RIGHT    CLINICAL HISTORY:  Unspecified fall, initial encounter    TECHNIQUE:  AP, lateral, and Merchant views of the right knee were performed.    COMPARISON:  Right knee series 04/17/2020 and CT right knee 04/17/2020    FINDINGS:  Redemonstration of a longitudinally oriented fracture line at the right aspect of the patella not simply changed from 04/17/2020 studies and likely nonunion; however, acute re-injury/fracture at this location not excluded on the basis of this examination.  Correlate for point tenderness at this region.    Otherwise, no additional acute displaced fracture, dislocation or destructive osseous process.  Mild tricompartmental degenerative change.  No large suprapatellar joint effusion.  Small enthesophyte at the inferior patellar pole.  No subcutaneous emphysema or radiodense retained foreign body.                                 Medications - No data to display  Medical Decision Making:   Initial Assessment:   The patient presents to the ED due to a fall. Physical exam is noted for full range of motion, yet painful, of the right knee. Abrasion to the right  knee.  Differential Diagnosis:   DDx includes but not limited to:   Fracture, dislocation, contusion, muscular strain, muscular sprain.     Clinical Tests:   Radiological Study: Ordered and Reviewed          Scribe Attestation:   Scribe #1: I performed the above scribed service and the documentation accurately describes the services I performed. I attest to the accuracy of the note.        ED Course as of 04/27/22 1607   Wed Apr 27, 2022   1408 Pt will be placed on knee immobilizer. Spoke with [facility] orth[facility] who states they will see the pt in clinic next week. Also bactroban for abrasion.  [DT]      ED Course User Index  [DT] Ritu Zamudio NP             Clinical Impression:   Final diagnoses:  [W19.XXXA] Fall  [T14.8XXA] Abrasion (Primary)  [S89.91XA] Injury of right knee, initial encounter  [R93.89] Abnormal x-ray          ED Disposition Condition    Discharge Stable        ED Prescriptions     Medication Sig Dispense Start Date End Date Auth. Provider    mupirocin (BACTROBAN) 2 % ointment Apply topically 3 (three) times daily. 22 g 4/27/2022  Ritu Zamudio NP        Follow-up Information     Follow up With Specialties Details Why Contact Info    Bayron Ferguson MD Internal Medicine Schedule an appointment as soon as possible for a visit in 2 days  [address]  [address]  [place] [id number] [phone]      Max Wallis MD Orthopedic Surgery Schedule an appointment as soon as possible for a visit in 1 week  [address]  [place] [id number] [phone]           I, Ritu Zamudio NP, personally performed the services described in this documentation.All medical record entries made by the scribe were at my direction and in my presence.I have reviewed the chart and agree that the record reflects my personal performance and is accurate and complete.        Ritu Zamudio NP  04/27/22 2294

## 2022-04-27 NOTE — ED NOTES
Patient complains of tripping and falling today on carpet inside his house today. Abrasion noted to right knee. Patient able to move and bend extremity.

## 2022-05-04 ENCOUNTER — OFFICE VISIT (OUTPATIENT)
Dept: ORTHOPEDICS | Facility: CLINIC | Age: 57
End: 2022-05-04
Payer: MEDICARE

## 2022-05-04 VITALS
SYSTOLIC BLOOD PRESSURE: 150 MMHG | DIASTOLIC BLOOD PRESSURE: 77 MMHG | BODY MASS INDEX: 30.51 KG/M2 | HEIGHT: 69 IN | WEIGHT: 206 LBS | HEART RATE: 116 BPM

## 2022-05-04 DIAGNOSIS — S89.91XA INJURY OF RIGHT KNEE, INITIAL ENCOUNTER: ICD-10-CM

## 2022-05-04 DIAGNOSIS — T14.8XXA ABRASION: ICD-10-CM

## 2022-05-04 DIAGNOSIS — S82.024A CLOSED NONDISPLACED LONGITUDINAL FRACTURE OF RIGHT PATELLA, INITIAL ENCOUNTER: Primary | ICD-10-CM

## 2022-05-04 PROCEDURE — 99214 PR OFFICE/OUTPT VISIT, EST, LEVL IV, 30-39 MIN: ICD-10-PCS | Mod: S$GLB,,, | Performed by: ORTHOPAEDIC SURGERY

## 2022-05-04 PROCEDURE — 99999 PR PBB SHADOW E&M-EST. PATIENT-LVL V: CPT | Mod: PBBFAC,,, | Performed by: ORTHOPAEDIC SURGERY

## 2022-05-04 PROCEDURE — 3078F PR MOST RECENT DIASTOLIC BLOOD PRESSURE < 80 MM HG: ICD-10-PCS | Mod: CPTII,S$GLB,, | Performed by: ORTHOPAEDIC SURGERY

## 2022-05-04 PROCEDURE — 3077F SYST BP >= 140 MM HG: CPT | Mod: CPTII,S$GLB,, | Performed by: ORTHOPAEDIC SURGERY

## 2022-05-04 PROCEDURE — 3078F DIAST BP <80 MM HG: CPT | Mod: CPTII,S$GLB,, | Performed by: ORTHOPAEDIC SURGERY

## 2022-05-04 PROCEDURE — 1159F PR MEDICATION LIST DOCUMENTED IN MEDICAL RECORD: ICD-10-PCS | Mod: CPTII,S$GLB,, | Performed by: ORTHOPAEDIC SURGERY

## 2022-05-04 PROCEDURE — 3077F PR MOST RECENT SYSTOLIC BLOOD PRESSURE >= 140 MM HG: ICD-10-PCS | Mod: CPTII,S$GLB,, | Performed by: ORTHOPAEDIC SURGERY

## 2022-05-04 PROCEDURE — 99214 OFFICE O/P EST MOD 30 MIN: CPT | Mod: S$GLB,,, | Performed by: ORTHOPAEDIC SURGERY

## 2022-05-04 PROCEDURE — 1160F RVW MEDS BY RX/DR IN RCRD: CPT | Mod: CPTII,S$GLB,, | Performed by: ORTHOPAEDIC SURGERY

## 2022-05-04 PROCEDURE — 4010F ACE/ARB THERAPY RXD/TAKEN: CPT | Mod: CPTII,S$GLB,, | Performed by: ORTHOPAEDIC SURGERY

## 2022-05-04 PROCEDURE — 4010F PR ACE/ARB THEARPY RXD/TAKEN: ICD-10-PCS | Mod: CPTII,S$GLB,, | Performed by: ORTHOPAEDIC SURGERY

## 2022-05-04 PROCEDURE — 99999 PR PBB SHADOW E&M-EST. PATIENT-LVL V: ICD-10-PCS | Mod: PBBFAC,,, | Performed by: ORTHOPAEDIC SURGERY

## 2022-05-04 PROCEDURE — 3008F PR BODY MASS INDEX (BMI) DOCUMENTED: ICD-10-PCS | Mod: CPTII,S$GLB,, | Performed by: ORTHOPAEDIC SURGERY

## 2022-05-04 PROCEDURE — 1159F MED LIST DOCD IN RCRD: CPT | Mod: CPTII,S$GLB,, | Performed by: ORTHOPAEDIC SURGERY

## 2022-05-04 PROCEDURE — 1160F PR REVIEW ALL MEDS BY PRESCRIBER/CLIN PHARMACIST DOCUMENTED: ICD-10-PCS | Mod: CPTII,S$GLB,, | Performed by: ORTHOPAEDIC SURGERY

## 2022-05-04 PROCEDURE — 3008F BODY MASS INDEX DOCD: CPT | Mod: CPTII,S$GLB,, | Performed by: ORTHOPAEDIC SURGERY

## 2022-05-04 NOTE — PROGRESS NOTES
Patient ID:   Tj Trammell is a 56 y.o. male.    Chief Complaint:   Right knee pain    HPI:   56-year-old male former smoker presents today for evaluation of right knee pain.  He was formally seen for elbow pain that has since resolved.  He is status post rotator cuff repair with subpectoral biceps tenodesis.  He had a mechanical fall from standing about 3-4 weeks ago.  Her x-rays on file, this appears to be closer to 1 week ago in 4/27.  He is accompanied by his family member.  He is a poor historian.  He is deaf.  He is in a knee immobilizer.  No other significant contributory past medical history.  He states he is able to walk.  He uses a motorized scooter for mobility.  He has some pain over the lateral aspect of the knee.  Currently, minimal pain.  No numbness or tingling.  No instability.    Medications:    Current Outpatient Medications:     acetaZOLAMIDE (DIAMOX) 250 MG tablet, Take 2 TABLETS BY MOUTH ONLY 8 HOURS AFTER SURGERY, Disp: 2 tablet, Rfl: 0    albuterol (PROAIR HFA) 90 mcg/actuation inhaler, Inhale 2 puffs by mouth every 4 hours., Disp: 8.5 g, Rfl: 3    albuterol (PROVENTIL) 2.5 mg /3 mL (0.083 %) nebulizer solution, Take 3 mLs (2.5 mg total) by nebulization every 6 (six) hours as needed for Wheezing or Shortness of Breath. Rescue, Disp: 180 mL, Rfl: 6    ALPRAZolam (XANAX) 0.5 MG tablet, TAKE ONE TABLET BY MOUTH TWICE DAILY AS NEEDED FOR ANXIETY, Disp: 60 tablet, Rfl: 4    atorvastatin (LIPITOR) 40 MG tablet, Take 1 tablet (40 mg total) by mouth once daily., Disp: 90 tablet, Rfl: 4    bacitracin 500 unit/gram Oint, Apply topically 2 (two) times daily., Disp: 28 g, Rfl: 0    baclofen (LIORESAL) 10 MG tablet, TAKE 1 TABLET BY MOUTH 3 TIMES DAILY., Disp: 90 tablet, Rfl: 11    buPROPion (WELLBUTRIN SR) 150 MG TBSR 12 hr tablet, TAKE 1 TABLET BY MOUTH EVERY 12 HOURS DAILY., Disp: 60 tablet, Rfl: 11    dicyclomine (BENTYL) 10 MG capsule, TAKE 1 CAPSULE BY MOUTH THREE TIMES A DAY FOR  ABDOMINAL PAIN, Disp: 90 capsule, Rfl: 3    difluprednate (DUREZOL) 0.05 % Drop ophthalmic solution, Instill one drop TO SURGERY EYE four times a day AFTER SURGERY X 30 DAYS, Disp: 5 mL, Rfl: 4    docusate sodium (STOOL SOFTENER) 100 MG capsule, Take 1 capsule (100 mg total) by mouth once daily., Disp: 30 capsule, Rfl: 5    ergocalciferol (ERGOCALCIFEROL) 50,000 unit Cap, TAKE 1 CAPSULE BY MOUTH Weekly, Disp: 12 capsule, Rfl: 0    famotidine (PEPCID) 20 MG tablet, Take 1 tablet (20 mg total) by mouth 2 (two) times daily., Disp: 60 tablet, Rfl: 3    fingolimod (GILENYA) 0.5 mg Cap, TAKE ONE CAPSULE (0.5 MG) BY MOUTH ONCE DAILY. MAY TAKE WITH OR WITHOUT FOOD. STORE AT ROOM TEMPERATURE., Disp: 90 capsule, Rfl: 2    gabapentin (NEURONTIN) 300 MG capsule, TAKE 1 CAPSULE BY MOUTH AT BEDTIME, Disp: 30 capsule, Rfl: 11    HYDROcodone-acetaminophen (NORCO) 7.5-325 mg per tablet, Take 1 tablet by mouth every 12 (twelve) hours as needed for Pain., Disp: 14 tablet, Rfl: 0    lactulose (CHRONULAC) 10 gram/15 mL solution, TAKE 45ML BY MOUTH THREE TIMES A DAY, Disp: 3784 mL, Rfl: 3    lisinopriL (PRINIVIL,ZESTRIL) 5 MG tablet, TAKE 1 TABLET BY MOUTH DAILY, Disp: 90 tablet, Rfl: 3    mupirocin (BACTROBAN) 2 % ointment, Apply topically 3 (three) times daily., Disp: 22 g, Rfl: 0    nabumetone (RELAFEN) 500 MG tablet, Take 1 tablet (500 mg total) by mouth 2 (two) times daily as needed., Disp: 60 tablet, Rfl: 6    nebulizer and compressor (HOME NEBULIZER PLUS SIDESTREAM) Lupe, use as directed, Disp: 1 each, Rfl: 0    ofloxacin (OCUFLOX) 0.3 % ophthalmic solution, Instill 1 drop TO SURGERY EYE four times a day STARTING 2 DAYS BEFORE SURGERY, Disp: 5 mL, Rfl: 3    oxybutynin (DITROPAN-XL) 5 MG TR24, Take 1 tablet (5 mg total) by mouth once daily., Disp: 90 tablet, Rfl: 3    pantoprazole (PROTONIX) 40 MG tablet, TAKE ONE TABLET BY MOUTH  ONCE DAILY, Disp: 90 tablet, Rfl: 3    polyethylene glycol (GLYCOLAX) 17 gram/dose  powder, Take 17 g by mouth once daily., Disp: 510 g, Rfl: 3    traMADoL (ULTRAM) 50 mg tablet, Take 1 tablet (50 mg total) by mouth every 6 (six) hours as needed for pain, Disp: 120 tablet, Rfl: 4    triamcinolone acetonide 0.1% (KENALOG) 0.1 % cream, Apply topically 2 (two) times daily. for 10 days, Disp: 80 g, Rfl: 0    Allergies:  Review of patient's allergies indicates:  No Known Allergies    Past Medical History:  Past Medical History:   Diagnosis Date    Asthma     Bilateral hearing loss 2012    Biliary acute pancreatitis     GERD (gastroesophageal reflux disease)     High cholesterol     Hypertension     Multiple sclerosis         Past Surgical History:  Past Surgical History:   Procedure Laterality Date    APPENDECTOMY      CHOLECYSTECTOMY      COLONOSCOPY N/A 2019    Procedure: COLONOSCOPY 2 day  golytely;  Surgeon: Nathan Waddell MD;  Location: [facility];  Service: Endoscopy;  Laterality: N/A;    ESOPHAGOGASTRODUODENOSCOPY N/A 2021    Procedure: EGD (ESOPHAGOGASTRODUODENOSCOPY) covid test Rapid;  Surgeon: Jared Loredo MD;  Location: [facility];  Service: Endoscopy;  Laterality: N/A;    JOINT REPLACEMENT      arm    TONSILLECTOMY         Social History:  Social History     Occupational History    Not on file   Tobacco Use    Smoking status: Former Smoker     Packs/day: 1.00     Years: 32.00     Pack years: 32.00     Types: Cigarettes     Quit date: 2022     Years since quittin.3    Smokeless tobacco: Never Used   Substance and Sexual Activity    Alcohol use: Not Currently    Drug use: No    Sexual activity: Not on file       Family History:  Family History   Problem Relation Age of Onset    Heart disease Mother     Heart disease Father     Heart disease Brother         ROS:  Review of Systems   Musculoskeletal: Positive for falls, joint pain and stiffness.   All other systems reviewed and are negative.      Vitals:  There were no vitals taken for  this visit.    Physical Examination:  Comprehensive Orthopaedic Musculoskeletal Exam    General        Constitutional: appears stated age, mildly obese, well-developed and well-nourished    Scleral icterus: no    Labored breathing: no    Psychiatric: normal mood and affect and no acute distress    Neurological: alert and oriented x3    Skin: intact    Lymphadenopathy: none     Ortho Exam       Constitutional: appears stated age, well-developed and well-nourished    Scleral icterus: no    Labored breathing: no    Psychiatric: normal mood and affect and no acute distress    Neurological: alert and oriented x3    Skin: intact    Lymphadenopathy: none    Right lower extremity    Two abrasions over the lateral aspect of the knee, 1 over the lateral femoral condyle, the other over the lateral knee adjacent to the patellar tendon.  No exposed tendon.  Superficial abrasions.  No purulence.  Some fibrinous exudate.  No active bleeding.  No effusion  Mild tenderness palpation lateral aspect of the patella  5/5 straight leg raise against resistance  Range of motion terminal extension to approximately 55° of flexion  No mediolateral joint line tenderness  Stable varus valgus stress  1A Lachman's  But intact EHL FHL tibia and gastrocs  Sensation to light touch saphenous sural deep peroneal superficial peroneal tibial distributions    Imaging:    I have independently reviewed the following imaging studies performed at [facility]:    X-rays demonstrate a longitudinal fracture of the lateral facet of the patella.  There is minimal displacement.  No patella [place] or [place].  No transverse component to the fracture.  Mild tricompartmental degenerative changes with spiking of the tibial eminences.    X-Ray Knee 3 View Right  Narrative: EXAMINATION:  XR KNEE 3 VIEW RIGHT    CLINICAL HISTORY:  Unspecified fall, initial encounter    TECHNIQUE:  AP, lateral, and Merchant views of the right knee were performed.    COMPARISON:  Right knee series  04/17/2020 and CT right knee 04/17/2020    FINDINGS:  Redemonstration of a longitudinally oriented fracture line at the right aspect of the patella not simply changed from 04/17/2020 studies and likely nonunion; however, acute re-injury/fracture at this location not excluded on the basis of this examination.  Correlate for point tenderness at this region.    Otherwise, no additional acute displaced fracture, dislocation or destructive osseous process.  Mild tricompartmental degenerative change.  No large suprapatellar joint effusion.  Small enthesophyte at the inferior patellar pole.  No subcutaneous emphysema or radiodense retained foreign body.  Impression: As above.    Electronically signed by: Warren Murray MD  Date:    04/27/2022  Time:    13:26      Assessment:  56-year-old male mechanical fall from standing with right longitudinal fracture patella lateral facet minimally displaced with intact extensor mechanism    Plan:  Concern by family member and patient about compliance and understanding treatment plan  Wear the side of caution and place him in a hinged knee brace locked in extension for approximately 2 more weeks  About 3 weeks from the date of injury we can start progressing his range of motion in 30 degree intervals every 2 weeks from 0-30, 0-60, 0-90.    We will plan on seeing him back in 4-5 weeks for repeat x-rays and clinical examination  Educated on wound care.  Instructed him to not put any more bacitracin or ointments on to the abrasions.  He can cover this and a Band-Aid daily the open area is scabbed over.  No signs or symptoms of infection.    A place a referral for physical therapy to start in approximately 3 weeks working on gentle range of motion.  Is out of abundance of precaution given his compliance and understanding of the treatment plan to make sure he is working on range of motion.     No follow-ups on file.

## 2022-05-09 DIAGNOSIS — M25.561 RIGHT KNEE PAIN, UNSPECIFIED CHRONICITY: Primary | ICD-10-CM

## 2022-05-23 ENCOUNTER — SPECIALTY PHARMACY (OUTPATIENT)
Dept: PHARMACY | Facility: CLINIC | Age: 57
End: 2022-05-23
Payer: MEDICARE

## 2022-05-23 NOTE — TELEPHONE ENCOUNTER
Specialty Pharmacy - Refill Coordination    Specialty Medication Orders Linked to Encounter    Flowsheet Row Most Recent Value   Medication #1 fingolimod (GILENYA) 0.5 mg Cap (Order#[id number], Rx#[id number])          Refill Questions - Documented Responses    Flowsheet Row Most Recent Value   Refill Screening Questions    Changes to allergies? No   Changes to medications? No   New conditions since last clinic visit? No   Unplanned office visit, urgent care, ED, or hospital admission in the last 4 weeks? No   How does patient/caregiver feel medication is working? Excellent   Financial problems or insurance changes? No   How many doses of your specialty medications were missed in the last 4 weeks? 0   Would patient like to speak to a pharmacist? No   When does the patient need to receive the medication? 05/27/22   Refill Delivery Questions    How will the patient receive the medication? Delivery Yanet   When does the patient need to receive the medication? 05/27/22   Shipping Address Home   Address in [facility] confirmed and updated if neccessary? Yes   Expected Copay ($) 0   Is the patient able to afford the medication copay? Yes   Payment Method zero copay   Days supply of Refill 30   Supplies needed? No supplies needed   Refill activity completed? Yes   Refill activity plan Refill scheduled   Shipment/Pickup Date: 05/27/22          Current Outpatient Medications   Medication Sig    acetaZOLAMIDE (DIAMOX) 250 MG tablet Take 2 TABLETS BY MOUTH ONLY 8 HOURS AFTER SURGERY    albuterol (PROAIR HFA) 90 mcg/actuation inhaler Inhale 2 puffs by mouth every 4 hours.    albuterol (PROVENTIL) 2.5 mg /3 mL (0.083 %) nebulizer solution Take 3 mLs (2.5 mg total) by nebulization every 6 (six) hours as needed for Wheezing or Shortness of Breath. Rescue    ALPRAZolam (XANAX) 0.5 MG tablet TAKE ONE TABLET BY MOUTH TWICE DAILY AS NEEDED FOR ANXIETY    atorvastatin (LIPITOR) 40 MG tablet Take 1 tablet (40 mg total) by  mouth once daily.    bacitracin 500 unit/gram Oint Apply topically 2 (two) times daily.    baclofen (LIORESAL) 10 MG tablet TAKE 1 TABLET BY MOUTH 3 TIMES DAILY.    buPROPion (WELLBUTRIN SR) 150 MG TBSR 12 hr tablet TAKE 1 TABLET BY MOUTH EVERY 12 HOURS DAILY.    dicyclomine (BENTYL) 10 MG capsule TAKE 1 CAPSULE BY MOUTH THREE TIMES A DAY FOR ABDOMINAL PAIN    difluprednate (DUREZOL) 0.05 % Drop ophthalmic solution Instill one drop TO SURGERY EYE four times a day AFTER SURGERY X 30 DAYS    docusate sodium (STOOL SOFTENER) 100 MG capsule Take 1 capsule (100 mg total) by mouth once daily.    ergocalciferol (ERGOCALCIFEROL) 50,000 unit Cap TAKE 1 CAPSULE BY MOUTH Weekly    famotidine (PEPCID) 20 MG tablet Take 1 tablet (20 mg total) by mouth 2 (two) times daily.    fingolimod (GILENYA) 0.5 mg Cap TAKE ONE CAPSULE (0.5 MG) BY MOUTH ONCE DAILY. MAY TAKE WITH OR WITHOUT FOOD. STORE AT ROOM TEMPERATURE.    gabapentin (NEURONTIN) 300 MG capsule TAKE 1 CAPSULE BY MOUTH AT BEDTIME    lactulose (CHRONULAC) 10 gram/15 mL solution TAKE 45ML BY MOUTH THREE TIMES A DAY    lisinopriL (PRINIVIL,ZESTRIL) 5 MG tablet TAKE 1 TABLET BY MOUTH DAILY    mupirocin (BACTROBAN) 2 % ointment Apply topically 3 (three) times daily.    nabumetone (RELAFEN) 500 MG tablet Take 1 tablet (500 mg total) by mouth 2 (two) times daily as needed.    nebulizer and compressor (HOME NEBULIZER PLUS SIDESTREAM) Lupe use as directed    ofloxacin (OCUFLOX) 0.3 % ophthalmic solution Instill 1 drop TO SURGERY EYE four times a day STARTING 2 DAYS BEFORE SURGERY    oxybutynin (DITROPAN-XL) 5 MG TR24 Take 1 tablet (5 mg total) by mouth once daily.    pantoprazole (PROTONIX) 40 MG tablet TAKE ONE TABLET BY MOUTH  ONCE DAILY    polyethylene glycol (GLYCOLAX) 17 gram/dose powder Take 17 g by mouth once daily.    traMADoL (ULTRAM) 50 mg tablet Take 1 tablet (50 mg total) by mouth every 6 (six) hours as needed for pain    triamcinolone acetonide 0.1%  (KENALOG) 0.1 % cream Apply topically 2 (two) times daily. for 10 days   Last reviewed on 5/4/2022  1:57 PM by Lion Case MD    Review of patient's allergies indicates:  No Known Allergies Last reviewed on  5/4/2022 1:57 PM by Lion Case      Tasks added this encounter   6/19/2022 - Refill Call (Auto Added)   Tasks due within next 3 months   No tasks due.     Jorden Mills, PharmD  [facility]  [address] [place] [address]  Phone: [phone]  Fax: [phone]

## 2022-06-03 ENCOUNTER — SPECIALTY PHARMACY (OUTPATIENT)
Dept: PHARMACY | Facility: CLINIC | Age: 57
End: 2022-06-03
Payer: MEDICARE

## 2022-06-03 NOTE — TELEPHONE ENCOUNTER
Specialty Pharmacy - Clinical Reassessment    Specialty Medication Orders Linked to Encounter    Flowsheet Row Most Recent Value   Medication #1 fingolimod (GILENYA) 0.5 mg Cap (Order#[id number], Rx#[id number])        Patient Diagnosis   G35 - Multiple sclerosis    Specialty clinical pharmacist review completed for an annual review of reassessment. Reviewed the following areas: current med list, reports of adverse effects, adherence and progress towards therapeutic goals.    Recommendations: none at this time.    Tasks added this encounter   3/3/2023 - Clinical - Follow Up Assesement (Annual)   Tasks due within next 3 months   6/19/2022 - Refill Call (Auto Added)     David Ochoa, [facility]  [address]  [facility] [address]  Phone: [phone]  Fax: [phone]

## 2022-06-06 DIAGNOSIS — R10.9 ABDOMINAL PAIN, UNSPECIFIED ABDOMINAL LOCATION: ICD-10-CM

## 2022-06-06 DIAGNOSIS — R14.0 ABDOMINAL BLOATING: ICD-10-CM

## 2022-06-06 RX ORDER — FAMOTIDINE 20 MG/1
20 TABLET, FILM COATED ORAL 2 TIMES DAILY
Qty: 60 TABLET | Refills: 3 | Status: CANCELLED | OUTPATIENT
Start: 2022-06-06 | End: 2022-07-06

## 2022-06-10 RX ORDER — ATORVASTATIN CALCIUM 40 MG/1
40 TABLET, FILM COATED ORAL DAILY
Qty: 90 TABLET | Refills: 4 | Status: SHIPPED | OUTPATIENT
Start: 2022-06-10 | End: 2023-05-25

## 2022-06-20 ENCOUNTER — SPECIALTY PHARMACY (OUTPATIENT)
Dept: PHARMACY | Facility: CLINIC | Age: 57
End: 2022-06-20
Payer: MEDICARE

## 2022-06-20 NOTE — TELEPHONE ENCOUNTER
Specialty Pharmacy - Refill Coordination    Specialty Medication Orders Linked to Encounter    Flowsheet Row Most Recent Value   Medication #1 fingolimod (GILENYA) 0.5 mg Cap (Order#[id number], Rx#[id number])          Refill Questions - Documented Responses    Flowsheet Row Most Recent Value   Refill Screening Questions    Changes to allergies? No   Changes to medications? No   New conditions since last clinic visit? No   How does patient/caregiver feel medication is working? Excellent   Financial problems or insurance changes? No   How many doses of your specialty medications were missed in the last 4 weeks? 0   Would patient like to speak to a pharmacist? No   When does the patient need to receive the medication? 06/22/22   Refill Delivery Questions    How will the patient receive the medication? Delivery Yanet   When does the patient need to receive the medication? 06/22/22   Shipping Address Home   Address in [facility] confirmed and updated if neccessary? Yes   Expected Copay ($) 0   Is the patient able to afford the medication copay? Yes   Payment Method zero copay   Days supply of Refill 30   Supplies needed? No supplies needed   Refill activity completed? Yes   Refill activity plan Refill scheduled   Shipment/Pickup Date: 06/22/22          Current Outpatient Medications   Medication Sig    acetaZOLAMIDE (DIAMOX) 250 MG tablet Take 2 TABLETS BY MOUTH ONLY 8 HOURS AFTER SURGERY    albuterol (PROAIR HFA) 90 mcg/actuation inhaler Inhale 2 puffs by mouth every 4 hours.    albuterol (PROVENTIL) 2.5 mg /3 mL (0.083 %) nebulizer solution Take 3 mLs (2.5 mg total) by nebulization every 6 (six) hours as needed for Wheezing or Shortness of Breath. Rescue    ALPRAZolam (XANAX) 0.5 MG tablet TAKE ONE TABLET BY MOUTH TWICE DAILY AS NEEDED FOR ANXIETY    atorvastatin (LIPITOR) 40 MG tablet Take 1 tablet (40 mg total) by mouth once daily.    bacitracin 500 unit/gram Oint Apply topically 2 (two) times daily.     baclofen (LIORESAL) 10 MG tablet TAKE 1 TABLET BY MOUTH 3 TIMES DAILY.    buPROPion (WELLBUTRIN SR) 150 MG TBSR 12 hr tablet TAKE 1 TABLET BY MOUTH EVERY 12 HOURS DAILY.    dicyclomine (BENTYL) 10 MG capsule TAKE 1 CAPSULE BY MOUTH THREE TIMES A DAY FOR ABDOMINAL PAIN    difluprednate (DUREZOL) 0.05 % Drop ophthalmic solution Instill one drop TO SURGERY EYE four times a day AFTER SURGERY X 30 DAYS    docusate sodium (STOOL SOFTENER) 100 MG capsule Take 1 capsule (100 mg total) by mouth once daily.    ergocalciferol (ERGOCALCIFEROL) 50,000 unit Cap TAKE 1 CAPSULE BY MOUTH Weekly    famotidine (PEPCID) 20 MG tablet Take 1 tablet (20 mg total) by mouth 2 (two) times daily.    fingolimod (GILENYA) 0.5 mg Cap TAKE ONE CAPSULE (0.5 MG) BY MOUTH ONCE DAILY. MAY TAKE WITH OR WITHOUT FOOD. STORE AT ROOM TEMPERATURE.    gabapentin (NEURONTIN) 300 MG capsule TAKE 1 CAPSULE BY MOUTH AT BEDTIME    lactulose (CHRONULAC) 10 gram/15 mL solution TAKE 45ML BY MOUTH THREE TIMES A DAY    lisinopriL (PRINIVIL,ZESTRIL) 5 MG tablet TAKE 1 TABLET BY MOUTH DAILY    mupirocin (BACTROBAN) 2 % ointment Apply topically 3 (three) times daily.    nabumetone (RELAFEN) 500 MG tablet Take 1 tablet (500 mg total) by mouth 2 (two) times daily as needed.    nebulizer and compressor (HOME NEBULIZER PLUS SIDESTREAM) Lupe use as directed    ofloxacin (OCUFLOX) 0.3 % ophthalmic solution Instill 1 drop TO SURGERY EYE four times a day STARTING 2 DAYS BEFORE SURGERY    oxybutynin (DITROPAN-XL) 5 MG TR24 Take 1 tablet (5 mg total) by mouth once daily.    pantoprazole (PROTONIX) 40 MG tablet TAKE ONE TABLET BY MOUTH  ONCE DAILY    polyethylene glycol (GLYCOLAX) 17 gram/dose powder Take 17 g by mouth once daily.    traMADoL (ULTRAM) 50 mg tablet Take 1 tablet (50 mg total) by mouth every 6 (six) hours as needed for pain    triamcinolone acetonide 0.1% (KENALOG) 0.1 % cream Apply topically 2 (two) times daily. for 10 days   Last reviewed on  5/4/2022  1:57 PM by Lion Case MD    Review of patient's allergies indicates:  No Known Allergies Last reviewed on  5/4/2022 1:57 PM by Lion Case      Tasks added this encounter   7/15/2022 - Refill Call (Auto Added)   Tasks due within next 3 months   No tasks due.     Jorden Mills, PharmD  [facility]  [address] [place] [address]  Phone: [phone]  Fax: [phone]

## 2022-06-30 DIAGNOSIS — K59.04 CHRONIC IDIOPATHIC CONSTIPATION: Primary | ICD-10-CM

## 2022-06-30 RX ORDER — LACTULOSE 10 G/15ML
SOLUTION ORAL; RECTAL
Qty: 3784 ML | Refills: 6 | Status: SHIPPED | OUTPATIENT
Start: 2022-06-30 | End: 2023-01-12 | Stop reason: SDUPTHER

## 2022-07-15 ENCOUNTER — SPECIALTY PHARMACY (OUTPATIENT)
Dept: PHARMACY | Facility: CLINIC | Age: 57
End: 2022-07-15
Payer: MEDICARE

## 2022-07-15 NOTE — TELEPHONE ENCOUNTER
Call for Gilenya refill. No answer, LVM.    Jorden Mills, PharmD  Clinical Pharmacist  [facility]  P: [phone]

## 2022-07-18 NOTE — TELEPHONE ENCOUNTER
Call for Gilenya refill. No answer, LVM.     Jorden Mills, PharmD  Clinical Pharmacist  [facility]  P: [phone]

## 2022-07-21 NOTE — TELEPHONE ENCOUNTER
Call for Gilenya refill. No answer, LVM. Reached out to staff at [facility] to get patient to contact [facility] if they see him.     Jorden Mills, PharmD  Clinical Pharmacist  [facility]  P: [phone]

## 2022-07-22 DIAGNOSIS — G35 MULTIPLE SCLEROSIS: ICD-10-CM

## 2022-07-22 RX ORDER — BACLOFEN 10 MG/1
10 TABLET ORAL 3 TIMES DAILY
Qty: 90 TABLET | Refills: 11 | Status: CANCELLED | OUTPATIENT
Start: 2022-07-22

## 2022-07-25 NOTE — TELEPHONE ENCOUNTER
Specialty Pharmacy - Refill Coordination    Specialty Medication Orders Linked to Encounter    Flowsheet Row Most Recent Value   Medication #1 fingolimod (GILENYA) 0.5 mg Cap (Order#[id number], Rx#[id number])        Refill Questions - Documented Responses    Flowsheet Row Most Recent Value   Patient Availability and HIPAA Verification    Does patient want to proceed with activity? Unable to Reach        We have had multiple attempts to the patient and have been unsuccessful to reach the patient. We will stop reaching out to the patient but in the event that the patient needs the med and contacts us, we will communicate and begin dispensing for the patient. At your next visit with the patient, please review the importance of being in contact with our specialty pharmacy as a part of our care team.      Jorden Mills, PharmD  [facility]  [address] [facility] [address]  Phone: [phone]  Fax: [phone]

## 2022-07-26 DIAGNOSIS — G35 MULTIPLE SCLEROSIS: ICD-10-CM

## 2022-07-26 RX ORDER — BACLOFEN 10 MG/1
10 TABLET ORAL 3 TIMES DAILY
Qty: 90 TABLET | Refills: 11 | Status: CANCELLED | OUTPATIENT
Start: 2022-07-22

## 2022-07-26 RX ORDER — BACLOFEN 10 MG/1
10 TABLET ORAL 3 TIMES DAILY
Qty: 90 TABLET | Refills: 11 | Status: SHIPPED | OUTPATIENT
Start: 2022-07-26 | End: 2023-08-29 | Stop reason: SDUPTHER

## 2022-07-27 ENCOUNTER — SPECIALTY PHARMACY (OUTPATIENT)
Dept: PHARMACY | Facility: CLINIC | Age: 57
End: 2022-07-27
Payer: MEDICARE

## 2022-07-27 DIAGNOSIS — G35 MULTIPLE SCLEROSIS: Primary | ICD-10-CM

## 2022-07-27 NOTE — TELEPHONE ENCOUNTER
Specialty Pharmacy - Refill Coordination    Specialty Medication Orders Linked to Encounter    Flowsheet Row Most Recent Value   Medication #1 fingolimod (GILENYA) 0.5 mg Cap (Order#[id number], Rx#[id number])          Refill Questions - Documented Responses    Flowsheet Row Most Recent Value   Patient Availability and HIPAA Verification    Does patient want to proceed with activity? Yes   HIPAA/medical authority confirmed? Yes   Relationship to patient of person spoken to? Self   Refill Screening Questions    Changes to allergies? No   Changes to medications? No   New conditions since last clinic visit? No   Unplanned office visit, urgent care, ED, or hospital admission in the last 4 weeks? No   How does patient/caregiver feel medication is working? Excellent   Financial problems or insurance changes? No   How many doses of your specialty medications were missed in the last 4 weeks? 0   Would patient like to speak to a pharmacist? No   When does the patient need to receive the medication? 07/28/22   Refill Delivery Questions    How will the patient receive the medication? Delivery Yanet   When does the patient need to receive the medication? 07/28/22   Shipping Address Home   Address in [facility] confirmed and updated if neccessary? Yes   Expected Copay ($) 0   Is the patient able to afford the medication copay? Yes   Payment Method zero copay   Days supply of Refill 30   Supplies needed? No supplies needed   Refill activity completed? Yes   Refill activity plan Refill scheduled   Shipment/Pickup Date: 07/27/22          Current Outpatient Medications   Medication Sig    acetaZOLAMIDE (DIAMOX) 250 MG tablet Take 2 TABLETS BY MOUTH ONLY 8 HOURS AFTER SURGERY    albuterol (PROAIR HFA) 90 mcg/actuation inhaler Inhale 2 puffs by mouth every 4 hours.    albuterol (PROVENTIL) 2.5 mg /3 mL (0.083 %) nebulizer solution Take 3 mLs (2.5 mg total) by nebulization every 6 (six) hours as needed for Wheezing or Shortness  of Breath. Rescue    ALPRAZolam (XANAX) 0.5 MG tablet TAKE ONE TABLET BY MOUTH TWICE DAILY AS NEEDED FOR ANXIETY    atorvastatin (LIPITOR) 40 MG tablet Take 1 tablet (40 mg total) by mouth once daily.    bacitracin 500 unit/gram Oint Apply topically 2 (two) times daily.    baclofen (LIORESAL) 10 MG tablet TAKE 1 TABLET BY MOUTH 3 TIMES DAILY.    buPROPion (WELLBUTRIN SR) 150 MG TBSR 12 hr tablet TAKE 1 TABLET BY MOUTH EVERY 12 HOURS DAILY.    dicyclomine (BENTYL) 10 MG capsule TAKE 1 CAPSULE BY MOUTH THREE TIMES A DAY FOR ABDOMINAL PAIN    difluprednate (DUREZOL) 0.05 % Drop ophthalmic solution Instill one drop TO SURGERY EYE four times a day AFTER SURGERY X 30 DAYS    docusate sodium (COLACE) 100 MG capsule Take 1 capsule (100 mg total) by mouth once daily.    ergocalciferol (ERGOCALCIFEROL) 50,000 unit Cap TAKE 1 CAPSULE BY MOUTH Weekly    famotidine (PEPCID) 20 MG tablet Take 1 tablet (20 mg total) by mouth 2 (two) times daily.    fingolimod (GILENYA) 0.5 mg Cap TAKE ONE CAPSULE (0.5 MG) BY MOUTH ONCE DAILY. MAY TAKE WITH OR WITHOUT FOOD. STORE AT ROOM TEMPERATURE.    gabapentin (NEURONTIN) 300 MG capsule TAKE 1 CAPSULE BY MOUTH AT BEDTIME    lactulose (CHRONULAC) 10 gram/15 mL solution TAKE 45ML BY MOUTH THREE TIMES A DAY    lisinopriL (PRINIVIL,ZESTRIL) 5 MG tablet TAKE 1 TABLET BY MOUTH DAILY    mupirocin (BACTROBAN) 2 % ointment Apply topically 3 (three) times daily.    nabumetone (RELAFEN) 500 MG tablet Take 1 tablet (500 mg total) by mouth 2 (two) times daily as needed.    nebulizer and compressor (HOME NEBULIZER PLUS SIDESTREAM) Lupe use as directed    ofloxacin (OCUFLOX) 0.3 % ophthalmic solution Instill 1 drop TO SURGERY EYE four times a day STARTING 2 DAYS BEFORE SURGERY    oxybutynin (DITROPAN-XL) 5 MG TR24 Take 1 tablet (5 mg total) by mouth once daily.    pantoprazole (PROTONIX) 40 MG tablet TAKE ONE TABLET BY MOUTH  ONCE DAILY    polyethylene glycol (GLYCOLAX) 17 gram/dose  powder Take 17 g by mouth once daily.    traMADoL (ULTRAM) 50 mg tablet Take 1 tablet (50 mg total) by mouth every 6 (six) hours as needed for pain    triamcinolone acetonide 0.1% (KENALOG) 0.1 % cream Apply topically 2 (two) times daily. for 10 days   Last reviewed on 7/19/2022  5:34 PM by Bayron Ferguson MD    Review of patient's allergies indicates:  No Known Allergies Last reviewed on  7/19/2022 5:34 PM by Bayron Ferguson      Tasks added this encounter   8/20/2022 - Refill Call (Auto Added)   Tasks due within next 3 months   No tasks due.     Jorden Mills, PharmD  [facility]  [address] [place] [id number]  Phone: [phone]  Fax: [phone]

## 2022-08-08 ENCOUNTER — HOSPITAL ENCOUNTER (EMERGENCY)
Facility: HOSPITAL | Age: 57
Discharge: HOME | End: 2022-08-08
Attending: EMERGENCY MEDICINE
Payer: MEDICARE

## 2022-08-08 VITALS
DIASTOLIC BLOOD PRESSURE: 74 MMHG | TEMPERATURE: 98 F | RESPIRATION RATE: 18 BRPM | OXYGEN SATURATION: 98 % | SYSTOLIC BLOOD PRESSURE: 150 MMHG | HEART RATE: 78 BPM

## 2022-08-08 DIAGNOSIS — S50.811A ABRASION OF RIGHT FOREARM, INITIAL ENCOUNTER: Primary | ICD-10-CM

## 2022-08-08 DIAGNOSIS — M25.512 LEFT SHOULDER PAIN: ICD-10-CM

## 2022-08-08 PROCEDURE — 99283 EMERGENCY DEPT VISIT LOW MDM: CPT

## 2022-08-08 PROCEDURE — 25000003 PHARM REV CODE 250: Performed by: EMERGENCY MEDICINE

## 2022-08-08 RX ORDER — ACETAMINOPHEN 325 MG/1
650 TABLET ORAL
Status: COMPLETED | OUTPATIENT
Start: 2022-08-08 | End: 2022-08-08

## 2022-08-08 RX ADMIN — ACETAMINOPHEN 650 MG: 325 TABLET ORAL at 09:08

## 2022-08-08 NOTE — DISCHARGE INSTRUCTIONS
Your x-ray shows no fracture or broken bones.  You may take Tylenol for pain.  Is important you follow-up with your PCP or orthopedics specialist for further evaluation of your chronic shoulder pain.  Make sure you keep your abrasions clean and dry until they heal.    Thank you for choosing [facility]!     Our goal in the Emergency Department is to always provide outstanding medical care. You may receive a survey by mail or e-mail in the next week regarding your experience today. We would greatly appreciate you completing and returning the survey. Your feedback provides us with a way to recognize our staff who provide very good care, and it helps us learn how to improve when your experience was below our aspiration of excellence.      We appreciate you trusting us with your medical care. We hope you feel better soon. We will be happy to take care of you for all of your future medical needs.    Sincerely,    Bayron Mcintyre Jr., MD  [facility]

## 2022-08-08 NOTE — ED PROVIDER NOTES
Encounter Date: 8/8/2022       History     Chief Complaint   Patient presents with    Fall     Pt states at 2100 last night he tripped over a box and fell, complains of right arm pain, + abrasion noted, no deformity, + slight swelling,  full ROM noted+ abrasion noted to right knee,denies LOC      Tj Trammell is a 57 y.o. male who  has a past medical history of Asthma, Bilateral hearing loss (12/12/2012), Biliary acute pancreatitis, GERD (gastroesophageal reflux disease), High cholesterol, Hypertension, and Multiple sclerosis.    The patient presents to the ED due to L shoulder pain and R forearm abrasions after fall last night. He states he tripped and landed on both arms. He scratched his R arm on a metal piece of his electric scooter.  He denies any head impact, LOC, neck/back pain, or additional extremity injury. He endorses chronic L shoulder pain but it worsened today after the fall. He did not take anything for his symptoms prior to arrival. No other complaints or concerns.         Review of patient's allergies indicates:  No Known Allergies  Past Medical History:   Diagnosis Date    Asthma     Bilateral hearing loss 12/12/2012    Biliary acute pancreatitis     GERD (gastroesophageal reflux disease)     High cholesterol     Hypertension     Multiple sclerosis      Past Surgical History:   Procedure Laterality Date    APPENDECTOMY      CHOLECYSTECTOMY      COLONOSCOPY N/A 1/22/2019    Procedure: COLONOSCOPY 2 day  golytely;  Surgeon: Nathan Waddell MD;  Location: [facility];  Service: Endoscopy;  Laterality: N/A;    ESOPHAGOGASTRODUODENOSCOPY N/A 9/29/2021    Procedure: EGD (ESOPHAGOGASTRODUODENOSCOPY) covid test Rapid;  Surgeon: Jared Loredo MD;  Location: [facility];  Service: Endoscopy;  Laterality: N/A;    JOINT REPLACEMENT      arm    TONSILLECTOMY       Family History   Problem Relation Age of Onset    Heart disease Mother     Heart disease Father     Heart disease  Brother      Social History     Tobacco Use    Smoking status: Former Smoker     Packs/day: 1.00     Years: 32.00     Pack years: 32.00     Types: Cigarettes     Quit date: 2022     Years since quittin.6    Smokeless tobacco: Never Used   Substance Use Topics    Alcohol use: Not Currently    Drug use: No     Review of Systems   Constitutional: Negative for chills and fever.   HENT: Negative for sore throat.    Respiratory: Negative for shortness of breath.    Cardiovascular: Negative for chest pain.   Gastrointestinal: Negative for constipation, diarrhea, nausea and vomiting.   Genitourinary: Negative for dysuria, frequency and urgency.   Musculoskeletal: Positive for arthralgias. Negative for back pain.   Skin: Positive for wound. Negative for rash.   Neurological: Negative for weakness.   Hematological: Does not bruise/bleed easily.   Psychiatric/Behavioral: Negative for agitation, behavioral problems and confusion.       Physical Exam     Initial Vitals [22 0853]   BP Pulse Resp Temp SpO2   (!) 150/74 78 18 98.1 °F (36.7 °C) 98 %      MAP       --         Physical Exam    Nursing note and vitals reviewed.  Constitutional: He appears well-developed and well-nourished. He is not diaphoretic. No distress.   Pleasant, but difficult to understand speech.   HENT:   Head: Normocephalic and atraumatic.   Mouth/Throat: Oropharynx is clear and moist.   Eyes: EOM are normal. Pupils are equal, round, and reactive to light.   Neck: No tracheal deviation present.   Cardiovascular: Normal rate, regular rhythm, normal heart sounds and intact distal pulses.   Pulmonary/Chest: Breath sounds normal. No stridor. No respiratory distress.   Abdominal: Abdomen is soft. He exhibits no distension and no mass. There is no abdominal tenderness.   Musculoskeletal:         General: No edema. Normal range of motion.      Comments: Full ROM all extremities.      Neurological: He is alert and oriented to person, place, and  time. No cranial nerve deficit or sensory deficit.   Skin: Skin is warm and dry. Capillary refill takes less than 2 seconds. Abrasion noted. No rash noted.   Superficial abrasions to R forearm, no active bleeding.    Psychiatric: He has a normal mood and affect. His behavior is normal. Thought content normal.         ED Course   Procedures  Labs Reviewed - No data to display       Imaging Results          X-Ray Shoulder Trauma Left (Final result)  Result time 08/08/22 10:28:13    Final result by Onesimo Gilmore MD (08/08/22 10:28:13)                 Impression:      No displaced fracture identified.      Electronically signed by: Onesimo Gilmore MD  Date:    08/08/2022  Time:    10:28             Narrative:    EXAMINATION:  XR SHOULDER TRAUMA 3 VIEW LEFT    CLINICAL HISTORY:  Pain in left shoulder.    TECHNIQUE:  Three views of the left shoulder were performed.    COMPARISON  10/11/2019.    FINDINGS:  Exam quality is mildly limited by suboptimal positioning.  No acute displaced fracture.  No dislocation.  There are degenerative changes in the acromioclavicular joint.  No unexpected radiopaque foreign body.                                 Medications   acetaminophen tablet 650 mg (650 mg Oral Given 8/8/22 0942)     Medical Decision Making:   History:   Old Medical Records: I decided to obtain old medical records.  Old Records Summarized: records from clinic visits and other records.       <> Summary of Records: History of chronic L shoulder pain, followed by PCP and orthopedics.   Initial Assessment:   56 yo M with L shoulder pain and R arm abrasions after mechanical fall.   Will obtain x-ray of L shoulder and reassess.  Differential Diagnosis:   Differential Diagnosis includes, but is not limited to:  Fracture, dislocation, compartment syndrome, nerve injury/palsy, vascular injury, DVT, rhabdomyolysis, hemarthrosis, septic joint, cellulitis, bursitis, muscle strain, ligament tear/sprain, laceration, foreign body,  abrasion, soft tissue contusion, osteoarthritis.    Clinical Tests:   Radiological Study: Ordered and Reviewed  ED Management:  X-ray negative for fracture or dislocation.  Will DC with supportive care. R forearm abrasions were dressed by ED nurse.     On re-evaluation, the patient's status has improved.  After complete ED evaluation, clinical impression is most consistent with L shoulder pain, R arm abrasions.  PCP/Ortho follow-up within 2-3 days was recommended.    After taking into careful account the patient's history, physical exam findings, as well as empirical and objective data obtained throughout ED workup, I feel no emergent medical condition has been identified. No further evaluation or admission was felt to be required, and the patient is stable for discharge from the ED. The patient and any additional family present were updated with test results, overall clinical impression, and recommended further plan of care, including discharge instructions as provided and outpatient follow-up for continued evaluation and management as needed. All questions were answered. The patient expressed understanding and agreed with current plan for discharge and follow-up plan of care. Strict ED return precautions were provided, including return/worsening of current symptoms, new symptoms, or any other concerns.                        Clinical Impression:   Final diagnoses:  [M25.512] Left shoulder pain  [S50.811A] Abrasion of right forearm, initial encounter (Primary)          ED Disposition Condition    Discharge Stable        ED Prescriptions     None        Follow-up Information     Follow up With Specialties Details Why Contact Info    Bayron Ferguson MD Internal Medicine Schedule an appointment as soon as possible for a visit   [address]  [address]  [facility] [address]  [phone]             Bayron Mcintyre MD  08/09/22 0930

## 2022-08-22 ENCOUNTER — SPECIALTY PHARMACY (OUTPATIENT)
Dept: PHARMACY | Facility: CLINIC | Age: 57
End: 2022-08-22
Payer: MEDICARE

## 2022-08-26 NOTE — TELEPHONE ENCOUNTER
Specialty Pharmacy - Refill Coordination    Specialty Medication Orders Linked to Encounter    Flowsheet Row Most Recent Value   Medication #1 fingolimod (GILENYA) 0.5 mg Cap (Order#[id number], Rx#[id number])          Refill Questions - Documented Responses    Flowsheet Row Most Recent Value   Patient Availability and HIPAA Verification    Does patient want to proceed with activity? Yes   HIPAA/medical authority confirmed? Yes   Relationship to patient of person spoken to? Self   Refill Screening Questions    Changes to allergies? No   Changes to medications? No   New conditions since last clinic visit? No   Unplanned office visit, urgent care, ED, or hospital admission in the last 4 weeks? No   How does patient/caregiver feel medication is working? Good   Financial problems or insurance changes? No   How many doses of your specialty medications were missed in the last 4 weeks? 0   Would patient like to speak to a pharmacist? No   When does the patient need to receive the medication? 08/29/22   Refill Delivery Questions    How will the patient receive the medication? Delivery Yanet   When does the patient need to receive the medication? 08/29/22   Shipping Address Home   Address in [facility] confirmed and updated if neccessary? Yes   Expected Copay ($) 0   Is the patient able to afford the medication copay? Yes   Payment Method zero copay   Days supply of Refill 30   Supplies needed? No supplies needed   Refill activity plan Refill scheduled   Shipment/Pickup Date: 08/26/22          Current Outpatient Medications   Medication Sig    acetaZOLAMIDE (DIAMOX) 250 MG tablet Take 2 TABLETS BY MOUTH ONLY 8 HOURS AFTER SURGERY    albuterol (PROAIR HFA) 90 mcg/actuation inhaler Inhale 2 puffs by mouth every 4 hours.    albuterol (PROVENTIL) 2.5 mg /3 mL (0.083 %) nebulizer solution Take 3 mLs (2.5 mg total) by nebulization every 6 (six) hours as needed for Wheezing or Shortness of Breath. Rescue    ALPRAZolam  (XANAX) 0.5 MG tablet TAKE ONE TABLET BY MOUTH TWICE DAILY AS NEEDED FOR ANXIETY    atorvastatin (LIPITOR) 40 MG tablet Take 1 tablet (40 mg total) by mouth once daily.    bacitracin 500 unit/gram Oint Apply topically 2 (two) times daily.    baclofen (LIORESAL) 10 MG tablet TAKE 1 TABLET BY MOUTH 3 TIMES DAILY.    buPROPion (WELLBUTRIN SR) 150 MG TBSR 12 hr tablet TAKE 1 TABLET BY MOUTH EVERY 12 HOURS DAILY.    dicyclomine (BENTYL) 10 MG capsule TAKE 1 CAPSULE BY MOUTH THREE TIMES A DAY FOR ABDOMINAL PAIN    difluprednate (DUREZOL) 0.05 % Drop ophthalmic solution Instill one drop TO SURGERY EYE four times a day AFTER SURGERY X 30 DAYS    docusate sodium (COLACE) 100 MG capsule Take 1 capsule (100 mg total) by mouth once daily.    ergocalciferol (ERGOCALCIFEROL) 50,000 unit Cap TAKE 1 CAPSULE BY MOUTH Weekly    famotidine (PEPCID) 20 MG tablet Take 1 tablet (20 mg total) by mouth 2 (two) times daily.    fingolimod (GILENYA) 0.5 mg Cap TAKE ONE CAPSULE (0.5 MG) BY MOUTH ONCE DAILY. MAY TAKE WITH OR WITHOUT FOOD. STORE AT ROOM TEMPERATURE.    gabapentin (NEURONTIN) 300 MG capsule TAKE 1 CAPSULE BY MOUTH AT BEDTIME    lactulose (CHRONULAC) 10 gram/15 mL solution TAKE 45ML BY MOUTH THREE TIMES A DAY    lisinopriL (PRINIVIL,ZESTRIL) 5 MG tablet TAKE 1 TABLET BY MOUTH DAILY    mupirocin (BACTROBAN) 2 % ointment Apply topically 3 (three) times daily.    nabumetone (RELAFEN) 500 MG tablet Take 1 tablet (500 mg total) by mouth 2 (two) times daily as needed.    nebulizer and compressor (HOME NEBULIZER PLUS SIDESTREAM) Lupe use as directed    ofloxacin (OCUFLOX) 0.3 % ophthalmic solution Instill 1 drop TO SURGERY EYE four times a day STARTING 2 DAYS BEFORE SURGERY    oxybutynin (DITROPAN-XL) 5 MG TR24 Take 1 tablet (5 mg total) by mouth once daily.    pantoprazole (PROTONIX) 40 MG tablet TAKE ONE TABLET BY MOUTH  ONCE DAILY    polyethylene glycol (GLYCOLAX) 17 gram/dose powder Take 17 g by mouth once daily.     traMADoL (ULTRAM) 50 mg tablet Take 1 tablet (50 mg total) by mouth every 6 (six) hours as needed for pain    triamcinolone acetonide 0.1% (KENALOG) 0.1 % cream Apply topically 2 (two) times daily. for 10 days   Last reviewed on 7/19/2022  5:34 PM by Bayron Ferguson MD    Review of patient's allergies indicates:  No Known Allergies Last reviewed on  8/8/2022 8:54 AM by Laureen Hardy      Tasks added this encounter   9/21/2022 - Refill Call (Auto Added)   Tasks due within next 3 months   No tasks due.     Jessica Parish, PharmD  [facility]  [address] [facility] [address]  Phone: [phone]  Fax: [phone]

## 2022-08-31 ENCOUNTER — OFFICE VISIT (OUTPATIENT)
Dept: ORTHOPEDICS | Facility: CLINIC | Age: 57
End: 2022-08-31
Payer: MEDICARE

## 2022-08-31 VITALS — BODY MASS INDEX: 31.19 KG/M2 | WEIGHT: 210.56 LBS | HEIGHT: 69 IN

## 2022-08-31 DIAGNOSIS — M25.512 CHRONIC LEFT SHOULDER PAIN: ICD-10-CM

## 2022-08-31 DIAGNOSIS — G89.29 CHRONIC LEFT SHOULDER PAIN: ICD-10-CM

## 2022-08-31 DIAGNOSIS — M54.16 LUMBAR RADICULOPATHY: Primary | ICD-10-CM

## 2022-08-31 PROCEDURE — 99999 PR PBB SHADOW E&M-EST. PATIENT-LVL V: CPT | Mod: PBBFAC,,, | Performed by: REGISTERED NURSE

## 2022-08-31 PROCEDURE — 3008F PR BODY MASS INDEX (BMI) DOCUMENTED: ICD-10-PCS | Mod: CPTII,S$GLB,, | Performed by: REGISTERED NURSE

## 2022-08-31 PROCEDURE — 99214 OFFICE O/P EST MOD 30 MIN: CPT | Mod: S$GLB,,, | Performed by: REGISTERED NURSE

## 2022-08-31 PROCEDURE — 99999 PR PBB SHADOW E&M-EST. PATIENT-LVL V: ICD-10-PCS | Mod: PBBFAC,,, | Performed by: REGISTERED NURSE

## 2022-08-31 PROCEDURE — 1159F PR MEDICATION LIST DOCUMENTED IN MEDICAL RECORD: ICD-10-PCS | Mod: CPTII,S$GLB,, | Performed by: REGISTERED NURSE

## 2022-08-31 PROCEDURE — 4010F PR ACE/ARB THEARPY RXD/TAKEN: ICD-10-PCS | Mod: CPTII,S$GLB,, | Performed by: REGISTERED NURSE

## 2022-08-31 PROCEDURE — 1159F MED LIST DOCD IN RCRD: CPT | Mod: CPTII,S$GLB,, | Performed by: REGISTERED NURSE

## 2022-08-31 PROCEDURE — 3008F BODY MASS INDEX DOCD: CPT | Mod: CPTII,S$GLB,, | Performed by: REGISTERED NURSE

## 2022-08-31 PROCEDURE — 4010F ACE/ARB THERAPY RXD/TAKEN: CPT | Mod: CPTII,S$GLB,, | Performed by: REGISTERED NURSE

## 2022-08-31 PROCEDURE — 1160F PR REVIEW ALL MEDS BY PRESCRIBER/CLIN PHARMACIST DOCUMENTED: ICD-10-PCS | Mod: CPTII,S$GLB,, | Performed by: REGISTERED NURSE

## 2022-08-31 PROCEDURE — 1160F RVW MEDS BY RX/DR IN RCRD: CPT | Mod: CPTII,S$GLB,, | Performed by: REGISTERED NURSE

## 2022-08-31 PROCEDURE — 99214 PR OFFICE/OUTPT VISIT, EST, LEVL IV, 30-39 MIN: ICD-10-PCS | Mod: S$GLB,,, | Performed by: REGISTERED NURSE

## 2022-08-31 NOTE — PROGRESS NOTES
DATE: 8/31/2022  PATIENT: Tj Trammell    Supervising Physician: Ernie Denton M.D.    CHIEF COMPLAINT: left leg pain and weakness    HISTORY:  Tj Trammell is a 57 y.o. male with pmhx of MS here for initial evaluation of low back and left leg pain (Back - 2, Leg - 5).  The pain in the left leg is what bothers him most.  The pain has been present for a few years but has progressively worsened over the past few months. The patient describes the pain as sharp.  The pain is worse with lifting the left leg and walking and improved by nothing in particular. There is associated numbness and tingling. There is subjective weakness. The patient ambulates with a Rolator. Prior treatments have included Tramadol and gabapentin, but no PT, JEVON or surgery.    The patient denies myelopathic symptoms such as handwriting changes or difficulty with buttons/coins/keys. Denies perineal paresthesias, bowel/bladder dysfunction.    PAST MEDICAL/SURGICAL HISTORY:  Past Medical History:   Diagnosis Date    Asthma     Bilateral hearing loss 12/12/2012    Biliary acute pancreatitis     GERD (gastroesophageal reflux disease)     High cholesterol     Hypertension     Multiple sclerosis      Past Surgical History:   Procedure Laterality Date    APPENDECTOMY      CHOLECYSTECTOMY      COLONOSCOPY N/A 1/22/2019    Procedure: COLONOSCOPY 2 day  golytely;  Surgeon: Nathan Waddell MD;  Location: [facility];  Service: Endoscopy;  Laterality: N/A;    ESOPHAGOGASTRODUODENOSCOPY N/A 9/29/2021    Procedure: EGD (ESOPHAGOGASTRODUODENOSCOPY) covid test Rapid;  Surgeon: Jared Loredo MD;  Location: [facility];  Service: Endoscopy;  Laterality: N/A;    JOINT REPLACEMENT      arm    TONSILLECTOMY         Medications:   Current Outpatient Medications on File Prior to Visit   Medication Sig Dispense Refill    acetaZOLAMIDE (DIAMOX) 250 MG tablet Take 2 TABLETS BY MOUTH ONLY 8 HOURS AFTER SURGERY 2 tablet 0    albuterol (PROAIR HFA) 90  mcg/actuation inhaler Inhale 2 puffs by mouth every 4 hours. 8.5 g 3    albuterol (PROVENTIL) 2.5 mg /3 mL (0.083 %) nebulizer solution Take 3 mLs (2.5 mg total) by nebulization every 6 (six) hours as needed for Wheezing or Shortness of Breath. Rescue 180 mL 6    ALPRAZolam (XANAX) 0.5 MG tablet TAKE ONE TABLET BY MOUTH TWICE DAILY AS NEEDED FOR ANXIETY 60 tablet 4    atorvastatin (LIPITOR) 40 MG tablet Take 1 tablet (40 mg total) by mouth once daily. 90 tablet 4    bacitracin 500 unit/gram Oint Apply topically 2 (two) times daily. 28 g 0    baclofen (LIORESAL) 10 MG tablet TAKE 1 TABLET BY MOUTH 3 TIMES DAILY. 90 tablet 11    buPROPion (WELLBUTRIN SR) 150 MG TBSR 12 hr tablet TAKE 1 TABLET BY MOUTH EVERY 12 HOURS DAILY. 60 tablet 11    dicyclomine (BENTYL) 10 MG capsule TAKE 1 CAPSULE BY MOUTH THREE TIMES A DAY FOR ABDOMINAL PAIN 90 capsule 3    difluprednate (DUREZOL) 0.05 % Drop ophthalmic solution Instill one drop TO SURGERY EYE four times a day AFTER SURGERY X 30 DAYS 5 mL 4    docusate sodium (COLACE) 100 MG capsule Take 1 capsule (100 mg total) by mouth once daily. 30 capsule 5    ergocalciferol (ERGOCALCIFEROL) 50,000 unit Cap TAKE 1 CAPSULE BY MOUTH Weekly 12 capsule 0    famotidine (PEPCID) 20 MG tablet Take 1 tablet (20 mg total) by mouth 2 (two) times daily. 60 tablet 3    fingolimod (GILENYA) 0.5 mg Cap TAKE ONE CAPSULE (0.5 MG) BY MOUTH ONCE DAILY. MAY TAKE WITH OR WITHOUT FOOD. STORE AT ROOM TEMPERATURE. 90 capsule 2    gabapentin (NEURONTIN) 300 MG capsule TAKE 1 CAPSULE BY MOUTH AT BEDTIME 30 capsule 11    lactulose (CHRONULAC) 10 gram/15 mL solution TAKE 45ML BY MOUTH THREE TIMES A DAY 3784 mL 6    lisinopriL (PRINIVIL,ZESTRIL) 5 MG tablet TAKE 1 TABLET BY MOUTH DAILY 90 tablet 3    mupirocin (BACTROBAN) 2 % ointment Apply topically 3 (three) times daily. 22 g 0    nabumetone (RELAFEN) 500 MG tablet Take 1 tablet (500 mg total) by mouth 2 (two) times daily as needed. 60 tablet 6    nebulizer and  "compressor (HOME NEBULIZER PLUS SIDESTREAM) Lupe use as directed 1 each 0    ofloxacin (OCUFLOX) 0.3 % ophthalmic solution Instill 1 drop TO SURGERY EYE four times a day STARTING 2 DAYS BEFORE SURGERY 5 mL 3    oxybutynin (DITROPAN-XL) 5 MG TR24 Take 1 tablet (5 mg total) by mouth once daily. 90 tablet 3    pantoprazole (PROTONIX) 40 MG tablet TAKE ONE TABLET BY MOUTH  ONCE DAILY 90 tablet 3    polyethylene glycol (GLYCOLAX) 17 gram/dose powder Take 17 g by mouth once daily. 510 g 3    traMADoL (ULTRAM) 50 mg tablet Take 1 tablet (50 mg total) by mouth every 6 (six) hours as needed for pain 120 tablet 4    triamcinolone acetonide 0.1% (KENALOG) 0.1 % cream Apply topically 2 (two) times daily. for 10 days 80 g 0     No current facility-administered medications on file prior to visit.       Social History:   Social History     Socioeconomic History    Marital status: Single   Tobacco Use    Smoking status: Former     Packs/day: 1.00     Years: 32.00     Pack years: 32.00     Types: Cigarettes     Quit date: 2022     Years since quittin.6    Smokeless tobacco: Never   Substance and Sexual Activity    Alcohol use: Not Currently    Drug use: No       REVIEW OF SYSTEMS:  Constitution: Negative. Negative for chills, fever and night sweats.   Cardiovascular: Negative for chest pain and syncope.   Respiratory: Negative for cough and shortness of breath.   Gastrointestinal: See HPI. Negative for nausea/vomiting. Negative for abdominal pain.  Genitourinary: See HPI. Negative for discoloration or dysuria.  Skin: Negative for dry skin, itching and rash.   Hematologic/Lymphatic: Negative for bleeding problem. Does not bruise/bleed easily.   Musculoskeletal: Negative for falls and muscle weakness.   Neurological: See HPI. No seizures.   Endocrine: Negative for polydipsia, polyphagia and polyuria.   Allergic/Immunologic: Negative for hives and persistent infections.     EXAM:  Ht 5' 9" (1.753 m)   Wt 95.5 kg (210 lb 8.6 " oz)   BMI 31.09 kg/m²     General: The patient is a very pleasant 57 y.o. male in no apparent distress, the patient is oriented to person, place and time.  Psych: Normal mood and affect  HEENT: Vision grossly intact, hearing intact to the spoken word.  Lungs: Respirations unlabored.  Gait: unable to preform toe or heel walk. Left foot drop.   Skin: Dorsal lumbar skin negative for rashes, lesions, hairy patches and surgical scars. There is mild lumbar tenderness to palpation.  Range of motion: Lumbar range of motion is limited due to weakness.  Spinal Balance: Global saggital and coronal spinal balance acceptable, not significant for scoliosis and kyphosis.  Musculoskeletal: No pain with the range of motion of the bilateral hips. No trochanteric tenderness to palpation.  Vascular: Bilateral lower extremities warm and well perfused, dorsalis pedis pulses 2+ bilaterally.  Neurological: decreased strength and tone in all major motor groups in the bilateral lower extremities. Decreased sensation to light touch in the L2-S1 dermatomes bilaterally.  Deep tendon reflexes symmetric 2+ in the bilateral lower extremities.  Negative Babinski bilaterally. Straight leg raise negative bilaterally.    IMAGING:   No new imaging prior to visit.      Body mass index is 31.09 kg/m².    Hemoglobin A1C   Date Value Ref Range Status   11/08/2021 6.2 (H) 4.0 - 5.6 % Final     Comment:     ADA Screening Guidelines:  5.7-6.4%  Consistent with prediabetes  >or=6.5%  Consistent with diabetes    High levels of fetal hemoglobin interfere with the HbA1C  assay. Heterozygous hemoglobin variants (HbS, HgC, etc)do  not significantly interfere with this assay.   However, presence of multiple variants may affect accuracy.     06/02/2016 6.1 4.5 - 6.2 % Final   07/15/2014 6.1 4.5 - 6.2 % Final           ASSESSMENT/PLAN:    Tj was seen today for post-op evaluation.    Diagnoses and all orders for this visit:    Lumbar radiculopathy  -     MRI Lumbar  Spine Without Contrast; Future  -     X-Ray Lumbar Spine Ap Lateral w/Flex Ext; Future  -     MRI Cervical Spine Demyelinating W W/O Contrast; Future    Chronic left shoulder pain  Comments:  - will send to Dr. Ballard for second opinion.  Orders:  -     Ambulatory referral/consult to Orthopedics  -     MRI Lumbar Spine Without Contrast; Future  -     X-Ray Lumbar Spine Ap Lateral w/Flex Ext; Future  -     MRI Cervical Spine Demyelinating W W/O Contrast; Future    Today we discussed at length all of the different treatment options including anti-inflammatories, acetaminophen, rest, ice, heat, physical therapy including strengthening and stretching exercises, home exercises, ROM, aerobic conditioning, aqua therapy, other modalities including ultrasound, massage, and dry needling, epidural steroid injections and finally surgical intervention.    The patient presents with progressive MS and follows up with neurology regularly. Lumbar xray and MRI ordered due to recent left leg pain. Cervical MRI also ordered, I will call with results.

## 2022-09-08 ENCOUNTER — HOSPITAL ENCOUNTER (EMERGENCY)
Facility: HOSPITAL | Age: 57
Discharge: HOME | End: 2022-09-08
Attending: EMERGENCY MEDICINE
Payer: MEDICARE

## 2022-09-08 VITALS
OXYGEN SATURATION: 98 % | HEART RATE: 86 BPM | SYSTOLIC BLOOD PRESSURE: 144 MMHG | TEMPERATURE: 99 F | DIASTOLIC BLOOD PRESSURE: 88 MMHG | RESPIRATION RATE: 18 BRPM

## 2022-09-08 DIAGNOSIS — R07.81 RIB PAIN ON LEFT SIDE: Primary | ICD-10-CM

## 2022-09-08 PROCEDURE — 99283 EMERGENCY DEPT VISIT LOW MDM: CPT | Mod: 25

## 2022-09-08 NOTE — ED PROVIDER NOTES
"Encounter Date: 2022       History     Chief Complaint   Patient presents with    Flank Pain     Pt c/o left side pain after turning around yesterday and feeling a "pop". Pt presents in his motorized scooter.      57 yr male presents to the ER with reports of left side pain that began after feeling a pop when pulling up on his moving chair. Pt states he has been having pain with palpation to the site. Denies chest pain or sob. PMH of asthma, gerd, pancreatitis, MS, HTN, hypercholesterolemia, hearing loss.     The history is provided by the patient. No  was used.   Review of patient's allergies indicates:  No Known Allergies  Past Medical History:   Diagnosis Date    Asthma     Bilateral hearing loss 2012    Biliary acute pancreatitis     GERD (gastroesophageal reflux disease)     High cholesterol     Hypertension     Multiple sclerosis      Past Surgical History:   Procedure Laterality Date    APPENDECTOMY      CHOLECYSTECTOMY      COLONOSCOPY N/A 2019    Procedure: COLONOSCOPY 2 day  golytely;  Surgeon: Nathan Waddell MD;  Location: [facility];  Service: Endoscopy;  Laterality: N/A;    ESOPHAGOGASTRODUODENOSCOPY N/A 2021    Procedure: EGD (ESOPHAGOGASTRODUODENOSCOPY) covid test Rapid;  Surgeon: Jared Loredo MD;  Location: [facility];  Service: Endoscopy;  Laterality: N/A;    JOINT REPLACEMENT      arm    TONSILLECTOMY       Family History   Problem Relation Age of Onset    Heart disease Mother     Heart disease Father     Heart disease Brother      Social History     Tobacco Use    Smoking status: Former     Packs/day: 1.00     Years: 32.00     Pack years: 32.00     Types: Cigarettes     Quit date: 2022     Years since quittin.6    Smokeless tobacco: Never   Substance Use Topics    Alcohol use: Not Currently    Drug use: No     Review of Systems   Constitutional: Negative.    HENT: Negative.     Eyes: Negative.    Respiratory: Negative.     Cardiovascular: " Negative.    Gastrointestinal: Negative.    Endocrine: Negative.    Genitourinary: Negative.    Musculoskeletal:         Left rib pain     Hematological: Negative.    Psychiatric/Behavioral: Negative.       Physical Exam     Initial Vitals [09/08/22 1309]   BP Pulse Resp Temp SpO2   (!) 144/88 86 18 98.7 °F (37.1 °C) 98 %      MAP       --         Physical Exam    Constitutional: He appears well-developed and well-nourished. He is not diaphoretic. No distress.   HENT:   Head: Normocephalic and atraumatic.   Right Ear: Hearing and tympanic membrane normal.   Left Ear: Hearing and tympanic membrane normal.   Nose: Nose normal.   Mouth/Throat: Uvula is midline, oropharynx is clear and moist and mucous membranes are normal.   Eyes: Lids are normal. Pupils are equal, round, and reactive to light.   Cardiovascular:  Normal rate.           Pulmonary/Chest: Effort normal and breath sounds normal. No respiratory distress. He has no wheezes. He has no rhonchi. He exhibits tenderness. He exhibits no laceration, no crepitus, no deformity and no retraction.     Small bruise noted to the marked location. No crepitus.    Abdominal: Abdomen is soft. There is no abdominal tenderness.   NO abd tenderness noted.    Musculoskeletal:         General: Normal range of motion.      Cervical back: No rigidity.     Neurological: He is oriented to person, place, and time.   Skin: Skin is warm and dry. No rash noted.   Psychiatric: He has a normal mood and affect. His behavior is normal. Judgment and thought content normal.       ED Course   Procedures  Labs Reviewed - No data to display       Imaging Results              XR Ribs Min 3 views w/PA Chest Left (Final result)  Result time 09/08/22 14:17:17      Final result by Lion Cotto MD (09/08/22 14:17:17)                   Impression:      See above      Electronically signed by: Lion Cotto MD  Date:    09/08/2022  Time:    14:17               Narrative:    EXAMINATION:  XR  RIBS MIN 3 VIEWS W/ PA CHEST LEFT    CLINICAL HISTORY:  rib pain;    TECHNIQUE:  Three views of the ribs and PA chest left    COMPARISON:  01/05/2022    FINDINGS:  Radiographs of the left ribs show no fractures or bony destruction.  Mild thoracolumbar scoliosis is present.  Except for a few fibrotic streaks heart and lungs are normal.                                       Medications - No data to display                ED Course as of 09/08/22 1521   Thu Sep 08, 2022   1437 Pt notified of the need for follow up care with pcp and to continue with meds prescribed. Pt takes Ultram for pain. Stable at this time for dc with deep breathing techniques.  [DT]      ED Course User Index  [DT] Ritu Zamudio NP             Clinical Impression:   Final diagnoses:  [R07.81] Rib pain on left side (Primary)        ED Disposition Condition    Discharge Stable          ED Prescriptions    None       Follow-up Information       Follow up With Specialties Details Why Contact Info    Bayron Ferguson MD Internal Medicine Schedule an appointment as soon as possible for a visit in 2 days  [address]  [address]  [facility] [address]  [phone]               Ritu Zamudio NP  09/08/22 1528

## 2022-09-16 ENCOUNTER — HOSPITAL ENCOUNTER (OUTPATIENT)
Dept: RADIOLOGY | Facility: HOSPITAL | Age: 57
Discharge: HOME | End: 2022-09-16
Attending: REGISTERED NURSE
Payer: MEDICARE

## 2022-09-16 ENCOUNTER — HOSPITAL ENCOUNTER (OUTPATIENT)
Dept: RADIOLOGY | Facility: HOSPITAL | Age: 57
Discharge: HOME | End: 2022-09-16
Attending: ORTHOPAEDIC SURGERY
Payer: MEDICARE

## 2022-09-16 DIAGNOSIS — M25.512 CHRONIC LEFT SHOULDER PAIN: ICD-10-CM

## 2022-09-16 DIAGNOSIS — M54.16 LUMBAR RADICULOPATHY: ICD-10-CM

## 2022-09-16 DIAGNOSIS — G89.29 CHRONIC LEFT SHOULDER PAIN: ICD-10-CM

## 2022-09-16 LAB
CREAT SERPL-MCNC: 0.9 MG/DL (ref 0.5–1.4)
SAMPLE: NORMAL

## 2022-09-16 PROCEDURE — 72156 MRI CERVICAL SPINE DEMYELINATING W W/O CONTRAST: ICD-10-PCS | Mod: 26,,, | Performed by: RADIOLOGY

## 2022-09-16 PROCEDURE — A9585 GADOBUTROL INJECTION: HCPCS | Performed by: ORTHOPAEDIC SURGERY

## 2022-09-16 PROCEDURE — 72148 MRI LUMBAR SPINE WITHOUT CONTRAST: ICD-10-PCS | Mod: 26,,, | Performed by: RADIOLOGY

## 2022-09-16 PROCEDURE — 72156 MRI NECK SPINE W/O & W/DYE: CPT | Mod: 26,,, | Performed by: RADIOLOGY

## 2022-09-16 PROCEDURE — 72156 MRI NECK SPINE W/O & W/DYE: CPT | Mod: TC

## 2022-09-16 PROCEDURE — 72148 MRI LUMBAR SPINE W/O DYE: CPT | Mod: TC

## 2022-09-16 PROCEDURE — 25500020 PHARM REV CODE 255: Performed by: ORTHOPAEDIC SURGERY

## 2022-09-16 PROCEDURE — 72148 MRI LUMBAR SPINE W/O DYE: CPT | Mod: 26,,, | Performed by: RADIOLOGY

## 2022-09-16 RX ORDER — GADOBUTROL 604.72 MG/ML
10 INJECTION INTRAVENOUS
Status: COMPLETED | OUTPATIENT
Start: 2022-09-16 | End: 2022-09-16

## 2022-09-16 RX ADMIN — GADOBUTROL 10 ML: 604.72 INJECTION INTRAVENOUS at 06:09

## 2022-09-16 NOTE — PROGRESS NOTES
Established Patient - Audio Only Telehealth Visit     The patient location is: home  The chief complaint leading to consultation is: MRI results  Visit type: Virtual visit with audio only (telephone)  Total time spent with patient: 10min     The reason for the audio only service rather than synchronous audio and video virtual visit was related to technical difficulties or patient preference/necessity.     Each patient to whom I provide medical services by telemedicine is:  (1) informed of the relationship between the physician and patient and the respective role of any other health care provider with respect to management of the patient; and (2) notified that they may decline to receive medical services by telemedicine and may withdraw from such care at any time. Patient verbally consented to receive this service via voice-only telephone call.    DATE: 9/22/2022  PATIENT: Tj Trammell    Attending Physician: Ernie Denton M.D.    HISTORY:  Tj Trammell is a 57 y.o. male who returns to me today for MRI results.  He was last seen by me 8/31/2022.  Today he is doing well but notes bilateral leg pain and weakness.    The Patient denies myelopathic symptoms such as handwriting changes or difficulty with buttons/coins/keys. Denies perineal paresthesias, bowel/bladder dysfunction.    PMH/PSH/FamHx/SocHx:  Unchanged from prior visit    ROS:  REVIEW OF SYSTEMS:  Constitution: Negative. Negative for chills, fever and night sweats.   HENT: Negative for congestion and headaches.    Eyes: Negative for blurred vision, left vision loss and right vision loss.   Cardiovascular: Negative for chest pain and syncope.   Respiratory: Negative for cough and shortness of breath.    Endocrine: Negative for polydipsia, polyphagia and polyuria.   Hematologic/Lymphatic: Negative for bleeding problem. Does not bruise/bleed easily.   Skin: Negative for dry skin, itching and rash.   Musculoskeletal: Negative for falls and muscle  weakness.   Gastrointestinal: Negative for abdominal pain and bowel incontinence.   Allergic/Immunologic: Negative for hives and persistent infections.  Genitourinary: Negative for urinary retention/incontinence and nocturia.   Neurological: negative for disturbances in coordination, no myelopathic symptoms such as handwriting changes or difficulty with buttons, coins, keys or small objects. No loss of balance and seizures.   Psychiatric/Behavioral: Negative for depression. The patient does not have insomnia.   Denies myelopathic symptoms, perineal paresthesias, bowel or bladder incontinence    EXAM:  There were no vitals taken for this visit.    Neuro and physical exam stable.     IMAGING:    Cervical MRI shows mild spinal canal stenosis at C3-4 and C5-6 and multilevel foraminal narrowing.     Lumbar MRI shows mild stenosis at L5/S1 with multilevel foraminal narrowing.     There is no height or weight on file to calculate BMI.    Hemoglobin A1C   Date Value Ref Range Status   11/08/2021 6.2 (H) 4.0 - 5.6 % Final     Comment:     ADA Screening Guidelines:  5.7-6.4%  Consistent with prediabetes  >or=6.5%  Consistent with diabetes    High levels of fetal hemoglobin interfere with the HbA1C  assay. Heterozygous hemoglobin variants (HbS, HgC, etc)do  not significantly interfere with this assay.   However, presence of multiple variants may affect accuracy.     06/02/2016 6.1 4.5 - 6.2 % Final   07/15/2014 6.1 4.5 - 6.2 % Final         ASSESSMENT/PLAN:    Diagnoses and all orders for this visit:    Lumbar radiculopathy  -     Procedure Order to Pain Management; Future    Today we discussed at length all of the different treatment options including anti-inflammatories, acetaminophen, rest, ice, heat, physical therapy including strengthening and stretching exercises, home exercises, ROM, aerobic conditioning, aqua therapy, other modalities including ultrasound, massage, and dry needling, epidural steroid injections and  finally surgical intervention.    L5/S1 JEVON ordered with pain management at [place]. The patient may follow up 2 weeks after the injection if his pain persists. I will likely discuss surgical options at that time.      This service was not originating from a related E/M service provided within the previous 7 days nor will  to an E/M service or procedure within the next 24 hours or my soonest available appointment.  Prevailing standard of care was able to be met in this audio-only visit.

## 2022-09-19 ENCOUNTER — TELEPHONE (OUTPATIENT)
Dept: ORTHOPEDICS | Facility: CLINIC | Age: 57
End: 2022-09-19
Payer: MEDICARE

## 2022-09-19 ENCOUNTER — OFFICE VISIT (OUTPATIENT)
Dept: ORTHOPEDICS | Facility: CLINIC | Age: 57
End: 2022-09-19
Payer: MEDICARE

## 2022-09-19 DIAGNOSIS — M54.16 LUMBAR RADICULOPATHY: Primary | ICD-10-CM

## 2022-09-19 PROCEDURE — 99441 PR PHYSICIAN TELEPHONE EVALUATION 5-10 MIN: ICD-10-PCS | Mod: 95,,, | Performed by: REGISTERED NURSE

## 2022-09-19 PROCEDURE — 1160F RVW MEDS BY RX/DR IN RCRD: CPT | Mod: CPTII,95,, | Performed by: REGISTERED NURSE

## 2022-09-19 PROCEDURE — 1159F PR MEDICATION LIST DOCUMENTED IN MEDICAL RECORD: ICD-10-PCS | Mod: CPTII,95,, | Performed by: REGISTERED NURSE

## 2022-09-19 PROCEDURE — 1159F MED LIST DOCD IN RCRD: CPT | Mod: CPTII,95,, | Performed by: REGISTERED NURSE

## 2022-09-19 PROCEDURE — 99441 PR PHYSICIAN TELEPHONE EVALUATION 5-10 MIN: CPT | Mod: 95,,, | Performed by: REGISTERED NURSE

## 2022-09-19 PROCEDURE — 1160F PR REVIEW ALL MEDS BY PRESCRIBER/CLIN PHARMACIST DOCUMENTED: ICD-10-PCS | Mod: CPTII,95,, | Performed by: REGISTERED NURSE

## 2022-09-19 PROCEDURE — 4010F PR ACE/ARB THEARPY RXD/TAKEN: ICD-10-PCS | Mod: CPTII,95,, | Performed by: REGISTERED NURSE

## 2022-09-19 PROCEDURE — 4010F ACE/ARB THERAPY RXD/TAKEN: CPT | Mod: CPTII,95,, | Performed by: REGISTERED NURSE

## 2022-09-19 RX ORDER — PANTOPRAZOLE SODIUM 40 MG/1
TABLET, DELAYED RELEASE ORAL DAILY
Qty: 90 TABLET | Refills: 3 | Status: SHIPPED | OUTPATIENT
Start: 2022-09-19 | End: 2023-09-05 | Stop reason: SDUPTHER

## 2022-09-20 ENCOUNTER — TELEPHONE (OUTPATIENT)
Dept: ORTHOPEDICS | Facility: CLINIC | Age: 57
End: 2022-09-20
Payer: MEDICARE

## 2022-09-21 ENCOUNTER — TELEPHONE (OUTPATIENT)
Dept: ORTHOPEDICS | Facility: CLINIC | Age: 57
End: 2022-09-21
Payer: MEDICARE

## 2022-09-21 ENCOUNTER — SPECIALTY PHARMACY (OUTPATIENT)
Dept: PHARMACY | Facility: CLINIC | Age: 57
End: 2022-09-21
Payer: MEDICARE

## 2022-09-21 NOTE — TELEPHONE ENCOUNTER
Spoke with patient sister in law and she said that she would like for Ms. Ruiz to go over the results because the patient is deaf so he didn't understand.

## 2022-09-21 NOTE — TELEPHONE ENCOUNTER
----- Message from Jordin Nieto sent at 9/21/2022  3:45 PM CDT -----  Regarding: PT IS DEAF AND FAMILY WOULDLIKE A CALL BACK REGARDING GOING OVER RESULTS  Contact: pt  Pt's sister in law calling to get results..    Confirmed contact info below:  Contact Name: Tj Contrerasdinaligia  Phone Number: [phone]

## 2022-09-22 ENCOUNTER — TELEPHONE (OUTPATIENT)
Dept: PAIN MEDICINE | Facility: CLINIC | Age: 57
End: 2022-09-22
Payer: MEDICARE

## 2022-09-22 DIAGNOSIS — M54.16 LUMBAR RADICULOPATHY: Primary | ICD-10-CM

## 2022-09-22 NOTE — TELEPHONE ENCOUNTER
Order received.  We will call and schedule bilateral L5 transforaminal epidural steroid injection with RN IV sedation.

## 2022-09-22 NOTE — LETTER
September 23, 2022    Tj SALDIVAR Jp  [address] [place] [address]             [facility]  [address]  [place] [address]  Phone: [phone] To Mr. Trammell:  Enclosed is your procedure information. Your arrival time will be 8:15 am.     Please do not hesitate to call if you need any assistance.    Sincerely,  Elisha

## 2022-09-23 NOTE — TELEPHONE ENCOUNTER
Specialty Pharmacy - Refill Coordination    Specialty Medication Orders Linked to Encounter      Flowsheet Row Most Recent Value   Medication #1 fingolimod (GILENYA) 0.5 mg Cap (Order#[id number], Rx#[id number])            Refill Questions - Documented Responses      Flowsheet Row Most Recent Value   Patient Availability and HIPAA Verification    Does patient want to proceed with activity? Yes   HIPAA/medical authority confirmed? Yes   Relationship to patient of person spoken to? Other  [[facility] Pharmacist]   Refill Screening Questions    Changes to allergies? No   Changes to medications? No   New conditions since last clinic visit? No   Unplanned office visit, urgent care, ED, or hospital admission in the last 4 weeks? No   How does patient/caregiver feel medication is working? Excellent   Financial problems or insurance changes? No   How many doses of your specialty medications were missed in the last 4 weeks? 0   Would patient like to speak to a pharmacist? No   When does the patient need to receive the medication? 09/26/22   Refill Delivery Questions    How will the patient receive the medication? Delivery Yanet   When does the patient need to receive the medication? 09/26/22   Shipping Address Home   Address in [facility] confirmed and updated if neccessary? Yes   Expected Copay ($) 0   Is the patient able to afford the medication copay? Yes   Payment Method zero copay   Days supply of Refill 30   Supplies needed? No supplies needed   Refill activity completed? Yes   Refill activity plan Refill scheduled   Shipment/Pickup Date: 09/23/22            Current Outpatient Medications   Medication Sig    acetaZOLAMIDE (DIAMOX) 250 MG tablet Take 2 TABLETS BY MOUTH ONLY 8 HOURS AFTER SURGERY    albuterol (PROAIR HFA) 90 mcg/actuation inhaler Inhale 2 puffs by mouth every 4 hours.    albuterol (PROVENTIL) 2.5 mg /3 mL (0.083 %) nebulizer solution Take 3 mLs (2.5 mg total) by nebulization every 6 (six) hours  as needed for Wheezing or Shortness of Breath. Rescue    ALPRAZolam (XANAX) 0.5 MG tablet TAKE ONE TABLET BY MOUTH TWICE DAILY AS NEEDED FOR ANXIETY    atorvastatin (LIPITOR) 40 MG tablet Take 1 tablet (40 mg total) by mouth once daily.    bacitracin 500 unit/gram Oint Apply topically 2 (two) times daily.    baclofen (LIORESAL) 10 MG tablet TAKE 1 TABLET BY MOUTH 3 TIMES DAILY.    buPROPion (WELLBUTRIN SR) 150 MG TBSR 12 hr tablet TAKE 1 TABLET BY MOUTH EVERY 12 HOURS DAILY.    dicyclomine (BENTYL) 10 MG capsule TAKE 1 CAPSULE BY MOUTH THREE TIMES A DAY FOR ABDOMINAL PAIN    difluprednate (DUREZOL) 0.05 % Drop ophthalmic solution Instill one drop TO SURGERY EYE four times a day AFTER SURGERY X 30 DAYS    docusate sodium (COLACE) 100 MG capsule Take 1 capsule (100 mg total) by mouth once daily.    ergocalciferol (ERGOCALCIFEROL) 50,000 unit Cap TAKE 1 CAPSULE BY MOUTH Weekly    famotidine (PEPCID) 20 MG tablet Take 1 tablet (20 mg total) by mouth 2 (two) times daily.    fingolimod (GILENYA) 0.5 mg Cap TAKE ONE CAPSULE (0.5 MG) BY MOUTH ONCE DAILY. MAY TAKE WITH OR WITHOUT FOOD. STORE AT ROOM TEMPERATURE.    gabapentin (NEURONTIN) 300 MG capsule TAKE 1 CAPSULE BY MOUTH AT BEDTIME    lactulose (CHRONULAC) 10 gram/15 mL solution TAKE 45ML BY MOUTH THREE TIMES A DAY    lisinopriL (PRINIVIL,ZESTRIL) 5 MG tablet TAKE 1 TABLET BY MOUTH DAILY    mupirocin (BACTROBAN) 2 % ointment Apply topically 3 (three) times daily.    nabumetone (RELAFEN) 500 MG tablet Take 1 tablet (500 mg total) by mouth 2 (two) times daily as needed.    nebulizer and compressor (HOME NEBULIZER PLUS SIDESTREAM) Lupe use as directed    ofloxacin (OCUFLOX) 0.3 % ophthalmic solution Instill 1 drop TO SURGERY EYE four times a day STARTING 2 DAYS BEFORE SURGERY    oxybutynin (DITROPAN-XL) 5 MG TR24 Take 1 tablet (5 mg total) by mouth once daily.    pantoprazole (PROTONIX) 40 MG tablet TAKE ONE TABLET BY MOUTH  ONCE DAILY    polyethylene glycol (GLYCOLAX) 17  gram/dose powder Take 17 g by mouth once daily.    traMADoL (ULTRAM) 50 mg tablet Take 1 tablet (50 mg total) by mouth every 6 (six) hours as needed for pain    triamcinolone acetonide 0.1% (KENALOG) 0.1 % cream Apply topically 2 (two) times daily. for 10 days   Last reviewed on 9/19/2022  9:00 AM by LISA Ruiz NP    Review of patient's allergies indicates:  No Known Allergies Last reviewed on  9/19/2022 9:00 AM by MU Ruiz      Tasks added this encounter   10/19/2022 - Refill Call (Auto Added)   Tasks due within next 3 months   No tasks due.     Jorden Mills, PharmD  [facility]  [address] [place] [id number]  Phone: [phone]  Fax: [phone]

## 2022-10-05 ENCOUNTER — HOSPITAL ENCOUNTER (OUTPATIENT)
Facility: HOSPITAL | Age: 57
Discharge: HOME | End: 2022-10-05
Attending: PHYSICAL MEDICINE & REHABILITATION | Admitting: PHYSICAL MEDICINE & REHABILITATION
Payer: MEDICARE

## 2022-10-05 VITALS
TEMPERATURE: 98 F | DIASTOLIC BLOOD PRESSURE: 82 MMHG | SYSTOLIC BLOOD PRESSURE: 161 MMHG | OXYGEN SATURATION: 98 % | RESPIRATION RATE: 16 BRPM | HEIGHT: 69 IN | HEART RATE: 69 BPM | BODY MASS INDEX: 34.07 KG/M2 | WEIGHT: 230 LBS

## 2022-10-05 DIAGNOSIS — M54.16 LUMBAR RADICULOPATHY: Primary | ICD-10-CM

## 2022-10-05 DIAGNOSIS — R52 PAIN: ICD-10-CM

## 2022-10-05 PROCEDURE — 64483 NJX AA&/STRD TFRM EPI L/S 1: CPT | Mod: 50,,, | Performed by: PHYSICAL MEDICINE & REHABILITATION

## 2022-10-05 PROCEDURE — 25500020 PHARM REV CODE 255: Performed by: PHYSICAL MEDICINE & REHABILITATION

## 2022-10-05 PROCEDURE — 63600175 PHARM REV CODE 636 W HCPCS: Performed by: PHYSICAL MEDICINE & REHABILITATION

## 2022-10-05 PROCEDURE — 64483 NJX AA&/STRD TFRM EPI L/S 1: CPT | Mod: 50 | Performed by: PHYSICAL MEDICINE & REHABILITATION

## 2022-10-05 PROCEDURE — 64483 PR EPIDURAL INJ, ANES/STEROID, TRANSFORAMINAL, LUMB/SACR, SNGL LEVL: ICD-10-PCS | Mod: 50,,, | Performed by: PHYSICAL MEDICINE & REHABILITATION

## 2022-10-05 PROCEDURE — 25000003 PHARM REV CODE 250: Performed by: PHYSICAL MEDICINE & REHABILITATION

## 2022-10-05 RX ORDER — LIDOCAINE HYDROCHLORIDE 10 MG/ML
INJECTION, SOLUTION EPIDURAL; INFILTRATION; INTRACAUDAL; PERINEURAL
Status: DISCONTINUED | OUTPATIENT
Start: 2022-10-05 | End: 2022-10-05 | Stop reason: HOSPADM

## 2022-10-05 RX ORDER — SODIUM CHLORIDE 9 MG/ML
500 INJECTION, SOLUTION INTRAVENOUS CONTINUOUS
Status: DISCONTINUED | OUTPATIENT
Start: 2022-10-05 | End: 2022-10-05 | Stop reason: HOSPADM

## 2022-10-05 RX ORDER — FENTANYL CITRATE 50 UG/ML
INJECTION, SOLUTION INTRAMUSCULAR; INTRAVENOUS
Status: DISCONTINUED | OUTPATIENT
Start: 2022-10-05 | End: 2022-10-05 | Stop reason: HOSPADM

## 2022-10-05 RX ORDER — LIDOCAINE HYDROCHLORIDE 10 MG/ML
INJECTION INFILTRATION; PERINEURAL
Status: DISCONTINUED | OUTPATIENT
Start: 2022-10-05 | End: 2022-10-05 | Stop reason: HOSPADM

## 2022-10-05 RX ORDER — MIDAZOLAM HYDROCHLORIDE 1 MG/ML
INJECTION INTRAMUSCULAR; INTRAVENOUS
Status: DISCONTINUED | OUTPATIENT
Start: 2022-10-05 | End: 2022-10-05 | Stop reason: HOSPADM

## 2022-10-05 RX ORDER — DEXAMETHASONE SODIUM PHOSPHATE 4 MG/ML
INJECTION, SOLUTION INTRA-ARTICULAR; INTRALESIONAL; INTRAMUSCULAR; INTRAVENOUS; SOFT TISSUE
Status: DISCONTINUED | OUTPATIENT
Start: 2022-10-05 | End: 2022-10-05 | Stop reason: HOSPADM

## 2022-10-05 NOTE — DISCHARGE INSTRUCTIONS
Home Care Instructions Pain Management:    1.  DIET:    You may resume your normal diet today.    2.  BATHING:    You may shower with luke warm water.    3.  DRESSING:    You may remove your bandage today.    4.  ACTIVITY LEVEL:      You may resume your normal activities 24 hours after your procedure.    5.  MEDICATIONS:    You may resume your normal medications today.    6.  SPECIAL INSTRUCTIONS:    No heat to the injection site for 24 hours including bath or shower, heating pad, moist heat or hot tubs.    Use an ice pack to the injection site for any pain or discomfort.  Apply ice packs for 20 minute intervals as needed.    If you have received any sedatives by mouth today, you can not drive for 12 hours.    If you have received sedation through an IV, you can not drive for 24 hours.    PLEASE CALL YOUR DOCTOR FOR THE FOLLOWIN.  Redness or swelling around the injection site.  2.  Fever of 101 degrees.  3.  Drainage (pus) from the injection site.  4.  For any continuous bleeding (some dried blood over the incision is normal.)    FOR EMERGENCIES:    If any unusual problems or difficulties occur during clinic hours, call [phone] or dial [phone].    Follow up with with your physician in 2-3 weeks.

## 2022-10-05 NOTE — H&P
HPI  Patient presenting for Procedure(s) (LRB):  Injection,steroid,epidural,transforaminal approach (Bilateral)     Patient on Anti-coagulation No    No health changes since previous encounter. No changes in pain since procedure was scheduled at previous visit. Denies any fevers or infections.     No current facility-administered medications on file prior to encounter.     Current Outpatient Medications on File Prior to Encounter   Medication Sig Dispense Refill    acetaZOLAMIDE (DIAMOX) 250 MG tablet Take 2 TABLETS BY MOUTH ONLY 8 HOURS AFTER SURGERY 2 tablet 0    albuterol (PROAIR HFA) 90 mcg/actuation inhaler Inhale 2 puffs by mouth every 4 hours. 8.5 g 3    albuterol (PROVENTIL) 2.5 mg /3 mL (0.083 %) nebulizer solution Take 3 mLs (2.5 mg total) by nebulization every 6 (six) hours as needed for Wheezing or Shortness of Breath. Rescue 180 mL 6    ALPRAZolam (XANAX) 0.5 MG tablet TAKE ONE TABLET BY MOUTH TWICE DAILY AS NEEDED FOR ANXIETY 60 tablet 4    atorvastatin (LIPITOR) 40 MG tablet Take 1 tablet (40 mg total) by mouth once daily. 90 tablet 4    bacitracin 500 unit/gram Oint Apply topically 2 (two) times daily. 28 g 0    baclofen (LIORESAL) 10 MG tablet TAKE 1 TABLET BY MOUTH 3 TIMES DAILY. 90 tablet 11    buPROPion (WELLBUTRIN SR) 150 MG TBSR 12 hr tablet TAKE 1 TABLET BY MOUTH EVERY 12 HOURS DAILY. 60 tablet 11    dicyclomine (BENTYL) 10 MG capsule TAKE 1 CAPSULE BY MOUTH THREE TIMES A DAY FOR ABDOMINAL PAIN 90 capsule 3    difluprednate (DUREZOL) 0.05 % Drop ophthalmic solution Instill one drop TO SURGERY EYE four times a day AFTER SURGERY X 30 DAYS 5 mL 4    docusate sodium (COLACE) 100 MG capsule Take 1 capsule (100 mg total) by mouth once daily. 30 capsule 5    ergocalciferol (ERGOCALCIFEROL) 50,000 unit Cap TAKE 1 CAPSULE BY MOUTH Weekly 12 capsule 0    famotidine (PEPCID) 20 MG tablet Take 1 tablet (20 mg total) by mouth 2 (two) times daily. 60 tablet 3    fingolimod (GILENYA) 0.5 mg Cap TAKE ONE  CAPSULE (0.5 MG) BY MOUTH ONCE DAILY. MAY TAKE WITH OR WITHOUT FOOD. STORE AT ROOM TEMPERATURE. 90 capsule 2    gabapentin (NEURONTIN) 300 MG capsule TAKE 1 CAPSULE BY MOUTH AT BEDTIME 30 capsule 11    lactulose (CHRONULAC) 10 gram/15 mL solution TAKE 45ML BY MOUTH THREE TIMES A DAY 3784 mL 6    lisinopriL (PRINIVIL,ZESTRIL) 5 MG tablet TAKE 1 TABLET BY MOUTH DAILY 90 tablet 3    mupirocin (BACTROBAN) 2 % ointment Apply topically 3 (three) times daily. 22 g 0    nabumetone (RELAFEN) 500 MG tablet Take 1 tablet (500 mg total) by mouth 2 (two) times daily as needed. 60 tablet 6    nebulizer and compressor (HOME NEBULIZER PLUS SIDESTREAM) Lupe use as directed 1 each 0    ofloxacin (OCUFLOX) 0.3 % ophthalmic solution Instill 1 drop TO SURGERY EYE four times a day STARTING 2 DAYS BEFORE SURGERY 5 mL 3    oxybutynin (DITROPAN-XL) 5 MG TR24 Take 1 tablet (5 mg total) by mouth once daily. 90 tablet 3    pantoprazole (PROTONIX) 40 MG tablet TAKE ONE TABLET BY MOUTH  ONCE DAILY 90 tablet 3    polyethylene glycol (GLYCOLAX) 17 gram/dose powder Take 17 g by mouth once daily. 510 g 3    traMADoL (ULTRAM) 50 mg tablet Take 1 tablet (50 mg total) by mouth every 6 (six) hours as needed for pain 120 tablet 4    triamcinolone acetonide 0.1% (KENALOG) 0.1 % cream Apply topically 2 (two) times daily. for 10 days 80 g 0     Past Medical History:   Diagnosis Date    Asthma     Bilateral hearing loss 12/12/2012    Biliary acute pancreatitis     GERD (gastroesophageal reflux disease)     High cholesterol     Hypertension     Multiple sclerosis      Past Surgical History:   Procedure Laterality Date    APPENDECTOMY      CHOLECYSTECTOMY      COLONOSCOPY N/A 1/22/2019    Procedure: COLONOSCOPY 2 day  golytely;  Surgeon: Nathan Waddell MD;  Location: [facility];  Service: Endoscopy;  Laterality: N/A;    ESOPHAGOGASTRODUODENOSCOPY N/A 9/29/2021    Procedure: EGD (ESOPHAGOGASTRODUODENOSCOPY) covid test Rapid;  Surgeon: Jared Loredo  "MD;  Location: [facility];  Service: Endoscopy;  Laterality: N/A;    JOINT REPLACEMENT      arm    TONSILLECTOMY       Review of patient's allergies indicates:  No Known Allergies   Current Facility-Administered Medications   Medication    0.9%  NaCl infusion    dexamethasone injection    iohexoL (OMNIPAQUE 240) injection    LIDOcaine (PF) 10 mg/ml (1%) injection    LIDOcaine HCL 10 mg/ml (1%) injection       PMHx, PSHx, Allergies, Medications reviewed in epic    ROS negative except pain complaints in HPI    OBJECTIVE:    BP (!) 163/71   Pulse 69   Temp 98 °F (36.7 °C) (Skin)   Resp 18   Ht 5' 9" (1.753 m)   Wt 104.3 kg (230 lb)   SpO2 100%   BMI 33.97 kg/m²     PHYSICAL EXAMINATION:    GENERAL: Well appearing, in no acute distress, alert and oriented.  PSYCH:  Mood and affect appropriate.  SKIN: Skin color, texture, turgor normal in procedure area, no rashes or lesions which will impact the procedure.  CV: RRR with palpation of the radial artery.  PULM: No evidence of respiratory difficulty, symmetric chest rise. Clear to auscultation.  NEURO: Alert. Oriented. Speech fluent and appropriate. Moving all extremities.    Plan:    Proceed with procedure as planned Procedure(s) (LRB):  Injection,steroid,epidural,transforaminal approach (Bilateral)    Consent was obtained using  virtually.    Grey Hogue  10/05/2022           "

## 2022-10-05 NOTE — OP NOTE
Lumbar Transforaminal Epidural Steroid Injection under fluoroscopic guidance  I have reviewed the patient's medications, allergies and relevant histories prior to the procedure and no contraindications have been identified. The risks, benefits and alternatives to the procedure were discussed with the patient, and all questions regarding the procedure were answered to the patient's satisfaction. I personally obtained Tj's consent prior to the start of the procedure and the signed consent can be found in the patient's chart.                                                         Time-out was taken to identify patient, procedure, laterality, and allergies prior to starting the procedure.       Date of Service: 10/05/2022  Procedure: bilateral L5 transforaminal epidural steroid injection under fluoroscopy  Pre-Operative Diagnosis: Lumbar Radiculopathy  Post-Operative Diagnosis: Lumbar Radiculopathy    Physician: Soo Giles M.D.  Assistants: Grey Hogue M.D.      Medications Injected:  Preservative-free dexamethasone 10 mg/mL & 5 mL of Xylocaine 1% MPF (3 mL injected per site).   Local Anesthetic: Xylocaine 1% 10 mL.   Sedation Medications:  Grey Hogue M.D.      Procedural Technique:   Laying in a prone position, the patient was prepped and draped in the usual sterile fashion using ChloraPrep and fenestrated drape. The vertebral foramen of interest stated above was determined under fluoroscopic guidance.  Local anesthetic was given by raising a wheel and going down to the hub of a 25-gauge 1.5 inch needle.  The 5 inch 22-gauge spinal needle was introduced towards the inferior-medial aspect transverse process of each above named nerve root levels.  The needle was walked medially then hinged into the neural foramen.  After negative aspiration, 1-2 mL of Omnipaque was injected to confirm appropriate placement and that there was no vascular runoff.  The medication was then injected slowly. Needles  were removed and bandages were applied to the areas.     Estimated Blood Loss:  None.  Complications:  None.     Disposition: The patient tolerated the procedure well. Vital signs remained stable throughout the procedure. The patient was taken to the recovery area where written discharge instructions for the procedure were given.     Follow-Up: We will see the patient back in two weeks or the patient may call to inform of status. The patient was discharged in a stable condition.

## 2022-10-05 NOTE — PLAN OF CARE
Dr Giles at bedside, obtaining consent using  for MERI, Any (#[id number]). MD wants IV placed for procedure, informed assessment, specifically meds, history, assessment were not completed due to possibly cognitive status and communication. Also, informed patient reports no help at home and falling daily.

## 2022-10-05 NOTE — DISCHARGE SUMMARY
[facility]  Discharge Note  Short Stay     Admit Date: 10/5/2022    Discharge Date: 10/5/2022     Attending Physician: Soo Giles M.D.    Diagnoses:  Active Hospital Problems    Diagnosis  POA    *Lumbar radiculopathy [M54.16]  Yes      Resolved Hospital Problems   No resolved problems to display.     Discharged Condition: Good     Hospital Course: Patient was admitted for an outpatient interventional pain management procedure and tolerated the procedure well with no complications.     Final Diagnoses: Same as principal problem.     Disposition: Home or Self Care     Follow up/Patient Instructions:   Follow-up in 1-2 weeks unless otherwise instructed. May return sooner as needed.       Reconciled Medications:     Medication List        CONTINUE taking these medications      acetaZOLAMIDE 250 MG tablet  Commonly known as: DIAMOX  Take 2 TABLETS BY MOUTH ONLY 8 HOURS AFTER SURGERY     * albuterol 90 mcg/actuation inhaler  Commonly known as: PROAIR HFA  Inhale 2 puffs by mouth every 4 hours.     * albuterol 2.5 mg /3 mL (0.083 %) nebulizer solution  Commonly known as: PROVENTIL  Take 3 mLs (2.5 mg total) by nebulization every 6 (six) hours as needed for Wheezing or Shortness of Breath. Rescue     ALPRAZolam 0.5 MG tablet  Commonly known as: XANAX  TAKE ONE TABLET BY MOUTH TWICE DAILY AS NEEDED FOR ANXIETY     atorvastatin 40 MG tablet  Commonly known as: LIPITOR  Take 1 tablet (40 mg total) by mouth once daily.     bacitracin 500 unit/gram Oint  Apply topically 2 (two) times daily.     baclofen 10 MG tablet  Commonly known as: LIORESAL  TAKE 1 TABLET BY MOUTH 3 TIMES DAILY.     buPROPion 150 MG TBSR 12 hr tablet  Commonly known as: WELLBUTRIN SR  TAKE 1 TABLET BY MOUTH EVERY 12 HOURS DAILY.     dicyclomine 10 MG capsule  Commonly known as: BENTYL  TAKE 1 CAPSULE BY MOUTH THREE TIMES A DAY FOR ABDOMINAL PAIN     docusate sodium 100 MG capsule  Commonly known as: COLACE  Take 1 capsule (100 mg total) by  mouth once daily.     DurezoL 0.05 % Drop ophthalmic solution  Generic drug: difluprednate  Instill one drop TO SURGERY EYE four times a day AFTER SURGERY X 30 DAYS     famotidine 20 MG tablet  Commonly known as: PEPCID  Take 1 tablet (20 mg total) by mouth 2 (two) times daily.     gabapentin 300 MG capsule  Commonly known as: NEURONTIN  TAKE 1 CAPSULE BY MOUTH AT BEDTIME     GILENYA 0.5 mg Cap  Generic drug: fingolimod  TAKE ONE CAPSULE (0.5 MG) BY MOUTH ONCE DAILY. MAY TAKE WITH OR WITHOUT FOOD. STORE AT ROOM TEMPERATURE.     HOME NEBULIZER PLUS SIDESTREAM Lupe  Generic drug: nebulizer and compressor  use as directed     lactulose 10 gram/15 mL solution  Commonly known as: CHRONULAC  TAKE 45ML BY MOUTH THREE TIMES A DAY     lisinopriL 5 MG tablet  Commonly known as: PRINIVIL,ZESTRIL  TAKE 1 TABLET BY MOUTH DAILY     mupirocin 2 % ointment  Commonly known as: BACTROBAN  Apply topically 3 (three) times daily.     nabumetone 500 MG tablet  Commonly known as: RELAFEN  Take 1 tablet (500 mg total) by mouth 2 (two) times daily as needed.     ofloxacin 0.3 % ophthalmic solution  Commonly known as: OCUFLOX  Instill 1 drop TO SURGERY EYE four times a day STARTING 2 DAYS BEFORE SURGERY     oxybutynin 5 MG Tr24  Commonly known as: DITROPAN-XL  Take 1 tablet (5 mg total) by mouth once daily.     pantoprazole 40 MG tablet  Commonly known as: PROTONIX  TAKE ONE TABLET BY MOUTH  ONCE DAILY     polyethylene glycol 17 gram/dose powder  Commonly known as: GLYCOLAX  Take 17 g by mouth once daily.     traMADoL 50 mg tablet  Commonly known as: ULTRAM  Take 1 tablet (50 mg total) by mouth every 6 (six) hours as needed for pain     triamcinolone acetonide 0.1% 0.1 % cream  Commonly known as: KENALOG  Apply topically 2 (two) times daily. for 10 days     VITAMIN D2 50,000 unit Cap  Generic drug: ergocalciferol  TAKE 1 CAPSULE BY MOUTH Weekly           * This list has 2 medication(s) that are the same as other medications prescribed for  you. Read the directions carefully, and ask your doctor or other care provider to review them with you.                 Discharge Procedure Orders (must include Diet, Follow-up, Activity)   Ice to affected area   Order Comments: 20 minutes of ice or until area numb to the touch if area is sore 2-3 times per day as needed     No driving until:   Order Comments: Until following day     No dressing needed     Notify your health care provider if you experience any of the following:  temperature >100.4     Notify your health care provider if you experience any of the following:  persistent nausea and vomiting or diarrhea     Notify your health care provider if you experience any of the following:  severe uncontrolled pain     Notify your health care provider if you experience any of the following:  redness, tenderness, or signs of infection (pain, swelling, redness, odor or green/yellow discharge around incision site)     Notify your health care provider if you experience any of the following:  difficulty breathing or increased cough     Notify your health care provider if you experience any of the following:  severe persistent headache     Notify your health care provider if you experience any of the following:  worsening rash     Notify your health care provider if you experience any of the following:  persistent dizziness, light-headedness, or visual disturbances     Notify your health care provider if you experience any of the following:  increased confusion or weakness     Shower on day dressing removed (No bath)       Soo Giles M.D.  Interventional Pain Medicine / Physical Medicine & Rehabilitation

## 2022-10-05 NOTE — PLAN OF CARE
Safety precautions maintained. Bed locked and in lowest position. Instructed pt to call for assistance. Pt verbalizes understanding. Contacted patient family member but he reports he is not sure of meds and patient history.Using  Any #[id number] (ASL), to communicate with patient, he says he can read lips a little and he understands sign language, Patient is difficult to understand.  says she is not sure of his baseline and his communication. Attempted to complete the  Pre-admission assessment with patient and , could not complete due to patient being a poor historian. Informed Dr Giles of the above and that he reports waking up SOB and falling daily and no one to help him at home. He will come speak to patient

## 2022-10-20 ENCOUNTER — SPECIALTY PHARMACY (OUTPATIENT)
Dept: PHARMACY | Facility: CLINIC | Age: 57
End: 2022-10-20
Payer: MEDICARE

## 2022-10-20 NOTE — TELEPHONE ENCOUNTER
Specialty Pharmacy - Refill Coordination    Specialty Medication Orders Linked to Encounter      Flowsheet Row Most Recent Value   Medication #1 fingolimod (GILENYA) 0.5 mg Cap (Order#[id number], Rx#[id number])            Refill Questions - Documented Responses      Flowsheet Row Most Recent Value   Patient Availability and HIPAA Verification    Does patient want to proceed with activity? Yes   HIPAA/medical authority confirmed? Yes   Relationship to patient of person spoken to? Self   Refill Screening Questions    Changes to allergies? No   Changes to medications? No   New conditions since last clinic visit? No   Unplanned office visit, urgent care, ED, or hospital admission in the last 4 weeks? No   How does patient/caregiver feel medication is working? Excellent   Financial problems or insurance changes? No   How many doses of your specialty medications were missed in the last 4 weeks? 0   Would patient like to speak to a pharmacist? No   When does the patient need to receive the medication? 10/24/22   Refill Delivery Questions    How will the patient receive the medication? MEDRx   When does the patient need to receive the medication? 10/24/22   Shipping Address Home   Address in [facility] confirmed and updated if neccessary? Yes   Expected Copay ($) 0   Is the patient able to afford the medication copay? Yes   Payment Method zero copay   Days supply of Refill 30   Supplies needed? No supplies needed   Refill activity completed? Yes   Refill activity plan Refill scheduled   Shipment/Pickup Date: 10/24/22            Current Outpatient Medications   Medication Sig    acetaZOLAMIDE (DIAMOX) 250 MG tablet Take 2 TABLETS BY MOUTH ONLY 8 HOURS AFTER SURGERY    albuterol (PROAIR HFA) 90 mcg/actuation inhaler Inhale 2 puffs by mouth every 4 hours.    albuterol (PROVENTIL) 2.5 mg /3 mL (0.083 %) nebulizer solution Take 3 mLs (2.5 mg total) by nebulization every 6 (six) hours as needed for Wheezing or Shortness of  Breath. Rescue    ALPRAZolam (XANAX) 0.5 MG tablet TAKE ONE TABLET BY MOUTH TWICE DAILY AS NEEDED FOR ANXIETY    atorvastatin (LIPITOR) 40 MG tablet Take 1 tablet (40 mg total) by mouth once daily.    bacitracin 500 unit/gram Oint Apply topically 2 (two) times daily.    baclofen (LIORESAL) 10 MG tablet TAKE 1 TABLET BY MOUTH 3 TIMES DAILY.    buPROPion (WELLBUTRIN SR) 150 MG TBSR 12 hr tablet TAKE 1 TABLET BY MOUTH EVERY 12 HOURS DAILY.    dicyclomine (BENTYL) 10 MG capsule TAKE 1 CAPSULE BY MOUTH THREE TIMES A DAY FOR ABDOMINAL PAIN    difluprednate (DUREZOL) 0.05 % Drop ophthalmic solution Instill one drop TO SURGERY EYE four times a day AFTER SURGERY X 30 DAYS    docusate sodium (COLACE) 100 MG capsule Take 1 capsule (100 mg total) by mouth once daily.    ergocalciferol (ERGOCALCIFEROL) 50,000 unit Cap TAKE 1 CAPSULE BY MOUTH Weekly    famotidine (PEPCID) 20 MG tablet Take 1 tablet (20 mg total) by mouth 2 (two) times daily.    fingolimod (GILENYA) 0.5 mg Cap TAKE ONE CAPSULE (0.5 MG) BY MOUTH ONCE DAILY. MAY TAKE WITH OR WITHOUT FOOD. STORE AT ROOM TEMPERATURE.    gabapentin (NEURONTIN) 300 MG capsule TAKE 1 CAPSULE BY MOUTH AT BEDTIME    lactulose (CHRONULAC) 10 gram/15 mL solution TAKE 45ML BY MOUTH THREE TIMES A DAY    lisinopriL (PRINIVIL,ZESTRIL) 5 MG tablet TAKE 1 TABLET BY MOUTH DAILY    mupirocin (BACTROBAN) 2 % ointment Apply topically 3 (three) times daily.    nabumetone (RELAFEN) 500 MG tablet Take 1 tablet (500 mg total) by mouth 2 (two) times daily as needed.    nebulizer and compressor (HOME NEBULIZER PLUS SIDESTREAM) Lupe use as directed    ofloxacin (OCUFLOX) 0.3 % ophthalmic solution Instill 1 drop TO SURGERY EYE four times a day STARTING 2 DAYS BEFORE SURGERY    oxybutynin (DITROPAN-XL) 5 MG TR24 Take 1 tablet (5 mg total) by mouth once daily.    pantoprazole (PROTONIX) 40 MG tablet TAKE ONE TABLET BY MOUTH  ONCE DAILY    polyethylene glycol (GLYCOLAX) 17 gram/dose powder Take 17 g by mouth  once daily.    traMADoL (ULTRAM) 50 mg tablet Take 1 tablet (50 mg total) by mouth every 6 (six) hours as needed for pain    triamcinolone acetonide 0.1% (KENALOG) 0.1 % cream Apply topically 2 (two) times daily. for 10 days   Last reviewed on 9/19/2022  9:00 AM by LISA Ruiz NP    Review of patient's allergies indicates:  No Known Allergies Last reviewed on  10/5/2022 10:34 AM by Noelle Hughes      Tasks added this encounter   11/15/2022 - Refill Call (Auto Added)   Tasks due within next 3 months   No tasks due.     Jorden Mills, PharmD  [facility]  [address] [place] [id number]  Phone: [phone]  Fax: [phone]

## 2022-10-27 ENCOUNTER — OFFICE VISIT (OUTPATIENT)
Dept: PAIN MEDICINE | Facility: CLINIC | Age: 57
End: 2022-10-27
Payer: MEDICARE

## 2022-10-27 VITALS
SYSTOLIC BLOOD PRESSURE: 153 MMHG | WEIGHT: 210.88 LBS | BODY MASS INDEX: 31.23 KG/M2 | DIASTOLIC BLOOD PRESSURE: 92 MMHG | HEART RATE: 72 BPM | HEIGHT: 69 IN | RESPIRATION RATE: 16 BRPM

## 2022-10-27 DIAGNOSIS — G35 MULTIPLE SCLEROSIS: ICD-10-CM

## 2022-10-27 DIAGNOSIS — M54.16 LUMBAR RADICULOPATHY: ICD-10-CM

## 2022-10-27 DIAGNOSIS — G89.29 CHRONIC BILATERAL LOW BACK PAIN WITH BILATERAL SCIATICA: ICD-10-CM

## 2022-10-27 DIAGNOSIS — M54.42 CHRONIC BILATERAL LOW BACK PAIN WITH BILATERAL SCIATICA: ICD-10-CM

## 2022-10-27 DIAGNOSIS — M54.41 CHRONIC BILATERAL LOW BACK PAIN WITH BILATERAL SCIATICA: ICD-10-CM

## 2022-10-27 DIAGNOSIS — Z74.09 IMPAIRED FUNCTIONAL MOBILITY, BALANCE, GAIT, AND ENDURANCE: Primary | ICD-10-CM

## 2022-10-27 PROCEDURE — 99214 PR OFFICE/OUTPT VISIT, EST, LEVL IV, 30-39 MIN: ICD-10-PCS | Mod: S$GLB,,, | Performed by: PHYSICAL MEDICINE & REHABILITATION

## 2022-10-27 PROCEDURE — 99214 OFFICE O/P EST MOD 30 MIN: CPT | Mod: S$GLB,,, | Performed by: PHYSICAL MEDICINE & REHABILITATION

## 2022-10-27 PROCEDURE — 1160F RVW MEDS BY RX/DR IN RCRD: CPT | Mod: CPTII,S$GLB,, | Performed by: PHYSICAL MEDICINE & REHABILITATION

## 2022-10-27 PROCEDURE — 1160F PR REVIEW ALL MEDS BY PRESCRIBER/CLIN PHARMACIST DOCUMENTED: ICD-10-PCS | Mod: CPTII,S$GLB,, | Performed by: PHYSICAL MEDICINE & REHABILITATION

## 2022-10-27 PROCEDURE — 1159F PR MEDICATION LIST DOCUMENTED IN MEDICAL RECORD: ICD-10-PCS | Mod: CPTII,S$GLB,, | Performed by: PHYSICAL MEDICINE & REHABILITATION

## 2022-10-27 PROCEDURE — 3077F SYST BP >= 140 MM HG: CPT | Mod: CPTII,S$GLB,, | Performed by: PHYSICAL MEDICINE & REHABILITATION

## 2022-10-27 PROCEDURE — 1159F MED LIST DOCD IN RCRD: CPT | Mod: CPTII,S$GLB,, | Performed by: PHYSICAL MEDICINE & REHABILITATION

## 2022-10-27 PROCEDURE — 99999 PR PBB SHADOW E&M-EST. PATIENT-LVL V: ICD-10-PCS | Mod: PBBFAC,,, | Performed by: PHYSICAL MEDICINE & REHABILITATION

## 2022-10-27 PROCEDURE — 4010F ACE/ARB THERAPY RXD/TAKEN: CPT | Mod: CPTII,S$GLB,, | Performed by: PHYSICAL MEDICINE & REHABILITATION

## 2022-10-27 PROCEDURE — 4010F PR ACE/ARB THEARPY RXD/TAKEN: ICD-10-PCS | Mod: CPTII,S$GLB,, | Performed by: PHYSICAL MEDICINE & REHABILITATION

## 2022-10-27 PROCEDURE — 99999 PR PBB SHADOW E&M-EST. PATIENT-LVL V: CPT | Mod: PBBFAC,,, | Performed by: PHYSICAL MEDICINE & REHABILITATION

## 2022-10-27 PROCEDURE — 3080F PR MOST RECENT DIASTOLIC BLOOD PRESSURE >= 90 MM HG: ICD-10-PCS | Mod: CPTII,S$GLB,, | Performed by: PHYSICAL MEDICINE & REHABILITATION

## 2022-10-27 PROCEDURE — 3080F DIAST BP >= 90 MM HG: CPT | Mod: CPTII,S$GLB,, | Performed by: PHYSICAL MEDICINE & REHABILITATION

## 2022-10-27 PROCEDURE — 3077F PR MOST RECENT SYSTOLIC BLOOD PRESSURE >= 140 MM HG: ICD-10-PCS | Mod: CPTII,S$GLB,, | Performed by: PHYSICAL MEDICINE & REHABILITATION

## 2022-10-27 NOTE — PROGRESS NOTES
[facility]    Chief Complaint:   Chief Complaint   Patient presents with    Back Pain       History of Present Illness: Tj Trammell is a 57 y.o. male here for back pain.     Onset: several years, but progressively worsening  Location: bilateral low back  Radiation: Bilateral legs diffusely, left leg worse than right and legs worse than back  Timing: constant  Quality: Sharp  Exacerbating Factors:  lifting the leg, walking  Alleviating Factors: nothing  Associated Symptoms: He feels numbness and tingling in both legs diffusely. He has weakness in both legs. He notes frequent falls, with resultant injuries in the past few months. He uses rollator walker. He has bladder incontinence and is on ditropan. He denies night fever/night sweats, bowel incontinence, and significant weight loss    He has tried tramadol and gabapentin. He has not done PT.     Severity: Currently: 10/10   Typical Range: 10-10/10     Exacerbation: 10/10     Pain Disability Index  Family/Home Responsibilities:: 0  Recreation:: 0  Social Activity:: 3  Occupation:: 0  Sexual Behavior:: 0  Self Care:: 1  Life-Support Activities:: 3  Pain Disability Index (PDI): 7    Previous Interventions:  - 10/5/22: B/L L5 TF JEVON w/ no relief.     Previous Therapies:  PT/OT: no  Relevant Surgery: no   Previous Medications:   - NSAIDS: relafen  - Muscle Relaxants: baclofen.    - TCAs:   - SNRIs:   - Topicals:   - Anticonvulsants: gabapentin    - Opioids: Tramadol    Current Pain Medications:  Tramadol  Baclofen  Gabapentin       Blood Thinners: None    Full Medication List:    Current Outpatient Medications:     acetaZOLAMIDE (DIAMOX) 250 MG tablet, Take 2 TABLETS BY MOUTH ONLY 8 HOURS AFTER SURGERY, Disp: 2 tablet, Rfl: 0    albuterol (PROAIR HFA) 90 mcg/actuation inhaler, Inhale 2 puffs by mouth every 4 hours., Disp: 8.5 g, Rfl: 3    albuterol (PROVENTIL) 2.5 mg /3 mL (0.083 %) nebulizer solution, Take 3 mLs (2.5 mg total) by nebulization every 6  (six) hours as needed for Wheezing or Shortness of Breath. Rescue, Disp: 180 mL, Rfl: 6    ALPRAZolam (XANAX) 0.5 MG tablet, TAKE ONE TABLET BY MOUTH TWICE DAILY AS NEEDED FOR ANXIETY, Disp: 60 tablet, Rfl: 4    atorvastatin (LIPITOR) 40 MG tablet, Take 1 tablet (40 mg total) by mouth once daily., Disp: 90 tablet, Rfl: 4    bacitracin 500 unit/gram Oint, Apply topically 2 (two) times daily., Disp: 28 g, Rfl: 0    baclofen (LIORESAL) 10 MG tablet, TAKE 1 TABLET BY MOUTH 3 TIMES DAILY., Disp: 90 tablet, Rfl: 11    buPROPion (WELLBUTRIN SR) 150 MG TBSR 12 hr tablet, TAKE 1 TABLET BY MOUTH EVERY 12 HOURS DAILY., Disp: 60 tablet, Rfl: 11    dicyclomine (BENTYL) 10 MG capsule, TAKE 1 CAPSULE BY MOUTH THREE TIMES A DAY FOR ABDOMINAL PAIN, Disp: 90 capsule, Rfl: 3    difluprednate (DUREZOL) 0.05 % Drop ophthalmic solution, Instill one drop TO SURGERY EYE four times a day AFTER SURGERY X 30 DAYS, Disp: 5 mL, Rfl: 4    docusate sodium (COLACE) 100 MG capsule, Take 1 capsule (100 mg total) by mouth once daily., Disp: 30 capsule, Rfl: 5    ergocalciferol (ERGOCALCIFEROL) 50,000 unit Cap, TAKE 1 CAPSULE BY MOUTH Weekly, Disp: 12 capsule, Rfl: 0    famotidine (PEPCID) 20 MG tablet, Take 1 tablet (20 mg total) by mouth 2 (two) times daily., Disp: 60 tablet, Rfl: 3    fingolimod (GILENYA) 0.5 mg Cap, TAKE ONE CAPSULE (0.5 MG) BY MOUTH ONCE DAILY. MAY TAKE WITH OR WITHOUT FOOD. STORE AT ROOM TEMPERATURE., Disp: 90 capsule, Rfl: 2    gabapentin (NEURONTIN) 300 MG capsule, TAKE 1 CAPSULE BY MOUTH AT BEDTIME, Disp: 30 capsule, Rfl: 11    lactulose (CHRONULAC) 10 gram/15 mL solution, TAKE 45ML BY MOUTH THREE TIMES A DAY, Disp: 3784 mL, Rfl: 6    lisinopriL (PRINIVIL,ZESTRIL) 5 MG tablet, TAKE 1 TABLET BY MOUTH DAILY, Disp: 90 tablet, Rfl: 3    mupirocin (BACTROBAN) 2 % ointment, Apply topically 3 (three) times daily., Disp: 22 g, Rfl: 0    nabumetone (RELAFEN) 500 MG tablet, Take 1 tablet (500 mg total) by mouth 2 (two) times daily as  "needed., Disp: 60 tablet, Rfl: 6    nebulizer and compressor (HOME NEBULIZER PLUS SIDESTREAM) Lupe, use as directed, Disp: 1 each, Rfl: 0    ofloxacin (OCUFLOX) 0.3 % ophthalmic solution, Instill 1 drop TO SURGERY EYE four times a day STARTING 2 DAYS BEFORE SURGERY, Disp: 5 mL, Rfl: 3    oxybutynin (DITROPAN-XL) 5 MG TR24, Take 1 tablet (5 mg total) by mouth once daily., Disp: 90 tablet, Rfl: 3    pantoprazole (PROTONIX) 40 MG tablet, TAKE ONE TABLET BY MOUTH  ONCE DAILY, Disp: 90 tablet, Rfl: 3    polyethylene glycol (GLYCOLAX) 17 gram/dose powder, Take 17 g by mouth once daily., Disp: 510 g, Rfl: 3    traMADoL (ULTRAM) 50 mg tablet, Take 1 tablet (50 mg total) by mouth every 6 (six) hours as needed for pain, Disp: 120 tablet, Rfl: 4    triamcinolone acetonide 0.1% (KENALOG) 0.1 % cream, Apply topically 2 (two) times daily. for 10 days, Disp: 80 g, Rfl: 0     Review of Systems:  ROS    Allergies:  Patient has no known allergies.     Medical History:   has a past medical history of Asthma, Bilateral hearing loss (12/12/2012), Biliary acute pancreatitis, GERD (gastroesophageal reflux disease), High cholesterol, Hypertension, and Multiple sclerosis.    Surgical History:   has a past surgical history that includes Joint replacement; Cholecystectomy; Colonoscopy (N/A, 1/22/2019); Tonsillectomy; Appendectomy; Esophagogastroduodenoscopy (N/A, 9/29/2021); and Transforaminal epidural injection of steroid (Bilateral, 10/5/2022).    Family History:  family history includes Heart disease in his brother, father, and mother.    Social History:   reports that he quit smoking about 9 months ago. His smoking use included cigarettes. He has a 32.00 pack-year smoking history. He has never used smokeless tobacco. He reports that he does not currently use alcohol. He reports that he does not use drugs.    Physical Exam:  BP (!) 153/92   Pulse 72   Resp 16   Ht 5' 9" (1.753 m)   Wt 95.7 kg (210 lb 13.9 oz)   BMI 31.14 kg/m²   GEN: " No acute distress. Calm, comfortable  HENT: Normocephalic, atraumatic, moist mucous membranes  EYE: Anicteric sclera, non-injected.   CV: Non-diaphoretic. Regular Rate. Radial Pulses 2+.  RESP: Breathing comfortably. Chest expansion symmetric.  EXT: No clubbing, cyanosis.   SKIN: Warm, & dry to palpation. No visible rashes or lesions of exposed skin.   PSYCH: Pleasant mood and appropriate affect. Recent and remote memory intact.   GAIT: Mod Independent with RW, antalgic ambulation  Lumbar Spine Exam:       Inspection: No erythema, bruising.       Palpation: (+) TTP of lumbar paraspinals b/l       ROM:  Limited in flexion, extension, lateral bending.       (+) Facet loading bilaterally      (-) Straight Leg Raise bilaterally      (-) LINDSEY bilaterally  Hip Exam:      Inspection: No gross deformity or apparent leg length discrepancy      Palpation: (+) TTP to right greater trochanteric bursa.   Neurologic Exam:     Alert. Speech is fluent and appropriate.     Strength: 4/5 in b/l hip flexion, left EHL, ADF, otherwise 5/5 throughout bilateral lower extremities     Sensation:  Grossly abnormal to light touch in bilateral lower extremities     Reflexes: 1+ in b/l patella, absent in b/l achilles     Tone: No abnormality appreciated in bilateral lower extremities     No Clonus     (+) Bey on the left                Imaging:  - MRI Lumbar Spine 09/16/2022:  Alignment: Normal.  Vertebrae: 5 lumbar-type vertebral bodies. No aggressive marrow replacement process or fracture.  Cortical endplate degenerative changes anteriorly to the RIGHT, degenerative in nature.  Cortical endplate degenerative changes predominantly L2 through L   Discs: Multilevel disc space narrowing, mild with diffuse desiccation of disc material.  Disc space narrowing predominantly L5-S1.  Cord: Normal. Conus terminates at L1.  Degenerative findings:  T12-L1: There is no focal disc herniation. No significant central canal narrowing . No significant  neural foraminal narrowing.  L1-L2: There is no focal disc herniation. No significant central canal narrowing . No significant neural foraminal narrowing.  L2-L3: There is no focal disc herniation. No significant central canal narrowing . No significant neural foraminal narrowing.  L3-L4: There is no focal disc herniation.Broad based mild diffuse bulging of disc material . No significant central canal narrowing . No significant neural foraminal narrowing.  L4-L5: There is no focal disc herniation. Broad based mild diffuse bulging of disc material .  Annular fissure lateralizing RIGHT  no significant central canal narrowing . Moderate RIGHT neural foraminal narrowing.  L5-S1: Broad-based protrusion of disc material eccentric to the RIGHT paracentral region with mass effect upon the anterior thecal sac margins and exiting S1 nerve root.  Mild central canal narrowing .  Moderate to severe bilateral neural foraminal narrowing.  Paraspinal muscles & soft tissues: RIGHT renal cystic lesion greater than LEFT.  Mild posterior paraspinal muscular atrophy.       -MRI Cervical Spine 09/16/2022:  FINDINGS:  The vertebral bodies demonstrate no evidence of fracture, osseous destructive process or aggressive bone marrow replacement process.     Normal sagittal alignment is preserved.  No significant spondylolisthesis     Mild posterior disc osteophyte complex at C3-4 and C5-6.  This results in some attenuation of the ventral CSF space and slight contouring of the cord.  The posterior CSF space remains preserved.  Modest posterior disc osteophyte complex at C4-5 and C6-7 without significant mass effect.  No new large disc herniation or site of spinal stenosis.  Multilevel neural foraminal narrowing from uncovertebral and facet hypertrophy.     Multiple scattered short segment T2 hyperintense foci within the spinal cord, in keeping with reported history of multiple sclerosis.  Midline dorsal lesion at the C2 level slightly less  conspicuous.  Question mild cord thinning at this site.  Additional lesions C2-3, C4, C5-6, in T2 appear grossly stable.  No new discrete lesions identified.  No enhancing foci.     No intraspinal mass or fluid collection.     No acute abnormalities in the visualized paraspinal soft tissue structures.    Labs:  BMP  Lab Results   Component Value Date     11/08/2021    K 4.1 11/08/2021     11/08/2021    CO2 20 (L) 11/08/2021    BUN 10 11/08/2021    CREATININE 1.0 11/08/2021    CALCIUM 8.8 11/08/2021    ANIONGAP 12 11/08/2021    ESTGFRAFRICA >60 11/08/2021    EGFRNONAA >60 11/08/2021     Lab Results   Component Value Date    ALT 19 11/08/2021    AST 24 11/08/2021    ALKPHOS 83 11/08/2021    BILITOT 0.4 11/08/2021     Lab Results   Component Value Date     11/08/2021       Assessment:  Tj Trammell is a 57 y.o. male with the following diagnoses based on history, exam, and imaging:    Problem List Items Addressed This Visit          Neuro    Multiple sclerosis    Relevant Orders    Ambulatory referral/consult to Neurology    Lumbar radiculopathy    Relevant Orders    Ambulatory referral/consult to Physical/Occupational Therapy     Other Visit Diagnoses       Impaired functional mobility, balance, gait, and endurance    -  Primary    Relevant Orders    Ambulatory referral/consult to Physical/Occupational Therapy    Chronic bilateral low back pain with bilateral sciatica        Relevant Orders    Ambulatory referral/consult to Physical/Occupational Therapy            This is a pleasant 57 y.o. gentleman presenting with:     - Chronic bilateral low back pain with bilateral leg pains: Potential radiculopathy related to his lumbar canal and foraminal stenosis, or potentially related to his MS  - Multiple sclerosis: Will get patient re-established with neurology, as MS could be the cause of his pains, weakness and falls.       Treatment Plan:   - PT/OT/HEP: Refer to PT. Discussed benefits of exercise  for pain.   - Procedures: If no relief with PT, plan for caudal JEVON  - Medications: No changes recommended at this time. I would be hesitant to add any further sedating medications with his current issues with falls. May need to consider tapering his xanax and tramadol.   - Imaging: Reviewed.   - Labs: Reviewed.  Medications are appropriately dosed for current hepatorenal function.  - Refer back to neurology for his MS.     Follow Up: RTC in 2-3 months or sooner PRJOHNATHAN Giles M.D.  Interventional Pain Medicine / Physical Medicine & Rehabilitation    Disclaimer: This note was partly generated using dictation software which may occasionally result in transcription errors.

## 2022-11-02 ENCOUNTER — TELEPHONE (OUTPATIENT)
Dept: NEUROLOGY | Facility: CLINIC | Age: 57
End: 2022-11-02

## 2022-11-02 NOTE — TELEPHONE ENCOUNTER
Spoke with [place] who advised me that Mr. Spencer was not available at the moment, she will have him call me back. She stated she did not have his number and needed to call someone who was in the hospital to get his number.

## 2022-11-16 ENCOUNTER — SPECIALTY PHARMACY (OUTPATIENT)
Dept: PHARMACY | Facility: CLINIC | Age: 57
End: 2022-11-16
Payer: MEDICARE

## 2022-11-16 NOTE — TELEPHONE ENCOUNTER
Specialty Pharmacy - Refill Coordination    Specialty Medication Orders Linked to Encounter      Flowsheet Row Most Recent Value   Medication #1 fingolimod (GILENYA) 0.5 mg Cap (Order#[id number], Rx#[id number])            Refill Questions - Documented Responses      Flowsheet Row Most Recent Value   Patient Availability and HIPAA Verification    Does patient want to proceed with activity? Yes   HIPAA/medical authority confirmed? Yes   Relationship to patient of person spoken to? Self   Refill Screening Questions    Changes to allergies? No   Changes to medications? No   New conditions since last clinic visit? No   Unplanned office visit, urgent care, ED, or hospital admission in the last 4 weeks? No   How does patient/caregiver feel medication is working? Good   Financial problems or insurance changes? No   How many doses of your specialty medications were missed in the last 4 weeks? 0   Would patient like to speak to a pharmacist? No   When does the patient need to receive the medication? 11/25/22   Refill Delivery Questions    How will the patient receive the medication? MEDRx   When does the patient need to receive the medication? 11/25/22   Shipping Address Home   Address in [facility] confirmed and updated if neccessary? Yes   Expected Copay ($) 0   Is the patient able to afford the medication copay? Yes   Payment Method zero copay   Days supply of Refill 30   Supplies needed? No supplies needed   Refill activity completed? Yes   Refill activity plan Refill scheduled   Shipment/Pickup Date: 11/21/22            Current Outpatient Medications   Medication Sig    acetaZOLAMIDE (DIAMOX) 250 MG tablet Take 2 TABLETS BY MOUTH ONLY 8 HOURS AFTER SURGERY    albuterol (PROAIR HFA) 90 mcg/actuation inhaler Inhale 2 puffs by mouth every 4 hours.    albuterol (PROVENTIL) 2.5 mg /3 mL (0.083 %) nebulizer solution Take 3 mLs (2.5 mg total) by nebulization every 6 (six) hours as needed for Wheezing or Shortness of  Breath. Rescue    ALPRAZolam (XANAX) 0.5 MG tablet TAKE ONE TABLET BY MOUTH TWICE DAILY AS NEEDED FOR ANXIETY    atorvastatin (LIPITOR) 40 MG tablet Take 1 tablet (40 mg total) by mouth once daily.    bacitracin 500 unit/gram Oint Apply topically 2 (two) times daily.    baclofen (LIORESAL) 10 MG tablet TAKE 1 TABLET BY MOUTH 3 TIMES DAILY.    buPROPion (WELLBUTRIN SR) 150 MG TBSR 12 hr tablet TAKE 1 TABLET BY MOUTH EVERY 12 HOURS DAILY.    dicyclomine (BENTYL) 10 MG capsule TAKE 1 CAPSULE BY MOUTH THREE TIMES A DAY FOR ABDOMINAL PAIN    difluprednate (DUREZOL) 0.05 % Drop ophthalmic solution Instill one drop TO SURGERY EYE four times a day AFTER SURGERY X 30 DAYS    docusate sodium (COLACE) 100 MG capsule Take 1 capsule (100 mg total) by mouth once daily.    ergocalciferol (ERGOCALCIFEROL) 50,000 unit Cap TAKE 1 CAPSULE BY MOUTH Weekly    famotidine (PEPCID) 20 MG tablet Take 1 tablet (20 mg total) by mouth 2 (two) times daily.    fingolimod (GILENYA) 0.5 mg Cap TAKE ONE CAPSULE (0.5 MG) BY MOUTH ONCE DAILY. MAY TAKE WITH OR WITHOUT FOOD. STORE AT ROOM TEMPERATURE.    gabapentin (NEURONTIN) 300 MG capsule TAKE 1 CAPSULE BY MOUTH AT BEDTIME    lactulose (CHRONULAC) 10 gram/15 mL solution TAKE 45ML BY MOUTH THREE TIMES A DAY    lisinopriL (PRINIVIL,ZESTRIL) 5 MG tablet TAKE 1 TABLET BY MOUTH DAILY    mupirocin (BACTROBAN) 2 % ointment Apply topically 3 (three) times daily.    nabumetone (RELAFEN) 500 MG tablet Take 1 tablet (500 mg total) by mouth 2 (two) times daily as needed.    nebulizer and compressor (HOME NEBULIZER PLUS SIDESTREAM) Lupe use as directed    ofloxacin (OCUFLOX) 0.3 % ophthalmic solution Instill 1 drop TO SURGERY EYE four times a day STARTING 2 DAYS BEFORE SURGERY    oxybutynin (DITROPAN-XL) 5 MG TR24 Take 1 tablet (5 mg total) by mouth once daily.    pantoprazole (PROTONIX) 40 MG tablet TAKE ONE TABLET BY MOUTH  ONCE DAILY    polyethylene glycol (GLYCOLAX) 17 gram/dose powder Take 17 g by mouth  once daily.    traMADoL (ULTRAM) 50 mg tablet Take 1 tablet (50 mg total) by mouth every 6 (six) hours as needed for pain    triamcinolone acetonide 0.1% (KENALOG) 0.1 % cream Apply topically 2 (two) times daily. for 10 days   Last reviewed on 10/27/2022 11:07 AM by Soo Giles MD    Review of patient's allergies indicates:  No Known Allergies Last reviewed on  10/27/2022 11:07 AM by Soo Giles      Tasks added this encounter   12/18/2022 - Refill Call (Auto Added)   Tasks due within next 3 months   No tasks due.     Estrellita Burciaga, PharmD  [facility]  [address] [facility] [address]  Phone: [phone]  Fax: [phone]

## 2022-12-07 ENCOUNTER — HOSPITAL ENCOUNTER (EMERGENCY)
Facility: HOSPITAL | Age: 57
Discharge: HOME | End: 2022-12-07
Attending: EMERGENCY MEDICINE
Payer: MEDICARE

## 2022-12-07 VITALS
RESPIRATION RATE: 18 BRPM | SYSTOLIC BLOOD PRESSURE: 130 MMHG | BODY MASS INDEX: 33.97 KG/M2 | DIASTOLIC BLOOD PRESSURE: 96 MMHG | WEIGHT: 230 LBS | OXYGEN SATURATION: 99 % | HEART RATE: 90 BPM | TEMPERATURE: 97 F
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
## 2022-12-07 PROCEDURE — 99284 EMERGENCY DEPT VISIT MOD MDM: CPT

## 2022-12-07 PROCEDURE — 25000003 PHARM REV CODE 250: Performed by: NURSE PRACTITIONER

## 2022-12-07 RX ORDER — OXYCODONE AND ACETAMINOPHEN 5; 325 MG/1; MG/1
1 TABLET ORAL
Status: COMPLETED | OUTPATIENT
Start: 2022-12-07 | End: 2022-12-07

## 2022-12-07 RX ADMIN — OXYCODONE HYDROCHLORIDE AND ACETAMINOPHEN 1 TABLET: 5; 325 TABLET ORAL at 12:12

## 2022-12-07 NOTE — ED TRIAGE NOTES
Tj Trammell, an 57 y.o. male presents to the ED with a complaint of left leg pain after a trip and fall in [facility] yesterday.       Review of patient's allergies indicates:  No Known Allergies  Chief Complaint   Patient presents with    Leg Injury     Left leg injury/ pain after a [facility] 12[facility]22 . Pt was ambulatory after the fall but states that he is unable to bear weight on it today. Pt presents himself in a mobility scooter.     Past Medical History:   Diagnosis Date    Asthma     Bilateral hearing loss 12/12/2012    Biliary acute pancreatitis     GERD (gastroesophageal reflux disease)     High cholesterol     Hypertension     Multiple sclerosis        Patient identifiers verified and correct.     LOC: The patient is awake, alert and aware of environment with an appropriate affect, the patient is oriented x 3 and speaking appropriately.     APPEARANCE: Patient appears comfortable and in no acute distress, patient is clean and well groomed.    HEENT: Head symmetrical. Eyes bilateral.  Bilateral ears without drainage. Bilateral nares patent, throat clear.    SKIN: The skin is warm and dry, color consistent with ethnicity, patient has normal skin turgor and moist mucus membranes,      MUSCULOSKELETAL: Patient moving all extremities spontaneously,pt uses a power scooter. Unable to bear weight.     RESPIRATORY: Airway is open and patent, respirations are spontaneous, patient has a normal effort and rate, no accessory muscle use noted.     CARDIAC: Patient has a normal rate and regular rhythm, no edema noted, capillary refill < 3 seconds.     GASTRO: Abdomen soft and non-distended.     NEURO: Pt opens eyes spontaneously pupils equal, round, and reactive. behavior appropriate to situation, follows commands, facial expression symmetrical,      NEUROVASCULAR: All extremities are warm and pink.       Will continue to monitor.

## 2022-12-07 NOTE — FIRST PROVIDER EVALUATION
Emergency Department TeleTriage Encounter Note      CHIEF COMPLAINT    Chief Complaint   Patient presents with    Leg Injury     Left leg injury/ pain after a fall at [facility] yesterday 12/6/2022 . Pt was ambulatory after the fall but states that he is unable to bear weight on it today. Pt presents himself in a mobility scooter.       VITAL SIGNS   Initial Vitals [12/07/22 1004]   BP Pulse Resp Temp SpO2   (!) 130/96 90 14 97.4 °F (36.3 °C) 99 %      MAP       --            ALLERGIES    Review of patient's allergies indicates:  No Known Allergies    PROVIDER TRIAGE NOTE  Patient presents with complaint of right sided pain after a fall. Patient is deaf and unable to hear me and I am unable to understand him.      Phy:   Constitutional: well nourished, well developed, appearing stated age, NAD   HEENT: NCAT, symmetrical lids, No obvious facial deformity.  Normal phonation. Normal Conjunctiva   Neck: NAROM   Respiratory: Normal effort.  No obvious use of accessory muscles   Musculoskeletal: Moved upper extremities well   Neuro: Alert, answers questions appropriately    Psych: appropriate mood and affect      Initial orders will be placed and care will be transferred to an alternate provider when patient is roomed for a full evaluation. Any additional orders and the final disposition will be determined by that provider.        ORDERS  Labs Reviewed - No data to display    ED Orders (720h ago, onward)      None              Virtual Visit Note: The provider triage portion of this emergency department evaluation and documentation was performed via [employer], a HIPAA-compliant telemedicine application, in concert with a tele-presenter in the room. A face to face patient evaluation with one of my colleagues will occur once the patient is placed in an emergency department room.      DISCLAIMER: This note was prepared with [employer] voice recognition transcription software. Garbled syntax, mangled pronouns, and other  bizarre constructions may be attributed to that software system.

## 2022-12-07 NOTE — ED PROVIDER NOTES
Encounter Date: 12/7/2022    SCRIBE #1 NOTE: I, Lyndsey Capone, am scribing for, and in the presence of,  Ritu Zamudio NP. I have scribed the following portions of the note - Other sections scribed: HPI, ROS, PE.     History     Chief Complaint   Patient presents with    Leg Injury     Left leg injury/ pain after a fall at [employer] yesterday 12/6/2022 . Pt was ambulatory after the fall but states that he is unable to bear weight on it today. Pt presents himself in a mobility scooter.     A 57-year-old male presents to the ED for evaluation of a left leg injury that occurred yesterday. He states he tripped and fell at [place]. No head trauma or LOC. He complains of left shoulder, elbow, hand and thigh pain. He denies back and neck pain. He usually ambulates with a walker but is currently in a motorized scooter. He states he is unable to weight bear due to pain.        The history is provided by the patient. No  was used.   Review of patient's allergies indicates:  No Known Allergies  Past Medical History:   Diagnosis Date    Asthma     Bilateral hearing loss 12/12/2012    Biliary acute pancreatitis     GERD (gastroesophageal reflux disease)     High cholesterol     Hypertension     Multiple sclerosis      Past Surgical History:   Procedure Laterality Date    APPENDECTOMY      CHOLECYSTECTOMY      COLONOSCOPY N/A 1/22/2019    Procedure: COLONOSCOPY 2 day  golytely;  Surgeon: Nathan Waddell MD;  Location: [facility];  Service: Endoscopy;  Laterality: N/A;    ESOPHAGOGASTRODUODENOSCOPY N/A 9/29/2021    Procedure: EGD (ESOPHAGOGASTRODUODENOSCOPY) covid test Rapid;  Surgeon: Jared Loredo MD;  Location: [facility];  Service: Endoscopy;  Laterality: N/A;    JOINT REPLACEMENT      arm    TONSILLECTOMY      TRANSFORAMINAL EPIDURAL INJECTION OF STEROID Bilateral 10/5/2022    Procedure: Injection,steroid,epidural,transforaminal approach;  Surgeon: Soo Giles MD;  Location: [facility];  Service: Pain Management;  Laterality: Bilateral;  bilateral L5 transforaminal epidural steroid injection with RN IV sedation     Family History   Problem Relation Age of Onset    Heart disease Mother     Heart disease Father     Heart disease Brother      Social History     Tobacco Use    Smoking status: Former     Packs/day: 1.00     Years: 32.00     Pack years: 32.00     Types: Cigarettes     Quit date: 2022     Years since quittin.9    Smokeless tobacco: Never   Substance Use Topics    Alcohol use: Not Currently    Drug use: No     Review of Systems   Constitutional:  Negative for fever.   HENT:  Negative for sore throat.    Respiratory:  Negative for shortness of breath.    Cardiovascular:  Negative for chest pain.   Gastrointestinal:  Negative for nausea.   Genitourinary:  Negative for dysuria.   Musculoskeletal:  Positive for arthralgias (left thigh, shoulder, elbow, hand pain). Negative for back pain and neck pain.   Skin:  Negative for rash.   Neurological:  Negative for weakness.   Hematological:  Does not bruise/bleed easily.     Physical Exam     Initial Vitals [22 1004]   BP Pulse Resp Temp SpO2   (!) 130/96 90 14 97.4 °F (36.3 °C) 99 %      MAP       --         Physical Exam    Nursing note and vitals reviewed.  Constitutional: He appears well-developed and well-nourished. No distress.   HENT:   Head: Normocephalic and atraumatic.   Eyes: Conjunctivae and EOM are normal.   Neck:   Normal range of motion.  Cardiovascular:  Normal rate.           Pulmonary/Chest: Effort normal. No respiratory distress.   Abdominal: There is no guarding.   Musculoskeletal:         General: Normal range of motion.      Cervical back: Normal range of motion.      Left upper leg: Swelling (left thigh) and tenderness (tenderness to palpation of the left thigh) present.      Comments: No skin breaks in the left thigh   Inability to bear weight with the left leg     Neurological: He is alert. No sensory  deficit.   Skin: Abrasion (scattered abrasion to the left elbow and knee) noted. No rash noted.   Psychiatric: He has a normal mood and affect. His behavior is normal. Thought content normal.     ED Course   Procedures  Labs Reviewed - No data to display       Imaging Results              X-Ray Elbow Complete Left (Final result)  Result time 12/07/22 12:08:27      Final result by Kirt Bautista MD (12/07/22 12:08:27)                   Impression:      1. No acute displaced fracture or dislocation of the left elbow.      Electronically signed by: Kirt Bautista MD  Date:    12/07/2022  Time:    12:08               Narrative:    EXAMINATION:  XR ELBOW COMPLETE 3 VIEW LEFT    CLINICAL HISTORY:  Unspecified fall, initial encounter    TECHNIQUE:  AP, lateral, and oblique views of the left elbow were performed.    COMPARISON:  04/20/2022    FINDINGS:  Three views left elbow.    No significant displacement of the anterior or posterior elbow fat pads.  The anterior humeral line and radiocapitellar lines are in appropriate orientation.  There is stable configuration of the posterior aspect of the olecranon.  Mild edema overlies the posterior olecranon.  No radiopaque foreign body.                                       X-Ray Shoulder 2 or More Views Left (Final result)  Result time 12/07/22 12:09:25      Final result by Kirt Bautista MD (12/07/22 12:09:25)                   Impression:      1. No acute displaced fracture or dislocation of the left shoulder.      Electronically signed by: Kirt Bautista MD  Date:    12/07/2022  Time:    12:09               Narrative:    EXAMINATION:  XR SHOULDER COMPLETE 2 OR MORE VIEWS LEFT    CLINICAL HISTORY:  fall;    TECHNIQUE:  Two or three views of the left shoulder were performed.    COMPARISON:  08/08/2022    FINDINGS:  Two views left shoulder.    The left humeral head maintains appropriate relationship with the glenoid.  The acromioclavicular joint is intact noting  degenerative changes.  The visualized lung zones are grossly clear.  No acute displaced left rib fracture.                                       X-Ray Hip 2 or 3 views Left (with Pelvis when performed) (Final result)  Result time 12/07/22 12:11:12      Final result by Kirt Bautista MD (12/07/22 12:11:12)                   Impression:      1. No acute displaced fracture or dislocation of the left hip.      Electronically signed by: Kirt Bautista MD  Date:    12/07/2022  Time:    12:11               Narrative:    EXAMINATION:  XR HIP WITH PELVIS WHEN PERFORMED, 2 OR 3 VIEWS LEFT    CLINICAL HISTORY:  fall;    TECHNIQUE:  AP view of the pelvis and frog leg lateral view of the left hip were performed.    COMPARISON:  None    FINDINGS:  Three views left hip.    The bilateral sacroiliac joints are intact.  The pubic symphysis is intact.  The bilateral femoroacetabular joints are intact.  No radiopaque foreign body.                                       X-Ray Femur Ap/Lat Left (Final result)  Result time 12/07/22 12:12:09      Final result by Kirt Bautista MD (12/07/22 12:12:09)                   Impression:      1. No acute displaced fracture or dislocation of the left femur.      Electronically signed by: Kirt Bautista MD  Date:    12/07/2022  Time:    12:12               Narrative:    EXAMINATION:  XR FEMUR 2 VIEW LEFT    CLINICAL HISTORY:  Unspecified fall, initial encounter    TECHNIQUE:  AP and lateral views of the left femur were performed.    COMPARISON:  None}    FINDINGS:  Four views left femur.    The left femoroacetabular joint is intact.  No acute displaced fracture or dislocation of the femur.  There are degenerative changes of the knee.  No large knee joint effusion.  There are degenerative changes along the anterior aspect of the proximal tibia.  No radiopaque foreign body.                                       X-Ray Hand 3 view Left (Final result)  Result time 12/07/22 12:13:59      Final  result by Kirt Bautista MD (12/07/22 12:13:59)                   Impression:      1. No acute displaced fracture or dislocation of the hand noting progressive degenerative changes.      Electronically signed by: Kirt Bautista MD  Date:    12/07/2022  Time:    12:13               Narrative:    EXAMINATION:  XR HAND COMPLETE 3 VIEW LEFT    CLINICAL HISTORY:  hand injury;.    TECHNIQUE:  PA, lateral, and oblique views of the left hand were performed.    COMPARISON:  11/15/2007    FINDINGS:  Three views left hand.    There is osteopenia.  There are degenerative changes of the hand.  There is stable lucency involving the distal aspect of the lunate.  No convincing acute displaced fracture or dislocation of the hand.  No radiopaque foreign body.                                       Medications   oxyCODONE-acetaminophen 5-325 mg per tablet 1 tablet (1 tablet Oral Given 12/7/22 1252)     Medical Decision Making:   Initial Assessment:   Tj Trammell is a 57 y.o. male who presents to the ED with left leg injury status post a trip and fall. His physical exam was noted for tenderness to palpation of the left thigh with swelling, no skin break of the left thigh, scattered abrasions on the left elbow and knee and inability to weight bare with the left leg.      Clinical Tests:   Radiological Study: Ordered and Reviewed        Scribe Attestation:   Scribe #1: I performed the above scribed service and the documentation accurately describes the services I performed. I attest to the accuracy of the note.      ED Course as of 12/07/22 1345   Wed Dec 07, 2022   1330 Pt evaluated also per Dr Christianson. Images are negative for any acute fractures. He was given dose of pain meds here in the ER and is otherwise stable at this time for dc. Reports he typically gets around with his scooter and has some issues with walking on a normal basis. Strict return precautions given.  [DT]      ED Course User Index  [DT] Ritu GONSALEZ  CAMILLE Zamudio                 Clinical Impression:   Final diagnoses:  [W19.XXXA] Fall      ED Disposition Condition    Discharge Stable          ED Prescriptions    None       Follow-up Information       Follow up With Specialties Details Why Contact Info    Bayron Ferguson MD Internal Medicine Schedule an appointment as soon as possible for a visit   [address] [facility]  [address] [place] [id number] [phone]            IRitu NP, personally performed the services described in this documentation.All medical record entries made by the scribe were at my direction and in my presence.I have reviewed the chart and agree that the record reflects my personal performance and is accurate and complete.       Ritu Zamudio NP  12/07/22 1712

## 2022-12-19 ENCOUNTER — SPECIALTY PHARMACY (OUTPATIENT)
Dept: PHARMACY | Facility: CLINIC | Age: 57
End: 2022-12-19
Payer: MEDICARE

## 2022-12-19 DIAGNOSIS — G35 MULTIPLE SCLEROSIS: ICD-10-CM

## 2022-12-19 RX ORDER — FINGOLIMOD HYDROCHLORIDE 0.5 MG/1
CAPSULE ORAL
Qty: 90 CAPSULE | Refills: 2 | Status: CANCELLED | OUTPATIENT
Start: 2022-12-19

## 2022-12-22 NOTE — TELEPHONE ENCOUNTER
Catch-22: provider is no longer working at [facility] location, provider unable to be contacted for refill request. Urgent fax request sent to [facility] location.

## 2022-12-22 NOTE — TELEPHONE ENCOUNTER
Incoming call from ERENDIRA Quiroga stating that [facility] reached out for refill but we should call [phone] ([facility] office) to get refills for this med for this pt.  Alerted assigned [facility].

## 2022-12-28 NOTE — TELEPHONE ENCOUNTER
Call to patient to assess medication status (likely out) and assist with follow up with new neurologist. No answer, LVM.

## 2022-12-29 DIAGNOSIS — G35 MULTIPLE SCLEROSIS: ICD-10-CM

## 2022-12-29 RX ORDER — FINGOLIMOD HYDROCHLORIDE 0.5 MG/1
CAPSULE ORAL
Qty: 90 CAPSULE | Refills: 2 | Status: CANCELLED | OUTPATIENT
Start: 2022-12-19

## 2022-12-29 NOTE — TELEPHONE ENCOUNTER
Patient will be seeing a new neurologist on 1/26 to establish care. Will follow up after this visit to assess continued MS therapy.

## 2022-12-30 ENCOUNTER — HOSPITAL ENCOUNTER (EMERGENCY)
Facility: HOSPITAL | Age: 57
Discharge: HOME | End: 2022-12-30
Attending: EMERGENCY MEDICINE
Payer: MEDICARE

## 2022-12-30 VITALS
DIASTOLIC BLOOD PRESSURE: 76 MMHG | HEART RATE: 100 BPM | RESPIRATION RATE: 20 BRPM | SYSTOLIC BLOOD PRESSURE: 153 MMHG | OXYGEN SATURATION: 98 % | TEMPERATURE: 99 F
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
## 2022-12-30 DIAGNOSIS — S09.90XA CLOSED HEAD INJURY, INITIAL ENCOUNTER: ICD-10-CM

## 2022-12-30 DIAGNOSIS — S01.01XA SCALP LACERATION, INITIAL ENCOUNTER: ICD-10-CM

## 2022-12-30 PROCEDURE — 99284 EMERGENCY DEPT VISIT MOD MDM: CPT | Mod: 25

## 2022-12-30 NOTE — ED PROVIDER NOTES
Encounter Date: 12/30/2022    SCRIBE #1 NOTE: I, Zbigniew Hathaway, am scribing for, and in the presence of,  Dr. Mcdonald. I have scribed the following portions of the note - Other sections scribed: HPI, ROS, Physical Exam.     History     Chief Complaint   Patient presents with    Head Injury     States he tripped and fell.  Denies any LOC.  Abrasion to head and right knee.     Tj Trammell is a 57 y.o. male who presents to the ED due to a fall that occurred just prior to arrival. Patient reports he tripped and fell, hitting his head and sustaining an abrasion to the right knee. He denies LOC.     The history is provided by the patient. No  was used.   Review of patient's allergies indicates:  No Known Allergies  Past Medical History:   Diagnosis Date    Asthma     Bilateral hearing loss 12/12/2012    Biliary acute pancreatitis     GERD (gastroesophageal reflux disease)     High cholesterol     Hypertension     Multiple sclerosis      Past Surgical History:   Procedure Laterality Date    APPENDECTOMY      CHOLECYSTECTOMY      COLONOSCOPY N/A 1/22/2019    Procedure: COLONOSCOPY 2 day  golytely;  Surgeon: Nathan Waddell MD;  Location: [facility];  Service: Endoscopy;  Laterality: N/A;    ESOPHAGOGASTRODUODENOSCOPY N/A 9/29/2021    Procedure: EGD (ESOPHAGOGASTRODUODENOSCOPY) covid test Rapid;  Surgeon: Jared Loredo MD;  Location: [facility];  Service: Endoscopy;  Laterality: N/A;    JOINT REPLACEMENT      arm    TONSILLECTOMY      TRANSFORAMINAL EPIDURAL INJECTION OF STEROID Bilateral 10/5/2022    Procedure: Injection,steroid,epidural,transforaminal approach;  Surgeon: Soo Giles MD;  Location: [facility];  Service: Pain Management;  Laterality: Bilateral;  bilateral L5 transforaminal epidural steroid injection with RN IV sedation     Family History   Problem Relation Age of Onset    Heart disease Mother     Heart disease Father     Heart disease Brother      Social History      Tobacco Use    Smoking status: Former     Packs/day: 1.00     Years: 32.00     Pack years: 32.00     Types: Cigarettes     Quit date: 2022     Years since quittin.9    Smokeless tobacco: Never   Substance Use Topics    Alcohol use: Not Currently    Drug use: No     Review of Systems   Constitutional:  Negative for chills and fever.   HENT:  Negative for sore throat.    Eyes:  Negative for redness.   Respiratory:  Negative for shortness of breath.    Cardiovascular:  Negative for chest pain.   Gastrointestinal:  Negative for abdominal pain, diarrhea, nausea and vomiting.   Genitourinary:  Negative for dysuria and hematuria.   Musculoskeletal:  Negative for back pain.   Skin:  Positive for wound. Negative for rash.   Neurological:  Negative for syncope and headaches.   All other systems reviewed and are negative.    Physical Exam     Initial Vitals [22 0154]   BP Pulse Resp Temp SpO2   134/74 96 18 99.1 °F (37.3 °C) 98 %      MAP       --         Physical Exam    Nursing note and vitals reviewed.  Constitutional: He appears well-developed and well-nourished. No distress.   HENT:   Head: Normocephalic and atraumatic.   Abrasion to top left scalp   Eyes: Conjunctivae are normal.   Cardiovascular:  Normal rate and regular rhythm.           Pulmonary/Chest: No respiratory distress.   Musculoskeletal:      Right knee: Laceration present.      Comments: Minor laceration noted to right knee.     Neurological: He is alert and oriented to person, place, and time.   Skin: Skin is warm and dry. Capillary refill takes less than 2 seconds. No rash noted. No pallor.   Psychiatric: He has a normal mood and affect.       ED Course   Procedures  Labs Reviewed - No data to display       Imaging Results              X-Ray Knee 3 View Right (Final result)  Result time 22 04:33:10      Final result by Bobbi Nicholson MD (22 04:33:10)                   Impression:      Please see above.      Electronically  signed by: Bobbi Nicholson MD  Date:    12/30/2022  Time:    04:33               Narrative:    EXAMINATION:  XR KNEE 3 VIEW RIGHT    CLINICAL HISTORY:  Unspecified fall, initial encounter    TECHNIQUE:  AP, lateral, and Merchant views of the right knee were performed.    COMPARISON:  Chest radiograph 04/27/2022, CT knee 04/17/2020    FINDINGS:  There is redemonstration of a longitudinally oriented lucency involving the right aspect of the patella in keeping with previously demonstrated patellar fracture seen and Jamie BROWNING on exams of 04/17/2020.  Findings do not appear appreciably changed from most recent comparison exam of 04/27/2022.  Remaining visualized osseous structures appear intact.  Alignment appears to be within normal limits noting mild tricompartmental degenerative change.  No large suprapatellar joint effusion identified.  No radiopaque foreign body identified.                                       CT Head Without Contrast (Final result)  Result time 12/30/22 04:29:10      Final result by Bobbi Nicholson MD (12/30/22 04:29:10)                   Impression:      1. No CT evidence of acute intracranial abnormality. Clinical correlation and further evaluation as warranted.  2. Hypoattenuation within the supratentorial white matter which appears similar to prior exams and presumably relates to sequela of patient's reported multiple sclerosis.  If there is clinical concern for acute ischemia or active demyelinating process, further assessment with MRI can be performed if there are no clinical contraindications.  3. Ethmoid and maxillary sinus disease.      Electronically signed by: Bobbi Nicholson MD  Date:    12/30/2022  Time:    04:29               Narrative:    EXAMINATION:  CT HEAD WITHOUT CONTRAST    CLINICAL HISTORY:  Head trauma, moderate-severe;    TECHNIQUE:  Low dose axial images were obtained through the head.  Coronal and sagittal reformations were also performed. Contrast was not  administered.    COMPARISON:  MRI brain 09/23/2021, head CT 04/22/2019    FINDINGS:  There is no acute intracranial hemorrhage, hydrocephalus, midline shift or mass effect. There is patchy hypoattenuation within the supratentorial white matter which appears similar to prior head CT and MRI exams allowing for differences in imaging modality.  Findings presumably relating to patient's reported history of multiple sclerosis.  If there is clinical concern for acute ischemia/active demyelinating process further evaluation with MRI can be performed if there are no clinical contraindications.  Gray-white matter differentiation otherwise appears maintained.  The basal cisterns appear patent.  The visualized paranasal sinuses demonstrate mucosal thickening of the ethmoid and maxillary sinuses.  Mastoid air cells appear clear with minimal opacification of an inferior right mastoid air cell.  There is heterogeneous material within the left external auditory canal, likely cerumen although direct visualization advised.  The visualized bones of the calvarium demonstrate no acute osseous abnormality.                                       Medications - No data to display  Medical Decision Making:   Initial Assessment:   Pleasant 57-year-old male presents the ER for evaluation mechanical fall closed head injury.  Multiple medical problems.  Reports he tripped on an object on the ground fell forward, sustained a laceration to his right knee as well as abrasion to the top of his head no loss of consciousness.  Will appearing no acute distress neurologically intact.  Will plan CT x-ray imaging symptomatic support reassess.  Clinical Tests:   Radiological Study: Ordered and Reviewed        Scribe Attestation:   Scribe #1: I performed the above scribed service and the documentation accurately describes the services I performed. I attest to the accuracy of the note.      ED Course as of 12/30/22 0554   Fri Dec 30, 2022   0535 Resting in  bed no acute distress imaging no acute process noted.  Patient will be discharged fall precautions discussed. [SE]      ED Course User Index  [SE] Bernice Mcdonald MD                   Clinical Impression:   Final diagnoses:  [W19.XXXA] Fall (Primary)  [S09.90XA] Closed head injury, initial encounter  [S01.01XA] Scalp laceration, initial encounter        ED Disposition Condition    Discharge Stable        My Scribe Attestation: I acknowledge that the documentation on this chart was provided by described on the date of service noted above and that the documentation in the chart accurately reflects work and decisions made by me alone.    ED Prescriptions    None       Follow-up Information       Follow up With Specialties Details Why Contact Info    Bayron Ferguson MD Internal Medicine Schedule an appointment as soon as possible for a visit  As needed [address] [facility]  [address]  [facility] [id number] [phone]               Bernice Mcdonald MD  12/30/22 5975

## 2022-12-30 NOTE — ED TRIAGE NOTES
Pt presents to ED for evaluation of his head and knee following a trip and fall. Pt reports he went to let his dog outside to use the bathroom when he tripped and fell. Pt denies LOC. Pt denies other complaints at this time.     Patient has verified the spelling of their name and  on armband.   APPEARANCE: Alert, oriented and in no acute distress.  CARDIAC: Normal rate and rhythm.  PERIPHERAL VASCULAR: peripheral pulses present. Normal cap refill. No edema. Warm to touch.    RESPIRATORY:Normal rate and effort. Respirations are equal and unlabored no obvious signs of distress.  GASTRO: soft, no tenderness, no abdominal distention.  MUSC: Full ROM. No bony tenderness or soft tissue tenderness. No obvious deformity.  SKIN: Skin is warm and dry, normal skin turgor, mucous membranes moist. Abrasion on top of head and to R knee.   NEURO: 5/5 strength major flexors/extensors bilaterally. Sensory intact to light touch bilaterally. Manisha coma scale: eyes open spontaneously-4, oriented & converses-5, obeys commands-6. No neurological abnormalities.   MENTAL STATUS: awake, alert and aware of environment.  EYE: PERRL, both eyes: pupils brisk and reactive to light. Normal size.     Pt arrived in his personal motorized scooter.

## 2022-12-30 NOTE — DISCHARGE INSTRUCTIONS
Tylenol Motrin as needed for pain and headache follow-up with primary care return the ER for any concerning reason.

## 2023-01-03 RX ORDER — FINGOLIMOD HYDROCHLORIDE 0.5 MG/1
CAPSULE ORAL
Qty: 90 CAPSULE | Refills: 2 | Status: SHIPPED | OUTPATIENT
Start: 2023-01-03 | End: 2023-10-12 | Stop reason: SDUPTHER

## 2023-01-06 NOTE — TELEPHONE ENCOUNTER
Specialty Pharmacy - Refill Coordination    Specialty Medication Orders Linked to Encounter      Flowsheet Row Most Recent Value   Medication #1 fingolimod (GILENYA) 0.5 mg Cap (Order#[id number], Rx#[id number])            Refill Questions - Documented Responses      Flowsheet Row Most Recent Value   Patient Availability and HIPAA Verification    Does patient want to proceed with activity? Yes   HIPAA/medical authority confirmed? Yes   Relationship to patient of person spoken to? Self   Refill Screening Questions    Changes to allergies? No   Changes to medications? No   New conditions since last clinic visit? No   Unplanned office visit, urgent care, ED, or hospital admission in the last 4 weeks? No   How does patient/caregiver feel medication is working? Excellent   Financial problems or insurance changes? No   How many doses of your specialty medications were missed in the last 4 weeks? 0   Would patient like to speak to a pharmacist? No   When does the patient need to receive the medication? 01/10/23   Refill Delivery Questions    How will the patient receive the medication? MEDRx   When does the patient need to receive the medication? 01/10/23   Shipping Address Home   Address in [facility] confirmed and updated if neccessary? Yes   Expected Copay ($) 0   Is the patient able to afford the medication copay? Yes   Payment Method zero copay   Days supply of Refill 30   Supplies needed? No supplies needed   Refill activity completed? Yes   Refill activity plan Refill scheduled   Shipment/Pickup Date: 01/09/23            Current Outpatient Medications   Medication Sig    acetaZOLAMIDE (DIAMOX) 250 MG tablet Take 2 TABLETS BY MOUTH ONLY 8 HOURS AFTER SURGERY    albuterol (PROAIR HFA) 90 mcg/actuation inhaler Inhale 2 puffs by mouth every 4 hours.    ALPRAZolam (XANAX) 0.5 MG tablet TAKE ONE TABLET BY MOUTH TWICE DAILY AS NEEDED FOR ANXIETY    atorvastatin (LIPITOR) 40 MG tablet Take 1 tablet (40 mg total) by  mouth once daily.    bacitracin 500 unit/gram Oint Apply topically 2 (two) times daily.    baclofen (LIORESAL) 10 MG tablet TAKE 1 TABLET BY MOUTH 3 TIMES DAILY.    buPROPion (WELLBUTRIN SR) 150 MG TBSR 12 hr tablet TAKE 1 TABLET BY MOUTH EVERY 12 HOURS DAILY.    dicyclomine (BENTYL) 10 MG capsule TAKE 1 CAPSULE BY MOUTH THREE TIMES A DAY FOR ABDOMINAL PAIN    difluprednate (DUREZOL) 0.05 % Drop ophthalmic solution Instill one drop TO SURGERY EYE four times a day AFTER SURGERY X 30 DAYS    docusate sodium (COLACE) 100 MG capsule Take 1 capsule (100 mg total) by mouth once daily.    ergocalciferol (ERGOCALCIFEROL) 50,000 unit Cap TAKE 1 CAPSULE BY MOUTH Weekly    famotidine (PEPCID) 20 MG tablet Take 1 tablet (20 mg total) by mouth 2 (two) times daily.    fingolimod (GILENYA) 0.5 mg Cap TAKE ONE CAPSULE (0.5 MG) BY MOUTH ONCE DAILY. MAY TAKE WITH OR WITHOUT FOOD. STORE AT ROOM TEMPERATURE.    gabapentin (NEURONTIN) 300 MG capsule TAKE 1 CAPSULE BY MOUTH AT BEDTIME    lactulose (CHRONULAC) 10 gram/15 mL solution TAKE 45ML BY MOUTH THREE TIMES A DAY    lisinopriL (PRINIVIL,ZESTRIL) 5 MG tablet TAKE 1 TABLET BY MOUTH DAILY    mupirocin (BACTROBAN) 2 % ointment Apply topically 3 (three) times daily.    nabumetone (RELAFEN) 500 MG tablet Take 1 tablet (500 mg total) by mouth 2 (two) times daily as needed.    nebulizer and compressor (HOME NEBULIZER PLUS SIDESTREAM) Lupe use as directed    ofloxacin (OCUFLOX) 0.3 % ophthalmic solution Instill 1 drop TO SURGERY EYE four times a day STARTING 2 DAYS BEFORE SURGERY    oxybutynin (DITROPAN-XL) 5 MG TR24 Take 1 tablet (5 mg total) by mouth once daily.    pantoprazole (PROTONIX) 40 MG tablet TAKE ONE TABLET BY MOUTH  ONCE DAILY    polyethylene glycol (GLYCOLAX) 17 gram/dose powder Take 17 g by mouth once daily.    traMADoL (ULTRAM) 50 mg tablet Take 1 tablet (50 mg total) by mouth every 6 (six) hours as needed for pain    triamcinolone acetonide 0.1% (KENALOG) 0.1 % cream Apply  topically 2 (two) times daily. for 10 days   Last reviewed on 10/27/2022 11:07 AM by Soo Giles MD    Review of patient's allergies indicates:  No Known Allergies Last reviewed on  12/30/2022 1:56 AM by Racquel Gr    Interventions added this encounter   Open: [facility] Care Plan: fingolimod (GILENYA) 0.5 mg Cap     Tasks added this encounter   2/2/2023 - Refill Call (Auto Added)   Tasks due within next 3 months   3/3/2023 - Clinical - Follow Up Assesement (Annual)     Jorden Mills, PharmD  [facility]  [address] [place] [id number]  Phone: [phone]  Fax: [phone]

## 2023-01-12 DIAGNOSIS — K59.04 CHRONIC IDIOPATHIC CONSTIPATION: ICD-10-CM

## 2023-01-12 RX ORDER — LACTULOSE 10 G/15ML
45 SOLUTION ORAL; RECTAL 3 TIMES DAILY
Qty: 4050 ML | Refills: 11 | Status: SHIPPED | OUTPATIENT
Start: 2023-01-12 | End: 2023-05-03 | Stop reason: SDUPTHER

## 2023-01-23 ENCOUNTER — TELEPHONE (OUTPATIENT)
Dept: NEUROLOGY | Facility: CLINIC | Age: 58
End: 2023-01-23
Payer: MEDICARE

## 2023-01-26 ENCOUNTER — OFFICE VISIT (OUTPATIENT)
Dept: PAIN MEDICINE | Facility: CLINIC | Age: 58
End: 2023-01-26
Payer: MEDICARE

## 2023-01-26 VITALS
SYSTOLIC BLOOD PRESSURE: 130 MMHG | BODY MASS INDEX: 33.96 KG/M2 | DIASTOLIC BLOOD PRESSURE: 84 MMHG | WEIGHT: 229.94 LBS | HEART RATE: 92 BPM

## 2023-01-26 DIAGNOSIS — M54.41 CHRONIC BILATERAL LOW BACK PAIN WITH BILATERAL SCIATICA: ICD-10-CM

## 2023-01-26 DIAGNOSIS — M54.16 LUMBAR RADICULOPATHY: ICD-10-CM

## 2023-01-26 DIAGNOSIS — Z74.09 IMPAIRED FUNCTIONAL MOBILITY, BALANCE, GAIT, AND ENDURANCE: Primary | ICD-10-CM

## 2023-01-26 DIAGNOSIS — R52 PAIN: ICD-10-CM

## 2023-01-26 DIAGNOSIS — G35 MULTIPLE SCLEROSIS: ICD-10-CM

## 2023-01-26 DIAGNOSIS — G89.29 CHRONIC BILATERAL LOW BACK PAIN WITH BILATERAL SCIATICA: ICD-10-CM

## 2023-01-26 DIAGNOSIS — M54.42 CHRONIC BILATERAL LOW BACK PAIN WITH BILATERAL SCIATICA: ICD-10-CM

## 2023-01-26 PROCEDURE — 3075F PR MOST RECENT SYSTOLIC BLOOD PRESS GE 130-139MM HG: ICD-10-PCS | Mod: CPTII,S$GLB,, | Performed by: NURSE PRACTITIONER

## 2023-01-26 PROCEDURE — 3008F PR BODY MASS INDEX (BMI) DOCUMENTED: ICD-10-PCS | Mod: CPTII,S$GLB,, | Performed by: NURSE PRACTITIONER

## 2023-01-26 PROCEDURE — 1159F MED LIST DOCD IN RCRD: CPT | Mod: CPTII,S$GLB,, | Performed by: NURSE PRACTITIONER

## 2023-01-26 PROCEDURE — 1160F RVW MEDS BY RX/DR IN RCRD: CPT | Mod: CPTII,S$GLB,, | Performed by: NURSE PRACTITIONER

## 2023-01-26 PROCEDURE — 3079F DIAST BP 80-89 MM HG: CPT | Mod: CPTII,S$GLB,, | Performed by: NURSE PRACTITIONER

## 2023-01-26 PROCEDURE — 99999 PR PBB SHADOW E&M-EST. PATIENT-LVL IV: ICD-10-PCS | Mod: PBBFAC,,, | Performed by: NURSE PRACTITIONER

## 2023-01-26 PROCEDURE — 99214 OFFICE O/P EST MOD 30 MIN: CPT | Mod: S$GLB,,, | Performed by: NURSE PRACTITIONER

## 2023-01-26 PROCEDURE — 1159F PR MEDICATION LIST DOCUMENTED IN MEDICAL RECORD: ICD-10-PCS | Mod: CPTII,S$GLB,, | Performed by: NURSE PRACTITIONER

## 2023-01-26 PROCEDURE — 99999 PR PBB SHADOW E&M-EST. PATIENT-LVL IV: CPT | Mod: PBBFAC,,, | Performed by: NURSE PRACTITIONER

## 2023-01-26 PROCEDURE — 99214 PR OFFICE/OUTPT VISIT, EST, LEVL IV, 30-39 MIN: ICD-10-PCS | Mod: S$GLB,,, | Performed by: NURSE PRACTITIONER

## 2023-01-26 PROCEDURE — 3075F SYST BP GE 130 - 139MM HG: CPT | Mod: CPTII,S$GLB,, | Performed by: NURSE PRACTITIONER

## 2023-01-26 PROCEDURE — 3079F PR MOST RECENT DIASTOLIC BLOOD PRESSURE 80-89 MM HG: ICD-10-PCS | Mod: CPTII,S$GLB,, | Performed by: NURSE PRACTITIONER

## 2023-01-26 PROCEDURE — 3008F BODY MASS INDEX DOCD: CPT | Mod: CPTII,S$GLB,, | Performed by: NURSE PRACTITIONER

## 2023-01-26 PROCEDURE — 1160F PR REVIEW ALL MEDS BY PRESCRIBER/CLIN PHARMACIST DOCUMENTED: ICD-10-PCS | Mod: CPTII,S$GLB,, | Performed by: NURSE PRACTITIONER

## 2023-01-26 NOTE — PROGRESS NOTES
[facility] Established Clinic Visit     Chief Complaint:   Chief Complaint   Patient presents with    Low-back Pain       History of Present Illness: Tj Trammell is a 57 y.o. male here for back pain.     Onset: several years, but progressively worsening  Location: bilateral low back  Radiation: Bilateral legs diffusely, left leg worse than right and legs worse than back  Timing: constant  Quality: Sharp  Exacerbating Factors:  lifting the leg, walking  Alleviating Factors: nothing  Associated Symptoms: He feels numbness and tingling in both legs diffusely. He has weakness in both legs. He notes frequent falls, with resultant injuries in the past few months. He uses rollator walker. He has bladder incontinence and is on ditropan. He denies night fever/night sweats, bowel incontinence, and significant weight loss    He has tried tramadol and gabapentin. He has not done PT.     Severity: Currently: 10/10   Typical Range: 10-10/10     Exacerbation: 10/10       Interval History (01/26/2023):  Tj Trammell returns today for follow up.  Upon review of his chart He presented to the ED on 12/30/2022 patient reports that he tripped and fell he hit his head and sustained a abrasion to the right knee.  He did not lose consciousness.  He suffered minor lacerations to his top left scalp and right knee.  He presents today for a three-month follow-up appointment he is a former patient of Dr. Giles.  Currently, the back pain is stable.        Current Pain Scales:  Current: 0/10              Typical Range: 0-1/10     Pain Disability Index  Family/Home Responsibilities:: 0  Recreation:: 0  Social Activity:: 1  Occupation:: 0  Sexual Behavior:: 0  Self Care:: 0  Life-Support Activities:: 0  Pain Disability Index (PDI): 1    Previous Interventions:  - 10/5/22: B/L L5 TF JEVON w/ no relief.     Previous Therapies:  PT/OT: no  Relevant Surgery: no   Previous Medications:   - NSAIDS: relafen  - Muscle Relaxants:  baclofen.    - TCAs:   - SNRIs:   - Topicals:   - Anticonvulsants: gabapentin    - Opioids: Tramadol    Current Pain Medications:  Tramadol  Baclofen  Gabapentin       Blood Thinners: None    Full Medication List:    Current Outpatient Medications:     acetaZOLAMIDE (DIAMOX) 250 MG tablet, Take 2 TABLETS BY MOUTH ONLY 8 HOURS AFTER SURGERY, Disp: 2 tablet, Rfl: 0    albuterol (PROAIR HFA) 90 mcg/actuation inhaler, Inhale 2 puffs by mouth every 4 hours., Disp: 8.5 g, Rfl: 3    ALPRAZolam (XANAX) 0.5 MG tablet, TAKE ONE TABLET BY MOUTH TWICE DAILY AS NEEDED FOR ANXIETY, Disp: 60 tablet, Rfl: 4    atorvastatin (LIPITOR) 40 MG tablet, Take 1 tablet (40 mg total) by mouth once daily., Disp: 90 tablet, Rfl: 4    bacitracin 500 unit/gram Oint, Apply topically 2 (two) times daily., Disp: 28 g, Rfl: 0    baclofen (LIORESAL) 10 MG tablet, TAKE 1 TABLET BY MOUTH 3 TIMES DAILY., Disp: 90 tablet, Rfl: 11    buPROPion (WELLBUTRIN SR) 150 MG TBSR 12 hr tablet, TAKE 1 TABLET BY MOUTH EVERY 12 HOURS DAILY., Disp: 60 tablet, Rfl: 11    dicyclomine (BENTYL) 10 MG capsule, TAKE 1 CAPSULE BY MOUTH THREE TIMES A DAY FOR ABDOMINAL PAIN, Disp: 90 capsule, Rfl: 3    difluprednate (DUREZOL) 0.05 % Drop ophthalmic solution, Instill one drop TO SURGERY EYE four times a day AFTER SURGERY X 30 DAYS, Disp: 5 mL, Rfl: 4    docusate sodium (COLACE) 100 MG capsule, Take 1 capsule (100 mg total) by mouth once daily., Disp: 30 capsule, Rfl: 5    ergocalciferol (ERGOCALCIFEROL) 50,000 unit Cap, TAKE 1 CAPSULE BY MOUTH Weekly, Disp: 12 capsule, Rfl: 0    famotidine (PEPCID) 20 MG tablet, Take 1 tablet (20 mg total) by mouth 2 (two) times daily., Disp: 60 tablet, Rfl: 3    fingolimod (GILENYA) 0.5 mg Cap, TAKE ONE CAPSULE (0.5 MG) BY MOUTH ONCE DAILY. MAY TAKE WITH OR WITHOUT FOOD. STORE AT ROOM TEMPERATURE., Disp: 90 capsule, Rfl: 2    gabapentin (NEURONTIN) 300 MG capsule, TAKE 1 CAPSULE BY MOUTH AT BEDTIME, Disp: 30 capsule, Rfl: 11    lactulose  (CHRONULAC) 10 gram/15 mL solution, Take 45 mLs (30 g total) by mouth 3 (three) times daily., Disp: 4050 mL, Rfl: 11    lisinopriL (PRINIVIL,ZESTRIL) 5 MG tablet, TAKE 1 TABLET BY MOUTH DAILY, Disp: 90 tablet, Rfl: 3    mupirocin (BACTROBAN) 2 % ointment, Apply topically 3 (three) times daily., Disp: 22 g, Rfl: 0    nabumetone (RELAFEN) 500 MG tablet, Take 1 tablet (500 mg total) by mouth 2 (two) times daily as needed., Disp: 60 tablet, Rfl: 6    nebulizer and compressor (HOME NEBULIZER PLUS SIDESTREAM) Lupe, use as directed, Disp: 1 each, Rfl: 0    ofloxacin (OCUFLOX) 0.3 % ophthalmic solution, Instill 1 drop TO SURGERY EYE four times a day STARTING 2 DAYS BEFORE SURGERY, Disp: 5 mL, Rfl: 3    oxybutynin (DITROPAN-XL) 5 MG TR24, Take 1 tablet (5 mg total) by mouth once daily., Disp: 90 tablet, Rfl: 3    pantoprazole (PROTONIX) 40 MG tablet, TAKE ONE TABLET BY MOUTH  ONCE DAILY, Disp: 90 tablet, Rfl: 3    polyethylene glycol (GLYCOLAX) 17 gram/dose powder, Take 17 g by mouth once daily., Disp: 510 g, Rfl: 3    traMADoL (ULTRAM) 50 mg tablet, Take 1 tablet (50 mg total) by mouth every 6 (six) hours as needed for pain, Disp: 120 tablet, Rfl: 4    triamcinolone acetonide 0.1% (KENALOG) 0.1 % cream, Apply topically 2 (two) times daily. for 10 days, Disp: 80 g, Rfl: 0     Review of Systems:  ROS    Allergies:  Patient has no known allergies.     Medical History:   has a past medical history of Asthma, Bilateral hearing loss (12/12/2012), Biliary acute pancreatitis, GERD (gastroesophageal reflux disease), High cholesterol, Hypertension, and Multiple sclerosis.    Surgical History:   has a past surgical history that includes Joint replacement; Cholecystectomy; Colonoscopy (N/A, 1/22/2019); Tonsillectomy; Appendectomy; Esophagogastroduodenoscopy (N/A, 9/29/2021); and Transforaminal epidural injection of steroid (Bilateral, 10/5/2022).    Family History:  family history includes Heart disease in his brother, father, and  mother.    Social History:   reports that he quit smoking about 12 months ago. His smoking use included cigarettes. He has a 32.00 pack-year smoking history. He has never used smokeless tobacco. He reports that he does not currently use alcohol. He reports that he does not use drugs.    Physical Exam:  /84   Pulse 92   Wt 104.3 kg (229 lb 15 oz)   BMI 33.96 kg/m²   GEN: No acute distress. Calm, comfortable  HENT: Normocephalic, atraumatic, moist mucous membranes  EYE: Anicteric sclera, non-injected.   CV: Non-diaphoretic. Regular Rate. Radial Pulses 2+.  RESP: Breathing comfortably. Chest expansion symmetric.  EXT: No clubbing, cyanosis.   SKIN: Warm, & dry to palpation. No visible rashes or lesions of exposed skin.   PSYCH: Pleasant mood and appropriate affect. Recent and remote memory intact.   GAIT: Mod Independent with RW, antalgic ambulation  Lumbar Spine Exam:       Inspection: No erythema, bruising.       Palpation: (+) TTP of lumbar paraspinals b/l       ROM:  Limited in flexion, extension, lateral bending.       (+) Facet loading bilaterally      (-) Straight Leg Raise bilaterally      (-) LINDSEY bilaterally  Hip Exam:      Inspection: No gross deformity or apparent leg length discrepancy      Palpation: (+) TTP to right greater trochanteric bursa.   Neurologic Exam:     Alert. Speech is fluent and appropriate.     Strength: 4/5 in b/l hip flexion, left EHL, ADF, otherwise 5/5 throughout bilateral lower extremities     Sensation:  Grossly abnormal to light touch in bilateral lower extremities     Reflexes: 1+ in b/l patella, absent in b/l achilles     Tone: No abnormality appreciated in bilateral lower extremities     No Clonus     (+) Bey on the left                Imaging:  - MRI Lumbar Spine 09/16/2022:  Alignment: Normal.  Vertebrae: 5 lumbar-type vertebral bodies. No aggressive marrow replacement process or fracture.  Cortical endplate degenerative changes anteriorly to the RIGHT,  degenerative in nature.  Cortical endplate degenerative changes predominantly L2 through L   Discs: Multilevel disc space narrowing, mild with diffuse desiccation of disc material.  Disc space narrowing predominantly L5-S1.  Cord: Normal. Conus terminates at L1.  Degenerative findings:  T12-L1: There is no focal disc herniation. No significant central canal narrowing . No significant neural foraminal narrowing.  L1-L2: There is no focal disc herniation. No significant central canal narrowing . No significant neural foraminal narrowing.  L2-L3: There is no focal disc herniation. No significant central canal narrowing . No significant neural foraminal narrowing.  L3-L4: There is no focal disc herniation.Broad based mild diffuse bulging of disc material . No significant central canal narrowing . No significant neural foraminal narrowing.  L4-L5: There is no focal disc herniation. Broad based mild diffuse bulging of disc material .  Annular fissure lateralizing RIGHT  no significant central canal narrowing . Moderate RIGHT neural foraminal narrowing.  L5-S1: Broad-based protrusion of disc material eccentric to the RIGHT paracentral region with mass effect upon the anterior thecal sac margins and exiting S1 nerve root.  Mild central canal narrowing .  Moderate to severe bilateral neural foraminal narrowing.  Paraspinal muscles & soft tissues: RIGHT renal cystic lesion greater than LEFT.  Mild posterior paraspinal muscular atrophy.       -MRI Cervical Spine 09/16/2022:  FINDINGS:  The vertebral bodies demonstrate no evidence of fracture, osseous destructive process or aggressive bone marrow replacement process.     Normal sagittal alignment is preserved.  No significant spondylolisthesis     Mild posterior disc osteophyte complex at C3-4 and C5-6.  This results in some attenuation of the ventral CSF space and slight contouring of the cord.  The posterior CSF space remains preserved.  Modest posterior disc osteophyte  complex at C4-5 and C6-7 without significant mass effect.  No new large disc herniation or site of spinal stenosis.  Multilevel neural foraminal narrowing from uncovertebral and facet hypertrophy.     Multiple scattered short segment T2 hyperintense foci within the spinal cord, in keeping with reported history of multiple sclerosis.  Midline dorsal lesion at the C2 level slightly less conspicuous.  Question mild cord thinning at this site.  Additional lesions C2-3, C4, C5-6, in T2 appear grossly stable.  No new discrete lesions identified.  No enhancing foci.     No intraspinal mass or fluid collection.     No acute abnormalities in the visualized paraspinal soft tissue structures.    Labs:  BMP  Lab Results   Component Value Date     11/08/2021    K 4.1 11/08/2021     11/08/2021    CO2 20 (L) 11/08/2021    BUN 10 11/08/2021    CREATININE 1.0 11/08/2021    CALCIUM 8.8 11/08/2021    ANIONGAP 12 11/08/2021    ESTGFRAFRICA >60 11/08/2021    EGFRNONAA >60 11/08/2021     Lab Results   Component Value Date    ALT 19 11/08/2021    AST 24 11/08/2021    ALKPHOS 83 11/08/2021    BILITOT 0.4 11/08/2021     Lab Results   Component Value Date     11/08/2021       Assessment:  Tj Trammell is a 57 y.o. male with the following diagnoses based on history, exam, and imaging:    Problem List Items Addressed This Visit          Neuro    Multiple sclerosis    Lumbar radiculopathy     Other Visit Diagnoses       Impaired functional mobility, balance, gait, and endurance    -  Primary    Chronic bilateral low back pain with bilateral sciatica        Pain                  This is a pleasant 57 y.o. gentleman presenting with:     - Chronic bilateral low back pain with bilateral leg pains: Potential radiculopathy related to his lumbar canal and foraminal stenosis, or potentially related to his MS  - Multiple sclerosis: Will get patient re-established with neurology, as MS could be the cause of his pains, weakness  "and falls.     1/26/2023- 56 y/o male with a Hx of chronic low back with bilateral leg pains, discussed that he will continued follow-up with Neurology.  I recommend today that he attend physical therapy as I see that his pending order is still active for PT per Dr. Giles.  I did recommend him scoring his tramadol tablets as he did state that they have a tendency to make him feel "groggy      Treatment Plan:   - PT/OT/HEP: Attend PT pending order. Discussed benefits of exercise for pain.   - Procedures: If no relief with PT, plan for caudal JEVON  - Medications: No changes recommended at this time. I would be hesitant to add any further sedating medications with his current issues with falls. May need to consider tapering his xanax and tramadol.   - Imaging: Reviewed.   - Labs: Reviewed.  Medications are appropriately dosed for current hepatorenal function.  - Continued follow up with neurology for his MS.     Follow Up: RTC in 3 months     ZACHERY Jane  Inter[facility] Pain Management    Disclaimer: This note was partly generated using dictation software which may occasionally result in transcription errors.        "

## 2023-02-02 ENCOUNTER — SPECIALTY PHARMACY (OUTPATIENT)
Dept: PHARMACY | Facility: CLINIC | Age: 58
End: 2023-02-02
Payer: MEDICARE

## 2023-02-03 NOTE — TELEPHONE ENCOUNTER
Specialty Pharmacy - Refill Coordination    Specialty Medication Orders Linked to Encounter      Flowsheet Row Most Recent Value   Medication #1 fingolimod (GILENYA) 0.5 mg Cap (Order#[id number], Rx#[id number])            Refill Questions - Documented Responses      Flowsheet Row Most Recent Value   Refill Screening Questions    Changes to allergies? No   Changes to medications? No   New conditions since last clinic visit? No   Unplanned office visit, urgent care, ED, or hospital admission in the last 4 weeks? No   How does patient/caregiver feel medication is working? Excellent   How many doses of your specialty medications were missed in the last 4 weeks? 0   Would patient like to speak to a pharmacist? No   When does the patient need to receive the medication? 02/08/23   Refill Delivery Questions    How will the patient receive the medication? MEDRx   When does the patient need to receive the medication? 02/08/23   Shipping Address Home   Address in [facility] confirmed and updated if neccessary? Yes   Expected Copay ($) 0   Is the patient able to afford the medication copay? Yes   Payment Method zero copay   Days supply of Refill 30   Supplies needed? No supplies needed   Refill activity completed? Yes   Refill activity plan Refill scheduled   Shipment/Pickup Date: 02/06/23            Current Outpatient Medications   Medication Sig    acetaZOLAMIDE (DIAMOX) 250 MG tablet Take 2 TABLETS BY MOUTH ONLY 8 HOURS AFTER SURGERY    albuterol (PROAIR HFA) 90 mcg/actuation inhaler Inhale 2 puffs by mouth every 4 hours.    ALPRAZolam (XANAX) 0.5 MG tablet TAKE ONE TABLET BY MOUTH TWICE DAILY AS NEEDED FOR ANXIETY    atorvastatin (LIPITOR) 40 MG tablet Take 1 tablet (40 mg total) by mouth once daily.    bacitracin 500 unit/gram Oint Apply topically 2 (two) times daily.    baclofen (LIORESAL) 10 MG tablet TAKE 1 TABLET BY MOUTH 3 TIMES DAILY.    buPROPion (WELLBUTRIN SR) 150 MG TBSR 12 hr tablet TAKE 1 TABLET BY MOUTH  EVERY 12 HOURS DAILY.    dicyclomine (BENTYL) 10 MG capsule TAKE 1 CAPSULE BY MOUTH THREE TIMES A DAY FOR ABDOMINAL PAIN    difluprednate (DUREZOL) 0.05 % Drop ophthalmic solution Instill one drop TO SURGERY EYE four times a day AFTER SURGERY X 30 DAYS    docusate sodium (COLACE) 100 MG capsule Take 1 capsule (100 mg total) by mouth once daily.    ergocalciferol (ERGOCALCIFEROL) 50,000 unit Cap TAKE 1 CAPSULE BY MOUTH Weekly    famotidine (PEPCID) 20 MG tablet Take 1 tablet (20 mg total) by mouth 2 (two) times daily.    fingolimod (GILENYA) 0.5 mg Cap TAKE ONE CAPSULE (0.5 MG) BY MOUTH ONCE DAILY. MAY TAKE WITH OR WITHOUT FOOD. STORE AT ROOM TEMPERATURE.    gabapentin (NEURONTIN) 300 MG capsule TAKE 1 CAPSULE BY MOUTH AT BEDTIME    lactulose (CHRONULAC) 10 gram/15 mL solution Take 45 mLs (30 g total) by mouth 3 (three) times daily.    lisinopriL (PRINIVIL,ZESTRIL) 5 MG tablet TAKE 1 TABLET BY MOUTH DAILY    mupirocin (BACTROBAN) 2 % ointment Apply topically 3 (three) times daily.    nabumetone (RELAFEN) 500 MG tablet Take 1 tablet (500 mg total) by mouth 2 (two) times daily as needed.    nebulizer and compressor (HOME NEBULIZER PLUS SIDESTREAM) Lupe use as directed    ofloxacin (OCUFLOX) 0.3 % ophthalmic solution Instill 1 drop TO SURGERY EYE four times a day STARTING 2 DAYS BEFORE SURGERY    oxybutynin (DITROPAN-XL) 5 MG TR24 Take 1 tablet (5 mg total) by mouth once daily.    pantoprazole (PROTONIX) 40 MG tablet TAKE ONE TABLET BY MOUTH  ONCE DAILY    polyethylene glycol (GLYCOLAX) 17 gram/dose powder Take 17 g by mouth once daily.    traMADoL (ULTRAM) 50 mg tablet Take 1 tablet (50 mg total) by mouth every 6 (six) hours as needed for pain    triamcinolone acetonide 0.1% (KENALOG) 0.1 % cream Apply topically 2 (two) times daily. for 10 days   Last reviewed on 1/26/2023  9:59 AM by STEPHEN Rosas    Review of patient's allergies indicates:  No Known Allergies Last reviewed on  1/27/2023 1:07 PM by Bayrno  Marty      Tasks added this encounter   3/3/2023 - Refill Call (Auto Added)   Tasks due within next 3 months   3/3/2023 - Clinical - Follow Up Assesement (Annual)     Jorden Mills, PharmD  [facility]  [address]  [facility] [address]  Phone: [phone]  Fax: [phone]

## 2023-03-06 ENCOUNTER — SPECIALTY PHARMACY (OUTPATIENT)
Dept: PHARMACY | Facility: CLINIC | Age: 58
End: 2023-03-06
Payer: MEDICARE

## 2023-03-06 DIAGNOSIS — G35 MULTIPLE SCLEROSIS: Primary | ICD-10-CM

## 2023-03-06 NOTE — TELEPHONE ENCOUNTER
Specialty Pharmacy - Refill Coordination    Specialty Medication Orders Linked to Encounter      Flowsheet Row Most Recent Value   Medication #1 fingolimod (GILENYA) 0.5 mg Cap (Order#[id number], Rx#[id number])            Refill Questions - Documented Responses      Flowsheet Row Most Recent Value   Patient Availability and HIPAA Verification    Does patient want to proceed with activity? Yes   HIPAA/medical authority confirmed? Yes   Relationship to patient of person spoken to? Family Member   Refill Screening Questions    Changes to allergies? No   Changes to medications? No   New conditions since last clinic visit? No   Unplanned office visit, urgent care, ED, or hospital admission in the last 4 weeks? No   How does patient/caregiver feel medication is working? Excellent   Financial problems or insurance changes? No   How many doses of your specialty medications were missed in the last 4 weeks? 0   Would patient like to speak to a pharmacist? No   When does the patient need to receive the medication? 03/09/23   Refill Delivery Questions    How will the patient receive the medication? MED[facility]   When does the patient need to receive the medication? 03/09/23   Shipping Address Home   Address in [facility] confirmed and updated if neccessary? Yes   Expected Copay ($) 0   Is the patient able to afford the medication copay? Yes   Payment Method zero copay   Days supply of Refill 30   Supplies needed? No supplies needed   Refill activity completed? Yes   Refill activity plan Refill scheduled   Shipment/Pickup Date: 03/08/23            Current Outpatient Medications   Medication Sig    acetaZOLAMIDE (DIAMOX) 250 MG tablet Take 2 TABLETS BY MOUTH ONLY 8 HOURS AFTER SURGERY    albuterol (PROAIR HFA) 90 mcg/actuation inhaler Inhale 2 puffs by mouth every 4 hours.    ALPRAZolam (XANAX) 0.5 MG tablet TAKE ONE TABLET BY MOUTH TWICE DAILY AS NEEDED FOR ANXIETY    atorvastatin (LIPITOR) 40 MG tablet Take 1 tablet (40 mg  total) by mouth once daily.    bacitracin 500 unit/gram Oint Apply topically 2 (two) times daily.    baclofen (LIORESAL) 10 MG tablet TAKE 1 TABLET BY MOUTH 3 TIMES DAILY.    buPROPion (WELLBUTRIN SR) 150 MG TBSR 12 hr tablet TAKE 1 TABLET BY MOUTH EVERY 12 HOURS DAILY.    dicyclomine (BENTYL) 10 MG capsule TAKE 1 CAPSULE BY MOUTH THREE TIMES A DAY FOR ABDOMINAL PAIN    difluprednate (DUREZOL) 0.05 % Drop ophthalmic solution Instill one drop TO SURGERY EYE four times a day AFTER SURGERY X 30 DAYS    docusate sodium (COLACE) 100 MG capsule Take 1 capsule (100 mg total) by mouth once daily.    ergocalciferol (ERGOCALCIFEROL) 50,000 unit Cap TAKE 1 CAPSULE BY MOUTH Weekly    famotidine (PEPCID) 20 MG tablet Take 1 tablet (20 mg total) by mouth 2 (two) times daily.    fingolimod (GILENYA) 0.5 mg Cap TAKE ONE CAPSULE (0.5 MG) BY MOUTH ONCE DAILY. MAY TAKE WITH OR WITHOUT FOOD. STORE AT ROOM TEMPERATURE.    gabapentin (NEURONTIN) 300 MG capsule TAKE 1 CAPSULE BY MOUTH AT BEDTIME    lactulose (CHRONULAC) 10 gram/15 mL solution Take 45 mLs (30 g total) by mouth 3 (three) times daily.    lisinopriL (PRINIVIL,ZESTRIL) 5 MG tablet TAKE 1 TABLET BY MOUTH DAILY    mupirocin (BACTROBAN) 2 % ointment Apply topically 3 (three) times daily.    nabumetone (RELAFEN) 500 MG tablet Take 1 tablet (500 mg total) by mouth 2 (two) times daily as needed.    nebulizer and compressor (HOME NEBULIZER PLUS SIDESTREAM) Lupe use as directed    ofloxacin (OCUFLOX) 0.3 % ophthalmic solution Instill 1 drop TO SURGERY EYE four times a day STARTING 2 DAYS BEFORE SURGERY    oxybutynin (DITROPAN-XL) 5 MG TR24 Take 1 tablet (5 mg total) by mouth once daily.    pantoprazole (PROTONIX) 40 MG tablet TAKE ONE TABLET BY MOUTH  ONCE DAILY    polyethylene glycol (GLYCOLAX) 17 gram/dose powder Take 17 g by mouth once daily.    traMADoL (ULTRAM) 50 mg tablet Take 1 tablet (50 mg total) by mouth every 6 (six) hours as needed for pain    triamcinolone acetonide 0.1%  (KENALOG) 0.1 % cream Apply topically 2 (two) times daily. for 10 days   Last reviewed on 1/26/2023  9:59 AM by STEPHEN Rosas    Review of patient's allergies indicates:  No Known Allergies Last reviewed on  1/27/2023 1:07 PM by Bayron Ferguson      Tasks added this encounter   4/1/2023 - Refill Call (Auto Added)   Tasks due within next 3 months   3/3/2023 - Clinical - Follow Up Assesement (Annual)     Jorden Mills, PharmD  [facility]  [address] [place] [id number]  Phone: [phone]  Fax: [phone]

## 2023-03-08 NOTE — TELEPHONE ENCOUNTER
Incoming call from patient stating he has two tablets left. Informed patient OSP spoke with a family member on 3/6 and the refill is set up. Patient will receive delivery on 3/9, patient verbalized understanding. No further questions/concerns.

## 2023-03-15 ENCOUNTER — OFFICE VISIT (OUTPATIENT)
Dept: GASTROENTEROLOGY | Facility: CLINIC | Age: 58
End: 2023-03-15
Payer: MEDICARE

## 2023-03-15 VITALS — HEIGHT: 69 IN | BODY MASS INDEX: 33.94 KG/M2 | WEIGHT: 229.13 LBS

## 2023-03-15 DIAGNOSIS — K58.1 IRRITABLE BOWEL SYNDROME WITH CONSTIPATION: ICD-10-CM

## 2023-03-15 DIAGNOSIS — Z12.11 SCREENING FOR COLON CANCER: Primary | ICD-10-CM

## 2023-03-15 PROCEDURE — 99999 PR PBB SHADOW E&M-EST. PATIENT-LVL IV: ICD-10-PCS | Mod: PBBFAC,,, | Performed by: NURSE PRACTITIONER

## 2023-03-15 PROCEDURE — 99999 PR PBB SHADOW E&M-EST. PATIENT-LVL IV: CPT | Mod: PBBFAC,,, | Performed by: NURSE PRACTITIONER

## 2023-03-15 PROCEDURE — 4010F PR ACE/ARB THEARPY RXD/TAKEN: ICD-10-PCS | Mod: CPTII,S$GLB,, | Performed by: NURSE PRACTITIONER

## 2023-03-15 PROCEDURE — 3008F PR BODY MASS INDEX (BMI) DOCUMENTED: ICD-10-PCS | Mod: CPTII,S$GLB,, | Performed by: NURSE PRACTITIONER

## 2023-03-15 PROCEDURE — 3008F BODY MASS INDEX DOCD: CPT | Mod: CPTII,S$GLB,, | Performed by: NURSE PRACTITIONER

## 2023-03-15 PROCEDURE — 4010F ACE/ARB THERAPY RXD/TAKEN: CPT | Mod: CPTII,S$GLB,, | Performed by: NURSE PRACTITIONER

## 2023-03-15 PROCEDURE — 99214 PR OFFICE/OUTPT VISIT, EST, LEVL IV, 30-39 MIN: ICD-10-PCS | Mod: S$GLB,,, | Performed by: NURSE PRACTITIONER

## 2023-03-15 PROCEDURE — 1159F MED LIST DOCD IN RCRD: CPT | Mod: CPTII,S$GLB,, | Performed by: NURSE PRACTITIONER

## 2023-03-15 PROCEDURE — 1159F PR MEDICATION LIST DOCUMENTED IN MEDICAL RECORD: ICD-10-PCS | Mod: CPTII,S$GLB,, | Performed by: NURSE PRACTITIONER

## 2023-03-15 PROCEDURE — 99214 OFFICE O/P EST MOD 30 MIN: CPT | Mod: S$GLB,,, | Performed by: NURSE PRACTITIONER

## 2023-03-15 RX ORDER — SODIUM, POTASSIUM,MAG SULFATES 17.5-3.13G
1 SOLUTION, RECONSTITUTED, ORAL ORAL DAILY
Qty: 1 KIT | Refills: 0 | Status: SHIPPED | OUTPATIENT
Start: 2023-03-15 | End: 2023-08-21

## 2023-03-15 NOTE — PROGRESS NOTES
GASTROENTEROLOGY CLINIC NOTE    Chief Complaint: The primary encounter diagnosis was Screening for colon cancer. A diagnosis of Irritable bowel syndrome with constipation was also pertinent to this visit.  Referring provider/PCP: Bayron Ferguson MD    HPI:  Tj Trammell is a 57 y.o. male who is a new patient to me with a PMH that is significant for Asthma, Bilateral hearing loss, Biliary acute pancreatitis, GERD (gastroesophageal reflux disease), High cholesterol, Hypertension, and Multiple sclerosis.  He is here today to re-establish care for constipation as he was previously seen by SHAAN Horton NP in our clinic.  This is not a new problem as he reports he has been having constipation for several years.  He has tried Miralax, Milk of Magnesia, Stool Softeners, Enemas, Linzess, and Lactulose.  He reports lactulose helps in managing the constipation.  He reports consistency of stool is hard and accompanied by straining and lower abdominal bloating and pain.  Denies unexplained weight loss, hematochezia, or melena.  Upon reviewing patient's records, his PCP Dr. Ferguson sent in Lactulose to [facility].  I spoke with pharmacy and patient is able to have prescription refilled.     Interval Note 9/20/2021  Mr. Trammell who is known to me presents to clinic for follow up after recent hospital visit.  He went to the emergency room 9/4/2021 due to abdominal pain. He was further evaluated and CT was obtained. CT revealed moderate amount of stool in large intestine.  He reports the pain is generalized and not the same as when he is constipated. He is unsure of any triggers to the pain.  Additionally, he reports black stools.  He was discharged with Pepcid 20mg BID and reports this helped his abdominal pain.  Of note, he was previously prescribed Protonix by his PCP which he reports he is not taking. He denies any changes in bowel habits. He continues to have bowel movements every two days and  continues to take Lactulose.  Denies hematochezia. Patient due for screening colonoscopy in 2022.     Interval Note 3/15/2023  Mr. Trammell who is known to me presents for follow up regarding constipation and colon cancer screening.  Has h/o constipation which has been managed with Lactulose.  Lactulose is no longer helping with constipation.  Bowel movement occurring about once a week. Reports bloating, abdominal discomfort, and nausea.  No hematochezia, unexplained weight loss, or changes in appetite. He is due for screening colonoscopy; has h/o polyps.     Prior Upper Endoscopy:  9/2021 with Dr. Loredo  Impression:            - Normal esophagus.                          - Erythematous mucosa in the antrum. Biopsied.                          - Normal examined duodenum.     Recommendation:        - Discharge patient to home.                          - Resume previous diet.                          - Continue present medications.                          - Await pathology results.                          - Return to nurse practitioner at appointment to                          be scheduled.     Pathology:  Gastric biopsy:   -Antral and fundic type mucosa with mild chronic gastritis.   -Immunohistochemistry with appropriate control for H.pylori is negative.       2015  Findings: There were esophageal mucosal changes suspicious for short-segment   Norman's esophagus present in the lower third of the esophagus. The maximum longitudinal extent of these mucosal changes was 1 cm in length ( islands) . Mucosa was biopsied with a cold forceps for histology randomly at the gastroesophageal junction. One specimen bottle was sent to pathology. Diffuse moderately erythematous mucosa was found in the entire examined stomach. This was biopsied with a cold forceps for histology. The examined duodenum was normal.     Impression:  - Esophageal mucosal changes suspicious for short-segment Norman's esophagus. Biopsied.                          - Erythematous mucosa in the stomach. Biopsied.                         - Normal examined duodenum.     Recommendation:       - Await pathology results.                         - Amylase and lipase.                         - If Biopsies negative consider targeted biopsies under OCT                         - clinic visit 1 month                         - Discharge patient to home (ambulatory).     Pathology:  1. STOMACH, BIOPSY:  -Gastric mucosa with no significant histopathologic findings.  -No Helicobacter pylori organisms on H&E stain.  2. ESOPHAGUS, BIOPSY:  -Gastroesophageal junction mucosa with chronic inflammation and reactive changes.  -No intestinal metaplasia or dysplasia.  -Minute focus of heterotopic pancreatic tissue    Prior Colonoscopy: 2019 with Dr. Waddell (3 year recall; Due 2022)  Findings: A large amount of stool was found in the entire colon, interfering with visualization. Lavage of the area was performed using copious amounts of sterile water, resulting in clearance with fair visualization. A 10 mm polyp was found in the sigmoid colon. The polyp was semi-pedunculated. The polyp was removed with a hot snare. Resection and retrieval were complete. Verification of patient identification for the specimen was done. Estimated blood loss: none. The exam was otherwise without abnormality on direct and retroflexion views.     Impression:   - Stool in the entire examined colon.                         - One 10 mm polyp in the sigmoid colon, removed with a hot snare. Resected and retrieved.                         - The examination was otherwise normal on direct and retroflexion views.     Recommendation:  - Resume previous diet.                         - Continue present medications.                         - Await pathology results.                         - Repeat colonoscopy date to be determined after pending pathology results are reviewed for surveillance.     Pathology:  SIGMOID  BIOPSY:  ADENOMATOUS POLYP    Family h/o Colon Cancer: No  NSAIDs: No  Anticoagulation or Antiplatelet: No    Review of Systems   Constitutional:  Negative for weight loss.   HENT:  Positive for hearing loss. Negative for sore throat.    Eyes:  Negative for blurred vision.   Respiratory:  Negative for cough.    Cardiovascular:  Negative for chest pain.   Gastrointestinal:  Positive for abdominal pain (lower quadrant) and constipation. Negative for blood in stool, diarrhea, heartburn, melena, nausea and vomiting.   Genitourinary:  Negative for dysuria.   Musculoskeletal:  Negative for myalgias.   Skin:  Negative for rash.   Neurological:  Negative for headaches.   Endo/Heme/Allergies:  Negative for environmental allergies.   Psychiatric/Behavioral:  Negative for suicidal ideas. The patient is not nervous/anxious.      Past Medical History: has a past medical history of Asthma, Bilateral hearing loss, Biliary acute pancreatitis, GERD (gastroesophageal reflux disease), High cholesterol, Hypertension, and Multiple sclerosis.    Past Surgical History: has a past surgical history that includes Joint replacement; Cholecystectomy; Colonoscopy (N/A, 1/22/2019); Tonsillectomy; Appendectomy; Esophagogastroduodenoscopy (N/A, 9/29/2021); and Transforaminal epidural injection of steroid (Bilateral, 10/5/2022).    Family History:family history includes Heart disease in his brother, father, and mother.    Allergies: Review of patient's allergies indicates:  No Known Allergies    Social History: reports that he quit smoking about 14 months ago. His smoking use included cigarettes. He has a 32.00 pack-year smoking history. He has never used smokeless tobacco. He reports that he does not currently use alcohol. He reports that he does not use drugs.    Home medications:   Current Outpatient Medications on File Prior to Visit   Medication Sig Dispense Refill    acetaZOLAMIDE (DIAMOX) 250 MG tablet Take 2 TABLETS BY MOUTH ONLY 8 HOURS  AFTER SURGERY 2 tablet 0    albuterol (PROAIR HFA) 90 mcg/actuation inhaler Inhale 2 puffs by mouth every 4 hours. 8.5 g 3    ALPRAZolam (XANAX) 0.5 MG tablet TAKE ONE TABLET BY MOUTH TWICE DAILY AS NEEDED FOR ANXIETY 60 tablet 4    atorvastatin (LIPITOR) 40 MG tablet Take 1 tablet (40 mg total) by mouth once daily. 90 tablet 4    bacitracin 500 unit/gram Oint Apply topically 2 (two) times daily. 28 g 0    baclofen (LIORESAL) 10 MG tablet TAKE 1 TABLET BY MOUTH 3 TIMES DAILY. 90 tablet 11    buPROPion (WELLBUTRIN SR) 150 MG TBSR 12 hr tablet TAKE 1 TABLET BY MOUTH EVERY 12 HOURS DAILY. 60 tablet 11    dicyclomine (BENTYL) 10 MG capsule TAKE 1 CAPSULE BY MOUTH THREE TIMES A DAY FOR ABDOMINAL PAIN 90 capsule 3    difluprednate (DUREZOL) 0.05 % Drop ophthalmic solution Instill one drop TO SURGERY EYE four times a day AFTER SURGERY X 30 DAYS 5 mL 4    docusate sodium (COLACE) 100 MG capsule Take 1 capsule (100 mg total) by mouth once daily. 30 capsule 5    ergocalciferol (ERGOCALCIFEROL) 50,000 unit Cap TAKE 1 CAPSULE BY MOUTH Weekly 12 capsule 0    famotidine (PEPCID) 20 MG tablet Take 1 tablet (20 mg total) by mouth 2 (two) times daily. 60 tablet 3    fingolimod (GILENYA) 0.5 mg Cap TAKE ONE CAPSULE (0.5 MG) BY MOUTH ONCE DAILY. MAY TAKE WITH OR WITHOUT FOOD. STORE AT ROOM TEMPERATURE. 90 capsule 2    gabapentin (NEURONTIN) 300 MG capsule TAKE 1 CAPSULE BY MOUTH AT BEDTIME 30 capsule 11    lisinopriL (PRINIVIL,ZESTRIL) 5 MG tablet TAKE 1 TABLET BY MOUTH DAILY 90 tablet 3    mupirocin (BACTROBAN) 2 % ointment Apply topically 3 (three) times daily. 22 g 0    nabumetone (RELAFEN) 500 MG tablet Take 1 tablet (500 mg total) by mouth 2 (two) times daily as needed. 60 tablet 6    nebulizer and compressor (HOME NEBULIZER PLUS SIDESTREAM) Lupe use as directed 1 each 0    ofloxacin (OCUFLOX) 0.3 % ophthalmic solution Instill 1 drop TO SURGERY EYE four times a day STARTING 2 DAYS BEFORE SURGERY 5 mL 3    oxybutynin (DITROPAN-XL)  "5 MG TR24 Take 1 tablet (5 mg total) by mouth once daily. 90 tablet 3    pantoprazole (PROTONIX) 40 MG tablet TAKE ONE TABLET BY MOUTH  ONCE DAILY 90 tablet 3    traMADoL (ULTRAM) 50 mg tablet Take 1 tablet (50 mg total) by mouth every 6 (six) hours as needed for pain 120 tablet 4    [DISCONTINUED] lactulose (CHRONULAC) 10 gram/15 mL solution Take 45 mLs (30 g total) by mouth 3 (three) times daily. 4050 mL 11    [DISCONTINUED] polyethylene glycol (GLYCOLAX) 17 gram/dose powder Take 17 g by mouth once daily. 510 g 3    triamcinolone acetonide 0.1% (KENALOG) 0.1 % cream Apply topically 2 (two) times daily. for 10 days 80 g 0     No current facility-administered medications on file prior to visit.       Vital signs:  Ht 5' 9" (1.753 m)   Wt 103.9 kg (229 lb 2.4 oz)   BMI 33.84 kg/m²     Physical Exam  Vitals reviewed.   Constitutional:       General: He is not in acute distress.     Appearance: Normal appearance. He is not ill-appearing.   HENT:      Head: Normocephalic.   Cardiovascular:      Rate and Rhythm: Normal rate and regular rhythm.      Heart sounds: Normal heart sounds. No murmur heard.  Pulmonary:      Effort: Pulmonary effort is normal. No respiratory distress.      Breath sounds: Normal breath sounds.   Chest:      Chest wall: No tenderness.   Abdominal:      General: Bowel sounds are normal. There is no distension.      Palpations: Abdomen is soft.      Tenderness: There is no abdominal tenderness. Negative signs include Hilliard's sign.      Hernia: No hernia is present.      Comments: Tense Abdomen   Skin:     General: Skin is warm.   Neurological:      Mental Status: He is alert and oriented to person, place, and time.   Psychiatric:         Mood and Affect: Mood normal.         Behavior: Behavior normal.         Thought Content: Thought content normal.       Routine labs:  Lab Results   Component Value Date    WBC 4.42 11/08/2021    HGB 15.9 11/08/2021    HCT 50.6 11/08/2021    MCV 75 (L) 11/08/2021 "     11/08/2021     Lab Results   Component Value Date    INR 1.0 07/13/2021     Lab Results   Component Value Date    IRON 97 11/19/2020    FERRITIN 38 11/19/2020    TIBC 428 11/19/2020    FESATURATED 23 11/19/2020     Lab Results   Component Value Date     11/08/2021    K 4.1 11/08/2021     11/08/2021    CO2 20 (L) 11/08/2021    BUN 10 11/08/2021    CREATININE 1.0 11/08/2021     Lab Results   Component Value Date    ALBUMIN 3.6 11/08/2021    ALT 19 11/08/2021    AST 24 11/08/2021    ALKPHOS 83 11/08/2021    BILITOT 0.4 11/08/2021     No results found for: GLUCOSE  Lab Results   Component Value Date    TSH 0.829 07/15/2014     Lab Results   Component Value Date    CALCIUM 8.8 11/08/2021    PHOS 2.9 07/18/2014       Imaging:      I have reviewed prior labs, imaging, and notes.      Assessment:  1. Screening for colon cancer    2. Irritable bowel syndrome with constipation          Plan:  Orders Placed This Encounter    sodium,potassium,mag sulfates (SUPREP BOWEL PREP KIT) 17.5-3.13-1.6 gram SolR    linaCLOtide (LINZESS) 290 mcg Cap capsule    Case Request Endoscopy: COLONOSCOPY     Linzess 290 mcg daily.   Colonoscopy for colon cancer screening. Suprep.   Continue Protonix as previously prescribed.   Limit Bentyl use.     Plan of care discussed with patient who is in agreement and verbalized understanding.     I have explained the planned procedures to the patient.The risks, benefits and alternatives of the procedure were also explained in detail. Patient verbalized understanding, all questions were answered. The patient agrees to proceed as planned        Follow Up: As Needed          Nataly Falcon, LYNNETTE,FNP-BC  [facility]

## 2023-03-15 NOTE — PATIENT INSTRUCTIONS
We are going to change the lactulose to a new medication for constipation.   Linzess is the new medication. Take once a day in the morning.   Let us know in a week if it is helping.     Colonoscopy for colon cancer screening.         SUPREP Instructions    [facility] [address]  Clinic Office [phone]  Endoscopy Lab [phone]    You are scheduled for a Colonoscopy with Dr. Loredo on  4/25/23   at [facility].  You will check in at the Information desk on the first floor of the hospital. Please contact the office to reschedule if needed at     Do not follow instructions listed in the box.    A responsible adult (family member or friend) must come with you and transport you home.  You are not allowed to drive, take a taxi/ride share or bus or leave the Endoscopy Center alone.  If you do not have a responsible adult with you to take you home, your exam will be cancelled.     Please follow instructions of  if you are taking anticoagulant/blood thinning medications such as Aggrenox, Brilinta, Effient, Eliquis, Lovenox, Plavix, Pletal, Pradaxa, Ticilid, Xarelto or Coumadin.     Please skip your morning dose of insulin or other oral medications for diabetes the morning of the procedure unless instructed otherwise by your doctor. You should take your blood pressure, heart, anti-rejection and or seizure medication the morning of your procedure.    To ensure that your test is accurate and complete, you must follow these instructions - please do not use the instructions provided from the pharmacy.    For your safety and the safety of the providers and staff, You will be required to have a screening Covid test 72 hours before procedure.    Do not follow instructions listed in the box.          Two Days Before the procedure       At 4:00 PM  Take 1 dose of simethicone (Gas-X) tablets or capsules. USE AS DIRECTED.  Take 20mg (four pills) of the Dulcolax.    At  6:00 PM,   Drink 1 glass of Miralax (8 ounces of gatorade or water with 1 capful of Miralax powder) every 15 minutes until you have completed 8 glasses of Miralax.   This is easier to drink if this solution is cold, so you can mix the solution ahead of time and place in the refrigerator prior to drinking.  You have to drink the solution within 24 hours of mixing it.  Do NOT put this solution over ice.  It IS ok to drink with a straw.            The Day Before The Procedure:    Follow a CLEAR LIQUID DIET for the entire day before your scheduled colonoscopy.  This means no solid food the entire day.  A clear liquid diet includes the following:  Water, Coffee or decaffeinated coffee (no milk or cream)  Tea, Herbal tea  Carbonated beverages (soft drinks), regular and sugar free  Gelatin  Apple Juice, white grape juice, white cranberry juice  Gatorade, Power Aid, Crystal Light, Jason Aid (Yellow, Green, Clear)  Lemonade and Limeade  Bouillon, clear consomme'  Snowball, popsicles  (Yellow, Green, Clear)    DO NOT DRINK ANY LIQUIDS CONTAINING RED DYE  DO NOT DRINK ANY LIQUIDS NOT SPECIFICALLY LISTED  DO NOT DRINK ALCOHOL    At 5 pm the evening before your colonoscopy:  Pour one (1) bottle of SUPREP into the container provided in the box. Add water to the line on the container and mix well.  Drink the whole container and then drink 2 more containers of water over 1 hour.  This is sometimes easier to drink if this solution is cold, so you can mix the solution 20 minutes ahead of time and place in the refrigerator prior to drinking.  You have to drink the solution within 30-45 minutes of mixing it.  Do NOT put this solution over ice.  It IS ok to drink with a straw.        The Day of the Procedure - The Endoscopy department will call you 2 days prior to your procedure to give you the exact time    5 hours prior to your ARRIVAL TIME (this may be in the middle of the night)  Pour the 2nd bottle of SUPREP into the container  provided in the box.  Add water to the line on the container and mix well.  Drink the whole container and then drink 2 more containers of water over 1 hour.    AFTER YOU HAVE COMPLETED YOUR PREP, YOU MAY HAVE NOTHING ELSE BY MOUTH    Please leave all valuables and jewelry at home.    At 600 am, you may take the last dose of any medications you are allowed to take with a small sip of water (blood pressure, heart, anti-rejection and or seizure medication).  If you use inhalers bring them with you.    You may call the Endoscopy department at [phone] with any questions regarding your procedure.

## 2023-04-04 ENCOUNTER — SPECIALTY PHARMACY (OUTPATIENT)
Dept: PHARMACY | Facility: CLINIC | Age: 58
End: 2023-04-04
Payer: MEDICARE

## 2023-04-04 NOTE — TELEPHONE ENCOUNTER
Specialty Pharmacy - Refill Coordination    Specialty Medication Orders Linked to Encounter      Flowsheet Row Most Recent Value   Medication #1 fingolimod (GILENYA) 0.5 mg Cap (Order#[id number], Rx#[id number])            Refill Questions - Documented Responses      Flowsheet Row Most Recent Value   Patient Availability and HIPAA Verification    Does patient want to proceed with activity? Yes   HIPAA/medical authority confirmed? Yes   Relationship to patient of person spoken to? Self   Refill Screening Questions    Changes to allergies? No   Changes to medications? No   New conditions since last clinic visit? No   Unplanned office visit, urgent care, ED, or hospital admission in the last 4 weeks? No   How does patient/caregiver feel medication is working? Excellent   Financial problems or insurance changes? No   How many doses of your specialty medications were missed in the last 4 weeks? 0   Would patient like to speak to a pharmacist? No   When does the patient need to receive the medication? 04/06/23   Refill Delivery Questions    How will the patient receive the medication? [facility]   When does the patient need to receive the medication? 04/06/23   Shipping Address Home   Address in [facility] confirmed and updated if neccessary? Yes   Expected Copay ($) 0   Is the patient able to afford the medication copay? Yes   Payment Method zero copay   Days supply of Refill 30   Supplies needed? No supplies needed   Refill activity completed? Yes   Refill activity plan Refill scheduled   Shipment/Pickup Date: 04/05/23            Current Outpatient Medications   Medication Sig    acetaZOLAMIDE (DIAMOX) 250 MG tablet Take 2 TABLETS BY MOUTH ONLY 8 HOURS AFTER SURGERY    albuterol (PROAIR HFA) 90 mcg/actuation inhaler Inhale 2 puffs by mouth every 4 hours.    ALPRAZolam (XANAX) 0.5 MG tablet TAKE ONE TABLET BY MOUTH TWICE DAILY AS NEEDED FOR ANXIETY    atorvastatin (LIPITOR) 40 MG tablet Take 1 tablet (40 mg total) by  mouth once daily.    bacitracin 500 unit/gram Oint Apply topically 2 (two) times daily.    baclofen (LIORESAL) 10 MG tablet TAKE 1 TABLET BY MOUTH 3 TIMES DAILY.    buPROPion (WELLBUTRIN SR) 150 MG TBSR 12 hr tablet TAKE 1 TABLET BY MOUTH EVERY 12 HOURS DAILY.    dicyclomine (BENTYL) 10 MG capsule TAKE 1 CAPSULE BY MOUTH THREE TIMES A DAY FOR ABDOMINAL PAIN    difluprednate (DUREZOL) 0.05 % Drop ophthalmic solution Instill one drop TO SURGERY EYE four times a day AFTER SURGERY X 30 DAYS    docusate sodium (COLACE) 100 MG capsule Take 1 capsule (100 mg total) by mouth once daily.    ergocalciferol (ERGOCALCIFEROL) 50,000 unit Cap TAKE 1 CAPSULE BY MOUTH Weekly    famotidine (PEPCID) 20 MG tablet Take 1 tablet (20 mg total) by mouth 2 (two) times daily.    fingolimod (GILENYA) 0.5 mg Cap TAKE ONE CAPSULE (0.5 MG) BY MOUTH ONCE DAILY. MAY TAKE WITH OR WITHOUT FOOD. STORE AT ROOM TEMPERATURE.    gabapentin (NEURONTIN) 300 MG capsule TAKE 1 CAPSULE BY MOUTH AT BEDTIME    linaCLOtide (LINZESS) 290 mcg Cap capsule Take 1 capsule (290 mcg total) by mouth before breakfast.    lisinopriL (PRINIVIL,ZESTRIL) 5 MG tablet TAKE 1 TABLET BY MOUTH DAILY    mupirocin (BACTROBAN) 2 % ointment Apply topically 3 (three) times daily.    nabumetone (RELAFEN) 500 MG tablet Take 1 tablet (500 mg total) by mouth 2 (two) times daily as needed.    nebulizer and compressor (HOME NEBULIZER PLUS SIDESTREAM) Lupe use as directed    ofloxacin (OCUFLOX) 0.3 % ophthalmic solution Instill 1 drop TO SURGERY EYE four times a day STARTING 2 DAYS BEFORE SURGERY    oxybutynin (DITROPAN-XL) 5 MG TR24 Take 1 tablet (5 mg total) by mouth once daily.    pantoprazole (PROTONIX) 40 MG tablet TAKE ONE TABLET BY MOUTH  ONCE DAILY    sodium,potassium,mag sulfates (SUPREP BOWEL PREP KIT) 17.5-3.13-1.6 gram SolR Take 177 mLs by mouth once daily.    traMADoL (ULTRAM) 50 mg tablet Take 1 tablet (50 mg total) by mouth every 6 (six) hours as needed for pain     triamcinolone acetonide 0.1% (KENALOG) 0.1 % cream Apply topically 2 (two) times daily. for 10 days   Last reviewed on 3/15/2023 10:50 AM by Bijan Nath MA    Review of patient's allergies indicates:  No Known Allergies Last reviewed on  3/15/2023 10:49 AM by Bijan Nath      Tasks added this encounter   4/29/2023 - Refill Call (Auto Added)   Tasks due within next 3 months   No tasks due.     Jorden Mills, PharmD  [facility]  [address] [place] [id number]  Phone: [phone]  Fax: [phone]

## 2023-04-21 ENCOUNTER — TELEPHONE (OUTPATIENT)
Dept: ENDOSCOPY | Facility: HOSPITAL | Age: 58
End: 2023-04-21
Payer: MEDICARE

## 2023-04-21 NOTE — TELEPHONE ENCOUNTER
Left message instructing patient to call dept @ [phone] between 8am-4pm.    Arrival time to be given @ 0815  Portal pending

## 2023-04-24 ENCOUNTER — TELEPHONE (OUTPATIENT)
Dept: ENDOSCOPY | Facility: HOSPITAL | Age: 58
End: 2023-04-24
Payer: MEDICARE

## 2023-04-24 NOTE — TELEPHONE ENCOUNTER
Spoke with patient's brother about arrival time @ 0815.   Colon    Prep instructions reviewed: the day before the procedure, follow a clear liquid diet all day, then start the first 1/2 of prep at 5pm and take 2nd 1/2 of prep @ 0300.  Pt must be completely NPO when prep completed @ 0500.              Medications: Do not take Insulin or oral diabetic medications the day of the procedure.  Take as prescribed: heart, seizure and blood pressure medication in the morning with a sip of water (less than an ounce).  Take any breathing medications and bring inhalers to hospital with you Leave all valuables and jewelry at home.     Wear comfortable clothes to procedure to change into hospital gown You cannot drive for 24 hours after your procedure because you will receive sedation for your procedure to make you comfortable.  A ride must be provided at discharge.

## 2023-04-25 ENCOUNTER — ANESTHESIA (OUTPATIENT)
Dept: ENDOSCOPY | Facility: HOSPITAL | Age: 58
End: 2023-04-25
Payer: MEDICARE

## 2023-04-25 ENCOUNTER — HOSPITAL ENCOUNTER (OUTPATIENT)
Facility: HOSPITAL | Age: 58
Discharge: HOME | End: 2023-04-25
Attending: INTERNAL MEDICINE | Admitting: INTERNAL MEDICINE
Payer: MEDICARE

## 2023-04-25 ENCOUNTER — ANESTHESIA EVENT (OUTPATIENT)
Dept: ENDOSCOPY | Facility: HOSPITAL | Age: 58
End: 2023-04-25
Payer: MEDICARE

## 2023-04-25 VITALS
OXYGEN SATURATION: 98 % | TEMPERATURE: 98 F | BODY MASS INDEX: 33.92 KG/M2 | WEIGHT: 229 LBS | DIASTOLIC BLOOD PRESSURE: 79 MMHG | RESPIRATION RATE: 20 BRPM | SYSTOLIC BLOOD PRESSURE: 140 MMHG | HEIGHT: 69 IN | HEART RATE: 86 BPM

## 2023-04-25 DIAGNOSIS — Z12.11 COLON CANCER SCREENING: ICD-10-CM

## 2023-04-25 PROCEDURE — 25000003 PHARM REV CODE 250: Performed by: NURSE ANESTHETIST, CERTIFIED REGISTERED

## 2023-04-25 PROCEDURE — 88305 TISSUE EXAM BY PATHOLOGIST: CPT | Performed by: PATHOLOGY

## 2023-04-25 PROCEDURE — 37000009 HC ANESTHESIA EA ADD 15 MINS: Performed by: INTERNAL MEDICINE

## 2023-04-25 PROCEDURE — D9220A PRA ANESTHESIA: Mod: PT,ANES,, | Performed by: STUDENT IN AN ORGANIZED HEALTH CARE EDUCATION/TRAINING PROGRAM

## 2023-04-25 PROCEDURE — 25000003 PHARM REV CODE 250: Performed by: INTERNAL MEDICINE

## 2023-04-25 PROCEDURE — 45385 PR COLONOSCOPY,REMV LESN,SNARE: ICD-10-PCS | Mod: PT,,, | Performed by: INTERNAL MEDICINE

## 2023-04-25 PROCEDURE — D9220A PRA ANESTHESIA: ICD-10-PCS | Mod: PT,ANES,, | Performed by: STUDENT IN AN ORGANIZED HEALTH CARE EDUCATION/TRAINING PROGRAM

## 2023-04-25 PROCEDURE — D9220A PRA ANESTHESIA: ICD-10-PCS | Mod: PT,CRNA,, | Performed by: NURSE ANESTHETIST, CERTIFIED REGISTERED

## 2023-04-25 PROCEDURE — 45385 COLONOSCOPY W/LESION REMOVAL: CPT | Mod: PT | Performed by: INTERNAL MEDICINE

## 2023-04-25 PROCEDURE — D9220A PRA ANESTHESIA: Mod: PT,CRNA,, | Performed by: NURSE ANESTHETIST, CERTIFIED REGISTERED

## 2023-04-25 PROCEDURE — 63600175 PHARM REV CODE 636 W HCPCS: Performed by: NURSE ANESTHETIST, CERTIFIED REGISTERED

## 2023-04-25 PROCEDURE — 88305 TISSUE EXAM BY PATHOLOGIST: ICD-10-PCS | Mod: 26,,, | Performed by: PATHOLOGY

## 2023-04-25 PROCEDURE — 88305 TISSUE EXAM BY PATHOLOGIST: CPT | Mod: 26,,, | Performed by: PATHOLOGY

## 2023-04-25 PROCEDURE — 45385 COLONOSCOPY W/LESION REMOVAL: CPT | Mod: PT,,, | Performed by: INTERNAL MEDICINE

## 2023-04-25 PROCEDURE — 27201089 HC SNARE, DISP (ANY): Performed by: INTERNAL MEDICINE

## 2023-04-25 PROCEDURE — 37000008 HC ANESTHESIA 1ST 15 MINUTES: Performed by: INTERNAL MEDICINE

## 2023-04-25 RX ORDER — PROPOFOL 10 MG/ML
VIAL (ML) INTRAVENOUS CONTINUOUS PRN
Status: DISCONTINUED | OUTPATIENT
Start: 2023-04-25 | End: 2023-04-25

## 2023-04-25 RX ORDER — PROPOFOL 10 MG/ML
VIAL (ML) INTRAVENOUS
Status: DISCONTINUED | OUTPATIENT
Start: 2023-04-25 | End: 2023-04-25

## 2023-04-25 RX ORDER — SODIUM CHLORIDE 9 MG/ML
INJECTION, SOLUTION INTRAVENOUS CONTINUOUS
Status: DISCONTINUED | OUTPATIENT
Start: 2023-04-25 | End: 2023-04-25 | Stop reason: HOSPADM

## 2023-04-25 RX ORDER — SODIUM CHLORIDE 9 MG/ML
INJECTION, SOLUTION INTRAVENOUS CONTINUOUS PRN
Status: DISCONTINUED | OUTPATIENT
Start: 2023-04-25 | End: 2023-04-25

## 2023-04-25 RX ORDER — LIDOCAINE HYDROCHLORIDE 20 MG/ML
INJECTION INTRAVENOUS
Status: DISCONTINUED | OUTPATIENT
Start: 2023-04-25 | End: 2023-04-25

## 2023-04-25 RX ADMIN — SODIUM CHLORIDE: 9 INJECTION, SOLUTION INTRAVENOUS at 08:04

## 2023-04-25 RX ADMIN — SODIUM CHLORIDE: 0.9 INJECTION, SOLUTION INTRAVENOUS at 08:04

## 2023-04-25 RX ADMIN — PROPOFOL 50 MG: 10 INJECTION, EMULSION INTRAVENOUS at 09:04

## 2023-04-25 RX ADMIN — LIDOCAINE HYDROCHLORIDE 50 MG: 20 INJECTION, SOLUTION INTRAVENOUS at 09:04

## 2023-04-25 RX ADMIN — PROPOFOL 150 MCG/KG/MIN: 10 INJECTION, EMULSION INTRAVENOUS at 09:04

## 2023-04-25 NOTE — [employer]ATION PATIENT INSTRUCTIONS
Discharge Summary/Instructions after an Endoscopic Procedure  Patient Name: Tj Trammell  Patient MRN: [id number]  Patient YOB: 1965 Tuesday, April 25, 2023  Jared Loredo MD  Dear patient,  As a result of recent federal legislation (The Federal Cures Act), you may   receive lab or pathology results from your procedure in your [facility]   account before your physician is able to contact you. Your physician or   their representative will relay the results to you with their   recommendations at their soonest availability.  Thank you,  Your health is very important to us during the Covid Crisis. Following your   procedure today, you will receive a daily text for 2 weeks asking about   signs or symptoms of Covid 19.  Please respond to this text when you   receive it so we can follow up and keep you as safe as possible.   RESTRICTIONS:  During your procedure today, you received medications for sedation.  These   medications may affect your judgment, balance and coordination.  Therefore,   for 24 hours, you have the following restrictions:   - DO NOT drive a car, operate machinery, make legal/financial decisions,   sign important papers or drink alcohol.    ACTIVITY:  Today: no heavy lifting, straining or running due to procedural   sedation/anesthesia.  The following day: return to full activity including work.  DIET:  Eat and drink normally unless instructed otherwise.     TREATMENT FOR COMMON SIDE EFFECTS:  - Mild abdominal pain, nausea, belching, bloating or excessive gas:  rest,   eat lightly and use a heating pad.  - Sore Throat: treat with throat lozenges and/or gargle with warm salt   water.  - Because air was used during the procedure, expelling large amounts of air   from your rectum or belching is normal.  - If a bowel prep was taken, you may not have a bowel movement for 1-3 days.    This is normal.  SYMPTOMS TO WATCH FOR AND REPORT TO YOUR PHYSICIAN:  1. Abdominal pain or bloating,  other than gas cramps.  2. Chest pain.  3. Back pain.  4. Signs of infection such as: chills or fever occurring within 24 hours   after the procedure.  5. Rectal bleeding, which would show as bright red, maroon, or black stools.   (A tablespoon of blood from the rectum is not serious, especially if   hemorrhoids are present.)  6. Vomiting.  7. Weakness or dizziness.  GO DIRECTLY TO THE NEAREST EMERGENCY ROOM IF YOU HAVE ANY OF THE FOLLOWING:      Difficulty breathing              Chills and/or fever over 101 F   Persistent vomiting and/or vomiting blood   Severe abdominal pain   Severe chest pain   Black, tarry stools   Bleeding- more than one tablespoon   Any other symptom or condition that you feel may need urgent attention  Your doctor recommends these additional instructions:  If any biopsies were taken, your doctors clinic will contact you in 1 to 2   weeks with any results.  - Discharge patient to home.   - Resume previous diet.   - Continue present medications.   - Await pathology results.   - Repeat colonoscopy in 6 months because the bowel preparation was   suboptimal.   - For future colonoscopy the patient will require an extended preparation.    If there are any questions, please contact the gastroenterologist.   - Patient has a contact number available for emergencies.  The signs and   symptoms of potential delayed complications were discussed with the   patient.  Return to normal activities tomorrow.  Written discharge   instructions were provided to the patient.  For questions, problems or results please call your physician - Jared Loredo MD.  EMERGENCY PHONE NUMBER: [phone],  LAB RESULTS: [phone]  IF A COMPLICATION OR EMERGENCY SITUATION ARISES AND YOU ARE UNABLE TO REACH   YOUR PHYSICIAN - GO DIRECTLY TO THE EMERGENCY ROOM.  Jared Loredo MD  4/25/2023 9:32:53 AM  This report has been verified and signed electronically.  Dear patient,  As a result of recent  federal legislation (The Federal Cures Act), you may   receive lab or pathology results from your procedure in your [facility]   account before your physician is able to contact you. Your physician or   their representative will relay the results to you with their   recommendations at their soonest availability.  Thank you,  [employer]ATION

## 2023-04-25 NOTE — TRANSFER OF CARE
"Anesthesia Transfer of Care Note    Patient: Tj Trammell    Procedure(s) Performed: Procedure(s) (LRB):  COLONOSCOPY (N/A)    Patient location: GI    Anesthesia Type: general    Transport from OR: Transported from OR on room air with adequate spontaneous ventilation    Post pain: adequate analgesia    Post assessment: no apparent anesthetic complications and tolerated procedure well    Post vital signs: stable    Level of consciousness: awake, alert and oriented    Nausea/Vomiting: no nausea/vomiting    Complications: none    Transfer of care protocol was followed      Last vitals:   Visit Vitals  BP (!) 158/85 (BP Location: Left arm, Patient Position: Lying)   Pulse 106   Temp 36.5 °C (97.7 °F) (Temporal)   Resp 18   Ht 5' 9" (1.753 m)   Wt 103.9 kg (229 lb)   SpO2 95%   BMI 33.82 kg/m²     "

## 2023-04-25 NOTE — H&P
Short Stay Endoscopy History and Physical    PCP - Bayron Ferguson MD    Procedure - Colonoscopy  ASA - III  Mallampati - per anesthesia  History of Anesthesia problems - no  Family history Anesthesia problems - no     HPI:  This is a 57 y.o. male here for evaluation of : Colon polyps    Pt here today for surveillance of prior colon polyps. The most recent exam was in 2019, at which time patients recalls having polyps removed. Since patient denies change in bowel habits or overt blood in stool. Denies weight loss.     ROS:  Constitutional: No fevers, chills, No weight loss  ENT: No allergies  CV: No chest pain  Pulm: No shortness of breath  GI: see HPI  Derm: No rash    Medical History:  has a past medical history of Asthma, Bilateral hearing loss (12/12/2012), Biliary acute pancreatitis, GERD (gastroesophageal reflux disease), High cholesterol, Hypertension, and Multiple sclerosis.    Surgical History:  has a past surgical history that includes Joint replacement; Cholecystectomy; Colonoscopy (N/A, 1/22/2019); Tonsillectomy; Appendectomy; Esophagogastroduodenoscopy (N/A, 9/29/2021); and Transforaminal epidural injection of steroid (Bilateral, 10/5/2022).    Family History: family history includes Heart disease in his brother, father, and mother.. Otherwise no colon cancer, inflammatory bowel disease, or GI malignancies.    Social History:  reports that he quit smoking about 15 months ago. His smoking use included cigarettes. He has a 32.00 pack-year smoking history. He has never used smokeless tobacco. He reports that he does not currently use alcohol. He reports that he does not use drugs.    Review of patient's allergies indicates:  No Known Allergies    Medications:   Medications Prior to Admission   Medication Sig Dispense Refill Last Dose    acetaZOLAMIDE (DIAMOX) 250 MG tablet Take 2 TABLETS BY MOUTH ONLY 8 HOURS AFTER SURGERY 2 tablet 0     albuterol (PROAIR HFA) 90 mcg/actuation inhaler Inhale 2 puffs by  mouth every 4 hours. 8.5 g 3     ALPRAZolam (XANAX) 0.5 MG tablet TAKE ONE TABLET BY MOUTH TWICE DAILY AS NEEDED FOR ANXIETY 60 tablet 4     atorvastatin (LIPITOR) 40 MG tablet Take 1 tablet (40 mg total) by mouth once daily. 90 tablet 4     bacitracin 500 unit/gram Oint Apply topically 2 (two) times daily. 28 g 0     baclofen (LIORESAL) 10 MG tablet TAKE 1 TABLET BY MOUTH 3 TIMES DAILY. 90 tablet 11     buPROPion (WELLBUTRIN SR) 150 MG TBSR 12 hr tablet TAKE 1 TABLET BY MOUTH EVERY 12 HOURS DAILY. 60 tablet 11     dicyclomine (BENTYL) 10 MG capsule TAKE 1 CAPSULE BY MOUTH THREE TIMES A DAY FOR ABDOMINAL PAIN 90 capsule 3     difluprednate (DUREZOL) 0.05 % Drop ophthalmic solution Instill one drop TO SURGERY EYE four times a day AFTER SURGERY X 30 DAYS 5 mL 4     docusate sodium (COLACE) 100 MG capsule Take 1 capsule (100 mg total) by mouth once daily. 30 capsule 5     ergocalciferol (ERGOCALCIFEROL) 50,000 unit Cap TAKE 1 CAPSULE BY MOUTH Weekly 12 capsule 0     famotidine (PEPCID) 20 MG tablet Take 1 tablet (20 mg total) by mouth 2 (two) times daily. 60 tablet 3     fingolimod (GILENYA) 0.5 mg Cap TAKE ONE CAPSULE (0.5 MG) BY MOUTH ONCE DAILY. MAY TAKE WITH OR WITHOUT FOOD. STORE AT ROOM TEMPERATURE. 90 capsule 2     gabapentin (NEURONTIN) 300 MG capsule TAKE 1 CAPSULE BY MOUTH AT BEDTIME 30 capsule 11     linaCLOtide (LINZESS) 290 mcg Cap capsule Take 1 capsule (290 mcg total) by mouth before breakfast. 30 capsule 11     lisinopriL (PRINIVIL,ZESTRIL) 5 MG tablet TAKE 1 TABLET BY MOUTH DAILY 90 tablet 3     mupirocin (BACTROBAN) 2 % ointment Apply topically 3 (three) times daily. 22 g 0     nabumetone (RELAFEN) 500 MG tablet Take 1 tablet (500 mg total) by mouth 2 (two) times daily as needed. 60 tablet 6     nebulizer and compressor (HOME NEBULIZER PLUS SIDESTREAM) Lupe use as directed 1 each 0     ofloxacin (OCUFLOX) 0.3 % ophthalmic solution Instill 1 drop TO SURGERY EYE four times a day STARTING 2 DAYS BEFORE  SURGERY 5 mL 3     oxybutynin (DITROPAN-XL) 5 MG TR24 Take 1 tablet (5 mg total) by mouth once daily. 90 tablet 3     pantoprazole (PROTONIX) 40 MG tablet TAKE ONE TABLET BY MOUTH  ONCE DAILY 90 tablet 3     sodium,potassium,mag sulfates (SUPREP BOWEL PREP KIT) 17.5-3.13-1.6 gram SolR Take 177 mLs by mouth once daily. 1 kit 0     traMADoL (ULTRAM) 50 mg tablet Take 1 tablet (50 mg total) by mouth every 6 (six) hours as needed for pain 120 tablet 4     triamcinolone acetonide 0.1% (KENALOG) 0.1 % cream Apply topically 2 (two) times daily. for 10 days 80 g 0          Objective Findings:    Vital Signs: see nursing notes  Physical Exam:  General Appearance: Well appearing in no acute distress  Eyes:    No scleral icterus  ENT: Neck supple  Lungs: CTA anteriorly  Heart:  S1, S2 normal, no murmurs heard  Abdomen: Soft, non tender, non distended with positive bowel sounds. No hepatosplenomegaly, ascites, or mass  Extremities: no edema  Skin: No rash      Labs:  Lab Results   Component Value Date    WBC 4.42 11/08/2021    HGB 15.9 11/08/2021    HCT 50.6 11/08/2021     11/08/2021    CHOL 164 11/08/2021    TRIG 204 (H) 11/08/2021    HDL 31 (L) 11/08/2021    ALT 19 11/08/2021    AST 24 11/08/2021     11/08/2021    K 4.1 11/08/2021     11/08/2021    CREATININE 1.0 11/08/2021    BUN 10 11/08/2021    CO2 20 (L) 11/08/2021    TSH 0.829 07/15/2014    PSA 0.23 11/08/2021    INR 1.0 07/13/2021    HGBA1C 6.2 (H) 11/08/2021       I have explained the risks and benefits of endoscopy procedures to the patient including but not limited to bleeding, perforation, infection, and death.    Jared Loredo MD

## 2023-04-25 NOTE — ANESTHESIA PREPROCEDURE EVALUATION
[facility]  Anesthesia Pre-Operative Evaluation         Patient Name: Tj Trammell  YOB: 1965  MRN: [id number]    SUBJECTIVE:     04/25/2023    Procedure(s) (LRB):  COLONOSCOPY (N/A)    Tj Trammell is a 57 y.o. male here for Procedure(s) (LRB):  COLONOSCOPY (N/A)    Drips:     Patient Active Problem List   Diagnosis    Multiple sclerosis    Generalized osteoarthritis of multiple sites    Bilateral hearing loss    Incontinence of urine    Deaf    Tobacco abuse    HTN (hypertension)    HLD (hyperlipidemia)    Constipation    Pain, abdominal    Biliary stricture    GERD (gastroesophageal reflux disease)    Chronic pain of left knee    Weakness of both lower extremities    Decreased functional mobility    Screening for malignant neoplasm of colon    Falls    Left leg weakness    Impaired gait    Fall off motorized mobility scooter, initial encounter    Abrasion    Wrist pain    Scapholunate dissociation of left wrist    Anxiety disorder    Chronic obstructive pulmonary disease    Metabolic syndrome    Opioid dependence    Abdominal bloating    Lumbar radiculopathy       Review of patient's allergies indicates:  No Known Allergies    No current facility-administered medications on file prior to encounter.     Current Outpatient Medications on File Prior to Encounter   Medication Sig Dispense Refill    acetaZOLAMIDE (DIAMOX) 250 MG tablet Take 2 TABLETS BY MOUTH ONLY 8 HOURS AFTER SURGERY 2 tablet 0    albuterol (PROAIR HFA) 90 mcg/actuation inhaler Inhale 2 puffs by mouth every 4 hours. 8.5 g 3    ALPRAZolam (XANAX) 0.5 MG tablet TAKE ONE TABLET BY MOUTH TWICE DAILY AS NEEDED FOR ANXIETY 60 tablet 4    atorvastatin (LIPITOR) 40 MG tablet Take 1 tablet (40 mg total) by mouth once daily. 90 tablet 4    bacitracin 500 unit/gram Oint Apply topically 2 (two) times daily.  28 g 0    baclofen (LIORESAL) 10 MG tablet TAKE 1 TABLET BY MOUTH 3 TIMES DAILY. 90 tablet 11    buPROPion (WELLBUTRIN SR) 150 MG TBSR 12 hr tablet TAKE 1 TABLET BY MOUTH EVERY 12 HOURS DAILY. 60 tablet 11    dicyclomine (BENTYL) 10 MG capsule TAKE 1 CAPSULE BY MOUTH THREE TIMES A DAY FOR ABDOMINAL PAIN 90 capsule 3    difluprednate (DUREZOL) 0.05 % Drop ophthalmic solution Instill one drop TO SURGERY EYE four times a day AFTER SURGERY X 30 DAYS 5 mL 4    docusate sodium (COLACE) 100 MG capsule Take 1 capsule (100 mg total) by mouth once daily. 30 capsule 5    ergocalciferol (ERGOCALCIFEROL) 50,000 unit Cap TAKE 1 CAPSULE BY MOUTH Weekly 12 capsule 0    famotidine (PEPCID) 20 MG tablet Take 1 tablet (20 mg total) by mouth 2 (two) times daily. 60 tablet 3    fingolimod (GILENYA) 0.5 mg Cap TAKE ONE CAPSULE (0.5 MG) BY MOUTH ONCE DAILY. MAY TAKE WITH OR WITHOUT FOOD. STORE AT ROOM TEMPERATURE. 90 capsule 2    gabapentin (NEURONTIN) 300 MG capsule TAKE 1 CAPSULE BY MOUTH AT BEDTIME 30 capsule 11    linaCLOtide (LINZESS) 290 mcg Cap capsule Take 1 capsule (290 mcg total) by mouth before breakfast. 30 capsule 11    lisinopriL (PRINIVIL,ZESTRIL) 5 MG tablet TAKE 1 TABLET BY MOUTH DAILY 90 tablet 3    mupirocin (BACTROBAN) 2 % ointment Apply topically 3 (three) times daily. 22 g 0    nebulizer and compressor (HOME NEBULIZER PLUS SIDESTREAM) Lupe use as directed 1 each 0    ofloxacin (OCUFLOX) 0.3 % ophthalmic solution Instill 1 drop TO SURGERY EYE four times a day STARTING 2 DAYS BEFORE SURGERY 5 mL 3    oxybutynin (DITROPAN-XL) 5 MG TR24 Take 1 tablet (5 mg total) by mouth once daily. 90 tablet 3    pantoprazole (PROTONIX) 40 MG tablet TAKE ONE TABLET BY MOUTH  ONCE DAILY 90 tablet 3    sodium,potassium,mag sulfates (SUPREP BOWEL PREP KIT) 17.5-3.13-1.6 gram SolR Take 177 mLs by mouth once daily. 1 kit 0    traMADoL (ULTRAM) 50 mg tablet Take 1 tablet (50 mg total) by mouth every 6 (six) hours as needed  for pain 120 tablet 4    triamcinolone acetonide 0.1% (KENALOG) 0.1 % cream Apply topically 2 (two) times daily. for 10 days 80 g 0       Past Surgical History:   Procedure Laterality Date    APPENDECTOMY      CHOLECYSTECTOMY      COLONOSCOPY N/A 2019    Procedure: COLONOSCOPY 2 day  golytely;  Surgeon: Nathan Waddell MD;  Location: [facility];  Service: Endoscopy;  Laterality: N/A;    ESOPHAGOGASTRODUODENOSCOPY N/A 2021    Procedure: EGD (ESOPHAGOGASTRODUODENOSCOPY) covid test Rapid;  Surgeon: Jared Loredo MD;  Location: [facility];  Service: Endoscopy;  Laterality: N/A;    JOINT REPLACEMENT      arm    TONSILLECTOMY      TRANSFORAMINAL EPIDURAL INJECTION OF STEROID Bilateral 10/5/2022    Procedure: Injection,steroid,epidural,transforaminal approach;  Surgeon: Soo Giles MD;  Location: [facility];  Service: Pain Management;  Laterality: Bilateral;  bilateral L5 transforaminal epidural steroid injection with RN IV sedation       Social History     Socioeconomic History    Marital status: Single   Tobacco Use    Smoking status: Former     Packs/day: 1.00     Years: 32.00     Pack years: 32.00     Types: Cigarettes     Quit date: 2022     Years since quittin.3    Smokeless tobacco: Never   Substance and Sexual Activity    Alcohol use: Not Currently    Drug use: No         OBJECTIVE:     Vital Signs Range (Last 24H):  Temp:  [36.5 °C (97.7 °F)] 36.5 °C (97.7 °F)  Pulse:  [106] 106  Resp:  [18] 18  SpO2:  [95 %] 95 %  BP: (158)/(85) 158/85    Significant Labs:  Lab Results   Component Value Date    WBC 4.42 2021    HGB 15.9 2021    HCT 50.6 2021     2021    CHOL 164 2021    TRIG 204 (H) 2021    HDL 31 (L) 2021    ALT 19 2021    AST 24 2021     2021    K 4.1 2021     2021    CREATININE 1.0 2021    BUN 10 2021    CO2 20 (L) 2021    TSH 0.829 07/15/2014    PSA 0.23  11/08/2021    INR 1.0 07/13/2021    HGBA1C 6.2 (H) 11/08/2021       Prior anesthetic history per previous records:       PRIOR ANES (Epic)  [id number] EGD    prop 80 -> 150 mcg/kg/min VSS [facility]  [id number] Rotator_Cuff interscalblock/GA    [MAC for block; prop 160]    [sevo, prop 40. fent 250, ephed 25]    [easy mask; ETT 7.5, Mac4, 3 attempts lg floppy epiglottis]  [id number] ERCP GA    [mid 2, fent 50, prop 100] VSS    [sevo] VSS    [easy mask; CMAC intubation, large floppy epiglottis, Grade III]      Pre-op Assessment    I have reviewed the Patient Summary Reports.     I have reviewed the Nursing Notes. I have reviewed the NPO Status.   I have reviewed the Medications.     Review of Systems  Anesthesia Hx:  No problems with previous Anesthesia  History of prior surgery of interest to airway management or planning:  Denies Personal Hx of Anesthesia complications.   Social:  Former Smoker    Hematology/Oncology:  Hematology Normal   Oncology Normal     EENT/Dental:   Hearing loss   Cardiovascular:   Exercise tolerance: poor Hypertension Denies Valvular problems/Murmurs.  Denies MI.   hyperlipidemia Limited mobility (wheelchair), but can transfer short distances and with assistance    Pulmonary:   COPD, mild Asthma    Renal/:   Denies Chronic Renal Disease.     Hepatic/GI:   GERD Denies Liver Disease.    Musculoskeletal:   Arthritis   Spine Disorders: lumbar Chronic Pain    Neurological:   Denies TIA. Denies CVA. Neuromuscular Disease,  Denies Seizures. Multiple sclerosis    Endocrine:   BMI 33.8 Obesity / BMI > 30  Psych:   anxiety          Physical Exam  General: Well nourished, Cooperative, Oriented and Alert    Airway:  Mallampati: II           Anesthesia Plan  Type of Anesthesia, risks & benefits discussed:    Anesthesia Type: Gen Natural Airway  Intra-op Monitoring Plan: Standard ASA Monitors  Post Op Pain Control Plan: multimodal analgesia and IV/PO Opioids PRN  Induction:  IV  Informed Consent: Informed consent  signed with the Patient and all parties understand the risks and agree with anesthesia plan.  All questions answered.   ASA Score: 3  Day of Surgery Review of History & Physical: H&P Update referred to the surgeon/provider.  Anesthesia Plan Notes:     Discussed with patient and brother small risk of post-procedure and will avoid hyperthermia and other metabolic derangements.     Ready For Surgery From Anesthesia Perspective.     .

## 2023-04-25 NOTE — ANESTHESIA POSTPROCEDURE EVALUATION
Anesthesia Post Evaluation    Patient: Tj Trammell    Procedure(s) Performed: Procedure(s) (LRB):  COLONOSCOPY (N/A)    Final Anesthesia Type: general      Patient location during evaluation: PACU  Patient participation: Yes- Able to Participate  Level of consciousness: awake  Post-procedure vital signs: reviewed and stable  Pain management: adequate  Airway patency: patent    PONV status at discharge: No PONV  Anesthetic complications: no      Cardiovascular status: blood pressure returned to baseline  Respiratory status: unassisted  Hydration status: euvolemic  Follow-up not needed.          Vitals Value Taken Time   /79 04/25/23 0955   Temp 36.6 °C (97.9 °F) 04/25/23 0925   Pulse 86 04/25/23 0955   Resp 20 04/25/23 0955   SpO2 98 % 04/25/23 0955         Event Time   Out of Recovery 09:56:44         Pain/Suki Score: Suki Score: 10 (4/25/2023  9:55 AM)

## 2023-05-01 DIAGNOSIS — K59.04 CHRONIC IDIOPATHIC CONSTIPATION: ICD-10-CM

## 2023-05-01 LAB
FINAL PATHOLOGIC DIAGNOSIS: NORMAL
Lab: NORMAL

## 2023-05-01 RX ORDER — LACTULOSE 10 G/15ML
45 SOLUTION ORAL; RECTAL 3 TIMES DAILY
Qty: 4050 ML | Refills: 11 | Status: CANCELLED | OUTPATIENT
Start: 2023-05-01 | End: 2024-04-30

## 2023-05-02 ENCOUNTER — SPECIALTY PHARMACY (OUTPATIENT)
Dept: PHARMACY | Facility: CLINIC | Age: 58
End: 2023-05-02
Payer: MEDICARE

## 2023-05-02 NOTE — TELEPHONE ENCOUNTER
Specialty Pharmacy - Refill Coordination    Specialty Medication Orders Linked to Encounter      Flowsheet Row Most Recent Value   Medication #1 fingolimod (GILENYA) 0.5 mg Cap (Order#[id number], Rx#[id number])            Refill Questions - Documented Responses      Flowsheet Row Most Recent Value   Refill Screening Questions    Changes to allergies? No   Changes to medications? No   New conditions since last clinic visit? No   Unplanned office visit, urgent care, ED, or hospital admission in the last 4 weeks? No   How does patient/caregiver feel medication is working? Excellent   Financial problems or insurance changes? No   How many doses of your specialty medications were missed in the last 4 weeks? 0   Would patient like to speak to a pharmacist? No   When does the patient need to receive the medication? 05/09/23   Refill Delivery Questions    How will the patient receive the medication? [facility]   When does the patient need to receive the medication? 05/09/23   Shipping Address Home   Address in [facility] confirmed and updated if neccessary? Yes   Expected Copay ($) 0   Is the patient able to afford the medication copay? Yes   Payment Method zero copay   Days supply of Refill 30   Supplies needed? No supplies needed   Refill activity completed? Yes   Refill activity plan Refill scheduled   Shipment/Pickup Date: 05/04/23            Current Outpatient Medications   Medication Sig    acetaZOLAMIDE (DIAMOX) 250 MG tablet Take 2 TABLETS BY MOUTH ONLY 8 HOURS AFTER SURGERY    albuterol (PROAIR HFA) 90 mcg/actuation inhaler Inhale 2 puffs by mouth every 4 hours.    ALPRAZolam (XANAX) 0.5 MG tablet TAKE ONE TABLET BY MOUTH TWICE DAILY AS NEEDED FOR ANXIETY    atorvastatin (LIPITOR) 40 MG tablet Take 1 tablet (40 mg total) by mouth once daily.    bacitracin 500 unit/gram Oint Apply topically 2 (two) times daily.    baclofen (LIORESAL) 10 MG tablet TAKE 1 TABLET BY MOUTH 3 TIMES DAILY.    buPROPion (WELLBUTRIN SR)  150 MG TBSR 12 hr tablet TAKE 1 TABLET BY MOUTH EVERY 12 HOURS DAILY.    dicyclomine (BENTYL) 10 MG capsule TAKE 1 CAPSULE BY MOUTH THREE TIMES A DAY FOR ABDOMINAL PAIN    difluprednate (DUREZOL) 0.05 % Drop ophthalmic solution Instill one drop TO SURGERY EYE four times a day AFTER SURGERY X 30 DAYS    docusate sodium (COLACE) 100 MG capsule Take 1 capsule (100 mg total) by mouth once daily.    ergocalciferol (ERGOCALCIFEROL) 50,000 unit Cap TAKE 1 CAPSULE BY MOUTH Weekly    famotidine (PEPCID) 20 MG tablet Take 1 tablet (20 mg total) by mouth 2 (two) times daily.    fingolimod (GILENYA) 0.5 mg Cap TAKE ONE CAPSULE (0.5 MG) BY MOUTH ONCE DAILY. MAY TAKE WITH OR WITHOUT FOOD. STORE AT ROOM TEMPERATURE.    gabapentin (NEURONTIN) 300 MG capsule TAKE 1 CAPSULE BY MOUTH AT BEDTIME    linaCLOtide (LINZESS) 290 mcg Cap capsule Take 1 capsule (290 mcg total) by mouth before breakfast.    lisinopriL (PRINIVIL,ZESTRIL) 5 MG tablet TAKE 1 TABLET BY MOUTH DAILY    mupirocin (BACTROBAN) 2 % ointment Apply topically 3 (three) times daily.    nabumetone (RELAFEN) 500 MG tablet Take 1 tablet (500 mg total) by mouth 2 (two) times daily as needed.    nebulizer and compressor (HOME NEBULIZER PLUS SIDESTREAM) Lupe use as directed    ofloxacin (OCUFLOX) 0.3 % ophthalmic solution Instill 1 drop TO SURGERY EYE four times a day STARTING 2 DAYS BEFORE SURGERY    oxybutynin (DITROPAN-XL) 5 MG TR24 Take 1 tablet (5 mg total) by mouth once daily.    pantoprazole (PROTONIX) 40 MG tablet TAKE ONE TABLET BY MOUTH  ONCE DAILY    sodium,potassium,mag sulfates (SUPREP BOWEL PREP KIT) 17.5-3.13-1.6 gram SolR Take 177 mLs by mouth once daily.    traMADoL (ULTRAM) 50 mg tablet Take 1 tablet (50 mg total) by mouth every 6 (six) hours as needed for pain    triamcinolone acetonide 0.1% (KENALOG) 0.1 % cream Apply topically 2 (two) times daily. for 10 days   Last reviewed on 4/25/2023  8:10 AM by Carrie Arenas RN    Review of patient's allergies  indicates:  No Known Allergies Last reviewed on  4/25/2023 9:28 AM by eRkha Rosario      Tasks added this encounter   No tasks added.   Tasks due within next 3 months   4/29/2023 - Refill Coordination Outreach (1 time occurrence)     Jorden Mills, PharmD  [facility]  [address]  [facility] [address]  Phone: [phone]  Fax: [phone]

## 2023-05-02 NOTE — PROGRESS NOTES
Pathology from recent colonoscopy reviewed.  Colon polyp(s) benign.  Repeat colonoscopy in 6 months d/t inadequate prep.

## 2023-05-03 ENCOUNTER — TELEPHONE (OUTPATIENT)
Dept: GASTROENTEROLOGY | Facility: CLINIC | Age: 58
End: 2023-05-03
Payer: MEDICARE

## 2023-05-03 DIAGNOSIS — K59.04 CHRONIC IDIOPATHIC CONSTIPATION: ICD-10-CM

## 2023-05-03 RX ORDER — LACTULOSE 10 G/15ML
45 SOLUTION ORAL; RECTAL 3 TIMES DAILY
Qty: 4050 ML | Refills: 11 | Status: CANCELLED | OUTPATIENT
Start: 2023-05-01 | End: 2024-04-30

## 2023-05-03 NOTE — TELEPHONE ENCOUNTER
Verbal understanding of the test results.        ----- Message from Jared Loredo MD sent at 5/2/2023  8:29 AM CDT -----  Pathology from recent colonoscopy reviewed.  Colon polyp(s) benign.  Repeat colonoscopy in 6 months d/t inadequate prep.

## 2023-05-24 RX ORDER — ATORVASTATIN CALCIUM 40 MG/1
40 TABLET, FILM COATED ORAL DAILY
Qty: 90 TABLET | Refills: 4 | Status: CANCELLED | OUTPATIENT
Start: 2023-05-24

## 2023-05-25 RX ORDER — ATORVASTATIN CALCIUM 40 MG/1
40 TABLET, FILM COATED ORAL DAILY
Qty: 90 TABLET | Refills: 4 | Status: SHIPPED | OUTPATIENT
Start: 2023-05-25 | End: 2023-08-14

## 2023-05-26 ENCOUNTER — OFFICE VISIT (OUTPATIENT)
Dept: PAIN MEDICINE | Facility: CLINIC | Age: 58
End: 2023-05-26
Payer: MEDICARE

## 2023-05-26 VITALS
WEIGHT: 229.06 LBS | HEART RATE: 84 BPM | DIASTOLIC BLOOD PRESSURE: 97 MMHG | SYSTOLIC BLOOD PRESSURE: 160 MMHG | BODY MASS INDEX: 33.83 KG/M2

## 2023-05-26 DIAGNOSIS — G35 MULTIPLE SCLEROSIS: ICD-10-CM

## 2023-05-26 DIAGNOSIS — R52 PAIN: ICD-10-CM

## 2023-05-26 DIAGNOSIS — Z74.09 IMPAIRED FUNCTIONAL MOBILITY, BALANCE, GAIT, AND ENDURANCE: ICD-10-CM

## 2023-05-26 DIAGNOSIS — M54.16 LUMBAR RADICULOPATHY: ICD-10-CM

## 2023-05-26 DIAGNOSIS — G89.29 CHRONIC BILATERAL LOW BACK PAIN WITH BILATERAL SCIATICA: Primary | ICD-10-CM

## 2023-05-26 DIAGNOSIS — M54.41 CHRONIC BILATERAL LOW BACK PAIN WITH BILATERAL SCIATICA: Primary | ICD-10-CM

## 2023-05-26 DIAGNOSIS — M54.42 CHRONIC BILATERAL LOW BACK PAIN WITH BILATERAL SCIATICA: Primary | ICD-10-CM

## 2023-05-26 PROCEDURE — 1160F PR REVIEW ALL MEDS BY PRESCRIBER/CLIN PHARMACIST DOCUMENTED: ICD-10-PCS | Mod: CPTII,S$GLB,, | Performed by: NURSE PRACTITIONER

## 2023-05-26 PROCEDURE — 3077F SYST BP >= 140 MM HG: CPT | Mod: CPTII,S$GLB,, | Performed by: NURSE PRACTITIONER

## 2023-05-26 PROCEDURE — 1159F MED LIST DOCD IN RCRD: CPT | Mod: CPTII,S$GLB,, | Performed by: NURSE PRACTITIONER

## 2023-05-26 PROCEDURE — 3077F PR MOST RECENT SYSTOLIC BLOOD PRESSURE >= 140 MM HG: ICD-10-PCS | Mod: CPTII,S$GLB,, | Performed by: NURSE PRACTITIONER

## 2023-05-26 PROCEDURE — 99999 PR PBB SHADOW E&M-EST. PATIENT-LVL IV: CPT | Mod: PBBFAC,,, | Performed by: NURSE PRACTITIONER

## 2023-05-26 PROCEDURE — 4010F ACE/ARB THERAPY RXD/TAKEN: CPT | Mod: CPTII,S$GLB,, | Performed by: NURSE PRACTITIONER

## 2023-05-26 PROCEDURE — 3080F PR MOST RECENT DIASTOLIC BLOOD PRESSURE >= 90 MM HG: ICD-10-PCS | Mod: CPTII,S$GLB,, | Performed by: NURSE PRACTITIONER

## 2023-05-26 PROCEDURE — 3080F DIAST BP >= 90 MM HG: CPT | Mod: CPTII,S$GLB,, | Performed by: NURSE PRACTITIONER

## 2023-05-26 PROCEDURE — 1160F RVW MEDS BY RX/DR IN RCRD: CPT | Mod: CPTII,S$GLB,, | Performed by: NURSE PRACTITIONER

## 2023-05-26 PROCEDURE — 99214 OFFICE O/P EST MOD 30 MIN: CPT | Mod: S$GLB,,, | Performed by: NURSE PRACTITIONER

## 2023-05-26 PROCEDURE — 1159F PR MEDICATION LIST DOCUMENTED IN MEDICAL RECORD: ICD-10-PCS | Mod: CPTII,S$GLB,, | Performed by: NURSE PRACTITIONER

## 2023-05-26 PROCEDURE — 4010F PR ACE/ARB THEARPY RXD/TAKEN: ICD-10-PCS | Mod: CPTII,S$GLB,, | Performed by: NURSE PRACTITIONER

## 2023-05-26 PROCEDURE — 3008F PR BODY MASS INDEX (BMI) DOCUMENTED: ICD-10-PCS | Mod: CPTII,S$GLB,, | Performed by: NURSE PRACTITIONER

## 2023-05-26 PROCEDURE — 3008F BODY MASS INDEX DOCD: CPT | Mod: CPTII,S$GLB,, | Performed by: NURSE PRACTITIONER

## 2023-05-26 PROCEDURE — 99999 PR PBB SHADOW E&M-EST. PATIENT-LVL IV: ICD-10-PCS | Mod: PBBFAC,,, | Performed by: NURSE PRACTITIONER

## 2023-05-26 PROCEDURE — 99214 PR OFFICE/OUTPT VISIT, EST, LEVL IV, 30-39 MIN: ICD-10-PCS | Mod: S$GLB,,, | Performed by: NURSE PRACTITIONER

## 2023-05-26 NOTE — PROGRESS NOTES
[facility] Visit     Chief Complaint:   Chief Complaint   Patient presents with    Back Pain    Leg Pain     bilateral       History of Present Illness: Tj Trammell is a 57 y.o. male here for back pain.     Onset: several years, but progressively worsening  Location: bilateral low back  Radiation: Bilateral legs diffusely, left leg worse than right and legs worse than back  Timing: constant  Quality: Sharp  Exacerbating Factors:  lifting the leg, walking  Alleviating Factors: nothing  Associated Symptoms: He feels numbness and tingling in both legs diffusely. He has weakness in both legs. He notes frequent falls, with resultant injuries in the past few months. He uses rollator walker. He has bladder incontinence and is on ditropan. He denies night fever/night sweats, bowel incontinence, and significant weight loss    He has tried tramadol and gabapentin. He has not done PT.     Severity: Currently: 10/10   Typical Range: 10-10/10     Exacerbation: 10/10     Interval History (05/26/2023):  Tj Trammell returns today for follow up for low back and bilateral leg pain. He presents in a motorized W/C. He reports intermittent falls, he continues to use a rollator walker at home. Last CV I ordered PT for leg strengthening, however he did not attend. He denies any B/B dysfunction, denies any saddle anesthesia he does feel tingling in his legs diffusely.        Current Pain Scales:  Current: 10/10              Typical Range: 7-9/10 10    Interval History (01/26/2023):  Tj Trammell returns today for follow up.  Upon review of his chart He presented to the ED on 12/30/2022 patient reports that he tripped and fell he hit his head and sustained a abrasion to the right knee.  He did not lose consciousness.  He suffered minor lacerations to his top left scalp and right knee.  He presents today for a three-month follow-up appointment he is a former patient of Dr. Giles.  Currently,  the back pain is stable.        Current Pain Scales:  Current: 0/10              Typical Range: 0-1/10          Previous Interventions:  - 10/5/22: B/L L5 TF JEVON w/ no relief.     Previous Therapies:  PT/OT: no  Relevant Surgery: no   Previous Medications:   - NSAIDS: relafen  - Muscle Relaxants: baclofen.    - TCAs:   - SNRIs:   - Topicals:   - Anticonvulsants: gabapentin    - Opioids: Tramadol    Current Pain Medications:  Tramadol  Baclofen  Gabapentin       Blood Thinners: None    Full Medication List:    Current Outpatient Medications:     acetaZOLAMIDE (DIAMOX) 250 MG tablet, Take 2 TABLETS BY MOUTH ONLY 8 HOURS AFTER SURGERY, Disp: 2 tablet, Rfl: 0    albuterol (PROAIR HFA) 90 mcg/actuation inhaler, Inhale 2 puffs by mouth every 4 hours., Disp: 8.5 g, Rfl: 3    ALPRAZolam (XANAX) 0.5 MG tablet, TAKE ONE TABLET BY MOUTH TWICE DAILY AS NEEDED FOR ANXIETY, Disp: 60 tablet, Rfl: 4    atorvastatin (LIPITOR) 40 MG tablet, Take 1 tablet (40 mg total) by mouth once daily., Disp: 90 tablet, Rfl: 4    bacitracin 500 unit/gram Oint, Apply topically 2 (two) times daily., Disp: 28 g, Rfl: 0    baclofen (LIORESAL) 10 MG tablet, TAKE 1 TABLET BY MOUTH 3 TIMES DAILY., Disp: 90 tablet, Rfl: 11    buPROPion (WELLBUTRIN SR) 150 MG TBSR 12 hr tablet, TAKE 1 TABLET BY MOUTH EVERY 12 HOURS DAILY., Disp: 60 tablet, Rfl: 11    dicyclomine (BENTYL) 10 MG capsule, TAKE 1 CAPSULE BY MOUTH THREE TIMES A DAY FOR ABDOMINAL PAIN, Disp: 90 capsule, Rfl: 3    difluprednate (DUREZOL) 0.05 % Drop ophthalmic solution, Instill one drop TO SURGERY EYE four times a day AFTER SURGERY X 30 DAYS, Disp: 5 mL, Rfl: 4    docusate sodium (COLACE) 100 MG capsule, Take 1 capsule (100 mg total) by mouth once daily., Disp: 30 capsule, Rfl: 5    ergocalciferol (ERGOCALCIFEROL) 50,000 unit Cap, TAKE 1 CAPSULE BY MOUTH Weekly, Disp: 12 capsule, Rfl: 0    famotidine (PEPCID) 20 MG tablet, Take 1 tablet (20 mg total) by mouth 2 (two) times daily., Disp: 60  tablet, Rfl: 3    fingolimod (GILENYA) 0.5 mg Cap, TAKE ONE CAPSULE (0.5 MG) BY MOUTH ONCE DAILY. MAY TAKE WITH OR WITHOUT FOOD. STORE AT ROOM TEMPERATURE., Disp: 90 capsule, Rfl: 2    gabapentin (NEURONTIN) 300 MG capsule, TAKE 1 CAPSULE BY MOUTH AT BEDTIME, Disp: 30 capsule, Rfl: 11    lactulose (CHRONULAC) 10 gram/15 mL solution, 45 mLs (30 g total) 3 (three) times daily., Disp: 4050 mL, Rfl: 3    linaCLOtide (LINZESS) 290 mcg Cap capsule, Take 1 capsule (290 mcg total) by mouth before breakfast., Disp: 30 capsule, Rfl: 11    lisinopriL (PRINIVIL,ZESTRIL) 5 MG tablet, TAKE 1 TABLET BY MOUTH DAILY, Disp: 90 tablet, Rfl: 3    mupirocin (BACTROBAN) 2 % ointment, Apply topically 3 (three) times daily., Disp: 22 g, Rfl: 0    nabumetone (RELAFEN) 500 MG tablet, Take 1 tablet (500 mg total) by mouth 2 (two) times daily as needed., Disp: 60 tablet, Rfl: 6    nebulizer and compressor (HOME NEBULIZER PLUS SIDESTREAM) Lupe, use as directed, Disp: 1 each, Rfl: 0    ofloxacin (OCUFLOX) 0.3 % ophthalmic solution, Instill 1 drop TO SURGERY EYE four times a day STARTING 2 DAYS BEFORE SURGERY, Disp: 5 mL, Rfl: 3    oxybutynin (DITROPAN-XL) 5 MG TR24, Take 1 tablet (5 mg total) by mouth once daily., Disp: 90 tablet, Rfl: 3    pantoprazole (PROTONIX) 40 MG tablet, TAKE ONE TABLET BY MOUTH  ONCE DAILY, Disp: 90 tablet, Rfl: 3    sodium,potassium,mag sulfates (SUPREP BOWEL PREP KIT) 17.5-3.13-1.6 gram SolR, Take 177 mLs by mouth once daily., Disp: 1 kit, Rfl: 0    traMADoL (ULTRAM) 50 mg tablet, Take 1 tablet (50 mg total) by mouth every 6 (six) hours as needed for pain, Disp: 120 tablet, Rfl: 4    triamcinolone acetonide 0.1% (KENALOG) 0.1 % cream, Apply topically 2 (two) times daily. for 10 days, Disp: 80 g, Rfl: 0     Review of Systems:  ROS    Allergies:  Patient has no known allergies.     Medical History:   has a past medical history of Asthma, Bilateral hearing loss (12/12/2012), Biliary acute pancreatitis, GERD  (gastroesophageal reflux disease), High cholesterol, Hypertension, and Multiple sclerosis.    Surgical History:   has a past surgical history that includes Joint replacement; Cholecystectomy; Colonoscopy (N/A, 1/22/2019); Tonsillectomy; Appendectomy; Esophagogastroduodenoscopy (N/A, 9/29/2021); Transforaminal epidural injection of steroid (Bilateral, 10/5/2022); and Colonoscopy (N/A, 4/25/2023).    Family History:  family history includes Heart disease in his brother, father, and mother.    Social History:   reports that he quit smoking about 16 months ago. His smoking use included cigarettes. He has a 32.00 pack-year smoking history. He has never used smokeless tobacco. He reports that he does not currently use alcohol. He reports that he does not use drugs.    Physical Exam:  Wt 103.9 kg (229 lb 0.9 oz)   BMI 33.83 kg/m²   GEN: No acute distress. Calm, comfortable  HENT: Normocephalic, atraumatic, moist mucous membranes  EYE: Anicteric sclera, non-injected.   CV: Non-diaphoretic. Regular Rate. Radial Pulses 2+.  RESP: Breathing comfortably. Chest expansion symmetric.  EXT: No clubbing, cyanosis.   SKIN: Warm, & dry to palpation. No visible rashes or lesions of exposed skin.   PSYCH: Pleasant mood and appropriate affect. Recent and remote memory intact.   GAIT: Mod Independent with RW, antalgic ambulation  Lumbar Spine Exam:       Inspection: No erythema, bruising.       Palpation: (+) TTP of lumbar paraspinals b/l       ROM:  Limited in flexion, extension, lateral bending.       (+) Facet loading bilaterally      (-) Straight Leg Raise bilaterally      (-) LINDSEY bilaterally  Hip Exam:      Inspection: No gross deformity or apparent leg length discrepancy      Palpation: (+) TTP to right greater trochanteric bursa.   Neurologic Exam:     Alert. Speech is fluent and appropriate.     Strength: 4/5 in b/l hip flexion, left EHL, ADF, otherwise 5/5 throughout bilateral lower extremities     Sensation:  Grossly abnormal  to light touch in bilateral lower extremities     Reflexes: 1+ in b/l patella, absent in b/l achilles     Tone: No abnormality appreciated in bilateral lower extremities     No Clonus     (+) Bey on the left                Imaging:  - MRI Lumbar Spine 09/16/2022:  Alignment: Normal.  Vertebrae: 5 lumbar-type vertebral bodies. No aggressive marrow replacement process or fracture.  Cortical endplate degenerative changes anteriorly to the RIGHT, degenerative in nature.  Cortical endplate degenerative changes predominantly L2 through L   Discs: Multilevel disc space narrowing, mild with diffuse desiccation of disc material.  Disc space narrowing predominantly L5-S1.  Cord: Normal. Conus terminates at L1.  Degenerative findings:  T12-L1: There is no focal disc herniation. No significant central canal narrowing . No significant neural foraminal narrowing.  L1-L2: There is no focal disc herniation. No significant central canal narrowing . No significant neural foraminal narrowing.  L2-L3: There is no focal disc herniation. No significant central canal narrowing . No significant neural foraminal narrowing.  L3-L4: There is no focal disc herniation.Broad based mild diffuse bulging of disc material . No significant central canal narrowing . No significant neural foraminal narrowing.  L4-L5: There is no focal disc herniation. Broad based mild diffuse bulging of disc material .  Annular fissure lateralizing RIGHT  no significant central canal narrowing . Moderate RIGHT neural foraminal narrowing.  L5-S1: Broad-based protrusion of disc material eccentric to the RIGHT paracentral region with mass effect upon the anterior thecal sac margins and exiting S1 nerve root.  Mild central canal narrowing .  Moderate to severe bilateral neural foraminal narrowing.  Paraspinal muscles & soft tissues: RIGHT renal cystic lesion greater than LEFT.  Mild posterior paraspinal muscular atrophy.       -MRI Cervical Spine  09/16/2022:  FINDINGS:  The vertebral bodies demonstrate no evidence of fracture, osseous destructive process or aggressive bone marrow replacement process.     Normal sagittal alignment is preserved.  No significant spondylolisthesis     Mild posterior disc osteophyte complex at C3-4 and C5-6.  This results in some attenuation of the ventral CSF space and slight contouring of the cord.  The posterior CSF space remains preserved.  Modest posterior disc osteophyte complex at C4-5 and C6-7 without significant mass effect.  No new large disc herniation or site of spinal stenosis.  Multilevel neural foraminal narrowing from uncovertebral and facet hypertrophy.     Multiple scattered short segment T2 hyperintense foci within the spinal cord, in keeping with reported history of multiple sclerosis.  Midline dorsal lesion at the C2 level slightly less conspicuous.  Question mild cord thinning at this site.  Additional lesions C2-3, C4, C5-6, in T2 appear grossly stable.  No new discrete lesions identified.  No enhancing foci.     No intraspinal mass or fluid collection.     No acute abnormalities in the visualized paraspinal soft tissue structures.    Labs:  BMP  Lab Results   Component Value Date     11/08/2021    K 4.1 11/08/2021     11/08/2021    CO2 20 (L) 11/08/2021    BUN 10 11/08/2021    CREATININE 1.0 11/08/2021    CALCIUM 8.8 11/08/2021    ANIONGAP 12 11/08/2021    ESTGFRAFRICA >60 11/08/2021    EGFRNONAA >60 11/08/2021     Lab Results   Component Value Date    ALT 19 11/08/2021    AST 24 11/08/2021    ALKPHOS 83 11/08/2021    BILITOT 0.4 11/08/2021     Lab Results   Component Value Date     11/08/2021       Assessment:  Tj Trammell is a 57 y.o. male with the following diagnoses based on history, exam, and imaging:    Problem List Items Addressed This Visit          Neuro    Multiple sclerosis    Lumbar radiculopathy    Relevant Orders    Ambulatory referral/consult to  "Physical/Occupational Therapy     Other Visit Diagnoses       Chronic bilateral low back pain with bilateral sciatica    -  Primary    Relevant Orders    Ambulatory referral/consult to Physical/Occupational Therapy    Impaired functional mobility, balance, gait, and endurance        Relevant Orders    Ambulatory referral/consult to Physical/Occupational Therapy    Pain                    This is a pleasant 57 y.o. gentleman presenting with:     - Chronic bilateral low back pain with bilateral leg pains: Potential radiculopathy related to his lumbar canal and foraminal stenosis, or potentially related to his MS  - Multiple sclerosis: Will get patient re-established with neurology, as MS could be the cause of his pains, weakness and falls.     1/26/2023- 56 y/o male with a Hx of chronic low back with bilateral leg pains, discussed that he will continued follow-up with Neurology.  I recommend today that he attend physical therapy as I see that his pending order is still active for PT per Dr. Giles.  I did recommend him scoring his tramadol tablets as he did state that they have a tendency to make him feel "groggy    05/26/[id number]-old male with history of chronic low back and bilateral leg pain.  He does have lumbar canal foraminal stenosis that is likely related to his multiple he continues to get reassessed by Neurology.  I did recommend physical therapy does not appear that he attended I will resubmit another order for physical therapy to help with lumbar stabilization and muscular strengthening.      Treatment Plan:   - PT/OT/HEP:  Reordered physical therapy today.  Discussed benefits of exercise for pain.   - Procedures: If no relief with PT, plan for caudal JEVON  - Medications: No changes recommended at this time. I would be hesitant to add any further sedating medications with his current issues with falls. May need to consider tapering his xanax and tramadol.   - Imaging: Reviewed.   - Labs: Reviewed.  " Medications are appropriately dosed for current hepatorenal function.  - Continued follow up with neurology for his MS.     Follow Up: RTC in 3 - 4 months or sooner if needed.    Huy Denson NP-C  [facility]    Disclaimer: This note was partly generated using dictation software which may occasionally result in transcription errors.

## 2023-05-29 ENCOUNTER — SPECIALTY PHARMACY (OUTPATIENT)
Dept: PHARMACY | Facility: CLINIC | Age: 58
End: 2023-05-29
Payer: MEDICARE

## 2023-05-29 NOTE — TELEPHONE ENCOUNTER
Specialty Pharmacy - Refill Coordination    Specialty Medication Orders Linked to Encounter      Flowsheet Row Most Recent Value   Medication #1 fingolimod (GILENYA) 0.5 mg Cap (Order#[id number], Rx#[id number])            Refill Questions - Documented Responses      Flowsheet Row Most Recent Value   Patient Availability and HIPAA Verification    Does patient want to proceed with activity? Yes   HIPAA/medical authority confirmed? Yes   Relationship to patient of person spoken to? Family Member   Refill Screening Questions    Changes to allergies? No   Changes to medications? No   New conditions since last clinic visit? No   Unplanned office visit, urgent care, ED, or hospital admission in the last 4 weeks? No   How does patient/caregiver feel medication is working? Excellent   Financial problems or insurance changes? No   How many doses of your specialty medications were missed in the last 4 weeks? 0   Would patient like to speak to a pharmacist? No   When does the patient need to receive the medication? 06/01/23   Refill Delivery Questions    How will the patient receive the medication? MEDRx   When does the patient need to receive the medication? 06/01/23   Shipping Address Home   Address in [facility] confirmed and updated if neccessary? Yes   Expected Copay ($) 0   Is the patient able to afford the medication copay? Yes   Payment Method zero copay   Days supply of Refill 30   Supplies needed? No supplies needed   Refill activity completed? Yes   Refill activity plan Refill scheduled   Shipment/Pickup Date: 05/31/23            Current Outpatient Medications   Medication Sig    acetaZOLAMIDE (DIAMOX) 250 MG tablet Take 2 TABLETS BY MOUTH ONLY 8 HOURS AFTER SURGERY    albuterol (PROAIR HFA) 90 mcg/actuation inhaler Inhale 2 puffs by mouth every 4 hours.    ALPRAZolam (XANAX) 0.5 MG tablet TAKE ONE TABLET BY MOUTH TWICE DAILY AS NEEDED FOR ANXIETY    atorvastatin (LIPITOR) 40 MG tablet Take 1 tablet (40 mg  total) by mouth once daily.    bacitracin 500 unit/gram Oint Apply topically 2 (two) times daily.    baclofen (LIORESAL) 10 MG tablet TAKE 1 TABLET BY MOUTH 3 TIMES DAILY.    buPROPion (WELLBUTRIN SR) 150 MG TBSR 12 hr tablet TAKE 1 TABLET BY MOUTH EVERY 12 HOURS DAILY.    dicyclomine (BENTYL) 10 MG capsule TAKE 1 CAPSULE BY MOUTH THREE TIMES A DAY FOR ABDOMINAL PAIN    difluprednate (DUREZOL) 0.05 % Drop ophthalmic solution Instill one drop TO SURGERY EYE four times a day AFTER SURGERY X 30 DAYS    docusate sodium (COLACE) 100 MG capsule Take 1 capsule (100 mg total) by mouth once daily.    ergocalciferol (ERGOCALCIFEROL) 50,000 unit Cap TAKE 1 CAPSULE BY MOUTH Weekly    famotidine (PEPCID) 20 MG tablet Take 1 tablet (20 mg total) by mouth 2 (two) times daily.    fingolimod (GILENYA) 0.5 mg Cap TAKE ONE CAPSULE (0.5 MG) BY MOUTH ONCE DAILY. MAY TAKE WITH OR WITHOUT FOOD. STORE AT ROOM TEMPERATURE.    gabapentin (NEURONTIN) 300 MG capsule TAKE 1 CAPSULE BY MOUTH AT BEDTIME    lactulose (CHRONULAC) 10 gram/15 mL solution 45 mLs (30 g total) 3 (three) times daily.    linaCLOtide (LINZESS) 290 mcg Cap capsule Take 1 capsule (290 mcg total) by mouth before breakfast.    lisinopriL (PRINIVIL,ZESTRIL) 5 MG tablet TAKE 1 TABLET BY MOUTH DAILY    mupirocin (BACTROBAN) 2 % ointment Apply topically 3 (three) times daily.    nabumetone (RELAFEN) 500 MG tablet Take 1 tablet (500 mg total) by mouth 2 (two) times daily as needed.    nebulizer and compressor (HOME NEBULIZER PLUS SIDESTREAM) Lupe use as directed    ofloxacin (OCUFLOX) 0.3 % ophthalmic solution Instill 1 drop TO SURGERY EYE four times a day STARTING 2 DAYS BEFORE SURGERY    oxybutynin (DITROPAN-XL) 5 MG TR24 Take 1 tablet (5 mg total) by mouth once daily.    pantoprazole (PROTONIX) 40 MG tablet TAKE ONE TABLET BY MOUTH  ONCE DAILY    sodium,potassium,mag sulfates (SUPREP BOWEL PREP KIT) 17.5-3.13-1.6 gram SolR Take 177 mLs by mouth once daily.    traMADoL (ULTRAM)  50 mg tablet Take 1 tablet (50 mg total) by mouth every 6 (six) hours as needed for pain    triamcinolone acetonide 0.1% (KENALOG) 0.1 % cream Apply topically 2 (two) times daily. for 10 days   Last reviewed on 5/26/2023  9:24 AM by STEPHEN Rosas    Review of patient's allergies indicates:  No Known Allergies Last reviewed on  5/26/2023 9:29 AM by Almaz Baker      Tasks added this encounter   No tasks added.   Tasks due within next 3 months   5/27/2023 - Refill Coordination Outreach (1 time occurrence)     Jorden Mills, PharmD  [facility]  [facility] [facility] [address]  Phone: [phone]  Fax: [phone]

## 2023-06-23 ENCOUNTER — SPECIALTY PHARMACY (OUTPATIENT)
Dept: PHARMACY | Facility: CLINIC | Age: 58
End: 2023-06-23
Payer: MEDICARE

## 2023-06-23 NOTE — TELEPHONE ENCOUNTER
Specialty Pharmacy - Refill Coordination    Specialty Medication Orders Linked to Encounter      Flowsheet Row Most Recent Value   Medication #1 fingolimod (GILENYA) 0.5 mg Cap (Order#[id number], Rx#[id number])            Refill Questions - Documented Responses      Flowsheet Row Most Recent Value   Patient Availability and HIPAA Verification    Does patient want to proceed with activity? Yes   HIPAA/medical authority confirmed? Yes   Relationship to patient of person spoken to? Family Member   Refill Screening Questions    Changes to allergies? No   Changes to medications? No   New conditions since last clinic visit? No   Unplanned office visit, urgent care, ED, or hospital admission in the last 4 weeks? No   How does patient/caregiver feel medication is working? Very good   Financial problems or insurance changes? No   How many doses of your specialty medications were missed in the last 4 weeks? 0   Would patient like to speak to a pharmacist? No   When does the patient need to receive the medication? 06/29/23   Refill Delivery Questions    How will the patient receive the medication? MEDRx   When does the patient need to receive the medication? 06/29/23   Shipping Address Home   Address in [facility] confirmed and updated if neccessary? Yes   Expected Copay ($) 0   Is the patient able to afford the medication copay? Yes   Payment Method zero copay   Days supply of Refill 30   Supplies needed? No supplies needed   Refill activity completed? Yes   Refill activity plan Refill scheduled   Shipment/Pickup Date: 06/27/23            Current Outpatient Medications   Medication Sig    acetaZOLAMIDE (DIAMOX) 250 MG tablet Take 2 TABLETS BY MOUTH ONLY 8 HOURS AFTER SURGERY    albuterol (PROAIR HFA) 90 mcg/actuation inhaler Inhale 2 puffs by mouth every 4 hours.    ALPRAZolam (XANAX) 0.5 MG tablet TAKE ONE TABLET BY MOUTH TWICE DAILY AS NEEDED FOR ANXIETY    atorvastatin (LIPITOR) 40 MG tablet Take 1 tablet (40 mg  total) by mouth once daily.    bacitracin 500 unit/gram Oint Apply topically 2 (two) times daily.    baclofen (LIORESAL) 10 MG tablet TAKE 1 TABLET BY MOUTH 3 TIMES DAILY.    buPROPion (WELLBUTRIN SR) 150 MG TBSR 12 hr tablet TAKE 1 TABLET BY MOUTH EVERY 12 HOURS DAILY.    dicyclomine (BENTYL) 10 MG capsule TAKE 1 CAPSULE BY MOUTH THREE TIMES A DAY FOR ABDOMINAL PAIN    difluprednate (DUREZOL) 0.05 % Drop ophthalmic solution Instill one drop TO SURGERY EYE four times a day AFTER SURGERY X 30 DAYS    docusate sodium (COLACE) 100 MG capsule Take 1 capsule (100 mg total) by mouth once daily.    ergocalciferol (ERGOCALCIFEROL) 50,000 unit Cap TAKE 1 CAPSULE BY MOUTH Weekly    famotidine (PEPCID) 20 MG tablet Take 1 tablet (20 mg total) by mouth 2 (two) times daily.    fingolimod (GILENYA) 0.5 mg Cap TAKE ONE CAPSULE (0.5 MG) BY MOUTH ONCE DAILY. MAY TAKE WITH OR WITHOUT FOOD. STORE AT ROOM TEMPERATURE.    gabapentin (NEURONTIN) 300 MG capsule TAKE 1 CAPSULE BY MOUTH AT BEDTIME    lactulose (CHRONULAC) 10 gram/15 mL solution 45 mLs (30 g total) 3 (three) times daily.    linaCLOtide (LINZESS) 290 mcg Cap capsule Take 1 capsule (290 mcg total) by mouth before breakfast.    lisinopriL (PRINIVIL,ZESTRIL) 5 MG tablet TAKE 1 TABLET BY MOUTH DAILY    mupirocin (BACTROBAN) 2 % ointment Apply topically 3 (three) times daily.    nabumetone (RELAFEN) 500 MG tablet Take 1 tablet (500 mg total) by mouth 2 (two) times daily as needed.    nebulizer and compressor (HOME NEBULIZER PLUS SIDESTREAM) Lupe use as directed    ofloxacin (OCUFLOX) 0.3 % ophthalmic solution Instill 1 drop TO SURGERY EYE four times a day STARTING 2 DAYS BEFORE SURGERY    oxybutynin (DITROPAN-XL) 5 MG TR24 Take 1 tablet (5 mg total) by mouth once daily.    pantoprazole (PROTONIX) 40 MG tablet TAKE ONE TABLET BY MOUTH  ONCE DAILY    sodium,potassium,mag sulfates (SUPREP BOWEL PREP KIT) 17.5-3.13-1.6 gram SolR Take 177 mLs by mouth once daily.    traMADoL (ULTRAM)  50 mg tablet Take 1 tablet (50 mg total) by mouth every 6 (six) hours as needed for pain    triamcinolone acetonide 0.1% (KENALOG) 0.1 % cream Apply topically 2 (two) times daily. for 10 days   Last reviewed on 5/26/2023  9:24 AM by STEPHEN Rosas    Review of patient's allergies indicates:  No Known Allergies Last reviewed on  5/26/2023 9:29 AM by Almaz Baker      Tasks added this encounter   No tasks added.   Tasks due within next 3 months   6/23/2023 - Refill Coordination Outreach (1 time occurrence)     Jorden Mills, PharmD  [facility]  [facility] [facility] [address]  Phone: [phone]  Fax: [phone]

## 2023-06-27 ENCOUNTER — CLINICAL SUPPORT (OUTPATIENT)
Dept: REHABILITATION | Facility: HOSPITAL | Age: 58
End: 2023-06-27
Payer: MEDICARE

## 2023-06-27 DIAGNOSIS — R53.1 DECREASED STRENGTH: ICD-10-CM

## 2023-06-27 DIAGNOSIS — M54.42 CHRONIC MIDLINE LOW BACK PAIN WITH BILATERAL SCIATICA: Primary | ICD-10-CM

## 2023-06-27 DIAGNOSIS — G89.29 CHRONIC MIDLINE LOW BACK PAIN WITH BILATERAL SCIATICA: Primary | ICD-10-CM

## 2023-06-27 DIAGNOSIS — M54.41 CHRONIC MIDLINE LOW BACK PAIN WITH BILATERAL SCIATICA: Primary | ICD-10-CM

## 2023-06-27 DIAGNOSIS — R26.89 IMPAIRED GAIT AND MOBILITY: ICD-10-CM

## 2023-06-27 PROBLEM — R29.898 LEFT LEG WEAKNESS: Status: RESOLVED | Noted: 2019-06-04 | Resolved: 2023-06-27
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
## 2023-06-27 PROBLEM — R26.9 IMPAIRED GAIT: Status: RESOLVED | Noted: 2019-06-04 | Resolved: 2023-06-27

## 2023-06-27 PROBLEM — R29.6 FALLS: Status: RESOLVED | Noted: 2019-06-04 | Resolved: 2023-06-27

## 2023-06-27 PROCEDURE — 97110 THERAPEUTIC EXERCISES: CPT | Mod: PN

## 2023-06-27 PROCEDURE — 97162 PT EVAL MOD COMPLEX 30 MIN: CPT | Mod: PN

## 2023-06-27 NOTE — PLAN OF CARE
[facility]   Physical Therapy Initial Evaluation      Name: Tj Trammell  [facility] Number: [id number]    Therapy Diagnosis:   Encounter Diagnoses   Name Primary?    Chronic midline low back pain with bilateral sciatica Yes    Impaired gait and mobility     Decreased strength         Physician: Hyu Denson FNP    Physician Orders: PT Eval and Treat   Medical Diagnosis from Referral: Impaired functional mobility, balance, gait, and endurance [Z74.09], Lumbar radiculopathy [M54.16], Chronic bilateral low back pain with bilateral sciatica [M54.42, M54.41, G89.29]  Evaluation Date: 6/27/2023  Authorization Period Expiration: 8/22/2023  Plan of Care Expiration: 8/11/2023  Progress Note Due: 7/27/2023  Visit # / Visits authorized: 1/1   FOTO: 1/5    Precautions:  Standard; multiple sclerosis, hypertension, asthma; hearing impaired (needs )    Time In: 0912  Time Out: 1003  Total Appointment Time (timed & untimed codes): 47 minutes    Subjective     Date of onset: Chronic     History of current condition - TJ reports: chronic pain in left>right lower extremity, both more than his low back. Patient reports he takes Tramadol for pain but with no relief. Previous JEVON in 2022 without relief. Patient with a history of multiple sclerosis and reports pain all over his body aside from back and legs; patient specifically notes left shoulder and right hand hurt    Falls: Patient reports he's fallen 20 times in 6 months. Patient reports he falls during sit<>stand transfers and while walking. Unable to obtain clear answer from patient if he falls with or without assistive device.     Imaging:   MRI Cervical Spine 8/2022:   Examination mildly degraded by patient motion artifact. Multiple lesions throughout the cervical and upper thoracic spinal cord a distribution compatible with reported history of multiple sclerosis.  Decreased conspicuity of prior lesion the C2 level.  Otherwise  stable distribution, without evidence of new or enhancing focus to indicate ongoing/active demyelination. Moderate cervical spondylosis, similar to prior exams again noting at least mild spinal canal stenosis at C3-4 and C5-6 and multilevel foraminal narrowing.  MRI Lumbar Spine 8/2022:   Broad-based RIGHT paracentral protrusion of disc material L5-S1 as above.   X-Ray Femur 12/2022:   No acute displaced fracture or dislocation of the left femur.  X-Ray Hip 12/2022:   No acute displaced fracture or dislocation of the left hip.    Prior Therapy: Yes; with neuro team in 2019  Social History: Patient lives with his two brothers in single story house. Patient's brother provides patient with transportation  Occupation: Disabled  Prior Level of Function: Modified independent with rollator for 5 years.   Current Level of Function: Modified independent with rollator > power chair. Patient uses power chair for longer community outings however needs a new battery and tire; company not available until 50 days     Pain:  Current 7/10, worst 9/10, best 5/10   Location: Midline of low back, throughout bilateral lower extremity   Description: Sharp  Aggravating Factors: Transferring out of bed in the morning, walking   Easing Factors: Tramadol    Patients goals: Decrease pain and falls     Medical History:   Past Medical History:   Diagnosis Date    Asthma     Bilateral hearing loss 12/12/2012    Biliary acute pancreatitis     GERD (gastroesophageal reflux disease)     High cholesterol     Hypertension     Multiple sclerosis      Surgical History:   Tj SALDIVAR Jp  has a past surgical history that includes Joint replacement; Cholecystectomy; Colonoscopy (N/A, 1/22/2019); Tonsillectomy; Appendectomy; Esophagogastroduodenoscopy (N/A, 9/29/2021); Transforaminal epidural injection of steroid (Bilateral, 10/5/2022); and Colonoscopy (N/A, 4/25/2023).    Medications:   Tj has a current medication list which includes the following  prescription(s): acetazolamide, albuterol, alprazolam, atorvastatin, bacitracin, baclofen, bupropion, dicyclomine, difluprednate, docusate sodium, ergocalciferol, famotidine, fingolimod, gabapentin, lactulose, linaclotide, lisinopril, mupirocin, nabumetone, nebulizer and compressor, ofloxacin, oxybutynin, pantoprazole, sodium,potassium,mag sulfates, tramadol, and triamcinolone acetonide 0.1%.    Allergies:   Review of patient's allergies indicates:  No Known Allergies     Objective      Palpation: Tenderness to bilateral lateral sacral border and left gluteus medius and minumus     Observation: Scab on left lateral lower leg (~3x4 cm) with serous drainage and no odor. Photo uploaded in Media    Active range of motion:  Lumbar: within functional limits   Hip: within functional limits   Knee: within functional limits   Ankle: within functional limits     Lower extremity manual muscle tests  Right Left Pain/dysfunction with movement   Hip flexion 3-/5 3-/5    Hip extension 3-/5 3/5    Hip abduction 3-/5 3+/5    Hip internal rotation 4+/5 4/5    Hip external rotation 4/5 4-/5    Knee flexion 5/5 4/5    Knee extension 5/5 4/5    Ankle dorsiflexion 4/5 4-/5    Ankle plantarflexion 4+/5 4+/5    Ankle inversion 4/5 4+/5    Ankle eversion 4+/5 4/5      Gross movement: Increased time to roll and perform supine<>prone and supine<>sit transitions/transfers on mat  Sit<>stands: Required bilateral upper extremity use    Gait analysis: Continuous with rollator; bilateral shoulder shrug and forward trunk lean. Right hip circumduction in swing, right foot pronation from initial contact throughout stance  Other: Moderate assistance to regain balance, loss of balance to the right as patient     Intake Outcome Measure for FOTO Lumbar Spine Survey    Therapist reviewed FOTO scores for Tj Contrerasdinaligia on 6/27/2023.   FOTO documents entered into EPIC - see Media section.    Intake Score: 53%     Treatment     Total Treatment time  "(time-based codes) separate from Evaluation: 20 minutes     TJ received the treatments listed below:      therapeutic exercises to develop strength and endurance for 10 minutes including:    Sidelying clams: Included in home exercise program  Double leg bridge: Included in home exercise program  Long arc quad: x5 bilateral  Seated march: x5 bilateral     Next:   Walk for endurance (aim for 5+ minutes with rollator)  Standing forward bows for pain relief  Sit<>stands on standard chair + foam and unilateral/no upper extremity support (bill as TA)  Step ups on 4-6" step with bilateral/unilateral upper extremity support (bill as TA)    gait training to improve functional mobility and safety for 10 minutes, including:  Continuous gait with rollator x 320 feet. Verbal cues to stand closer to rollator, stand upright, and decrease shoulder shrug  Use of lock mechanism before transfers and in static standing    Patient Education and Home Exercises     Education provided:   - home exercise program  - use rolling walker at all times. Patient falls too frequently to attempt independent gait  - rollator is at appropriate height. Breaks need to be fixed    Written Home Exercises Provided: yes. Exercises were reviewed and TJ was able to demonstrate them prior to the end of the session.  TJ demonstrated good  understanding of the education provided. See EMR under Patient Instructions for exercises provided during therapy sessions.    Assessment     Tj is a 58 y.o. male referred to outpatient Physical Therapy with a medical diagnosis of lumbar radiculopathy, chronic bilateral low back pain with sciatica, and decreased mobility, balance, gait and endurance. Primary complaints are back pain and falls. Unsure if stenosis pattern present; needs more testing and  to clarify. Falls also need clarification if they are while using assistive device or attempting independent gait.     Patient prognosis is Fair.   Patient " will benefit from skilled outpatient Physical Therapy to address the deficits stated above and in the chart below, provide patient /family education, and to maximize patientt's level of independence.     Plan of care discussed with patient: Yes  Patient's spiritual, cultural and educational needs considered and patient is agreeable to the plan of care and goals as stated below:     Anticipated Barriers for therapy: Current rollator use. Co-morbidities    Medical Necessity is demonstrated by the following  History  Co-morbidities and personal factors that may impact the plan of care [] LOW: no personal factors / co-morbidities  [x] MODERATE: 1-2 personal factors / co-morbidities  [] HIGH: 3+ personal factors / co-morbidities    Moderate / High Support Documentation:   Co-morbidities affecting plan of care:     Personal Factors:   lifestyle-assistive device useage     Examination  Body Structures and Functions, activity limitations and participation restrictions that may impact the plan of care [] LOW: addressing 1-2 elements  [] MODERATE: 3+ elements  [x] HIGH: 4+ elements (please support below)    Moderate / High Support Documentation: Above     Clinical Presentation [] LOW: stable  [x] MODERATE: Evolving  [] HIGH: Unstable     Decision Making/ Complexity Score: moderate       Short Term Goals: 3 weeks  1. Patient will be compliant with home exercise program to supplement therapy in decreasing pain with functional mobility.  2. Patient will improve impaired lower extremity manual muscle tests to 3+/5 bilaterally to improve strength for standing tasks.  3. Patient will ambulate with rollator at all times to promote safety at home and in the community.   4. Patient will ambulate in close proximity to rollator without shrug to decrease reports of shoulder pain.     Long Term Goals: 6 weeks  1. Patient will improve FOTO score to </= 41% limited to decrease perceived limitation with maintaining/changing body position  2.  "Patient will improve impaired lower extremity manual muscle tests to 4/5 grade bilaterally to improve strength for standing tasks.  3. Patient will perform 6' walk at 0.75 m/s to promote safe community ambulation.  4. Patient will perform 30" sit<>stand test with >/= 8 reps with minimal upper extremity support to demonstrate increased functional strength.   5. Patient will report 4/10 pain at worst in low back and legs to promote increased physical activity.     Plan     Plan of care Certification: 6/27/2023 to 8/11/2023.    Outpatient Physical Therapy 2 times weekly for 6 weeks to include the following interventions: Cervical/Lumbar Traction, Gait Training, Manual Therapy, Moist Heat/ Ice, Neuromuscular Re-ed, Patient Education, Self Care, Therapeutic Activities, and Therapeutic Exercise.     Taylor Garcia, PT  "

## 2023-06-29 ENCOUNTER — CLINICAL SUPPORT (OUTPATIENT)
Dept: REHABILITATION | Facility: HOSPITAL | Age: 58
End: 2023-06-29
Payer: MEDICARE

## 2023-06-29 DIAGNOSIS — G89.29 CHRONIC MIDLINE LOW BACK PAIN WITH BILATERAL SCIATICA: Primary | ICD-10-CM

## 2023-06-29 DIAGNOSIS — M54.41 CHRONIC MIDLINE LOW BACK PAIN WITH BILATERAL SCIATICA: Primary | ICD-10-CM

## 2023-06-29 DIAGNOSIS — M54.42 CHRONIC MIDLINE LOW BACK PAIN WITH BILATERAL SCIATICA: Primary | ICD-10-CM

## 2023-06-29 DIAGNOSIS — R26.89 IMPAIRED GAIT AND MOBILITY: ICD-10-CM

## 2023-06-29 DIAGNOSIS — R53.1 DECREASED STRENGTH: ICD-10-CM

## 2023-06-29 PROCEDURE — 97110 THERAPEUTIC EXERCISES: CPT | Mod: PN,CQ

## 2023-06-29 NOTE — PROGRESS NOTES
"[facility]   Physical Therapy Treatment Note      Name: Tj Trammell  Clinic Number: [id number]    Therapy Diagnosis:   Encounter Diagnoses   Name Primary?    Chronic midline low back pain with bilateral sciatica Yes    Impaired gait and mobility     Decreased strength      Physician: Huy Denson FNP    Visit Date: 6/29/2023    Physician Orders: PT Eval and Treat   Medical Diagnosis from Referral: Impaired functional mobility, balance, gait, and endurance [Z74.09], Lumbar radiculopathy [M54.16], Chronic bilateral low back pain with bilateral sciatica [M54.42, M54.41, G89.29]  Evaluation Date: 6/27/2023  Authorization Period Expiration: 8/22/2023  Plan of Care Expiration: 8/11/2023  Progress Note Due: 7/27/2023  Visit # / Visits authorized: 2/1   FOTO: 2/5    PTA Visit #: 1/5     Time In: 7:10 AM   Time Out: 8:00 AM   Total Billable Time: 50 minutes 3 TE    Subjective       Pt reports: pt reports he has a wound on his Left foot. Pt agreeable to PT session   He was compliant with home exercise program.  Response to previous treatment: evaluation previous  Functional change: none reported    Pain: 0/10 (NOT RATED)  Location:  L high ankle     Objective      Objective Measures updated at progress report unless specified.     Treatment     TJ received the treatments listed below:      therapeutic exercises to develop strength, endurance, and ROM for 45 minutes including:  - Nustep x 7 min for B LE/UE reciprocal AROM level 2  - SLR (straight leg raise) 2x10 B  - Lumbar Bridging over green peanut theraball 2x10  - quad set / gluteal set 5"x15  - seated LAQ 1.5# 2x12 B  - seated hip flex 1.5# 2x12 B  (SEE ASSESSMENT FOR WOUND OBSERVATION)    Neuromuscular re-education activities to improve: Balance, Coordination, Kinesthetic, and Proprioception for 00 minutes. The following activities were included:    therapeutic activities to improve functional performance for 00  minutes, " including      Patient Education and Home Exercises       Education provided:   - encouraged safety with Rollator negotiation on level/ unlevel surfaces  -encouraged HEP compliance    Written Home Exercises Provided: Patient instructed to cont prior HEP. Exercises were reviewed and RICK was able to demonstrate them prior to the end of the session.  RICK demonstrated fair  understanding of the education provided. See EMR under Patient Instructions for exercises provided during therapy sessions    Assessment     Pt completed session w/  for today's session, as pt reads lips also. He was able to perform all therex with instructions provided on technique and to perform slowly.   Arrived to session with rollator, with faulty L brake, pt with forward flexed trunk and forward head, easily distracted when ambulating leads to becoming unsafe.  Wound Observation: L lateral distal tibial wound, min serous drainage noted that adhered to his sock. Surrounding wound tissue dry and scaly skin, wound cleansed with wound spray, padded dry with steril 4x4 gauze, covered with sterile self adherent 2x4 telfa bandage.     IRCK Is progressing well towards his goals.   Pt prognosis is Good.     Pt will continue to benefit from skilled outpatient physical therapy to address the deficits listed in the problem list box on initial evaluation, provide pt/family education and to maximize pt's level of independence in the home and community environment.     Pt's spiritual, cultural and educational needs considered and pt agreeable to plan of care and goals.     Anticipated barriers to physical therapy: Current rollator use. Co-morbidities       Goals:   Short Term Goals: 3 weeks  1. Patient will be compliant with home exercise program to supplement therapy in decreasing pain with functional mobility.(ongoing, not met)  2. Patient will improve impaired lower extremity manual muscle tests to 3+/5 bilaterally to improve strength for  "standing tasks.(ongoing, not met)  3. Patient will ambulate with rollator at all times to promote safety at home and in the community. (ongoing, not met)  4. Patient will ambulate in close proximity to rollator without shrug to decrease reports of shoulder pain. (ongoing, not met)     Long Term Goals: 6 weeks  1. Patient will improve FOTO score to </= 41% limited to decrease perceived limitation with maintaining/changing body position(ongoing, not met)  2. Patient will improve impaired lower extremity manual muscle tests to 4/5 grade bilaterally to improve strength for standing tasks.(ongoing, not met)  3. Patient will perform 6' walk at 0.75 m/s to promote safe community ambulation.(ongoing, not met)  4. Patient will perform 30" sit<>stand test with >/= 8 reps with minimal upper extremity support to demonstrate increased functional strength. (ongoing, not met)  5. Patient will report 4/10 pain at worst in low back and legs to promote increased physical activity. (ongoing, not met)       Plan     Cont PT as per POC, revisit possibility of ordering new 4 Wheeled walker (rollator)  Monitor L high ankle wound    Brennan Avitia PTA       "

## 2023-07-05 ENCOUNTER — CLINICAL SUPPORT (OUTPATIENT)
Dept: REHABILITATION | Facility: HOSPITAL | Age: 58
End: 2023-07-05
Payer: MEDICARE

## 2023-07-05 DIAGNOSIS — R53.1 DECREASED STRENGTH: ICD-10-CM

## 2023-07-05 DIAGNOSIS — R26.89 IMPAIRED GAIT AND MOBILITY: ICD-10-CM

## 2023-07-05 DIAGNOSIS — M54.41 CHRONIC MIDLINE LOW BACK PAIN WITH BILATERAL SCIATICA: Primary | ICD-10-CM

## 2023-07-05 DIAGNOSIS — G89.29 CHRONIC MIDLINE LOW BACK PAIN WITH BILATERAL SCIATICA: Primary | ICD-10-CM

## 2023-07-05 DIAGNOSIS — M54.42 CHRONIC MIDLINE LOW BACK PAIN WITH BILATERAL SCIATICA: Primary | ICD-10-CM

## 2023-07-05 PROCEDURE — 97110 THERAPEUTIC EXERCISES: CPT | Mod: PN,CQ

## 2023-07-05 NOTE — PROGRESS NOTES
"[facility]   Physical Therapy Treatment Note      Name: Tj Trammell  Clinic Number: [id number]    Therapy Diagnosis:   Encounter Diagnoses   Name Primary?    Chronic midline low back pain with bilateral sciatica Yes    Impaired gait and mobility     Decreased strength      Physician: Huy Denson FNP    Visit Date: 7/5/2023    Physician Orders: PT Eval and Treat   Medical Diagnosis from Referral: Impaired functional mobility, balance, gait, and endurance [Z74.09], Lumbar radiculopathy [M54.16], Chronic bilateral low back pain with bilateral sciatica [M54.42, M54.41, G89.29]  Evaluation Date: 6/27/2023  Authorization Period Expiration: 8/22/2023  Plan of Care Expiration: 8/11/2023  Progress Note Due: 7/27/2023  Visit # / Visits authorized: 2/1   FOTO: 3/5    PTA Visit #: 2/5     Time In: 7:10 AM   Time Out: 8:00 AM   Total Billable Time: 50 minutes 3 TE    Subjective     Pt reports: pt agreeable to PT session. Pt's only report is low back pain today.  He was compliant with home exercise program.  Response to previous treatment: no adverse response  Functional change: none reported    Pain: not rated/10   Location:  Lumbar pain    Objective      Objective Measures updated at progress report unless specified.     Treatment     TJ received the treatments listed below:      therapeutic exercises to develop strength, endurance, and ROM for 50 minutes including:  - Nustep x 7 min for B LE/UE reciprocal AROM level 2  - SLR (straight leg raise) 2x10 B  - Lumbar Bridging over green peanut theraball 2x10  - quad set / gluteal set 5"x15  - seated LAQ 1.5# 2x12 B  - seated hip flex 1.5# 2x12 B  Add standing next session    (SEE ASSESSMENT FOR WOUND OBSERVATION)    Neuromuscular re-education activities to improve: Balance, Coordination, Kinesthetic, and Proprioception for 00 minutes. The following activities were included:    therapeutic activities to improve functional performance for 00  " minutes, including    Patient Education and Home Exercises       Education provided:   - encouraged safety with Rollator negotiation on level/ unlevel surfaces  -encouraged HEP compliance    Written Home Exercises Provided: Patient instructed to cont prior HEP. Exercises were reviewed and RICK was able to demonstrate them prior to the end of the session.  RICK demonstrated fair  understanding of the education provided. See EMR under Patient Instructions for exercises provided during therapy sessions    Assessment     Pt completed session w/  for today's session. He was able to perform all therex with instructions provided on technique and to perform slowly. He performed supine / seated B LE AROM. He did not report any increased lumbar pain at end of today's session.     Wound Observation: L lateral high ankle wound,  Surrounding wound tissue dry and skin integrity remains noted., wound cleansed with wound spray, padded dry with steril 4x4 gauze, covered with sterile self adherent 2x4 telfa bandage. Reviewed importance to replace bandage on Left LE.    RICK Is progressing well towards his goals.   Pt prognosis is Good.     Pt will continue to benefit from skilled outpatient physical therapy to address the deficits listed in the problem list box on initial evaluation, provide pt/family education and to maximize pt's level of independence in the home and community environment.     Pt's spiritual, cultural and educational needs considered and pt agreeable to plan of care and goals.     Anticipated barriers to physical therapy: Current rollator use. Co-morbidities       Goals:   Short Term Goals: 3 weeks  1. Patient will be compliant with home exercise program to supplement therapy in decreasing pain with functional mobility.(ongoing, not met)  2. Patient will improve impaired lower extremity manual muscle tests to 3+/5 bilaterally to improve strength for standing tasks.(ongoing, not met)  3. Patient will  "ambulate with rollator at all times to promote safety at home and in the community. (ongoing, not met)  4. Patient will ambulate in close proximity to rollator without shrug to decrease reports of shoulder pain. (ongoing, not met)     Long Term Goals: 6 weeks  1. Patient will improve FOTO score to </= 41% limited to decrease perceived limitation with maintaining/changing body position(ongoing, not met)  2. Patient will improve impaired lower extremity manual muscle tests to 4/5 grade bilaterally to improve strength for standing tasks.(ongoing, not met)  3. Patient will perform 6' walk at 0.75 m/s to promote safe community ambulation.(ongoing, not met)  4. Patient will perform 30" sit<>stand test with >/= 8 reps with minimal upper extremity support to demonstrate increased functional strength. (ongoing, not met)  5. Patient will report 4/10 pain at worst in low back and legs to promote increased physical activity. (ongoing, not met)       Plan     Advance PT as per POC, Monitor L high ankle wound.  Progress to standing as able.     Brennan Avitia PTA         "

## 2023-07-11 ENCOUNTER — LAB VISIT (OUTPATIENT)
Dept: LAB | Facility: HOSPITAL | Age: 58
End: 2023-07-11
Attending: INTERNAL MEDICINE
Payer: MEDICARE

## 2023-07-11 DIAGNOSIS — Z12.5 PROSTATE CANCER SCREENING: ICD-10-CM

## 2023-07-11 DIAGNOSIS — E88.810 METABOLIC SYNDROME: ICD-10-CM

## 2023-07-11 DIAGNOSIS — I10 ESSENTIAL HYPERTENSION: ICD-10-CM

## 2023-07-11 LAB
ALBUMIN SERPL BCP-MCNC: 3.8 G/DL (ref 3.5–5.2)
ALP SERPL-CCNC: 96 U/L (ref 55–135)
ALT SERPL W/O P-5'-P-CCNC: 26 U/L (ref 10–44)
ANION GAP SERPL CALC-SCNC: 7 MMOL/L (ref 8–16)
AST SERPL-CCNC: 30 U/L (ref 10–40)
BASOPHILS # BLD AUTO: 0.02 K/UL (ref 0–0.2)
BASOPHILS NFR BLD: 0.5 % (ref 0–1.9)
BILIRUB SERPL-MCNC: 0.4 MG/DL (ref 0.1–1)
BUN SERPL-MCNC: 10 MG/DL (ref 6–20)
CALCIUM SERPL-MCNC: 9.3 MG/DL (ref 8.7–10.5)
CHLORIDE SERPL-SCNC: 101 MMOL/L (ref 95–110)
CHOLEST SERPL-MCNC: 161 MG/DL (ref 120–199)
CHOLEST/HDLC SERPL: 5 {RATIO} (ref 2–5)
CO2 SERPL-SCNC: 28 MMOL/L (ref 23–29)
COMPLEXED PSA SERPL-MCNC: 0.14 NG/ML (ref 0–4)
CREAT SERPL-MCNC: 1.1 MG/DL (ref 0.5–1.4)
DIFFERENTIAL METHOD: ABNORMAL
EOSINOPHIL # BLD AUTO: 0.2 K/UL (ref 0–0.5)
EOSINOPHIL NFR BLD: 4.5 % (ref 0–8)
ERYTHROCYTE [DISTWIDTH] IN BLOOD BY AUTOMATED COUNT: 17.4 % (ref 11.5–14.5)
EST. GFR  (NO RACE VARIABLE): >60 ML/MIN/1.73 M^2
ESTIMATED AVG GLUCOSE: 143 MG/DL (ref 68–131)
GLUCOSE SERPL-MCNC: 140 MG/DL (ref 70–110)
HBA1C MFR BLD: 6.6 % (ref 4–5.6)
HCT VFR BLD AUTO: 49.4 % (ref 40–54)
HDLC SERPL-MCNC: 32 MG/DL (ref 40–75)
HDLC SERPL: 19.9 % (ref 20–50)
HGB BLD-MCNC: 15.3 G/DL (ref 14–18)
IMM GRANULOCYTES # BLD AUTO: 0.01 K/UL (ref 0–0.04)
IMM GRANULOCYTES NFR BLD AUTO: 0.2 % (ref 0–0.5)
LDLC SERPL CALC-MCNC: 76.6 MG/DL (ref 63–159)
LYMPHOCYTES # BLD AUTO: 1 K/UL (ref 1–4.8)
LYMPHOCYTES NFR BLD: 25.7 % (ref 18–48)
MCH RBC QN AUTO: 23.3 PG (ref 27–31)
MCHC RBC AUTO-ENTMCNC: 31 G/DL (ref 32–36)
MCV RBC AUTO: 75 FL (ref 82–98)
MONOCYTES # BLD AUTO: 0.6 K/UL (ref 0.3–1)
MONOCYTES NFR BLD: 13.9 % (ref 4–15)
NEUTROPHILS # BLD AUTO: 2.2 K/UL (ref 1.8–7.7)
NEUTROPHILS NFR BLD: 55.2 % (ref 38–73)
NONHDLC SERPL-MCNC: 129 MG/DL
NRBC BLD-RTO: 0 /100 WBC
PLATELET # BLD AUTO: 204 K/UL (ref 150–450)
PMV BLD AUTO: 9.9 FL (ref 9.2–12.9)
POTASSIUM SERPL-SCNC: 4 MMOL/L (ref 3.5–5.1)
PROT SERPL-MCNC: 7.2 G/DL (ref 6–8.4)
RBC # BLD AUTO: 6.58 M/UL (ref 4.6–6.2)
SODIUM SERPL-SCNC: 136 MMOL/L (ref 136–145)
TRIGL SERPL-MCNC: 262 MG/DL (ref 30–150)
WBC # BLD AUTO: 4.04 K/UL (ref 3.9–12.7)

## 2023-07-11 PROCEDURE — 36415 COLL VENOUS BLD VENIPUNCTURE: CPT | Performed by: INTERNAL MEDICINE

## 2023-07-11 PROCEDURE — 84153 ASSAY OF PSA TOTAL: CPT | Performed by: INTERNAL MEDICINE

## 2023-07-11 PROCEDURE — 85025 COMPLETE CBC W/AUTO DIFF WBC: CPT | Performed by: INTERNAL MEDICINE

## 2023-07-11 PROCEDURE — 80053 COMPREHEN METABOLIC PANEL: CPT | Performed by: INTERNAL MEDICINE

## 2023-07-11 PROCEDURE — 83036 HEMOGLOBIN GLYCOSYLATED A1C: CPT | Performed by: INTERNAL MEDICINE

## 2023-07-11 PROCEDURE — 80061 LIPID PANEL: CPT | Performed by: INTERNAL MEDICINE

## 2023-07-13 ENCOUNTER — CLINICAL SUPPORT (OUTPATIENT)
Dept: REHABILITATION | Facility: HOSPITAL | Age: 58
End: 2023-07-13
Payer: MEDICARE

## 2023-07-13 DIAGNOSIS — G89.29 CHRONIC MIDLINE LOW BACK PAIN WITH BILATERAL SCIATICA: Primary | ICD-10-CM

## 2023-07-13 DIAGNOSIS — M54.42 CHRONIC MIDLINE LOW BACK PAIN WITH BILATERAL SCIATICA: Primary | ICD-10-CM

## 2023-07-13 DIAGNOSIS — R53.1 DECREASED STRENGTH: ICD-10-CM

## 2023-07-13 DIAGNOSIS — R26.89 IMPAIRED GAIT AND MOBILITY: ICD-10-CM

## 2023-07-13 DIAGNOSIS — M54.41 CHRONIC MIDLINE LOW BACK PAIN WITH BILATERAL SCIATICA: Primary | ICD-10-CM

## 2023-07-13 PROCEDURE — 97110 THERAPEUTIC EXERCISES: CPT | Mod: PN

## 2023-07-13 PROCEDURE — 97112 NEUROMUSCULAR REEDUCATION: CPT | Mod: PN

## 2023-07-13 PROCEDURE — 97140 MANUAL THERAPY 1/> REGIONS: CPT | Mod: PN

## 2023-07-13 NOTE — PROGRESS NOTES
[facility]   Physical Therapy Treatment Note      Name: Tj Trammell  [facility] Number: [id number]    Therapy Diagnosis:   Encounter Diagnoses   Name Primary?    Chronic midline low back pain with bilateral sciatica Yes    Impaired gait and mobility     Decreased strength      Physician: Huy Denson FNP    Visit Date: 7/13/2023    Physician Orders: PT Eval and Treat   Medical Diagnosis from Referral: Impaired functional mobility, balance, gait, and endurance [Z74.09], Lumbar radiculopathy [M54.16], Chronic bilateral low back pain with bilateral sciatica [M54.42, M54.41, G89.29]  Evaluation Date: 6/27/2023  Authorization Period Expiration: 8/22/2023  Plan of Care Expiration: 8/11/2023  Progress Note Due: 7/27/2023  Visit # / Visits authorized: 3/23 + 1  FOTO: 4/5 NEXT    PTA Visit #: 0/5     Time In: 0904  Time Out: 1001  Total Billable Time: 57 minutes     Precautions:  Standard; multiple sclerosis, hypertension, asthma; hearing impaired (needs )    Subjective     Pt reports: pain in his back and legs (also shoulders and general body pain) is better with medication adherence. Patient reports increased swelling in bilateral foot over the last two months. Patient was informed this is related to his fall frequency. Patient reports he feels stronger since beginning therapy but still falls once a day. Patient hasn't been contacted about a new rollator. Patient keeps his shoe laces loose to air his feet out; was instructed to do so by a doctor. Patient reports the scab on his left lateral lower leg came off in the night. Patient reports he lightly scrubs it with soap and water and usually it bleeds.     He was compliant with home exercise program.  Response to previous treatment: increased lower extremity strength  Functional change: continued falls (1x/day)    Pain: None  Location: Low back and bilateral lower extremity     Objective      7/13/2023:    Gait analysis: Heel's  "lifting out back of shoes left>right during push off in terminal stance. Increased forward trunk lean, feet ~1 foot behind rollator, and bilateral shoulder shrug. Self recovered trip upon entry into the clinic.     Observation: 4x3 cm granular wound, mild odor and serous drainage    Treatment     RICK received the treatments listed below:      manual therapy techniques: were applied to the: left lower extremity for 14 minutes, including:  Cleansing and covering of wound on left lateral lower leg    therapeutic exercises to develop strength, endurance, and ROM for 23 minutes including education:    Hip extension: 2 lbs, 2x10 bilateral. Stance on airex to promote full knee extension on swing leg + verbal cues for extension and upright standing  Hip abduction: 2 lbs 3x10 bilateral. Verbal cues to stand upright and maintain knee extension  March: 2 lbs 2x10 bilateral   Terminal knee extension: Pink sports cord, x15 bilateral   Bilateral upper extremity support and supervision throughout    therapeutic activities to improve functional performance for 20 minutes, including    Sit<>stands: 2x15 with bilateral upper extremity support. Verbal cues to prevent knee hyperextension into mat, tactile cues to promote full standing  Forward step ups: 4" step, x6-7 bilateral with bilateral upper extremity support. Tactile cues on right knee to prevent hyperextension on ascent and descent  Other: Frequent visual and verbal cues to stand close to rolling walker and avoid shrugging for safety at home and in the community. Tightening of bilateral shoe laces to promote shoe stability.  Supervision throughout    NEXT: NMR with lateral stepping (hands hovering over support), forwards <> backwards walking (light upper extremity support), alternate step taps (light upper extremity support)    Patient Education and Home Exercises       Education provided:   - Gait mechanics. Therapist will check on rollator order from referring provider  - " Keep shoe laces tight. Fall frequency takes precedent.  - Avoid scrubbing incision. Let soap go over wound and lightly pat to dry. Provided Telfa bandages to cover while in bed and wearing pants.    Written Home Exercises Provided: Patient instructed to cont prior HEP. Exercises were reviewed and TJ was able to demonstrate them prior to the end of the session.  TJ demonstrated fair  understanding of the education provided. See EMR under Patient Instructions for exercises provided during therapy sessions    Assessment     Tj is a 58 y.o. male that presents to outpatient Physical Therapy with decreased strength and balance and gait deficits. Back and leg pain well controlled; stenosis pattern of pain suspected. Concerned about self care of wound and falls; loose shoe wear and poor carryover of gait education contribute. Rollator needs replacing. Tendency to hyperextend knees during functional activities; only one episode of buckling noted towards end of standing exercises.     TJ Is progressing well towards his goals.   Pt prognosis is Good.   Pt will continue to benefit from skilled outpatient physical therapy to address the deficits listed in the problem list box on initial evaluation, provide pt/family education and to maximize pt's level of independence in the home and community environment.     Pt's spiritual, cultural and educational needs considered and pt agreeable to plan of care and goals.  Anticipated barriers to physical therapy: Current rollator use. Compliance with education. Co-morbidities and self care.     Short Term Goals: 3 weeks  1. Patient will be compliant with home exercise program to supplement therapy in decreasing pain with functional mobility.(ongoing, not met)  2. Patient will improve impaired lower extremity manual muscle tests to 3+/5 bilaterally to improve strength for standing tasks.(ongoing, not met)  3. Patient will ambulate with rollator at all times to promote safety at home  "and in the community. (ongoing, not met)  4. Patient will ambulate in close proximity to rollator without shrug to decrease reports of shoulder pain. (ongoing, not met)     Long Term Goals: 6 weeks  1. Patient will improve FOTO score to </= 41% limited to decrease perceived limitation with maintaining/changing body position(ongoing, not met)  2. Patient will improve impaired lower extremity manual muscle tests to 4/5 grade bilaterally to improve strength for standing tasks.(ongoing, not met)  3. Patient will perform 6' walk at 0.75 m/s to promote safe community ambulation.(ongoing, not met)  4. Patient will perform 30" sit<>stand test with >/= 8 reps with minimal upper extremity support to demonstrate increased functional strength. (ongoing, not met)  5. Patient will report 4/10 pain at worst in low back and legs to promote increased physical activity. (ongoing, not met)     Plan     Monitor left lateral lower leg wound.  Increase strength, function, and balance in standing.   Messaged referring MD about wound and rollator.    Taylor Garcia, PT, DPT, OCS          "

## 2023-07-16 ENCOUNTER — HOSPITAL ENCOUNTER (EMERGENCY)
Facility: HOSPITAL | Age: 58
Discharge: HOME | End: 2023-07-16
Attending: EMERGENCY MEDICINE | Admitting: PHYSICIAN ASSISTANT
Payer: MEDICARE

## 2023-07-16 VITALS
RESPIRATION RATE: 18 BRPM | TEMPERATURE: 98 F | WEIGHT: 215 LBS | SYSTOLIC BLOOD PRESSURE: 150 MMHG | BODY MASS INDEX: 31.75 KG/M2 | DIASTOLIC BLOOD PRESSURE: 70 MMHG | HEART RATE: 80 BPM | OXYGEN SATURATION: 99 %

## 2023-07-16 DIAGNOSIS — S62.639A CLOSED AVULSION FRACTURE OF DISTAL PHALANX OF FINGER, INITIAL ENCOUNTER: Primary | ICD-10-CM

## 2023-07-16 PROCEDURE — 99283 EMERGENCY DEPT VISIT LOW MDM: CPT

## 2023-07-16 PROCEDURE — 29131 APPL FINGER SPLINT DYNAMIC: CPT | Mod: F8

## 2023-07-16 NOTE — DISCHARGE INSTRUCTIONS

## 2023-07-16 NOTE — ED PROVIDER NOTES
"Encounter Date: 7/16/2023       History     Chief Complaint   Patient presents with    Fall     Pt states he fell, 2 days ago ( pt uses a motorized scoter) from a standing position when opening door to let dogs out, pt complains of right ring finger pain and swelling noted, +2 radial pulse, pt alos has bruise noted to left upper medial arm      58-year-old male presents to ED with concern of right 4th finger pain and limited ROM along with bruising to left upper extremity after fall that occurred 2 days ago.  Patient reports he tripped over rug, causing him to fall forward and contused bilateral upper extremities.  No head injury.  No LOC. he reports his left upper extremity pain is "minimal" but continues have significant pain to his right 4th finger described as sharp with severity 6/10.  No numbness or focal weakness.  No other acute complaints at this time.    The history is provided by the patient. The history is limited by a language barrier. A  was used (DPSI:  [id number]).   Review of patient's allergies indicates:  No Known Allergies  Past Medical History:   Diagnosis Date    Asthma     Bilateral hearing loss 12/12/2012    Biliary acute pancreatitis     GERD (gastroesophageal reflux disease)     High cholesterol     Hypertension     Multiple sclerosis      Past Surgical History:   Procedure Laterality Date    APPENDECTOMY      CHOLECYSTECTOMY      COLONOSCOPY N/A 1/22/2019    Procedure: COLONOSCOPY 2 day  golytely;  Surgeon: Nathan Waddell MD;  Location: [facility];  Service: Endoscopy;  Laterality: N/A;    COLONOSCOPY N/A 4/25/2023    Procedure: COLONOSCOPY;  Surgeon: Jared Loredo MD;  Location: [facility];  Service: Endoscopy;  Laterality: N/A;    ESOPHAGOGASTRODUODENOSCOPY N/A 9/29/2021    Procedure: EGD (ESOPHAGOGASTRODUODENOSCOPY) covid test Rapid;  Surgeon: Jared Loredo MD;  Location: [facility];  Service: Endoscopy;  Laterality: N/A;    JOINT REPLACEMENT      " arm    TONSILLECTOMY      TRANSFORAMINAL EPIDURAL INJECTION OF STEROID Bilateral 10/5/2022    Procedure: Injection,steroid,epidural,transforaminal approach;  Surgeon: Soo Giles MD;  Location: [facility];  Service: Pain Management;  Laterality: Bilateral;  bilateral L5 transforaminal epidural steroid injection with RN IV sedation     Family History   Problem Relation Age of Onset    Heart disease Mother     Heart disease Father     Heart disease Brother      Social History     Tobacco Use    Smoking status: Former     Packs/day: 1.00     Years: 32.00     Pack years: 32.00     Types: Cigarettes     Quit date: 2022     Years since quittin.5    Smokeless tobacco: Never   Substance Use Topics    Alcohol use: Not Currently    Drug use: No     Review of Systems   Musculoskeletal:  Positive for arthralgias.   Skin:  Positive for color change.   Neurological:  Negative for weakness and numbness.     Physical Exam     Initial Vitals [23 1013]   BP Pulse Resp Temp SpO2   (!) 159/76 76 18 98.4 °F (36.9 °C) 97 %      MAP       --         Physical Exam    Nursing note and vitals reviewed.  Constitutional: He appears well-developed and well-nourished. He is active. He does not have a sickly appearance. He does not appear ill. No distress.   HENT:   Head: Normocephalic and atraumatic.   Neck:   Normal range of motion.  Cardiovascular:  Normal rate and regular rhythm.           Pulmonary/Chest: Effort normal and breath sounds normal.   Musculoskeletal:         General: Tenderness present.      Cervical back: Normal range of motion.      Comments: Right 4th finger noted have swelling, bruising and tenderness near DIP joint.  ROM does appear to be intact but very limited due to reported pain symptoms.  Distal sensation intact.  Brisk capillary refill.  Superficial abrasions and light bruising noted to left upper extremity.  Denying any specific area of tenderness.  Full ROM.  Radial pulse intact.  Sensations  intact.     Neurological: He is alert. GCS eye subscore is 4. GCS verbal subscore is 5. GCS motor subscore is 6.   Skin: Skin is warm and dry.   Psychiatric: He has a normal mood and affect. His speech is normal and behavior is normal.       ED Course   Splint Application    Date/Time: 7/16/2023 12:26 PM  Performed by: Benjamin Barcenas PA-C  Authorized by: Jenna Leblanc MD   Location details: right ring finger  Splint type: dynamic finger  Supplies used: aluminum splint  Post-procedure: The splinted body part was neurovascularly unchanged following the procedure.  Patient tolerance: Patient tolerated the procedure well with no immediate complications      Labs Reviewed - No data to display       Imaging Results              X-Ray Finger 2 or More Views Right (Final result)  Result time 07/16/23 11:47:32      Final result by Mo Meredith MD (07/16/23 11:47:32)                   Impression:      As above.      Electronically signed by: Mo Meredith MD  Date:    07/16/2023  Time:    11:47               Narrative:    EXAMINATION:  XR FINGER 2 OR MORE VIEWS RIGHT    CLINICAL HISTORY:  right 4th finger pain;    TECHNIQUE:  Right 4th finger, three views    COMPARISON:  None.    FINDINGS:  No fracture or dislocation.  Punctate calcification at the ulnar aspect of the DIP joint, possible degenerative or avulsed fragment.  There is flexion of the long finger DIP joint, perhaps sequela of extensor tendon avulsion.  Soft tissue swelling noted about the DIP joint.  Mild degenerative changes noted.                                       Medications - No data to display  Medical Decision Making:   Initial Assessment:   Patient presents to ED with concern of right 4th finger pain after contusing during fall 2 days ago.  No head injury or LOC.  No numbness or focal weakness.  Afebrile.  Reproducible tenderness to right 4th finger near DIP joint.  Neurovascularly intact.  Differential Diagnosis:   Strain, sprain, contusion,  dislocation, fracture  ED Management:  X-ray right 4th finger per my interpretation is showing questionable evidence of avulsion fracture on ulnar aspect of right 4th DIP joint.  Will plan to continue with conservative care.  Finger splint applied in ED.  Ambulatory referral sent to Ortho.  Encouraged to continue protecting finger at all times, Tylenol as needed, close PCP/ortho follow-up.  ED return precautions were discussed.  Patient states his understanding and agrees with plan.                        Clinical Impression:   Final diagnoses:  [W75.162I] Closed avulsion fracture of distal phalanx of finger, initial encounter (Primary)        ED Disposition Condition    Discharge Stable          ED Prescriptions    None       Follow-up Information       Follow up With Specialties Details Why Contact Info    Bayron Ferguson MD Internal Medicine   [address]  [address]  [facility] [address] [phone]               Benjamin Barcenas PA-C  07/16/23 [id number]

## 2023-07-17 ENCOUNTER — CLINICAL SUPPORT (OUTPATIENT)
Dept: REHABILITATION | Facility: HOSPITAL | Age: 58
End: 2023-07-17
Payer: MEDICARE

## 2023-07-17 DIAGNOSIS — R26.89 IMPAIRED GAIT AND MOBILITY: ICD-10-CM

## 2023-07-17 DIAGNOSIS — M54.41 CHRONIC MIDLINE LOW BACK PAIN WITH BILATERAL SCIATICA: Primary | ICD-10-CM

## 2023-07-17 DIAGNOSIS — M54.42 CHRONIC MIDLINE LOW BACK PAIN WITH BILATERAL SCIATICA: Primary | ICD-10-CM

## 2023-07-17 DIAGNOSIS — R53.1 DECREASED STRENGTH: ICD-10-CM

## 2023-07-17 DIAGNOSIS — G89.29 CHRONIC MIDLINE LOW BACK PAIN WITH BILATERAL SCIATICA: Primary | ICD-10-CM

## 2023-07-17 PROCEDURE — 97530 THERAPEUTIC ACTIVITIES: CPT | Mod: PN

## 2023-07-17 PROCEDURE — 97110 THERAPEUTIC EXERCISES: CPT | Mod: PN

## 2023-07-17 PROCEDURE — 97112 NEUROMUSCULAR REEDUCATION: CPT | Mod: PN

## 2023-07-17 NOTE — PROGRESS NOTES
[facility]   Physical Therapy Treatment Note      Name: Tj Trammell  [facility] Number: [id number]    Therapy Diagnosis:   Encounter Diagnoses   Name Primary?    Chronic midline low back pain with bilateral sciatica Yes    Impaired gait and mobility     Decreased strength      Physician: Huy Denson, FNP    Visit Date: 7/17/2023    Physician Orders: PT Eval and Treat   Medical Diagnosis from Referral: Impaired functional mobility, balance, gait, and endurance [Z74.09], Lumbar radiculopathy [M54.16], Chronic bilateral low back pain with bilateral sciatica [M54.42, M54.41, G89.29]  Evaluation Date: 6/27/2023  Authorization Period Expiration: 8/22/2023  Plan of Care Expiration: 8/11/2023  Progress Note Due: 7/27/2023  Visit # / Visits authorized: 4/23 + 1  FOTO: 5/5 NEXT    PTA Visit #: 0/5     Time In: 1005  Time Out: 1105  Total Billable Time: 60 minutes     Precautions:  Standard; multiple sclerosis, hypertension, asthma; hearing impaired (needs )    Subjective     Pt reports: he fell two days ago and broke his right ring finger. Patient went to the ED to address; received a splint and co-band but unsure how to apply it. Patient denies itching the wound on his left lateral lower leg however continues to scrub it in the shower.  He was compliant with home exercise program.  Response to previous treatment: fatigue  Functional change: fall resulting in finger fracture     Pain: None  Location: Low back and bilateral lower extremity     Objective      7/17/2023:  Gait analysis: Increased forward trunk lean, feet ~1 foot behind rollator, and bilateral shoulder shrug  Gross movement:   Observation: 4x3 cm granular wound, mild odor and moderate slough drainage    Treatment     TJ received the treatments listed below:      manual therapy techniques: were applied to the: left lower extremity for 8 minutes, including:  Removing Telfa bandage from last visit; cleansing with  "wound spray and patting dry with gauze pad  Applying splint and co-band to right 4th finger. Patient given incomplete parts from ED     therapeutic exercises to develop strength, endurance, and ROM for 27 minutes including:    Hip extension: 2 lbs, 2x10 bilateral. Stance on airex to promote full knee extension on swing leg; tactile cues to prevent hyperextension on stance leg an verbal to prevent flexion on swing leg   Hip abduction: 2 lbs 3x10 bilateral. Verbal cues to stand upright and maintain knee extension on swing leg   March: 2 lbs 2x10 bilateral. Verbal cues to stand upright and increase height of hip flexion   Terminal knee extension: Pink sports cord, x15 bilateral   Bilateral upper extremity support and supervision throughout    therapeutic activities to improve functional performance for 15 minutes, including    Sit<>stands: 2x15 with bilateral upper extremity support. Verbal cues to prevent knee hyperextension into mat, tactile cues to promote full standing  Forward step ups: 4" step, 2x10 bilateral with bilateral upper extremity support. Tactile cues on stance leg to prevent hyperextension on ascent and descent    neuromuscular re-education activities to improve: Balance, Coordination, Kinesthetic, Proprioception, and Posture for 9 minutes. The following activities were included:     Lateral stepping: 10 feet x 5 bilateral. Verbal and visual cues to prevent forward trunk lean and knee hyperextension  Other: Cues to stand close to walker and avoid shrug multiple times during session to improve stability and decrease finger pain    Patient Education and Home Exercises       Education provided:   - Need increased carryover from patient with gait training. Even through patient needs a new device, making these adjustments and sticking to them will decrease risk of falls.  - Stop scrubbing wound. Let soap go over it and lightly pat to dry. Provided Telfa bandages to cover while in bed and wearing pants, but " try to let it air out during the day.  - Finger splint use    Written Home Exercises Provided: Patient instructed to cont prior HEP. Exercises were reviewed and TJ was able to demonstrate them prior to the end of the session.  TJ demonstrated fair  understanding of the education provided. See EMR under Patient Instructions for exercises provided during therapy sessions    Assessment     Tj is a 58 y.o. male that presents to outpatient Physical Therapy with decreased strength and balance and gait deficits. Falls persist; lack of carryover in gait training contributes. Less knee hyperextension thrust and improved full stand during sit<>stands however tendency to hyperextend stance leg during hip strengthening, lateral stepping, and step ups. Patient would benefit from wound care referral secondary to time it is taking wound to scab over.     TJ Is progressing well towards his goals.   Pt prognosis is Good.   Pt will continue to benefit from skilled outpatient physical therapy to address the deficits listed in the problem list box on initial evaluation, provide pt/family education and to maximize pt's level of independence in the home and community environment.     Pt's spiritual, cultural and educational needs considered and pt agreeable to plan of care and goals.  Anticipated barriers to physical therapy: Current rollator use. Compliance with education. Co-morbidities and self care.     Short Term Goals: 3 weeks  1. Patient will be compliant with home exercise program to supplement therapy in decreasing pain with functional mobility.(ongoing, not met)  2. Patient will improve impaired lower extremity manual muscle tests to 3+/5 bilaterally to improve strength for standing tasks.(ongoing, not met)  3. Patient will ambulate with rollator at all times to promote safety at home and in the community. (ongoing, not met)  4. Patient will ambulate in close proximity to rollator without shrug to decrease reports of  "shoulder pain. (ongoing, not met)     Long Term Goals: 6 weeks  1. Patient will improve FOTO score to </= 41% limited to decrease perceived limitation with maintaining/changing body position(ongoing, not met)  2. Patient will improve impaired lower extremity manual muscle tests to 4/5 grade bilaterally to improve strength for standing tasks.(ongoing, not met)  3. Patient will perform 6' walk at 0.75 m/s to promote safe community ambulation.(ongoing, not met)  4. Patient will perform 30" sit<>stand test with >/= 8 reps with minimal upper extremity support to demonstrate increased functional strength. (ongoing, not met)  5. Patient will report 4/10 pain at worst in low back and legs to promote increased physical activity. (ongoing, not met)     Plan     Monitor left lateral lower leg wound.  Increase strength, function, and balance in standing.   Put in additional referral for rollator. Messaged referring MD and PCP about wound and rollator.    Taylor Garcia, PT, DPT, OCS            "

## 2023-07-19 DIAGNOSIS — Z74.09 IMPAIRED FUNCTIONAL MOBILITY, BALANCE, GAIT, AND ENDURANCE: Primary | ICD-10-CM

## 2023-07-19 DIAGNOSIS — G35 MULTIPLE SCLEROSIS: ICD-10-CM

## 2023-07-19 DIAGNOSIS — M54.16 LUMBAR RADICULOPATHY: ICD-10-CM

## 2023-07-20 ENCOUNTER — SPECIALTY PHARMACY (OUTPATIENT)
Dept: PHARMACY | Facility: CLINIC | Age: 58
End: 2023-07-20
Payer: MEDICARE

## 2023-07-20 ENCOUNTER — CLINICAL SUPPORT (OUTPATIENT)
Dept: REHABILITATION | Facility: HOSPITAL | Age: 58
End: 2023-07-20
Payer: MEDICARE

## 2023-07-20 DIAGNOSIS — M54.41 CHRONIC MIDLINE LOW BACK PAIN WITH BILATERAL SCIATICA: Primary | ICD-10-CM

## 2023-07-20 DIAGNOSIS — R26.89 IMPAIRED GAIT AND MOBILITY: ICD-10-CM

## 2023-07-20 DIAGNOSIS — G89.29 CHRONIC MIDLINE LOW BACK PAIN WITH BILATERAL SCIATICA: Primary | ICD-10-CM

## 2023-07-20 DIAGNOSIS — R53.1 DECREASED STRENGTH: ICD-10-CM

## 2023-07-20 DIAGNOSIS — M54.42 CHRONIC MIDLINE LOW BACK PAIN WITH BILATERAL SCIATICA: Primary | ICD-10-CM

## 2023-07-20 PROCEDURE — 97112 NEUROMUSCULAR REEDUCATION: CPT | Mod: PN,CQ

## 2023-07-20 PROCEDURE — 97140 MANUAL THERAPY 1/> REGIONS: CPT | Mod: PN,CQ

## 2023-07-20 PROCEDURE — 97530 THERAPEUTIC ACTIVITIES: CPT | Mod: PN,CQ

## 2023-07-20 PROCEDURE — 97110 THERAPEUTIC EXERCISES: CPT | Mod: PN,CQ

## 2023-07-20 NOTE — TELEPHONE ENCOUNTER
Specialty Pharmacy - Refill Coordination    Specialty Medication Orders Linked to Encounter      Flowsheet Row Most Recent Value   Medication #1 fingolimod (GILENYA) 0.5 mg Cap (Order#[id number], Rx#[id number])            Refill Questions - Documented Responses      Flowsheet Row Most Recent Value   Patient Availability and HIPAA Verification    Does patient want to proceed with activity? Yes   HIPAA/medical authority confirmed? Yes   Relationship to patient of person spoken to? Self   Refill Screening Questions    Changes to allergies? No   Changes to medications? No   New conditions since last clinic visit? No   Unplanned office visit, urgent care, ED, or hospital admission in the last 4 weeks? No   How does patient/caregiver feel medication is working? Excellent   Financial problems or insurance changes? No   How many doses of your specialty medications were missed in the last 4 weeks? 0   Would patient like to speak to a pharmacist? No   When does the patient need to receive the medication? 07/27/23   Refill Delivery Questions    How will the patient receive the medication? [facility]   When does the patient need to receive the medication? 07/27/23   Shipping Address Home   Address in [facility] confirmed and updated if neccessary? Yes   Expected Copay ($) 0   Is the patient able to afford the medication copay? Yes   Payment Method zero copay   Days supply of Refill 30   Supplies needed? No supplies needed   Refill activity completed? Yes   Refill activity plan Refill scheduled   Shipment/Pickup Date: 07/25/23            Current Outpatient Medications   Medication Sig    acetaZOLAMIDE (DIAMOX) 250 MG tablet Take 2 TABLETS BY MOUTH ONLY 8 HOURS AFTER SURGERY    albuterol (PROAIR HFA) 90 mcg/actuation inhaler Inhale 2 puffs by mouth every 4 hours.    ALPRAZolam (XANAX) 0.5 MG tablet TAKE ONE TABLET BY MOUTH TWICE DAILY AS NEEDED FOR ANXIETY    atorvastatin (LIPITOR) 40 MG tablet Take 1 tablet (40 mg total) by  mouth once daily.    bacitracin 500 unit/gram Oint Apply topically 2 (two) times daily.    baclofen (LIORESAL) 10 MG tablet TAKE 1 TABLET BY MOUTH 3 TIMES DAILY.    buPROPion (WELLBUTRIN SR) 150 MG TBSR 12 hr tablet TAKE 1 TABLET BY MOUTH EVERY 12 HOURS DAILY.    dicyclomine (BENTYL) 10 MG capsule TAKE 1 CAPSULE BY MOUTH THREE TIMES A DAY FOR ABDOMINAL PAIN    difluprednate (DUREZOL) 0.05 % Drop ophthalmic solution Instill one drop TO SURGERY EYE four times a day AFTER SURGERY X 30 DAYS    docusate sodium (COLACE) 100 MG capsule Take 1 capsule (100 mg total) by mouth once daily.    ergocalciferol (ERGOCALCIFEROL) 50,000 unit Cap TAKE 1 CAPSULE BY MOUTH Weekly    famotidine (PEPCID) 20 MG tablet Take 1 tablet (20 mg total) by mouth 2 (two) times daily.    fingolimod (GILENYA) 0.5 mg Cap TAKE ONE CAPSULE (0.5 MG) BY MOUTH ONCE DAILY. MAY TAKE WITH OR WITHOUT FOOD. STORE AT ROOM TEMPERATURE.    gabapentin (NEURONTIN) 300 MG capsule TAKE 1 CAPSULE BY MOUTH AT BEDTIME    lactulose (CHRONULAC) 10 gram/15 mL solution 45 mLs (30 g total) 3 (three) times daily.    linaCLOtide (LINZESS) 290 mcg Cap capsule Take 1 capsule (290 mcg total) by mouth before breakfast.    lisinopriL (PRINIVIL,ZESTRIL) 5 MG tablet TAKE 1 TABLET BY MOUTH DAILY    mupirocin (BACTROBAN) 2 % ointment Apply topically 3 (three) times daily.    nabumetone (RELAFEN) 500 MG tablet Take 1 tablet (500 mg total) by mouth 2 (two) times daily as needed.    nebulizer and compressor (HOME NEBULIZER PLUS SIDESTREAM) Lupe use as directed    ofloxacin (OCUFLOX) 0.3 % ophthalmic solution Instill 1 drop TO SURGERY EYE four times a day STARTING 2 DAYS BEFORE SURGERY    oxybutynin (DITROPAN-XL) 5 MG TR24 Take 1 tablet (5 mg total) by mouth once daily.    pantoprazole (PROTONIX) 40 MG tablet TAKE ONE TABLET BY MOUTH  ONCE DAILY    sodium,potassium,mag sulfates (SUPREP BOWEL PREP KIT) 17.5-3.13-1.6 gram SolR Take 177 mLs by mouth once daily.    traMADoL (ULTRAM) 50 mg  tablet Take 1 tablet (50 mg total) by mouth every 6 (six) hours as needed for pain    triamcinolone acetonide 0.1% (KENALOG) 0.1 % cream Apply topically 2 (two) times daily. for 10 days   Last reviewed on 7/11/2023 11:17 AM by Bayron Ferguson MD    Review of patient's allergies indicates:  No Known Allergies Last reviewed on  7/11/2023 11:17 AM by Bayron Ferguson      Tasks added this encounter   No tasks added.   Tasks due within next 3 months   7/20/2023 - Refill Coordination Outreach (1 time occurrence)     Jorden Mills, PharmD  [facility]  [address] [facility] [address]  Phone: [phone]  Fax: [phone]

## 2023-07-20 NOTE — PROGRESS NOTES
"[facility]   Physical Therapy Treatment Note      Name: Tj Trammell  Clinic Number: [id number]    Therapy Diagnosis:   Encounter Diagnoses   Name Primary?    Chronic midline low back pain with bilateral sciatica Yes    Impaired gait and mobility     Decreased strength      Physician: Huy Denson, FNP    Visit Date: 7/20/2023    Physician Orders: PT Eval and Treat   Medical Diagnosis from Referral: Impaired functional mobility, balance, gait, and endurance [Z74.09], Lumbar radiculopathy [M54.16], Chronic bilateral low back pain with bilateral sciatica [M54.42, M54.41, G89.29]  Evaluation Date: 6/27/2023  Authorization Period Expiration: 8/22/2023  Plan of Care Expiration: 8/11/2023  Progress Note Due: 7/27/2023  Visit # / Visits authorized: 5/23 + 1  FOTO: 6/10 NEXT    PTA Visit #: 1/5     Time In: 9:00 AM   Time Out: 10:00 AM  Total Billable Time: 60 minutes (1MT, 1NMR, 1TE,1TA)    Precautions:  Standard; multiple sclerosis, hypertension, asthma; hearing impaired (needs )    Subjective     Pt reports: "My leg has been hurting, I scratch the scab sometimes". Pt agreeable to PT session. Pt refers to his Left high ankle wound  He was compliant with home exercise program.  Response to previous treatment: general fatigue  Functional change: fall resulting in finger fracture    Pain: None  Location: Low back and bilateral lower extremity     Objective      7/17/2023:  Gait analysis: Increased forward trunk lean, feet ~1 foot behind rollator, and bilateral shoulder shrug  Gross movement:   Observation: 4x3 cm granular wound, mild odor and moderate slough drainage    Treatment     TJ received the treatments listed below:      manual therapy techniques: were applied to the: left lower extremity for 8 minutes, including:  Removing Telfa bandage from last visit; cleansing with wound spray and patting dry with sterile 4x4 gauze pad. Secured with self adhesive telfa 2x4 " "(2)  Applying splint and co-band to right 4th finger. Patient given incomplete parts from ED (not today)    therapeutic exercises to develop strength, endurance, and ROM for 27 minutes including:    Hip extension: 2 lbs, 2x10 bilateral. Stance on airex to promote full knee extension on swing leg; tactile cues to prevent hyperextension on stance leg an verbal to prevent flexion on swing leg   Hip abduction: 2 lbs 3x10 bilateral. Verbal cues to stand upright and maintain knee extension on swing leg   March: 2 lbs 2x10 bilateral. Verbal cues to stand upright and increase height of hip flexion   Terminal knee extension: Pink sports cord, x15 bilateral standing with visual instruction on technique.  Bilateral upper extremity support and supervision throughout    therapeutic activities to improve functional performance for 15 minutes, including    Sit<>stands: 2x15 with bilateral upper extremity support. Verbal cues to prevent knee hyperextension into mat, tactile cues to promote full standing  Forward step ups: 4" step, 2x10 bilateral with bilateral upper extremity support. Tactile cues on stance leg to prevent hyperextension on ascent and descent    neuromuscular re-education activities to improve: Balance, Coordination, Kinesthetic, Proprioception, and Posture for 10 minutes. The following activities were included:     Lateral stepping: 10 feet x 5 bilateral. Verbal and visual cues to prevent forward trunk lean and knee hyperextension  Other: Cues to stand close to walker and avoid shrug multiple times during session to improve stability and decrease finger pain    Patient Education and Home Exercises       Education provided:   -   - Finger splint use    Written Home Exercises Provided: Patient instructed to cont prior HEP. Exercises were reviewed and RICK was able to demonstrate them prior to the end of the session.  RCIK demonstrated fair  understanding of the education provided. See EMR under Patient Instructions " for exercises provided during therapy sessions    Assessment     Tj is a 58 y.o. male that presents to outpatient Physical Therapy with decreased strength and balance and gait deficits. Falls persist; lack of carryover in gait training contributes. Less knee hyperextension thrust and improved full stand during sit<>stands however tendency to hyperextend stance leg during hip strengthening, lateral stepping, and step ups. Patient would benefit from wound care referral secondary to time it is taking wound to scab over.     TJ Is progressing well towards his goals.   Pt prognosis is Good.   Pt will continue to benefit from skilled outpatient physical therapy to address the deficits listed in the problem list box on initial evaluation, provide pt/family education and to maximize pt's level of independence in the home and community environment.     Pt's spiritual, cultural and educational needs considered and pt agreeable to plan of care and goals.  Anticipated barriers to physical therapy: Current rollator use. Compliance with education. Co-morbidities and self care.     Short Term Goals: 3 weeks  1. Patient will be compliant with home exercise program to supplement therapy in decreasing pain with functional mobility.(ongoing, not met)  2. Patient will improve impaired lower extremity manual muscle tests to 3+/5 bilaterally to improve strength for standing tasks.(ongoing, not met)  3. Patient will ambulate with rollator at all times to promote safety at home and in the community. (ongoing, not met)  4. Patient will ambulate in close proximity to rollator without shrug to decrease reports of shoulder pain. (ongoing, not met)     Long Term Goals: 6 weeks  1. Patient will improve FOTO score to </= 41% limited to decrease perceived limitation with maintaining/changing body position(ongoing, not met)  2. Patient will improve impaired lower extremity manual muscle tests to 4/5 grade bilaterally to improve strength for  "standing tasks.(ongoing, not met)  3. Patient will perform 6' walk at 0.75 m/s to promote safe community ambulation.(ongoing, not met)  4. Patient will perform 30" sit<>stand test with >/= 8 reps with minimal upper extremity support to demonstrate increased functional strength. (ongoing, not met)  5. Patient will report 4/10 pain at worst in low back and legs to promote increased physical activity. (ongoing, not met)     Plan     Monitor left lateral lower leg wound.  Increase strength, function, and balance in standing.   Put in additional referral for rollator. Messaged referring MD and PCP about wound and rollator.    Brennan Avitia PTA              "

## 2023-07-25 ENCOUNTER — CLINICAL SUPPORT (OUTPATIENT)
Dept: REHABILITATION | Facility: HOSPITAL | Age: 58
End: 2023-07-25
Payer: MEDICARE

## 2023-07-25 DIAGNOSIS — R26.89 IMPAIRED GAIT AND MOBILITY: ICD-10-CM

## 2023-07-25 DIAGNOSIS — R53.1 DECREASED STRENGTH: ICD-10-CM

## 2023-07-25 DIAGNOSIS — M54.42 CHRONIC MIDLINE LOW BACK PAIN WITH BILATERAL SCIATICA: Primary | ICD-10-CM

## 2023-07-25 DIAGNOSIS — M54.41 CHRONIC MIDLINE LOW BACK PAIN WITH BILATERAL SCIATICA: Primary | ICD-10-CM

## 2023-07-25 DIAGNOSIS — G89.29 CHRONIC MIDLINE LOW BACK PAIN WITH BILATERAL SCIATICA: Primary | ICD-10-CM

## 2023-07-25 PROCEDURE — 97110 THERAPEUTIC EXERCISES: CPT | Mod: PN,CQ

## 2023-07-25 PROCEDURE — 97112 NEUROMUSCULAR REEDUCATION: CPT | Mod: PN,CQ

## 2023-07-25 PROCEDURE — 97530 THERAPEUTIC ACTIVITIES: CPT | Mod: PN,CQ

## 2023-07-25 PROCEDURE — 97140 MANUAL THERAPY 1/> REGIONS: CPT | Mod: PN,CQ

## 2023-07-25 NOTE — PROGRESS NOTES
"[facility]   Physical Therapy Treatment Note      Name: Tj Trammell  Clinic Number: [id number]    Therapy Diagnosis:   Encounter Diagnoses   Name Primary?    Chronic midline low back pain with bilateral sciatica Yes    Impaired gait and mobility     Decreased strength      Physician: Huy Denson, FNP    Visit Date: 7/25/2023    Physician Orders: PT Eval and Treat   Medical Diagnosis from Referral: Impaired functional mobility, balance, gait, and endurance [Z74.09], Lumbar radiculopathy [M54.16], Chronic bilateral low back pain with bilateral sciatica [M54.42, M54.41, G89.29]  Evaluation Date: 6/27/2023  Authorization Period Expiration: 8/22/2023  Plan of Care Expiration: 8/11/2023  Progress Note Due: 7/27/2023  Visit # / Visits authorized: 6/23 + 1  FOTO: 7/10     PTA Visit #: 2/5     Time In: 10:00 AM   Time Out: 11:00 AM  Total Billable Time: 60 minutes (1MT, 1NMR, 1TE,1TA)    Precautions:  Standard; multiple sclerosis, hypertension, asthma; hearing impaired (needs )    Subjective     Pt reports: "I haven't fallen but I know my legs need to be stronger". Pt agreeable to PT session. Pt refers to his Left high ankle wound  He was compliant with home exercise program.  Response to previous treatment: general fatigue  Functional change: fall resulting in finger fracture    Pain: None  Location: Low back and bilateral lower extremity     Objective      7/17/2023:  Gait analysis: Increased forward trunk lean, feet ~1 foot behind rollator, and bilateral shoulder shrug  Gross movement:   Observation: 4x3 cm granular wound, mild odor and moderate slough drainage    Treatment     TJ received the treatments listed below:      manual therapy techniques: were applied to the: left lower extremity for 10 minutes, including:  Removing Telfa bandage from last visit; cleansing with wound spray and patting dry with sterile 4x4 gauze pad, trial xeroform over wound then Secured with " "self adhesive telfa 2x4     therapeutic exercises to develop strength, endurance, and ROM for 25 minutes including:    Hip extension: 2 lbs, 2x10 bilateral. Stance on airex to promote full knee extension on swing leg; tactile cues to prevent hyperextension on stance leg an verbal to prevent flexion on swing leg   Hip abduction: 2 lbs 3x10 bilateral. Verbal cues to stand upright and maintain knee extension on swing leg   March: 2 lbs 2x10 bilateral. Verbal cues to stand upright and increase height of hip flexion   Terminal knee extension: Pink sports cord, x15 bilateral standing with visual instruction on technique.  Bilateral upper extremity support and supervision throughout    therapeutic activities to improve functional performance for 15 minutes, including    Sit<>stands: 2x15 with bilateral upper extremity support. Verbal cues to prevent knee hyperextension into mat, tactile cues to promote full standing  Forward step ups: 4" step, 2x10 bilateral with bilateral upper extremity support. Tactile cues on stance leg to prevent hyperextension on ascent and descent    neuromuscular re-education activities to improve: Balance, Coordination, Kinesthetic, Proprioception, and Posture for 10 minutes. The following activities were included:     Lateral stepping: 10 feet x 5 bilateral. Verbal and visual cues to prevent forward trunk lean and knee hyperextension  Other: Cues to stand close to walker and avoid shrug multiple times during session to improve stability and decrease finger pain    Patient Education and Home Exercises       Education provided:   - contact wound care for consultation  - Finger splint use    Written Home Exercises Provided: Patient instructed to cont prior HEP. Exercises were reviewed and RICK was able to demonstrate them prior to the end of the session.  RICK demonstrated fair  understanding of the education provided. See EMR under Patient Instructions for exercises provided during therapy " "sessions    Assessment     Tj is a 58 y.o. male that presents to outpatient Physical Therapy : completed today's session with no adverse response. Wound observation: L high ankle granulate tissue visible and sloughing present, noted odorous today, rounded wound edges. Pt advised to consult with wound care, which he states his brother would call for him.     TJ Is progressing well towards his goals.   Pt prognosis is Good.   Pt will continue to benefit from skilled outpatient physical therapy to address the deficits listed in the problem list box on initial evaluation, provide pt/family education and to maximize pt's level of independence in the home and community environment.     Pt's spiritual, cultural and educational needs considered and pt agreeable to plan of care and goals.  Anticipated barriers to physical therapy: Current rollator use. Compliance with education. Co-morbidities and self care.     Short Term Goals: 3 weeks  1. Patient will be compliant with home exercise program to supplement therapy in decreasing pain with functional mobility.(ongoing, not met)  2. Patient will improve impaired lower extremity manual muscle tests to 3+/5 bilaterally to improve strength for standing tasks.(ongoing, not met)  3. Patient will ambulate with rollator at all times to promote safety at home and in the community. (ongoing, not met)  4. Patient will ambulate in close proximity to rollator without shrug to decrease reports of shoulder pain. (ongoing, not met)     Long Term Goals: 6 weeks  1. Patient will improve FOTO score to </= 41% limited to decrease perceived limitation with maintaining/changing body position(ongoing, not met)  2. Patient will improve impaired lower extremity manual muscle tests to 4/5 grade bilaterally to improve strength for standing tasks.(ongoing, not met)  3. Patient will perform 6' walk at 0.75 m/s to promote safe community ambulation.(ongoing, not met)  4. Patient will perform 30" " sit<>stand test with >/= 8 reps with minimal upper extremity support to demonstrate increased functional strength. (ongoing, not met)  5. Patient will report 4/10 pain at worst in low back and legs to promote increased physical activity. (ongoing, not met)     Plan     Monitor would, cont to advance PT towards established PT goals.  Brennan Avitia, PTA

## 2023-07-27 ENCOUNTER — CLINICAL SUPPORT (OUTPATIENT)
Dept: REHABILITATION | Facility: HOSPITAL | Age: 58
End: 2023-07-27
Payer: MEDICARE

## 2023-07-27 DIAGNOSIS — R26.89 IMPAIRED GAIT AND MOBILITY: ICD-10-CM

## 2023-07-27 DIAGNOSIS — M54.41 CHRONIC MIDLINE LOW BACK PAIN WITH BILATERAL SCIATICA: Primary | ICD-10-CM

## 2023-07-27 DIAGNOSIS — G89.29 CHRONIC MIDLINE LOW BACK PAIN WITH BILATERAL SCIATICA: Primary | ICD-10-CM

## 2023-07-27 DIAGNOSIS — R53.1 DECREASED STRENGTH: ICD-10-CM

## 2023-07-27 DIAGNOSIS — M54.42 CHRONIC MIDLINE LOW BACK PAIN WITH BILATERAL SCIATICA: Primary | ICD-10-CM

## 2023-07-27 PROCEDURE — 97140 MANUAL THERAPY 1/> REGIONS: CPT | Mod: PN,CQ

## 2023-07-27 PROCEDURE — 97110 THERAPEUTIC EXERCISES: CPT | Mod: PN,CQ

## 2023-07-27 PROCEDURE — 97112 NEUROMUSCULAR REEDUCATION: CPT | Mod: PN,CQ

## 2023-07-27 NOTE — PROGRESS NOTES
"[facility]   Physical Therapy Treatment Note      Name: Tj Trammell  Clinic Number: [id number]    Therapy Diagnosis:   Encounter Diagnoses   Name Primary?    Chronic midline low back pain with bilateral sciatica Yes    Impaired gait and mobility     Decreased strength      Physician: Huy Denson, FNP    Visit Date: 7/27/2023    Physician Orders: PT Eval and Treat   Medical Diagnosis from Referral: Impaired functional mobility, balance, gait, and endurance [Z74.09], Lumbar radiculopathy [M54.16], Chronic bilateral low back pain with bilateral sciatica [M54.42, M54.41, G89.29]  Evaluation Date: 6/27/2023  Authorization Period Expiration: 8/22/2023  Plan of Care Expiration: 8/11/2023  Progress Note Due: 7/27/2023  Visit # / Visits authorized: 7/23 + 1  FOTO: 7/10     PTA Visit #: 3/5     Time In: 10:15 AM   Time Out: 11:00 AM  Total Billable Time: 45 minutes (1MT, 1NMR, 1TE,1TA)    Precautions:  Standard; multiple sclerosis, hypertension, asthma; hearing impaired (needs )    Subjective     Pt reports: "I fell yesterday, got dizzy bending over when I opened a box ".  Reports R elbow pain as a result. He is agreeable to PT session.   He was compliant with home exercise program.  Response to previous treatment: general fatigue in B LE  Functional change:  none reported     Pain: None  Location: Low back and bilateral lower extremity     Objective      7/17/2023:  Gait analysis: Increased forward trunk lean, feet ~1 foot behind rollator, and bilateral shoulder shrug  Gross movement:   Observation: 4x3 cm granular wound, mild odor and moderate slough drainage    Treatment     TJ received the treatments listed below:      manual therapy techniques: were applied to the: left lower extremity for 10 minutes, including:  Removing Telfa bandage from last visit; cleansing with wound spray and patting dry with sterile 4x4 gauze pad, trial xeroform over wound then Secured with " "self adhesive telfa 2x4 (4)    therapeutic exercises to develop strength, endurance, and ROM for 25 minutes including:    Hip extension: 2 lbs, 2x10 bilateral. Stance on airex to promote full knee extension on swing leg; tactile cues to prevent hyperextension on stance leg an verbal to prevent flexion on swing leg   Hip abduction: 2 lbs 3x10 bilateral. Verbal cues to stand upright and maintain knee extension on swing leg   March: 2 lbs 2x10 bilateral. Verbal cues to stand upright and increase height of hip flexion   Terminal knee extension: Pink sports cord, x15 bilateral standing with visual instruction on technique. OOT  Bilateral upper extremity support and supervision throughout    therapeutic activities to improve functional performance for 00 minutes, including    Sit<>stands: 2x15 with bilateral upper extremity support. Verbal cues to prevent knee hyperextension into mat, tactile cues to promote full standing  Forward step ups: 4" step, 2x10 bilateral with bilateral upper extremity support. Tactile cues on stance leg to prevent hyperextension on ascent and descent    neuromuscular re-education activities to improve: Balance, Coordination, Kinesthetic, Proprioception, and Posture for 10 minutes. The following activities were included:     Lateral stepping: 10 feet x 5 bilateral. Verbal and visual cues to prevent forward trunk lean and knee hyperextension      Patient Education and Home Exercises       Education provided:   -encouraged home safety with Rollator use, also encouraged to follow up with attaining a new rollator.    Written Home Exercises Provided: Patient instructed to cont prior HEP. Exercises were reviewed and TJ was able to demonstrate them prior to the end of the session.  TJ demonstrated fair  understanding of the education provided. See EMR under Patient Instructions for exercises provided during therapy sessions    Assessment     Tj is a 58 y.o. male that presents to outpatient " Physical Therapy : completed today's session with no adverse response.   Wound observation: 3 cm / 3 cm diameter left lateral distal fibular aspect (high ankle), granulate tissue visible with sloughing, odorous noted, min serous drainage noted, advised wound care to address non healing wound.    Spoke with pt's brother, Max, who transports him to medical appts, to address the need to consult with wound care and to check status on DME to attain a new rollator. He reports he has an appointment tomorrow to with MD to follow up with in R finger splinting, but he will request a consult set up for Mr Spencer's (patient) Left high ankle wound.   Max also reported he has been in contact with DME to attain a new rollator for next week.   Pt towards his goals.   Pt prognosis is Good.   Pt will continue to benefit from skilled outpatient physical therapy to address the deficits listed in the problem list box on initial evaluation, provide pt/family education and to maximize pt's level of independence in the home and community environment.     Pt's spiritual, cultural and educational needs considered and pt agreeable to plan of care and goals.  Anticipated barriers to physical therapy: Current rollator use. Compliance with education. Co-morbidities and self care.     Short Term Goals: 3 weeks  1. Patient will be compliant with home exercise program to supplement therapy in decreasing pain with functional mobility.(ongoing, not met)  2. Patient will improve impaired lower extremity manual muscle tests to 3+/5 bilaterally to improve strength for standing tasks.(ongoing, not met)  3. Patient will ambulate with rollator at all times to promote safety at home and in the community. (ongoing, not met)  4. Patient will ambulate in close proximity to rollator without shrug to decrease reports of shoulder pain. (ongoing, not met)     Long Term Goals: 6 weeks  1. Patient will improve FOTO score to </= 41% limited to decrease  "perceived limitation with maintaining/changing body position(ongoing, not met)  2. Patient will improve impaired lower extremity manual muscle tests to 4/5 grade bilaterally to improve strength for standing tasks.(ongoing, not met)  3. Patient will perform 6' walk at 0.75 m/s to promote safe community ambulation.(ongoing, not met)  4. Patient will perform 30" sit<>stand test with >/= 8 reps with minimal upper extremity support to demonstrate increased functional strength. (ongoing, not met)  5. Patient will report 4/10 pain at worst in low back and legs to promote increased physical activity. (ongoing, not met)     Plan     Cont to advance PT as per POC, follow with wound care conversation     Brennan Avitia PTA                  "

## 2023-07-28 ENCOUNTER — HOSPITAL ENCOUNTER (OUTPATIENT)
Dept: WOUND CARE | Facility: HOSPITAL | Age: 58
Discharge: HOME | End: 2023-07-28
Attending: PREVENTIVE MEDICINE
Payer: MEDICARE

## 2023-07-28 VITALS
HEART RATE: 80 BPM | SYSTOLIC BLOOD PRESSURE: 137 MMHG | BODY MASS INDEX: 31.84 KG/M2 | DIASTOLIC BLOOD PRESSURE: 72 MMHG | WEIGHT: 215 LBS | HEIGHT: 69 IN | TEMPERATURE: 97 F

## 2023-07-28 DIAGNOSIS — L97.921 NON-PRESSURE ULCER OF LOWER EXTREMITY, LIMITED TO BREAKDOWN OF SKIN, LEFT: Primary | ICD-10-CM

## 2023-07-28 DIAGNOSIS — Z74.09 IMPAIRED FUNCTIONAL MOBILITY, BALANCE, GAIT, AND ENDURANCE: ICD-10-CM

## 2023-07-28 DIAGNOSIS — G35 MULTIPLE SCLEROSIS: ICD-10-CM

## 2023-07-28 DIAGNOSIS — M54.16 LUMBAR RADICULOPATHY: ICD-10-CM

## 2023-07-28 DIAGNOSIS — T14.8XXA ABRASION: ICD-10-CM

## 2023-07-28 PROCEDURE — 99203 OFFICE O/P NEW LOW 30 MIN: CPT

## 2023-07-28 RX ORDER — MUPIROCIN 20 MG/G
OINTMENT TOPICAL DAILY
Qty: 22 G | Refills: 2 | Status: SHIPPED | OUTPATIENT
Start: 2023-07-28 | End: 2023-08-18 | Stop reason: SDUPTHER

## 2023-07-28 RX ORDER — MUPIROCIN 20 MG/G
OINTMENT TOPICAL DAILY
Qty: 22 G | Refills: 2 | Status: SHIPPED | OUTPATIENT
Start: 2023-07-28 | End: 2023-07-28 | Stop reason: SDUPTHER

## 2023-07-28 NOTE — PROGRESS NOTES
[facility]    Subjective:       Patient ID: Tj Trammell is a 58 y.o. male.    Chief Complaint: Non-healing Wound (Left ankle)    57 y/o male with wound to left lateral ankle x 1 year. Unknown origin per patient. Hx of MS, HTN. Very difficult to understand patient's speech. No overt s/s of infection. Mupirocin and bordered foam applied to site. Rx sent to patient's pharmacy for mupirocin. Educated patient to daily care. Verbalizes understanding.    Review of Systems   All other systems reviewed and are negative.        Objective:     Vitals:    07/28/23 0814   BP: 137/72   Pulse: 80   Temp: 97 °F (36.1 °C)         Physical Exam       Altered Skin Integrity 07/28/23 0800 Left lateral Malleolus (Active)   07/28/23 0800   Altered Skin Integrity Present on Admission - Did Patient arrive to the hospital with altered skin?: yes   Side: Left   Orientation: lateral   Location: Malleolus   Wound Number:    Is this injury device related?: No   Primary Wound Type:    Description of Altered Skin Integrity:    Ankle-Brachial Index:    Pulses:    Removal Indication and Assessment:    Wound Outcome:    (Retired) Wound Length (cm):    (Retired) Wound Width (cm):    (Retired) Depth (cm):    Wound Description (Comments):    Removal Indications:    Wound Image   07/28/23 0823   Dressing Appearance Open to air 07/28/23 0823   Drainage Amount Small 07/28/23 0823   Drainage Characteristics/Odor Serosanguineous 07/28/23 0823   Appearance Red;Yellow;Moist 07/28/23 0823   Tissue loss description Partial thickness 07/28/23 0823   Red (%), Wound Tissue Color 90 % 07/28/23 0823   Yellow (%), Wound Tissue Color 10 % 07/28/23 0823   Periwound Area Dry 07/28/23 0823   Wound Edges Irregular 07/28/23 0823   Wound Length (cm) 3 cm 07/28/23 0823   Wound Width (cm) 3 cm 07/28/23 0823   Wound Depth (cm) 0.1 cm 07/28/23 0823   Wound Volume (cm^3) 0.9 cm^3 07/28/23 0823   Wound Surface Area (cm^2) 9  cm^2 07/28/23 0823   Care Cleansed with:;Sterile normal saline 07/28/23 0823   Dressing Applied;Island/border;Other (comment);Tubular bandage 07/28/23 0823   Periwound Care Moisturizer applied 07/28/23 0823   Compression Tubular elasticized bandage 07/28/23 0823   Dressing Change Due 07/29/23 07/28/23 0823         Assessment/Plan:         ICD-10-CM ICD-9-CM   1. Non-pressure ulcer of lower extremity, limited to breakdown of skin, left  L97.921 707.10   2. Abrasion  T14.8XXA 919.0   3. Impaired functional mobility, balance, gait, and endurance  Z74.09 V49.89   4. Lumbar radiculopathy  M54.16 724.4   5. Multiple sclerosis  G35 340           Tissue pathology and/or culture taken:  [] Yes [x] No   Sharp debridement performed:   [] Yes [x] No   Labs ordered this visit:   [] Yes [x] No   Imaging ordered this visit:   [] Yes [x] No           Orders Placed This Encounter   Procedures    Ambulatory referral/consult to Wound Clinic     Standing Status:   Standing     Number of Occurrences:   1     Referral Priority:   Routine     Referral Type:   Consultation     Referral Reason:   Specialty Services Required     Requested Specialty:   Wound Care     Number of Visits Requested:   1    Change dressing     Cleanse wound with: Soap and water  Lidocaine: prn  Silver nitrate: prn  Periwound care: Moisturize BLE  Primary dressing: Mupirocin  Secondary dressing: 3 x 3 bordered foam or bandaid  Edema control: Tubigrip F LLE toes to knee  Frequency: Daily per patient  Follow-up: Dr. Casey 8/4/23  Other Orders: Rx for mupirocin sent to pharmacy        Follow up in about 1 week (around 8/4/2023).

## 2023-07-31 PROBLEM — L97.921: Status: ACTIVE | Noted: 2023-07-31

## 2023-08-01 ENCOUNTER — CLINICAL SUPPORT (OUTPATIENT)
Dept: REHABILITATION | Facility: HOSPITAL | Age: 58
End: 2023-08-01
Payer: MEDICARE

## 2023-08-01 DIAGNOSIS — M54.41 CHRONIC MIDLINE LOW BACK PAIN WITH BILATERAL SCIATICA: Primary | ICD-10-CM

## 2023-08-01 DIAGNOSIS — R26.89 IMPAIRED GAIT AND MOBILITY: ICD-10-CM

## 2023-08-01 DIAGNOSIS — M54.42 CHRONIC MIDLINE LOW BACK PAIN WITH BILATERAL SCIATICA: Primary | ICD-10-CM

## 2023-08-01 DIAGNOSIS — R53.1 DECREASED STRENGTH: ICD-10-CM

## 2023-08-01 DIAGNOSIS — G89.29 CHRONIC MIDLINE LOW BACK PAIN WITH BILATERAL SCIATICA: Primary | ICD-10-CM

## 2023-08-01 PROCEDURE — 97112 NEUROMUSCULAR REEDUCATION: CPT | Mod: PN,CQ

## 2023-08-01 PROCEDURE — 97110 THERAPEUTIC EXERCISES: CPT | Mod: PN,CQ

## 2023-08-01 PROCEDURE — 97530 THERAPEUTIC ACTIVITIES: CPT | Mod: PN,CQ

## 2023-08-01 NOTE — PROGRESS NOTES
"[facility]   Physical Therapy Treatment Note      Name: Tj Trammell  Clinic Number: [id number]    Therapy Diagnosis:   Encounter Diagnoses   Name Primary?    Chronic midline low back pain with bilateral sciatica Yes    Impaired gait and mobility     Decreased strength      Physician: Huy Denson, FNP    Visit Date: 8/1/2023    Physician Orders: PT Eval and Treat   Medical Diagnosis from Referral: Impaired functional mobility, balance, gait, and endurance [Z74.09], Lumbar radiculopathy [M54.16], Chronic bilateral low back pain with bilateral sciatica [M54.42, M54.41, G89.29]  Evaluation Date: 6/27/2023  Authorization Period Expiration: 8/22/2023  Plan of Care Expiration: 8/11/2023  Progress Note Due: 7/27/2023  Visit # / Visits authorized: 8/23 + 1  FOTO: 8/10     PTA Visit #: 4/5     Time In: 10:05 AM   Time Out: 11:00 AM  Total Billable Time: 55 minutes ( 1NMR, 1TE,1TA)    Precautions:  Standard; multiple sclerosis, hypertension, asthma; hearing impaired (needs )    Subjective      present for session.    Pt reports: "I went to the wound doctor last week.". Pt agreeable to PT session  He was compliant with home exercise program.  Response to previous treatment: general fatigue in B LE  Functional change:  none reported     Pain: None  Location: Low back and bilateral lower extremity     Objective      7/17/2023:  Gait analysis: Increased forward trunk lean, feet ~1 foot behind rollator, and bilateral shoulder shrug  Gross movement:   Observation: 4x3 cm granular wound, mild odor and moderate slough drainage    Treatment     TJ received the treatments listed below:      therapeutic exercises to develop strength, endurance, and ROM for 25 minutes including:  Heavy education provided on his need to self care   Hip extension: 0 lbs, 2x10 bilateral. Stance on airex to promote full knee extension on swing leg; tactile cues to prevent hyperextension on " "stance leg an verbal to prevent flexion on swing leg   Hip abduction: 0 lbs 3x10 bilateral. Verbal cues to stand upright and maintain knee extension on swing leg   March: 0 lbs 2x10 bilateral. Verbal cues to stand upright and increase height of hip flexion   Terminal knee extension: Pink sports cord, x15 bilateral standing with visual instruction on technique. OOT  Bilateral upper extremity support and supervision throughout    therapeutic activities to improve functional performance for 20 minutes, including    Sit<>stands: 2x15 with bilateral upper extremity support. Verbal cues to prevent knee hyperextension into mat, tactile cues to promote full standing  Forward step ups: 4" step, 2x10 bilateral with bilateral upper extremity support. Tactile cues on stance leg to prevent hyperextension on ascent and descent    neuromuscular re-education activities to improve: Balance, Coordination, Kinesthetic, Proprioception, and Posture for 10 minutes. The following activities were included:     Lateral stepping: 10 feet x 5 bilateral. Verbal and visual cues to prevent forward trunk lean and knee hyperextension    Patient Education and Home Exercises       Education provided:   -Encouraged the need to attain a functioning rollator.    -Informed pt PT services will no longer provide wound care.     Written Home Exercises Provided: Patient instructed to cont prior HEP. Exercises were reviewed and TJ was able to demonstrate them prior to the end of the session.  TJ demonstrated fair  understanding of the education provided. See EMR under Patient Instructions for exercises provided during therapy sessions    Assessment     Tj is a 58 y.o. male that presents to outpatient Physical Therapy : completed today's session with no adverse response.     Heavy education provided to pt on the importance to become proactive with his health and care. Pt disclosed cannot always count on his brother for wound care, and he feels he cannot " perform his own wound care because he cannot physically reach down to his Left ankle, however pt demonstrated that he is able when asked to perform in seated position at end of today's session. Pt depends on his brother (Max) for transportation, appointments and home wound care dressing changes.   Pt has been issued written orders to attain a safe and functioning rollator, but has yet to visit [facility], even after encouragement to do so. His current rollator, has no functioning brakes.     Pt towards his goals.   Pt prognosis is Good.   Pt will continue to benefit from skilled outpatient physical therapy to address the deficits listed in the problem list box on initial evaluation, provide pt/family education and to maximize pt's level of independence in the home and community environment.     Pt's spiritual, cultural and educational needs considered and pt agreeable to plan of care and goals.  Anticipated barriers to physical therapy: Current rollator use. Compliance with education. Co-morbidities and self care.     Short Term Goals: 3 weeks  1. Patient will be compliant with home exercise program to supplement therapy in decreasing pain with functional mobility.(ongoing, not met)  2. Patient will improve impaired lower extremity manual muscle tests to 3+/5 bilaterally to improve strength for standing tasks.(ongoing, not met)  3. Patient will ambulate with rollator at all times to promote safety at home and in the community. (ongoing, not met)  4. Patient will ambulate in close proximity to rollator without shrug to decrease reports of shoulder pain. (ongoing, not met)     Long Term Goals: 6 weeks  1. Patient will improve FOTO score to </= 41% limited to decrease perceived limitation with maintaining/changing body position(ongoing, not met)  2. Patient will improve impaired lower extremity manual muscle tests to 4/5 grade bilaterally to improve strength for standing tasks.(ongoing, not met)  3. Patient will  "perform 6' walk at 0.75 m/s to promote safe community ambulation.(ongoing, not met)  4. Patient will perform 30" sit<>stand test with >/= 8 reps with minimal upper extremity support to demonstrate increased functional strength. (ongoing, not met)  5. Patient will report 4/10 pain at worst in low back and legs to promote increased physical activity. (ongoing, not met)     Plan     Cont to advance PT as per POC.    Brennan Avitia PTA                  "

## 2023-08-03 ENCOUNTER — CLINICAL SUPPORT (OUTPATIENT)
Dept: REHABILITATION | Facility: HOSPITAL | Age: 58
End: 2023-08-03
Payer: MEDICARE

## 2023-08-03 DIAGNOSIS — M54.41 CHRONIC MIDLINE LOW BACK PAIN WITH BILATERAL SCIATICA: Primary | ICD-10-CM

## 2023-08-03 DIAGNOSIS — G89.29 CHRONIC MIDLINE LOW BACK PAIN WITH BILATERAL SCIATICA: Primary | ICD-10-CM

## 2023-08-03 DIAGNOSIS — R53.1 DECREASED STRENGTH: ICD-10-CM

## 2023-08-03 DIAGNOSIS — R26.89 IMPAIRED GAIT AND MOBILITY: ICD-10-CM

## 2023-08-03 DIAGNOSIS — M54.42 CHRONIC MIDLINE LOW BACK PAIN WITH BILATERAL SCIATICA: Primary | ICD-10-CM

## 2023-08-03 PROCEDURE — 97530 THERAPEUTIC ACTIVITIES: CPT | Mod: PN,CQ

## 2023-08-03 PROCEDURE — 97110 THERAPEUTIC EXERCISES: CPT | Mod: PN,CQ

## 2023-08-03 NOTE — PROGRESS NOTES
"[facility]   Physical Therapy Treatment Note      Name: Tj Trammell  Clinic Number: [id number]    Therapy Diagnosis:   Encounter Diagnoses   Name Primary?    Chronic midline low back pain with bilateral sciatica Yes    Impaired gait and mobility     Decreased strength      Physician: Huy Denson, FNP    Visit Date: 8/3/2023    Physician Orders: PT Eval and Treat   Medical Diagnosis from Referral: Impaired functional mobility, balance, gait, and endurance [Z74.09], Lumbar radiculopathy [M54.16], Chronic bilateral low back pain with bilateral sciatica [M54.42, M54.41, G89.29]  Evaluation Date: 6/27/2023  Authorization Period Expiration: 8/22/2023  Plan of Care Expiration: 8/11/2023  Progress Note Due: 7/27/2023  Visit # / Visits authorized: 9/23 + 1  FOTO: 9/10     PTA Visit #: 5/5     Time In: 10:15 AM   Time Out: 11:00 AM  Total Billable Time: 45 minutes ( 1NMR, 1TE,1TA)    Precautions:  Standard; multiple sclerosis, hypertension, asthma; hearing impaired (needs )    Subjective      (OSCAR) present for session.    Pt reports: "Im doing ok, have my wound care tomorrow.". Pt agreeable to PT session  He was compliant with home exercise program.  Response to previous treatment: general fatigue in B LE  Functional change:  none reported     Pain: None  Location: Low back and bilateral lower extremity     Objective      7/17/2023:  Gait analysis: Increased forward trunk lean, feet ~1 foot behind rollator, and bilateral shoulder shrug  Gross movement:   Observation: 4x3 cm granular wound, mild odor and moderate slough drainage    Treatment     TJ received the treatments listed below:      therapeutic exercises to develop strength, endurance, and ROM for 20 minutes including:  Heavy education provided on his need to self care   Cybex Leg press 5.5 plates 3x10 B (vc's to prevent L knee hyperextension)  Cybex Knee extension 10 lbs 3x10 B   Hip extension: 2 " "lbs, 2x10 bilateral. Stance on airex to promote full knee extension on swing leg; tactile cues to prevent hyperextension and to prevent use of momentum.  Hip abduction: 0 lbs 3x10 bilateral. Verbal cues to stand upright and maintain knee extension on swing leg   March: 2lbs 2x10 bilateral. Verbal instructions on postural awareness  Terminal knee extension: Pink sports cord, x15 bilateral standing with visual instruction on technique. OOT  Bilateral upper extremity support and supervision throughout    therapeutic activities to improve functional performance for 25 minutes, including    Sit<>stands: 2x15 with bilateral upper extremity support. Verbal cues to prevent knee hyperextension into mat, tactile cues to promote full standing (Not Performed today)  Forward step ups: 4" step, 2x10 bilateral with bilateral upper extremity support. Tactile instructions on stance leg to prevent hyperextension on ascent and descent  Lateral sidestepping up 4" step in //, VC's on postural awareness and safety  Ended session with NUSTEP x 5 min for B UE/LE reciprocal AROM level 4    neuromuscular re-education activities to improve: Balance, Coordination, Kinesthetic, Proprioception, and Posture for 00 minutes. The following activities were included:     Lateral stepping: 10 feet x 5 bilateral. Verbal and visual cues to prevent forward trunk lean and knee hyperextension    Patient Education and Home Exercises       Education provided:   -Encouraged the need to attain a functioning rollator.    -Informed pt PT services will no longer provide wound care.     Written Home Exercises Provided: Patient instructed to cont prior HEP. Exercises were reviewed and TJ was able to demonstrate them prior to the end of the session.  TJ demonstrated fair  understanding of the education provided. See EMR under Patient Instructions for exercises provided during therapy sessions    Assessment     Tj is a 58 y.o. male that presents to outpatient " Physical Therapy : completed today's session with out an adverse reaction. Session focused mainly on standing and B LE strengthening.  Added cybex machinery for LE resistance therex, no adverse response. Visual / Tactile instructions provided on postural awareness in standing (forward head / trunk positioning noted), pt demonstrates tendency to rush through activities and often becomes distracted while completing tasks.    Pt towards his goals.   Pt prognosis is Good.   Pt will continue to benefit from skilled outpatient physical therapy to address the deficits listed in the problem list box on initial evaluation, provide pt/family education and to maximize pt's level of independence in the home and community environment.     Pt's spiritual, cultural and educational needs considered and pt agreeable to plan of care and goals.  Anticipated barriers to physical therapy: Current rollator use. Compliance with education. Co-morbidities and self care.     Short Term Goals: 3 weeks  1. Patient will be compliant with home exercise program to supplement therapy in decreasing pain with functional mobility.(ongoing, not met)  2. Patient will improve impaired lower extremity manual muscle tests to 3+/5 bilaterally to improve strength for standing tasks.(ongoing, not met)  3. Patient will ambulate with rollator at all times to promote safety at home and in the community. (ongoing, not met)  4. Patient will ambulate in close proximity to rollator without shrug to decrease reports of shoulder pain. (ongoing, not met)     Long Term Goals: 6 weeks  1. Patient will improve FOTO score to </= 41% limited to decrease perceived limitation with maintaining/changing body position(ongoing, not met)  2. Patient will improve impaired lower extremity manual muscle tests to 4/5 grade bilaterally to improve strength for standing tasks.(ongoing, not met)  3. Patient will perform 6' walk at 0.75 m/s to promote safe community ambulation.(ongoing,  "not met)  4. Patient will perform 30" sit<>stand test with >/= 8 reps with minimal upper extremity support to demonstrate increased functional strength. (ongoing, not met)  5. Patient will report 4/10 pain at worst in low back and legs to promote increased physical activity. (ongoing, not met)     Plan     Cont to advance PT as per POC.    Brennan Avitia, PTA                  "

## 2023-08-04 ENCOUNTER — HOSPITAL ENCOUNTER (OUTPATIENT)
Dept: WOUND CARE | Facility: HOSPITAL | Age: 58
Discharge: HOME | End: 2023-08-04
Attending: PREVENTIVE MEDICINE
Payer: MEDICARE

## 2023-08-04 VITALS — HEART RATE: 70 BPM | DIASTOLIC BLOOD PRESSURE: 74 MMHG | RESPIRATION RATE: 18 BRPM | SYSTOLIC BLOOD PRESSURE: 134 MMHG

## 2023-08-04 DIAGNOSIS — Z74.09 IMPAIRED FUNCTIONAL MOBILITY, BALANCE, GAIT, AND ENDURANCE: ICD-10-CM

## 2023-08-04 DIAGNOSIS — L97.921 NON-PRESSURE ULCER OF LOWER EXTREMITY, LIMITED TO BREAKDOWN OF SKIN, LEFT: Primary | ICD-10-CM

## 2023-08-04 DIAGNOSIS — T14.8XXA ABRASION: ICD-10-CM

## 2023-08-04 PROCEDURE — 99213 OFFICE O/P EST LOW 20 MIN: CPT

## 2023-08-04 NOTE — PROGRESS NOTES
[facility]    Subjective:       Patient ID: Tj Trammell is a 58 y.o. male.    Chief Complaint: Non-healing Wound Follow Up    Wound care follow up for left leg ulcer. Patient changing his own dressing. Wound decreased in size and with clean red tissue. Continue plan of care, return to clinic in 2 weeks.     Review of Systems   All other systems reviewed and are negative.        Objective:     Vitals:    08/04/23 0833   BP: 134/74   Pulse: 70   Resp: 18         Physical Exam       Altered Skin Integrity 07/28/23 0800 Left lateral Malleolus (Active)   07/28/23 0800   Altered Skin Integrity Present on Admission - Did Patient arrive to the hospital with altered skin?: yes   Side: Left   Orientation: lateral   Location: Malleolus   Wound Number:    Is this injury device related?: No   Primary Wound Type:    Description of Altered Skin Integrity:    Ankle-Brachial Index:    Pulses:    Removal Indication and Assessment:    Wound Outcome:    (Retired) Wound Length (cm):    (Retired) Wound Width (cm):    (Retired) Depth (cm):    Wound Description (Comments):    Removal Indications:    Wound Image   08/04/23 0834   Dressing Appearance Moist drainage 08/04/23 0834   Drainage Amount Small 08/04/23 0834   Drainage Characteristics/Odor Serosanguineous 08/04/23 0834   Appearance Red 08/04/23 0834   Tissue loss description Partial thickness 08/04/23 0834   Red (%), Wound Tissue Color 100 % 08/04/23 0834   Periwound Area Dry;Edematous 08/04/23 0834   Wound Edges Defined 08/04/23 0834   Wound Length (cm) 2 cm 08/04/23 0834   Wound Width (cm) 2 cm 08/04/23 0834   Wound Depth (cm) 0.1 cm 08/04/23 0834   Wound Volume (cm^3) 0.4 cm^3 08/04/23 0834   Wound Surface Area (cm^2) 4 cm^2 08/04/23 0834   Care Cleansed with:;Sterile normal saline 08/04/23 0834   Dressing Applied;Other (comment);Non-adherent;Tubular bandage 08/04/23 0834   Periwound Care Moisturizer applied 08/04/23 0834    Compression Tubular elasticized bandage 08/04/23 0834   Dressing Change Due 08/05/23 08/04/23 0834         Assessment/Plan:         ICD-10-CM ICD-9-CM   1. Non-pressure ulcer of lower extremity, limited to breakdown of skin, left  L97.921 707.10   2. Abrasion  T14.8XXA 919.0   3. Impaired functional mobility, balance, gait, and endurance  Z74.09 V49.89           Tissue pathology and/or culture taken:  [] Yes [x] No   Sharp debridement performed:   [] Yes [x] No   Labs ordered this visit:   [] Yes [x] No   Imaging ordered this visit:   [] Yes [x] No           Orders Placed This Encounter   Procedures    Change dressing     Left leg:   Cleanse wound with: Soap and water   Lidocaine: prn   Silver nitrate: prn   Periwound care: Moisturize BLE   Primary dressing: Mupirocin   Secondary dressing: 3 x 3 bordered foam or bandaid   Edema control: Tubigrip F LLE toes to knee   Frequency: Daily per patient   Follow-up: Dr. Casey in 2 weeks        Follow up in about 2 weeks (around 8/18/2023) for .            This includes face to face time and non-face to face time preparing to see the patient (eg, review of tests), obtaining and/or reviewing separately obtained history, documenting clinical information in the electronic or other health record, independently interpreting results and communicating results to the patient/family/caregiver, or care coordinator.

## 2023-08-08 ENCOUNTER — CLINICAL SUPPORT (OUTPATIENT)
Dept: REHABILITATION | Facility: HOSPITAL | Age: 58
End: 2023-08-08
Payer: MEDICARE

## 2023-08-08 DIAGNOSIS — M54.42 CHRONIC MIDLINE LOW BACK PAIN WITH BILATERAL SCIATICA: Primary | ICD-10-CM

## 2023-08-08 DIAGNOSIS — R53.1 DECREASED STRENGTH: ICD-10-CM

## 2023-08-08 DIAGNOSIS — G89.29 CHRONIC MIDLINE LOW BACK PAIN WITH BILATERAL SCIATICA: Primary | ICD-10-CM

## 2023-08-08 DIAGNOSIS — M54.41 CHRONIC MIDLINE LOW BACK PAIN WITH BILATERAL SCIATICA: Primary | ICD-10-CM

## 2023-08-08 DIAGNOSIS — R26.89 IMPAIRED GAIT AND MOBILITY: ICD-10-CM

## 2023-08-08 PROCEDURE — 97110 THERAPEUTIC EXERCISES: CPT | Mod: PN

## 2023-08-08 PROCEDURE — 97112 NEUROMUSCULAR REEDUCATION: CPT | Mod: PN

## 2023-08-08 NOTE — PROGRESS NOTES
[facility]   Physical Therapy Treatment Note      Name: Rick Trammell  [place] [facility] Number: [id number]    Therapy Diagnosis:   Encounter Diagnoses   Name Primary?    Chronic midline low back pain with bilateral sciatica Yes    Impaired gait and mobility     Decreased strength      Physician: Huy Denson, HEAVENP    Visit Date: 8/8/2023    Physician Orders: PT Eval and Treat   Medical Diagnosis from Referral: Impaired functional mobility, balance, gait, and endurance [Z74.09], Lumbar radiculopathy [M54.16], Chronic bilateral low back pain with bilateral sciatica [M54.42, M54.41, G89.29]  Evaluation Date: 6/27/2023  Authorization Period Expiration: 8/22/2023  Plan of Care Expiration: 8/11/2023 -> 9/8/2023  Visit # / Visits authorized: 10/23 + 1  FOTO: 11/12 NEXT  PTA Visit #: 0/5     Time In: 10:06 AM   Time Out: 11:03 PM  Total Billable Time: 57 minutes     Precautions:  Standard; multiple sclerosis, hypertension, asthma; hearing impaired (needs )    Subjective      ([place]) present for session.    Pt reports: that he called many places for the rollator and they said they didn't have one. Patient reported that he is going to wound care this afternoon, he reports his wound has been bleeding. Patient fell yesterday from a standing position trying to read the paper. When most of his falls occur, he is not using his rollator but the wall for support.     He was not compliant with home exercise program.  Response to previous treatment: fatigue  Functional change:  none reported     Pain: None reported  Location: Low back and bilateral lower extremity     Objective      See updated plan of care in Treatment section    Treatment     RICK received the treatments listed below:      therapeutic exercises to improve strength and endurance for 39 minutes, including:    Objective   Education    neuromuscular re-education activities to improve: Balance, Coordination, Kinesthetic,  "Proprioception, and Posture for 18 minutes. The following activities were included:     Forward step ups: 6" step, 2x10 bilateral with bilateral upper extremity support. Verbal and tactile cues to prevent knee hyperextension  Alternate march: 2x10 bilateral with unilateral upper extremity support. Verbal and tactile cues to prevent knee hyperextension and trunk lean towards upper extremity support    Patient Education and Home Exercises       Education provided:   - Walk with assistive device. Using the wall is not adequate assistance  - Inform wound care of bleeding.  - Will contact [facility] about assistive device availability.    Written Home Exercises Provided: Patient instructed to cont prior HEP. Exercises were reviewed and RICK was able to demonstrate them prior to the end of the session.  RICK demonstrated fair  understanding of the education provided. See EMR under Patient Instructions for exercises provided during therapy sessions    Assessment     See updated plan of care in Treatment section    Plan     See updated plan of care in Treatment section     [facility] contacted by evaluating therapist - they have the order and rollators in stock.   Improve lower extremity strength and balance.   Discharge if no change in exercise and assistive device compliance.    Taylor Garcia, PT    I certify that I was present in the room directing the student in service delivery and guided him/her using my skilled judgment. As the co-signing therapist, I have reviewed the student's documentation and am responsible for the treatment, assessment and plan.    Taylor Garcia, PT, DPT, OCS                "

## 2023-08-10 ENCOUNTER — CLINICAL SUPPORT (OUTPATIENT)
Dept: REHABILITATION | Facility: HOSPITAL | Age: 58
End: 2023-08-10
Payer: MEDICARE

## 2023-08-10 DIAGNOSIS — R53.1 DECREASED STRENGTH: ICD-10-CM

## 2023-08-10 DIAGNOSIS — M54.41 CHRONIC MIDLINE LOW BACK PAIN WITH BILATERAL SCIATICA: Primary | ICD-10-CM

## 2023-08-10 DIAGNOSIS — R26.89 IMPAIRED GAIT AND MOBILITY: ICD-10-CM

## 2023-08-10 DIAGNOSIS — M54.42 CHRONIC MIDLINE LOW BACK PAIN WITH BILATERAL SCIATICA: Primary | ICD-10-CM

## 2023-08-10 DIAGNOSIS — G89.29 CHRONIC MIDLINE LOW BACK PAIN WITH BILATERAL SCIATICA: Primary | ICD-10-CM

## 2023-08-10 PROCEDURE — 97112 NEUROMUSCULAR REEDUCATION: CPT | Mod: PN

## 2023-08-10 PROCEDURE — 97140 MANUAL THERAPY 1/> REGIONS: CPT | Mod: PN

## 2023-08-10 PROCEDURE — 97530 THERAPEUTIC ACTIVITIES: CPT | Mod: PN

## 2023-08-10 NOTE — PROGRESS NOTES
"[facility]   Physical Therapy Treatment Note      Name: Tj Trammell  Clinic Number: [id number]    Therapy Diagnosis:   Encounter Diagnoses   Name Primary?    Chronic midline low back pain with bilateral sciatica Yes    Impaired gait and mobility     Decreased strength      Physician: Huy Denson, HEAVENP    Visit Date: 8/10/2023    Physician Orders: PT Eval and Treat   Medical Diagnosis from Referral: Impaired functional mobility, balance, gait, and endurance [Z74.09], Lumbar radiculopathy [M54.16], Chronic bilateral low back pain with bilateral sciatica [M54.42, M54.41, G89.29]  Evaluation Date: 6/27/2023  Authorization Period Expiration: 8/22/2023  Plan of Care Expiration: 9/8/2023  Visit # / Visits authorized: 11/23 + 1  FOTO: 12/13 NEXT when ASL in-person  present    PTA Visit #: 0/5     Time In: 10:04 AM   Time Out: 10:59 AM  Total Billable Time: 55 minutes     Precautions:  Standard; multiple sclerosis, hypertension, asthma; hearing impaired (needs )    Subjective     Virtual  via [employer] language services used.    Pt reports: his wound continues to bleed. Patient visited doctor yesterday but wasn't informed of anything significant (nothing noted in Epic)    He was compliant with home exercise program.  Response to previous treatment: tired   Functional change: performed home exercise program this morning    Pain: 0/10  Location: Low back and bilateral lower extremity     Objective      8/10/2023:  Walking speed: 0.34 m/s    Treatment     TJ received the treatments listed below:      therapeutic exercises to develop strength, endurance, and ROM for 10 minutes including objective and education:     Walk for endurance: 7'11'', 480 feet with rolling walker. Stand by assistance    therapeutic activities to improve functional performance for 23 minutes, including    Forward step ups: 6" step, 2x10 bilateral with bilateral upper extremity " "support. Verbal cues to prevent forward trunk lean.   Lateral step ups: 4" step in 2x10 bilateral with bilateral upper extremity support. Verbal cues to prevent forward trunk lean.  Mini squats: 2x10 with bilateral upper extremity support    neuromuscular re-education activities to improve: Balance, Coordination, Kinesthetic, Proprioception, and Posture for 21 minutes. The following activities were included:     Lateral stepping: 10 feet x 5 bilateral. Verbal and visual cues to prevent forward trunk lean and knee hyperextension  Alternate march: 2x10 bilateral with unilateral to bilateral upper extremity support. Verbal and tactile cues to prevent knee hyperextension  Terminal knee extension: Pink sports cord, x15 bilateral. Verbal and tactile cues to prevent hip hinge and foot elevation.     Patient Education and Home Exercises       Education provided:   - Informed patient that [facility] has his rollator order and rollators in stock. Informed patient he can go at any time to pick one out and they will adjust it to his height. Provided patient with the address and encouraged him to bring his brother for translation if needed. Patient verbalizes understanding.     Written Home Exercises Provided: Patient instructed to cont prior HEP. Exercises were reviewed and TJ was able to demonstrate them prior to the end of the session.  TJ demonstrated fair  understanding of the education provided. See EMR under Patient Instructions for exercises provided during therapy sessions    Assessment     Tj is a 58 y.o. male that presents to outpatient Physical Therapy with primary complaints of recurrent falls. Improved exercise compliance since last visit. Able to achieve more today versus previous sessions; cues to attend to task and  to facilitate. Able to reduce knee hyperextension although constant cues required. Gait with rolling walker until patient receives new rollator; requires " "stand by assistance due to frequent kicking of rear leg with right lower extremity and collision with obvious obstacles.      Pt towards his goals.   Pt prognosis is Good.   Pt will continue to benefit from skilled outpatient physical therapy to address the deficits listed in the problem list box on initial evaluation, provide pt/family education and to maximize pt's level of independence in the home and community environment.     Pt's spiritual, cultural and educational needs considered and pt agreeable to plan of care and goals.  Anticipated barriers to physical therapy: Current rollator use. Compliance with education. Co-morbidities and self care.     Short Term Goals: 3 weeks  1. Patient will be compliant with home exercise program to supplement therapy in decreasing pain with functional mobility.(ongoing, not met)  2. Patient will improve impaired lower extremity manual muscle tests to 3+/5 bilaterally to improve strength for standing tasks.(ongoing, not met)  3. Patient will ambulate with rollator at all times to promote safety at home and in the community. (ongoing, not met)  4. Patient will ambulate in close proximity to rollator without shrug to decrease reports of shoulder pain. (ongoing, not met)     Long Term Goals: 6 weeks  1. Patient will improve FOTO score to </= 41% limited to decrease perceived limitation with maintaining/changing body position(ongoing, not met)  2. Patient will improve impaired lower extremity manual muscle tests to 4/5 grade bilaterally to improve strength for standing tasks.(ongoing, not met)  3. Patient will perform 6' walk at 0.75 m/s to promote safe community ambulation.(ongoing, not met)  4. Patient will perform 30" sit<>stand test with >/= 8 reps with minimal upper extremity support to demonstrate increased functional strength. (ongoing, not met)  5. Patient will report 4/10 pain at worst in low back and legs to promote increased physical activity. (ongoing, not met)   "   Plan     Complete FOTO survey at next visit.    Increase walking speed while maintaining safety.  NO NUSTEP. Standing balance/functional activities or strength/endurance exercises only!     Shy Zavaleta, SPT

## 2023-08-10 NOTE — PLAN OF CARE
[facility]  Physical Therapy Plan of Care Note     Name: Tj Trammell  [facility] Number: [id number]    Therapy Diagnosis:   Encounter Diagnoses   Name Primary?    Chronic midline low back pain with bilateral sciatica Yes    Impaired gait and mobility     Decreased strength      Physician: Huy Denson FNP    Visit Date: 8/8/2023    Physician Orders: PT Eval and Treat   Medical Diagnosis from Referral: Impaired functional mobility, balance, gait, and endurance [Z74.09], Lumbar radiculopathy [M54.16], Chronic bilateral low back pain with bilateral sciatica [M54.42, M54.41, G89.29]  Evaluation Date: 6/27/2023  Authorization Period Expiration: 8/22/2023  Plan of Care Expiration: 8/11/2023  Progress Note Due: 7/27/2023  Visit # / Visits authorized: 10/23 + 1  FOTO: 11/12 NEXT  PTA Visit #: 0/5     Precautions: Standard; multiple sclerosis, hypertension, asthma; hearing impaired (needs )   Functional Level Prior to Evaluation:  Modified independent with rollator for 5 years    SUBJECTIVE     Update: Similar falls status since evaluation; patient usually not using assistive device when he falls. Patient reports he's not doing his home exercise program. Patient willing to increase compliance to improve safety, lower extremity strength and endurance.     OBJECTIVE     Update:      6 minute walk test: 413 ft SBA-CGA: .35 m/s  30 second sit-to-stand: 5 with bilateral upper extremity support  Gait analysis:   Rollator: increased forward trunk lean, rollator in front of patient (~1-2 feet), bilateral shoulder shrug               Rolling walker: assistive device positioned closer to base of support     Lower extremity manual muscle tests Right Left Pain/dysfunction with movement   Hip flexion 4-/5 3-/5     Hip extension 3-/5 3-/5     Hip abduction 3-/5 3-/5     Knee flexion 4/5 3/5     Knee extension 4/5 3/5     Ankle dorsiflexion 4/5 3/5     Ankle plantarflexion 4/5 3/5         ASSESSMENT     Update: Tj is a 58 y.o. male that presents to outpatient Physical Therapy with primary complaints of recurrent falls. Sessions often limited to assisting patient with affairs outside of physical therapy; cues throughout to remain on task. Lack of assistive device compliance resulting in high and unchanging fall frequency. Lack of exercise compliance resulting in lack of goal attainment.      Pt towards his goals.   Pt prognosis is Fair.  Pt will continue to benefit from skilled outpatient physical therapy to address the deficits listed in the problem list box on initial evaluation, provide pt/family education and to maximize pt's level of independence in the home and community environment.      Pt's spiritual, cultural and educational needs considered and pt agreeable to plan of care and goals.  Anticipated barriers to physical therapy: Current rollator use. Safety. Compliance with education. Co-morbidities and self care.     Short Term Goals: 3 weeks  1. Patient will be compliant with home exercise program to supplement therapy in decreasing pain with functional mobility. Progressing, not met   2. Patient will improve impaired lower extremity manual muscle tests to 3+/5 bilaterally to improve strength for standing tasks. Progressing, not met   3. Patient will ambulate with rollator at all times to promote safety at home and in the community. Progressing, not met   4. Patient will ambulate in close proximity to rollator without shrug to decrease reports of shoulder pain. Progressing, not met      Long Term Goals: 6 weeks  1. Patient will improve FOTO score to </= 41% limited to decrease perceived limitation with maintaining/changing body position. Progressing, not met   2. Patient will improve impaired lower extremity manual muscle tests to 4/5 grade bilaterally to improve strength for standing tasks. Progressing, not met   3. Patient will perform 6' walk at 0.75 m/s to promote safe community  "ambulation. Adjust to 0.6 m/s  4. Patient will perform 30" sit<>stand test with >/= 8 reps with minimal upper extremity support to demonstrate increased functional strength. Progressing, not met   5. Patient will report 4/10 pain at worst in low back and legs to promote increased physical activity. Met 8/8/2023    Short Term Goals Status: Continue goals 1-4  Long Term Goal Status: Continue goals 1-4  Reasons for Recertification of Therapy:  Plan of care expires Friday    PLAN     Updated Certification Period: 8/8/2023 to 9/8/2023  Recommended Treatment Plan: 2 times per week for 4 weeks:  Gait Training, Manual Therapy, Moist Heat/ Ice, Neuromuscular Re-ed, Patient Education, Self Care, Therapeutic Activities, and Therapeutic Exercise  Other Recommendations: Initiate home exercise program     Taylor Garcia, PT, DPT, OCS      "

## 2023-08-18 ENCOUNTER — HOSPITAL ENCOUNTER (OUTPATIENT)
Dept: WOUND CARE | Facility: HOSPITAL | Age: 58
Discharge: HOME | End: 2023-08-18
Attending: PREVENTIVE MEDICINE
Payer: MEDICARE

## 2023-08-18 VITALS
HEART RATE: 77 BPM | DIASTOLIC BLOOD PRESSURE: 74 MMHG | HEIGHT: 69 IN | BODY MASS INDEX: 31.84 KG/M2 | TEMPERATURE: 97 F | SYSTOLIC BLOOD PRESSURE: 143 MMHG | WEIGHT: 215 LBS

## 2023-08-18 DIAGNOSIS — L97.921 NON-PRESSURE ULCER OF LOWER EXTREMITY, LIMITED TO BREAKDOWN OF SKIN, LEFT: Primary | ICD-10-CM

## 2023-08-18 DIAGNOSIS — L92.9 HYPERGRANULATION: ICD-10-CM

## 2023-08-18 DIAGNOSIS — Z74.09 IMPAIRED FUNCTIONAL MOBILITY, BALANCE, GAIT, AND ENDURANCE: ICD-10-CM

## 2023-08-18 PROCEDURE — 99213 OFFICE O/P EST LOW 20 MIN: CPT

## 2023-08-18 RX ORDER — MUPIROCIN 20 MG/G
OINTMENT TOPICAL DAILY
Qty: 22 G | Refills: 2 | Status: SHIPPED | OUTPATIENT
Start: 2023-08-18 | End: 2023-08-18

## 2023-08-18 RX ORDER — MUPIROCIN 20 MG/G
OINTMENT TOPICAL DAILY
Qty: 22 G | Refills: 2 | Status: SHIPPED | OUTPATIENT
Start: 2023-08-18 | End: 2023-10-13 | Stop reason: SDUPTHER

## 2023-08-18 NOTE — PROGRESS NOTES
[facility]    Subjective:       Patient ID: Tj Trammell is a 58 y.o. male.    Chief Complaint: Non-healing Wound Follow Up    Wound care follow up for left leg ulcer. Patient noted with dried abrasions to knees, reports he rolled out of bed and hit wall. Left leg remains with clean red tissue, continue plan of care.     Review of Systems   All other systems reviewed and are negative.        Objective:     Vitals:    08/18/23 0817   BP: (!) 143/74   Pulse: 77   Temp: 96.6 °F (35.9 °C)         Physical Exam       Altered Skin Integrity 07/28/23 0800 Left lateral Malleolus (Active)   07/28/23 0800   Altered Skin Integrity Present on Admission - Did Patient arrive to the hospital with altered skin?: yes   Side: Left   Orientation: lateral   Location: Malleolus   Wound Number:    Is this injury device related?: No   Primary Wound Type:    Description of Altered Skin Integrity:    Ankle-Brachial Index:    Pulses:    Removal Indication and Assessment:    Wound Outcome:    (Retired) Wound Length (cm):    (Retired) Wound Width (cm):    (Retired) Depth (cm):    Wound Description (Comments):    Removal Indications:    Wound Image   08/18/23 0810   Dressing Appearance Moist drainage 08/18/23 0810   Drainage Amount Moderate 08/18/23 0810   Drainage Characteristics/Odor Serosanguineous 08/18/23 0810   Appearance Red 08/18/23 0810   Tissue loss description Partial thickness 08/18/23 0810   Red (%), Wound Tissue Color 100 % 08/18/23 0810   Periwound Area Dry;Edematous 08/18/23 0810   Wound Edges Defined 08/18/23 0810   Wound Length (cm) 2.2 cm 08/18/23 0810   Wound Width (cm) 2.8 cm 08/18/23 0810   Wound Depth (cm) 0.1 cm 08/18/23 0810   Wound Volume (cm^3) 0.616 cm^3 08/18/23 0810   Wound Surface Area (cm^2) 6.16 cm^2 08/18/23 0810   Care Cleansed with:;Sterile normal saline 08/18/23 0810   Dressing Applied;Other (comment);Non-adherent;Tubular bandage 08/18/23 0810    Periwound Care Moisturizer applied 08/18/23 0810   Compression Tubular elasticized bandage 08/18/23 0810   Dressing Change Due 08/19/23 08/18/23 0810         Assessment/Plan:         ICD-10-CM ICD-9-CM   1. Non-pressure ulcer of lower extremity, limited to breakdown of skin, left  L97.921 707.10   2. Hypergranulation  L92.9 701.5   3. Impaired functional mobility, balance, gait, and endurance  Z74.09 V49.89       Silver nitrate applied to hypergranulation tissue.    Tissue pathology and/or culture taken:  [] Yes [x] No   Sharp debridement performed:   [] Yes [x] No   Labs ordered this visit:   [] Yes [x] No   Imaging ordered this visit:   [] Yes [x] No           Orders Placed This Encounter   Procedures    Change dressing     Left lateral leg:   Cleanse wound with: Soap and water   Lidocaine: prn   Silver nitrate: prn   Periwound care: Moisturize BLE   Primary dressing: Mupirocin   Secondary dressing: 3 x 3 bordered foam or bandaid   Edema control: Tubigrip F LLE toes to knee   Frequency: Daily per patient   Follow-up: Dr. Casey in 2 weeks  Other: Rx bactroban        Follow up in about 2 weeks (around 9/1/2023) for .            This includes face to face time and non-face to face time preparing to see the patient (eg, review of tests), obtaining and/or reviewing separately obtained history, documenting clinical information in the electronic or other health record, independently interpreting results and communicating results to the patient/family/caregiver, or care coordinator.

## 2023-08-21 ENCOUNTER — TELEPHONE (OUTPATIENT)
Dept: GASTROENTEROLOGY | Facility: CLINIC | Age: 58
End: 2023-08-21
Payer: MEDICARE

## 2023-08-21 DIAGNOSIS — Z12.11 SCREENING FOR COLON CANCER: Primary | ICD-10-CM

## 2023-08-21 NOTE — TELEPHONE ENCOUNTER
----- Message from Nataly Nina NP sent at 4/25/2023  3:43 PM CDT -----  Regarding: repeat colonoscopy d/t inadequate prep  Repeat colonoscopy in 6 months because the bowel                          preparation was suboptimal.                          - For future colonoscopy the patient will require                          an extended preparation.

## 2023-08-22 ENCOUNTER — DOCUMENTATION ONLY (OUTPATIENT)
Dept: REHABILITATION | Facility: HOSPITAL | Age: 58
End: 2023-08-22
Payer: MEDICARE

## 2023-08-22 NOTE — PROGRESS NOTES
Physical therapist and physical therapy assistant(s) met face to face to discuss patient's treatment plan and progress towards established goals. Pt will be seen by a physical therapist minimally every 6th visit or every 30 days.    Taylor Garcia, PT, DPT, OCS    
no

## 2023-08-23 ENCOUNTER — CLINICAL SUPPORT (OUTPATIENT)
Dept: REHABILITATION | Facility: HOSPITAL | Age: 58
End: 2023-08-23
Payer: MEDICARE

## 2023-08-23 DIAGNOSIS — M54.42 CHRONIC MIDLINE LOW BACK PAIN WITH BILATERAL SCIATICA: Primary | ICD-10-CM

## 2023-08-23 DIAGNOSIS — R26.89 IMPAIRED GAIT AND MOBILITY: ICD-10-CM

## 2023-08-23 DIAGNOSIS — R53.1 DECREASED STRENGTH: ICD-10-CM

## 2023-08-23 DIAGNOSIS — M54.41 CHRONIC MIDLINE LOW BACK PAIN WITH BILATERAL SCIATICA: Primary | ICD-10-CM

## 2023-08-23 DIAGNOSIS — G89.29 CHRONIC MIDLINE LOW BACK PAIN WITH BILATERAL SCIATICA: Primary | ICD-10-CM

## 2023-08-23 PROCEDURE — 97112 NEUROMUSCULAR REEDUCATION: CPT | Mod: PN,CQ

## 2023-08-23 PROCEDURE — 97530 THERAPEUTIC ACTIVITIES: CPT | Mod: PN,CQ

## 2023-08-23 NOTE — PROGRESS NOTES
"[facility]   Physical Therapy Treatment Note      Name: Tj Trammell  Clinic Number: [id number]    Therapy Diagnosis:   Encounter Diagnoses   Name Primary?    Chronic midline low back pain with bilateral sciatica Yes    Impaired gait and mobility     Decreased strength      Physician: Huy Denson, FNP    Visit Date: 8/23/2023    Physician Orders: PT Eval and Treat   Medical Diagnosis from Referral: Impaired functional mobility, balance, gait, and endurance [Z74.09], Lumbar radiculopathy [M54.16], Chronic bilateral low back pain with bilateral sciatica [M54.42, M54.41, G89.29]  Evaluation Date: 6/27/2023  Authorization Period Expiration: 8/22/2023  Plan of Care Expiration: 9/8/2023  Visit # / Visits authorized: 12/23 + 1  FOTO: 13/13 NEXT when ASL in-person  present    PTA Visit #: 1/5     Time In: 11:00 AM   Time Out: 11:45 AM  Total Billable Time: 45 minutes 2TA, 1 NMR    Precautions:  Standard; multiple sclerosis, hypertension, asthma; hearing impaired (needs )    Subjective     Virtual  via [employer] language services used.    Pt reports:" I couldn't get my walker because they told me the order is not in the computer". Also spoke with Max (brother) who reports the could not attain a rollator with written orders in hand.   Pt agreeable to PT session.     He was compliant with home exercise program.  Response to previous treatment: tired   Functional change: performed home exercise program this morning    Pain: 0/10  Location: Low back and bilateral lower extremity     Objective      8/10/2023:  Walking speed: 0.34 m/s    Treatment     TJ received the treatments listed below:      therapeutic exercises to develop strength, endurance, and ROM for 00 minutes including objective and education:     Walk for endurance: 7'11'', 480 feet with rolling walker. Stand by assistance    therapeutic activities to improve functional performance for 25 " "minutes, including    Forward step ups: 6" step, 2x10 bilateral with bilateral upper extremity support. VCs to prevent forward trunk lean.   Lateral step ups: 4" step in 2x10 bilateral with bilateral upper extremity support. VCs to prevent forward trunk lean.  Mini squats: 2x10 with bilateral upper extremity support in // bars    neuromuscular re-education activities to improve: Balance, Coordination, Kinesthetic, Proprioception, and Posture for 20 minutes. The following activities were included:     Lateral stepping: 10 feet x 5 bilateral. Verbal and visual instructions to prevent forward trunk lean and knee hyperextension  Alternate march: 2x10 bilateral with unilateral to bilateral upper extremity support. Verbal and tactile instructions to prevent knee hyperextension  Terminal knee extension: Pink sports cord, x15 bilateral. Verbal and tactile instructions  to prevent hip hinge and foot elevation.     Patient Education and Home Exercises       Education provided:   - importance of attaining a safer Rollator.    Written Home Exercises Provided: Patient instructed to cont prior HEP. Exercises were reviewed and TJ was able to demonstrate them prior to the end of the session.  TJ demonstrated fair  understanding of the education provided. See EMR under Patient Instructions for exercises provided during therapy sessions    Assessment     Tj is a 58 y.o. male that presents to outpatient Physical. Pt has failed to attain a rollator despite written requirements issued to pt / pt's brother. He was able to complete session with no reports of increased lumbar pain. Pt requires verbal / tactile instructions on safety and proper technique including postural awareness.   Pt towards his goals.   Pt prognosis is Good.   Pt will continue to benefit from skilled outpatient physical therapy to address the deficits listed in the problem list box on initial evaluation, provide pt/family education and to maximize pt's level of " "independence in the home and community environment.     Pt's spiritual, cultural and educational needs considered and pt agreeable to plan of care and goals.  Anticipated barriers to physical therapy: Current rollator use. Compliance with education. Co-morbidities and self care.     Short Term Goals: 3 weeks  1. Patient will be compliant with home exercise program to supplement therapy in decreasing pain with functional mobility.(ongoing, not met)  2. Patient will improve impaired lower extremity manual muscle tests to 3+/5 bilaterally to improve strength for standing tasks.(ongoing, not met)  3. Patient will ambulate with rollator at all times to promote safety at home and in the community. (ongoing, not met)  4. Patient will ambulate in close proximity to rollator without shrug to decrease reports of shoulder pain. (ongoing, not met)     Long Term Goals: 6 weeks  1. Patient will improve FOTO score to </= 41% limited to decrease perceived limitation with maintaining/changing body position(ongoing, not met)  2. Patient will improve impaired lower extremity manual muscle tests to 4/5 grade bilaterally to improve strength for standing tasks.(ongoing, not met)  3. Patient will perform 6' walk at 0.75 m/s to promote safe community ambulation.(ongoing, not met)  4. Patient will perform 30" sit<>stand test with >/= 8 reps with minimal upper extremity support to demonstrate increased functional strength. (ongoing, not met)  5. Patient will report 4/10 pain at worst in low back and legs to promote increased physical activity. (ongoing, not met)     Plan     Complete FOTO survey at next visit.  Advance as appropriate.    Brennan Avitia, PTA                "

## 2023-08-28 NOTE — TELEPHONE ENCOUNTER
Patient needs a colonoscopy but I can not understand what he is saying. Appt scheduled for 8/31/23 at 10:40am with Nataly Nina NP.

## 2023-08-29 DIAGNOSIS — G35 MULTIPLE SCLEROSIS: ICD-10-CM

## 2023-08-30 RX ORDER — BACLOFEN 10 MG/1
10 TABLET ORAL 3 TIMES DAILY
Qty: 90 TABLET | Refills: 11 | Status: SHIPPED | OUTPATIENT
Start: 2023-08-30

## 2023-08-31 ENCOUNTER — CLINICAL SUPPORT (OUTPATIENT)
Dept: REHABILITATION | Facility: HOSPITAL | Age: 58
End: 2023-08-31
Payer: MEDICARE

## 2023-08-31 ENCOUNTER — OFFICE VISIT (OUTPATIENT)
Dept: GASTROENTEROLOGY | Facility: CLINIC | Age: 58
End: 2023-08-31
Payer: MEDICARE

## 2023-08-31 VITALS — HEIGHT: 69 IN | WEIGHT: 215 LBS | BODY MASS INDEX: 31.84 KG/M2

## 2023-08-31 DIAGNOSIS — M54.42 CHRONIC MIDLINE LOW BACK PAIN WITH BILATERAL SCIATICA: Primary | ICD-10-CM

## 2023-08-31 DIAGNOSIS — M54.41 CHRONIC MIDLINE LOW BACK PAIN WITH BILATERAL SCIATICA: Primary | ICD-10-CM

## 2023-08-31 DIAGNOSIS — Z12.11 SCREENING FOR COLON CANCER: Primary | ICD-10-CM

## 2023-08-31 DIAGNOSIS — K58.1 IRRITABLE BOWEL SYNDROME WITH CONSTIPATION: ICD-10-CM

## 2023-08-31 DIAGNOSIS — R53.1 DECREASED STRENGTH: ICD-10-CM

## 2023-08-31 DIAGNOSIS — R26.89 IMPAIRED GAIT AND MOBILITY: ICD-10-CM

## 2023-08-31 DIAGNOSIS — G89.29 CHRONIC MIDLINE LOW BACK PAIN WITH BILATERAL SCIATICA: Primary | ICD-10-CM

## 2023-08-31 PROCEDURE — 97530 THERAPEUTIC ACTIVITIES: CPT | Mod: PN,CQ

## 2023-08-31 PROCEDURE — 3044F HG A1C LEVEL LT 7.0%: CPT | Mod: CPTII,S$GLB,, | Performed by: NURSE PRACTITIONER

## 2023-08-31 PROCEDURE — 99214 OFFICE O/P EST MOD 30 MIN: CPT | Mod: S$GLB,,, | Performed by: NURSE PRACTITIONER

## 2023-08-31 PROCEDURE — 97112 NEUROMUSCULAR REEDUCATION: CPT | Mod: PN,CQ

## 2023-08-31 PROCEDURE — 99999 PR PBB SHADOW E&M-EST. PATIENT-LVL IV: ICD-10-PCS | Mod: PBBFAC,,, | Performed by: NURSE PRACTITIONER

## 2023-08-31 PROCEDURE — 99999 PR PBB SHADOW E&M-EST. PATIENT-LVL IV: CPT | Mod: PBBFAC,,, | Performed by: NURSE PRACTITIONER

## 2023-08-31 PROCEDURE — 3044F PR MOST RECENT HEMOGLOBIN A1C LEVEL <7.0%: ICD-10-PCS | Mod: CPTII,S$GLB,, | Performed by: NURSE PRACTITIONER

## 2023-08-31 PROCEDURE — 3008F BODY MASS INDEX DOCD: CPT | Mod: CPTII,S$GLB,, | Performed by: NURSE PRACTITIONER

## 2023-08-31 PROCEDURE — 1159F MED LIST DOCD IN RCRD: CPT | Mod: CPTII,S$GLB,, | Performed by: NURSE PRACTITIONER

## 2023-08-31 PROCEDURE — 3008F PR BODY MASS INDEX (BMI) DOCUMENTED: ICD-10-PCS | Mod: CPTII,S$GLB,, | Performed by: NURSE PRACTITIONER

## 2023-08-31 PROCEDURE — 1159F PR MEDICATION LIST DOCUMENTED IN MEDICAL RECORD: ICD-10-PCS | Mod: CPTII,S$GLB,, | Performed by: NURSE PRACTITIONER

## 2023-08-31 PROCEDURE — 99214 PR OFFICE/OUTPT VISIT, EST, LEVL IV, 30-39 MIN: ICD-10-PCS | Mod: S$GLB,,, | Performed by: NURSE PRACTITIONER

## 2023-08-31 PROCEDURE — 4010F ACE/ARB THERAPY RXD/TAKEN: CPT | Mod: CPTII,S$GLB,, | Performed by: NURSE PRACTITIONER

## 2023-08-31 PROCEDURE — 4010F PR ACE/ARB THEARPY RXD/TAKEN: ICD-10-PCS | Mod: CPTII,S$GLB,, | Performed by: NURSE PRACTITIONER

## 2023-08-31 RX ORDER — SODIUM, POTASSIUM,MAG SULFATES 17.5-3.13G
1 SOLUTION, RECONSTITUTED, ORAL ORAL DAILY
Qty: 354 ML | Refills: 0 | Status: SHIPPED | OUTPATIENT
Start: 2023-08-31

## 2023-08-31 NOTE — PATIENT INSTRUCTIONS
Start Linzess. Take once a day in the morning before breakfast.   Lactulose as needed.     SUPREP Instructions    [facility] [address]  Clinic Office [phone]  Endoscopy Lab [phone]    You are scheduled for a Colonoscopy with Dr. Loredo on Oct. 10, 2023  at [facility].  You will check in at the Information desk on the first floor of the hospital. Please contact the office to reschedule if needed at     Do not follow instructions listed in the box.    A responsible adult (family member or friend) must come with you and transport you home.  You are not allowed to drive, take a taxi/ride share or bus or leave the Endoscopy Center alone.  If you do not have a responsible adult with you to take you home, your exam will be cancelled.     Please follow instructions of  if you are taking anticoagulant/blood thinning medications such as Aggrenox, Brilinta, Effient, Eliquis, Lovenox, Plavix, Pletal, Pradaxa, Ticilid, Xarelto or Coumadin.     Please skip your morning dose of insulin or other oral medications for diabetes the morning of the procedure unless instructed otherwise by your doctor. You should take your blood pressure, heart, anti-rejection and or seizure medication the morning of your procedure.    To ensure that your test is accurate and complete, you must follow these instructions - please do not use the instructions provided from the pharmacy.    For your safety and the safety of the providers and staff, You will be required to have a screening Covid test 72 hours before procedure.    Do not follow instructions listed in the box.          Two Days Before the procedure       At 4:00 PM  Take 1 dose of simethicone (Gas-X) tablets or capsules. USE AS DIRECTED.  Take 20mg (four pills) of the Dulcolax.    At 6:00 PM,   Drink 1 glass of Miralax (8 ounces of gatorade or water with 1 capful of Miralax powder) every 15 minutes until you have  completed 8 glasses of Miralax.   This is easier to drink if this solution is cold, so you can mix the solution ahead of time and place in the refrigerator prior to drinking.  You have to drink the solution within 24 hours of mixing it.  Do NOT put this solution over ice.  It IS ok to drink with a straw.            The Day Before The Procedure:    Follow a CLEAR LIQUID DIET for the entire day before your scheduled colonoscopy.  This means no solid food the entire day.  A clear liquid diet includes the following:  Water, Coffee or decaffeinated coffee (no milk or cream)  Tea, Herbal tea  Carbonated beverages (soft drinks), regular and sugar free  Gelatin  Apple Juice, white grape juice, white cranberry juice  Gatorade, Power Aid, Crystal Light, Jason Aid (Yellow, Green, Clear)  Lemonade and Limeade  Bouillon, clear consomme'  Snowball, popsicles  (Yellow, Green, Clear)    DO NOT DRINK ANY LIQUIDS CONTAINING RED DYE  DO NOT DRINK ANY LIQUIDS NOT SPECIFICALLY LISTED  DO NOT DRINK ALCOHOL    At 5 pm the evening before your colonoscopy:  Pour one (1) bottle of SUPREP into the container provided in the box. Add water to the line on the container and mix well.  Drink the whole container and then drink 2 more containers of water over 1 hour.  This is sometimes easier to drink if this solution is cold, so you can mix the solution 20 minutes ahead of time and place in the refrigerator prior to drinking.  You have to drink the solution within 30-45 minutes of mixing it.  Do NOT put this solution over ice.  It IS ok to drink with a straw.        The Day of the Procedure - The Endoscopy department will call you 2 days prior to your procedure to give you the exact time    5 hours prior to your ARRIVAL TIME (this may be in the middle of the night)  Pour the 2nd bottle of SUPREP into the container provided in the box.  Add water to the line on the container and mix well.  Drink the whole container and then drink 2 more containers of  water over 1 hour.    AFTER YOU HAVE COMPLETED YOUR PREP, YOU MAY HAVE NOTHING ELSE BY MOUTH    Please leave all valuables and jewelry at home.    At 600 am, you may take the last dose of any medications you are allowed to take with a small sip of water (blood pressure, heart, anti-rejection and or seizure medication).  If you use inhalers bring them with you.    You may call the Endoscopy department at [phone] with any questions regarding your procedure.

## 2023-08-31 NOTE — PROGRESS NOTES
GASTROENTEROLOGY CLINIC NOTE    Chief Complaint: The primary encounter diagnosis was Screening for colon cancer. A diagnosis of Irritable bowel syndrome with constipation was also pertinent to this visit.  Referring provider/PCP: Bayron Ferguson MD    HPI:  Tj Trammell is a 58 y.o. male who is a new patient to me with a PMH that is significant for Asthma, Bilateral hearing loss, Biliary acute pancreatitis, GERD (gastroesophageal reflux disease), High cholesterol, Hypertension, and Multiple sclerosis.  He is here today to re-establish care for constipation as he was previously seen by SHAAN Horton NP in our clinic.  This is not a new problem as he reports he has been having constipation for several years.  He has tried Miralax, Milk of Magnesia, Stool Softeners, Enemas, Linzess, and Lactulose.  He reports lactulose helps in managing the constipation.  He reports consistency of stool is hard and accompanied by straining and lower abdominal bloating and pain.  Denies unexplained weight loss, hematochezia, or melena.  Upon reviewing patient's records, his PCP Dr. Ferguson sent in Lactulose to [facility].  I spoke with pharmacy and patient is able to have prescription refilled.     Interval Note 9/20/2021  Mr. Trammell who is known to me presents to clinic for follow up after recent hospital visit.  He went to the emergency room 9/4/2021 due to abdominal pain. He was further evaluated and CT was obtained. CT revealed moderate amount of stool in large intestine.  He reports the pain is generalized and not the same as when he is constipated. He is unsure of any triggers to the pain.  Additionally, he reports black stools.  He was discharged with Pepcid 20mg BID and reports this helped his abdominal pain.  Of note, he was previously prescribed Protonix by his PCP which he reports he is not taking. He denies any changes in bowel habits. He continues to have bowel movements every two days and  continues to take Lactulose.  Denies hematochezia. Patient due for screening colonoscopy in 2022.     Interval Note 3/15/2023  Mr. Trammell who is known to me presents for follow up regarding constipation and colon cancer screening.  Has h/o constipation which has been managed with Lactulose.  Lactulose is no longer helping with constipation.  Bowel movement occurring about once a week. Reports bloating, abdominal discomfort, and nausea.  No hematochezia, unexplained weight loss, or changes in appetite. He is due for screening colonoscopy; has h/o polyps.     Interval Note 8/31/2023  Mr. Trammell who is known to me presents for follow up regarding constipation and colon cancer screening. Underwent colonoscopy with Dr. Loredo 4/2023; inadequate prep. Linzess 290 initiated and repeat colonoscopy recommended in 6 months.   He has not started taking Linzess. Currently only taking Lactulose.       Prior Upper Endoscopy:  9/2021 with Dr. Loredo  Impression:            - Normal esophagus.                          - Erythematous mucosa in the antrum. Biopsied.                          - Normal examined duodenum.     Recommendation:        - Discharge patient to home.                          - Resume previous diet.                          - Continue present medications.                          - Await pathology results.                          - Return to nurse practitioner at appointment to                          be scheduled.     Pathology:  Gastric biopsy:   -Antral and fundic type mucosa with mild chronic gastritis.   -Immunohistochemistry with appropriate control for H.pylori is negative.       2015  Findings: There were esophageal mucosal changes suspicious for short-segment   Norman's esophagus present in the lower third of the esophagus. The maximum longitudinal extent of these mucosal changes was 1 cm in length ( islands) . Mucosa was biopsied with a cold forceps for histology randomly at the  gastroesophageal junction. One specimen bottle was sent to pathology. Diffuse moderately erythematous mucosa was found in the entire examined stomach. This was biopsied with a cold forceps for histology. The examined duodenum was normal.     Impression:  - Esophageal mucosal changes suspicious for short-segment Norman's esophagus. Biopsied.                         - Erythematous mucosa in the stomach. Biopsied.                         - Normal examined duodenum.     Recommendation:       - Await pathology results.                         - Amylase and lipase.                         - If Biopsies negative consider targeted biopsies under OCT                         - clinic visit 1 month                         - Discharge patient to home (ambulatory).     Pathology:  1. STOMACH, BIOPSY:  -Gastric mucosa with no significant histopathologic findings.  -No Helicobacter pylori organisms on H&E stain.  2. ESOPHAGUS, BIOPSY:  -Gastroesophageal junction mucosa with chronic inflammation and reactive changes.  -No intestinal metaplasia or dysplasia.  -Minute focus of heterotopic pancreatic tissue    Prior Colonoscopy: 2019 with Dr. Waddell (3 year recall; Due 2022)  Findings: A large amount of stool was found in the entire colon, interfering with visualization. Lavage of the area was performed using copious amounts of sterile water, resulting in clearance with fair visualization. A 10 mm polyp was found in the sigmoid colon. The polyp was semi-pedunculated. The polyp was removed with a hot snare. Resection and retrieval were complete. Verification of patient identification for the specimen was done. Estimated blood loss: none. The exam was otherwise without abnormality on direct and retroflexion views.     Impression:   - Stool in the entire examined colon.                         - One 10 mm polyp in the sigmoid colon, removed with a hot snare. Resected and retrieved.                         - The examination was otherwise  normal on direct and retroflexion views.     Recommendation:  - Resume previous diet.                         - Continue present medications.                         - Await pathology results.                         - Repeat colonoscopy date to be determined after pending pathology results are reviewed for surveillance.     Pathology:  SIGMOID BIOPSY:  ADENOMATOUS POLYP    Family h/o Colon Cancer: No  NSAIDs: No  Anticoagulation or Antiplatelet: No    Review of Systems   Constitutional:  Negative for weight loss.   HENT:  Positive for hearing loss. Negative for sore throat.    Eyes:  Negative for blurred vision.   Respiratory:  Negative for cough.    Cardiovascular:  Negative for chest pain.   Gastrointestinal:  Positive for abdominal pain (lower quadrant) and constipation. Negative for blood in stool, diarrhea, heartburn, melena, nausea and vomiting.   Genitourinary:  Negative for dysuria.   Musculoskeletal:  Negative for myalgias.   Skin:  Negative for rash.   Neurological:  Negative for headaches.   Endo/Heme/Allergies:  Negative for environmental allergies.   Psychiatric/Behavioral:  Negative for suicidal ideas. The patient is not nervous/anxious.        Past Medical History: has a past medical history of Asthma, Bilateral hearing loss, Biliary acute pancreatitis, GERD (gastroesophageal reflux disease), High cholesterol, Hypertension, and Multiple sclerosis.    Past Surgical History: has a past surgical history that includes Joint replacement; Cholecystectomy; Colonoscopy (N/A, 1/22/2019); Tonsillectomy; Appendectomy; Esophagogastroduodenoscopy (N/A, 9/29/2021); Transforaminal epidural injection of steroid (Bilateral, 10/5/2022); and Colonoscopy (N/A, 4/25/2023).    Family History:family history includes Heart disease in his brother, father, and mother.    Allergies: Review of patient's allergies indicates:  No Known Allergies    Social History: reports that he quit smoking about 19 months ago. His smoking use  included cigarettes. He started smoking about 33 years ago. He has a 32.0 pack-year smoking history. He has never used smokeless tobacco. He reports that he does not currently use alcohol. He reports that he does not use drugs.    Home medications:   Current Outpatient Medications on File Prior to Visit   Medication Sig Dispense Refill    acetaZOLAMIDE (DIAMOX) 250 MG tablet Take 2 TABLETS BY MOUTH ONLY 8 HOURS AFTER SURGERY 2 tablet 0    albuterol (PROAIR HFA) 90 mcg/actuation inhaler Inhale 2 puffs by mouth every 4 hours. 8.5 g 3    ALPRAZolam (XANAX) 0.5 MG tablet TAKE ONE TABLET BY MOUTH TWICE DAILY AS NEEDED FOR ANXIETY 60 tablet 4    atorvastatin (LIPITOR) 40 MG tablet Take 1 tablet (40 mg total) by mouth once daily. 90 tablet 4    bacitracin 500 unit/gram Oint Apply topically 2 (two) times daily. 28 g 0    baclofen (LIORESAL) 10 MG tablet TAKE 1 TABLET BY MOUTH 3 TIMES DAILY. 90 tablet 11    buPROPion (WELLBUTRIN SR) 150 MG TBSR 12 hr tablet TAKE 1 TABLET BY MOUTH EVERY 12 HOURS DAILY. 60 tablet 11    dicyclomine (BENTYL) 10 MG capsule TAKE 1 CAPSULE BY MOUTH THREE TIMES A DAY FOR ABDOMINAL PAIN 90 capsule 3    difluprednate (DUREZOL) 0.05 % Drop ophthalmic solution Instill one drop TO SURGERY EYE four times a day AFTER SURGERY X 30 DAYS 5 mL 4    docusate sodium (COLACE) 100 MG capsule Take 1 capsule (100 mg total) by mouth once daily. 30 capsule 5    ergocalciferol (ERGOCALCIFEROL) 50,000 unit Cap TAKE 1 CAPSULE BY MOUTH Weekly 12 capsule 0    famotidine (PEPCID) 20 MG tablet Take 1 tablet (20 mg total) by mouth 2 (two) times daily. 180 tablet 3    fingolimod (GILENYA) 0.5 mg Cap TAKE ONE CAPSULE (0.5 MG) BY MOUTH ONCE DAILY. MAY TAKE WITH OR WITHOUT FOOD. STORE AT ROOM TEMPERATURE. 90 capsule 2    gabapentin (NEURONTIN) 300 MG capsule TAKE 1 CAPSULE BY MOUTH AT BEDTIME 30 capsule 11    lactulose (CHRONULAC) 10 gram/15 mL solution 45 mLs (30 g total) 3 (three) times daily. 4050 mL 3    lisinopriL  "(PRINIVIL,ZESTRIL) 5 MG tablet TAKE 1 TABLET BY MOUTH DAILY 90 tablet 3    mupirocin (BACTROBAN) 2 % ointment Apply topically once daily. 22 g 2    nabumetone (RELAFEN) 500 MG tablet Take 1 tablet (500 mg total) by mouth 2 (two) times daily as needed. 60 tablet 6    nebulizer and compressor (HOME NEBULIZER PLUS SIDESTREAM) Lupe use as directed 1 each 0    ofloxacin (OCUFLOX) 0.3 % ophthalmic solution Instill 1 drop TO SURGERY EYE four times a day STARTING 2 DAYS BEFORE SURGERY 5 mL 3    oxybutynin (DITROPAN-XL) 5 MG TR24 Take 1 tablet (5 mg total) by mouth once daily. 90 tablet 3    pantoprazole (PROTONIX) 40 MG tablet TAKE ONE TABLET BY MOUTH  ONCE DAILY 90 tablet 3    traMADoL (ULTRAM) 50 mg tablet Take 1 tablet (50 mg total) by mouth every 6 (six) hours as needed for pain 120 tablet 4    [DISCONTINUED] linaCLOtide (LINZESS) 290 mcg Cap capsule Take 1 capsule (290 mcg total) by mouth before breakfast. 30 capsule 11    triamcinolone acetonide 0.1% (KENALOG) 0.1 % cream Apply topically 2 (two) times daily. for 10 days 80 g 0     No current facility-administered medications on file prior to visit.       Vital signs:  Ht 5' 9" (1.753 m)   Wt 97.5 kg (215 lb)   BMI 31.75 kg/m²     Physical Exam  Vitals reviewed.   Constitutional:       General: He is not in acute distress.     Appearance: Normal appearance. He is not ill-appearing.   HENT:      Head: Normocephalic.   Cardiovascular:      Rate and Rhythm: Normal rate and regular rhythm.      Heart sounds: Normal heart sounds. No murmur heard.  Pulmonary:      Effort: Pulmonary effort is normal. No respiratory distress.      Breath sounds: Normal breath sounds.   Chest:      Chest wall: No tenderness.   Abdominal:      General: Bowel sounds are normal. There is no distension.      Palpations: Abdomen is soft.      Tenderness: There is no abdominal tenderness. Negative signs include Hilliard's sign.      Hernia: No hernia is present.      Comments: Tense Abdomen   Skin:   " "  General: Skin is warm.   Neurological:      Mental Status: He is alert and oriented to person, place, and time.   Psychiatric:         Mood and Affect: Mood normal.         Behavior: Behavior normal.         Thought Content: Thought content normal.         Routine labs:  Lab Results   Component Value Date    WBC 4.04 07/11/2023    HGB 15.3 07/11/2023    HCT 49.4 07/11/2023    MCV 75 (L) 07/11/2023     07/11/2023     Lab Results   Component Value Date    INR 1.0 07/13/2021     Lab Results   Component Value Date    IRON 97 11/19/2020    FERRITIN 38 11/19/2020    TIBC 428 11/19/2020    FESATURATED 23 11/19/2020     Lab Results   Component Value Date     07/11/2023    K 4.0 07/11/2023     07/11/2023    CO2 28 07/11/2023    BUN 10 07/11/2023    CREATININE 1.1 07/11/2023     Lab Results   Component Value Date    ALBUMIN 3.8 07/11/2023    ALT 26 07/11/2023    AST 30 07/11/2023    ALKPHOS 96 07/11/2023    BILITOT 0.4 07/11/2023     No results found for: "GLUCOSE"  Lab Results   Component Value Date    TSH 0.829 07/15/2014     Lab Results   Component Value Date    CALCIUM 9.3 07/11/2023    PHOS 2.9 07/18/2014       Imaging:      I have reviewed prior labs, imaging, and notes.      Assessment:  1. Screening for colon cancer    2. Irritable bowel syndrome with constipation            Plan:  Orders Placed This Encounter    linaCLOtide (LINZESS) 290 mcg Cap capsule    sodium,potassium,mag sulfates (SUPREP BOWEL PREP KIT) 17.5-3.13-1.6 gram SolR       Linzess 290 mcg daily.   Lactulose PRN  Colonoscopy for colon cancer screening. Suprep.   Continue Protonix as previously prescribed.   Limit Bentyl use.     Plan of care discussed with patient who is in agreement and verbalized understanding.     I have explained the planned procedures to the patient.The risks, benefits and alternatives of the procedure were also explained in detail. Patient verbalized understanding, all questions were answered. The patient " agrees to proceed as planned        Follow Up: As Needed          Nataly Falcon, LYNNETTE,FNP-BC  [facility]

## 2023-08-31 NOTE — PROGRESS NOTES
"[facility]   Physical Therapy Treatment Note      Name: Tj Trammell  Clinic Number: [id number]    Therapy Diagnosis:   Encounter Diagnoses   Name Primary?    Chronic midline low back pain with bilateral sciatica Yes    Impaired gait and mobility     Decreased strength      Physician: Huy Denson, FNP    Visit Date: 8/31/2023    Physician Orders: PT Eval and Treat   Medical Diagnosis from Referral: Impaired functional mobility, balance, gait, and endurance [Z74.09], Lumbar radiculopathy [M54.16], Chronic bilateral low back pain with bilateral sciatica [M54.42, M54.41, G89.29]  Evaluation Date: 6/27/2023  Authorization Period Expiration: 8/22/2023  Plan of Care Expiration: 9/8/2023  Visit # / Visits authorized: 12/23 + 1  FOTO: 14 with ASL in-person  present (on 8/31/23)    PTA Visit #: 2/5     Time In:7:05 AM   Time Out: 8:00 AM  Total Billable Time: 55 minutes 2TA, 1 NMR    Precautions:  Standard; multiple sclerosis, hypertension, asthma; hearing impaired (needs )    Subjective   ASL  (Timothy)  present for today's session    Pt reports:pt agreeable to PT session. No new reports of pain per pt.   He was compliant with home exercise program.  Response to previous treatment: no adverse response  Functional change: performed home exercise program this morning    Pain: 0/10  Location: Low back and bilateral lower extremity     Objective      8/10/2023:  Walking speed: 0.34 m/s    Treatment     TJ received the treatments listed below:      therapeutic exercises to develop strength, endurance, and ROM for 00 minutes including objective and education:     Walk for endurance: 7'11'', 480 feet with rolling walker. Stand by assistance    therapeutic activities to improve functional performance for 25 minutes, including    Forward step ups: 6" step, 2x10 bilateral with bilateral upper extremity support. VCs to prevent forward trunk lean.   Lateral step " "ups: 4" step in 2x10 bilateral with bilateral upper extremity support. VCs to prevent forward trunk lean.  Mini squats: 2x10 with bilateral upper extremity support in // bars    neuromuscular re-education activities to improve: Balance, Coordination, Kinesthetic, Proprioception, and Posture for 30 minutes. The following activities were included:     Lateral stepping: obstacle course consisting of 4" step, 4" oxana, 6" step 10 feet x 5 bilateral. Verbal and visual instructions to prevent forward trunk lean and knee hyperextension  Alternate march: 2x10 bilateral with unilateral to bilateral upper extremity support. Verbal and tactile instructions to prevent knee hyperextension  Terminal knee extension: Pink sports cord, x15 bilateral. Verbal and tactile instructions  to prevent hip hinge and foot elevation.   FOTO Completed with  today x7 min    Patient Education and Home Exercises       Education provided:   - importance of attaining a safer Rollator.    Written Home Exercises Provided: Patient instructed to cont prior HEP. Exercises were reviewed and TJ was able to demonstrate them prior to the end of the session.  TJ demonstrated fair  understanding of the education provided. See EMR under Patient Instructions for exercises provided during therapy sessions    Assessment     Tj is a 58 y.o. male that presents to outpatient Physical Therapy with primary complaints of recurrent falls. Pt has failed to attain a rollator despite written paperwork issued to pt / pt's brother. He was able to complete today's session with no reports of pain in B knees/ low back.  Seated rest breaks granted between standing sets. Pt continues to require verbal instructions on postural awareness in standing while performing therex. Often rushes through activities, VC's to slow down.   Pt towards his goals.   Pt prognosis is Good.   Pt will continue to benefit from skilled outpatient physical therapy to address the deficits " "listed in the problem list box on initial evaluation, provide pt/family education and to maximize pt's level of independence in the home and community environment.     Pt's spiritual, cultural and educational needs considered and pt agreeable to plan of care and goals.  Anticipated barriers to physical therapy: Current rollator use. Compliance with education. Co-morbidities and self care.     Short Term Goals: 3 weeks  1. Patient will be compliant with home exercise program to supplement therapy in decreasing pain with functional mobility.(ongoing, not met)  2. Patient will improve impaired lower extremity manual muscle tests to 3+/5 bilaterally to improve strength for standing tasks.(ongoing, not met)  3. Patient will ambulate with rollator at all times to promote safety at home and in the community. (ongoing, not met)  4. Patient will ambulate in close proximity to rollator without shrug to decrease reports of shoulder pain. (ongoing, not met)     Long Term Goals: 6 weeks  1. Patient will improve FOTO score to </= 41% limited to decrease perceived limitation with maintaining/changing body position(ongoing, not met)  2. Patient will improve impaired lower extremity manual muscle tests to 4/5 grade bilaterally to improve strength for standing tasks.(ongoing, not met)  3. Patient will perform 6' walk at 0.75 m/s to promote safe community ambulation.(ongoing, not met)  4. Patient will perform 30" sit<>stand test with >/= 8 reps with minimal upper extremity support to demonstrate increased functional strength. (ongoing, not met)  5. Patient will report 4/10 pain at worst in low back and legs to promote increased physical activity. (ongoing, not met)     Plan     Cont PT as per POC, as appropriate.    Brennan Avitia PTA                "

## 2023-09-01 ENCOUNTER — HOSPITAL ENCOUNTER (OUTPATIENT)
Dept: WOUND CARE | Facility: HOSPITAL | Age: 58
Discharge: HOME | End: 2023-09-01
Attending: PREVENTIVE MEDICINE
Payer: MEDICARE

## 2023-09-01 VITALS
HEART RATE: 72 BPM | BODY MASS INDEX: 31.84 KG/M2 | HEIGHT: 69 IN | TEMPERATURE: 96 F | SYSTOLIC BLOOD PRESSURE: 164 MMHG | DIASTOLIC BLOOD PRESSURE: 88 MMHG | WEIGHT: 215 LBS

## 2023-09-01 DIAGNOSIS — Z74.09 IMPAIRED FUNCTIONAL MOBILITY, BALANCE, GAIT, AND ENDURANCE: ICD-10-CM

## 2023-09-01 DIAGNOSIS — L97.921 NON-PRESSURE ULCER OF LOWER EXTREMITY, LIMITED TO BREAKDOWN OF SKIN, LEFT: Primary | ICD-10-CM

## 2023-09-01 DIAGNOSIS — T14.8XXA ABRASION: ICD-10-CM

## 2023-09-01 PROCEDURE — 29581 APPL MULTLAYER CMPRN SYS LEG: CPT

## 2023-09-01 NOTE — PROGRESS NOTES
[facility]    Subjective:       Patient ID: Tj Trammell is a 58 y.o. male.    Chief Complaint: Wound Care    Follow up for LLE wound that is progressing well. Patient stated his concerns for slow healing. New orders noted.     Review of Systems   All other systems reviewed and are negative.        Objective:     Vitals:    09/01/23 0807   BP: (!) 164/88   Pulse: 72   Temp: 96.4 °F (35.8 °C)         Physical Exam       Altered Skin Integrity 07/28/23 0800 Left lateral Malleolus (Active)   07/28/23 0800   Altered Skin Integrity Present on Admission - Did Patient arrive to the hospital with altered skin?: yes   Side: Left   Orientation: lateral   Location: Malleolus   Wound Number:    Is this injury device related?: No   Primary Wound Type:    Description of Altered Skin Integrity:    Ankle-Brachial Index:    Pulses:    Removal Indication and Assessment:    Wound Outcome:    (Retired) Wound Length (cm):    (Retired) Wound Width (cm):    (Retired) Depth (cm):    Wound Description (Comments):    Removal Indications:    Wound Image   09/01/23 0845   Dressing Appearance Intact;Moist drainage 09/01/23 0845   Drainage Amount Small 09/01/23 0845   Drainage Characteristics/Odor Serosanguineous 09/01/23 0845   Appearance Intact;Red 09/01/23 0845   Tissue loss description Partial thickness 09/01/23 0845   Red (%), Wound Tissue Color 100 % 09/01/23 0845   Periwound Area Intact;Dry;Edematous 09/01/23 0845   Wound Edges Defined 09/01/23 0845   Wound Length (cm) 2 cm 09/01/23 0845   Wound Width (cm) 2.6 cm 09/01/23 0845   Wound Depth (cm) 0.1 cm 09/01/23 0845   Wound Volume (cm^3) 0.52 cm^3 09/01/23 0845   Wound Surface Area (cm^2) 5.2 cm^2 09/01/23 0845   Care Cleansed with:;Soap and water;Sterile normal saline 09/01/23 0845   Dressing Applied;Changed;Non-adherent;Absorptive Pad;Compression wrap 09/01/23 0845   Periwound Care Absorptive dressing applied 09/01/23 0845    Compression Two layer compression 09/01/23 0845   Dressing Change Due 09/08/23 09/01/23 0845         Assessment/Plan:         ICD-10-CM ICD-9-CM   1. Non-pressure ulcer of lower extremity, limited to breakdown of skin, left  L97.921 707.10   2. Abrasion  T14.8XXA 919.0   3. Impaired functional mobility, balance, gait, and endurance  Z74.09 V49.89           Tissue pathology and/or culture taken:  [] Yes [x] No   Sharp debridement performed:   [] Yes [x] No   Labs ordered this visit:   [] Yes [x] No   Imaging ordered this visit:   [] Yes [x] No           Orders Placed This Encounter   Procedures    Change dressing     Left lateral leg:   Cleanse wound with: Soap and water   Lidocaine: prn   Silver nitrate: prn   Periwound care: Moisturize BLE   Primary dressing:Xeroform  Secondary dressing: small abd pad  Edema control: Co-Flex w/calamine  LLE toes to knee   Frequency: weekly  Follow-up: Dr. Casey in 1 week        Follow up in about 1 week (around 9/8/2023).            This includes face to face time and non-face to face time preparing to see the patient (eg, review of tests), obtaining and/or reviewing separately obtained history, documenting clinical information in the electronic or other health record, independently interpreting results and communicating results to the patient/family/caregiver, or care coordinator.

## 2023-09-05 ENCOUNTER — TELEPHONE (OUTPATIENT)
Dept: REHABILITATION | Facility: HOSPITAL | Age: 58
End: 2023-09-05
Payer: MEDICARE

## 2023-09-05 RX ORDER — PANTOPRAZOLE SODIUM 40 MG/1
TABLET, DELAYED RELEASE ORAL DAILY
Qty: 90 TABLET | Refills: 3 | Status: CANCELLED | OUTPATIENT
Start: 2023-09-05 | End: 2024-09-04

## 2023-09-06 RX ORDER — PANTOPRAZOLE SODIUM 40 MG/1
TABLET, DELAYED RELEASE ORAL DAILY
Qty: 90 TABLET | Refills: 3 | Status: CANCELLED | OUTPATIENT
Start: 2023-09-05 | End: 2024-09-04

## 2023-09-07 ENCOUNTER — TELEPHONE (OUTPATIENT)
Dept: REHABILITATION | Facility: HOSPITAL | Age: 58
End: 2023-09-07
Payer: MEDICARE

## 2023-09-08 ENCOUNTER — TELEPHONE (OUTPATIENT)
Dept: REHABILITATION | Facility: HOSPITAL | Age: 58
End: 2023-09-08

## 2023-09-08 ENCOUNTER — HOSPITAL ENCOUNTER (OUTPATIENT)
Dept: WOUND CARE | Facility: HOSPITAL | Age: 58
Discharge: HOME | End: 2023-09-08
Attending: PREVENTIVE MEDICINE
Payer: MEDICARE

## 2023-09-08 VITALS
SYSTOLIC BLOOD PRESSURE: 143 MMHG | RESPIRATION RATE: 18 BRPM | DIASTOLIC BLOOD PRESSURE: 82 MMHG | HEART RATE: 91 BPM | HEIGHT: 69 IN | WEIGHT: 215 LBS | BODY MASS INDEX: 31.84 KG/M2 | TEMPERATURE: 98 F

## 2023-09-08 DIAGNOSIS — Z74.09 IMPAIRED FUNCTIONAL MOBILITY, BALANCE, GAIT, AND ENDURANCE: ICD-10-CM

## 2023-09-08 DIAGNOSIS — L97.921 NON-PRESSURE ULCER OF LOWER EXTREMITY, LIMITED TO BREAKDOWN OF SKIN, LEFT: Primary | ICD-10-CM

## 2023-09-08 PROCEDURE — 29581 APPL MULTLAYER CMPRN SYS LEG: CPT

## 2023-09-08 RX ORDER — PANTOPRAZOLE SODIUM 40 MG/1
TABLET, DELAYED RELEASE ORAL DAILY
Qty: 90 TABLET | Refills: 3 | Status: SHIPPED | OUTPATIENT
Start: 2023-09-08 | End: 2024-09-07

## 2023-09-08 NOTE — PROGRESS NOTES
[facility]    Subjective:       Patient ID: Tj Trammell is a 58 y.o. male.    Chief Complaint: Non-healing Wound Follow Up    Wound care follow up for left leg ulcer. Tolerated compression wrap. Wound remains with moist, friable red tissue. Will start silver contact and drawtex to dry out area. Return to clinic in 1 week.    Review of Systems   All other systems reviewed and are negative.        Objective:     Vitals:    09/08/23 0819   BP: (!) 143/82   Pulse: 91   Resp: 18   Temp: 98 °F (36.7 °C)         Physical Exam       Altered Skin Integrity 07/28/23 0800 Left lateral Malleolus (Active)   07/28/23 0800   Altered Skin Integrity Present on Admission - Did Patient arrive to the hospital with altered skin?: yes   Side: Left   Orientation: lateral   Location: Malleolus   Wound Number:    Is this injury device related?: No   Primary Wound Type:    Description of Altered Skin Integrity:    Ankle-Brachial Index:    Pulses:    Removal Indication and Assessment:    Wound Outcome:    (Retired) Wound Length (cm):    (Retired) Wound Width (cm):    (Retired) Depth (cm):    Wound Description (Comments):    Removal Indications:    Wound Image   09/08/23 0839   Dressing Appearance Intact 09/08/23 0839   Drainage Amount Small 09/08/23 0839   Drainage Characteristics/Odor Serosanguineous 09/08/23 0839   Appearance Red;Smooth 09/08/23 0839   Tissue loss description Partial thickness 09/08/23 0839   Red (%), Wound Tissue Color 100 % 09/08/23 0839   Periwound Area Intact 09/08/23 0839   Wound Edges Irregular 09/08/23 0839   Wound Length (cm) 3.5 cm 09/08/23 0839   Wound Width (cm) 2.5 cm 09/08/23 0839   Wound Depth (cm) 0.1 cm 09/08/23 0839   Wound Volume (cm^3) 0.875 cm^3 09/08/23 0839   Wound Surface Area (cm^2) 8.75 cm^2 09/08/23 0839   Care Cleansed with:;Sterile normal saline 09/08/23 0839   Dressing Applied;Silver;Non-adherent;Absorptive Pad;Compression wrap 09/08/23  0839   Periwound Care Absorptive dressing applied 09/08/23 0839   Compression Two layer compression 09/08/23 0839   Dressing Change Due 09/15/23 09/08/23 0839         Assessment/Plan:         ICD-10-CM ICD-9-CM   1. Non-pressure ulcer of lower extremity, limited to breakdown of skin, left  L97.921 707.10   2. Impaired functional mobility, balance, gait, and endurance  Z74.09 V49.89           Tissue pathology and/or culture taken:  [] Yes [x] No   Sharp debridement performed:   [] Yes [x] No   Labs ordered this visit:   [] Yes [x] No   Imaging ordered this visit:   [] Yes [x] No           Orders Placed This Encounter   Procedures    Change dressing     Left lateral leg:   Cleanse wound with: Soap and water   Lidocaine: prn   Silver nitrate: prn   Periwound care: Moisturize BLE   Primary dressing:urgo silver contact layer  Secondary dressing: Drawtex  Edema control: Co-Flex w/calamine  LLE toes to knee   Frequency: weekly   Follow-up: Dr. Casey in 1 week        Follow up in about 1 week (around 9/15/2023) for .            This includes face to face time and non-face to face time preparing to see the patient (eg, review of tests), obtaining and/or reviewing separately obtained history, documenting clinical information in the electronic or other health record, independently interpreting results and communicating results to the patient/family/caregiver, or care coordinator.

## 2023-09-12 ENCOUNTER — TELEPHONE (OUTPATIENT)
Dept: REHABILITATION | Facility: HOSPITAL | Age: 58
End: 2023-09-12

## 2023-09-12 ENCOUNTER — DOCUMENTATION ONLY (OUTPATIENT)
Dept: REHABILITATION | Facility: HOSPITAL | Age: 58
End: 2023-09-12

## 2023-09-12 PROBLEM — R26.89 IMPAIRED GAIT AND MOBILITY: Status: RESOLVED | Noted: 2023-06-27 | Resolved: 2023-09-12

## 2023-09-12 PROBLEM — M54.42 CHRONIC MIDLINE LOW BACK PAIN WITH BILATERAL SCIATICA: Status: RESOLVED | Noted: 2023-06-27 | Resolved: 2023-09-12

## 2023-09-12 PROBLEM — G89.29 CHRONIC MIDLINE LOW BACK PAIN WITH BILATERAL SCIATICA: Status: RESOLVED | Noted: 2023-06-27 | Resolved: 2023-09-12

## 2023-09-12 PROBLEM — R53.1 DECREASED STRENGTH: Status: RESOLVED | Noted: 2023-06-27 | Resolved: 2023-09-12

## 2023-09-12 PROBLEM — M54.41 CHRONIC MIDLINE LOW BACK PAIN WITH BILATERAL SCIATICA: Status: RESOLVED | Noted: 2023-06-27 | Resolved: 2023-09-12

## 2023-09-12 NOTE — PROGRESS NOTES
Spoke with patient on the phone before 9 AM today. Patient reports he is unable to attend 8 AM appointment secondary to swelling of his left lower extremity and inability to don a shoe. Patient sees wound care regularly. Patient arrived to clinic a little after 10 AM for rollator order. Therapist provides copy to patient and patient's brother. Patient's brother reports patient is compliant with home exercise program but not with current rollator use. Patient's brother reports he brought patient to [facility] to receive his rollator but they did not give him one because they did not have his order. Discussed other durable medical equipment carriers nearby. Patient's brother verbalizes understanding and confirms he will call the clinic and ask for Taylor if they have any issues. Patient, patient's brother, and therapist agree to patient discharge from physical therapy secondary to lack of progress and continued falls.

## 2023-09-15 ENCOUNTER — HOSPITAL ENCOUNTER (OUTPATIENT)
Dept: WOUND CARE | Facility: HOSPITAL | Age: 58
Discharge: HOME | End: 2023-09-15
Attending: PREVENTIVE MEDICINE
Payer: MEDICARE

## 2023-09-15 VITALS
TEMPERATURE: 96 F | WEIGHT: 215 LBS | BODY MASS INDEX: 31.84 KG/M2 | SYSTOLIC BLOOD PRESSURE: 143 MMHG | HEIGHT: 69 IN | HEART RATE: 82 BPM | DIASTOLIC BLOOD PRESSURE: 74 MMHG

## 2023-09-15 DIAGNOSIS — L97.921 NON-PRESSURE ULCER OF LOWER EXTREMITY, LIMITED TO BREAKDOWN OF SKIN, LEFT: Primary | ICD-10-CM

## 2023-09-15 DIAGNOSIS — T14.8XXA ABRASION: ICD-10-CM

## 2023-09-15 DIAGNOSIS — Z74.09 IMPAIRED FUNCTIONAL MOBILITY, BALANCE, GAIT, AND ENDURANCE: ICD-10-CM

## 2023-09-15 PROCEDURE — 29581 APPL MULTLAYER CMPRN SYS LEG: CPT

## 2023-09-15 NOTE — PROGRESS NOTES
[facility]    Subjective:       Patient ID: Tj Trammell is a 58 y.o. male.    Chief Complaint: Wound Care    Follow up for LLE wound that is progressing well. No new concerns. Continue with same plan of care.     Review of Systems   All other systems reviewed and are negative.        Objective:     Vitals:    09/15/23 0959   BP: (!) 143/74   Pulse: 82   Temp: 96.3 °F (35.7 °C)         Physical Exam       Altered Skin Integrity 07/28/23 0800 Left lateral Malleolus (Active)   07/28/23 0800   Altered Skin Integrity Present on Admission - Did Patient arrive to the hospital with altered skin?: yes   Side: Left   Orientation: lateral   Location: Malleolus   Wound Number:    Is this injury device related?: No   Primary Wound Type:    Description of Altered Skin Integrity:    Ankle-Brachial Index:    Pulses:    Removal Indication and Assessment:    Wound Outcome:    (Retired) Wound Length (cm):    (Retired) Wound Width (cm):    (Retired) Depth (cm):    Wound Description (Comments):    Removal Indications:    Wound Image   09/15/23 1031   Dressing Appearance Intact;Moist drainage 09/15/23 1031   Drainage Amount Scant 09/15/23 1031   Drainage Characteristics/Odor Serosanguineous 09/15/23 1031   Appearance Intact;Red;Smooth 09/15/23 1031   Tissue loss description Partial thickness 09/15/23 1031   Red (%), Wound Tissue Color 100 % 09/15/23 1031   Periwound Area Intact 09/15/23 1031   Wound Edges Irregular 09/15/23 1031   Wound Length (cm) 3 cm 09/15/23 1031   Wound Width (cm) 2.4 cm 09/15/23 1031   Wound Depth (cm) 0.1 cm 09/15/23 1031   Wound Volume (cm^3) 0.72 cm^3 09/15/23 1031   Wound Surface Area (cm^2) 7.2 cm^2 09/15/23 1031   Care Cleansed with:;Soap and water;Sterile normal saline 09/15/23 1031   Dressing Applied;Changed;Silver;Non-adherent;Absorptive Pad;Compression wrap 09/15/23 1031   Periwound Care Absorptive dressing applied 09/15/23 1031   Compression Two  layer compression 09/15/23 1031   Dressing Change Due 09/22/23 09/15/23 1031         Assessment/Plan:         ICD-10-CM ICD-9-CM   1. Non-pressure ulcer of lower extremity, limited to breakdown of skin, left  L97.921 707.10   2. Abrasion  T14.8XXA 919.0   3. Impaired functional mobility, balance, gait, and endurance  Z74.09 V49.89           Tissue pathology and/or culture taken:  [] Yes [x] No   Sharp debridement performed:   [] Yes [x] No   Labs ordered this visit:   [] Yes [x] No   Imaging ordered this visit:   [] Yes [x] No           Orders Placed This Encounter   Procedures    Change dressing     Left lateral leg:   Cleanse wound with: Soap and water   Lidocaine: prn   Silver nitrate: prn   Periwound care: Moisturize BLE   Primary dressing:urgo silver contact layer   Secondary dressing: Drawtex   Edema control: Co-Flex w/calamine  LLE toes to knee   Frequency: weekly   Follow-up: Dr. Casey in 1 week        Follow up in about 1 week (around 9/22/2023).            This includes face to face time and non-face to face time preparing to see the patient (eg, review of tests), obtaining and/or reviewing separately obtained history, documenting clinical information in the electronic or other health record, independently interpreting results and communicating results to the patient/family/caregiver, or care coordinator.

## 2023-09-22 ENCOUNTER — HOSPITAL ENCOUNTER (OUTPATIENT)
Dept: WOUND CARE | Facility: HOSPITAL | Age: 58
Discharge: HOME | End: 2023-09-22
Attending: PREVENTIVE MEDICINE
Payer: MEDICARE

## 2023-09-22 VITALS
WEIGHT: 215 LBS | BODY MASS INDEX: 31.84 KG/M2 | SYSTOLIC BLOOD PRESSURE: 149 MMHG | DIASTOLIC BLOOD PRESSURE: 73 MMHG | HEART RATE: 70 BPM | HEIGHT: 69 IN | TEMPERATURE: 96 F

## 2023-09-22 DIAGNOSIS — Z74.09 IMPAIRED FUNCTIONAL MOBILITY, BALANCE, GAIT, AND ENDURANCE: ICD-10-CM

## 2023-09-22 DIAGNOSIS — L97.921 NON-PRESSURE ULCER OF LOWER EXTREMITY, LIMITED TO BREAKDOWN OF SKIN, LEFT: Primary | ICD-10-CM

## 2023-09-22 DIAGNOSIS — T14.8XXA ABRASION: ICD-10-CM

## 2023-09-22 PROCEDURE — 29581 APPL MULTLAYER CMPRN SYS LEG: CPT

## 2023-09-22 NOTE — PROGRESS NOTES
[facility]    Subjective:       Patient ID: Tj Trammell is a 58 y.o. male.    Chief Complaint: Non-healing Wound Follow Up    Wound care follow up for left leg ulcer. New area of epithelialization tissue noted, drainage decreased. Continue plan of care.     Review of Systems   All other systems reviewed and are negative.        Objective:     Vitals:    09/22/23 0901   BP: (!) 149/73   Pulse: 70   Temp: 96.4 °F (35.8 °C)         Physical Exam       Altered Skin Integrity 07/28/23 0800 Left lateral Malleolus (Active)   07/28/23 0800   Altered Skin Integrity Present on Admission - Did Patient arrive to the hospital with altered skin?: yes   Side: Left   Orientation: lateral   Location: Malleolus   Wound Number:    Is this injury device related?: No   Primary Wound Type:    Description of Altered Skin Integrity:    Ankle-Brachial Index:    Pulses:    Removal Indication and Assessment:    Wound Outcome:    (Retired) Wound Length (cm):    (Retired) Wound Width (cm):    (Retired) Depth (cm):    Wound Description (Comments):    Removal Indications:    Wound Image   09/22/23 0856   Dressing Appearance Intact;Moist drainage 09/22/23 0856   Drainage Amount Scant 09/22/23 0856   Drainage Characteristics/Odor Serosanguineous 09/22/23 0856   Appearance Smooth;Red 09/22/23 0856   Tissue loss description Partial thickness 09/22/23 0856   Red (%), Wound Tissue Color 100 % 09/22/23 0856   Periwound Area Intact 09/22/23 0856   Wound Edges Undefined 09/22/23 0856   Wound Length (cm) 1 cm 09/22/23 0856   Wound Width (cm) 1 cm 09/22/23 0856   Wound Depth (cm) 0.1 cm 09/22/23 0856   Wound Volume (cm^3) 0.1 cm^3 09/22/23 0856   Wound Surface Area (cm^2) 1 cm^2 09/22/23 0856   Care Cleansed with:;Sterile normal saline 09/22/23 0856   Dressing Applied;Silver;Non-adherent;Island/border;Compression wrap 09/22/23 0856   Compression Two layer compression 09/22/23 0856   Dressing Change  Due 09/29/23 09/22/23 0856         Assessment/Plan:         ICD-10-CM ICD-9-CM   1. Non-pressure ulcer of lower extremity, limited to breakdown of skin, left  L97.921 707.10   2. Impaired functional mobility, balance, gait, and endurance  Z74.09 V49.89   3. Abrasion  T14.8XXA 919.0           Tissue pathology and/or culture taken:  [] Yes [x] No   Sharp debridement performed:   [] Yes [x] No   Labs ordered this visit:   [] Yes [x] No   Imaging ordered this visit:   [] Yes [x] No           Orders Placed This Encounter   Procedures    Change dressing     Left lateral leg:   Cleanse wound with: Soap and water   Lidocaine: prn   Silver nitrate: prn   Periwound care: Moisturize BLE   Primary dressing:urgo silver contact layer   Secondary dressing: mepilex border  Edema control: Co-Flex w/calamine  LLE toes to knee   Frequency: weekly   Follow-up: Dr. Casey in 1 week        Follow up in about 1 week (around 9/29/2023) for .            This includes face to face time and non-face to face time preparing to see the patient (eg, review of tests), obtaining and/or reviewing separately obtained history, documenting clinical information in the electronic or other health record, independently interpreting results and communicating results to the patient/family/caregiver, or care coordinator.

## 2023-09-29 ENCOUNTER — HOSPITAL ENCOUNTER (OUTPATIENT)
Dept: WOUND CARE | Facility: HOSPITAL | Age: 58
Discharge: HOME | End: 2023-09-29
Attending: PREVENTIVE MEDICINE
Payer: MEDICARE

## 2023-09-29 VITALS
WEIGHT: 215 LBS | TEMPERATURE: 97 F | HEART RATE: 78 BPM | HEIGHT: 69 IN | BODY MASS INDEX: 31.84 KG/M2 | SYSTOLIC BLOOD PRESSURE: 126 MMHG | DIASTOLIC BLOOD PRESSURE: 72 MMHG

## 2023-09-29 DIAGNOSIS — L92.9 HYPERGRANULATION: ICD-10-CM

## 2023-09-29 DIAGNOSIS — L97.921 NON-PRESSURE ULCER OF LOWER EXTREMITY, LIMITED TO BREAKDOWN OF SKIN, LEFT: Primary | ICD-10-CM

## 2023-09-29 PROCEDURE — 29581 APPL MULTLAYER CMPRN SYS LEG: CPT

## 2023-09-29 PROCEDURE — 17250 CHEM CAUT OF GRANLTJ TISSUE: CPT

## 2023-09-29 NOTE — PROGRESS NOTES
[facility]    Subjective:       Patient ID: Tj Trammell is a 58 y.o. male.    Chief Complaint: Wound Care    Follow up related to left lateral leg wound.  No new complaints.  Outer layer of epiderms is gone.  Applied silver nitrate to granular/hypergranular tissue. May need punch biopsy in the future.    Review of Systems   All other systems reviewed and are negative.        Objective:     Vitals:    09/29/23 0830   BP: 126/72   Pulse: 78   Temp: 96.7 °F (35.9 °C)         Physical Exam       Altered Skin Integrity 07/28/23 0800 Left lateral Malleolus (Active)   07/28/23 0800   Altered Skin Integrity Present on Admission - Did Patient arrive to the hospital with altered skin?: yes   Side: Left   Orientation: lateral   Location: Malleolus   Wound Number:    Is this injury device related?: No   Primary Wound Type:    Description of Altered Skin Integrity:    Ankle-Brachial Index:    Pulses:    Removal Indication and Assessment:    Wound Outcome:    (Retired) Wound Length (cm):    (Retired) Wound Width (cm):    (Retired) Depth (cm):    Wound Description (Comments):    Removal Indications:    Wound Image    09/29/23 0800   Dressing Appearance Intact;Moist drainage 09/29/23 0800   Drainage Amount Scant 09/29/23 0800   Drainage Characteristics/Odor Serosanguineous 09/29/23 0800   Appearance Smooth;Red;Moist 09/29/23 0800   Tissue loss description Partial thickness 09/29/23 0800   Red (%), Wound Tissue Color 100 % 09/29/23 0800   Periwound Area Intact;Dry 09/29/23 0800   Wound Edges Irregular 09/29/23 0800   Wound Length (cm) 4 cm 09/29/23 0800   Wound Width (cm) 3 cm 09/29/23 0800   Wound Depth (cm) 0.1 cm 09/29/23 0800   Wound Volume (cm^3) 1.2 cm^3 09/29/23 0800   Wound Surface Area (cm^2) 12 cm^2 09/29/23 0800   Care Cleansed with:;Soap and water;Sterile normal saline 09/29/23 0800   Dressing Applied;Silver;Hydrofiber;Foam;Island/border;Compression wrap 09/29/23  0800   Periwound Care Absorptive dressing applied;Moisturizer applied 09/29/23 0800   Compression Two layer compression 09/29/23 0800   Dressing Change Due 10/06/23 09/29/23 0800         Assessment/Plan:         ICD-10-CM ICD-9-CM   1. Non-pressure ulcer of lower extremity, limited to breakdown of skin, left  L97.921 707.10   2. Hypergranulation  L92.9 701.5           Tissue pathology and/or culture taken:  [] Yes [x] No   Sharp debridement performed:   [] Yes [x] No   Labs ordered this visit:   [] Yes [x] No   Imaging ordered this visit:   [] Yes [x] No           Orders Placed This Encounter   Procedures    Change dressing     Left lateral leg:   Cleanse wound with: Soap and water   Lidocaine: prn   Silver nitrate: prn   Periwound care: Moisturize BLE   Primary dressing: hydrofera blue ready  Secondary dressing: mepilex border   Edema control: Co-Flex w/calamine  LLE toes to knee   Frequency: weekly   Follow-up: Dr. Casey in 1 week        Follow up in about 1 week (around 10/6/2023) for Dr Casey.            This includes face to face time and non-face to face time preparing to see the patient (eg, review of tests), obtaining and/or reviewing separately obtained history, documenting clinical information in the electronic or other health record, independently interpreting results and communicating results to the patient/family/caregiver, or care coordinator.

## 2023-10-02 ENCOUNTER — HOSPITAL ENCOUNTER (OUTPATIENT)
Dept: WOUND CARE | Facility: HOSPITAL | Age: 58
Discharge: HOME | End: 2023-10-02
Attending: PREVENTIVE MEDICINE
Payer: MEDICARE

## 2023-10-02 DIAGNOSIS — Z74.09 IMPAIRED FUNCTIONAL MOBILITY, BALANCE, GAIT, AND ENDURANCE: ICD-10-CM

## 2023-10-02 DIAGNOSIS — L97.921 NON-PRESSURE ULCER OF LOWER EXTREMITY, LIMITED TO BREAKDOWN OF SKIN, LEFT: Primary | ICD-10-CM

## 2023-10-02 PROCEDURE — 29581 APPL MULTLAYER CMPRN SYS LEG: CPT

## 2023-10-02 NOTE — PROGRESS NOTES
[facility]    Subjective:       Patient ID: Tj Trammell is a 58 y.o. male.    Chief Complaint: Non-healing Wound Follow Up    Nurse visit for dressing change. Patient arrived at clinic this morning c/o of pain to left leg in location of wound. Pain relieved when dressing removed. Hydrofera blue and compression wrap applied.Patient reports no pain with new wrap.    Review of Systems      Objective:   There were no vitals filed for this visit.      Physical Exam       Altered Skin Integrity 07/28/23 0800 Left lateral Malleolus (Active)   07/28/23 0800   Altered Skin Integrity Present on Admission - Did Patient arrive to the hospital with altered skin?: yes   Side: Left   Orientation: lateral   Location: Malleolus   Wound Number:    Is this injury device related?: No   Primary Wound Type:    Description of Altered Skin Integrity:    Ankle-Brachial Index:    Pulses:    Removal Indication and Assessment:    Wound Outcome:    (Retired) Wound Length (cm):    (Retired) Wound Width (cm):    (Retired) Depth (cm):    Wound Description (Comments):    Removal Indications:    Dressing Appearance Intact;Moist drainage 10/02/23 0848   Drainage Amount Moderate 10/02/23 0848   Drainage Characteristics/Odor Serosanguineous 10/02/23 0848   Appearance Red 10/02/23 0848   Tissue loss description Full thickness 10/02/23 0848   Red (%), Wound Tissue Color 100 % 10/02/23 0848   Periwound Area Intact;Dry;Edematous 10/02/23 0848   Wound Edges Defined 10/02/23 0848   Wound Length (cm) 4 cm 10/02/23 0848   Wound Width (cm) 3 cm 10/02/23 0848   Wound Depth (cm) 0.1 cm 10/02/23 0848   Wound Volume (cm^3) 1.2 cm^3 10/02/23 0848   Wound Surface Area (cm^2) 12 cm^2 10/02/23 0848   Care Cleansed with:;Sterile normal saline 10/02/23 0848   Dressing Applied;Hydrofiber;Island/border;Compression wrap 10/02/23 0848   Compression Two layer compression 10/02/23 0848   Dressing Change Due 10/06/23  10/02/23 0848         Assessment/Plan:         ICD-10-CM ICD-9-CM   1. Non-pressure ulcer of lower extremity, limited to breakdown of skin, left  L97.921 707.10   2. Impaired functional mobility, balance, gait, and endurance  Z74.09 V49.89           Tissue pathology and/or culture taken:  [] Yes [x] No   Sharp debridement performed:   [] Yes [x] No   Labs ordered this visit:   [] Yes [x] No   Imaging ordered this visit:   [] Yes [x] No           No orders of the defined types were placed in this encounter.  Md orders from 9/29/2023:   Left lateral leg:   Cleanse wound with: Soap and water   Lidocaine: prn   Silver nitrate: prn   Periwound care: Moisturize BLE   Primary dressing: hydrofera blue ready   Secondary dressing: mepilex border   Edema control: Co-Flex w/calamine  LLE toes to knee   Frequency: weekly   Follow-up: Dr. Casey in 1 week    Follow up in about 4 days (around 10/6/2023) for .            This includes face to face time and non-face to face time preparing to see the patient (eg, review of tests), obtaining and/or reviewing separately obtained history, documenting clinical information in the electronic or other health record, independently interpreting results and communicating results to the patient/family/caregiver, or care coordinator.

## 2023-10-06 ENCOUNTER — TELEPHONE (OUTPATIENT)
Dept: ENDOSCOPY | Facility: HOSPITAL | Age: 58
End: 2023-10-06
Payer: MEDICARE

## 2023-10-06 ENCOUNTER — HOSPITAL ENCOUNTER (OUTPATIENT)
Dept: WOUND CARE | Facility: HOSPITAL | Age: 58
Discharge: HOME | End: 2023-10-06
Attending: PREVENTIVE MEDICINE
Payer: MEDICARE

## 2023-10-06 VITALS
HEIGHT: 69 IN | HEART RATE: 73 BPM | TEMPERATURE: 98 F | SYSTOLIC BLOOD PRESSURE: 141 MMHG | WEIGHT: 215 LBS | DIASTOLIC BLOOD PRESSURE: 67 MMHG | BODY MASS INDEX: 31.84 KG/M2

## 2023-10-06 DIAGNOSIS — Z74.09 IMPAIRED FUNCTIONAL MOBILITY, BALANCE, GAIT, AND ENDURANCE: ICD-10-CM

## 2023-10-06 DIAGNOSIS — L97.921 NON-PRESSURE ULCER OF LOWER EXTREMITY, LIMITED TO BREAKDOWN OF SKIN, LEFT: Primary | ICD-10-CM

## 2023-10-06 PROCEDURE — 29581 APPL MULTLAYER CMPRN SYS LEG: CPT

## 2023-10-06 RX ORDER — DOXYCYCLINE HYCLATE 100 MG
100 TABLET ORAL EVERY 12 HOURS
Qty: 28 TABLET | Refills: 0 | Status: SHIPPED | OUTPATIENT
Start: 2023-10-06 | End: 2023-10-20

## 2023-10-06 NOTE — PROGRESS NOTES
[facility]    Subjective:       Patient ID: Tj Trammell is a 58 y.o. male.    Chief Complaint: Non-healing Wound Follow Up    Wound care follow up for left leg ulcer. Chart reviewed, wound unchanged since July despite aggressive local wound care and compression. Will start on antibiotics and refer to dermatology for recommendation and possible biopsy.     Review of Systems   All other systems reviewed and are negative.        Objective:     Vitals:    10/06/23 0814   BP: (!) 141/67   Pulse: 73   Temp: 97.8 °F (36.6 °C)         Physical Exam       Altered Skin Integrity 07/28/23 0800 Left lateral Malleolus (Active)   07/28/23 0800   Altered Skin Integrity Present on Admission - Did Patient arrive to the hospital with altered skin?: yes   Side: Left   Orientation: lateral   Location: Malleolus   Wound Number:    Is this injury device related?: No   Primary Wound Type:    Description of Altered Skin Integrity:    Ankle-Brachial Index:    Pulses:    Removal Indication and Assessment:    Wound Outcome:    (Retired) Wound Length (cm):    (Retired) Wound Width (cm):    (Retired) Depth (cm):    Wound Description (Comments):    Removal Indications:    Wound Image   10/06/23 0814   Dressing Appearance Intact;Moist drainage 10/06/23 0814   Drainage Amount Moderate 10/06/23 0814   Drainage Characteristics/Odor Sanguineous 10/06/23 0814   Appearance Red;Smooth;Not granulating;Other (see comments) 10/06/23 0814   Red (%), Wound Tissue Color 100 % 10/06/23 0814   Periwound Area Intact 10/06/23 0814   Wound Edges Undefined 10/06/23 0814   Wound Length (cm) 3.5 cm 10/06/23 0814   Wound Width (cm) 2 cm 10/06/23 0814   Wound Depth (cm) 0.1 cm 10/06/23 0814   Wound Volume (cm^3) 0.7 cm^3 10/06/23 0814   Wound Surface Area (cm^2) 7 cm^2 10/06/23 0814   Care Cleansed with:;Sterile normal saline 10/06/23 0814   Dressing Applied;Hydrofiber;Absorptive Pad;Compression wrap 10/06/23  0814   Periwound Care Absorptive dressing applied 10/06/23 0814   Compression Two layer compression 10/06/23 0814   Dressing Change Due 10/13/23 10/06/23 0814         Assessment/Plan:         ICD-10-CM ICD-9-CM   1. Non-pressure ulcer of lower extremity, limited to breakdown of skin, left  L97.921 707.10   2. Impaired functional mobility, balance, gait, and endurance  Z74.09 V49.89           Tissue pathology and/or culture taken:  [] Yes [x] No   Sharp debridement performed:   [] Yes [x] No   Labs ordered this visit:   [] Yes [x] No   Imaging ordered this visit:   [] Yes [x] No           Orders Placed This Encounter   Procedures    Ambulatory referral/consult to Dermatology     Standing Status:   Future     Standing Expiration Date:   11/6/2024     Referral Priority:   Routine     Referral Type:   Consultation     Referral Reason:   Specialty Services Required     Requested Specialty:   Dermatology     Number of Visits Requested:   1    Change dressing     Left lateral leg:   Cleanse wound with: Soap and water   Lidocaine: prn   Silver nitrate: prn   Periwound care: Moisturize BLE   Primary dressing: hydrofera blue ready   Secondary dressing: small ABD   Edema control: Co-Flex w/calamine  LLE toes to knee   Frequency: weekly   Follow-up: Dr. Casey in 1 week  Other: Refer to dermatology, RX: Doxycyline X 14 days        Follow up in about 1 week (around 10/13/2023) for .            This includes face to face time and non-face to face time preparing to see the patient (eg, review of tests), obtaining and/or reviewing separately obtained history, documenting clinical information in the electronic or other health record, independently interpreting results and communicating results to the patient/family/caregiver, or care coordinator.

## 2023-10-06 NOTE — TELEPHONE ENCOUNTER
Left messages instructing patient to call dept @ [phone] between 8am-4pm.    Arrival time to be given @ 1030  Colon/Suprep  (No [facility] portal)

## 2023-10-09 ENCOUNTER — TELEPHONE (OUTPATIENT)
Dept: ENDOSCOPY | Facility: HOSPITAL | Age: 58
End: 2023-10-09
Payer: MEDICARE

## 2023-10-09 NOTE — TELEPHONE ENCOUNTER
Spoke with patient's brother about arrival time @ 1030.   Colon/Suprep    Prep instructions reviewed: the day before the procedure, follow a clear liquid diet all day, then start the first 1/2 of prep at 5pm and take 2nd 1/2 of prep @ 0530.  Pt must be completely NPO when prep completed @ 0730.              Medications: Do not take Insulin or oral diabetic medications the day of the procedure.  Take as prescribed: heart, seizure and blood pressure medication in the morning with a sip of water (less than an ounce).  Take any breathing medications and bring inhalers to hospital with you Leave all valuables and jewelry at home.     Wear comfortable clothes to procedure to change into hospital gown You cannot drive for 24 hours after your procedure because you will receive sedation for your procedure to make you comfortable.  A ride must be provided at discharge.

## 2023-10-10 ENCOUNTER — ANESTHESIA (OUTPATIENT)
Dept: ENDOSCOPY | Facility: HOSPITAL | Age: 58
End: 2023-10-10
Payer: MEDICARE

## 2023-10-10 ENCOUNTER — HOSPITAL ENCOUNTER (OUTPATIENT)
Facility: HOSPITAL | Age: 58
Discharge: HOME | End: 2023-10-10
Attending: INTERNAL MEDICINE | Admitting: INTERNAL MEDICINE
Payer: MEDICARE

## 2023-10-10 ENCOUNTER — ANESTHESIA EVENT (OUTPATIENT)
Dept: ENDOSCOPY | Facility: HOSPITAL | Age: 58
End: 2023-10-10
Payer: MEDICARE

## 2023-10-10 VITALS
OXYGEN SATURATION: 98 % | HEIGHT: 69 IN | SYSTOLIC BLOOD PRESSURE: 135 MMHG | TEMPERATURE: 98 F | HEART RATE: 76 BPM | WEIGHT: 235 LBS | RESPIRATION RATE: 14 BRPM | DIASTOLIC BLOOD PRESSURE: 78 MMHG | BODY MASS INDEX: 34.8 KG/M2

## 2023-10-10 DIAGNOSIS — Z12.11 SCREEN FOR COLON CANCER: ICD-10-CM

## 2023-10-10 LAB
ERYTHROCYTE [DISTWIDTH] IN BLOOD BY AUTOMATED COUNT: 17 % (ref 11.5–14.5)
HCT VFR BLD AUTO: 49.3 % (ref 40–54)
HGB BLD-MCNC: 15.5 G/DL (ref 14–18)
MCH RBC QN AUTO: 23.6 PG (ref 27–31)
MCHC RBC AUTO-ENTMCNC: 31.4 G/DL (ref 32–36)
MCV RBC AUTO: 75 FL (ref 82–98)
PLATELET # BLD AUTO: 192 K/UL (ref 150–450)
PMV BLD AUTO: 9.7 FL (ref 9.2–12.9)
RBC # BLD AUTO: 6.58 M/UL (ref 4.6–6.2)
WBC # BLD AUTO: 5.81 K/UL (ref 3.9–12.7)

## 2023-10-10 PROCEDURE — G0105 COLORECTAL SCRN; HI RISK IND: HCPCS | Mod: ,,, | Performed by: INTERNAL MEDICINE

## 2023-10-10 PROCEDURE — D9220A PRA ANESTHESIA: Mod: ANES,,, | Performed by: STUDENT IN AN ORGANIZED HEALTH CARE EDUCATION/TRAINING PROGRAM

## 2023-10-10 PROCEDURE — 85027 COMPLETE CBC AUTOMATED: CPT | Performed by: INTERNAL MEDICINE

## 2023-10-10 PROCEDURE — G0105 COLORECTAL SCRN; HI RISK IND: ICD-10-PCS | Mod: ,,, | Performed by: INTERNAL MEDICINE

## 2023-10-10 PROCEDURE — D9220A PRA ANESTHESIA: Mod: CRNA,,, | Performed by: NURSE ANESTHETIST, CERTIFIED REGISTERED

## 2023-10-10 PROCEDURE — 37000008 HC ANESTHESIA 1ST 15 MINUTES: Performed by: INTERNAL MEDICINE

## 2023-10-10 PROCEDURE — 25000003 PHARM REV CODE 250: Performed by: NURSE ANESTHETIST, CERTIFIED REGISTERED

## 2023-10-10 PROCEDURE — 37000009 HC ANESTHESIA EA ADD 15 MINS: Performed by: INTERNAL MEDICINE

## 2023-10-10 PROCEDURE — 36415 COLL VENOUS BLD VENIPUNCTURE: CPT | Performed by: INTERNAL MEDICINE

## 2023-10-10 PROCEDURE — G0105 COLORECTAL SCRN; HI RISK IND: HCPCS | Performed by: INTERNAL MEDICINE

## 2023-10-10 PROCEDURE — D9220A PRA ANESTHESIA: ICD-10-PCS | Mod: ANES,,, | Performed by: STUDENT IN AN ORGANIZED HEALTH CARE EDUCATION/TRAINING PROGRAM

## 2023-10-10 PROCEDURE — D9220A PRA ANESTHESIA: ICD-10-PCS | Mod: CRNA,,, | Performed by: NURSE ANESTHETIST, CERTIFIED REGISTERED

## 2023-10-10 PROCEDURE — 63600175 PHARM REV CODE 636 W HCPCS: Performed by: NURSE ANESTHETIST, CERTIFIED REGISTERED

## 2023-10-10 RX ORDER — SODIUM CHLORIDE 0.9 % (FLUSH) 0.9 %
10 SYRINGE (ML) INJECTION
Status: DISCONTINUED | OUTPATIENT
Start: 2023-10-10 | End: 2023-10-10 | Stop reason: HOSPADM

## 2023-10-10 RX ORDER — PROPOFOL 10 MG/ML
VIAL (ML) INTRAVENOUS CONTINUOUS PRN
Status: DISCONTINUED | OUTPATIENT
Start: 2023-10-10 | End: 2023-10-10

## 2023-10-10 RX ORDER — PROPOFOL 10 MG/ML
VIAL (ML) INTRAVENOUS
Status: DISCONTINUED | OUTPATIENT
Start: 2023-10-10 | End: 2023-10-10

## 2023-10-10 RX ORDER — LIDOCAINE HYDROCHLORIDE 20 MG/ML
INJECTION INTRAVENOUS
Status: DISCONTINUED | OUTPATIENT
Start: 2023-10-10 | End: 2023-10-10

## 2023-10-10 RX ADMIN — PROPOFOL 60 MG: 10 INJECTION, EMULSION INTRAVENOUS at 01:10

## 2023-10-10 RX ADMIN — PROPOFOL 150 MCG/KG/MIN: 10 INJECTION, EMULSION INTRAVENOUS at 01:10

## 2023-10-10 RX ADMIN — SODIUM CHLORIDE: 0.9 INJECTION, SOLUTION INTRAVENOUS at 12:10

## 2023-10-10 RX ADMIN — LIDOCAINE HYDROCHLORIDE 40 MG: 20 INJECTION, SOLUTION INTRAVENOUS at 01:10

## 2023-10-10 NOTE — PLAN OF CARE
MD at bedside. Updated patient and brother of the findings and plan. Waiting to get blood work results back before discharging patient, per MD.

## 2023-10-10 NOTE — ANESTHESIA POSTPROCEDURE EVALUATION
Anesthesia Post Evaluation    Patient: Tj Trammell    Procedure(s) Performed: Procedure(s) (LRB):  COLONOSCOPY (N/A)    Final Anesthesia Type: general      Patient location during evaluation: PACU  Patient participation: Yes- Able to Participate  Level of consciousness: awake  Post-procedure vital signs: reviewed and stable  Pain management: adequate  Airway patency: patent    PONV status at discharge: No PONV  Anesthetic complications: no      Cardiovascular status: blood pressure returned to baseline  Respiratory status: unassisted  Hydration status: euvolemic  Follow-up not needed.          Vitals Value Taken Time   /78 10/10/23 1400   Temp 36.8 °C (98.2 °F) 10/10/23 1330   Pulse 76 10/10/23 1400   Resp 14 10/10/23 1400   SpO2 98 % 10/10/23 1400         Event Time   Out of Recovery 14:20:44         Pain/Suki Score: Suki Score: 10 (10/10/2023  2:00 PM)

## 2023-10-10 NOTE — H&P
Short Stay Endoscopy History and Physical    PCP - Bayron Ferguson MD    Procedure - Colonoscopy  ASA - III  Mallampati - per anesthesia  History of Anesthesia problems - no  Family history Anesthesia problems - no     HPI:  This is a 58 y.o. male here for evaluation of : Colon polyps    Pt here today for surveillance of prior colon polyps. The most recent exam was in 2019, at which time patients recalls having polyps removed. Since patient denies change in bowel habits or overt blood in stool. Denies weight loss.     ROS:  Constitutional: No fevers, chills, No weight loss  ENT: No allergies  CV: No chest pain  Pulm: No shortness of breath  GI: see HPI  Derm: No rash    Medical History:  has a past medical history of Asthma, Bilateral hearing loss (12/12/2012), Biliary acute pancreatitis, GERD (gastroesophageal reflux disease), High cholesterol, Hypertension, and Multiple sclerosis.    Surgical History:  has a past surgical history that includes Joint replacement; Cholecystectomy; Colonoscopy (N/A, 1/22/2019); Tonsillectomy; Appendectomy; Esophagogastroduodenoscopy (N/A, 9/29/2021); Transforaminal epidural injection of steroid (Bilateral, 10/5/2022); and Colonoscopy (N/A, 4/25/2023).    Family History: family history includes Heart disease in his brother, father, and mother.. Otherwise no colon cancer, inflammatory bowel disease, or GI malignancies.    Social History:  reports that he quit smoking about 21 months ago. His smoking use included cigarettes. He started smoking about 33 years ago. He has a 32.0 pack-year smoking history. He has never used smokeless tobacco. He reports that he does not currently use alcohol. He reports that he does not use drugs.    Review of patient's allergies indicates:  No Known Allergies    Medications:   Medications Prior to Admission   Medication Sig Dispense Refill Last Dose    acetaZOLAMIDE (DIAMOX) 250 MG tablet Take 2 TABLETS BY MOUTH ONLY 8 HOURS AFTER SURGERY 2 tablet 0  Past Week    albuterol (PROAIR HFA) 90 mcg/actuation inhaler Inhale 2 puffs by mouth every 4 hours. 8.5 g 3 Past Week    ALPRAZolam (XANAX) 0.5 MG tablet TAKE ONE TABLET BY MOUTH TWICE DAILY AS NEEDED FOR ANXIETY 60 tablet 4 Past Week    atorvastatin (LIPITOR) 40 MG tablet Take 1 tablet (40 mg total) by mouth once daily. 90 tablet 4 Past Week    baclofen (LIORESAL) 10 MG tablet TAKE 1 TABLET BY MOUTH 3 TIMES DAILY. 90 tablet 11 Past Week    buPROPion (WELLBUTRIN SR) 150 MG TBSR 12 hr tablet TAKE 1 TABLET BY MOUTH EVERY 12 HOURS DAILY. 60 tablet 11 Past Week    dicyclomine (BENTYL) 10 MG capsule TAKE 1 CAPSULE BY MOUTH THREE TIMES A DAY FOR ABDOMINAL PAIN 90 capsule 3 Past Week    difluprednate (DUREZOL) 0.05 % Drop ophthalmic solution Instill one drop TO SURGERY EYE four times a day AFTER SURGERY X 30 DAYS 5 mL 4 Past Week    docusate sodium (COLACE) 100 MG capsule Take 1 capsule (100 mg total) by mouth once daily. 30 capsule 5 Past Week    doxycycline (VIBRA-TABS) 100 MG tablet Take 1 tablet (100 mg total) by mouth every 12 (twelve) hours. for 14 days 28 tablet 0 Past Week    ergocalciferol (ERGOCALCIFEROL) 50,000 unit Cap TAKE 1 CAPSULE BY MOUTH Weekly 12 capsule 0 Past Week    famotidine (PEPCID) 20 MG tablet Take 1 tablet (20 mg total) by mouth 2 (two) times daily. 180 tablet 3 Past Month    fingolimod (GILENYA) 0.5 mg Cap TAKE ONE CAPSULE (0.5 MG) BY MOUTH ONCE DAILY. MAY TAKE WITH OR WITHOUT FOOD. STORE AT ROOM TEMPERATURE. 90 capsule 2 Past Week    gabapentin (NEURONTIN) 300 MG capsule TAKE 1 CAPSULE BY MOUTH AT BEDTIME 30 capsule 11 Past Week    lactulose (CHRONULAC) 10 gram/15 mL solution 45 mLs (30 g total) 3 (three) times daily. 4050 mL 3 Past Week    linaCLOtide (LINZESS) 290 mcg Cap capsule Take 1 capsule (290 mcg total) by mouth before breakfast. 30 capsule 11 Past Week    lisinopriL (PRINIVIL,ZESTRIL) 5 MG tablet TAKE 1 TABLET BY MOUTH DAILY 90 tablet 3 Past Week    nabumetone (RELAFEN) 500 MG tablet  Take 1 tablet (500 mg total) by mouth 2 (two) times daily as needed. 60 tablet 6 Past Week    nebulizer and compressor (HOME NEBULIZER PLUS SIDESTREAM) Lupe use as directed 1 each 0 Past Week    oxybutynin (DITROPAN-XL) 5 MG TR24 Take 1 tablet (5 mg total) by mouth once daily. 90 tablet 3 Past Week    pantoprazole (PROTONIX) 40 MG tablet TAKE ONE TABLET BY MOUTH  ONCE DAILY 90 tablet 3 Past Week    traMADoL (ULTRAM) 50 mg tablet Take 1 tablet (50 mg total) by mouth every 6 (six) hours as needed for pain 120 tablet 4 Past Week    bacitracin 500 unit/gram Oint Apply topically 2 (two) times daily. 28 g 0     mupirocin (BACTROBAN) 2 % ointment Apply topically once daily. 22 g 2 More than a month    ofloxacin (OCUFLOX) 0.3 % ophthalmic solution Instill 1 drop TO SURGERY EYE four times a day STARTING 2 DAYS BEFORE SURGERY 5 mL 3 More than a month    sodium,potassium,mag sulfates (SUPREP BOWEL PREP KIT) 17.5-3.13-1.6 gram SolR Take 177 mLs by mouth once daily. 354 mL 0     triamcinolone acetonide 0.1% (KENALOG) 0.1 % cream Apply topically 2 (two) times daily. for 10 days 80 g 0          Objective Findings:    Vital Signs: see nursing notes  Physical Exam:  General Appearance: Well appearing in no acute distress  Eyes:    No scleral icterus  ENT: Neck supple  Lungs: CTA anteriorly  Heart:  S1, S2 normal, no murmurs heard  Abdomen: Soft, non tender, non distended with positive bowel sounds. No hepatosplenomegaly, ascites, or mass  Extremities: no edema  Skin: No rash      Labs:  Lab Results   Component Value Date    WBC 4.04 07/11/2023    HGB 15.3 07/11/2023    HCT 49.4 07/11/2023     07/11/2023    CHOL 161 07/11/2023    TRIG 262 (H) 07/11/2023    HDL 32 (L) 07/11/2023    ALT 26 07/11/2023    AST 30 07/11/2023     07/11/2023    K 4.0 07/11/2023     07/11/2023    CREATININE 1.1 07/11/2023    BUN 10 07/11/2023    CO2 28 07/11/2023    TSH 0.829 07/15/2014    PSA 0.14 07/11/2023    INR 1.0 07/13/2021    HGBA1C  6.6 (H) 07/11/2023       I have explained the risks and benefits of endoscopy procedures to the patient including but not limited to bleeding, perforation, infection, and death.    Jared Loredo MD

## 2023-10-10 NOTE — [employer] PATIENT INSTRUCTIONS
Discharge Summary/Instructions after an Endoscopic Procedure  Patient Name: Tj Trammell  Patient MRN: [id number]  Patient YOB: 1965  Tuesday, October 10, 2023  Jared Loredo MD  Dear patient,  As a result of recent federal legislation (The Federal Cures Act), you may   receive lab or pathology results from your procedure in your [facility]   account before your physician is able to contact you. Your physician or   their representative will relay the results to you with their   recommendations at their soonest availability.  Thank you,  Your health is very important to us during the Covid Crisis. Following your   procedure today, you will receive a daily text for 2 weeks asking about   signs or symptoms of Covid 19.  Please respond to this text when you   receive it so we can follow up and keep you as safe as possible.   RESTRICTIONS:  During your procedure today, you received medications for sedation.  These   medications may affect your judgment, balance and coordination.  Therefore,   for 24 hours, you have the following restrictions:   - DO NOT drive a car, operate machinery, make legal/financial decisions,   sign important papers or drink alcohol.    ACTIVITY:  Today: no heavy lifting, straining or running due to procedural   sedation/anesthesia.  The following day: return to full activity including work.  DIET:  Eat and drink normally unless instructed otherwise.     TREATMENT FOR COMMON SIDE EFFECTS:  - Mild abdominal pain, nausea, belching, bloating or excessive gas:  rest,   eat lightly and use a heating pad.  - Sore Throat: treat with throat lozenges and/or gargle with warm salt   water.  - Because air was used during the procedure, expelling large amounts of air   from your rectum or belching is normal.  - If a bowel prep was taken, you may not have a bowel movement for 1-3 days.    This is normal.  SYMPTOMS TO WATCH FOR AND REPORT TO YOUR PHYSICIAN:  1. Abdominal pain or bloating,  other than gas cramps.  2. Chest pain.  3. Back pain.  4. Signs of infection such as: chills or fever occurring within 24 hours   after the procedure.  5. Rectal bleeding, which would show as bright red, maroon, or black stools.   (A tablespoon of blood from the rectum is not serious, especially if   hemorrhoids are present.)  6. Vomiting.  7. Weakness or dizziness.  GO DIRECTLY TO THE NEAREST EMERGENCY ROOM IF YOU HAVE ANY OF THE FOLLOWING:      Difficulty breathing              Chills and/or fever over 101 F   Persistent vomiting and/or vomiting blood   Severe abdominal pain   Severe chest pain   Black, tarry stools   Bleeding- more than one tablespoon   Any other symptom or condition that you feel may need urgent attention  Your doctor recommends these additional instructions:  If any biopsies were taken, your doctors clinic will contact you in 1 to 2   weeks with any results.  - Discharge patient to home.   - Resume previous diet.   - Continue present medications.   - Repeat colonoscopy in 6 months because the bowel preparation was   suboptimal.   - For future colonoscopy the patient will require an extended preparation.    If there are any questions, please contact the gastroenterologist.   - Patient has a contact number available for emergencies.  The signs and   symptoms of potential delayed complications were discussed with the   patient.  Return to normal activities tomorrow.  Written discharge   instructions were provided to the patient.  For questions, problems or results please call your physician - Jared Loredo MD.  EMERGENCY PHONE NUMBER: [phone],  LAB RESULTS: [phone]  IF A COMPLICATION OR EMERGENCY SITUATION ARISES AND YOU ARE UNABLE TO REACH   YOUR PHYSICIAN - GO DIRECTLY TO THE EMERGENCY ROOM.  Jared Loredo MD  10/10/2023 1:29:04 PM  This report has been verified and signed electronically.  Dear patient,  As a result of recent federal legislation (The Federal  Cures Act), you may   receive lab or pathology results from your procedure in your [facility]   account before your physician is able to contact you. Your physician or   their representative will relay the results to you with their   recommendations at their soonest availability.  Thank you,  [employer]

## 2023-10-10 NOTE — TRANSFER OF CARE
"Anesthesia Transfer of Care Note    Patient: Tj Trammell    Procedure(s) Performed: Procedure(s) (LRB):  COLONOSCOPY (N/A)    Patient location: GI    Anesthesia Type: general    Transport from OR: Transported from OR on room air with adequate spontaneous ventilation    Post pain: adequate analgesia    Post assessment: no apparent anesthetic complications and tolerated procedure well    Post vital signs: stable    Level of consciousness: responds to stimulation and sedated    Nausea/Vomiting: no nausea/vomiting    Complications: none    Transfer of care protocol was followed      Last vitals:   Visit Vitals  BP (!) 177/81 (BP Location: Left arm, Patient Position: Lying)   Pulse 88   Temp 37.1 °C (98.8 °F) (Temporal)   Resp 18   Ht 5' 9" (1.753 m)   Wt 106.6 kg (235 lb)   SpO2 96%   BMI 34.70 kg/m²     "

## 2023-10-10 NOTE — ANESTHESIA PREPROCEDURE EVALUATION
[facility]  Anesthesia Pre-Operative Evaluation         Patient Name: Tj Trammell  YOB: 1965  MRN: [id number]    SUBJECTIVE:     10/10/2023    Procedure(s) (LRB):  COLONOSCOPY (N/A)    Tj Trammell is a 58 y.o. male here for Procedure(s) (LRB):  COLONOSCOPY (N/A)    Drips:     Patient Active Problem List   Diagnosis    Multiple sclerosis    Generalized osteoarthritis of multiple sites    Bilateral hearing loss    Incontinence of urine    Deaf    Tobacco abuse    HTN (hypertension)    HLD (hyperlipidemia)    Constipation    Pain, abdominal    Biliary stricture    GERD (gastroesophageal reflux disease)    Screening for malignant neoplasm of colon    Fall off motorized mobility scooter, initial encounter    Abrasion    Wrist pain    Scapholunate dissociation of left wrist    Anxiety disorder    Chronic obstructive pulmonary disease    Metabolic syndrome    Opioid dependence    Abdominal bloating    Lumbar radiculopathy    Non-pressure ulcer of lower extremity, limited to breakdown of skin, left       Review of patient's allergies indicates:  No Known Allergies    Current Outpatient Medications on File Prior to Visit   Medication Sig Dispense Refill    acetaZOLAMIDE (DIAMOX) 250 MG tablet Take 2 TABLETS BY MOUTH ONLY 8 HOURS AFTER SURGERY 2 tablet 0    albuterol (PROAIR HFA) 90 mcg/actuation inhaler Inhale 2 puffs by mouth every 4 hours. 8.5 g 3    ALPRAZolam (XANAX) 0.5 MG tablet TAKE ONE TABLET BY MOUTH TWICE DAILY AS NEEDED FOR ANXIETY 60 tablet 4    atorvastatin (LIPITOR) 40 MG tablet Take 1 tablet (40 mg total) by mouth once daily. 90 tablet 4    bacitracin 500 unit/gram Oint Apply topically 2 (two) times daily. 28 g 0    baclofen (LIORESAL) 10 MG tablet TAKE 1 TABLET BY MOUTH 3 TIMES DAILY. 90 tablet 11    buPROPion (WELLBUTRIN SR) 150 MG TBSR 12 hr tablet TAKE  1 TABLET BY MOUTH EVERY 12 HOURS DAILY. 60 tablet 11    dicyclomine (BENTYL) 10 MG capsule TAKE 1 CAPSULE BY MOUTH THREE TIMES A DAY FOR ABDOMINAL PAIN 90 capsule 3    difluprednate (DUREZOL) 0.05 % Drop ophthalmic solution Instill one drop TO SURGERY EYE four times a day AFTER SURGERY X 30 DAYS 5 mL 4    docusate sodium (COLACE) 100 MG capsule Take 1 capsule (100 mg total) by mouth once daily. 30 capsule 5    doxycycline (VIBRA-TABS) 100 MG tablet Take 1 tablet (100 mg total) by mouth every 12 (twelve) hours. for 14 days 28 tablet 0    ergocalciferol (ERGOCALCIFEROL) 50,000 unit Cap TAKE 1 CAPSULE BY MOUTH Weekly 12 capsule 0    famotidine (PEPCID) 20 MG tablet Take 1 tablet (20 mg total) by mouth 2 (two) times daily. 180 tablet 3    fingolimod (GILENYA) 0.5 mg Cap TAKE ONE CAPSULE (0.5 MG) BY MOUTH ONCE DAILY. MAY TAKE WITH OR WITHOUT FOOD. STORE AT ROOM TEMPERATURE. 90 capsule 2    gabapentin (NEURONTIN) 300 MG capsule TAKE 1 CAPSULE BY MOUTH AT BEDTIME 30 capsule 11    lactulose (CHRONULAC) 10 gram/15 mL solution 45 mLs (30 g total) 3 (three) times daily. 4050 mL 3    linaCLOtide (LINZESS) 290 mcg Cap capsule Take 1 capsule (290 mcg total) by mouth before breakfast. 30 capsule 11    lisinopriL (PRINIVIL,ZESTRIL) 5 MG tablet TAKE 1 TABLET BY MOUTH DAILY 90 tablet 3    mupirocin (BACTROBAN) 2 % ointment Apply topically once daily. 22 g 2    nabumetone (RELAFEN) 500 MG tablet Take 1 tablet (500 mg total) by mouth 2 (two) times daily as needed. 60 tablet 6    nebulizer and compressor (HOME NEBULIZER PLUS SIDESTREAM) Lupe use as directed 1 each 0    ofloxacin (OCUFLOX) 0.3 % ophthalmic solution Instill 1 drop TO SURGERY EYE four times a day STARTING 2 DAYS BEFORE SURGERY 5 mL 3    oxybutynin (DITROPAN-XL) 5 MG TR24 Take 1 tablet (5 mg total) by mouth once daily. 90 tablet 3    pantoprazole (PROTONIX) 40 MG tablet TAKE ONE TABLET BY MOUTH  ONCE DAILY 90 tablet 3    sodium,potassium,mag sulfates  (SUPREP BOWEL PREP KIT) 17.5-3.13-1.6 gram SolR Take 177 mLs by mouth once daily. 354 mL 0    traMADoL (ULTRAM) 50 mg tablet Take 1 tablet (50 mg total) by mouth every 6 (six) hours as needed for pain 120 tablet 4    triamcinolone acetonide 0.1% (KENALOG) 0.1 % cream Apply topically 2 (two) times daily. for 10 days 80 g 0     No current facility-administered medications on file prior to visit.       Past Surgical History:   Procedure Laterality Date    APPENDECTOMY      CHOLECYSTECTOMY      COLONOSCOPY N/A 2019    Procedure: COLONOSCOPY 2 day  golytely;  Surgeon: Nathan Waddell MD;  Location: [facility] ENDO;  Service: Endoscopy;  Laterality: N/A;    COLONOSCOPY N/A 2023    Procedure: COLONOSCOPY;  Surgeon: Jared Loredo MD;  Location: [facility] ENDO;  Service: Endoscopy;  Laterality: N/A;    ESOPHAGOGASTRODUODENOSCOPY N/A 2021    Procedure: EGD (ESOPHAGOGASTRODUODENOSCOPY) covid test Rapid;  Surgeon: Jared Loredo MD;  Location: [facility] ENDO;  Service: Endoscopy;  Laterality: N/A;    JOINT REPLACEMENT      arm    TONSILLECTOMY      TRANSFORAMINAL EPIDURAL INJECTION OF STEROID Bilateral 10/5/2022    Procedure: Injection,steroid,epidural,transforaminal approach;  Surgeon: Soo Giles MD;  Location: [facility] PAIN MGT;  Service: Pain Management;  Laterality: Bilateral;  bilateral L5 transforaminal epidural steroid injection with RN IV sedation       Social History     Socioeconomic History    Marital status: Single   Tobacco Use    Smoking status: Former     Current packs/day: 0.00     Average packs/day: 1 pack/day for 32.0 years (32.0 ttl pk-yrs)     Types: Cigarettes     Start date: 1990     Quit date: 2022     Years since quittin.7    Smokeless tobacco: Never   Substance and Sexual Activity    Alcohol use: Not Currently    Drug use: No         OBJECTIVE:     Vital Signs Range (Last 24H):  Temp:  [37.1 °C (98.8 °F)] 37.1 °C (98.8 °F)  Pulse:  [88] 88  Resp:  [18]  18  SpO2:  [96 %] 96 %  BP: (177)/(81) 177/81    Significant Labs:  Lab Results   Component Value Date    WBC 4.04 07/11/2023    HGB 15.3 07/11/2023    HCT 49.4 07/11/2023     07/11/2023    CHOL 161 07/11/2023    TRIG 262 (H) 07/11/2023    HDL 32 (L) 07/11/2023    ALT 26 07/11/2023    AST 30 07/11/2023     07/11/2023    K 4.0 07/11/2023     07/11/2023    CREATININE 1.1 07/11/2023    BUN 10 07/11/2023    CO2 28 07/11/2023    TSH 0.829 07/15/2014    PSA 0.14 07/11/2023    INR 1.0 07/13/2021    HGBA1C 6.6 (H) 07/11/2023       Prior anesthetic history per previous records:       PRIOR ANES (Epic)  [id number] EGD    prop 80 -> 150 mcg/kg/min VSS [facility]  [id number] Rotator_Cuff interscalblock/GA    [MAC for block; prop 160]    [sevo, prop 40. fent 250, ephed 25]    [easy mask; ETT 7.5, Mac4, 3 attempts lg floppy epiglottis]  [id number] ERCP GA    [mid 2, fent 50, prop 100] VSS    [sevo] VSS    [easy mask; CMAC intubation, large floppy epiglottis, Grade III]      Pre-op Assessment    I have reviewed the Patient Summary Reports.     I have reviewed the Nursing Notes. I have reviewed the NPO Status.   I have reviewed the Medications.     Review of Systems  Anesthesia Hx:  No problems with previous Anesthesia  History of prior surgery of interest to airway management or planning:  Denies Personal Hx of Anesthesia complications.   Social:  Former Smoker    Hematology/Oncology:  Hematology Normal   Oncology Normal     EENT/Dental:   Hearing loss   Cardiovascular:   Exercise tolerance: poor Hypertension Denies Valvular problems/Murmurs.  Denies MI.   hyperlipidemia Limited mobility (wheelchair), but can transfer short distances and with assistance    Pulmonary:   COPD, mild Asthma    Renal/:   Denies Chronic Renal Disease.     Hepatic/GI:   GERD Denies Liver Disease.    Musculoskeletal:   Arthritis   Spine Disorders: lumbar Chronic Pain    Neurological:   Denies TIA. Denies CVA. Neuromuscular Disease,  Denies  Seizures. Multiple sclerosis    Endocrine:   Denies Diabetes. Denies Hypothyroidism. Denies Hyperthyroidism. BMI 34.7  Obesity / BMI > 30  Psych:   anxiety          Physical Exam  General: Well nourished, Cooperative, Oriented and Alert  LLE bandages with wound care   Airway:  Mallampati: II           Anesthesia Plan  Type of Anesthesia, risks & benefits discussed:    Anesthesia Type: Gen Natural Airway  Intra-op Monitoring Plan: Standard ASA Monitors  Post Op Pain Control Plan: multimodal analgesia  Induction:  IV  Informed Consent: Informed consent signed with the Patient and all parties understand the risks and agree with anesthesia plan.  All questions answered.   ASA Score: 3  Day of Surgery Review of History & Physical: H&P Update referred to the surgeon/provider.  Anesthesia Plan Notes:     Discussed with patient and relative small risk of MS exacerbation post-procedure and will avoid hyperthermia and other metabolic derangements.     Ready For Surgery From Anesthesia Perspective.     .

## 2023-10-12 DIAGNOSIS — G35 MULTIPLE SCLEROSIS: Primary | ICD-10-CM

## 2023-10-12 RX ORDER — FINGOLIMOD HYDROCHLORIDE 0.5 MG/1
CAPSULE ORAL
Qty: 90 CAPSULE | Refills: 2 | Status: SHIPPED | OUTPATIENT
Start: 2023-10-12 | End: 2023-10-13 | Stop reason: CLARIF

## 2023-10-13 ENCOUNTER — HOSPITAL ENCOUNTER (OUTPATIENT)
Dept: WOUND CARE | Facility: HOSPITAL | Age: 58
Discharge: HOME | End: 2023-10-13
Attending: PREVENTIVE MEDICINE
Payer: MEDICARE

## 2023-10-13 VITALS
DIASTOLIC BLOOD PRESSURE: 80 MMHG | WEIGHT: 235 LBS | SYSTOLIC BLOOD PRESSURE: 140 MMHG | TEMPERATURE: 98 F | HEIGHT: 69 IN | HEART RATE: 88 BPM | BODY MASS INDEX: 34.8 KG/M2

## 2023-10-13 DIAGNOSIS — T14.8XXA ABRASION: ICD-10-CM

## 2023-10-13 DIAGNOSIS — Z74.09 IMPAIRED FUNCTIONAL MOBILITY, BALANCE, GAIT, AND ENDURANCE: ICD-10-CM

## 2023-10-13 DIAGNOSIS — L97.921 NON-PRESSURE ULCER OF LOWER EXTREMITY, LIMITED TO BREAKDOWN OF SKIN, LEFT: Primary | ICD-10-CM

## 2023-10-13 PROCEDURE — 99212 OFFICE O/P EST SF 10 MIN: CPT

## 2023-10-13 RX ORDER — FINGOLIMOD HYDROCHLORIDE 0.5 MG/1
1 CAPSULE ORAL DAILY
Qty: 90 CAPSULE | Refills: 2 | Status: ACTIVE | OUTPATIENT
Start: 2023-10-13 | End: 2024-10-12

## 2023-10-13 RX ORDER — MUPIROCIN 20 MG/G
OINTMENT TOPICAL
Qty: 22 G | Refills: 2 | Status: SHIPPED | OUTPATIENT
Start: 2023-10-13

## 2023-10-13 NOTE — PROGRESS NOTES
[facility]    Subjective:       Patient ID: Tj Trammell is a 58 y.o. male.    Chief Complaint: Wound Care    Follow up for left lateral leg wound. Patient presented with several wounds on left hand fingers. Treated with Mupirocin and band aids.  New orders noted for Left lateral leg wound.    Review of Systems   All other systems reviewed and are negative.        Objective:     Vitals:    10/13/23 1028   BP: (!) 140/80   Pulse: 88   Temp: 98 °F (36.7 °C)         Physical Exam       Altered Skin Integrity 07/28/23 0800 Left lateral Malleolus (Active)   07/28/23 0800   Altered Skin Integrity Present on Admission - Did Patient arrive to the hospital with altered skin?: yes   Side: Left   Orientation: lateral   Location: Malleolus   Wound Number:    Is this injury device related?: No   Primary Wound Type:    Description of Altered Skin Integrity:    Ankle-Brachial Index:    Pulses:    Removal Indication and Assessment:    Wound Outcome:    (Retired) Wound Length (cm):    (Retired) Wound Width (cm):    (Retired) Depth (cm):    Wound Description (Comments):    Removal Indications:    Wound Image   10/13/23 1143   Dressing Appearance Intact;Moist drainage 10/13/23 1143   Drainage Amount Small 10/13/23 1143   Drainage Characteristics/Odor Serosanguineous 10/13/23 1143   Appearance Intact;Red;Moist 10/13/23 1143   Tissue loss description Full thickness 10/13/23 1143   Red (%), Wound Tissue Color 100 % 10/13/23 1143   Periwound Area Intact;Moist 10/13/23 1143   Wound Edges Irregular 10/13/23 1143   Wound Length (cm) 3 cm 10/13/23 1143   Wound Width (cm) 2 cm 10/13/23 1143   Wound Depth (cm) 0.1 cm 10/13/23 1143   Wound Volume (cm^3) 0.6 cm^3 10/13/23 1143   Wound Surface Area (cm^2) 6 cm^2 10/13/23 1143   Care Cleansed with:;Antimicrobial agent;Soap and water;Sterile normal saline 10/13/23 1143   Dressing Applied;Changed;Foam;Island/border;Non-adherent;Tubular bandage  10/13/23 1143   Periwound Care Absorptive dressing applied 10/13/23 1143   Compression Tubular elasticized bandage 10/13/23 1143   Dressing Change Due 10/17/23 10/13/23 1143         Assessment/Plan:         ICD-10-CM ICD-9-CM   1. Non-pressure ulcer of lower extremity, limited to breakdown of skin, left  L97.921 707.10   2. Impaired functional mobility, balance, gait, and endurance  Z74.09 V49.89   3. Abrasion  T14.8XXA 919.0           Tissue pathology and/or culture taken:  [] Yes [x] No   Sharp debridement performed:   [] Yes [x] No   Labs ordered this visit:   [] Yes [x] No   Imaging ordered this visit:   [] Yes [x] No           Orders Placed This Encounter   Procedures    Change dressing     Left lateral leg:   Cleanse wound with: Soap and water   Lidocaine: prn   Silver nitrate: prn   Periwound care: Moisturize BLE   Primary dressing: Cutimed  Secondary dressing: Bordered Foam  Edema control: Tubi  F x2  LLE toes to knee   Frequency: weekly   Follow-up: Dr. Casey in 1 week        Follow up in about 1 week (around 10/20/2023).            This includes face to face time and non-face to face time preparing to see the patient (eg, review of tests), obtaining and/or reviewing separately obtained history, documenting clinical information in the electronic or other health record, independently interpreting results and communicating results to the patient/family/caregiver, or care coordinator.

## 2023-10-13 NOTE — PROGRESS NOTES
[facility]    Subjective:       Patient ID: Tj Trammell is a 58 y.o. male.    Chief Complaint: Wound Care    Follow up for LLE wound. Presented with several finger wounds that was treated with Mupirocin and bandaids.  New orders noted.   Review of Systems      Objective:     Vitals:    10/13/23 1028   BP: (!) 140/80   Pulse: 88   Temp: 98 °F (36.7 °C)         Physical Exam       Altered Skin Integrity 07/28/23 0800 Left lateral Malleolus (Active)   07/28/23 0800   Altered Skin Integrity Present on Admission - Did Patient arrive to the hospital with altered skin?: yes   Side: Left   Orientation: lateral   Location: Malleolus   Wound Number:    Is this injury device related?: No   Primary Wound Type:    Description of Altered Skin Integrity:    Ankle-Brachial Index:    Pulses:    Removal Indication and Assessment:    Wound Outcome:    (Retired) Wound Length (cm):    (Retired) Wound Width (cm):    (Retired) Depth (cm):    Wound Description (Comments):    Removal Indications:    Wound Image   10/13/23 1143   Dressing Appearance Intact;Moist drainage 10/13/23 1143   Drainage Amount Small 10/13/23 1143   Drainage Characteristics/Odor Serosanguineous 10/13/23 1143   Appearance Intact;Red;Moist 10/13/23 1143   Tissue loss description Full thickness 10/13/23 1143   Red (%), Wound Tissue Color 100 % 10/13/23 1143   Periwound Area Intact;Moist 10/13/23 1143   Wound Edges Irregular 10/13/23 1143   Wound Length (cm) 3 cm 10/13/23 1143   Wound Width (cm) 2 cm 10/13/23 1143   Wound Depth (cm) 0.1 cm 10/13/23 1143   Wound Volume (cm^3) 0.6 cm^3 10/13/23 1143   Wound Surface Area (cm^2) 6 cm^2 10/13/23 1143   Care Cleansed with:;Antimicrobial agent;Soap and water;Sterile normal saline 10/13/23 1143   Dressing Applied;Changed;Foam;Island/border;Non-adherent;Tubular bandage 10/13/23 1143   Periwound Care Absorptive dressing applied 10/13/23 1143   Compression Tubular elasticized  bandage 10/13/23 1143   Dressing Change Due 10/17/23 10/13/23 1143         Assessment/Plan:         ICD-10-CM ICD-9-CM   1. Non-pressure ulcer of lower extremity, limited to breakdown of skin, left  L97.921 707.10           Tissue pathology and/or culture taken:  [] Yes [x] No   Sharp debridement performed:   [] Yes [x] No   Labs ordered this visit:   [] Yes [x] No   Imaging ordered this visit:   [] Yes [x] No           Orders Placed This Encounter   Procedures    Change dressing     Left lateral leg:   Cleanse wound with: Soap and water   Lidocaine: prn   Silver nitrate: prn   Periwound care: Moisturize BLE   Primary dressing: Cutimed  Secondary dressing: Bordered Foam  Edema control: Tubi  F x2  LLE toes to knee   Frequency: weekly   Follow-up: Dr. Casey in 1 week        Follow up in about 1 week (around 10/20/2023).            This includes face to face time and non-face to face time preparing to see the patient (eg, review of tests), obtaining and/or reviewing separately obtained history, documenting clinical information in the electronic or other health record, independently interpreting results and communicating results to the patient/family/caregiver, or care coordinator.

## 2023-10-20 ENCOUNTER — HOSPITAL ENCOUNTER (OUTPATIENT)
Dept: WOUND CARE | Facility: HOSPITAL | Age: 58
Discharge: HOME | End: 2023-10-20
Attending: PREVENTIVE MEDICINE
Payer: MEDICARE

## 2023-10-20 DIAGNOSIS — L97.921 NON-PRESSURE ULCER OF LOWER EXTREMITY, LIMITED TO BREAKDOWN OF SKIN, LEFT: Primary | ICD-10-CM

## 2023-10-20 PROCEDURE — 99213 OFFICE O/P EST LOW 20 MIN: CPT

## 2023-10-20 NOTE — PROGRESS NOTES
[facility]    Subjective:       Patient ID: Tj Trammell is a 58 y.o. male.    Chief Complaint: Non-healing Wound Follow Up    Nurse visit for dressing change.  Review of Systems      Objective:   There were no vitals filed for this visit.      Physical Exam       Altered Skin Integrity 07/28/23 0800 Left lateral Malleolus (Active)   07/28/23 0800   Altered Skin Integrity Present on Admission - Did Patient arrive to the hospital with altered skin?: yes   Side: Left   Orientation: lateral   Location: Malleolus   Wound Number:    Is this injury device related?: No   Primary Wound Type:    Description of Altered Skin Integrity:    Ankle-Brachial Index:    Pulses:    Removal Indication and Assessment:    Wound Outcome:    (Retired) Wound Length (cm):    (Retired) Wound Width (cm):    (Retired) Depth (cm):    Wound Description (Comments):    Removal Indications:    Dressing Appearance Intact;Moist drainage 10/20/23 0938   Drainage Amount Small 10/20/23 0938   Drainage Characteristics/Odor Serosanguineous 10/20/23 0938   Appearance Red;Epithelialization 10/20/23 0938   Red (%), Wound Tissue Color 100 % 10/20/23 0938   Periwound Area Intact 10/20/23 0938   Wound Edges Irregular 10/20/23 0938   Wound Length (cm) 3 cm 10/20/23 0938   Wound Width (cm) 2 cm 10/20/23 0938   Wound Depth (cm) 0.1 cm 10/20/23 0938   Wound Volume (cm^3) 0.6 cm^3 10/20/23 0938   Wound Surface Area (cm^2) 6 cm^2 10/20/23 0938   Care Cleansed with:;Sterile normal saline 10/20/23 0938   Dressing Applied;Other (comment);Island/border;Tubular bandage 10/20/23 0938   Compression Tubular elasticized bandage 10/20/23 0938   Dressing Change Due 10/27/23 10/20/23 0938         Assessment/Plan:         ICD-10-CM ICD-9-CM   1. Non-pressure ulcer of lower extremity, limited to breakdown of skin, left  L97.921 707.10           Tissue pathology and/or culture taken:  [] Yes [x] No   Sharp debridement  performed:   [] Yes [x] No   Labs ordered this visit:   [] Yes [x] No   Imaging ordered this visit:   [] Yes [x] No           No orders of the defined types were placed in this encounter.   MD orders from 10/20/23:  Left lateral leg:   Cleanse wound with: Soap and water   Lidocaine: prn   Silver nitrate: prn   Periwound care: Moisturize BLE   Primary dressing: Cutimed   Secondary dressing: Bordered Foam   Edema control: Tubi  F x2  LLE toes to knee   Frequency: weekly   Follow-up: Dr. Casey in 1 week     Follow up in about 1 week (around 10/27/2023) for .            This includes face to face time and non-face to face time preparing to see the patient (eg, review of tests), obtaining and/or reviewing separately obtained history, documenting clinical information in the electronic or other health record, independently interpreting results and communicating results to the patient/family/caregiver, or care coordinator.

## 2023-10-27 ENCOUNTER — HOSPITAL ENCOUNTER (OUTPATIENT)
Dept: WOUND CARE | Facility: HOSPITAL | Age: 58
Discharge: HOME | End: 2023-10-27
Attending: PREVENTIVE MEDICINE
Payer: MEDICARE

## 2023-10-27 VITALS
DIASTOLIC BLOOD PRESSURE: 61 MMHG | HEART RATE: 126 BPM | SYSTOLIC BLOOD PRESSURE: 122 MMHG | BODY MASS INDEX: 34.8 KG/M2 | WEIGHT: 235 LBS | RESPIRATION RATE: 18 BRPM | HEIGHT: 69 IN | TEMPERATURE: 98 F

## 2023-10-27 DIAGNOSIS — Z74.09 IMPAIRED FUNCTIONAL MOBILITY, BALANCE, GAIT, AND ENDURANCE: ICD-10-CM

## 2023-10-27 DIAGNOSIS — L97.921 NON-PRESSURE ULCER OF LOWER EXTREMITY, LIMITED TO BREAKDOWN OF SKIN, LEFT: Primary | ICD-10-CM

## 2023-10-27 DIAGNOSIS — S20.411A BACK ABRASION, RIGHT, INITIAL ENCOUNTER: ICD-10-CM

## 2023-10-27 DIAGNOSIS — S40.811A ARM ABRASION, RIGHT, INITIAL ENCOUNTER: ICD-10-CM

## 2023-10-27 PROCEDURE — 99213 OFFICE O/P EST LOW 20 MIN: CPT

## 2023-10-27 NOTE — PROGRESS NOTES
[facility]    Subjective:       Patient ID: Tj Trammell is a 58 y.o. male.    Chief Complaint: Non-healing Wound Follow Up    Wound care follow up for left leg ulcer. Wound with decreased drainage, 75% of area is friable newly epithelialized tissue. Patient reports falling out of a broken chair - abrasions noted to right flank and right forearm- patient to apply Bactroban. Continue plan of care - return to clinic in 1 week.     Review of Systems   All other systems reviewed and are negative.        Objective:     Vitals:    10/27/23 0810   BP: 122/61   Pulse: (!) 126   Resp: 18   Temp: 98 °F (36.7 °C)         Physical Exam       Altered Skin Integrity 07/28/23 0800 Left lateral Malleolus (Active)   07/28/23 0800   Altered Skin Integrity Present on Admission - Did Patient arrive to the hospital with altered skin?: yes   Side: Left   Orientation: lateral   Location: Malleolus   Wound Number:    Is this injury device related?: No   Primary Wound Type:    Description of Altered Skin Integrity:    Ankle-Brachial Index:    Pulses:    Removal Indication and Assessment:    Wound Outcome:    (Retired) Wound Length (cm):    (Retired) Wound Width (cm):    (Retired) Depth (cm):    Wound Description (Comments):    Removal Indications:    Wound Image   10/27/23 0820   Dressing Appearance Intact;Moist drainage 10/27/23 0820   Drainage Amount Small 10/27/23 0820   Drainage Characteristics/Odor Serous 10/27/23 0820   Appearance Red;Epithelialization;Smooth 10/27/23 0820   Red (%), Wound Tissue Color 100 % 10/27/23 0820   Periwound Area Intact 10/27/23 0820   Wound Edges Undefined 10/27/23 0820   Wound Length (cm) 3 cm 10/27/23 0820   Wound Width (cm) 2 cm 10/27/23 0820   Wound Depth (cm) 0.1 cm 10/27/23 0820   Wound Volume (cm^3) 0.6 cm^3 10/27/23 0820   Wound Surface Area (cm^2) 6 cm^2 10/27/23 0820   Care Cleansed with:;Sterile normal saline 10/27/23 0820   Dressing  Applied;Other (comment);Island/border;Tubular bandage 10/27/23 0820   Compression Tubular elasticized bandage 10/27/23 0820   Dressing Change Due 11/03/23 10/27/23 0820         Assessment/Plan:         ICD-10-CM ICD-9-CM   1. Non-pressure ulcer of lower extremity, limited to breakdown of skin, left  L97.921 707.10   2. Impaired functional mobility, balance, gait, and endurance  Z74.09 V49.89   3. Arm abrasion, right, initial encounter  S40.811A 912.0   4. Back abrasion, right, initial encounter  S20.411A 911.0       Wound is improving. No signs of infection or necrosis.      Tissue pathology and/or culture taken:  [] Yes [x] No   Sharp debridement performed:   [] Yes [x] No   Labs ordered this visit:   [] Yes [x] No   Imaging ordered this visit:   [] Yes [x] No           Orders Placed This Encounter   Procedures    Change dressing     Left lateral leg:   Cleanse wound with: Soap and water   Lidocaine: prn   Silver nitrate: prn   Periwound care: Moisturize BLE   Primary dressing: Cutimed   Secondary dressing: Bordered Foam   Edema control: Tubi  F x 2  LLE toes to knee   Frequency: weekly   Follow-up: Dr. Casey in 1 week   Other: Bactroban to right forearm and right flank abrasions daily, open to air              Follow up in about 1 week (around 11/3/2023) for .            This includes face to face time and non-face to face time preparing to see the patient (eg, review of tests), obtaining and/or reviewing separately obtained history, documenting clinical information in the electronic or other health record, independently interpreting results and communicating results to the patient/family/caregiver, or care coordinator.

## 2023-11-03 ENCOUNTER — HOSPITAL ENCOUNTER (OUTPATIENT)
Dept: WOUND CARE | Facility: HOSPITAL | Age: 58
Discharge: HOME | End: 2023-11-03
Attending: PREVENTIVE MEDICINE
Payer: MEDICARE

## 2023-11-03 VITALS
HEIGHT: 69 IN | SYSTOLIC BLOOD PRESSURE: 155 MMHG | TEMPERATURE: 98 F | BODY MASS INDEX: 34.8 KG/M2 | WEIGHT: 235 LBS | HEART RATE: 80 BPM | DIASTOLIC BLOOD PRESSURE: 68 MMHG

## 2023-11-03 DIAGNOSIS — Z74.09 IMPAIRED FUNCTIONAL MOBILITY, BALANCE, GAIT, AND ENDURANCE: ICD-10-CM

## 2023-11-03 DIAGNOSIS — L97.921 NON-PRESSURE ULCER OF LOWER EXTREMITY, LIMITED TO BREAKDOWN OF SKIN, LEFT: Primary | ICD-10-CM

## 2023-11-03 PROCEDURE — 99213 OFFICE O/P EST LOW 20 MIN: CPT

## 2023-11-03 NOTE — PROGRESS NOTES
[facility]    Subjective:       Patient ID: Tj Trammell is a 58 y.o. male.    Chief Complaint: Wound Care    11/3/2023  Follow up related to left lateral leg wound, abrasions to right forearm and right flank. No complaints. Wound improvement noted, increase in epithelialization. Continue plan of care.        Review of Systems   All other systems reviewed and are negative.        Objective:     Vitals:    11/03/23 0816   BP: (!) 155/68   Pulse: 80   Temp: 98 °F (36.7 °C)         Physical Exam       Altered Skin Integrity 07/28/23 0800 Left lateral Malleolus (Active)   07/28/23 0800   Altered Skin Integrity Present on Admission - Did Patient arrive to the hospital with altered skin?: yes   Side: Left   Orientation: lateral   Location: Malleolus   Wound Number:    Is this injury device related?: No   Primary Wound Type:    Description of Altered Skin Integrity:    Ankle-Brachial Index:    Pulses:    Removal Indication and Assessment:    Wound Outcome:    (Retired) Wound Length (cm):    (Retired) Wound Width (cm):    (Retired) Depth (cm):    Wound Description (Comments):    Removal Indications:    Wound Image   11/03/23 0800   Dressing Appearance Intact;Moist drainage 11/03/23 0800   Drainage Amount Small 11/03/23 0800   Drainage Characteristics/Odor Sanguineous 11/03/23 0800   Appearance Red;Epithelialization;Moist 11/03/23 0800   Tissue loss description Full thickness 11/03/23 0800   Red (%), Wound Tissue Color 100 % 11/03/23 0800   Periwound Area Intact;Dry 11/03/23 0800   Wound Edges Irregular 11/03/23 0800   Wound Length (cm) 4.2 cm 11/03/23 0800   Wound Width (cm) 3.1 cm 11/03/23 0800   Wound Depth (cm) 0.1 cm 11/03/23 0800   Wound Volume (cm^3) 1.302 cm^3 11/03/23 0800   Wound Surface Area (cm^2) 13.02 cm^2 11/03/23 0800   Care Cleansed with:;Soap and water;Sterile normal saline 11/03/23 0800   Dressing Applied;Other (comment);Island/border;Tubular bandage  11/03/23 0800   Periwound Care Absorptive dressing applied;Moisturizer applied 11/03/23 0800   Compression Tubular elasticized bandage 11/03/23 0800   Dressing Change Due 11/10/23 11/03/23 0800               Assessment/Plan:         ICD-10-CM ICD-9-CM   1. Non-pressure ulcer of lower extremity, limited to breakdown of skin, left  L97.921 707.10   2. Impaired functional mobility, balance, gait, and endurance  Z74.09 V49.89           Tissue pathology and/or culture taken:  [] Yes [x] No   Sharp debridement performed:   [] Yes [x] No   Labs ordered this visit:   [] Yes [x] No   Imaging ordered this visit:   [] Yes [x] No           Orders Placed This Encounter   Procedures    Change dressing     Left lateral leg:   Cleanse wound with: Soap and water   Lidocaine: prn   Silver nitrate: prn   Periwound care: Moisturize BLE   Primary dressing: Cutimed   Secondary dressing: Bordered Foam   Edema control: Tubi  F x 2  LLE toes to knee   Frequency: weekly   Follow-up: Dr. Casey in 1 week   Other: Bactroban to right forearm and right flank abrasions daily, open to air        Follow up in about 1 week (around 11/10/2023) for Dr Casey.            This includes face to face time and non-face to face time preparing to see the patient (eg, review of tests), obtaining and/or reviewing separately obtained history, documenting clinical information in the electronic or other health record, independently interpreting results and communicating results to the patient/family/caregiver, or care coordinator.

## 2023-11-10 ENCOUNTER — HOSPITAL ENCOUNTER (OUTPATIENT)
Dept: WOUND CARE | Facility: HOSPITAL | Age: 58
Discharge: HOME | End: 2023-11-10
Attending: PREVENTIVE MEDICINE
Payer: MEDICARE

## 2023-11-10 VITALS
DIASTOLIC BLOOD PRESSURE: 75 MMHG | SYSTOLIC BLOOD PRESSURE: 138 MMHG | TEMPERATURE: 98 F | RESPIRATION RATE: 18 BRPM | HEART RATE: 80 BPM

## 2023-11-10 DIAGNOSIS — Z74.09 IMPAIRED FUNCTIONAL MOBILITY, BALANCE, GAIT, AND ENDURANCE: ICD-10-CM

## 2023-11-10 DIAGNOSIS — L97.921 NON-PRESSURE ULCER OF LOWER EXTREMITY, LIMITED TO BREAKDOWN OF SKIN, LEFT: Primary | ICD-10-CM

## 2023-11-10 PROCEDURE — 99213 OFFICE O/P EST LOW 20 MIN: CPT

## 2023-11-10 NOTE — PROGRESS NOTES
[facility]    Subjective:       Patient ID: Tj Trammell is a 58 y.o. male.    Chief Complaint: Non-healing Wound Follow Up    Wound care follow up for left leg ulcer. Abrasions to back and left forearm resolved. Patient reports hitting his leg/wound on bed. Wound with increased epithelialized tissue. Awaiting referral to dermatology. Continue plan of care.     Review of Systems   All other systems reviewed and are negative.        Objective:     Vitals:    11/10/23 0807   BP: 138/75   Pulse: 80   Resp: 18   Temp: 98.3 °F (36.8 °C)         Physical Exam       Altered Skin Integrity 07/28/23 0800 Left lateral Malleolus (Active)   07/28/23 0800   Altered Skin Integrity Present on Admission - Did Patient arrive to the hospital with altered skin?: yes   Side: Left   Orientation: lateral   Location: Malleolus   Wound Number:    Is this injury device related?: No   Primary Wound Type:    Description of Altered Skin Integrity:    Ankle-Brachial Index:    Pulses:    Removal Indication and Assessment:    Wound Outcome:    (Retired) Wound Length (cm):    (Retired) Wound Width (cm):    (Retired) Depth (cm):    Wound Description (Comments):    Removal Indications:    Wound Image   11/10/23 0808   Dressing Appearance Intact;Moist drainage 11/10/23 0808   Drainage Amount Moderate 11/10/23 0808   Drainage Characteristics/Odor Serosanguineous 11/10/23 0808   Appearance Red;Smooth;Epithelialization 11/10/23 0808   Tissue loss description Partial thickness 11/10/23 0808   Red (%), Wound Tissue Color 100 % 11/10/23 0808   Periwound Area Intact 11/10/23 0808   Wound Edges Irregular 11/10/23 0808   Wound Length (cm) 2.5 cm 11/10/23 0808   Wound Width (cm) 2 cm 11/10/23 0808   Wound Depth (cm) 0.1 cm 11/10/23 0808   Wound Volume (cm^3) 0.5 cm^3 11/10/23 0808   Wound Surface Area (cm^2) 5 cm^2 11/10/23 0808   Care Cleansed with:;Sterile normal saline 11/10/23 0808   Dressing  Applied;Other (comment);Island/border;Tubular bandage 11/10/23 0808   Periwound Care Moisturizer applied 11/10/23 0808   Compression Tubular elasticized bandage 11/10/23 0808   Dressing Change Due 11/17/23 11/10/23 0808         Assessment/Plan:         ICD-10-CM ICD-9-CM   1. Non-pressure ulcer of lower extremity, limited to breakdown of skin, left  L97.921 707.10   2. Impaired functional mobility, balance, gait, and endurance  Z74.09 V49.89           Tissue pathology and/or culture taken:  [] Yes [x] No   Sharp debridement performed:   [] Yes [x] No   Labs ordered this visit:   [] Yes [x] No   Imaging ordered this visit:   [] Yes [x] No           Orders Placed This Encounter   Procedures    Change dressing     Left lateral leg:   Cleanse wound with: Soap and water   Lidocaine: prn   Silver nitrate: prn   Periwound care: Moisturize BLE   Primary dressing: Cutimed   Secondary dressing: Bordered Foam   Edema control: Tubi  F x 2  LLE toes to knee   Frequency: weekly   Follow-up: Dr. Casey in 1 week        Follow up in about 1 week (around 11/17/2023) for .            This includes face to face time and non-face to face time preparing to see the patient (eg, review of tests), obtaining and/or reviewing separately obtained history, documenting clinical information in the electronic or other health record, independently interpreting results and communicating results to the patient/family/caregiver, or care coordinator.

## 2023-11-17 ENCOUNTER — HOSPITAL ENCOUNTER (OUTPATIENT)
Dept: WOUND CARE | Facility: HOSPITAL | Age: 58
Discharge: HOME | End: 2023-11-17
Attending: PREVENTIVE MEDICINE
Payer: MEDICARE

## 2023-11-17 VITALS
DIASTOLIC BLOOD PRESSURE: 73 MMHG | WEIGHT: 235 LBS | TEMPERATURE: 98 F | HEIGHT: 69 IN | SYSTOLIC BLOOD PRESSURE: 144 MMHG | BODY MASS INDEX: 34.8 KG/M2 | HEART RATE: 84 BPM

## 2023-11-17 DIAGNOSIS — L97.921 NON-PRESSURE ULCER OF LOWER EXTREMITY, LIMITED TO BREAKDOWN OF SKIN, LEFT: Primary | ICD-10-CM

## 2023-11-17 DIAGNOSIS — Z74.09 IMPAIRED FUNCTIONAL MOBILITY, BALANCE, GAIT, AND ENDURANCE: ICD-10-CM

## 2023-11-17 DIAGNOSIS — T14.8XXA ABRASION: ICD-10-CM

## 2023-11-17 PROCEDURE — 99213 OFFICE O/P EST LOW 20 MIN: CPT

## 2023-11-17 NOTE — PROGRESS NOTES
[facility]    Subjective:       Patient ID: Tj Trammell is a 58 y.o. male.    Chief Complaint: Non-healing Wound Follow Up    Follow up left leg ulcer. New wound to left knee. Reports falling off scooter hitting knee a few days ago. Denies pain or signs of infection. Waiting dermatology referral. Plan of care updated.     Review of Systems   All other systems reviewed and are negative.        Objective:     Vitals:    11/17/23 0813   BP: (!) 144/73   Pulse: 84   Temp: 97.9 °F (36.6 °C)         Physical Exam       Altered Skin Integrity 07/28/23 0800 Left lateral Malleolus (Active)   07/28/23 0800   Altered Skin Integrity Present on Admission - Did Patient arrive to the hospital with altered skin?: yes   Side: Left   Orientation: lateral   Location: Malleolus   Wound Number:    Is this injury device related?: No   Primary Wound Type:    Description of Altered Skin Integrity:    Ankle-Brachial Index:    Pulses:    Removal Indication and Assessment:    Wound Outcome:    (Retired) Wound Length (cm):    (Retired) Wound Width (cm):    (Retired) Depth (cm):    Wound Description (Comments):    Removal Indications:    Wound Image   11/17/23 0831   Dressing Appearance Intact;Moist drainage 11/17/23 0831   Drainage Amount Moderate 11/17/23 0831   Drainage Characteristics/Odor Serosanguineous 11/17/23 0831   Appearance Red;Moist;Epithelialization 11/17/23 0831   Tissue loss description Partial thickness 11/17/23 0831   Red (%), Wound Tissue Color 100 % 11/17/23 0831   Periwound Area Intact;Dry 11/17/23 0831   Wound Edges Irregular 11/17/23 0831   Wound Length (cm) 2.8 cm 11/17/23 0831   Wound Width (cm) 2.1 cm 11/17/23 0831   Wound Depth (cm) 0.1 cm 11/17/23 0831   Wound Volume (cm^3) 0.588 cm^3 11/17/23 0831   Wound Surface Area (cm^2) 5.88 cm^2 11/17/23 0831   Care Cleansed with:;Sterile normal saline 11/17/23 0831   Dressing Applied;Methylene blue/gentian  violet;Non-adherent;Island/border;Tubular bandage 11/17/23 0831   Periwound Care Absorptive dressing applied;Dry periwound area maintained 11/17/23 0831   Compression Tubular elasticized bandage 11/17/23 0831   Dressing Change Due 11/24/23 11/17/23 0831            Altered Skin Integrity 11/17/23 0800 Left anterior Knee Traumatic (Active)   11/17/23 0800   Altered Skin Integrity Present on Admission - Did Patient arrive to the hospital with altered skin?:    Side: Left   Orientation: anterior   Location: Knee   Wound Number:    Is this injury device related?:    Primary Wound Type: Traumatic   Description of Altered Skin Integrity:    Ankle-Brachial Index:    Pulses:    Removal Indication and Assessment:    Wound Outcome:    (Retired) Wound Length (cm):    (Retired) Wound Width (cm):    (Retired) Depth (cm):    Wound Description (Comments):    Removal Indications:    Wound Image   11/17/23 0834   Description of Altered Skin Integrity Partial thickness tissue loss. Shallow open ulcer with a red or pink wound bed, without slough. Intact or Open/Ruptured Serum-filled blister. 11/17/23 0834   Dressing Appearance Intact;Moist drainage 11/17/23 0834   Drainage Amount Moderate 11/17/23 0834   Drainage Characteristics/Odor Serous 11/17/23 0834   Appearance Red;Moist 11/17/23 0834   Tissue loss description Partial thickness 11/17/23 0834   Red (%), Wound Tissue Color 100 % 11/17/23 0834   Periwound Area Intact;Dry;Pink;Satellite lesion 11/17/23 0834   Wound Edges Irregular;Jagged;Open 11/17/23 0834   Wound Length (cm) 3.3 cm 11/17/23 0834   Wound Width (cm) 3.9 cm 11/17/23 0834   Wound Depth (cm) 0.1 cm 11/17/23 0834   Wound Volume (cm^3) 1.287 cm^3 11/17/23 0834   Wound Surface Area (cm^2) 12.87 cm^2 11/17/23 0834   Care Cleansed with:;Sterile normal saline 11/17/23 0834   Dressing Applied;Island/border;Silver 11/17/23 0834   Periwound Care Absorptive dressing applied;Dry periwound area maintained 11/17/23 0834   Compression  Tubular elasticized bandage 11/17/23 0834   Dressing Change Due 11/24/23 11/17/23 0834         Assessment/Plan:         ICD-10-CM ICD-9-CM   1. Non-pressure ulcer of lower extremity, limited to breakdown of skin, left  L97.921 707.10   2. Impaired functional mobility, balance, gait, and endurance  Z74.09 V49.89   3. Abrasion  T14.8XXA 919.0           Tissue pathology and/or culture taken:  [] Yes [x] No   Sharp debridement performed:   [] Yes [x] No   Labs ordered this visit:   [] Yes [x] No   Imaging ordered this visit:   [] Yes [x] No           Orders Placed This Encounter   Procedures    Change dressing     Left lateral leg:   Cleanse wound with: Soap and water   Lidocaine: prn   Silver nitrate: prn   Periwound care: Moisturize BLE   Primary dressing: Gentian Violet to wound Cutimed     Left Knee  Border foam   Left lateral leg:   Cleanse wound with: Soap and water   Lidocaine: prn   Silver nitrate: prn   Periwound care: Moisturize BLE     Secondary dressing: Bordered Foam both wounds  Edema control: Tubi  F x 2  LLE toes to knee   Frequency: weekly   Follow-up: Dr. Casey in 1 week        Follow up in about 1 week (around 11/24/2023) for .            This includes face to face time and non-face to face time preparing to see the patient (eg, review of tests), obtaining and/or reviewing separately obtained history, documenting clinical information in the electronic or other health record, independently interpreting results and communicating results to the patient/family/caregiver, or care coordinator.

## 2023-11-24 ENCOUNTER — HOSPITAL ENCOUNTER (OUTPATIENT)
Dept: WOUND CARE | Facility: HOSPITAL | Age: 58
Discharge: HOME | End: 2023-11-24
Attending: PREVENTIVE MEDICINE
Payer: MEDICARE

## 2023-11-24 VITALS
HEART RATE: 78 BPM | RESPIRATION RATE: 18 BRPM | DIASTOLIC BLOOD PRESSURE: 82 MMHG | SYSTOLIC BLOOD PRESSURE: 169 MMHG | BODY MASS INDEX: 34.8 KG/M2 | HEIGHT: 69 IN | WEIGHT: 235 LBS | TEMPERATURE: 98 F

## 2023-11-24 DIAGNOSIS — L97.921 NON-PRESSURE ULCER OF LOWER EXTREMITY, LIMITED TO BREAKDOWN OF SKIN, LEFT: Primary | ICD-10-CM

## 2023-11-24 DIAGNOSIS — Z74.09 IMPAIRED FUNCTIONAL MOBILITY, BALANCE, GAIT, AND ENDURANCE: ICD-10-CM

## 2023-11-24 PROCEDURE — 99213 OFFICE O/P EST LOW 20 MIN: CPT

## 2023-11-24 NOTE — PROGRESS NOTES
[facility]    Subjective:       Patient ID: Tj Trammell is a 58 y.o. male.    Chief Complaint: Non-healing Wound Follow Up    Wound care follow up for left leg ulcer. Abrasion to left knee has resolved. Left leg ulcer stable with areas of epithelialized tissue to wound bed and clean red tissue thought not healing overall. Awaiting recommendation from dermatology - patient has appointment on 12/13/23. Continue plan of care.     Review of Systems   All other systems reviewed and are negative.        Objective:     Vitals:    11/24/23 0816   BP: (!) 169/82   Pulse: 78   Resp: 18   Temp: 97.9 °F (36.6 °C)         Physical Exam       Altered Skin Integrity 07/28/23 0800 Left lateral Malleolus (Active)   07/28/23 0800   Altered Skin Integrity Present on Admission - Did Patient arrive to the hospital with altered skin?: yes   Side: Left   Orientation: lateral   Location: Malleolus   Wound Number:    Is this injury device related?: No   Primary Wound Type:    Description of Altered Skin Integrity:    Ankle-Brachial Index:    Pulses:    Removal Indication and Assessment:    Wound Outcome:    (Retired) Wound Length (cm):    (Retired) Wound Width (cm):    (Retired) Depth (cm):    Wound Description (Comments):    Removal Indications:    Wound Image   11/24/23 0808   Description of Altered Skin Integrity Partial thickness tissue loss. Shallow open ulcer with a red or pink wound bed, without slough. Intact or Open/Ruptured Serum-filled blister. 11/24/23 0808   Drainage Amount Moderate 11/24/23 0808   Drainage Characteristics/Odor Serosanguineous 11/24/23 0808   Appearance Red;Smooth;Epithelialization 11/24/23 0808   Red (%), Wound Tissue Color 100 % 11/24/23 0808   Periwound Area Intact 11/24/23 0808   Wound Edges Irregular 11/24/23 0808   Wound Length (cm) 3.9 cm 11/24/23 0808   Wound Width (cm) 2.8 cm 11/24/23 0808   Wound Depth (cm) 0.1 cm 11/24/23 0808   Wound Volume  (cm^3) 1.092 cm^3 11/24/23 0808   Wound Surface Area (cm^2) 10.92 cm^2 11/24/23 0808   Care Cleansed with:;Sterile normal saline 11/24/23 0808   Dressing Applied;Other (comment);Island/border;Tubular bandage 11/24/23 0808   Periwound Care Absorptive dressing applied;Topical treatment applied 11/24/23 0808   Compression Tubular elasticized bandage 11/24/23 0808   Dressing Change Due 12/01/23 11/24/23 0808            Altered Skin Integrity 11/17/23 0800 Left anterior Knee Traumatic (Active)   11/17/23 0800   Altered Skin Integrity Present on Admission - Did Patient arrive to the hospital with altered skin?:    Side: Left   Orientation: anterior   Location: Knee   Wound Number:    Is this injury device related?:    Primary Wound Type: Traumatic   Description of Altered Skin Integrity:    Ankle-Brachial Index:    Pulses:    Removal Indication and Assessment:    Wound Outcome:    (Retired) Wound Length (cm):    (Retired) Wound Width (cm):    (Retired) Depth (cm):    Wound Description (Comments):    Removal Indications:    Wound Image   11/24/23 0808   Description of Altered Skin Integrity Partial thickness tissue loss. Shallow open ulcer with a red or pink wound bed, without slough. Intact or Open/Ruptured Serum-filled blister. 11/24/23 0808   Dressing Appearance Intact 11/24/23 0808   Drainage Amount None 11/24/23 0808   Appearance Epithelialization 11/24/23 0808   Periwound Area Intact 11/24/23 0808   Wound Edges Rolled/closed 11/24/23 0808   Wound Length (cm) 0 cm 11/24/23 0808   Wound Width (cm) 0 cm 11/24/23 0808   Wound Depth (cm) 0 cm 11/24/23 0808   Wound Volume (cm^3) 0 cm^3 11/24/23 0808   Wound Surface Area (cm^2) 0 cm^2 11/24/23 0808         Assessment/Plan:         ICD-10-CM ICD-9-CM   1. Non-pressure ulcer of lower extremity, limited to breakdown of skin, left  L97.921 707.10   2. Impaired functional mobility, balance, gait, and endurance  Z74.09 V49.89           Tissue pathology and/or culture taken:  []  Yes [x] No   Sharp debridement performed:   [] Yes [x] No   Labs ordered this visit:   [] Yes [x] No   Imaging ordered this visit:   [] Yes [x] No           Orders Placed This Encounter   Procedures    Change dressing     Left lateral leg:  Cleanse wound with: Soap and water  Lidocaine: prn  Silver nitrate: prn  Periwound care: Moisturize BLE, GV  Primary dressing: cutimed sorbact  Secondary dressing: Bordered Foam  Edema control: Tubi  F x 2  LLE toes to knee  Frequency: weekly  Follow-up: Dr. Casey in 1 week  Other: Dermatology appt scheduled for 12/13/23 - patient has appointment information        Follow up in about 1 week (around 12/1/2023) for .            This includes face to face time and non-face to face time preparing to see the patient (eg, review of tests), obtaining and/or reviewing separately obtained history, documenting clinical information in the electronic or other health record, independently interpreting results and communicating results to the patient/family/caregiver, or care coordinator.

## 2023-12-01 ENCOUNTER — HOSPITAL ENCOUNTER (OUTPATIENT)
Dept: WOUND CARE | Facility: HOSPITAL | Age: 58
Discharge: HOME | End: 2023-12-01
Attending: PREVENTIVE MEDICINE
Payer: MEDICARE

## 2023-12-01 DIAGNOSIS — Z74.09 IMPAIRED FUNCTIONAL MOBILITY, BALANCE, GAIT, AND ENDURANCE: ICD-10-CM

## 2023-12-01 DIAGNOSIS — L97.921 NON-PRESSURE ULCER OF LOWER EXTREMITY, LIMITED TO BREAKDOWN OF SKIN, LEFT: Primary | ICD-10-CM

## 2023-12-01 DIAGNOSIS — G35 MULTIPLE SCLEROSIS: ICD-10-CM

## 2023-12-01 PROCEDURE — 99213 OFFICE O/P EST LOW 20 MIN: CPT

## 2023-12-01 NOTE — PROGRESS NOTES
[facility]    Subjective:       Patient ID: Tj Trammell is a 58 y.o. male.    Chief Complaint: Non-healing Wound Follow Up    Follow up wound care visit for lle wound. No complaints. Left knee wound remains closed. Dressing sticking to lower leg wound. Change to nonadherent silver layer with dressing change. Scheduled with dermatology 12/13/23.    Review of Systems   All other systems reviewed and are negative.        Objective:   There were no vitals filed for this visit.      Physical Exam       Altered Skin Integrity 07/28/23 0800 Left lateral Malleolus (Active)   07/28/23 0800   Altered Skin Integrity Present on Admission - Did Patient arrive to the hospital with altered skin?: yes   Side: Left   Orientation: lateral   Location: Malleolus   Wound Number:    Is this injury device related?: No   Primary Wound Type:    Description of Altered Skin Integrity:    Ankle-Brachial Index:    Pulses:    Removal Indication and Assessment:    Wound Outcome:    (Retired) Wound Length (cm):    (Retired) Wound Width (cm):    (Retired) Depth (cm):    Wound Description (Comments):    Removal Indications:    Wound Image   12/01/23 1145   Description of Altered Skin Integrity Partial thickness tissue loss. Shallow open ulcer with a red or pink wound bed, without slough. Intact or Open/Ruptured Serum-filled blister. 12/01/23 1145   Drainage Amount Moderate 12/01/23 1145   Drainage Characteristics/Odor Serosanguineous 12/01/23 1145   Appearance Red;Moist;Smooth;Epithelialization 12/01/23 1145   Tissue loss description Partial thickness 12/01/23 1145   Red (%), Wound Tissue Color 100 % 12/01/23 1145   Periwound Area Intact;Dry 12/01/23 1145   Wound Edges Irregular 12/01/23 1145   Wound Length (cm) 3.8 cm 12/01/23 1145   Wound Width (cm) 2.5 cm 12/01/23 1145   Wound Depth (cm) 0.1 cm 12/01/23 1145   Wound Volume (cm^3) 0.95 cm^3 12/01/23 1145   Wound Surface Area (cm^2) 9.5 cm^2  12/01/23 1145   Care Cleansed with:;Sterile normal saline 12/01/23 1145   Dressing Applied;Silver;Non-adherent;Island/border;Tubular bandage 12/01/23 1145   Periwound Care Absorptive dressing applied;Moisturizer applied 12/01/23 1145   Compression Tubular elasticized bandage 12/01/23 1145   Dressing Change Due 12/08/23 12/01/23 1145       [REMOVED]      Altered Skin Integrity 11/17/23 0800 Left anterior Knee Traumatic (Removed)   11/17/23 0800   Altered Skin Integrity Present on Admission - Did Patient arrive to the hospital with altered skin?:    Side: Left   Orientation: anterior   Location: Knee   Wound Number:    Is this injury device related?:    Primary Wound Type: Traumatic   Description of Altered Skin Integrity:    Ankle-Brachial Index:    Pulses:    Removal Indication and Assessment:    Wound Outcome: Healed   (Retired) Wound Length (cm):    (Retired) Wound Width (cm):    (Retired) Depth (cm):    Wound Description (Comments):    Removal Indications:    Removed 12/01/23 1146   Wound Image   12/01/23 1145   Appearance Closed/resurfaced 12/01/23 1145   Periwound Area Intact;Dry 12/01/23 1145   Wound Edges Rolled/closed 12/01/23 1145   Wound Length (cm) 0 cm 12/01/23 1145   Wound Width (cm) 0 cm 12/01/23 1145   Wound Depth (cm) 0 cm 12/01/23 1145   Wound Volume (cm^3) 0 cm^3 12/01/23 1145   Wound Surface Area (cm^2) 0 cm^2 12/01/23 1145   Care Cleansed with:;Sterile normal saline 12/01/23 1145   Dressing Applied;Tubular bandage 12/01/23 1145   Periwound Care Moisturizer applied 12/01/23 1145   Compression Tubular elasticized bandage 12/01/23 1145   Dressing Change Due 12/08/23 12/01/23 1145         Assessment/Plan:         ICD-10-CM ICD-9-CM   1. Non-pressure ulcer of lower extremity, limited to breakdown of skin, left  L97.921 707.10   2. Impaired functional mobility, balance, gait, and endurance  Z74.09 V49.89   3. Multiple sclerosis  G35 340           Tissue pathology and/or culture taken:  [] Yes [x] No    Sharp debridement performed:   [] Yes [x] No   Labs ordered this visit:   [] Yes [x] No   Imaging ordered this visit:   [] Yes [x] No           Orders Placed This Encounter   Procedures    Change dressing     Left lateral leg:  Cleanse wound with: Soap and water  Lidocaine: prn  Silver nitrate: prn  Periwound care: Moisturize BLE   Primary dressing: Urgotul AG  Secondary dressing: Bordered Foam  Edema control: Tubi  F x 2  LLE toes to knee  Frequency: Weekly  Follow-up: Dr. Casey in 1 week  Other: Dermatology appt scheduled for 12/13/23 - patient has appointment information        Follow up in about 1 day (around 12/2/2023) for .            This includes face to face time and non-face to face time preparing to see the patient (eg, review of tests), obtaining and/or reviewing separately obtained history, documenting clinical information in the electronic or other health record, independently interpreting results and communicating results to the patient/family/caregiver, or care coordinator.

## 2023-12-08 ENCOUNTER — HOSPITAL ENCOUNTER (OUTPATIENT)
Dept: WOUND CARE | Facility: HOSPITAL | Age: 58
Discharge: HOME | End: 2023-12-08
Attending: PREVENTIVE MEDICINE
Payer: MEDICARE

## 2023-12-08 VITALS
SYSTOLIC BLOOD PRESSURE: 154 MMHG | DIASTOLIC BLOOD PRESSURE: 79 MMHG | TEMPERATURE: 98 F | WEIGHT: 235 LBS | HEIGHT: 69 IN | HEART RATE: 86 BPM | BODY MASS INDEX: 34.8 KG/M2

## 2023-12-08 DIAGNOSIS — Z74.09 IMPAIRED FUNCTIONAL MOBILITY, BALANCE, GAIT, AND ENDURANCE: ICD-10-CM

## 2023-12-08 DIAGNOSIS — L97.921 NON-PRESSURE ULCER OF LOWER EXTREMITY, LIMITED TO BREAKDOWN OF SKIN, LEFT: Primary | ICD-10-CM

## 2023-12-08 DIAGNOSIS — G35 MULTIPLE SCLEROSIS: ICD-10-CM

## 2023-12-08 PROCEDURE — 99213 OFFICE O/P EST LOW 20 MIN: CPT

## 2023-12-08 NOTE — PROGRESS NOTES
[facility]    Subjective:       Patient ID: Tj Trammell is a 58 y.o. male.    Chief Complaint: Wound Care    Follow up related to left lateral leg wound. Patient's pain 9/10. No changes in wound.    Review of Systems   All other systems reviewed and are negative.        Objective:     Vitals:    12/08/23 0816   BP: (!) 154/79   Pulse: 86   Temp: 97.8 °F (36.6 °C)         Physical Exam       Altered Skin Integrity 07/28/23 0800 Left lateral Malleolus (Active)   07/28/23 0800   Altered Skin Integrity Present on Admission - Did Patient arrive to the hospital with altered skin?: yes   Side: Left   Orientation: lateral   Location: Malleolus   Wound Number:    Is this injury device related?: No   Primary Wound Type:    Description of Altered Skin Integrity:    Ankle-Brachial Index:    Pulses:    Removal Indication and Assessment:    Wound Outcome:    (Retired) Wound Length (cm):    (Retired) Wound Width (cm):    (Retired) Depth (cm):    Wound Description (Comments):    Removal Indications:    Wound Image   12/08/23 0800   Description of Altered Skin Integrity Partial thickness tissue loss. Shallow open ulcer with a red or pink wound bed, without slough. Intact or Open/Ruptured Serum-filled blister. 12/08/23 0800   Dressing Appearance Intact;Moist drainage 12/08/23 0800   Drainage Amount Moderate 12/08/23 0800   Drainage Characteristics/Odor Serosanguineous 12/08/23 0800   Appearance Red;Smooth;Epithelialization;Moist 12/08/23 0800   Tissue loss description Partial thickness 12/08/23 0800   Red (%), Wound Tissue Color 100 % 12/08/23 0800   Periwound Area Intact;Dry 12/08/23 0800   Wound Edges Irregular 12/08/23 0800   Wound Length (cm) 3.5 cm 12/08/23 0800   Wound Width (cm) 3.1 cm 12/08/23 0800   Wound Depth (cm) 0.1 cm 12/08/23 0800   Wound Volume (cm^3) 1.085 cm^3 12/08/23 0800   Wound Surface Area (cm^2) 10.85 cm^2 12/08/23 0800   Care Cleansed with:;Soap and  water;Sterile normal saline 12/08/23 0800   Dressing Applied;Methylene blue/gentian violet;Silver;Island/border;Tubular bandage 12/08/23 0800   Periwound Care Absorptive dressing applied;Moisturizer applied 12/08/23 0800   Compression Tubular elasticized bandage 12/08/23 0800   Dressing Change Due 12/15/23 12/08/23 0800         Assessment/Plan:         ICD-10-CM ICD-9-CM   1. Non-pressure ulcer of lower extremity, limited to breakdown of skin, left  L97.921 707.10   2. Impaired functional mobility, balance, gait, and endurance  Z74.09 V49.89   3. Multiple sclerosis  G35 340           Tissue pathology and/or culture taken:  [] Yes [x] No   Sharp debridement performed:   [] Yes [x] No   Labs ordered this visit:   [] Yes [x] No   Imaging ordered this visit:   [] Yes [x] No           Orders Placed This Encounter   Procedures    Change dressing     Left lateral leg:  Cleanse wound with: Soap and water  Lidocaine: prn  Silver nitrate: prn  Periwound care: Moisturize BLE  Primary dressing: Gentian Violet then Urgotul AG  Secondary dressing: Bordered Foam  Edema control: Tubi  F x 2  LLE toes to knee  Frequency: Weekly  Follow-up: Dr. Casey in 1 week  Other: Patient to call and reschedule dermatology appointment        Follow up in 1 week (on 12/15/2023) for Dr Casey.            This includes face to face time and non-face to face time preparing to see the patient (eg, review of tests), obtaining and/or reviewing separately obtained history, documenting clinical information in the electronic or other health record, independently interpreting results and communicating results to the patient/family/caregiver, or care coordinator.

## 2023-12-15 ENCOUNTER — HOSPITAL ENCOUNTER (OUTPATIENT)
Dept: WOUND CARE | Facility: HOSPITAL | Age: 58
Discharge: HOME | End: 2023-12-15
Attending: PREVENTIVE MEDICINE
Payer: MEDICARE

## 2023-12-15 VITALS
SYSTOLIC BLOOD PRESSURE: 146 MMHG | TEMPERATURE: 98 F | HEART RATE: 77 BPM | BODY MASS INDEX: 34.8 KG/M2 | WEIGHT: 235 LBS | DIASTOLIC BLOOD PRESSURE: 73 MMHG | HEIGHT: 69 IN

## 2023-12-15 DIAGNOSIS — L97.921 NON-PRESSURE ULCER OF LOWER EXTREMITY, LIMITED TO BREAKDOWN OF SKIN, LEFT: Primary | ICD-10-CM

## 2023-12-15 DIAGNOSIS — L92.9 HYPERGRANULATION: ICD-10-CM

## 2023-12-15 PROCEDURE — 17250 CHEM CAUT OF GRANLTJ TISSUE: CPT

## 2023-12-15 NOTE — PROGRESS NOTES
[facility]    Subjective:       Patient ID: Tj Trammell is a 58 y.o. male.    Chief Complaint: Non-healing Wound (Left leg)    Silver nitrate to hypergranulated tissue. Aquacel AG bordered foam to site. Continue Tubigrip F x2 to LLE. Will try to get patient derm appt. In [id number].    Review of Systems   All other systems reviewed and are negative.        Objective:     Vitals:    12/15/23 0800   BP: (!) 146/73   Pulse: 77   Temp: 97.9 °F (36.6 °C)         Physical Exam       Altered Skin Integrity 07/28/23 0800 Left lateral Malleolus (Active)   07/28/23 0800   Altered Skin Integrity Present on Admission - Did Patient arrive to the hospital with altered skin?: yes   Side: Left   Orientation: lateral   Location: Malleolus   Wound Number:    Is this injury device related?: No   Primary Wound Type:    Description of Altered Skin Integrity:    Ankle-Brachial Index:    Pulses:    Removal Indication and Assessment:    Wound Outcome:    (Retired) Wound Length (cm):    (Retired) Wound Width (cm):    (Retired) Depth (cm):    Wound Description (Comments):    Removal Indications:    Wound Image   12/15/23 0833   Dressing Appearance Moist drainage 12/15/23 0833   Drainage Amount Moderate 12/15/23 0833   Drainage Characteristics/Odor Serosanguineous 12/15/23 0833   Appearance Red;Hypergranulation;Moist 12/15/23 0833   Tissue loss description Full thickness 12/15/23 0833   Red (%), Wound Tissue Color 100 % 12/15/23 0833   Periwound Area Dry 12/15/23 0833   Wound Edges Defined 12/15/23 0833   Wound Length (cm) 4.5 cm 12/15/23 0833   Wound Width (cm) 3.5 cm 12/15/23 0833   Wound Depth (cm) 0.1 cm 12/15/23 0833   Wound Volume (cm^3) 1.575 cm^3 12/15/23 0833   Wound Surface Area (cm^2) 15.75 cm^2 12/15/23 0833   Care Cleansed with:;Sterile normal saline;Applied:;Other (see comments) 12/15/23 0833   Dressing Applied;Silver;Island/border 12/15/23 0833   Periwound Care  Dry periwound area maintained 12/15/23 0833   Compression Tubular elasticized bandage 12/15/23 0833   Dressing Change Due 12/22/23 12/15/23 0833         Assessment/Plan:         ICD-10-CM ICD-9-CM   1. Non-pressure ulcer of lower extremity, limited to breakdown of skin, left  L97.921 707.10   2. Hypergranulation  L92.9 701.5           Tissue pathology and/or culture taken:  [] Yes [x] No   Sharp debridement performed:   [] Yes [x] No   Labs ordered this visit:   [] Yes [x] No   Imaging ordered this visit:   [] Yes [x] No           Orders Placed This Encounter   Procedures    Change dressing     Left lateral leg:  Cleanse wound with: Soap and water  Lidocaine: prn  Silver nitrate: prn  Periwound care: Gentian violet prn  Primary dressing: Aquacel AG bordered foam  Secondary dressing: Tubigrip F x 2 to LLE  Edema control: Tubi  F x 2  LLE toes to knee  Frequency: Weekly  Follow-up: Dr. Casey in 1 week  Other: Will try to schedule with derm in [place]        Follow up in about 1 week (around 12/22/2023).            This includes face to face time and non-face to face time preparing to see the patient (eg, review of tests), obtaining and/or reviewing separately obtained history, documenting clinical information in the electronic or other health record, independently interpreting results and communicating results to the patient/family/caregiver, or care coordinator.

## 2023-12-22 ENCOUNTER — HOSPITAL ENCOUNTER (OUTPATIENT)
Dept: WOUND CARE | Facility: HOSPITAL | Age: 58
Discharge: HOME | End: 2023-12-22
Attending: PREVENTIVE MEDICINE
Payer: MEDICARE

## 2023-12-22 VITALS
HEART RATE: 86 BPM | HEIGHT: 69 IN | DIASTOLIC BLOOD PRESSURE: 71 MMHG | WEIGHT: 235 LBS | SYSTOLIC BLOOD PRESSURE: 169 MMHG | BODY MASS INDEX: 34.8 KG/M2

## 2023-12-22 DIAGNOSIS — Z74.09 IMPAIRED FUNCTIONAL MOBILITY, BALANCE, GAIT, AND ENDURANCE: ICD-10-CM

## 2023-12-22 DIAGNOSIS — L92.9 HYPERGRANULATION: ICD-10-CM

## 2023-12-22 DIAGNOSIS — L97.921 NON-PRESSURE ULCER OF LOWER EXTREMITY, LIMITED TO BREAKDOWN OF SKIN, LEFT: Primary | ICD-10-CM

## 2023-12-22 PROCEDURE — 11720 DEBRIDE NAIL 1-5: CPT

## 2023-12-22 PROCEDURE — 11104 PUNCH BX SKIN SINGLE LESION: CPT

## 2023-12-22 NOTE — PROGRESS NOTES
[facility]    Subjective:       Patient ID: Tj Trammell is a 58 y.o. male.    Chief Complaint: No chief complaint on file.    Measurement improved. Punch biopsy and silver nitrate to tissue.Adpatic and Aquacel AG bordered foam to site. Continued Tubigrip F x2 to LLE.     Review of Systems   All other systems reviewed and are negative.        Objective:     Vitals:    12/22/23 0818   BP: (!) 169/71   Pulse: 86         Physical Exam       Altered Skin Integrity 07/28/23 0800 Left lateral Malleolus (Active)   07/28/23 0800   Altered Skin Integrity Present on Admission - Did Patient arrive to the hospital with altered skin?: yes   Side: Left   Orientation: lateral   Location: Malleolus   Wound Number:    Is this injury device related?: No   Primary Wound Type:    Description of Altered Skin Integrity:    Ankle-Brachial Index:    Pulses:    Removal Indication and Assessment:    Wound Outcome:    (Retired) Wound Length (cm):    (Retired) Wound Width (cm):    (Retired) Depth (cm):    Wound Description (Comments):    Removal Indications:    Wound Image   12/22/23 0850   Dressing Appearance Intact;Moist drainage 12/22/23 0850   Drainage Amount Moderate 12/22/23 0850   Drainage Characteristics/Odor Serosanguineous 12/22/23 0850   Appearance Red;Moist;Granulating 12/22/23 0850   Tissue loss description Full thickness 12/22/23 0850   Red (%), Wound Tissue Color 100 % 12/22/23 0850   Periwound Area Intact;Dry 12/22/23 0850   Wound Edges Defined 12/22/23 0850   Wound Length (cm) 2.5 cm 12/22/23 0850   Wound Width (cm) 2.6 cm 12/22/23 0850   Wound Depth (cm) 0.1 cm 12/22/23 0850   Wound Volume (cm^3) 0.65 cm^3 12/22/23 0850   Wound Surface Area (cm^2) 6.5 cm^2 12/22/23 0850   Care Cleansed with:;Sterile normal saline;Soap and water 12/22/23 0850   Dressing Applied;Silver;Island/border;Tubular bandage 12/22/23 0850   Periwound Care Absorptive dressing applied;Dry periwound  area maintained 12/22/23 0850   Compression Tubular elasticized bandage 12/22/23 0850   Dressing Change Due 12/29/23 12/22/23 0850       With patient's verbal consent, nails were aggressively reduced and debrided x 2 to their soft tissue attachment mechanically with nippers. Patient reports relief following the procedure. No anesthesia or hemostasis required.     Assessment/Plan:         ICD-10-CM ICD-9-CM   1. Non-pressure ulcer of lower extremity, limited to breakdown of skin, left  L97.921 707.10   2. Hypergranulation  L92.9 701.5   3. Impaired functional mobility, balance, gait, and endurance  Z74.09 V49.89           Tissue pathology and/or culture taken:  [] Yes [x] No   Sharp debridement performed:   [] Yes [x] No   Labs ordered this visit:   [] Yes [x] No   Imaging ordered this visit:   [] Yes [x] No           Orders Placed This Encounter   Procedures    Change dressing     Left lateral leg:   Cleanse wound with: Soap and water   Lidocaine: prn   Silver nitrate: prn   Periwound care: Gentian violet prn   Primary dressing: Adaptic, Aquacel AG bordered foam   Secondary dressing: Tubigrip F x 2 to LLE   Edema control: Tubi  F x 2  LLE toes to knee   Frequency: Weekly   Follow-up: Dr. Casey in 1 week   Other: Punch Biopsy taken 12/22/23  Soak dressing to remove        Follow up in about 1 week (around 12/29/2023) for .            This includes face to face time and non-face to face time preparing to see the patient (eg, review of tests), obtaining and/or reviewing separately obtained history, documenting clinical information in the electronic or other health record, independently interpreting results and communicating results to the patient/family/caregiver, or care coordinator.

## 2023-12-22 NOTE — PROCEDURES
"Wound tissue biopsy    Date/Time: 12/22/2023 7:50 AM    Performed by: Joe Casey MD  Authorized by: Joe Casey MD  Consent: Verbal consent obtained.  Risks and benefits: risks, benefits and alternatives were discussed  Consent given by: patient  Patient understanding: patient states understanding of the procedure being performed  Patient consent: the patient's understanding of the procedure matches consent given  Procedure consent: procedure consent matches procedure scheduled  Relevant documents: relevant documents present and verified  Test results: test results available and properly labeled  Site marked: the operative site was marked  Patient identity confirmed: verbally with patient  Time out: Immediately prior to procedure a "time out" was called to verify the correct patient, procedure, equipment, support staff and site/side marked as required.  Preparation: Patient was prepped and draped in the usual sterile fashion.  Local anesthesia used: yes  Anesthesia: local infiltration    Anesthesia:  Local anesthesia used: yes  Local Anesthetic: lidocaine 1% without epinephrine  Anesthetic total: 2 mL    Sedation:  Patient sedated: no    Patient tolerance: patient tolerated the procedure well with no immediate complications        "

## 2023-12-29 ENCOUNTER — HOSPITAL ENCOUNTER (OUTPATIENT)
Dept: WOUND CARE | Facility: HOSPITAL | Age: 58
Discharge: HOME | End: 2023-12-29
Attending: PREVENTIVE MEDICINE
Payer: MEDICARE

## 2023-12-29 VITALS
TEMPERATURE: 98 F | SYSTOLIC BLOOD PRESSURE: 149 MMHG | WEIGHT: 235 LBS | BODY MASS INDEX: 34.8 KG/M2 | HEIGHT: 69 IN | HEART RATE: 81 BPM | DIASTOLIC BLOOD PRESSURE: 74 MMHG

## 2023-12-29 DIAGNOSIS — L97.921 NON-PRESSURE ULCER OF LOWER EXTREMITY, LIMITED TO BREAKDOWN OF SKIN, LEFT: Primary | ICD-10-CM

## 2023-12-29 PROCEDURE — 11721 DEBRIDE NAIL 6 OR MORE: CPT

## 2023-12-29 PROCEDURE — 99213 OFFICE O/P EST LOW 20 MIN: CPT | Mod: 25

## 2023-12-29 PROCEDURE — 99214 OFFICE O/P EST MOD 30 MIN: CPT

## 2023-12-29 PROCEDURE — 11720 DEBRIDE NAIL 1-5: CPT

## 2023-12-29 NOTE — PROGRESS NOTES
[facility]    Subjective:       Patient ID: Tj Trammell is a 58 y.o. male.    Chief Complaint: Non-healing Wound Follow Up    Wound care follow up for left leg non healing ulcer. Biopsy results pending. Wound has contracted. Irritation from dressing noted to lateral edges. Xeroform and abd applied, will continue light compression. Return to clinic in 1 week.     Review of Systems   All other systems reviewed and are negative.        Objective:     Vitals:    12/29/23 0926   BP: (!) 149/74   Pulse: 81   Temp: 97.8 °F (36.6 °C)         Physical Exam       Altered Skin Integrity 07/28/23 0800 Left lateral Malleolus (Active)   07/28/23 0800   Altered Skin Integrity Present on Admission - Did Patient arrive to the hospital with altered skin?: yes   Side: Left   Orientation: lateral   Location: Malleolus   Wound Number:    Is this injury device related?: No   Primary Wound Type:    Description of Altered Skin Integrity:    Ankle-Brachial Index:    Pulses:    Removal Indication and Assessment:    Wound Outcome:    (Retired) Wound Length (cm):    (Retired) Wound Width (cm):    (Retired) Depth (cm):    Wound Description (Comments):    Removal Indications:    Wound Image   12/29/23 0906   Description of Altered Skin Integrity Partial thickness tissue loss. Shallow open ulcer with a red or pink wound bed, without slough. Intact or Open/Ruptured Serum-filled blister. 12/29/23 0906   Dressing Appearance Moist drainage 12/29/23 0906   Drainage Amount Moderate 12/29/23 0906   Drainage Characteristics/Odor Serosanguineous 12/29/23 0906   Appearance Red 12/29/23 0906   Red (%), Wound Tissue Color 100 % 12/29/23 0906   Periwound Area Excoriated;Moist;Satellite lesion 12/29/23 0906   Wound Edges Undefined 12/29/23 0906   Wound Length (cm) 1 cm 12/29/23 0906   Wound Width (cm) 1 cm 12/29/23 0906   Wound Depth (cm) 0.2 cm 12/29/23 0906   Wound Volume (cm^3) 0.2 cm^3 12/29/23 0906    Wound Surface Area (cm^2) 1 cm^2 12/29/23 0906   Care Cleansed with:;Sterile normal saline 12/29/23 0906   Dressing Applied;Non-adherent;Absorptive Pad;Cast padding;Tubular bandage 12/29/23 0906   Periwound Care Topical treatment applied;Moisturizer applied 12/29/23 0906   Compression Other (see comments) 12/29/23 0906   Dressing Change Due 01/05/24 12/29/23 0906       With patient's verbal consent, nails were aggressively reduced and debrided x 1to their soft tissue attachment mechanically with nippers. Patient reports relief following the procedure. No anesthesia or hemostasis required.     Assessment/Plan:         ICD-10-CM ICD-9-CM   1. Non-pressure ulcer of lower extremity, limited to breakdown of skin, left  L97.921 707.10           Tissue pathology and/or culture taken:  [] Yes [x] No   Sharp debridement performed:   [] Yes [x] No   Labs ordered this visit:   [] Yes [x] No   Imaging ordered this visit:   [] Yes [x] No           Orders Placed This Encounter   Procedures    Change dressing     Left lateral leg:  Cleanse wound with: saline, leg with soap and water  Lidocaine: prn  Silver nitrate: prn  Periwound care: Gentian violet prn, lotion  Primary dressing: xeroform  Secondary dressing: small ABD, cast padding  Edema control: Tubi  F x 2  LLE toes to knee  Frequency: Weekly  Follow-up: Dr. Casey in 1 week  Other: Punch Biopsy taken 12/22/23 - results pending        Follow up in about 1 week (around 1/5/2024) for .            This includes face to face time and non-face to face time preparing to see the patient (eg, review of tests), obtaining and/or reviewing separately obtained history, documenting clinical information in the electronic or other health record, independently interpreting results and communicating results to the patient/family/caregiver, or care coordinator.

## 2024-01-05 ENCOUNTER — HOSPITAL ENCOUNTER (OUTPATIENT)
Dept: WOUND CARE | Facility: HOSPITAL | Age: 59
Discharge: HOME | End: 2024-01-05
Attending: PREVENTIVE MEDICINE
Payer: MEDICARE

## 2024-01-05 VITALS
TEMPERATURE: 98 F | SYSTOLIC BLOOD PRESSURE: 144 MMHG | WEIGHT: 235 LBS | HEART RATE: 87 BPM | HEIGHT: 69 IN | BODY MASS INDEX: 34.8 KG/M2 | DIASTOLIC BLOOD PRESSURE: 78 MMHG

## 2024-01-05 DIAGNOSIS — L92.9 HYPERGRANULATION: ICD-10-CM

## 2024-01-05 DIAGNOSIS — S41.112A SKIN TEAR OF LEFT UPPER ARM WITHOUT COMPLICATION, INITIAL ENCOUNTER: ICD-10-CM

## 2024-01-05 DIAGNOSIS — L97.921 NON-PRESSURE ULCER OF LOWER EXTREMITY, LIMITED TO BREAKDOWN OF SKIN, LEFT: Primary | ICD-10-CM

## 2024-01-05 LAB
FINAL PATHOLOGIC DIAGNOSIS: NORMAL
GROSS: NORMAL
Lab: NORMAL
MICROSCOPIC EXAM: NORMAL

## 2024-01-05 PROCEDURE — 99213 OFFICE O/P EST LOW 20 MIN: CPT

## 2024-01-05 NOTE — PROGRESS NOTES
[facility]    Subjective:       Patient ID: Tj Trammell is a 58 y.o. male.    Chief Complaint: Non-healing Wound Follow Up    Follow up left leg wound. Wound stable. New skin tear left arm reports falling yesterday, silver border applied to left arm. Continued current treatment left leg.    Review of Systems   All other systems reviewed and are negative.        Objective:     Vitals:    01/05/24 0817   BP: (!) 144/78   Pulse: 87   Temp: 97.8 °F (36.6 °C)         Physical Exam       Altered Skin Integrity 07/28/23 0800 Left lateral Malleolus (Active)   07/28/23 0800   Altered Skin Integrity Present on Admission - Did Patient arrive to the hospital with altered skin?: yes   Side: Left   Orientation: lateral   Location: Malleolus   Wound Number:    Is this injury device related?: No   Primary Wound Type:    Description of Altered Skin Integrity:    Ankle-Brachial Index:    Pulses:    Removal Indication and Assessment:    Wound Outcome:    (Retired) Wound Length (cm):    (Retired) Wound Width (cm):    (Retired) Depth (cm):    Wound Description (Comments):    Removal Indications:    Wound Image   01/05/24 0813   Dressing Appearance Intact;Moist drainage 01/05/24 0813   Drainage Amount Moderate 01/05/24 0813   Drainage Characteristics/Odor Serosanguineous 01/05/24 0813   Appearance Red;Moist;Epithelialization;Granulating 01/05/24 0813   Tissue loss description Full thickness 01/05/24 0813   Red (%), Wound Tissue Color 100 % 01/05/24 0813   Periwound Area Intact;Dry;Ecchymotic 01/05/24 0813   Wound Edges Defined 01/05/24 0813   Wound Length (cm) 2.4 cm 01/05/24 0813   Wound Width (cm) 2.3 cm 01/05/24 0813   Wound Depth (cm) 0.1 cm 01/05/24 0813   Wound Volume (cm^3) 0.552 cm^3 01/05/24 0813   Wound Surface Area (cm^2) 5.52 cm^2 01/05/24 0813   Care Cleansed with:;Sterile normal saline 01/05/24 0813   Dressing Applied;Other (comment);Absorptive Pad;Foam;Rolled  gauze;Tubular bandage 01/05/24 0813   Periwound Care Absorptive dressing applied 01/05/24 0813   Compression Tubular elasticized bandage 01/05/24 0813   Dressing Change Due 01/12/24 01/05/24 0813            Altered Skin Integrity 01/05/24 0816 Left lower Arm Skin Tear (Active)   01/05/24 0816   Altered Skin Integrity Present on Admission - Did Patient arrive to the hospital with altered skin?:    Side: Left   Orientation: lower   Location: Arm   Wound Number:    Is this injury device related?:    Primary Wound Type: Skin tear   Description of Altered Skin Integrity:    Ankle-Brachial Index:    Pulses:    Removal Indication and Assessment:    Wound Outcome:    (Retired) Wound Length (cm):    (Retired) Wound Width (cm):    (Retired) Depth (cm):    Wound Description (Comments):    Removal Indications:    Wound Image   01/05/24 0813   Description of Altered Skin Integrity Full thickness tissue loss. Subcutaneous fat may be visible but bone, tendon or muscle are not exposed 01/05/24 0813   Dressing Appearance Intact;Dried drainage 01/05/24 0813   Drainage Amount Moderate 01/05/24 0813   Drainage Characteristics/Odor Other (see comments) 01/05/24 0813   Appearance Pink;Moist 01/05/24 0813   Tissue loss description Full thickness 01/05/24 0813   Red (%), Wound Tissue Color 100 % 01/05/24 0813   Periwound Area Intact;Dry 01/05/24 0813   Wound Edges Open 01/05/24 0813   Wound Length (cm) 0.4 cm 01/05/24 0813   Wound Width (cm) 3 cm 01/05/24 0813   Wound Depth (cm) 0.1 cm 01/05/24 0813   Wound Volume (cm^3) 0.12 cm^3 01/05/24 0813   Wound Surface Area (cm^2) 1.2 cm^2 01/05/24 0813   Care Cleansed with:;Sterile normal saline 01/05/24 0813   Dressing Applied;Silver;Island/border;Rolled gauze 01/05/24 0813   Periwound Care Absorptive dressing applied;Dry periwound area maintained;Skin barrier film applied 01/05/24 0813   Dressing Change Due 01/12/24 01/05/24 0813         Assessment/Plan:         ICD-10-CM ICD-9-CM   1.  Non-pressure ulcer of lower extremity, limited to breakdown of skin, left  L97.921 707.10   2. Skin tear of left upper arm without complication, initial encounter  S41.112A 880.03   3. Hypergranulation  L92.9 701.5       Pathology is pending.    Tissue pathology and/or culture taken:  [] Yes [x] No   Sharp debridement performed:   [] Yes [x] No   Labs ordered this visit:   [] Yes [x] No   Imaging ordered this visit:   [] Yes [x] No           Orders Placed This Encounter   Procedures    Change dressing     Left lateral leg:  Cleanse wound with: saline, leg with soap and water  Lidocaine: prn  Silver nitrate: prn  Periwound care: Gentian violet prn, lotion  Primary dressing: xeroform  Secondary dressing: small ABD, rebecca foam, cast padding  Edema control: Tubi  F x 2  LLE toes to knee    Left Arm Skin Tear  Cleanse wound with: Saline  Lidocaine: prn  Silver nitrate: prn  Primary dressing: Silver [place] Border  Secondary dressing: Cast padding    Frequency: Weekly  Follow-up: Dr. Casey in 1 week  Other: Punch Biopsy taken 12/22/23 - results pending        Follow up in about 1 week (around 1/12/2024) for .            This includes face to face time and non-face to face time preparing to see the patient (eg, review of tests), obtaining and/or reviewing separately obtained history, documenting clinical information in the electronic or other health record, independently interpreting results and communicating results to the patient/family/caregiver, or care coordinator.

## 2024-01-08 ENCOUNTER — HOSPITAL ENCOUNTER (OUTPATIENT)
Dept: WOUND CARE | Facility: HOSPITAL | Age: 59
Discharge: HOME | End: 2024-01-08
Attending: PREVENTIVE MEDICINE
Payer: MEDICARE

## 2024-01-08 ENCOUNTER — LAB VISIT (OUTPATIENT)
Dept: LAB | Facility: HOSPITAL | Age: 59
End: 2024-01-08
Attending: INTERNAL MEDICINE

## 2024-01-08 DIAGNOSIS — L97.921 NON-PRESSURE ULCER OF LOWER EXTREMITY, LIMITED TO BREAKDOWN OF SKIN, LEFT: Primary | ICD-10-CM

## 2024-01-08 DIAGNOSIS — I10 ESSENTIAL HYPERTENSION: ICD-10-CM

## 2024-01-08 DIAGNOSIS — E11.9 DIABETES MELLITUS WITHOUT COMPLICATION: ICD-10-CM

## 2024-01-08 PROCEDURE — 99213 OFFICE O/P EST LOW 20 MIN: CPT

## 2024-01-08 NOTE — PROGRESS NOTES
[facility]    Subjective:       Patient ID: Tj Trammell is a 58 y.o. male.    Chief Complaint: Wound Care    Patient was seen for dressing change.Tolerated well.  Review of Systems      Objective:   There were no vitals filed for this visit.      Physical Exam       Altered Skin Integrity 07/28/23 0800 Left lateral Malleolus (Active)   07/28/23 0800   Altered Skin Integrity Present on Admission - Did Patient arrive to the hospital with altered skin?: yes   Side: Left   Orientation: lateral   Location: Malleolus   Wound Number:    Is this injury device related?: No   Primary Wound Type:    Description of Altered Skin Integrity:    Ankle-Brachial Index:    Pulses:    Removal Indication and Assessment:    Wound Outcome:    (Retired) Wound Length (cm):    (Retired) Wound Width (cm):    (Retired) Depth (cm):    Wound Description (Comments):    Removal Indications:    Dressing Appearance Intact;Moist drainage 01/08/24 0847   Drainage Amount Small 01/08/24 0847   Drainage Characteristics/Odor Serosanguineous 01/08/24 0847   Appearance Red;Moist;Granulating 01/08/24 0847   Tissue loss description Full thickness 01/08/24 0847   Red (%), Wound Tissue Color 100 % 01/08/24 0847   Periwound Area Intact;Dry 01/08/24 0847   Wound Edges Defined 01/08/24 0847   Wound Length (cm) 2.4 cm 01/08/24 0847   Wound Width (cm) 2.3 cm 01/08/24 0847   Wound Depth (cm) 0.1 cm 01/08/24 0847   Wound Volume (cm^3) 0.552 cm^3 01/08/24 0847   Wound Surface Area (cm^2) 5.52 cm^2 01/08/24 0847   Care Cleansed with:;Sterile normal saline 01/08/24 0847   Dressing Applied;Cast padding;Foam;Tubular bandage;Absorptive Pad 01/08/24 0847   Periwound Care Absorptive dressing applied 01/08/24 0847   Compression Tubular elasticized bandage 01/08/24 0847   Dressing Change Due 01/12/24 01/08/24 0847            Altered Skin Integrity 01/05/24 0816 Left lower Arm Skin Tear (Active)   01/05/24 0816   Altered  Skin Integrity Present on Admission - Did Patient arrive to the hospital with altered skin?:    Side: Left   Orientation: lower   Location: Arm   Wound Number:    Is this injury device related?:    Primary Wound Type: Skin tear   Description of Altered Skin Integrity:    Ankle-Brachial Index:    Pulses:    Removal Indication and Assessment:    Wound Outcome:    (Retired) Wound Length (cm):    (Retired) Wound Width (cm):    (Retired) Depth (cm):    Wound Description (Comments):    Removal Indications:    Description of Altered Skin Integrity Full thickness tissue loss. Subcutaneous fat may be visible but bone, tendon or muscle are not exposed 01/08/24 0847   Dressing Appearance Intact;Dried drainage 01/08/24 0847   Drainage Amount Moderate 01/08/24 0847   Appearance Pink;Moist 01/08/24 0847   Tissue loss description Full thickness 01/08/24 0847   Red (%), Wound Tissue Color 100 % 01/08/24 0847   Periwound Area Intact;Dry 01/08/24 0847   Wound Edges Open 01/08/24 0847   Wound Length (cm) 0.4 cm 01/08/24 0847   Wound Width (cm) 3 cm 01/08/24 0847   Wound Depth (cm) 0.1 cm 01/08/24 0847   Wound Volume (cm^3) 0.12 cm^3 01/08/24 0847   Wound Surface Area (cm^2) 1.2 cm^2 01/08/24 0847   Care Cleansed with:;Sterile normal saline 01/08/24 0847   Dressing Applied;Silver;Rolled gauze;Island/border 01/08/24 0847   Periwound Care Absorptive dressing applied;Dry periwound area maintained;Skin barrier film applied 01/08/24 0847   Dressing Change Due 01/12/24 01/08/24 0847         Assessment/Plan:         ICD-10-CM ICD-9-CM   1. Non-pressure ulcer of lower extremity, limited to breakdown of skin, left  L97.921 707.10           Tissue pathology and/or culture taken:  [] Yes [x] No   Sharp debridement performed:   [] Yes [x] No   Labs ordered this visit:   [] Yes [x] No   Imaging ordered this visit:   [] Yes [x] No           Orders Placed This Encounter   Procedures    Change dressing     Left lateral leg:   Cleanse wound with:  saline, leg with soap and water   Lidocaine: prn   Silver nitrate: prn   Periwound care: Gentian violet prn, lotion   Primary dressing: xeroform   Secondary dressing: small ABD, rebecca foam, cast padding   Edema control: Tubi  F x 2  LLE toes to knee     Left Arm Skin Tear   Cleanse wound with: Saline   Lidocaine: prn   Silver nitrate: prn   Primary dressing: [place]   Secondary dressing: Cast padding     Frequency: Weekly   Follow-up: Dr. Casey in 1 week   Other: Punch Biopsy taken 12/22/23 - results pending        Follow up in about 4 days (around 1/12/2024) for Dr. Cdi.            This includes face to face time and non-face to face time preparing to see the patient (eg, review of tests), obtaining and/or reviewing separately obtained history, documenting clinical information in the electronic or other health record, independently interpreting results and communicating results to the patient/family/caregiver, or care coordinator.

## 2024-01-12 ENCOUNTER — HOSPITAL ENCOUNTER (OUTPATIENT)
Dept: WOUND CARE | Facility: HOSPITAL | Age: 59
Discharge: HOME | End: 2024-01-12
Attending: PREVENTIVE MEDICINE
Payer: MEDICARE

## 2024-01-12 VITALS
HEART RATE: 87 BPM | BODY MASS INDEX: 34.8 KG/M2 | TEMPERATURE: 97 F | DIASTOLIC BLOOD PRESSURE: 81 MMHG | HEIGHT: 69 IN | SYSTOLIC BLOOD PRESSURE: 145 MMHG | WEIGHT: 235 LBS

## 2024-01-12 DIAGNOSIS — D04.9 BOWEN DISEASE: ICD-10-CM

## 2024-01-12 DIAGNOSIS — L97.921 NON-PRESSURE ULCER OF LOWER EXTREMITY, LIMITED TO BREAKDOWN OF SKIN, LEFT: Primary | ICD-10-CM

## 2024-01-12 DIAGNOSIS — C44.92 SCCA (SQUAMOUS CELL CARCINOMA) OF SKIN: ICD-10-CM

## 2024-01-12 DIAGNOSIS — S41.112D SKIN TEAR OF LEFT UPPER ARM WITHOUT COMPLICATION, SUBSEQUENT ENCOUNTER: ICD-10-CM

## 2024-01-12 PROCEDURE — 99213 OFFICE O/P EST LOW 20 MIN: CPT

## 2024-01-12 NOTE — PROGRESS NOTES
[facility]    Subjective:       Patient ID: Tj Trammell is a 58 y.o. male.    Chief Complaint: Non-healing Wound (Left leg)    LLE punch biopsy positive skin CA. Derm appt. Scheduled 1/23/24. Continue POC.    Review of Systems   All other systems reviewed and are negative.        Objective:     Vitals:    01/12/24 0807   BP: (!) 145/81   Pulse: 87   Temp: 97 °F (36.1 °C)         Physical Exam       Altered Skin Integrity 07/28/23 0800 Left lateral Malleolus (Active)   07/28/23 0800   Altered Skin Integrity Present on Admission - Did Patient arrive to the hospital with altered skin?: yes   Side: Left   Orientation: lateral   Location: Malleolus   Wound Number:    Is this injury device related?: No   Primary Wound Type:    Description of Altered Skin Integrity:    Ankle-Brachial Index:    Pulses:    Removal Indication and Assessment:    Wound Outcome:    (Retired) Wound Length (cm):    (Retired) Wound Width (cm):    (Retired) Depth (cm):    Wound Description (Comments):    Removal Indications:    Wound Image   01/12/24 0819   Dressing Appearance Moist drainage 01/12/24 0819   Drainage Amount Small 01/12/24 0819   Drainage Characteristics/Odor Serosanguineous 01/12/24 0819   Appearance Red;Moist 01/12/24 0819   Tissue loss description Full thickness 01/12/24 0819   Red (%), Wound Tissue Color 100 % 01/12/24 0819   Periwound Area Dry 01/12/24 0819   Wound Edges Defined 01/12/24 0819   Wound Length (cm) 2 cm 01/12/24 0819   Wound Width (cm) 1.7 cm 01/12/24 0819   Wound Depth (cm) 0.1 cm 01/12/24 0819   Wound Volume (cm^3) 0.34 cm^3 01/12/24 0819   Wound Surface Area (cm^2) 3.4 cm^2 01/12/24 0819   Care Cleansed with:;Sterile normal saline 01/12/24 0819   Dressing Applied;Non-adherent;Island/border;Tubular bandage 01/12/24 0819   Periwound Care Absorptive dressing applied 01/12/24 0819   Compression Tubular elasticized bandage 01/12/24 0819   Dressing Change Due  01/19/24 01/12/24 0819            Altered Skin Integrity 01/05/24 0816 Left lower Arm Skin Tear (Active)   01/05/24 0816   Altered Skin Integrity Present on Admission - Did Patient arrive to the hospital with altered skin?:    Side: Left   Orientation: lower   Location: Arm   Wound Number:    Is this injury device related?:    Primary Wound Type: Skin tear   Description of Altered Skin Integrity:    Ankle-Brachial Index:    Pulses:    Removal Indication and Assessment:    Wound Outcome:    (Retired) Wound Length (cm):    (Retired) Wound Width (cm):    (Retired) Depth (cm):    Wound Description (Comments):    Removal Indications:    Wound Image   01/12/24 0819   Dressing Appearance Moist drainage 01/12/24 0819   Drainage Amount Scant 01/12/24 0819   Drainage Characteristics/Odor Serosanguineous 01/12/24 0819   Appearance Red;Moist 01/12/24 0819   Tissue loss description Partial thickness 01/12/24 0819   Red (%), Wound Tissue Color 100 % 01/12/24 0819   Periwound Area Dry 01/12/24 0819   Wound Edges Open 01/12/24 0819   Wound Length (cm) 0.3 cm 01/12/24 0819   Wound Width (cm) 3 cm 01/12/24 0819   Wound Depth (cm) 0.1 cm 01/12/24 0819   Wound Volume (cm^3) 0.09 cm^3 01/12/24 0819   Wound Surface Area (cm^2) 0.9 cm^2 01/12/24 0819   Care Cleansed with:;Sterile normal saline 01/12/24 0819   Dressing Applied;Silver;Island/border 01/12/24 0819   Periwound Care Dry periwound area maintained 01/12/24 0819   Dressing Change Due 01/19/24 01/12/24 0819         Assessment/Plan:         ICD-10-CM ICD-9-CM   1. Non-pressure ulcer of lower extremity, limited to breakdown of skin, left  L97.921 707.10   2. SCCA (squamous cell carcinoma) of skin  C44.92 173.92   3. 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           Tissue pathology and/or culture taken:  [] Yes [x] No   Sharp debridement performed:   [] Yes [x] No   Labs ordered this  visit:   [] Yes [x] No   Imaging ordered this visit:   [] Yes [x] No           Orders Placed This Encounter   Procedures    Change dressing     Left lateral leg:  Cleanse wound with: saline, leg with soap and water  Lidocaine: prn  Silver nitrate: prn  Periwound care: Gentian violet prn, lotion  Primary dressing: xeroform  Secondary dressing: 5 x 5 bordered foam  Edema control: Tubi  F x 2  LLE toes to knee    Left Arm Skin Tear  Cleanse wound with: Saline  Lidocaine: prn  Silver nitrate: prn  Primary dressing: [place]  Secondary dressing: Cast padding    Frequency: Weekly  Follow-up: Dr. Casey in 1 week  Other: Derm appt. 1/23/24 for LLE skin CA        Follow up in about 1 week (around 1/19/2024).            This includes face to face time and non-face to face time preparing to see the patient (eg, review of tests), obtaining and/or reviewing separately obtained history, documenting clinical information in the electronic or other health record, independently interpreting results and communicating results to the patient/family/caregiver, or care coordinator.

## 2024-01-19 ENCOUNTER — HOSPITAL ENCOUNTER (OUTPATIENT)
Dept: WOUND CARE | Facility: HOSPITAL | Age: 59
Discharge: HOME | End: 2024-01-19
Attending: PREVENTIVE MEDICINE
Payer: MEDICARE

## 2024-01-19 VITALS
WEIGHT: 235 LBS | HEART RATE: 84 BPM | TEMPERATURE: 98 F | BODY MASS INDEX: 34.8 KG/M2 | SYSTOLIC BLOOD PRESSURE: 148 MMHG | DIASTOLIC BLOOD PRESSURE: 68 MMHG | HEIGHT: 69 IN

## 2024-01-19 DIAGNOSIS — L97.921 NON-PRESSURE ULCER OF LOWER EXTREMITY, LIMITED TO BREAKDOWN OF SKIN, LEFT: Primary | ICD-10-CM

## 2024-01-19 DIAGNOSIS — D04.9 BOWEN DISEASE: ICD-10-CM

## 2024-01-19 DIAGNOSIS — C44.92 SCCA (SQUAMOUS CELL CARCINOMA) OF SKIN: ICD-10-CM

## 2024-01-19 PROCEDURE — 99213 OFFICE O/P EST LOW 20 MIN: CPT

## 2024-01-19 NOTE — PROGRESS NOTES
[facility]    Subjective:       Patient ID: Tj Trammell is a 58 y.o. male.    Chief Complaint: Non-healing Wound Follow Up    Patient was seen for left lateral leg wound and left arm skin tear follow up. Continue with current plan of care. Return to clinic in 1 week.    Review of Systems   All other systems reviewed and are negative.        Objective:     Vitals:    01/19/24 0816   BP: (!) 148/68   Pulse: 84   Temp: 97.8 °F (36.6 °C)         Physical Exam       Altered Skin Integrity 07/28/23 0800 Left lateral Malleolus (Active)   07/28/23 0800   Altered Skin Integrity Present on Admission - Did Patient arrive to the hospital with altered skin?: yes   Side: Left   Orientation: lateral   Location: Malleolus   Wound Number:    Is this injury device related?: No   Primary Wound Type:    Description of Altered Skin Integrity:    Ankle-Brachial Index:    Pulses:    Removal Indication and Assessment:    Wound Outcome:    (Retired) Wound Length (cm):    (Retired) Wound Width (cm):    (Retired) Depth (cm):    Wound Description (Comments):    Removal Indications:    Wound Image   01/19/24 0815   Dressing Appearance Moist drainage 01/19/24 0815   Drainage Amount Small 01/19/24 0815   Drainage Characteristics/Odor Serosanguineous 01/19/24 0815   Appearance Red;Moist 01/19/24 0815   Tissue loss description Full thickness 01/19/24 0815   Red (%), Wound Tissue Color 100 % 01/19/24 0815   Periwound Area Intact;Dry 01/19/24 0815   Wound Edges Defined 01/19/24 0815   Wound Length (cm) 2 cm 01/19/24 0815   Wound Width (cm) 1.7 cm 01/19/24 0815   Wound Depth (cm) 0.1 cm 01/19/24 0815   Wound Volume (cm^3) 0.34 cm^3 01/19/24 0815   Wound Surface Area (cm^2) 3.4 cm^2 01/19/24 0815   Care Cleansed with:;Sterile normal saline 01/19/24 0815   Dressing Applied;Methylene blue/gentian violet;Island/border;Tubular bandage 01/19/24 0815   Periwound Care Absorptive dressing applied  01/19/24 0815   Compression Tubular elasticized bandage 01/19/24 0815   Dressing Change Due 01/26/24 01/19/24 0815            Altered Skin Integrity 01/05/24 0816 Left lower Arm Skin Tear (Active)   01/05/24 0816   Altered Skin Integrity Present on Admission - Did Patient arrive to the hospital with altered skin?:    Side: Left   Orientation: lower   Location: Arm   Wound Number:    Is this injury device related?:    Primary Wound Type: Skin tear   Description of Altered Skin Integrity:    Ankle-Brachial Index:    Pulses:    Removal Indication and Assessment:    Wound Outcome:    (Retired) Wound Length (cm):    (Retired) Wound Width (cm):    (Retired) Depth (cm):    Wound Description (Comments):    Removal Indications:    Wound Image   01/19/24 0815   Dressing Appearance Intact;Dried drainage 01/19/24 0815   Drainage Amount None 01/19/24 0815   Appearance Dry;Intact 01/19/24 0815   Tissue loss description Not applicable 01/19/24 0815   Red (%), Wound Tissue Color 100 % 01/19/24 0815   Periwound Area Intact;Dry 01/19/24 0815   Wound Edges Rolled/closed 01/19/24 0815   Wound Length (cm) 0 cm 01/19/24 0815   Wound Width (cm) 0 cm 01/19/24 0815   Wound Depth (cm) 0 cm 01/19/24 0815   Wound Volume (cm^3) 0 cm^3 01/19/24 0815   Wound Surface Area (cm^2) 0 cm^2 01/19/24 0815   Care Cleansed with:;Sterile normal saline 01/19/24 0815   Dressing Applied;Silver;Island/border;Cast padding 01/19/24 0815   Periwound Care Dry periwound area maintained 01/19/24 0815   Dressing Change Due 01/26/24 01/19/24 0815         Assessment/Plan:         ICD-10-CM ICD-9-CM   1. Non-pressure ulcer of lower extremity, limited to breakdown of skin, left  L97.921 707.10   2. SCCA (squamous cell carcinoma) of skin  C44.92 173.92   3. 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           Tissue pathology and/or culture taken:  [] Yes [x] No   Sharp debridement performed:   [] Yes [] No   Labs ordered this visit:   [] Yes [x] No   Imaging ordered this visit:   [] Yes  [x] No           Orders Placed This Encounter   Procedures    Change dressing     Left lateral leg:  Cleanse wound with: saline, leg with soap and water  Lidocaine: prn  Silver nitrate: prn  Periwound care: Gentian violet prn, lotion  Primary dressing: xeroform  Secondary dressing: 5 x 5 bordered foam  Edema control: Tubi  F x 2  LLE toes to knee    Left Arm Skin Tear  Cleanse wound with: Saline  Lidocaine: prn  Silver nitrate: prn  Primary dressing: [place]  Secondary dressing: Cast padding    Frequency: Weekly  Follow-up: Dr. Casey in 1 week        Follow up in about 1 week (around 1/26/2024) for Dr. Casey.            This includes face to face time and non-face to face time preparing to see the patient (eg, review of tests), obtaining and/or reviewing separately obtained history, documenting clinical information in the electronic or other health record, independently interpreting results and communicating results to the patient/family/caregiver, or care coordinator.

## 2024-01-26 ENCOUNTER — HOSPITAL ENCOUNTER (OUTPATIENT)
Dept: WOUND CARE | Facility: HOSPITAL | Age: 59
Discharge: HOME | End: 2024-01-26
Attending: PREVENTIVE MEDICINE
Payer: MEDICARE

## 2024-01-26 VITALS — DIASTOLIC BLOOD PRESSURE: 78 MMHG | RESPIRATION RATE: 18 BRPM | HEART RATE: 96 BPM | SYSTOLIC BLOOD PRESSURE: 147 MMHG

## 2024-01-26 DIAGNOSIS — C44.92 SCCA (SQUAMOUS CELL CARCINOMA) OF SKIN: Primary | ICD-10-CM

## 2024-01-26 DIAGNOSIS — L97.921 NON-PRESSURE ULCER OF LOWER EXTREMITY, LIMITED TO BREAKDOWN OF SKIN, LEFT: ICD-10-CM

## 2024-01-26 DIAGNOSIS — D04.9 BOWEN DISEASE: ICD-10-CM

## 2024-01-26 PROCEDURE — 99213 OFFICE O/P EST LOW 20 MIN: CPT

## 2024-01-26 NOTE — PROGRESS NOTES
[facility]    Subjective:       Patient ID: Tj Trammell is a 58 y.o. male.    Chief Complaint: Non-healing Wound Follow Up    Wound care follow up for left leg ulcer. Saw dermatology last week - no records in [facility], will attempt to obtain. Patient is unsure of plan of care and has return appointment on 2/12/24. Will continue local wound care and edema control. Return to clinic in 2 weeks.    Review of Systems   All other systems reviewed and are negative.        Objective:     Vitals:    01/26/24 0830   BP: (!) 147/78   Pulse: 96   Resp: 18         Physical Exam       Altered Skin Integrity 07/28/23 0800 Left lateral Malleolus (Active)   07/28/23 0800   Altered Skin Integrity Present on Admission - Did Patient arrive to the hospital with altered skin?: yes   Side: Left   Orientation: lateral   Location: Malleolus   Wound Number:    Is this injury device related?: No   Primary Wound Type:    Description of Altered Skin Integrity:    Ankle-Brachial Index:    Pulses:    Removal Indication and Assessment:    Wound Outcome:    (Retired) Wound Length (cm):    (Retired) Wound Width (cm):    (Retired) Depth (cm):    Wound Description (Comments):    Removal Indications:    Wound Image   01/26/24 0905   Dressing Appearance Moist drainage 01/26/24 0905   Drainage Amount Moderate 01/26/24 0905   Drainage Characteristics/Odor Serosanguineous 01/26/24 0905   Appearance Red;Not granulating 01/26/24 0905   Tissue loss description Full thickness 01/26/24 0905   Red (%), Wound Tissue Color 100 % 01/26/24 0905   Periwound Area Intact;Moist 01/26/24 0905   Wound Edges Undefined 01/26/24 0905   Wound Length (cm) 4 cm 01/26/24 0905   Wound Width (cm) 2 cm 01/26/24 0905   Wound Depth (cm) 0.1 cm 01/26/24 0905   Wound Volume (cm^3) 0.8 cm^3 01/26/24 0905   Wound Surface Area (cm^2) 8 cm^2 01/26/24 0905   Care Cleansed with:;Sterile normal saline 01/26/24 0905   Dressing  Applied;Island/border 01/26/24 0905   Compression Tubular elasticized bandage 01/26/24 0905   Dressing Change Due 02/02/24 01/26/24 0905            Altered Skin Integrity 01/05/24 0816 Left lower Arm Skin Tear (Active)   01/05/24 0816   Altered Skin Integrity Present on Admission - Did Patient arrive to the hospital with altered skin?:    Side: Left   Orientation: lower   Location: Arm   Wound Number:    Is this injury device related?:    Primary Wound Type: Skin tear   Description of Altered Skin Integrity:    Ankle-Brachial Index:    Pulses:    Removal Indication and Assessment:    Wound Outcome:    (Retired) Wound Length (cm):    (Retired) Wound Width (cm):    (Retired) Depth (cm):    Wound Description (Comments):    Removal Indications:    Wound Image   01/26/24 0905   Dressing Appearance Intact 01/26/24 0905   Drainage Amount None 01/26/24 0905   Appearance Epithelialization 01/26/24 0905   Wound Edges Rolled/closed 01/26/24 0905   Wound Length (cm) 0 cm 01/26/24 0905   Wound Width (cm) 0 cm 01/26/24 0905   Wound Depth (cm) 0 cm 01/26/24 0905   Wound Volume (cm^3) 0 cm^3 01/26/24 0905   Wound Surface Area (cm^2) 0 cm^2 01/26/24 0905         Assessment/Plan:         ICD-10-CM ICD-9-CM   1. SCCA (squamous cell carcinoma) of skin  C44.92 173.92   2. Non-pressure ulcer of lower extremity, limited to breakdown of skin, left  L97.921 707.10   3. 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 Casey in 2 weeks   Other: Follow up at Dermatology on 2/12/24        Follow up in about 2 weeks (around 2/9/2024) for .            This includes face to face time and non-face to face time preparing to see the patient (eg, review of tests), obtaining and/or reviewing separately obtained history, documenting clinical information in the electronic or other health record, independently interpreting results and communicating results to the patient/family/caregiver, or care coordinator.

## 2024-02-09 ENCOUNTER — OFFICE VISIT (OUTPATIENT)
Dept: SURGERY | Facility: CLINIC | Age: 59
End: 2024-02-09
Payer: MEDICARE

## 2024-02-09 ENCOUNTER — HOSPITAL ENCOUNTER (OUTPATIENT)
Dept: WOUND CARE | Facility: HOSPITAL | Age: 59
Discharge: HOME | End: 2024-02-09
Attending: PREVENTIVE MEDICINE
Payer: MEDICARE

## 2024-02-09 VITALS
WEIGHT: 233.69 LBS | BODY MASS INDEX: 34.61 KG/M2 | DIASTOLIC BLOOD PRESSURE: 76 MMHG | HEART RATE: 84 BPM | HEIGHT: 69 IN | SYSTOLIC BLOOD PRESSURE: 141 MMHG

## 2024-02-09 VITALS
WEIGHT: 235 LBS | HEART RATE: 84 BPM | HEIGHT: 69 IN | SYSTOLIC BLOOD PRESSURE: 136 MMHG | BODY MASS INDEX: 34.8 KG/M2 | DIASTOLIC BLOOD PRESSURE: 74 MMHG | TEMPERATURE: 99 F

## 2024-02-09 DIAGNOSIS — C44.92 SCCA (SQUAMOUS CELL CARCINOMA) OF SKIN: Primary | ICD-10-CM

## 2024-02-09 DIAGNOSIS — S81.812A SKIN TEAR OF LEFT LOWER LEG WITHOUT COMPLICATION, INITIAL ENCOUNTER: ICD-10-CM

## 2024-02-09 DIAGNOSIS — L97.921 NON-PRESSURE ULCER OF LOWER EXTREMITY, LIMITED TO BREAKDOWN OF SKIN, LEFT: ICD-10-CM

## 2024-02-09 PROCEDURE — 99204 OFFICE O/P NEW MOD 45 MIN: CPT | Mod: S$GLB,,, | Performed by: STUDENT IN AN ORGANIZED HEALTH CARE EDUCATION/TRAINING PROGRAM

## 2024-02-09 PROCEDURE — 99999 PR PBB SHADOW E&M-EST. PATIENT-LVL IV: CPT | Mod: PBBFAC,,, | Performed by: STUDENT IN AN ORGANIZED HEALTH CARE EDUCATION/TRAINING PROGRAM

## 2024-02-09 PROCEDURE — 99213 OFFICE O/P EST LOW 20 MIN: CPT

## 2024-02-09 NOTE — PROGRESS NOTES
[facility]    Subjective:       Patient ID: Tj Trammell is a 58 y.o. male.    Chief Complaint: Non-healing Wound Follow Up    Follow up left leg ulcer.  Reports dermatologist consulted general surgery for leg ulcer and states dermatologist took a biopsy of his scalp 2/7/24. Next dermatology appointment 2/12/24.  Has appointment today with general surgery.  New skin tear left knee states he ran into a door Wednesday.  Continued with current treatment both wounds. Reassess 1 week.     Review of Systems   All other systems reviewed and are negative.        Objective:     Vitals:    02/09/24 0908   BP: 136/74   Pulse: 84   Temp: 98.5 °F (36.9 °C)         Physical Exam       Altered Skin Integrity 07/28/23 0800 Left lateral Malleolus (Active)   07/28/23 0800   Altered Skin Integrity Present on Admission - Did Patient arrive to the hospital with altered skin?: yes   Side: Left   Orientation: lateral   Location: Malleolus   Wound Number:    Is this injury device related?: No   Primary Wound Type:    Description of Altered Skin Integrity:    Ankle-Brachial Index:    Pulses:    Removal Indication and Assessment:    Wound Outcome:    (Retired) Wound Length (cm):    (Retired) Wound Width (cm):    (Retired) Depth (cm):    Wound Description (Comments):    Removal Indications:    Wound Image   02/09/24 0855   Dressing Appearance Intact;Moist drainage 02/09/24 0855   Drainage Amount Small 02/09/24 0855   Drainage Characteristics/Odor Serous 02/09/24 0855   Appearance Red;Moist;Not granulating 02/09/24 0855   Tissue loss description Full thickness 02/09/24 0855   Red (%), Wound Tissue Color 100 % 02/09/24 0855   Periwound Area Intact;Moist 02/09/24 0855   Wound Edges Undefined 02/09/24 0855   Wound Length (cm) 4 cm 02/09/24 0855   Wound Width (cm) 2 cm 02/09/24 0855   Wound Depth (cm) 0.1 cm 02/09/24 0855   Wound Volume (cm^3) 0.8 cm^3 02/09/24 0855   Wound Surface Area  (cm^2) 8 cm^2 02/09/24 0855   Care Cleansed with:;Sterile normal saline 02/09/24 0855   Dressing Applied;Silver;Island/border;Tubular bandage 02/09/24 0855   Periwound Care Dry periwound area maintained 02/09/24 0855   Compression Tubular elasticized bandage 02/09/24 0855   Dressing Change Due 02/16/24 02/09/24 0855            Altered Skin Integrity 02/09/24 0850 Left anterior Knee Traumatic (Active)   02/09/24 0850   Altered Skin Integrity Present on Admission - Did Patient arrive to the hospital with altered skin?:    Side: Left   Orientation: anterior   Location: Knee   Wound Number:    Is this injury device related?:    Primary Wound Type: Traumatic   Description of Altered Skin Integrity:    Ankle-Brachial Index:    Pulses:    Removal Indication and Assessment:    Wound Outcome:    (Retired) Wound Length (cm):    (Retired) Wound Width (cm):    (Retired) Depth (cm):    Wound Description (Comments):    Removal Indications:    Wound Image   02/09/24 0855   Description of Altered Skin Integrity Partial thickness tissue loss. Shallow open ulcer with a red or pink wound bed, without slough. Intact or Open/Ruptured Serum-filled blister. 02/09/24 0855   Dressing Appearance Intact;Moist drainage 02/09/24 0855   Drainage Amount Scant 02/09/24 0855   Drainage Characteristics/Odor Serosanguineous 02/09/24 0855   Appearance Red;Moist 02/09/24 0855   Tissue loss description Partial thickness 02/09/24 0855   Red (%), Wound Tissue Color 100 % 02/09/24 0855   Periwound Area Intact;Dry 02/09/24 0855   Wound Edges Defined 02/09/24 0855   Wound Length (cm) 2.5 cm 02/09/24 0855   Wound Width (cm) 1.5 cm 02/09/24 0855   Wound Depth (cm) 0.1 cm 02/09/24 0855   Wound Volume (cm^3) 0.375 cm^3 02/09/24 0855   Wound Surface Area (cm^2) 3.75 cm^2 02/09/24 0855   Care Cleansed with:;Sterile normal saline 02/09/24 0855   Dressing Applied;Silver;Island/border;Tubular bandage 02/09/24 0855   Periwound Care Dry periwound area maintained  02/09/24 0855   Compression Tubular elasticized bandage 02/09/24 0855   Dressing Change Due 02/16/24 02/09/24 0855         Assessment/Plan:         ICD-10-CM ICD-9-CM   1. SCCA (squamous cell carcinoma) of skin  C44.92 173.92   2. Skin tear of left lower leg without complication, initial encounter  S81.812A 891.0   3. Non-pressure ulcer of lower extremity, limited to breakdown of skin, left  L97.921 707.10           Tissue pathology and/or culture taken:  [] Yes [x] No   Sharp debridement performed:   [] Yes [x] No   Labs ordered this visit:   [] Yes [x] No   Imaging ordered this visit:   [] Yes [x] No           Orders Placed This Encounter   Procedures    Change dressing     Left Lateral Knee and  Leg :   Cleanse wound with: saline, leg with soap and water   Lidocaine: prn   Silver nitrate: prn   Periwound care: Gentian violet prn, lotion   Primary dressing: 4x4 Silver  border   Edema control: Tubi  F x 2  LLE toes to knee     Frequency: Weekly - patient to change   Follow-up: Dr. Casey in 1 week   Other: Follow up at Dermatology on 2/12/24   Surgical Consult with  2/9/24        Follow up in about 1 week (around 2/16/2024) for .            This includes face to face time and non-face to face time preparing to see the patient (eg, review of tests), obtaining and/or reviewing separately obtained history, documenting clinical information in the electronic or other health record, independently interpreting results and communicating results to the patient/family/caregiver, or care coordinator.

## 2024-02-16 ENCOUNTER — HOSPITAL ENCOUNTER (OUTPATIENT)
Dept: WOUND CARE | Facility: HOSPITAL | Age: 59
Discharge: HOME | End: 2024-02-16
Attending: PREVENTIVE MEDICINE
Payer: MEDICARE

## 2024-02-16 VITALS
HEIGHT: 69 IN | BODY MASS INDEX: 34.51 KG/M2 | WEIGHT: 233 LBS | DIASTOLIC BLOOD PRESSURE: 72 MMHG | TEMPERATURE: 98 F | SYSTOLIC BLOOD PRESSURE: 164 MMHG | HEART RATE: 79 BPM

## 2024-02-16 DIAGNOSIS — S81.811A NONINFECTED SKIN TEAR OF RIGHT LOWER EXTREMITY, INITIAL ENCOUNTER: ICD-10-CM

## 2024-02-16 DIAGNOSIS — S81.812D SKIN TEAR OF LEFT LOWER LEG WITHOUT COMPLICATION, SUBSEQUENT ENCOUNTER: ICD-10-CM

## 2024-02-16 DIAGNOSIS — C44.92 SCCA (SQUAMOUS CELL CARCINOMA) OF SKIN: Primary | ICD-10-CM

## 2024-02-16 PROCEDURE — 99213 OFFICE O/P EST LOW 20 MIN: CPT

## 2024-02-16 PROCEDURE — 99214 OFFICE O/P EST MOD 30 MIN: CPT

## 2024-02-16 NOTE — PROGRESS NOTES
"                     [facility]    Subjective:       Patient ID: Tj Trammell is a 58 y.o. male.    Chief Complaint: Non-healing Wound Follow Up    Seen for lle leg ulcer. Wound improved. New tear right leg.  Reports seen general surgery 12/12/24 and states " they will call me to fix this soon after they get my head biopsy back." Continued current plan of care.     Review of Systems   All other systems reviewed and are negative.        Objective:     Vitals:    02/16/24 0913   BP: (!) 164/72   Pulse: 79   Temp: 97.5 °F (36.4 °C)         Physical Exam       Altered Skin Integrity 07/28/23 0800 Left lateral Malleolus (Active)   07/28/23 0800   Altered Skin Integrity Present on Admission - Did Patient arrive to the hospital with altered skin?: yes   Side: Left   Orientation: lateral   Location: Malleolus   Wound Number:    Is this injury device related?: No   Primary Wound Type:    Description of Altered Skin Integrity:    Ankle-Brachial Index:    Pulses:    Removal Indication and Assessment:    Wound Outcome:    (Retired) Wound Length (cm):    (Retired) Wound Width (cm):    (Retired) Depth (cm):    Wound Description (Comments):    Removal Indications:    Wound Image   02/16/24 0919   Dressing Appearance Intact;Moist drainage 02/16/24 0919   Drainage Amount Small 02/16/24 0919   Drainage Characteristics/Odor Serosanguineous 02/16/24 0919   Appearance Red;Yellow;Moist 02/16/24 0919   Tissue loss description Full thickness 02/16/24 0919   Red (%), Wound Tissue Color 100 % 02/16/24 0919   Periwound Area Intact;Dry;Ecchymotic 02/16/24 0919   Wound Edges Undefined 02/16/24 0919   Wound Length (cm) 2 cm 02/16/24 0919   Wound Width (cm) 2.5 cm 02/16/24 0919   Wound Depth (cm) 0.1 cm 02/16/24 0919   Wound Volume (cm^3) 0.5 cm^3 02/16/24 0919   Wound Surface Area (cm^2) 5 cm^2 02/16/24 0919   Care Cleansed with:;Antimicrobial agent;Sterile normal saline 02/16/24 0919   Dressing " Applied;Island/border;Silver;Compression wrap 02/16/24 0919   Periwound Care Dry periwound area maintained 02/16/24 0919   Compression Tubular elasticized bandage 02/16/24 0919   Dressing Change Due 02/23/24 02/16/24 0919            Altered Skin Integrity 02/09/24 0850 Left anterior Knee Traumatic (Active)   02/09/24 0850   Altered Skin Integrity Present on Admission - Did Patient arrive to the hospital with altered skin?:    Side: Left   Orientation: anterior   Location: Knee   Wound Number:    Is this injury device related?:    Primary Wound Type: Traumatic   Description of Altered Skin Integrity:    Ankle-Brachial Index:    Pulses:    Removal Indication and Assessment:    Wound Outcome:    (Retired) Wound Length (cm):    (Retired) Wound Width (cm):    (Retired) Depth (cm):    Wound Description (Comments):    Removal Indications:    Wound Image   02/16/24 0919   Dressing Appearance Intact;Dry 02/16/24 0919   Appearance Pink;Closed/resurfaced 02/16/24 0919   Red (%), Wound Tissue Color 100 % 02/16/24 0919   Periwound Area Intact;Dry 02/16/24 0919   Wound Edges Rolled/closed 02/16/24 0919   Wound Length (cm) 0 cm 02/16/24 0919   Wound Width (cm) 0 cm 02/16/24 0919   Wound Depth (cm) 0 cm 02/16/24 0919   Wound Volume (cm^3) 0 cm^3 02/16/24 0919   Wound Surface Area (cm^2) 0 cm^2 02/16/24 0919   Care Cleansed with:;Soap and water 02/16/24 0919   Dressing Applied;Island/border;Tubular bandage 02/16/24 0919   Periwound Care Dry periwound area maintained 02/16/24 0919   Compression Tubular elasticized bandage 02/16/24 0919   Dressing Change Due 02/23/24 02/16/24 0919            Altered Skin Integrity 02/16/24 0910 Right anterior;lower Leg Traumatic (Active)   02/16/24 0910   Altered Skin Integrity Present on Admission - Did Patient arrive to the hospital with altered skin?:    Side: Right   Orientation: anterior;lower   Location: Leg   Wound Number:    Is this injury device related?:    Primary Wound Type: Traumatic    Description of Altered Skin Integrity:    Ankle-Brachial Index:    Pulses:    Removal Indication and Assessment:    Wound Outcome:    (Retired) Wound Length (cm):    (Retired) Wound Width (cm):    (Retired) Depth (cm):    Wound Description (Comments):    Removal Indications:    Wound Image   02/16/24 0919   Description of Altered Skin Integrity Partial thickness tissue loss. Shallow open ulcer with a red or pink wound bed, without slough. Intact or Open/Ruptured Serum-filled blister. 02/16/24 0919   Dressing Appearance Intact;Moist drainage 02/16/24 0919   Drainage Amount Small 02/16/24 0919   Drainage Characteristics/Odor Serosanguineous 02/16/24 0919   Appearance Red;Moist 02/16/24 0919   Tissue loss description Partial thickness 02/16/24 0919   Red (%), Wound Tissue Color 100 % 02/16/24 0919   Periwound Area Intact;Dry 02/16/24 0919   Wound Edges Open 02/16/24 0919   Wound Length (cm) 2.5 cm 02/16/24 0919   Wound Width (cm) 0.3 cm 02/16/24 0919   Wound Depth (cm) 0.1 cm 02/16/24 0919   Wound Volume (cm^3) 0.075 cm^3 02/16/24 0919   Wound Surface Area (cm^2) 0.75 cm^2 02/16/24 0919   Care Cleansed with:;Soap and water;Sterile normal saline 02/16/24 0919   Dressing Applied;Silver;Island/border;Tubular bandage 02/16/24 0919   Periwound Care Dry periwound area maintained 02/16/24 0919   Compression Tubular elasticized bandage 02/16/24 0919   Dressing Change Due 02/23/24 02/16/24 0919         Assessment/Plan:         ICD-10-CM ICD-9-CM   1. SCCA (squamous cell carcinoma) of skin  C44.92 173.92   2. Skin tear of left lower leg without complication, subsequent encounter  S81.812D V58.89     891.0   3. Noninfected skin tear of right lower extremity, initial encounter  S81.811A 891.0           Tissue pathology and/or culture taken:  [] Yes [x] No   Sharp debridement performed:   [] Yes [x] No   Labs ordered this visit:   [] Yes [x] No   Imaging ordered this visit:   [] Yes [x] No           Orders Placed This Encounter    Procedures    Change dressing     Bilateral Lower Extremities  Cleanse wound with: saline, leg with soap and water   Lidocaine: prn   Silver nitrate: prn   Periwound care: Gentian violet prn, lotion   Primary dressing: 4x4 Silver  border to any open areas  Edema control: Tubi  F x 2  LLE toes to knee     Frequency: Weekly - patient to change   Follow-up: Dr. Casey in 2 weeks   Other: Surgery lle  with Dr. Rodriguez pending.        Follow up in about 2 weeks (around 3/1/2024) for .            This includes face to face time and non-face to face time preparing to see the patient (eg, review of tests), obtaining and/or reviewing separately obtained history, documenting clinical information in the electronic or other health record, independently interpreting results and communicating results to the patient/family/caregiver, or care coordinator.

## 2024-03-01 ENCOUNTER — HOSPITAL ENCOUNTER (OUTPATIENT)
Dept: WOUND CARE | Facility: HOSPITAL | Age: 59
Discharge: HOME | End: 2024-03-01
Attending: PREVENTIVE MEDICINE
Payer: MEDICARE

## 2024-03-01 VITALS
HEART RATE: 83 BPM | SYSTOLIC BLOOD PRESSURE: 125 MMHG | TEMPERATURE: 98 F | WEIGHT: 233 LBS | DIASTOLIC BLOOD PRESSURE: 85 MMHG | HEIGHT: 69 IN | BODY MASS INDEX: 34.51 KG/M2

## 2024-03-01 DIAGNOSIS — C44.92 SCCA (SQUAMOUS CELL CARCINOMA) OF SKIN: Primary | ICD-10-CM

## 2024-03-01 DIAGNOSIS — L97.921 NON-PRESSURE ULCER OF LOWER EXTREMITY, LIMITED TO BREAKDOWN OF SKIN, LEFT: ICD-10-CM

## 2024-03-01 PROCEDURE — 99212 OFFICE O/P EST SF 10 MIN: CPT

## 2024-03-01 NOTE — PROGRESS NOTES
[facility]    Subjective:       Patient ID: Tj Trammell is a 58 y.o. male.    Chief Complaint: Wound Care    Follow up for Left leg ulcer. Wound increased in size. Continue with same plan of care.     Review of Systems   All other systems reviewed and are negative.        Objective:     Vitals:    03/01/24 0759   BP: 125/85   Pulse: 83   Temp: 98.2 °F (36.8 °C)         Physical Exam       Altered Skin Integrity 07/28/23 0800 Left lateral Malleolus (Active)   07/28/23 0800   Altered Skin Integrity Present on Admission - Did Patient arrive to the hospital with altered skin?: yes   Side: Left   Orientation: lateral   Location: Malleolus   Wound Number:    Is this injury device related?: No   Primary Wound Type:    Description of Altered Skin Integrity:    Ankle-Brachial Index:    Pulses:    Removal Indication and Assessment:    Wound Outcome:    (Retired) Wound Length (cm):    (Retired) Wound Width (cm):    (Retired) Depth (cm):    Wound Description (Comments):    Removal Indications:    Dressing Appearance Intact;Moist drainage 03/01/24 0832   Drainage Amount Moderate 03/01/24 0832   Drainage Characteristics/Odor Serosanguineous 03/01/24 0832   Appearance Bleeding 03/01/24 0832   Tissue loss description Full thickness 03/01/24 0832   Red (%), Wound Tissue Color 100 % 03/01/24 0832   Periwound Area Intact;Dry;Moist 03/01/24 0832   Wound Edges Defined 03/01/24 0832   Wound Length (cm) 5 cm 03/01/24 0832   Wound Width (cm) 3.2 cm 03/01/24 0832   Wound Depth (cm) 0.1 cm 03/01/24 0832   Wound Volume (cm^3) 1.6 cm^3 03/01/24 0832   Wound Surface Area (cm^2) 16 cm^2 03/01/24 0832   Care Antimicrobial agent;Sterile normal saline 03/01/24 0832   Dressing Applied;Silver;Island/border;Tubular bandage 03/01/24 0832   Periwound Care Dry periwound area maintained 03/01/24 0832   Compression Tubular elasticized bandage 03/01/24 0832   Dressing Change Due 03/15/24 03/01/24 0832          Assessment/Plan:         ICD-10-CM ICD-9-CM   1. SCCA (squamous cell carcinoma) of skin  C44.92 173.92   2. Non-pressure ulcer of lower extremity, limited to breakdown of skin, left  L97.921 707.10       Awaiting surgery excision of SCC    Tissue pathology and/or culture taken:  [] Yes [x] No   Sharp debridement performed:   [] Yes [x] No   Labs ordered this visit:   [] Yes [x] No   Imaging ordered this visit:   [] Yes [x] No           Orders Placed This Encounter   Procedures    Change dressing     Bilateral Lower Extremities  Cleanse wound with: saline, leg with soap and water  Lidocaine: prn  Silver nitrate: prn  Periwound care: Gentian violet prn, lotion  Primary dressing: 4x4 Silver  border to any open areas  Edema control: Tubi  F x 2  LLE toes to knee    Frequency: Weekly - patient to change  Follow-up: Dr. Casey in 2 weeks  Other: Surgery lle  with Dr. Rodriguez pending.        Follow up in about 2 weeks (around 3/15/2024).            This includes face to face time and non-face to face time preparing to see the patient (eg, review of tests), obtaining and/or reviewing separately obtained history, documenting clinical information in the electronic or other health record, independently interpreting results and communicating results to the patient/family/caregiver, or care coordinator.

## 2024-03-15 ENCOUNTER — HOSPITAL ENCOUNTER (OUTPATIENT)
Dept: WOUND CARE | Facility: HOSPITAL | Age: 59
Discharge: HOME | End: 2024-03-15
Attending: PREVENTIVE MEDICINE
Payer: MEDICARE

## 2024-03-15 VITALS
HEIGHT: 69 IN | BODY MASS INDEX: 34.51 KG/M2 | TEMPERATURE: 98 F | DIASTOLIC BLOOD PRESSURE: 81 MMHG | SYSTOLIC BLOOD PRESSURE: 161 MMHG | WEIGHT: 233 LBS | HEART RATE: 78 BPM

## 2024-03-15 DIAGNOSIS — S81.811A NONINFECTED SKIN TEAR OF RIGHT LOWER EXTREMITY, INITIAL ENCOUNTER: ICD-10-CM

## 2024-03-15 DIAGNOSIS — C44.92 SCCA (SQUAMOUS CELL CARCINOMA) OF SKIN: ICD-10-CM

## 2024-03-15 DIAGNOSIS — L97.921 NON-PRESSURE ULCER OF LOWER EXTREMITY, LIMITED TO BREAKDOWN OF SKIN, LEFT: Primary | ICD-10-CM

## 2024-03-15 PROCEDURE — 99213 OFFICE O/P EST LOW 20 MIN: CPT

## 2024-03-15 NOTE — PROGRESS NOTES
[facility]    Subjective:       Patient ID: Tj Trammell is a 58 y.o. male.    Chief Complaint: Non-healing Wound Follow Up    Follow up left leg ulcer. Measurement unchanged. New wound right arm reports falling off scooter last week. Still waiting for surgeon to call to schedule lle surgery. Plan of care updated. Will contact surgeon to discuss plan of care regarding surgery.     Review of Systems   All other systems reviewed and are negative.        Objective:     Vitals:    03/15/24 0756   BP: (!) 161/81   Pulse: 78   Temp: 97.5 °F (36.4 °C)         Physical Exam       Altered Skin Integrity 07/28/23 0800 Left lateral Malleolus (Active)   07/28/23 0800   Altered Skin Integrity Present on Admission - Did Patient arrive to the hospital with altered skin?: yes   Side: Left   Orientation: lateral   Location: Malleolus   Wound Number:    Is this injury device related?: No   Primary Wound Type:    Description of Altered Skin Integrity:    Ankle-Brachial Index:    Pulses:    Removal Indication and Assessment:    Wound Outcome:    (Retired) Wound Length (cm):    (Retired) Wound Width (cm):    (Retired) Depth (cm):    Wound Description (Comments):    Removal Indications:    Wound Image   03/15/24 0822   Dressing Appearance Intact;Moist drainage 03/15/24 0822   Drainage Amount Small 03/15/24 0822   Drainage Characteristics/Odor Bleeding controlled 03/15/24 0822   Appearance Bleeding 03/15/24 0822   Tissue loss description Full thickness 03/15/24 0822   Red (%), Wound Tissue Color 100 % 03/15/24 0822   Periwound Area Intact;Dry;Maroon 03/15/24 0822   Wound Edges Defined 03/15/24 0822   Wound Length (cm) 4.9 cm 03/15/24 0822   Wound Width (cm) 3 cm 03/15/24 0822   Wound Depth (cm) 0.1 cm 03/15/24 0822   Wound Volume (cm^3) 1.47 cm^3 03/15/24 0822   Wound Surface Area (cm^2) 14.7 cm^2 03/15/24 0822   Care Cleansed with:;Soap and water 03/15/24 0822   Dressing  Applied;Silver;Island/border;Tubular bandage 03/15/24 0822   Periwound Care Dry periwound area maintained 03/15/24 0822   Compression Tubular elasticized bandage 03/15/24 0822   Dressing Change Due 03/22/24 03/15/24 0822            Altered Skin Integrity 03/15/24 0800 Right anterior;lower Arm Traumatic (Active)   03/15/24 0800   Altered Skin Integrity Present on Admission - Did Patient arrive to the hospital with altered skin?:    Side: Right   Orientation: anterior;lower   Location: Arm   Wound Number:    Is this injury device related?:    Primary Wound Type: Traumatic   Description of Altered Skin Integrity:    Ankle-Brachial Index:    Pulses:    Removal Indication and Assessment:    Wound Outcome:    (Retired) Wound Length (cm):    (Retired) Wound Width (cm):    (Retired) Depth (cm):    Wound Description (Comments):    Removal Indications:    Wound Image   03/15/24 0822   Dressing Appearance Dry;Open to air 03/15/24 0822   Drainage Amount None 03/15/24 0822   Appearance Cotton;Eschar;Dry 03/15/24 0822   Tissue loss description Partial thickness 03/15/24 0822   Yellow (%), Wound Tissue Color 100 % 03/15/24 0822   Periwound Area Intact;Dry;Redness 03/15/24 0822   Wound Edges Undefined 03/15/24 0822   Wound Length (cm) 5 cm 03/15/24 0822   Wound Width (cm) 0.6 cm 03/15/24 0822   Wound Depth (cm) 0.1 cm 03/15/24 0822   Wound Volume (cm^3) 0.3 cm^3 03/15/24 0822   Wound Surface Area (cm^2) 3 cm^2 03/15/24 0822   Care Cleansed with:;Sterile normal saline 03/15/24 0822   Dressing Applied;Island/border 03/15/24 0822   Periwound Care Absorptive dressing applied 03/15/24 0822   Dressing Change Due 03/17/24 03/15/24 0822         Assessment/Plan:         ICD-10-CM ICD-9-CM   1. Non-pressure ulcer of lower extremity, limited to breakdown of skin, left  L97.921 707.10   2. SCCA (squamous cell carcinoma) of skin  C44.92 173.92   3. Noninfected skin tear of right lower extremity, initial encounter  S81.811A 891.0           Tissue  pathology and/or culture taken:  [] Yes [x] No   Sharp debridement performed:   [] Yes [x] No   Labs ordered this visit:   [] Yes [x] No   Imaging ordered this visit:   [] Yes [x] No           Orders Placed This Encounter   Procedures    Change dressing     Left Lower Extremity  Cleanse wound with: saline, leg with soap and water  Lidocaine: prn  Silver nitrate: prn  Periwound care: Gentian violet prn, lotion  Primary dressing: 4x4 Silver  border to any open areas  Edema control: Tubi  F x 2  LLE toes to knee  Frequency: Weekly - patient to change    Right Arm  Cleanse wound with: saline, leg with soap and water  Lidocaine: prn  Silver nitrate: prn  Primary dressing: Mupirocin + Island border patient.   Frequency: every other day per patient to change    Follow-up: Dr. Casey in 3 weeks  Other: Surgery lle  with Dr. Rodriguez pending.        Follow up in about 3 weeks (around 4/5/2024) for .            This includes face to face time and non-face to face time preparing to see the patient (eg, review of tests), obtaining and/or reviewing separately obtained history, documenting clinical information in the electronic or other health record, independently interpreting results and communicating results to the patient/family/caregiver, or care coordinator.

## 2024-03-18 ENCOUNTER — OFFICE VISIT (OUTPATIENT)
Dept: SURGERY | Facility: CLINIC | Age: 59
End: 2024-03-18
Payer: MEDICARE

## 2024-03-18 VITALS
WEIGHT: 231.5 LBS | SYSTOLIC BLOOD PRESSURE: 123 MMHG | DIASTOLIC BLOOD PRESSURE: 88 MMHG | BODY MASS INDEX: 34.29 KG/M2 | HEIGHT: 69 IN | HEART RATE: 92 BPM

## 2024-03-18 DIAGNOSIS — C44.92 SCCA (SQUAMOUS CELL CARCINOMA) OF SKIN: Primary | ICD-10-CM

## 2024-03-18 PROCEDURE — 99999 PR PBB SHADOW E&M-EST. PATIENT-LVL IV: CPT | Mod: PBBFAC,,, | Performed by: STUDENT IN AN ORGANIZED HEALTH CARE EDUCATION/TRAINING PROGRAM

## 2024-03-18 PROCEDURE — 99214 OFFICE O/P EST MOD 30 MIN: CPT | Mod: S$GLB,,, | Performed by: STUDENT IN AN ORGANIZED HEALTH CARE EDUCATION/TRAINING PROGRAM

## 2024-03-18 NOTE — PROGRESS NOTES
Patient ID: Tj Trammell is a 58 y.o. male.    Chief Complaint: No chief complaint on file.      HPI:  HPI  58M with long standing left lateral lower leg venous ulcers underwent skin biopsy of edge of wound by dr bonner recently. Was found to have SCC in situ. Report scanned. Also underwent biopsy of scalp lesion left upper scalp today.     3/18/2024  Left leg wound about the same  Left scalp biopsy reviewed,ssc in situ as well      Review of Systems   Constitutional:  Negative for chills, diaphoresis and fever.   HENT:  Negative for trouble swallowing.    Respiratory:  Negative for cough, shortness of breath, wheezing and stridor.    Cardiovascular:  Negative for chest pain and palpitations.   Gastrointestinal:  Negative for abdominal distention, abdominal pain, blood in stool, diarrhea, nausea and vomiting.   Endocrine: Negative for cold intolerance and heat intolerance.   Genitourinary:  Negative for difficulty urinating.   Musculoskeletal:  Positive for gait problem. Negative for back pain.   Skin:  Positive for wound. Negative for rash.   Allergic/Immunologic: Negative for immunocompromised state.   Neurological:  Negative for dizziness, syncope and numbness.   Hematological:  Negative for adenopathy.   Psychiatric/Behavioral:  Negative for agitation.        Current Outpatient Medications   Medication Sig Dispense Refill    acetaZOLAMIDE (DIAMOX) 250 MG tablet Take 2 TABLETS BY MOUTH ONLY 8 HOURS AFTER SURGERY 2 tablet 0    albuterol (PROAIR HFA) 90 mcg/actuation inhaler Inhale 2 puffs by mouth every 4 hours. 8.5 g 3    ALPRAZolam (XANAX) 0.5 MG tablet TAKE ONE TABLET BY MOUTH TWICE DAILY AS NEEDED FOR ANXIETY 60 tablet 4    atorvastatin (LIPITOR) 40 MG tablet Take 1 tablet (40 mg total) by mouth once daily. 90 tablet 4    bacitracin 500 unit/gram Oint Apply topically 2 (two) times daily. 28 g 0    baclofen (LIORESAL) 10 MG tablet TAKE 1 TABLET BY MOUTH 3 TIMES DAILY. 90 tablet 11    buPROPion  (WELLBUTRIN SR) 150 MG TBSR 12 hr tablet TAKE 1 TABLET BY MOUTH EVERY 12 HOURS DAILY. 60 tablet 11    dicyclomine (BENTYL) 10 MG capsule TAKE 1 CAPSULE BY MOUTH THREE TIMES A DAY FOR ABDOMINAL PAIN 90 capsule 3    difluprednate (DUREZOL) 0.05 % Drop ophthalmic solution Instill one drop TO SURGERY EYE four times a day AFTER SURGERY X 30 DAYS 5 mL 4    docusate sodium (COLACE) 100 MG capsule Take 1 capsule (100 mg total) by mouth once daily. 30 capsule 5    ergocalciferol (ERGOCALCIFEROL) 50,000 unit Cap TAKE 1 CAPSULE BY MOUTH Weekly 12 capsule 0    famotidine (PEPCID) 20 MG tablet Take 1 tablet (20 mg total) by mouth 2 (two) times daily. 180 tablet 3    fingolimod (GILENYA) 0.5 mg Cap Take 1 capsule (0.5 mg total) by mouth once daily. 90 capsule 2    gabapentin (NEURONTIN) 300 MG capsule TAKE 1 CAPSULE BY MOUTH AT BEDTIME 30 capsule 11    lactulose (CHRONULAC) 10 gram/15 mL solution Take 45 mLs (30 g total) by mouth 3 (three) times daily. 3784 mL 3    linaCLOtide (LINZESS) 290 mcg Cap capsule Take 1 capsule (290 mcg total) by mouth before breakfast. 30 capsule 11    lisinopriL (PRINIVIL,ZESTRIL) 5 MG tablet TAKE 1 TABLET BY MOUTH DAILY 90 tablet 3    nabumetone (RELAFEN) 500 MG tablet Take 1 tablet (500 mg total) by mouth 2 (two) times daily as needed. 60 tablet 6    nebulizer and compressor (HOME NEBULIZER PLUS SIDESTREAM) Lupe use as directed 1 each 0    ofloxacin (OCUFLOX) 0.3 % ophthalmic solution Instill 1 drop TO SURGERY EYE four times a day STARTING 2 DAYS BEFORE SURGERY 5 mL 3    oxybutynin (DITROPAN-XL) 5 MG TR24 Take 1 tablet (5 mg total) by mouth once daily. 90 tablet 3    pantoprazole (PROTONIX) 40 MG tablet TAKE ONE TABLET BY MOUTH  ONCE DAILY 90 tablet 3    sodium,potassium,mag sulfates (SUPREP BOWEL PREP KIT) 17.5-3.13-1.6 gram SolR Take 177 mLs by mouth once daily. 354 mL 0    traMADoL (ULTRAM) 50 mg tablet Take 1 tablet (50 mg total) by mouth every 6 (six) hours as needed for pain 120 tablet 4     mupirocin (BACTROBAN) 2 % ointment Apply daily to left hand (Patient not taking: Reported on 2/16/2024) 22 g 2    triamcinolone acetonide 0.1% (KENALOG) 0.1 % cream Apply topically 2 (two) times daily. for 10 days 80 g 0     No current facility-administered medications for this visit.       Review of patient's allergies indicates:  No Known Allergies    Past Medical History:   Diagnosis Date    Asthma     Bilateral hearing loss 12/12/2012    Biliary acute pancreatitis     GERD (gastroesophageal reflux disease)     High cholesterol     Hypertension     Multiple sclerosis        Past Surgical History:   Procedure Laterality Date    APPENDECTOMY      CHOLECYSTECTOMY      COLONOSCOPY N/A 1/22/2019    Procedure: COLONOSCOPY 2 day  golytely;  Surgeon: Nathan Waddell MD;  Location: [facility];  Service: Endoscopy;  Laterality: N/A;    COLONOSCOPY N/A 4/25/2023    Procedure: COLONOSCOPY;  Surgeon: Jared Loredo MD;  Location: [facility];  Service: Endoscopy;  Laterality: N/A;    COLONOSCOPY N/A 10/10/2023    Procedure: COLONOSCOPY;  Surgeon: Jared Loredo MD;  Location: [facility];  Service: Endoscopy;  Laterality: N/A;    ESOPHAGOGASTRODUODENOSCOPY N/A 9/29/2021    Procedure: EGD (ESOPHAGOGASTRODUODENOSCOPY) covid test Rapid;  Surgeon: Jared Loredo MD;  Location: [facility];  Service: Endoscopy;  Laterality: N/A;    JOINT REPLACEMENT      arm    TONSILLECTOMY      TRANSFORAMINAL EPIDURAL INJECTION OF STEROID Bilateral 10/5/2022    Procedure: Injection,steroid,epidural,transforaminal approach;  Surgeon: Soo Giles MD;  Location: [facility];  Service: Pain Management;  Laterality: Bilateral;  bilateral L5 transforaminal epidural steroid injection with RN IV sedation       Social History     Socioeconomic History    Marital status: Single   Tobacco Use    Smoking status: Former     Current packs/day: 0.00     Average packs/day: 1 pack/day for 32.0 years (32.0 ttl pk-yrs)     Types:  Cigarettes     Start date: 1990     Quit date: 2022     Years since quittin.2    Smokeless tobacco: Never   Substance and Sexual Activity    Alcohol use: Not Currently    Drug use: No       Vitals:    24 1008   BP: 123/88   Pulse: 92       Physical Exam  Constitutional:       General: He is not in acute distress.  HENT:      Head: Normocephalic and atraumatic.   Eyes:      General: No scleral icterus.  Cardiovascular:      Rate and Rhythm: Normal rate.   Pulmonary:      Effort: Pulmonary effort is normal. No respiratory distress.      Breath sounds: No stridor.   Abdominal:      Palpations: Abdomen is soft.      Tenderness: There is no abdominal tenderness.   Lymphadenopathy:      Cervical: No cervical adenopathy.   Skin:     General: Skin is warm.      Findings: No erythema.          Neurological:      Mental Status: He is alert and oriented to person, place, and time.   Psychiatric:         Behavior: Behavior normal.     Body mass index is 34.18 kg/m².  Path report noted, ssc insitu left lateral leg and left posterior scalp    Assessment & Plan:  58m with SCC in left lateral leg ulcer, left scalp  Given low risk lesion will plan excision in office

## 2024-04-05 ENCOUNTER — HOSPITAL ENCOUNTER (OUTPATIENT)
Dept: WOUND CARE | Facility: HOSPITAL | Age: 59
Discharge: HOME | End: 2024-04-05
Attending: PREVENTIVE MEDICINE
Payer: MEDICARE

## 2024-04-05 VITALS
SYSTOLIC BLOOD PRESSURE: 147 MMHG | BODY MASS INDEX: 34.29 KG/M2 | DIASTOLIC BLOOD PRESSURE: 84 MMHG | TEMPERATURE: 98 F | HEIGHT: 69 IN | HEART RATE: 76 BPM | WEIGHT: 231.5 LBS

## 2024-04-05 DIAGNOSIS — L97.921 NON-PRESSURE ULCER OF LOWER EXTREMITY, LIMITED TO BREAKDOWN OF SKIN, LEFT: Primary | ICD-10-CM

## 2024-04-05 DIAGNOSIS — C44.92 SCCA (SQUAMOUS CELL CARCINOMA) OF SKIN: ICD-10-CM

## 2024-04-05 DIAGNOSIS — S81.812A SKIN TEAR OF LEFT LOWER LEG WITHOUT COMPLICATION, INITIAL ENCOUNTER: ICD-10-CM

## 2024-04-05 PROCEDURE — 99214 OFFICE O/P EST MOD 30 MIN: CPT

## 2024-04-05 NOTE — PROGRESS NOTES
[facility]    Subjective:       Patient ID: Tj Trammell is a 58 y.o. male.    Chief Complaint: Wound Care and Wound Check    Follow up for his left lower leg wound. New wounds to bilateral knees, patient states he fell out of bed last week. Right arm wound has closed. Patient only wearing one tubigrip, states that two is too tight.       Review of Systems   All other systems reviewed and are negative.        Objective:     Vitals:    04/05/24 0831   BP: (!) 147/84   Pulse: 76   Temp: 98 °F (36.7 °C)         Physical Exam       Altered Skin Integrity 07/28/23 0800 Left lateral Malleolus (Active)   07/28/23 0800   Altered Skin Integrity Present on Admission - Did Patient arrive to the hospital with altered skin?: yes   Side: Left   Orientation: lateral   Location: Malleolus   Wound Number:    Is this injury device related?: No   Primary Wound Type:    Description of Altered Skin Integrity:    Ankle-Brachial Index:    Pulses:    Removal Indication and Assessment:    Wound Outcome:    (Retired) Wound Length (cm):    (Retired) Wound Width (cm):    (Retired) Depth (cm):    Wound Description (Comments):    Removal Indications:    Wound Image   04/05/24 0800   Description of Altered Skin Integrity Full thickness tissue loss. Subcutaneous fat may be visible but bone, tendon or muscle are not exposed 04/05/24 0800   Dressing Appearance Moist drainage;Saturated 04/05/24 0800   Drainage Amount Large 04/05/24 0800   Drainage Characteristics/Odor Sanguineous 04/05/24 0800   Appearance Bleeding;Red;Wet 04/05/24 0800   Tissue loss description Full thickness 04/05/24 0800   Red (%), Wound Tissue Color 100 % 04/05/24 0800   Periwound Area Intact;Dry;Scar tissue;Maroon 04/05/24 0800   Wound Edges Defined 04/05/24 0800   Wound Length (cm) 2.5 cm 04/05/24 0800   Wound Width (cm) 3 cm 04/05/24 0800   Wound Depth (cm) 0.2 cm 04/05/24 0800   Wound Volume (cm^3) 1.5 cm^3 04/05/24 0800    Wound Surface Area (cm^2) 7.5 cm^2 04/05/24 0800   Care Cleansed with:;Sterile normal saline 04/05/24 0800   Dressing Applied;Silver;Island/border;Tubular bandage 04/05/24 0800   Periwound Care Absorptive dressing applied;Moisturizer applied 04/05/24 0800   Compression Tubular elasticized bandage 04/05/24 0800   Dressing Change Due 04/12/24 04/05/24 0800            Altered Skin Integrity 03/15/24 0800 Right anterior;lower Arm Traumatic (Active)   03/15/24 0800   Altered Skin Integrity Present on Admission - Did Patient arrive to the hospital with altered skin?:    Side: Right   Orientation: anterior;lower   Location: Arm   Wound Number:    Is this injury device related?:    Primary Wound Type: Traumatic   Description of Altered Skin Integrity:    Ankle-Brachial Index:    Pulses:    Removal Indication and Assessment:    Wound Outcome:    (Retired) Wound Length (cm):    (Retired) Wound Width (cm):    (Retired) Depth (cm):    Wound Description (Comments):    Removal Indications:    Wound Image   04/05/24 0800   Dressing Appearance Dry;Intact;Clean 04/05/24 0800   Drainage Amount None 04/05/24 0800   Appearance Fibrin;Dry 04/05/24 0800   Tissue loss description Not applicable 04/05/24 0800   Yellow (%), Wound Tissue Color 100 % 04/05/24 0800   Periwound Area Intact;Dry 04/05/24 0800   Wound Edges Rolled/closed 04/05/24 0800   Wound Length (cm) 1 cm 04/05/24 0800   Wound Width (cm) 0.7 cm 04/05/24 0800   Wound Depth (cm) 0.1 cm 04/05/24 0800   Wound Volume (cm^3) 0.07 cm^3 04/05/24 0800   Wound Surface Area (cm^2) 0.7 cm^2 04/05/24 0800   Care Cleansed with:;Sterile normal saline 04/05/24 0800            Altered Skin Integrity 04/05/24 0800 Right anterior Knee Traumatic Partial thickness tissue loss. Shallow open ulcer with a red or pink wound bed, without slough. Intact or Open/Ruptured Serum-filled blister. (Active)   04/05/24 0800   Altered Skin Integrity Present on Admission - Did Patient arrive to the hospital  with altered skin?: yes   Side: Right   Orientation: anterior   Location: Knee   Wound Number:    Is this injury device related?:    Primary Wound Type: Traumatic   Description of Altered Skin Integrity: Partial thickness tissue loss. Shallow open ulcer with a red or pink wound bed, without slough. Intact or Open/Ruptured Serum-filled blister.   Ankle-Brachial Index:    Pulses:    Removal Indication and Assessment:    Wound Outcome:    (Retired) Wound Length (cm):    (Retired) Wound Width (cm):    (Retired) Depth (cm):    Wound Description (Comments):    Removal Indications:    Wound Image   04/05/24 0800   Description of Altered Skin Integrity Partial thickness tissue loss. Shallow open ulcer with a red or pink wound bed, without slough. Intact or Open/Ruptured Serum-filled blister. 04/05/24 0800   Dressing Appearance Open to air 04/05/24 0800   Drainage Amount None 04/05/24 0800   Appearance Pink;Dry 04/05/24 0800   Tissue loss description Partial thickness 04/05/24 0800   Red (%), Wound Tissue Color 100 % 04/05/24 0800   Periwound Area Dry;Intact 04/05/24 0800   Wound Edges Open 04/05/24 0800   Wound Length (cm) 1.2 cm 04/05/24 0800   Wound Width (cm) 1.1 cm 04/05/24 0800   Wound Depth (cm) 0.1 cm 04/05/24 0800   Wound Volume (cm^3) 0.132 cm^3 04/05/24 0800   Wound Surface Area (cm^2) 1.32 cm^2 04/05/24 0800   Care Cleansed with:;Sterile normal saline 04/05/24 0800   Dressing Applied;Island/border 04/05/24 0800   Periwound Care Absorptive dressing applied;Dry periwound area maintained 04/05/24 0800   Dressing Change Due 04/07/24 04/05/24 0800            Altered Skin Integrity 04/05/24 0800 Left anterior Knee Traumatic Partial thickness tissue loss. Shallow open ulcer with a red or pink wound bed, without slough. Intact or Open/Ruptured Serum-filled blister. (Active)   04/05/24 0800   Altered Skin Integrity Present on Admission - Did Patient arrive to the hospital with altered skin?:    Side: Left   Orientation:  anterior   Location: Knee   Wound Number:    Is this injury device related?: No   Primary Wound Type: Traumatic   Description of Altered Skin Integrity: Partial thickness tissue loss. Shallow open ulcer with a red or pink wound bed, without slough. Intact or Open/Ruptured Serum-filled blister.   Ankle-Brachial Index:    Pulses:    Removal Indication and Assessment:    Wound Outcome:    (Retired) Wound Length (cm):    (Retired) Wound Width (cm):    (Retired) Depth (cm):    Wound Description (Comments):    Removal Indications:    Wound Image   04/05/24 0800   Description of Altered Skin Integrity Partial thickness tissue loss. Shallow open ulcer with a red or pink wound bed, without slough. Intact or Open/Ruptured Serum-filled blister. 04/05/24 0800   Dressing Appearance Open to air 04/05/24 0800   Drainage Amount None 04/05/24 0800   Appearance Pink;Dry 04/05/24 0800   Tissue loss description Partial thickness 04/05/24 0800   Red (%), Wound Tissue Color 100 % 04/05/24 0800   Periwound Area Satellite lesion;Dry 04/05/24 0800   Wound Edges Open 04/05/24 0800   Wound Length (cm) 1.5 cm 04/05/24 0800   Wound Width (cm) 2 cm 04/05/24 0800   Wound Depth (cm) 0.1 cm 04/05/24 0800   Wound Volume (cm^3) 0.3 cm^3 04/05/24 0800   Wound Surface Area (cm^2) 3 cm^2 04/05/24 0800   Care Cleansed with:;Sterile normal saline 04/05/24 0800   Dressing Applied;Island/border 04/05/24 0800   Periwound Care Absorptive dressing applied;Dry periwound area maintained 04/05/24 0800   Dressing Change Due 04/07/24 04/05/24 0800         Assessment/Plan:         ICD-10-CM ICD-9-CM   1. Non-pressure ulcer of lower extremity, limited to breakdown of skin, left  L97.921 707.10   2. SCCA (squamous cell carcinoma) of skin  C44.92 173.92       Plan for surgical excision of scalp and LLE lesion this Month with Dr. Rodriguez.    Tissue pathology and/or culture taken:  [] Yes [x] No   Sharp debridement performed:   [] Yes [x] No   Labs ordered this  visit:   [] Yes [x] No   Imaging ordered this visit:   [] Yes [x] No           Orders Placed This Encounter   Procedures    Change dressing     Left Lower Extremity   Cleanse wound with: saline, leg with soap and water   Lidocaine: prn   Silver nitrate: prn   Periwound care: Gentian violet prn, lotion   Primary dressing: 4x4 Silver  border to any open areas   Edema control: Tubi  F x 1  LLE toes to knee   Frequency: Weekly - patient to change     Right knee and left knee wound  Cleanse wound with: saline, leg with soap and water   Lidocaine: prn   Silver nitrate: prn   Primary dressing: Mupirocin + Island border patient.   Frequency: every other day per patient to change     Follow-up: Dr. Casey in 1 week  Other: Surgery lle  with Dr. Rodriguez pending.        Follow up in about 1 week (around 4/12/2024) for Dr Casey.            This includes face to face time and non-face to face time preparing to see the patient (eg, review of tests), obtaining and/or reviewing separately obtained history, documenting clinical information in the electronic or other health record, independently interpreting results and communicating results to the patient/family/caregiver, or care coordinator.

## 2024-04-12 ENCOUNTER — OFFICE VISIT (OUTPATIENT)
Dept: SURGERY | Facility: CLINIC | Age: 59
End: 2024-04-12
Payer: MEDICARE

## 2024-04-12 VITALS
BODY MASS INDEX: 34.29 KG/M2 | DIASTOLIC BLOOD PRESSURE: 74 MMHG | HEART RATE: 79 BPM | WEIGHT: 231.5 LBS | HEIGHT: 69 IN | TEMPERATURE: 97 F | SYSTOLIC BLOOD PRESSURE: 124 MMHG

## 2024-04-12 DIAGNOSIS — L98.9 SKIN LESION OF SCALP: Primary | ICD-10-CM

## 2024-04-12 DIAGNOSIS — L98.9 SKIN LESION OF LEFT LEG: ICD-10-CM

## 2024-04-12 PROCEDURE — 1159F MED LIST DOCD IN RCRD: CPT | Mod: CPTII,S$GLB,, | Performed by: STUDENT IN AN ORGANIZED HEALTH CARE EDUCATION/TRAINING PROGRAM

## 2024-04-12 PROCEDURE — 3074F SYST BP LT 130 MM HG: CPT | Mod: CPTII,S$GLB,, | Performed by: STUDENT IN AN ORGANIZED HEALTH CARE EDUCATION/TRAINING PROGRAM

## 2024-04-12 PROCEDURE — 88305 TISSUE EXAM BY PATHOLOGIST: CPT | Mod: 59 | Performed by: PATHOLOGY

## 2024-04-12 PROCEDURE — 88305 TISSUE EXAM BY PATHOLOGIST: CPT | Mod: 26,,, | Performed by: PATHOLOGY

## 2024-04-12 PROCEDURE — 4010F ACE/ARB THERAPY RXD/TAKEN: CPT | Mod: CPTII,S$GLB,, | Performed by: STUDENT IN AN ORGANIZED HEALTH CARE EDUCATION/TRAINING PROGRAM

## 2024-04-12 PROCEDURE — 3008F BODY MASS INDEX DOCD: CPT | Mod: CPTII,S$GLB,, | Performed by: STUDENT IN AN ORGANIZED HEALTH CARE EDUCATION/TRAINING PROGRAM

## 2024-04-12 PROCEDURE — 11621 EXC S/N/H/F/G MAL+MRG 0.6-1: CPT | Mod: S$GLB,,, | Performed by: STUDENT IN AN ORGANIZED HEALTH CARE EDUCATION/TRAINING PROGRAM

## 2024-04-12 PROCEDURE — 99499 UNLISTED E&M SERVICE: CPT | Mod: S$GLB,,, | Performed by: STUDENT IN AN ORGANIZED HEALTH CARE EDUCATION/TRAINING PROGRAM

## 2024-04-12 PROCEDURE — 99999 PR PBB SHADOW E&M-EST. PATIENT-LVL IV: CPT | Mod: PBBFAC,,, | Performed by: STUDENT IN AN ORGANIZED HEALTH CARE EDUCATION/TRAINING PROGRAM

## 2024-04-12 PROCEDURE — 88305 TISSUE EXAM BY PATHOLOGIST: CPT | Performed by: PATHOLOGY

## 2024-04-12 PROCEDURE — 3078F DIAST BP <80 MM HG: CPT | Mod: CPTII,S$GLB,, | Performed by: STUDENT IN AN ORGANIZED HEALTH CARE EDUCATION/TRAINING PROGRAM

## 2024-04-12 PROCEDURE — 3051F HG A1C>EQUAL 7.0%<8.0%: CPT | Mod: CPTII,S$GLB,, | Performed by: STUDENT IN AN ORGANIZED HEALTH CARE EDUCATION/TRAINING PROGRAM

## 2024-04-12 NOTE — PROGRESS NOTES
"Tj Trammell is a 58 y.o. male patient.    Temp: 97.1 °F (36.2 °C) (04/12/24 1148)  Pulse: 79 (04/12/24 1148)  BP: 124/74 (04/12/24 1148)  Weight: 105 kg (231 lb 7.7 oz) (04/12/24 1148)  Height: 5' 9" (175.3 cm) (04/12/24 1148)       Excision of Lesion Mid scalp    Date/Time: 4/12/2024 11:00 AM    Performed by: Chriss Rodriguez MD  Authorized by: Chriss Rodriguez MD    Consent Done?:  Yes (Written)  Timeout: prior to procedure the correct patient, procedure, and site was verified    Prep: patient was prepped and draped in usual sterile fashion    Local anesthesia used?: Yes    Anesthesia:  Nerve block  Local anesthetic:  Lidocaine 1% with epinephrine  Body area:  Scalp  Position:  Supine  Anesthesia:  Nerve block  Local anesthetic:  Lidocaine 1% with epinephrine  Excision type:  Skin  Malignancy:  Malignant  Excision size (cm):  1  Scalpel size:  15  Incision type:  Elliptical   Needle, instrument, and sponge counts were correct.   Patient tolerated the procedure well with no immediate complications.   Post-operative instructions were provided for the patient.  Comments:      Wound bed cauterized      4/12/2024      "

## 2024-04-12 NOTE — PROGRESS NOTES
"Tj Trammell is a 58 y.o. male patient.    Temp: 97.1 °F (36.2 °C) (04/12/24 1148)  Pulse: 79 (04/12/24 1148)  BP: 124/74 (04/12/24 1148)  Weight: 105 kg (231 lb 7.7 oz) (04/12/24 1148)  Height: 5' 9" (175.3 cm) (04/12/24 1148)       Excision of Lesion left scalp    Date/Time: 4/12/2024 11:00 AM    Performed by: Chriss Rodriguez MD  Authorized by: Chriss Rodriguez MD    Consent Done?:  Yes (Written)  Timeout: prior to procedure the correct patient, procedure, and site was verified    Prep: patient was prepped and draped in usual sterile fashion    Local anesthesia used?: Yes    Anesthesia:  Local infiltration  Local anesthetic:  Lidocaine 1% with epinephrine  Body area:  Scalp  Laterality:  Left  Position:  Supine  Anesthesia:  Local infiltration  Local anesthetic:  Lidocaine 1% with epinephrine  Excision type:  Skin  Malignancy:  Malignant  Excision size (cm):  1  Scalpel size:  15  Incision type:  Elliptical   Needle, instrument, and sponge counts were correct.   Patient tolerated the procedure well with no immediate complications.   Post-operative instructions were provided for the patient.  Comments:      Wound bed was cauterized      4/12/2024      "

## 2024-04-12 NOTE — PROGRESS NOTES
"Tj Trammell is a 58 y.o. male patient.    Temp: 97.1 °F (36.2 °C) (04/12/24 1148)  Pulse: 79 (04/12/24 1148)  BP: 124/74 (04/12/24 1148)  Weight: 105 kg (231 lb 7.7 oz) (04/12/24 1148)  Height: 5' 9" (175.3 cm) (04/12/24 1148)       Excision of Lesion left leg    Date/Time: 4/12/2024 11:00 AM    Performed by: Chriss Rodriguez MD  Authorized by: Chriss Rodriguez MD    Consent Done?:  Yes (Written)  Timeout: prior to procedure the correct patient, procedure, and site was verified    Prep: patient was prepped and draped in usual sterile fashion    Local anesthesia used?: Yes    Anesthesia:  Local infiltration  Local anesthetic:  Lidocaine 1% with epinephrine  Body area:  Lower leg / ankle  Laterality:  Left  Position:  Supine  Anesthesia:  Local infiltration  Local anesthetic:  Lidocaine 1% with epinephrine  Excision type:  Skin  Malignancy:  Malignant  Excision size (cm):  1  Scalpel size:  15  Incision type:  Elliptical   Needle, instrument, and sponge counts were correct.   Patient tolerated the procedure well with no immediate complications.   Post-operative instructions were provided for the patient.  Comments:      Wound cauterized      4/12/2024      "

## 2024-04-17 LAB
FINAL PATHOLOGIC DIAGNOSIS: ABNORMAL
GROSS: ABNORMAL
Lab: ABNORMAL

## 2024-04-18 LAB
FINAL PATHOLOGIC DIAGNOSIS: NORMAL
FINAL PATHOLOGIC DIAGNOSIS: NORMAL
GROSS: NORMAL
GROSS: NORMAL
Lab: NORMAL
Lab: NORMAL
MICROSCOPIC EXAM: NORMAL
MICROSCOPIC EXAM: NORMAL

## 2024-04-24 ENCOUNTER — OFFICE VISIT (OUTPATIENT)
Dept: SURGERY | Facility: CLINIC | Age: 59
End: 2024-04-24
Payer: MEDICARE

## 2024-04-24 DIAGNOSIS — D04.72 SQUAMOUS CELL CARCINOMA IN SITU (SCCIS) OF SKIN OF LEFT LOWER LEG: ICD-10-CM

## 2024-04-24 DIAGNOSIS — L98.9 SKIN LESION OF LEFT LEG: Primary | ICD-10-CM

## 2024-04-24 DIAGNOSIS — L98.9 SKIN LESION OF SCALP: ICD-10-CM

## 2024-04-24 DIAGNOSIS — L98.9 SKIN LESION OF SCALP: Primary | ICD-10-CM

## 2024-04-24 PROCEDURE — 3051F HG A1C>EQUAL 7.0%<8.0%: CPT | Mod: CPTII,95,, | Performed by: STUDENT IN AN ORGANIZED HEALTH CARE EDUCATION/TRAINING PROGRAM

## 2024-04-24 PROCEDURE — 99213 OFFICE O/P EST LOW 20 MIN: CPT | Mod: 95,,, | Performed by: STUDENT IN AN ORGANIZED HEALTH CARE EDUCATION/TRAINING PROGRAM

## 2024-04-24 PROCEDURE — 4010F ACE/ARB THERAPY RXD/TAKEN: CPT | Mod: CPTII,95,, | Performed by: STUDENT IN AN ORGANIZED HEALTH CARE EDUCATION/TRAINING PROGRAM

## 2024-04-28 NOTE — H&P (VIEW-ONLY)
Established Patient - Audio Only Telehealth Visit     The patient location is: home  The chief complaint leading to consultation is: skin cancer  Visit type: Virtual visit with audio only (telephone)  Total time spent with patient: 10       The reason for the audio only service rather than synchronous audio and video virtual visit was related to technical difficulties or patient preference/necessity.     Each patient to whom I provide medical services by telemedicine is:  (1) informed of the relationship between the physician and patient and the respective role of any other health care provider with respect to management of the patient; and (2) notified that they may decline to receive medical services by telemedicine and may withdraw from such care at any time. Patient verbally consented to receive this service via voice-only telephone call.       HPI:   Some bleeding from left leg  Scalp wounds healing  Path reviewed       Assessment and plan:    PLan for excision in OR                         This service was not originating from a related E/M service provided within the previous 7 days nor will  to an E/M service or procedure within the next 24 hours or my soonest available appointment.  Prevailing standard of care was able to be met in this audio-only visit.

## 2024-05-03 ENCOUNTER — OFFICE VISIT (OUTPATIENT)
Dept: OTOLARYNGOLOGY | Facility: CLINIC | Age: 59
End: 2024-05-03
Payer: MEDICARE

## 2024-05-03 VITALS
SYSTOLIC BLOOD PRESSURE: 164 MMHG | BODY MASS INDEX: 29.87 KG/M2 | WEIGHT: 202.25 LBS | DIASTOLIC BLOOD PRESSURE: 91 MMHG | HEART RATE: 109 BPM

## 2024-05-03 DIAGNOSIS — H61.23 BILATERAL IMPACTED CERUMEN: Primary | ICD-10-CM

## 2024-05-03 DIAGNOSIS — Z97.4 WEARS HEARING AID IN BOTH EARS: ICD-10-CM

## 2024-05-03 PROCEDURE — 3077F SYST BP >= 140 MM HG: CPT | Mod: CPTII,S$GLB,, | Performed by: NURSE PRACTITIONER

## 2024-05-03 PROCEDURE — 3008F BODY MASS INDEX DOCD: CPT | Mod: CPTII,S$GLB,, | Performed by: NURSE PRACTITIONER

## 2024-05-03 PROCEDURE — 99203 OFFICE O/P NEW LOW 30 MIN: CPT | Mod: 25,S$GLB,, | Performed by: NURSE PRACTITIONER

## 2024-05-03 PROCEDURE — 3051F HG A1C>EQUAL 7.0%<8.0%: CPT | Mod: CPTII,S$GLB,, | Performed by: NURSE PRACTITIONER

## 2024-05-03 PROCEDURE — 4010F ACE/ARB THERAPY RXD/TAKEN: CPT | Mod: CPTII,S$GLB,, | Performed by: NURSE PRACTITIONER

## 2024-05-03 PROCEDURE — 1160F RVW MEDS BY RX/DR IN RCRD: CPT | Mod: CPTII,S$GLB,, | Performed by: NURSE PRACTITIONER

## 2024-05-03 PROCEDURE — 69210 REMOVE IMPACTED EAR WAX UNI: CPT | Mod: S$GLB,,, | Performed by: NURSE PRACTITIONER

## 2024-05-03 PROCEDURE — 99999 PR PBB SHADOW E&M-EST. PATIENT-LVL IV: CPT | Mod: PBBFAC,,, | Performed by: NURSE PRACTITIONER

## 2024-05-03 PROCEDURE — 3080F DIAST BP >= 90 MM HG: CPT | Mod: CPTII,S$GLB,, | Performed by: NURSE PRACTITIONER

## 2024-05-03 PROCEDURE — 1159F MED LIST DOCD IN RCRD: CPT | Mod: CPTII,S$GLB,, | Performed by: NURSE PRACTITIONER

## 2024-05-03 NOTE — PROGRESS NOTES
Patient ID: Tj Trammell is a 58 y.o. y.o. male    Chief Complaint:   Chief Complaint   Patient presents with    Cerumen Impaction       Patient is self-referred for evaluation of a possible wax impaction in bilateral ears.  he has complaints of hearing loss in the affected ears, but denies pain or drainage.  This has been an issue in the past.  The patient has not been using any sort of ear drop to soften the wax.  He has a history of hearing loss and wears hearing aids in bilateral ears. Reports that he had a recent audiogram in January outside of [facility].       Review of Systems   Constitutional: Negative for fever, chills, fatigue and unexpected weight change.   HENT: Positive for ear blockage. Negative for hearing loss, nosebleeds, congestion, sore throat, facial swelling, rhinorrhea, sneezing, trouble swallowing, dental problem, voice change, postnasal drip, sinus pressure, tinnitus and ear discharge.    Eyes: Negative for redness, itching and visual disturbance.   Respiratory: Negative for cough, choking and wheezing.    Cardiovascular: Negative for chest pain and palpitations.   Gastrointestinal: Negative for abdominal pain.        No reflux.   Musculoskeletal: Negative for gait problem.   Skin: Negative for rash.   Neurological: Negative for dizziness, light-headedness and headaches.     Past Medical History:   Diagnosis Date    Asthma     Bilateral hearing loss 12/12/2012    Biliary acute pancreatitis     GERD (gastroesophageal reflux disease)     High cholesterol     Hypertension     Multiple sclerosis      Past Surgical History:   Procedure Laterality Date    APPENDECTOMY      CHOLECYSTECTOMY      COLONOSCOPY N/A 1/22/2019    Procedure: COLONOSCOPY 2 day  golytely;  Surgeon: Nathan Waddell MD;  Location: [facility];  Service: Endoscopy;  Laterality: N/A;    COLONOSCOPY N/A 4/25/2023    Procedure: COLONOSCOPY;  Surgeon: Jared Loredo MD;  Location: [facility];  Service: Endoscopy;   Laterality: N/A;    COLONOSCOPY N/A 10/10/2023    Procedure: COLONOSCOPY;  Surgeon: Jared Loredo MD;  Location: [facility] ENDO;  Service: Endoscopy;  Laterality: N/A;    ESOPHAGOGASTRODUODENOSCOPY N/A 2021    Procedure: EGD (ESOPHAGOGASTRODUODENOSCOPY) covid test Rapid;  Surgeon: Jared Loredo MD;  Location: [facility] ENDO;  Service: Endoscopy;  Laterality: N/A;    JOINT REPLACEMENT      arm    TONSILLECTOMY      TRANSFORAMINAL EPIDURAL INJECTION OF STEROID Bilateral 10/5/2022    Procedure: Injection,steroid,epidural,transforaminal approach;  Surgeon: Soo Giles MD;  Location: [facility] PAIN MGT;  Service: Pain Management;  Laterality: Bilateral;  bilateral L5 transforaminal epidural steroid injection with RN IV sedation     Social History     Socioeconomic History    Marital status: Single   Tobacco Use    Smoking status: Former     Current packs/day: 0.00     Average packs/day: 1 pack/day for 32.0 years (32.0 ttl pk-yrs)     Types: Cigarettes     Start date: 1990     Quit date: 2022     Years since quittin.3    Smokeless tobacco: Never   Substance and Sexual Activity    Alcohol use: Not Currently    Drug use: No     Family History   Problem Relation Name Age of Onset    Heart disease Mother      Heart disease Father      Heart disease Brother         Objective:      Vitals:    24 0833   BP: (!) 164/91   Pulse: 109       Physical Exam   Constitutional: Well appearing / communicating without difficutly.  NAD.  Eyes: EOM I Bilaterally  Head/Face: Normocephalic. Negative paranasal sinus pressure/tenderness.  Salivary glands WNL.  House Brackmann I Bilaterally.     Right Ear: Auricle normal appearance. External Auditory Canal with cerumen impaction. EAC with no lesions or masses,TM w/o masses/lesions/perforations. TM mobility noted.   Left Ear: Auricle normal appearance. External Auditory Canal with cerumen impaction. EAC with no lesions or masses,TM w/o masses/lesions/perforations.  TM mobility noted.  Nose: No gross nasal septal deviation. Inferior Turbinates 3+ bilaterally. No septal perforation. No masses/lesions. External nasal skin appears normal without masses/lesions.   Oral Cavity: Gingiva/lips within normal limits.  Dentition/gingiva healthy appearing. Mucus membranes moist. Floor of mouth soft, no masses palpated. Oral Tongue mobile. Hard Palate appears normal.    Oropharynx: Base of tongue appears normal. No masses/lesions noted. Tonsillar fossa/pharyngeal wall without lesions. Posterior oropharynx WNL.  Soft palate without masses. Midline uvula.   Neck/Lymphatic: No LAD I-VI bilaterally.  No thyromegaly.  No masses noted on exam.     Mirror laryngoscopy/nasopharyngoscopy: Active gag reflex.  Unable to perform.     Neuro/Psychiatric: AOx3.  Normal mood and affect.   Cardiovascular: Normal carotid pulses bilaterally, no increasing jugular venous distention noted at cervical region bilaterally.    Respiratory: Normal respiratory effort, no stridor, no retractions noted.      Cerumen removal under binocular microscopy   Ear Cerumen Removal    Date/Time: 5/3/2024 1:00 PM    Performed by: Rosa Isela Garcia NP  Authorized by: Rosa Isela Garcia NP    Consent Done?:  Yes (Verbal)    Local anesthetic:  None  Location details:  Both ears  Procedure type: curette    Procedure type comment:  Suction  Cerumen  Removal Results:  Cerumen completely removed  Patient tolerance:  Patient tolerated the procedure well with no immediate complications      Assessment:         ICD-10-CM ICD-9-CM    1. Bilateral impacted cerumen  H61.23 380.4 Ear Cerumen Removal      2. Wears hearing aid in both ears  Z97.4 [id number]            Plan:       1.   Cerumen impaction:  Removed under microscopy today without difficulty.  I would recommend the use of a wax softening drop, either over the counter Debrox or mineral oil, on a weekly basis.  I also instructed the patient to avoid Qtips.  2. Follow up 6 months or sooner  as needed for cerumen impaction removal         Rosa Isela Garcia, NP

## 2024-05-03 NOTE — PROCEDURES
Ear Cerumen Removal    Date/Time: 5/3/2024 1:00 PM    Performed by: Rosa Isela Garcia NP  Authorized by: Rosa Isela Garcia NP    Consent Done?:  Yes (Verbal)    Local anesthetic:  None  Location details:  Both ears  Procedure type: curette    Procedure type comment:  Suction  Cerumen  Removal Results:  Cerumen completely removed  Patient tolerance:  Patient tolerated the procedure well with no immediate complications

## 2024-05-06 ENCOUNTER — ANESTHESIA EVENT (OUTPATIENT)
Dept: SURGERY | Facility: HOSPITAL | Age: 59
End: 2024-05-06
Payer: MEDICARE

## 2024-05-07 ENCOUNTER — HOSPITAL ENCOUNTER (OUTPATIENT)
Facility: HOSPITAL | Age: 59
Discharge: HOME | End: 2024-05-07
Attending: STUDENT IN AN ORGANIZED HEALTH CARE EDUCATION/TRAINING PROGRAM | Admitting: STUDENT IN AN ORGANIZED HEALTH CARE EDUCATION/TRAINING PROGRAM
Payer: MEDICARE

## 2024-05-07 ENCOUNTER — ANESTHESIA (OUTPATIENT)
Dept: SURGERY | Facility: HOSPITAL | Age: 59
End: 2024-05-07
Payer: MEDICARE

## 2024-05-07 VITALS
OXYGEN SATURATION: 97 % | TEMPERATURE: 98 F | WEIGHT: 202.19 LBS | RESPIRATION RATE: 17 BRPM | BODY MASS INDEX: 29.95 KG/M2 | HEART RATE: 81 BPM | DIASTOLIC BLOOD PRESSURE: 78 MMHG | SYSTOLIC BLOOD PRESSURE: 149 MMHG | HEIGHT: 69 IN

## 2024-05-07 DIAGNOSIS — D04.72 SQUAMOUS CELL CARCINOMA IN SITU (SCCIS) OF SKIN OF LEFT LOWER LEG: ICD-10-CM

## 2024-05-07 DIAGNOSIS — L98.9 SKIN LESION OF LEFT LEG: ICD-10-CM

## 2024-05-07 DIAGNOSIS — G35 MULTIPLE SCLEROSIS: ICD-10-CM

## 2024-05-07 DIAGNOSIS — C44.92 SCCA (SQUAMOUS CELL CARCINOMA) OF SKIN: Primary | ICD-10-CM

## 2024-05-07 DIAGNOSIS — L98.9 SKIN LESION OF SCALP: ICD-10-CM

## 2024-05-07 PROCEDURE — 63600175 PHARM REV CODE 636 W HCPCS: Mod: JZ,JG | Performed by: STUDENT IN AN ORGANIZED HEALTH CARE EDUCATION/TRAINING PROGRAM

## 2024-05-07 PROCEDURE — 88342 IMHCHEM/IMCYTCHM 1ST ANTB: CPT | Mod: 26,,, | Performed by: PATHOLOGY

## 2024-05-07 PROCEDURE — 88341 IMHCHEM/IMCYTCHM EA ADD ANTB: CPT | Mod: 26,,, | Performed by: PATHOLOGY

## 2024-05-07 PROCEDURE — 11621 EXC S/N/H/F/G MAL+MRG 0.6-1: CPT | Mod: 51,,, | Performed by: STUDENT IN AN ORGANIZED HEALTH CARE EDUCATION/TRAINING PROGRAM

## 2024-05-07 PROCEDURE — 71000016 HC POSTOP RECOV ADDL HR: Performed by: STUDENT IN AN ORGANIZED HEALTH CARE EDUCATION/TRAINING PROGRAM

## 2024-05-07 PROCEDURE — 11604 EXC TR-EXT MAL+MARG 3.1-4 CM: CPT | Mod: ,,, | Performed by: STUDENT IN AN ORGANIZED HEALTH CARE EDUCATION/TRAINING PROGRAM

## 2024-05-07 PROCEDURE — D9220A PRA ANESTHESIA: Mod: CRNA,,, | Performed by: NURSE ANESTHETIST, CERTIFIED REGISTERED

## 2024-05-07 PROCEDURE — 63600175 PHARM REV CODE 636 W HCPCS: Performed by: NURSE ANESTHETIST, CERTIFIED REGISTERED

## 2024-05-07 PROCEDURE — 88341 IMHCHEM/IMCYTCHM EA ADD ANTB: CPT | Performed by: PATHOLOGY

## 2024-05-07 PROCEDURE — 37000008 HC ANESTHESIA 1ST 15 MINUTES: Performed by: STUDENT IN AN ORGANIZED HEALTH CARE EDUCATION/TRAINING PROGRAM

## 2024-05-07 PROCEDURE — 25000003 PHARM REV CODE 250: Performed by: STUDENT IN AN ORGANIZED HEALTH CARE EDUCATION/TRAINING PROGRAM

## 2024-05-07 PROCEDURE — 37000009 HC ANESTHESIA EA ADD 15 MINS: Performed by: STUDENT IN AN ORGANIZED HEALTH CARE EDUCATION/TRAINING PROGRAM

## 2024-05-07 PROCEDURE — D9220A PRA ANESTHESIA: Mod: ANES,,, | Performed by: UROLOGY

## 2024-05-07 PROCEDURE — 36000706: Performed by: STUDENT IN AN ORGANIZED HEALTH CARE EDUCATION/TRAINING PROGRAM

## 2024-05-07 PROCEDURE — 88305 TISSUE EXAM BY PATHOLOGIST: CPT | Mod: 59 | Performed by: PATHOLOGY

## 2024-05-07 PROCEDURE — 63600175 PHARM REV CODE 636 W HCPCS: Performed by: STUDENT IN AN ORGANIZED HEALTH CARE EDUCATION/TRAINING PROGRAM

## 2024-05-07 PROCEDURE — 71000015 HC POSTOP RECOV 1ST HR: Performed by: STUDENT IN AN ORGANIZED HEALTH CARE EDUCATION/TRAINING PROGRAM

## 2024-05-07 PROCEDURE — 25000003 PHARM REV CODE 250: Performed by: NURSE ANESTHETIST, CERTIFIED REGISTERED

## 2024-05-07 PROCEDURE — 36000707: Performed by: STUDENT IN AN ORGANIZED HEALTH CARE EDUCATION/TRAINING PROGRAM

## 2024-05-07 PROCEDURE — 88305 TISSUE EXAM BY PATHOLOGIST: CPT | Mod: 26,,, | Performed by: PATHOLOGY

## 2024-05-07 PROCEDURE — 88342 IMHCHEM/IMCYTCHM 1ST ANTB: CPT | Performed by: PATHOLOGY

## 2024-05-07 RX ORDER — FENTANYL CITRATE 50 UG/ML
INJECTION, SOLUTION INTRAMUSCULAR; INTRAVENOUS
Status: DISCONTINUED | OUTPATIENT
Start: 2024-05-07 | End: 2024-05-07

## 2024-05-07 RX ORDER — HYDROMORPHONE HYDROCHLORIDE 2 MG/ML
0.2 INJECTION, SOLUTION INTRAMUSCULAR; INTRAVENOUS; SUBCUTANEOUS EVERY 5 MIN PRN
Status: DISCONTINUED | OUTPATIENT
Start: 2024-05-07 | End: 2024-05-07 | Stop reason: HOSPADM

## 2024-05-07 RX ORDER — DEXAMETHASONE SODIUM PHOSPHATE 4 MG/ML
INJECTION, SOLUTION INTRA-ARTICULAR; INTRALESIONAL; INTRAMUSCULAR; INTRAVENOUS; SOFT TISSUE
Status: DISCONTINUED | OUTPATIENT
Start: 2024-05-07 | End: 2024-05-07

## 2024-05-07 RX ORDER — PROPOFOL 10 MG/ML
VIAL (ML) INTRAVENOUS CONTINUOUS PRN
Status: DISCONTINUED | OUTPATIENT
Start: 2024-05-07 | End: 2024-05-07

## 2024-05-07 RX ORDER — ONDANSETRON HYDROCHLORIDE 2 MG/ML
4 INJECTION, SOLUTION INTRAVENOUS DAILY PRN
Status: DISCONTINUED | OUTPATIENT
Start: 2024-05-07 | End: 2024-05-07 | Stop reason: HOSPADM

## 2024-05-07 RX ORDER — ONDANSETRON HYDROCHLORIDE 2 MG/ML
INJECTION, SOLUTION INTRAVENOUS
Status: DISCONTINUED | OUTPATIENT
Start: 2024-05-07 | End: 2024-05-07

## 2024-05-07 RX ORDER — MIDAZOLAM HYDROCHLORIDE 1 MG/ML
INJECTION INTRAMUSCULAR; INTRAVENOUS
Status: DISCONTINUED | OUTPATIENT
Start: 2024-05-07 | End: 2024-05-07

## 2024-05-07 RX ORDER — BUPIVACAINE HYDROCHLORIDE 2.5 MG/ML
INJECTION, SOLUTION EPIDURAL; INFILTRATION; INTRACAUDAL
Status: DISCONTINUED | OUTPATIENT
Start: 2024-05-07 | End: 2024-05-07 | Stop reason: HOSPADM

## 2024-05-07 RX ORDER — OXYCODONE HYDROCHLORIDE 5 MG/1
5 TABLET ORAL
Status: DISCONTINUED | OUTPATIENT
Start: 2024-05-07 | End: 2024-05-07 | Stop reason: HOSPADM

## 2024-05-07 RX ORDER — PROCHLORPERAZINE EDISYLATE 5 MG/ML
5 INJECTION INTRAMUSCULAR; INTRAVENOUS EVERY 30 MIN PRN
Status: DISCONTINUED | OUTPATIENT
Start: 2024-05-07 | End: 2024-05-07 | Stop reason: HOSPADM

## 2024-05-07 RX ORDER — DEXMEDETOMIDINE HYDROCHLORIDE 100 UG/ML
INJECTION, SOLUTION INTRAVENOUS
Status: DISCONTINUED | OUTPATIENT
Start: 2024-05-07 | End: 2024-05-07

## 2024-05-07 RX ORDER — CEFAZOLIN SODIUM 2 G/50ML
2 SOLUTION INTRAVENOUS
Status: COMPLETED | OUTPATIENT
Start: 2024-05-07 | End: 2024-05-07

## 2024-05-07 RX ORDER — HYDROCODONE BITARTRATE AND ACETAMINOPHEN 5; 325 MG/1; MG/1
1 TABLET ORAL EVERY 4 HOURS PRN
Qty: 5 TABLET | Refills: 0 | Status: SHIPPED | OUTPATIENT
Start: 2024-05-07

## 2024-05-07 RX ORDER — HYDROCODONE BITARTRATE AND ACETAMINOPHEN 5; 325 MG/1; MG/1
1 TABLET ORAL EVERY 4 HOURS PRN
Status: DISCONTINUED | OUTPATIENT
Start: 2024-05-07 | End: 2024-05-07 | Stop reason: HOSPADM

## 2024-05-07 RX ORDER — FINGOLIMOD HYDROCHLORIDE 0.5 MG/1
1 CAPSULE ORAL DAILY
Qty: 90 CAPSULE | Refills: 2 | Status: CANCELLED | OUTPATIENT
Start: 2024-05-07 | End: 2025-05-07

## 2024-05-07 RX ORDER — AMOXICILLIN 250 MG
1 CAPSULE ORAL 2 TIMES DAILY
COMMUNITY
Start: 2024-05-07

## 2024-05-07 RX ORDER — PROPOFOL 10 MG/ML
VIAL (ML) INTRAVENOUS
Status: DISCONTINUED | OUTPATIENT
Start: 2024-05-07 | End: 2024-05-07

## 2024-05-07 RX ORDER — OXYCODONE HYDROCHLORIDE 5 MG/1
10 TABLET ORAL EVERY 4 HOURS PRN
Status: DISCONTINUED | OUTPATIENT
Start: 2024-05-07 | End: 2024-05-07 | Stop reason: HOSPADM

## 2024-05-07 RX ORDER — LIDOCAINE HYDROCHLORIDE 20 MG/ML
INJECTION, SOLUTION EPIDURAL; INFILTRATION; INTRACAUDAL; PERINEURAL
Status: DISCONTINUED | OUTPATIENT
Start: 2024-05-07 | End: 2024-05-07

## 2024-05-07 RX ADMIN — MIDAZOLAM HYDROCHLORIDE 2 MG: 1 INJECTION, SOLUTION INTRAMUSCULAR; INTRAVENOUS at 06:05

## 2024-05-07 RX ADMIN — ONDANSETRON 4 MG: 2 INJECTION, SOLUTION INTRAMUSCULAR; INTRAVENOUS at 07:05

## 2024-05-07 RX ADMIN — DEXMEDETOMIDINE HCL 8 MCG: 100 INJECTION INTRAVENOUS at 07:05

## 2024-05-07 RX ADMIN — SODIUM CHLORIDE, SODIUM LACTATE, POTASSIUM CHLORIDE, AND CALCIUM CHLORIDE: .6; .31; .03; .02 INJECTION, SOLUTION INTRAVENOUS at 06:05

## 2024-05-07 RX ADMIN — FENTANYL CITRATE 50 MCG: 50 INJECTION INTRAMUSCULAR; INTRAVENOUS at 07:05

## 2024-05-07 RX ADMIN — LIDOCAINE HYDROCHLORIDE 60 MG: 20 INJECTION, SOLUTION EPIDURAL; INFILTRATION; INTRACAUDAL; PERINEURAL at 07:05

## 2024-05-07 RX ADMIN — PROPOFOL 75 MCG/KG/MIN: 10 INJECTION, EMULSION INTRAVENOUS at 07:05

## 2024-05-07 RX ADMIN — PROPOFOL 40 MG: 10 INJECTION, EMULSION INTRAVENOUS at 07:05

## 2024-05-07 RX ADMIN — CEFAZOLIN SODIUM 2 G: 2 SOLUTION INTRAVENOUS at 07:05

## 2024-05-07 RX ADMIN — DEXAMETHASONE SODIUM PHOSPHATE 4 MG: 4 INJECTION, SOLUTION INTRA-ARTICULAR; INTRALESIONAL; INTRAMUSCULAR; INTRAVENOUS; SOFT TISSUE at 07:05

## 2024-05-07 NOTE — ANESTHESIA POSTPROCEDURE EVALUATION
Anesthesia Post Evaluation    Patient: Tj Trammell    Procedure(s) Performed: Procedure(s) (LRB):  EXCISION, SUPERFICIAL MASS, HEAD (Left)  EXCISION, MASS, LOWER EXTREMITY (Left)    Final Anesthesia Type: general      Patient location during evaluation: PACU  Patient participation: Yes- Able to Participate  Level of consciousness: awake and alert and oriented  Post-procedure vital signs: reviewed and stable  Pain management: adequate  Airway patency: patent    PONV status at discharge: No PONV  Anesthetic complications: no      Cardiovascular status: hemodynamically stable  Respiratory status: unassisted  Hydration status: euvolemic  Follow-up not needed.              Vitals Value Taken Time   /61 05/07/24 1016   Temp 98 05/07/24 1016   Pulse 70 05/07/24 1016   Resp 19 05/07/24 1016   SpO2 98 05/07/24 1016         No case tracking events are documented in the log.      Pain/Suki Score: No data recorded

## 2024-05-07 NOTE — PLAN OF CARE
Unable to access medication list. Patient is poor historian. Pt states that he doesn't know what medications he takes and does not recognize any of the medications on his medication list. Pt states that he did not take any medications this morning.

## 2024-05-07 NOTE — OP NOTE
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 The lesions on the scalp were identified and sharply excised over an area measuring 1cm at the mid scalp and the left scalp. The wound bed was cauterized and neosproin was applied.   Next the left leg was prepped and draped. A skin mass was noted and the skin was sharply incised around it at normal appearing skin. The deep margin appeared grossly normal. The final area of excision measured 4cm round. Cautery was used to obtain hemostasis. Neosporin and bandages were applied. The specimens were marked with suture anteriorly for the scalp mass. For the leg mass long suture was placed anteriorly and short suture was placed superiorly.     Significant Surgical Tasks Conducted by the Assistant(s), if Applicable:     Estimated Blood Loss (EBL): 15ml  Implants: * No implants in log *    Specimens:   Specimen (24h ago, onward)      None                    Condition: Stable    Disposition: PACU - hemodynamically stable.    Attestation: I was present and scrubbed for the entire procedure.

## 2024-05-07 NOTE — TRANSFER OF CARE
"Anesthesia Transfer of Care Note    Patient: Tj Trammell    Procedure(s) Performed: Procedure(s) (LRB):  EXCISION, SUPERFICIAL MASS, HEAD (Left)  EXCISION, MASS, LOWER EXTREMITY (Left)    Patient location: [facility]    Anesthesia Type: general    Transport from OR: Transported from OR on 6-10 L/min O2 by face mask with adequate spontaneous ventilation    Post pain: adequate analgesia    Post assessment: no apparent anesthetic complications    Post vital signs: stable    Level of consciousness: awake    Nausea/Vomiting: no nausea/vomiting    Complications: none    Transfer of care protocol was followed      Last vitals: Visit Vitals  BP (!) 166/81 (BP Location: Right arm, Patient Position: Lying)   Pulse 80   Temp 36.5 °C (97.7 °F) (Skin)   Resp 17   Ht 5' 9" (1.753 m)   Wt 91.7 kg (202 lb 2.6 oz)   SpO2 97%   BMI 29.85 kg/m²     "

## 2024-05-07 NOTE — DISCHARGE INSTRUCTIONS
DRESSING CARE AND ACTIVITY  -Keep dressing clean, dry, intact for 48 hours then change daily  -OK to shower in 48 hours once dressing is removed, light soap and water to incisions, DO NOT scrub or soak incisions  -Dressing change: 1 times per day using neosporin and gauze   -Take pain meds as needed as prescribed  ANESTHESIA  -For the first 24 hours after surgery:  Do not drive, use heavy equipment, make important decisions, or drink alcohol  -It is normal to feel sleepy for several hours.  Rest until you are more awake.  -Have someone stay with you, if needed.  They can watch for problems and help keep you safe.  -Some possible post anesthesia side effects include: nausea and vomiting, sore throat and hoarseness, sleepiness, and dizziness.    PAIN  -If you have pain after surgery, pain medicine will help you feel better.  Take it as directed, before pain becomes severe.  Most pain relievers taken by mouth need at least 20-30 minutes to start working.  -Do not drive or drink alcohol while taking pain medicine.  -Pain medication can upset your stomach.  Taking them with a little food may help.  -Other ways to help control pain: elevation, ice, and relaxation  -Call your surgeon if still having unmanageable pain an hour after taking pain medicine.  -Pain medicine can cause constipation.  Taking an over-the counter stool softener while on prescription pain medicine and drinking plenty of fluids can prevent this side effect.  -Call your surgeon if you have severe side effects like: breathing problems, trouble waking up, dizziness, confusion, or severe constipation.    NAUSEA  -Some people have nausea after surgery.  This is often because of anesthesia, pain, pain medicine, or the stress of surgery.  -Do not push yourself to eat.  Start off with clear liquids and soup.  Slowly move to solid foods.  Don't eat fatty, rich, spicy foods at first.  Eat smaller amounts.  -If you develop persistent nausea and vomiting please  notify your surgeon immediately.    BLEEDING  -Different types of surgery require different types of care and dressing changes.  It is important to follow all instructions and advice from your surgeon.  Change dressing as directed.  Call your surgeon for any concerns regarding postop bleeding.    SIGNS OF INFECTION  -Signs of infection include: fever, swelling, drainage, and redness  -Notify your surgeon if you have a fever of 100.4 F (38.0 C) or higher.  -Notify your surgeon if you notice redness, swelling, increased pain, pus, or a foul smell at the incision site.

## 2024-05-07 NOTE — PLAN OF CARE
Patient oob and dressed with assisstance, vss, no issues, all instructions given to patient and brother, discharged safely to car in

## 2024-05-07 NOTE — ANESTHESIA PREPROCEDURE EVALUATION
05/07/2024  Tj Trammell is a 58 y.o., male for superficial scalp and left lower limb wound debridement    Patient is deaf.  Extensive hx of anesthetics without complications.  NPO>8 hours  No food or drug allergies  Amenable to proceed with scheduled procedure    Procedure: EXCISION, SUPERFICIAL MASS, HEAD (Left), EXCISION, MASS, LOWER EXTREMITY (Left)         Patient Active Problem List   Diagnosis    Multiple sclerosis    Generalized osteoarthritis of multiple sites    Bilateral hearing loss    Incontinence of urine    Deaf    Tobacco abuse    HTN (hypertension)    HLD (hyperlipidemia)    Constipation    Pain, abdominal    Biliary stricture    GERD (gastroesophageal reflux disease)    Screening for malignant neoplasm of colon    Fall off [employer] mobility scooter, initial encounter    Abrasion    Wrist pain    Scapholunate dissociation of left wrist    Anxiety disorder    Chronic obstructive pulmonary disease    Metabolic syndrome    Opioid dependence    Abdominal bloating    Lumbar radiculopathy    Non-pressure ulcer of lower extremity, limited to breakdown of skin, left    SCCA (squamous cell carcinoma) of skin       Past Medical History:   Diagnosis Date    Asthma     Bilateral hearing loss 12/12/2012    Biliary acute pancreatitis     GERD (gastroesophageal reflux disease)     High cholesterol     Hypertension     Multiple sclerosis        ECHO: No results found for this or any previous visit.      Body mass index is 29.85 kg/m².    Tobacco Use: Medium Risk (5/3/2024)    Patient History     Smoking Tobacco Use: Former     Smokeless Tobacco Use: Never     Passive Exposure: Not on file       Social History     Substance and Sexual Activity   Drug Use No        Alcohol Use: Not on file       Review of patient's allergies indicates:  No Known Allergies      Airway:  No value filed.         Pre-op  Assessment    I have reviewed the Patient Summary Reports.     I have reviewed the Nursing Notes. I have reviewed the NPO Status.   I have reviewed the Medications.     Review of Systems  Anesthesia Hx:  No problems with previous Anesthesia   History of prior surgery of interest to airway management or planning:          Denies Family Hx of Anesthesia complications.    Denies Personal Hx of Anesthesia complications.                    Cardiovascular:     Hypertension          PVD hyperlipidemia                       Hypertension         Pulmonary:   COPD         Chronic Obstructive Pulmonary Disease (COPD):                      Hepatic/GI:     GERD         Biliary Disease, Biliary Obstruction     Musculoskeletal:  Arthritis               Psych:  Psychiatric History                  Physical Exam  General: Cooperative, Alert, Well nourished and Oriented    Airway:  Mallampati: II / I  Mouth Opening: Normal  TM Distance: Normal  Tongue: Normal  Neck ROM: Normal ROM    Dental:  Periodontal disease, Dentures, Edentulous        Anesthesia Plan  Type of Anesthesia, risks & benefits discussed:    Anesthesia Type: Gen Supraglottic Airway, MAC, Gen Natural Airway  Intra-op Monitoring Plan: Standard ASA Monitors  Post Op Pain Control Plan: multimodal analgesia and IV/PO Opioids PRN  Induction:  IV  Airway Plan: , Post-Induction  Informed Consent: Informed consent signed with the Patient and all parties understand the risks and agree with anesthesia plan.  All questions answered. Patient consented to blood products? No  ASA Score: 3    Ready For Surgery From Anesthesia Perspective.     .

## 2024-05-10 ENCOUNTER — HOSPITAL ENCOUNTER (EMERGENCY)
Facility: HOSPITAL | Age: 59
Discharge: HOME | End: 2024-05-10
Attending: EMERGENCY MEDICINE
Payer: MEDICARE

## 2024-05-10 VITALS
HEIGHT: 69 IN | HEART RATE: 86 BPM | TEMPERATURE: 98 F | BODY MASS INDEX: 34.8 KG/M2 | RESPIRATION RATE: 18 BRPM | DIASTOLIC BLOOD PRESSURE: 87 MMHG | WEIGHT: 235 LBS | OXYGEN SATURATION: 97 % | SYSTOLIC BLOOD PRESSURE: 135 MMHG

## 2024-05-10 VITALS
SYSTOLIC BLOOD PRESSURE: 136 MMHG | RESPIRATION RATE: 16 BRPM | HEART RATE: 83 BPM | OXYGEN SATURATION: 100 % | TEMPERATURE: 98 F | DIASTOLIC BLOOD PRESSURE: 89 MMHG

## 2024-05-10 DIAGNOSIS — Z48.89 ENCOUNTER FOR EXAMINATION OF SURGICAL SITE: Primary | ICD-10-CM

## 2024-05-10 PROCEDURE — 99999 HC NO LEVEL OF SERVICE - ED ONLY: CPT

## 2024-05-10 PROCEDURE — 99282 EMERGENCY DEPT VISIT SF MDM: CPT

## 2024-05-10 NOTE — ED NOTES
Pt reported to the ED with complaints of bleeding from his L lower leg wound. He states he had surgery on his head 2 days ago and when he woke up he had bandages on his left leg and it was bleeding through. He never took the bandage off to look at where the blood was coming from. Pt denies any pain. He states he usually has help at home from a friend but his friend hasn't been helping him so he came here.

## 2024-05-10 NOTE — ED NOTES
Provided wound care with wound solution to the pts leg wound and put a new dressing and ace band on.

## 2024-05-10 NOTE — ED TRIAGE NOTES
Requesting dressing changes to head and left leg (?). Patient unable to speak clearly and contribute to history. Further assessment cannot be completed virtually.

## 2024-05-10 NOTE — ED PROVIDER NOTES
Encounter Date: 5/10/2024       History   No chief complaint on file.    Patient is a 58-year-old male with a past medical history of asthma, bilateral hearing loss, pancreatitis, GERD, high cholesterol, hypertension, and multiple sclerosis who presents to emergency room for bleeding to surgical site in left lower leg.  Patient unsure of what surgery was done.  States that he rode his motorized scooter to the emergency room because he started bleeding through his Ace wrap this morning.  Has not changed dressing since his surgery.  States that his surgery was on the 2nd floor of this facility.  Denies chest pain, palpitations, weakness, or others at this time.  History limited, as patient is a poor historian.    The history is provided by the patient. No  was used.     Review of patient's allergies indicates:  No Known Allergies  Past Medical History:   Diagnosis Date    Asthma     Bilateral hearing loss 12/12/2012    Biliary acute pancreatitis     GERD (gastroesophageal reflux disease)     High cholesterol     Hypertension     Multiple sclerosis      Past Surgical History:   Procedure Laterality Date    APPENDECTOMY      CHOLECYSTECTOMY      COLONOSCOPY N/A 1/22/2019    Procedure: COLONOSCOPY 2 day  golytely;  Surgeon: Nathan Waddell MD;  Location: [facility];  Service: Endoscopy;  Laterality: N/A;    COLONOSCOPY N/A 4/25/2023    Procedure: COLONOSCOPY;  Surgeon: Jared Loredo MD;  Location: [facility];  Service: Endoscopy;  Laterality: N/A;    COLONOSCOPY N/A 10/10/2023    Procedure: COLONOSCOPY;  Surgeon: Jared Loredo MD;  Location: [facility];  Service: Endoscopy;  Laterality: N/A;    ESOPHAGOGASTRODUODENOSCOPY N/A 9/29/2021    Procedure: EGD (ESOPHAGOGASTRODUODENOSCOPY) covid test Rapid;  Surgeon: Jared Loredo MD;  Location: [facility];  Service: Endoscopy;  Laterality: N/A;    EXCISION, SUPERFICIAL MASS, HEAD Left 5/7/2024    Procedure: EXCISION, SUPERFICIAL  MASS, HEAD;  Surgeon: Chriss Rodriguez MD;  Location: [facility] OR;  Service: General;  Laterality: Left;    JOINT REPLACEMENT      arm    SURGICAL REMOVAL OF MASS OF LOWER EXTREMITY Left 2024    Procedure: EXCISION, MASS, LOWER EXTREMITY;  Surgeon: Chriss Rodriguez MD;  Location: [facility] OR;  Service: General;  Laterality: Left;    TONSILLECTOMY      TRANSFORAMINAL EPIDURAL INJECTION OF STEROID Bilateral 10/5/2022    Procedure: Injection,steroid,epidural,transforaminal approach;  Surgeon: Soo Giles MD;  Location: [facility] PAIN [facility]GT;  Service: Pain Management;  Laterality: Bilateral;  bilateral L5 transforaminal epidural steroid injection with RN IV sedation     Family History   Problem Relation Name Age of Onset    Heart disease Mother      Heart disease Father      Heart disease Brother       Social History     Tobacco Use    Smoking status: Former     Current packs/day: 0.00     Average packs/day: 1 pack/day for 32.0 years (32.0 ttl pk-yrs)     Types: Cigarettes     Start date: 1990     Quit date: 2022     Years since quittin.3    Smokeless tobacco: Never   Substance Use Topics    Alcohol use: Not Currently    Drug use: No     Review of Systems   Constitutional:  Negative for chills, diaphoresis, fatigue and fever.   Cardiovascular:  Negative for chest pain and palpitations.   Musculoskeletal:  Negative for arthralgias, joint swelling and myalgias.   Skin:  Positive for wound (Left lower leg). Negative for color change and rash.   Neurological:  Negative for weakness and numbness.       Physical Exam     Initial Vitals   BP Pulse Resp Temp SpO2   05/10/24 1035 05/10/24 1035 05/10/24 1035 05/10/24 1035 05/10/24 1340   135/87 86 18 97.6 °F (36.4 °C) 100 %      MAP       --                Physical Exam    Nursing note and vitals reviewed.  Constitutional: He appears well-developed and well-nourished. He is not diaphoretic. No distress.   Patient well-appearing.  Awake and alert.  No acute distress.   Maintaining airway appropriately.  Patient with speech impediment.   HENT:   Head: Normocephalic.       Right Ear: External ear normal.   Left Ear: External ear normal.   Eyes: Conjunctivae and EOM are normal. Pupils are equal, round, and reactive to light.   Neck: Neck supple.   Normal range of motion.  Pulmonary/Chest: No respiratory distress.   Musculoskeletal:         General: No tenderness or edema. Normal range of motion.      Cervical back: Normal range of motion and neck supple.        Legs:      Neurological: He is alert and oriented to person, place, and time. He has normal strength.   Skin: Skin is warm. Capillary refill takes less than 2 seconds.        Media Information         Media Information            ED Course   Procedures  Labs Reviewed - No data to display       Imaging Results    None          Medications - No data to display  Medical Decision Making  Patient reports to emergency room for wound check and continued bleeding.  Vital signs stable and within normal limits.  Physical exam as stated above.    Differential diagnosis includes but is not limited to healing postoperative site, cellulitis, osteomyelitis, among others.  No surrounding erythema or edema.  No induration.  Bleeding noted to left lower extremity wound.  Discussed with Dr. Rodriguez, who did the surgery.  He advised Surgicel and Ace wrap with follow-up.  Wounds were redressed in the emergency room by the nurse.    I see no indication of an emergent process beyond that addressed during our encounter. Patient stable for discharge at this time. I have counseled the patient regarding follow up with General surgery and gave strict return precautions. I have discussed the final diagnosis and gave instructions regarding over-the-counter analgesic medications such as Tylenol. Patient verbalized understanding and is agreeable.     Problems Addressed:  Encounter for examination of surgical site: acute illness or injury    Amount and/or  "Complexity of Data Reviewed  External Data Reviewed: notes.     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  Operation note is as follows:  "Brought into OR and placed supine. The lesions on the scalp were identified and sharply excised over an area measuring 1cm at the mid scalp and the left scalp. The wound bed was cauterized and neosproin was applied.   Next the left leg was prepped and draped. A skin mass was noted and the skin was sharply incised around it at normal appearing skin. The deep margin appeared grossly normal. The final area of excision measured 4cm round. Cautery was used to obtain hemostasis. Neosporin and bandages were applied. The specimens were marked with suture anteriorly for the scalp mass. For the leg mass long suture was placed anteriorly and short suture was placed superiorly."    Risk  Risk Details: Comorbidities taken into consideration during the patient's evaluation and treatment include asthma, bilateral hearing loss, pancreatitis, GERD, high cholesterol, hypertension, and multiple sclerosis.  Social determinants of health taken into consideration during development of our treatment plan include difficulty in obtaining follow-up, obtaining medications, health literacy, access to healthy options for preventative/conservative management, and/or support systems due to, but not limited to, transportation limitations, socioeconomic status, and environmental factors.                ED Course as of 05/10/24 1756   Fri May 10, 2024   1318 Discussed patient with Dr. Rodriguez who did the surgery. Recommended Surgicel and ACE wrap.  [BJ]      ED Course User Index  [BJ] Jonelle Gandhi PA-C                           Clinical Impression:  Final diagnoses:  [Z48.89] Encounter for examination of surgical site (Primary)          ED Disposition Condition    Discharge Stable          ED Prescriptions    None       Follow-up Information "       Follow up With Specialties Details Why Contact Info    Chriss Rodriguez MD Surgery, General Surgery   [address]  [address]  [place] [id number] [phone]            This note was partially created using [employer] Voice Recognition software. Typographical and content errors may occur with this process. While efforts are made to detect and correct such errors, in some cases errors will persist. For this reason, wording in this document should be considered in the proper context and not strictly verbatim.        Jonelle Gandhi PA-C  05/10/24 [id number]

## 2024-05-10 NOTE — DISCHARGE INSTRUCTIONS
Thank you for allowing me and my emergency team to take care of you here today! I hope you feel better soon. Please do not hesitate to return with any additional concerns that may arise from this or any new problem you encounter.    Our goal in the emergency department is to always give you outstanding care and exceptional service. If you receive a survey by mail or e-mail in the next week regarding your experience in our ED, we would greatly appreciate you completing it. Your feedback provides us with a way to recognize our staff who give very good care and it helps us learn how to improve when your experience was below the excellence we aspire to be!    Brook Juneau, PA-C [facility]   Emergency Room Physician Assistant

## 2024-05-10 NOTE — ED NOTES
Pt reported to the Ed with complaints of bleeding from his L lower leg wound. He states he has one on his head as well and they both started bleeding two days ago. He said he was not aware of the wound and he just woke up and saw the blood coming through the bandage. He states he never took the bandage off to see what was bleeding. Pt denies any pain.

## 2024-05-10 NOTE — ED NOTES
Changed dressing on leg and applied foam dressing on head wounds. Dressings are CDI, pt left via his wheelchair, VSS, NADN. Pt provided with extra gauze to go home with and an extra foam dressing.

## 2024-05-13 LAB
FINAL PATHOLOGIC DIAGNOSIS: NORMAL
GROSS: NORMAL
Lab: NORMAL
MICROSCOPIC EXAM: NORMAL

## 2024-05-20 NOTE — DISCHARGE SUMMARY
[facility] ([facility])  Short Stay  Discharge Summary    Admit Date: 5/7/2024    Discharge Date and Time: 5/7/2024  9:10 AM      Discharge Attending Physician: Velma att. providers found     Hospital Course (synopsis of major diagnoses, care, treatment, and services provided during the course of the hospital stay): Patient brought in for elective surgery. Underwent procedure without complication and was discharged.       Final Diagnoses:    Principal Problem: 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   Secondary Diagnoses:   Active Hospital Problems    Diagnosis  POA    *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 [D04.72]  Yes      Resolved Hospital Problems   No resolved problems to display.       Discharged Condition: stable    Disposition: Home or Self Care    Follow up/Patient Instructions:    Medications:  Reconciled Home Medications:      Medication List        START taking these medications      HYDROcodone-acetaminophen 5-325 mg per tablet  Commonly known as: NORCO  Take 1 tablet by mouth every 4 (four) hours as needed for Pain.     senna-docusate 8.6-50 mg 8.6-50 mg per tablet  Commonly known as: PERICOLACE  Take 1 tablet by mouth 2 (two) times daily.            CONTINUE taking these medications      acetaZOLAMIDE 250 MG tablet  Commonly known as: DIAMOX  Take 2 TABLETS BY MOUTH ONLY 8 HOURS AFTER SURGERY     albuterol 90 mcg/actuation inhaler  Commonly known as: PROAIR HFA  Inhale 2 puffs by mouth every 4 hours.     ALPRAZolam 0.5 MG tablet  Commonly known as: XANAX  TAKE ONE TABLET BY MOUTH TWICE DAILY AS NEEDED FOR ANXIETY     atorvastatin 40 MG tablet  Commonly known as: LIPITOR  Take 1 tablet (40 mg total) by mouth once daily.     bacitracin 500 unit/gram Oint  Apply topically 2 (two) times daily.     baclofen 10 MG tablet  Commonly known as: LIORESAL  TAKE 1 TABLET BY MOUTH 3 TIMES DAILY.     buPROPion 150 MG TBSR 12 hr tablet  Commonly known as:  WELLBUTRIN SR  TAKE 1 TABLET BY MOUTH EVERY 12 HOURS DAILY.     dicyclomine 10 MG capsule  Commonly known as: BENTYL  TAKE 1 CAPSULE BY MOUTH THREE TIMES A DAY FOR ABDOMINAL PAIN     docusate sodium 100 MG capsule  Commonly known as: COLACE  Take 1 capsule (100 mg total) by mouth once daily.     DurezoL 0.05 % Drop ophthalmic solution  Generic drug: difluprednate  Instill one drop TO SURGERY EYE four times a day AFTER SURGERY X 30 DAYS     famotidine 20 MG tablet  Commonly known as: PEPCID  Take 1 tablet (20 mg total) by mouth 2 (two) times daily.     fingolimod 0.5 mg Cap  Commonly known as: GILENYA  Take 1 capsule (0.5 mg total) by mouth once daily.     HOME NEBULIZER PLUS SIDESTREAM Lupe  Generic drug: nebulizer and compressor  use as directed     lactulose 10 gram/15 mL solution  Commonly known as: CHRONULAC  Take 45 mLs (30 g total) by mouth 3 (three) times daily.     LINZESS 290 mcg Cap capsule  Generic drug: linaCLOtide  Take 1 capsule (290 mcg total) by mouth before breakfast.     lisinopriL 5 MG tablet  Commonly known as: PRINIVIL,ZESTRIL  TAKE 1 TABLET BY MOUTH DAILY     mupirocin 2 % ointment  Commonly known as: BACTROBAN  Apply daily to left hand     ofloxacin 0.3 % ophthalmic solution  Commonly known as: OCUFLOX  Instill 1 drop TO SURGERY EYE four times a day STARTING 2 DAYS BEFORE SURGERY     oxybutynin 5 MG Tr24  Commonly known as: DITROPAN-XL  Take 1 tablet (5 mg total) by mouth once daily.     pantoprazole 40 MG tablet  Commonly known as: PROTONIX  TAKE ONE TABLET BY MOUTH  ONCE DAILY     sodium,potassium,mag sulfates 17.5-3.13-1.6 gram Solr  Commonly known as: SUPREP BOWEL PREP KIT  Take 177 mLs by mouth once daily.     triamcinolone acetonide 0.1% 0.1 % cream  Commonly known as: KENALOG  Apply topically 2 (two) times daily. for 10 days     VITAMIN D2 1,250 mcg (50,000 unit) capsule  Generic drug: ergocalciferol  TAKE 1 CAPSULE BY MOUTH Weekly            Discharge Procedure Orders   Diet general      Remove dressing in 48 hours     Call MD for:  temperature >100.4     Call MD for:  persistent nausea and vomiting     Call MD for:  severe uncontrolled pain     Change dressing (specify)   Order Comments: Dressing change: 1 times per day using neosporin and gauze.     Shower on day dressing removed (No bath)

## 2024-05-24 ENCOUNTER — HOSPITAL ENCOUNTER (OUTPATIENT)
Dept: WOUND CARE | Facility: HOSPITAL | Age: 59
Discharge: HOME | End: 2024-05-24
Attending: PREVENTIVE MEDICINE
Payer: MEDICARE

## 2024-05-24 VITALS
DIASTOLIC BLOOD PRESSURE: 80 MMHG | BODY MASS INDEX: 34.8 KG/M2 | SYSTOLIC BLOOD PRESSURE: 138 MMHG | WEIGHT: 235 LBS | HEART RATE: 77 BPM | HEIGHT: 69 IN | TEMPERATURE: 98 F

## 2024-05-24 DIAGNOSIS — L97.921 NON-PRESSURE ULCER OF LOWER EXTREMITY, LIMITED TO BREAKDOWN OF SKIN, LEFT: Primary | ICD-10-CM

## 2024-05-24 DIAGNOSIS — D04.9 BOWEN DISEASE: ICD-10-CM

## 2024-05-24 DIAGNOSIS — C44.92 SCCA (SQUAMOUS CELL CARCINOMA) OF SKIN: ICD-10-CM

## 2024-05-24 PROCEDURE — 29581 APPL MULTLAYER CMPRN SYS LEG: CPT

## 2024-05-24 NOTE — PROGRESS NOTES
[facility]    Subjective:       Patient ID: Tj Trammell is a 58 y.o. male.    Chief Complaint: Non-healing Wound Follow Up    Follow up on left lateral leg wound.  Cancer excision was done 5/7/24 at site and on the head and leg  Other wounds resolved.  Tolerated dressing application well.      Review of Systems   All other systems reviewed and are negative.        Objective:     Vitals:    05/24/24 0814   BP: 138/80   Pulse: 77   Temp: 97.6 °F (36.4 °C)         Physical Exam       Altered Skin Integrity 07/28/23 0800 Left lateral Malleolus (Active)   07/28/23 0800   Altered Skin Integrity Present on Admission - Did Patient arrive to the hospital with altered skin?: yes   Side: Left   Orientation: lateral   Location: Malleolus   Wound Number:    Is this injury device related?: No   Primary Wound Type:    Description of Altered Skin Integrity:    Ankle-Brachial Index:    Pulses:    Removal Indication and Assessment:    Wound Outcome:    (Retired) Wound Length (cm):    (Retired) Wound Width (cm):    (Retired) Depth (cm):    Wound Description (Comments):    Removal Indications:    Wound Image   05/24/24 0833   Dressing Appearance Moist drainage 05/24/24 0833   Drainage Amount Moderate 05/24/24 0833   Drainage Characteristics/Odor Serosanguineous 05/24/24 0833   Appearance Moist 05/24/24 0833   Tissue loss description Full thickness 05/24/24 0833   Red (%), Wound Tissue Color 99 % 05/24/24 0833   Yellow (%), Wound Tissue Color 1 % 05/24/24 0833   Periwound Area Intact;Dry;Redness 05/24/24 0833   Wound Edges Defined;Open 05/24/24 0833   Wound Length (cm) 4.8 cm 05/24/24 0833   Wound Width (cm) 4 cm 05/24/24 0833   Wound Depth (cm) 0.1 cm 05/24/24 0833   Wound Volume (cm^3) 1.92 cm^3 05/24/24 0833   Wound Surface Area (cm^2) 19.2 cm^2 05/24/24 0833   Care Cleansed with:;Soap and water 05/24/24 0833   Dressing Applied;Hydrofiber 05/24/24 0833   Compression Two layer  compression 05/24/24 0833   Dressing Change Due 05/31/24 05/24/24 0833            Wound 05/24/24 0839 Other (comment) Head (Active)   05/24/24 0839   Present on Original Admission: Y   Primary Wound Type: Other   Side:    Orientation:    Location: Head   Wound Approximate Age at First Assessment (Weeks):    Wound Number:    Is this injury device related?:    Incision Type:    Closure Method:    Wound Description (Comments):    Type:    Additional Comments:    Ankle-Brachial Index:    Pulses:    Removal Indication and Assessment:    Wound Outcome:    Wound Image   05/24/24 0833   Dressing Appearance Moist drainage 05/24/24 0833   Drainage Amount Moderate 05/24/24 0833   Drainage Characteristics/Odor Serosanguineous 05/24/24 0833   Appearance Moist 05/24/24 0833   Tissue loss description Full thickness 05/24/24 0833   Red (%), Wound Tissue Color 100 % 05/24/24 0833   Periwound Area Intact;Dry 05/24/24 0833   Wound Edges Defined;Open 05/24/24 0833   Wound Length (cm) 1.5 cm 05/24/24 0833   Wound Width (cm) 1.5 cm 05/24/24 0833   Wound Depth (cm) 0.1 cm 05/24/24 0833   Wound Volume (cm^3) 0.225 cm^3 05/24/24 0833   Wound Surface Area (cm^2) 2.25 cm^2 05/24/24 0833   Care Cleansed with:;Sterile normal saline 05/24/24 0833   Dressing Applied;Hydrofiber;Foam 05/24/24 0833   Dressing Change Due 05/31/24 05/24/24 0833       [REMOVED]      Altered Skin Integrity 03/15/24 0800 Right anterior;lower Arm Traumatic (Removed)   03/15/24 0800   Altered Skin Integrity Present on Admission - Did Patient arrive to the hospital with altered skin?:    Side: Right   Orientation: anterior;lower   Location: Arm   Wound Number:    Is this injury device related?:    Primary Wound Type: Traumatic   Description of Altered Skin Integrity:    Ankle-Brachial Index:    Pulses:    Removal Indication and Assessment:    Wound Outcome: Healed   (Retired) Wound Length (cm):    (Retired) Wound Width (cm):    (Retired) Depth (cm):    Wound Description  (Comments):    Removal Indications:    Removed 05/24/24 0835   Wound Image   05/24/24 0833   Wound Length (cm) 0 cm 05/24/24 0833   Wound Width (cm) 0 cm 05/24/24 0833   Wound Depth (cm) 0 cm 05/24/24 0833   Wound Volume (cm^3) 0 cm^3 05/24/24 0833   Wound Surface Area (cm^2) 0 cm^2 05/24/24 0833       [REMOVED]      Altered Skin Integrity 04/05/24 0800 Right anterior Knee Traumatic Partial thickness tissue loss. Shallow open ulcer with a red or pink wound bed, without slough. Intact or Open/Ruptured Serum-filled blister. (Removed)   04/05/24 0800   Altered Skin Integrity Present on Admission - Did Patient arrive to the hospital with altered skin?: yes   Side: Right   Orientation: anterior   Location: Knee   Wound Number:    Is this injury device related?:    Primary Wound Type: Traumatic   Description of Altered Skin Integrity: Partial thickness tissue loss. Shallow open ulcer with a red or pink wound bed, without slough. Intact or Open/Ruptured Serum-filled blister.   Ankle-Brachial Index:    Pulses:    Removal Indication and Assessment:    Wound Outcome: Healed   (Retired) Wound Length (cm):    (Retired) Wound Width (cm):    (Retired) Depth (cm):    Wound Description (Comments):    Removal Indications:    Removed 05/24/24 0834   Wound Image   05/24/24 0833   Wound Length (cm) 0 cm 05/24/24 0833   Wound Width (cm) 0 cm 05/24/24 0833   Wound Depth (cm) 0 cm 05/24/24 0833   Wound Volume (cm^3) 0 cm^3 05/24/24 0833   Wound Surface Area (cm^2) 0 cm^2 05/24/24 0833       [REMOVED]      Altered Skin Integrity 04/05/24 0800 Left anterior Knee Traumatic Partial thickness tissue loss. Shallow open ulcer with a red or pink wound bed, without slough. Intact or Open/Ruptured Serum-filled blister. (Removed)   04/05/24 0800   Altered Skin Integrity Present on Admission - Did Patient arrive to the hospital with altered skin?:    Side: Left   Orientation: anterior   Location: Knee   Wound Number:    Is this injury device  related?: No   Primary Wound Type: Traumatic   Description of Altered Skin Integrity: Partial thickness tissue loss. Shallow open ulcer with a red or pink wound bed, without slough. Intact or Open/Ruptured Serum-filled blister.   Ankle-Brachial Index:    Pulses:    Removal Indication and Assessment:    Wound Outcome: Healed   (Retired) Wound Length (cm):    (Retired) Wound Width (cm):    (Retired) Depth (cm):    Wound Description (Comments):    Removal Indications:    Removed 05/24/24 0834   Wound Image   05/24/24 0833   Wound Length (cm) 0 cm 05/24/24 0833   Wound Width (cm) 0 cm 05/24/24 0833   Wound Depth (cm) 0 cm 05/24/24 0833   Wound Volume (cm^3) 0 cm^3 05/24/24 0833   Wound Surface Area (cm^2) 0 cm^2 05/24/24 0833         Assessment/Plan:         ICD-10-CM ICD-9-CM   1. Non-pressure ulcer of lower extremity, limited to breakdown of skin, left  L97.921 707.10   2. 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 Casey in 1 week        Follow up in about 1 week (around 5/31/2024) for Dr Casey.            This includes face to face time and non-face to face time preparing to see the patient (eg, review of tests), obtaining and/or reviewing separately obtained history, documenting clinical information in the electronic or other health record, independently interpreting results and communicating results to the patient/family/caregiver, or care coordinator.

## 2024-05-31 ENCOUNTER — HOSPITAL ENCOUNTER (OUTPATIENT)
Dept: WOUND CARE | Facility: HOSPITAL | Age: 59
Discharge: HOME | End: 2024-05-31
Attending: PREVENTIVE MEDICINE
Payer: MEDICARE

## 2024-05-31 ENCOUNTER — TELEPHONE (OUTPATIENT)
Dept: WOUND CARE | Facility: HOSPITAL | Age: 59
End: 2024-05-31

## 2024-05-31 VITALS
SYSTOLIC BLOOD PRESSURE: 167 MMHG | BODY MASS INDEX: 34.8 KG/M2 | WEIGHT: 235 LBS | DIASTOLIC BLOOD PRESSURE: 79 MMHG | HEIGHT: 69 IN | HEART RATE: 80 BPM | TEMPERATURE: 98 F

## 2024-05-31 DIAGNOSIS — L92.9 HYPERGRANULATION: ICD-10-CM

## 2024-05-31 DIAGNOSIS — D04.9 BOWEN DISEASE: ICD-10-CM

## 2024-05-31 DIAGNOSIS — L97.921 NON-PRESSURE CHRONIC ULCER LEFT LOWER LEG, LIMITED TO BREAKDOWN SKIN: Primary | ICD-10-CM

## 2024-05-31 DIAGNOSIS — S01.00XD OPEN WOUND OF SCALP, UNSPECIFIED OPEN WOUND TYPE, SUBSEQUENT ENCOUNTER: ICD-10-CM

## 2024-05-31 PROCEDURE — 29581 APPL MULTLAYER CMPRN SYS LEG: CPT

## 2024-05-31 PROCEDURE — 17250 CHEM CAUT OF GRANLTJ TISSUE: CPT

## 2024-05-31 NOTE — PROGRESS NOTES
[facility]    Subjective:       Patient ID: Tj Trammell is a 58 y.o. male.    Chief Complaint: Non-healing Wound    Follow up visit. Wounds continue to improve. No apparent signs of infection noted. No changes in plan of care. Requesting authorization for apligraft.    Review of Systems   All other systems reviewed and are negative.        Objective:     Vitals:    05/31/24 0900   BP: (!) 167/79   Pulse: 80   Temp: 97.8 °F (36.6 °C)         Physical Exam       Altered Skin Integrity 07/28/23 0800 Left lateral Malleolus (Active)   07/28/23 0800   Altered Skin Integrity Present on Admission - Did Patient arrive to the hospital with altered skin?: yes   Side: Left   Orientation: lateral   Location: Malleolus   Wound Number:    Is this injury device related?: No   Primary Wound Type:    Description of Altered Skin Integrity:    Ankle-Brachial Index:    Pulses:    Removal Indication and Assessment:    Wound Outcome:    (Retired) Wound Length (cm):    (Retired) Wound Width (cm):    (Retired) Depth (cm):    Wound Description (Comments):    Removal Indications:    Wound Image   05/31/24 0957   Dressing Appearance Moist drainage 05/31/24 0957   Drainage Amount Moderate 05/31/24 0957   Drainage Characteristics/Odor Serosanguineous 05/31/24 0957   Appearance Moist;Hypergranulation 05/31/24 0957   Tissue loss description Full thickness 05/31/24 0957   Red (%), Wound Tissue Color 100 % 05/31/24 0957   Periwound Area Intact;Dry 05/31/24 0957   Wound Edges Defined 05/31/24 0957   Wound Length (cm) 4 cm 05/31/24 0957   Wound Width (cm) 3.8 cm 05/31/24 0957   Wound Depth (cm) 0.2 cm 05/31/24 0957   Wound Volume (cm^3) 3.04 cm^3 05/31/24 0957   Wound Surface Area (cm^2) 15.2 cm^2 05/31/24 0957   Care Cleansed with:;Sterile normal saline 05/31/24 0957   Dressing Applied;Hydrofiber;Compression wrap 05/31/24 0957   Periwound Care Absorptive dressing applied 05/31/24 0957    Compression Two layer compression 05/31/24 0957   Dressing Change Due 06/07/24 05/31/24 0957            Wound 05/24/24 0839 Other (comment) Head (Active)   05/24/24 0839   Present on Original Admission: Y   Primary Wound Type: Other   Side:    Orientation:    Location: Head   Wound Approximate Age at First Assessment (Weeks):    Wound Number:    Is this injury device related?:    Incision Type:    Closure Method:    Wound Description (Comments):    Type:    Additional Comments:    Ankle-Brachial Index:    Pulses:    Removal Indication and Assessment:    Wound Outcome:    Wound Image   05/31/24 0957   Drainage Amount Small 05/31/24 0957   Drainage Characteristics/Odor Serosanguineous 05/31/24 0957   Appearance Moist 05/31/24 0957   Tissue loss description Full thickness 05/31/24 0957   Red (%), Wound Tissue Color 100 % 05/31/24 0957   Periwound Area Intact;Dry 05/31/24 0957   Wound Edges Defined 05/31/24 0957   Wound Length (cm) 0.9 cm 05/31/24 0957   Wound Width (cm) 0.9 cm 05/31/24 0957   Wound Depth (cm) 0.1 cm 05/31/24 0957   Wound Volume (cm^3) 0.081 cm^3 05/31/24 0957   Wound Surface Area (cm^2) 0.81 cm^2 05/31/24 0957   Care Cleansed with:;Sterile normal saline 05/31/24 0957   Dressing Applied;Hydrofiber 05/31/24 0957   Dressing Change Due 06/07/24 05/31/24 0957         Assessment/Plan:         ICD-10-CM ICD-9-CM   1. 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 Open wound of scalp, unspecified open wound type, subsequent encounter  S01.00XD V58.89     873.0       Silver nitrate applied to hypergranular tissue.    Tissue pathology and/or culture taken:  [] Yes [x] No   Sharp debridement performed:   [] Yes [x] No   Labs ordered this visit:   [] Yes [x] No   Imaging ordered this visit:   [] Yes [x] No           Orders Placed This Encounter   Procedures    Change dressing     LLE+ head    Cleanse  wound with: NS  Lidocaine: prn  Silver nitrate: prn  Periwound care: prn  Primary dressing: Hydrofera Classic (+foam dressing to head wound)  Edema control: Coflex with Zn from toe to knee  Frequency: Weekly    Follow-up: Dr. Casey in 1 week        Follow up in about 1 week (around 6/7/2024).            This includes face to face time and non-face to face time preparing to see the patient (eg, review of tests), obtaining and/or reviewing separately obtained history, documenting clinical information in the electronic or other health record, independently interpreting results and communicating results to the patient/family/caregiver, or care coordinator.

## 2024-05-31 NOTE — TELEPHONE ENCOUNTER
05/31/24 Spoke with Linnette with [facility] Ref# [id number]. No Authorization required for Apligraft /[id number]

## 2024-06-05 ENCOUNTER — OFFICE VISIT (OUTPATIENT)
Dept: SURGERY | Facility: CLINIC | Age: 59
End: 2024-06-05
Payer: MEDICARE

## 2024-06-05 VITALS
HEIGHT: 69 IN | DIASTOLIC BLOOD PRESSURE: 81 MMHG | BODY MASS INDEX: 34.71 KG/M2 | SYSTOLIC BLOOD PRESSURE: 126 MMHG | HEART RATE: 78 BPM

## 2024-06-05 DIAGNOSIS — D04.72 SQUAMOUS CELL CARCINOMA IN SITU (SCCIS) OF SKIN OF LEFT LOWER LEG: ICD-10-CM

## 2024-06-05 DIAGNOSIS — L98.9 SKIN LESION OF SCALP: Primary | ICD-10-CM

## 2024-06-05 PROCEDURE — 3079F DIAST BP 80-89 MM HG: CPT | Mod: CPTII,S$GLB,, | Performed by: STUDENT IN AN ORGANIZED HEALTH CARE EDUCATION/TRAINING PROGRAM

## 2024-06-05 PROCEDURE — 4010F ACE/ARB THERAPY RXD/TAKEN: CPT | Mod: CPTII,S$GLB,, | Performed by: STUDENT IN AN ORGANIZED HEALTH CARE EDUCATION/TRAINING PROGRAM

## 2024-06-05 PROCEDURE — 99999 PR PBB SHADOW E&M-EST. PATIENT-LVL III: CPT | Mod: PBBFAC,,, | Performed by: STUDENT IN AN ORGANIZED HEALTH CARE EDUCATION/TRAINING PROGRAM

## 2024-06-05 PROCEDURE — 3051F HG A1C>EQUAL 7.0%<8.0%: CPT | Mod: CPTII,S$GLB,, | Performed by: STUDENT IN AN ORGANIZED HEALTH CARE EDUCATION/TRAINING PROGRAM

## 2024-06-05 PROCEDURE — 3074F SYST BP LT 130 MM HG: CPT | Mod: CPTII,S$GLB,, | Performed by: STUDENT IN AN ORGANIZED HEALTH CARE EDUCATION/TRAINING PROGRAM

## 2024-06-05 PROCEDURE — 99024 POSTOP FOLLOW-UP VISIT: CPT | Mod: S$GLB,,, | Performed by: STUDENT IN AN ORGANIZED HEALTH CARE EDUCATION/TRAINING PROGRAM

## 2024-06-05 NOTE — PROGRESS NOTES
General Surgery Office Visit   History and Physical    Patient Name: Tj Trammell  YOB: 1965 (59 y.o.)  MRN: [id number]  Today's Date: 06/05/2024    Referring Md:   No referring provider defined for this encounter.    SUBJECTIVE:     Chief Complaint: SCC of the left leg    History of Present Illness:  Tj Trammell is a 59 y.o. male with past medical history of SCC of multiple regions, including the scalp and left leg. He is s/p excision of multiple lesions with Dr. Rodriguez on 5/7. Pathology revealed positive margins of the left leg lesion as well as the scalp margins. He has been seeing wound care in the interim and his scalp excision sites have healed well, and the larger left leg excision site is healing appropriately via secondary intention.     Review of patient's allergies indicates:  No Known Allergies  Past Medical History:   Diagnosis Date    Asthma     Bilateral hearing loss 12/12/2012    Biliary acute pancreatitis     GERD (gastroesophageal reflux disease)     High cholesterol     Hypertension     Multiple sclerosis      Past Surgical History:   Procedure Laterality Date    APPENDECTOMY      CHOLECYSTECTOMY      COLONOSCOPY N/A 1/22/2019    Procedure: COLONOSCOPY 2 day  golytely;  Surgeon: Nathan Waddell MD;  Location: [facility];  Service: Endoscopy;  Laterality: N/A;    COLONOSCOPY N/A 4/25/2023    Procedure: COLONOSCOPY;  Surgeon: Jared Loredo MD;  Location: [facility];  Service: Endoscopy;  Laterality: N/A;    COLONOSCOPY N/A 10/10/2023    Procedure: COLONOSCOPY;  Surgeon: Jared Loredo MD;  Location: [facility];  Service: Endoscopy;  Laterality: N/A;    ESOPHAGOGASTRODUODENOSCOPY N/A 9/29/2021    Procedure: EGD (ESOPHAGOGASTRODUODENOSCOPY) covid test Rapid;  Surgeon: Jared Loredo MD;  Location: [facility];  Service: Endoscopy;  Laterality: N/A;    EXCISION, SUPERFICIAL MASS, HEAD Left 5/7/2024    Procedure: EXCISION, SUPERFICIAL MASS, HEAD;   "Surgeon: Chriss Rodriguez MD;  Location: [facility] OR;  Service: General;  Laterality: Left;    JOINT REPLACEMENT      arm    SURGICAL REMOVAL OF MASS OF LOWER EXTREMITY Left 2024    Procedure: EXCISION, MASS, LOWER EXTREMITY;  Surgeon: Chriss Rodriguez MD;  Location: [facility] OR;  Service: General;  Laterality: Left;    TONSILLECTOMY      TRANSFORAMINAL EPIDURAL INJECTION OF STEROID Bilateral 10/5/2022    Procedure: Injection,steroid,epidural,transforaminal approach;  Surgeon: Soo Giles MD;  Location: [facility]IN [facility];  Service: Pain Management;  Laterality: Bilateral;  bilateral L5 transforaminal epidural steroid injection with RN IV sedation     Family History   Problem Relation Name Age of Onset    Heart disease Mother      Heart disease Father      Heart disease Brother       Social History     Tobacco Use    Smoking status: Former     Current packs/day: 0.00     Average packs/day: 1 pack/day for 32.0 years (32.0 ttl pk-yrs)     Types: Cigarettes     Start date: 1990     Quit date: 2022     Years since quittin.4    Smokeless tobacco: Never   Substance Use Topics    Alcohol use: Not Currently    Drug use: No        Review of Systems:  Review of Systems   Constitutional:  Negative for chills and fever.   Respiratory:  Negative for cough and shortness of breath.    Cardiovascular:  Negative for chest pain.   Gastrointestinal:  Negative for abdominal pain, constipation, nausea and vomiting.   Genitourinary:  Negative for dysuria.   Neurological:  Negative for dizziness.       OBJECTIVE:     Vital Signs (Most Recent)  /81 (BP Location: Left arm, Patient Position: Sitting)   Pulse 78   Ht 5' 9" (1.753 m)   BMI 34.71 kg/m²     Physical Exam:  Physical Exam  Vitals reviewed.   Constitutional:       Appearance: Normal appearance.   Cardiovascular:      Rate and Rhythm: Normal rate and regular rhythm.   Pulmonary:      Effort: Pulmonary effort is normal. No respiratory distress.   Abdominal:    "   Palpations: Abdomen is soft.      Tenderness: There is no abdominal tenderness.   Skin:     General: Skin is warm.      Comments: 2x scalp excision sites well healed with scabs. Left leg site with healthy granulation tissue present.   Neurological:      General: No focal deficit present.      Mental Status: He is alert and oriented to person, place, and time.             Diagnostic Results:    1. Skin, left scalp anterior stitch, excision:  - RESIDUAL SQUAMOUS CELL CARCINOMA IN SITU.  - THE TUMOR EXTENDS TO THE 9:00, 12:00 AND 6:00 PERIPHERAL MARGINS.  - PREVIOUS BIOPSY SITE CHANGES.      2. Skin, medial scalp anterior stitch, excision:  - RESIDUAL SQUAMOUS CELL CARCINOMA IN SITU.  - THE TUMOR EXTENDS TO THE 12:00 AND 6:00 PERIPHERAL MARGINS, AND CLOSELY APPROACHES THE 9:00 PERIPHERAL MARGIN.  - PREVIOUS BIOPSY SITE CHANGES.      3. Skin, left calf, excision:  - RESIDUAL IN SITU AND INVASIVE SQUAMOUS CELL CARCINOMA, POORLY DIFFERENTIATED, WITH FOCAL BASALOID FEATURES.  - BOTH IN SITU AND INVASIVE SQUAMOUS CELL CARCINOMA EXTENDS TO BOTH THE BLACK (3:00 HALF) AND ORANGE (9:00 HALF) PERIPHERAL MARGINS WITHIN BLOCKS F AND G (ADJACENT TO THE 6:00 MARGIN AND INVOLVING BOTH THE 3:00 AND 9:00 SHAVE MARGINS).  - SQUAMOUS CELL CARCINOMA IN SITU EXTENDS TO BOTH THE BLACK (3:00 HALF) AND ORANGE (9:00 HALF) INKED PERIPHERAL MARGINS WITHIN BLOCK A (ADJACENT TO THE 12:00 PERIPHERAL MARGIN); SQUAMOUS CELL CARCINOMA IN SITU ALSO EXTENDS TO THE BLACK (3:00 HALF)  PERIPHERAL MARGIN WITHIN BLOCKS C, D, E, AND F.  - PREVIOUS SURGICAL SITE CHANGES.       ASSESSMENT/PLAN:     Tj Trammell is a 59 y.o. male presenting to clinic s/p excision of SCC of the scalp and left leg. Both sites with positive margins. We discussed the indication for re excision of the left leg, including the risks and benefits, and he would like to proceed. We will refer to dermatology for mohs of the scalp lesions to minimize tissue loss.    - Referral to  dermatology for scalp lesions  - Schedule for re-excision of left leg SCC site  - Consent signed in clinic    Thang Saucedo MD  [facility]

## 2024-06-07 ENCOUNTER — HOSPITAL ENCOUNTER (OUTPATIENT)
Dept: WOUND CARE | Facility: HOSPITAL | Age: 59
Discharge: HOME | End: 2024-06-07
Attending: PREVENTIVE MEDICINE
Payer: MEDICARE

## 2024-06-07 VITALS
SYSTOLIC BLOOD PRESSURE: 140 MMHG | BODY MASS INDEX: 34.8 KG/M2 | WEIGHT: 235 LBS | TEMPERATURE: 99 F | HEIGHT: 69 IN | HEART RATE: 86 BPM | DIASTOLIC BLOOD PRESSURE: 69 MMHG | RESPIRATION RATE: 18 BRPM

## 2024-06-07 DIAGNOSIS — S01.00XD OPEN WOUND OF SCALP, UNSPECIFIED OPEN WOUND TYPE, SUBSEQUENT ENCOUNTER: ICD-10-CM

## 2024-06-07 DIAGNOSIS — D04.9 BOWEN DISEASE: ICD-10-CM

## 2024-06-07 DIAGNOSIS — D04.72 SQUAMOUS CELL CARCINOMA IN SITU (SCCIS) OF SKIN OF LEFT LOWER LEG: Primary | ICD-10-CM

## 2024-06-07 DIAGNOSIS — C44.92 SCCA (SQUAMOUS CELL CARCINOMA) OF SKIN: ICD-10-CM

## 2024-06-07 PROCEDURE — 29581 APPL MULTLAYER CMPRN SYS LEG: CPT

## 2024-06-07 NOTE — PROGRESS NOTES
[facility]    Subjective:       Patient ID: Tj Trammell is a 59 y.o. male.    Chief Complaint: Non-healing Wound    Wound care follow up for left leg and scalp lesions. Recent pathology shows patient remains with squamous cell carcinoma to head and leg. Saw surgery this week - pending procedure. Was referred to dermatology for Mohs procedure. Left leg wound clean with no signs of infection, dried fibrin to scalp lesions. Will continue to see patient in clinic weekly to change dressings and keep wounds clean until surgery.    Review of Systems   All other systems reviewed and are negative.        Objective:     Vitals:    06/07/24 0801   BP: (!) 140/69   Pulse: 86   Resp: 18   Temp: 98.5 °F (36.9 °C)         Physical Exam       Altered Skin Integrity 07/28/23 0800 Left lateral;lower Leg (Active)   07/28/23 0800   Altered Skin Integrity Present on Admission - Did Patient arrive to the hospital with altered skin?: yes   Side: Left   Orientation: lateral;lower   Location: Leg   Wound Number:    Is this injury device related?: No   Primary Wound Type:    Description of Altered Skin Integrity:    Ankle-Brachial Index:    Pulses:    Removal Indication and Assessment:    Wound Outcome:    (Retired) Wound Length (cm):    (Retired) Wound Width (cm):    (Retired) Depth (cm):    Wound Description (Comments):    Removal Indications:    Wound Image   06/07/24 0840   Dressing Appearance Moist drainage 06/07/24 0840   Drainage Amount Moderate 06/07/24 0840   Drainage Characteristics/Odor Serosanguineous 06/07/24 0840   Appearance Red;Yellow 06/07/24 0840   Tissue loss description Full thickness 06/07/24 0840   Red (%), Wound Tissue Color 95 % 06/07/24 0840   Yellow (%), Wound Tissue Color 5 % 06/07/24 0840   Periwound Area Denuded;Moist 06/07/24 0840   Wound Edges Defined 06/07/24 0840   Wound Length (cm) 3.9 cm 06/07/24 0840   Wound Width (cm) 3.5 cm 06/07/24 0840   Wound  Depth (cm) 0.2 cm 06/07/24 0840   Wound Volume (cm^3) 2.73 cm^3 06/07/24 0840   Wound Surface Area (cm^2) 13.65 cm^2 06/07/24 0840   Care Cleansed with:;Antimicrobial agent;Sterile normal saline 06/07/24 0840   Dressing Applied;Silver;Calcium alginate;Absorptive Pad;Compression wrap 06/07/24 0840   Periwound Care Absorptive dressing applied;Moisture barrier applied 06/07/24 0840   Compression Two layer compression 06/07/24 0840   Dressing Change Due 06/14/24 06/07/24 0840            Wound 05/24/24 0839 Other (comment) Head (Active)   05/24/24 0839   Present on Original Admission: Y   Primary Wound Type: Other   Side:    Orientation:    Location: Head   Wound Approximate Age at First Assessment (Weeks):    Wound Number:    Is this injury device related?:    Incision Type:    Closure Method:    Wound Description (Comments):    Type:    Additional Comments:    Ankle-Brachial Index:    Pulses:    Removal Indication and Assessment:    Wound Outcome:    Wound Image   06/07/24 0840   Dressing Appearance Open to air 06/07/24 0840   Drainage Amount None 06/07/24 0840   Appearance Fibrin 06/07/24 0840   Tissue loss description Full thickness 06/07/24 0840   Periwound Area Intact;Dry 06/07/24 0840   Wound Edges Defined 06/07/24 0840   Wound Length (cm) 0.9 cm 06/07/24 0840   Wound Width (cm) 0.9 cm 06/07/24 0840   Wound Depth (cm) 0.1 cm 06/07/24 0840   Wound Volume (cm^3) 0.081 cm^3 06/07/24 0840   Wound Surface Area (cm^2) 0.81 cm^2 06/07/24 0840   Care Cleansed with:;Sterile normal saline 06/07/24 0840   Dressing Applied;Other (comment);Island/border 06/07/24 0840   Dressing Change Due 06/14/24 06/07/24 0840         Assessment/Plan:         ICD-10-CM ICD-9-CM   1. 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 Open wound of scalp, unspecified open wound type, subsequent encounter  S01.00XD V58.89     873.0   4. SCCA (squamous cell carcinoma) of skin   C44.92 173.92           Tissue pathology and/or culture taken:  [] Yes [x] No   Sharp debridement performed:   [] Yes [x] No   Labs ordered this visit:   [] Yes [x] No   Imaging ordered this visit:   [] Yes [x] No           Orders Placed This Encounter   Procedures    Change dressing     Left lateral lower leg ulcer:     Cleanse wound with:Vashe   Lidocaine: prn   Silver nitrate: prn   Periwound care: zinc barrier cream  Primary dressing: silver alginate  Secondary dressing: small Mextra  Edema control: Coflex with Zinc    Scalp ulcers:  Cleanse wound with:saline  Lidocaine:prn  Silver nitrate:prn  Primary dressing:Bactroban  Secondary dressing:mepilex border    Frequency: Weekly     Follow-up: Dr. Casey in 2 weeks, NV weekly  Other: Hold Apligraf at this time, surgery pending with         Follow up in about 2 weeks (around 6/21/2024) for PRITESH Castillo weekly.            This includes face to face time and non-face to face time preparing to see the patient (eg, review of tests), obtaining and/or reviewing separately obtained history, documenting clinical information in the electronic or other health record, independently interpreting results and communicating results to the patient/family/caregiver, or care coordinator.

## 2024-06-13 DIAGNOSIS — D04.72 SQUAMOUS CELL CARCINOMA IN SITU (SCCIS) OF SKIN OF LEFT LOWER LEG: ICD-10-CM

## 2024-06-13 DIAGNOSIS — L98.9 SKIN LESION OF LEFT LEG: Primary | ICD-10-CM

## 2024-06-14 ENCOUNTER — HOSPITAL ENCOUNTER (OUTPATIENT)
Dept: WOUND CARE | Facility: HOSPITAL | Age: 59
Discharge: HOME | End: 2024-06-14
Attending: PREVENTIVE MEDICINE
Payer: MEDICARE

## 2024-06-14 DIAGNOSIS — D04.72 SQUAMOUS CELL CARCINOMA IN SITU (SCCIS) OF SKIN OF LEFT LOWER LEG: Primary | ICD-10-CM

## 2024-06-14 PROCEDURE — 29581 APPL MULTLAYER CMPRN SYS LEG: CPT

## 2024-06-14 NOTE — PROGRESS NOTES
W[facility]    Subjective:       Patient ID: Tj Trammell is a 59 y.o. male.    Chief Complaint: Wound Care    Nurse visit for dressing change. Patient given written instructions that his surgery with  is scheduled for 7/9/24 and the surgery department will be calling him.   Review of Systems      Objective:   There were no vitals filed for this visit.      Physical Exam       Altered Skin Integrity 07/28/23 0800 Left lateral;lower Leg (Active)   07/28/23 0800   Altered Skin Integrity Present on Admission - Did Patient arrive to the hospital with altered skin?: yes   Side: Left   Orientation: lateral;lower   Location: Leg   Wound Number:    Is this injury device related?: No   Primary Wound Type:    Description of Altered Skin Integrity:    Ankle-Brachial Index:    Pulses:    Removal Indication and Assessment:    Wound Outcome:    (Retired) Wound Length (cm):    (Retired) Wound Width (cm):    (Retired) Depth (cm):    Wound Description (Comments):    Removal Indications:    Dressing Appearance Moist drainage 06/14/24 0817   Drainage Amount Moderate 06/14/24 0817   Drainage Characteristics/Odor Serosanguineous 06/14/24 0817   Appearance Red 06/14/24 0817   Tissue loss description Full thickness 06/14/24 0817   Red (%), Wound Tissue Color 100 % 06/14/24 0817   Periwound Area Moist 06/14/24 0817   Wound Edges Defined 06/14/24 0817   Wound Length (cm) 3.9 cm 06/14/24 0817   Wound Width (cm) 3.5 cm 06/14/24 0817   Wound Depth (cm) 0.2 cm 06/14/24 0817   Wound Volume (cm^3) 2.73 cm^3 06/14/24 0817   Wound Surface Area (cm^2) 13.65 cm^2 06/14/24 0817   Care Cleansed with:;Antimicrobial agent;Sterile normal saline 06/14/24 0817   Dressing Applied;Silver;Calcium alginate;Absorptive Pad;Compression wrap 06/14/24 0817   Periwound Care Absorptive dressing applied;Moisture barrier applied 06/14/24 0817   Compression Two layer compression 06/14/24 0817   Dressing  Change Due 06/21/24 06/14/24 0817            Wound 05/24/24 0839 Other (comment) Head (Active)   05/24/24 0839   Present on Original Admission: Y   Primary Wound Type: Other   Side:    Orientation:    Location: Head   Wound Approximate Age at First Assessment (Weeks):    Wound Number:    Is this injury device related?:    Incision Type:    Closure Method:    Wound Description (Comments):    Type:    Additional Comments:    Ankle-Brachial Index:    Pulses:    Removal Indication and Assessment:    Wound Outcome:    Dressing Appearance Open to air 06/14/24 0817   Drainage Amount None 06/14/24 0817   Appearance Fibrin 06/14/24 0817   Tissue loss description Full thickness 06/14/24 0817   Periwound Area Intact;Dry 06/14/24 0817   Wound Edges Defined 06/14/24 0817   Wound Length (cm) 0.9 cm 06/14/24 0817   Wound Width (cm) 0.9 cm 06/14/24 0817   Wound Depth (cm) 0.1 cm 06/14/24 0817   Wound Volume (cm^3) 0.081 cm^3 06/14/24 0817   Wound Surface Area (cm^2) 0.81 cm^2 06/14/24 0817   Care Cleansed with:;Antimicrobial agent;Sterile normal saline 06/14/24 0817   Dressing Applied;Other (comment);Island/border 06/14/24 0817   Dressing Change Due 06/21/24 06/14/24 0817         Assessment/Plan:         ICD-10-CM ICD-9-CM   1. 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  D04.72 232.7           Tissue pathology and/or culture taken:  [] Yes [x] No   Sharp debridement performed:   [] Yes [x] No   Labs ordered this visit:   [] Yes [x] No   Imaging ordered this visit:   [] Yes [x] No            MD orders from 6/7/2024:  Left lateral lower leg ulcer:     Cleanse wound with:Vashe   Lidocaine: prn   Silver nitrate: prn   Periwound care: zinc barrier cream   Primary dressing: silver alginate   Secondary dressing: small Mextra   Edema control: Coflex with Zinc     Scalp ulcers:   Cleanse wound with:saline   Lidocaine:prn   Silver nitrate:prn   Primary dressing:Bactroban   Secondary dressing:mepilex border      Frequency: Weekly     Follow-up: Dr. Casey in 2 weeks, NV weekly   Other: Hold Apligraf at this time, surgery pending with      Follow up in about 1 week (around 6/21/2024) for .            This includes face to face time and non-face to face time preparing to see the patient (eg, review of tests), obtaining and/or reviewing separately obtained history, documenting clinical information in the electronic or other health record, independently interpreting results and communicating results to the patient/family/caregiver, or care coordinator.

## 2024-06-17 ENCOUNTER — TELEPHONE (OUTPATIENT)
Dept: SURGERY | Facility: HOSPITAL | Age: 59
End: 2024-06-17
Payer: MEDICARE

## 2024-06-17 DIAGNOSIS — I10 PRIMARY HYPERTENSION: ICD-10-CM

## 2024-06-17 DIAGNOSIS — Z01.818 PRE-OP EVALUATION: Primary | ICD-10-CM

## 2024-06-21 ENCOUNTER — HOSPITAL ENCOUNTER (OUTPATIENT)
Dept: WOUND CARE | Facility: HOSPITAL | Age: 59
Discharge: HOME | End: 2024-06-21
Attending: PREVENTIVE MEDICINE
Payer: MEDICARE

## 2024-06-21 ENCOUNTER — CLINICAL SUPPORT (OUTPATIENT)
Dept: LAB | Facility: HOSPITAL | Age: 59
End: 2024-06-21
Attending: STUDENT IN AN ORGANIZED HEALTH CARE EDUCATION/TRAINING PROGRAM
Payer: MEDICARE

## 2024-06-21 VITALS — DIASTOLIC BLOOD PRESSURE: 76 MMHG | HEART RATE: 79 BPM | SYSTOLIC BLOOD PRESSURE: 137 MMHG

## 2024-06-21 DIAGNOSIS — D04.72 SQUAMOUS CELL CARCINOMA IN SITU (SCCIS) OF SKIN OF LEFT LOWER LEG: Primary | ICD-10-CM

## 2024-06-21 DIAGNOSIS — S01.00XD OPEN WOUND OF SCALP, UNSPECIFIED OPEN WOUND TYPE, SUBSEQUENT ENCOUNTER: ICD-10-CM

## 2024-06-21 DIAGNOSIS — I10 PRIMARY HYPERTENSION: ICD-10-CM

## 2024-06-21 DIAGNOSIS — Z01.818 PRE-OP EVALUATION: ICD-10-CM

## 2024-06-21 DIAGNOSIS — D04.9 BOWEN DISEASE: ICD-10-CM

## 2024-06-21 LAB
ANION GAP SERPL CALC-SCNC: 10 MMOL/L (ref 8–16)
BUN SERPL-MCNC: 10 MG/DL (ref 6–20)
CALCIUM SERPL-MCNC: 9.5 MG/DL (ref 8.7–10.5)
CHLORIDE SERPL-SCNC: 99 MMOL/L (ref 95–110)
CO2 SERPL-SCNC: 26 MMOL/L (ref 23–29)
CREAT SERPL-MCNC: 1 MG/DL (ref 0.5–1.4)
EST. GFR  (NO RACE VARIABLE): >60 ML/MIN/1.73 M^2
GLUCOSE SERPL-MCNC: 123 MG/DL (ref 70–110)
OHS QRS DURATION: 86 MS
OHS QTC CALCULATION: 407 MS
POTASSIUM SERPL-SCNC: 4.4 MMOL/L (ref 3.5–5.1)
SODIUM SERPL-SCNC: 135 MMOL/L (ref 136–145)

## 2024-06-21 PROCEDURE — 29581 APPL MULTLAYER CMPRN SYS LEG: CPT

## 2024-06-21 PROCEDURE — 36415 COLL VENOUS BLD VENIPUNCTURE: CPT | Performed by: STUDENT IN AN ORGANIZED HEALTH CARE EDUCATION/TRAINING PROGRAM

## 2024-06-21 PROCEDURE — 93010 ELECTROCARDIOGRAM REPORT: CPT | Mod: ,,, | Performed by: INTERNAL MEDICINE

## 2024-06-21 PROCEDURE — 93005 ELECTROCARDIOGRAM TRACING: CPT

## 2024-06-21 PROCEDURE — 80048 BASIC METABOLIC PNL TOTAL CA: CPT | Performed by: STUDENT IN AN ORGANIZED HEALTH CARE EDUCATION/TRAINING PROGRAM

## 2024-06-21 NOTE — PROGRESS NOTES
[facility]    Subjective:       Patient ID: Tj Trammell is a 59 y.o. male.    Chief Complaint: Non-healing Wound Follow Up    Following up on LLE and scalp lesions.  Scalp with dry scab, open to air.  Surgery is pending 7/9/24.  Patient is aware.  Tolerated dressing changes well.  Continue with current plan of care.    Review of Systems   All other systems reviewed and are negative.        Objective:     Vitals:    06/21/24 0825   BP: 137/76   Pulse: 79         Physical Exam       Altered Skin Integrity 07/28/23 0800 Left lateral;lower Leg (Active)   07/28/23 0800   Altered Skin Integrity Present on Admission - Did Patient arrive to the hospital with altered skin?: yes   Side: Left   Orientation: lateral;lower   Location: Leg   Wound Number:    Is this injury device related?: No   Primary Wound Type:    Description of Altered Skin Integrity:    Ankle-Brachial Index:    Pulses:    Removal Indication and Assessment:    Wound Outcome:    (Retired) Wound Length (cm):    (Retired) Wound Width (cm):    (Retired) Depth (cm):    Wound Description (Comments):    Removal Indications:    Wound Image   06/21/24 0827   Description of Altered Skin Integrity Partial thickness tissue loss. Shallow open ulcer with a red or pink wound bed, without slough. Intact or Open/Ruptured Serum-filled blister. 06/21/24 0827   Dressing Appearance Moist drainage 06/21/24 0827   Drainage Amount Moderate 06/21/24 0827   Drainage Characteristics/Odor Serosanguineous 06/21/24 0827   Appearance Red 06/21/24 0827   Tissue loss description Full thickness 06/21/24 0827   Red (%), Wound Tissue Color 100 % 06/21/24 0827   Periwound Area Moist;Macerated 06/21/24 0827   Wound Edges Open 06/21/24 0827   Wound Length (cm) 2.4 cm 06/21/24 0827   Wound Width (cm) 2.4 cm 06/21/24 0827   Wound Depth (cm) 0.1 cm 06/21/24 0827   Wound Volume (cm^3) 0.576 cm^3 06/21/24 0827   Wound Surface Area (cm^2) 5.76  cm^2 06/21/24 0827   Care Cleansed with:;Soap and water;Antimicrobial agent 06/21/24 0827   Dressing Applied;Calcium alginate 06/21/24 0827   Periwound Care Absorptive dressing applied 06/21/24 0827   Compression Two layer compression 06/21/24 0827   Dressing Change Due 06/28/24 06/21/24 0827            Wound 05/24/24 0839 Other (comment) Head (Active)   05/24/24 0839   Present on Original Admission: Y   Primary Wound Type: Other   Side:    Orientation:    Location: Head   Wound Approximate Age at First Assessment (Weeks):    Wound Number:    Is this injury device related?:    Incision Type:    Closure Method:    Wound Description (Comments):    Type:    Additional Comments:    Ankle-Brachial Index:    Pulses:    Removal Indication and Assessment:    Wound Outcome:    Wound Image   06/21/24 0827   Dressing Appearance Open to air 06/21/24 0827   Drainage Amount None 06/21/24 0827   Periwound Area Intact;Dry 06/21/24 0827         Assessment/Plan:         ICD-10-CM ICD-9-CM   1. 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 Open wound of scalp, unspecified open wound type, subsequent encounter  S01.00XD V58.89     873.0       Scalp wound is almost fully resolved. Leg wound is stable.    Tissue pathology and/or culture taken:  [] Yes [x] No   Sharp debridement performed:   [] Yes [x] No   Labs ordered this visit:   [] Yes [x] No   Imaging ordered this visit:   [] Yes [x] No           Orders Placed This Encounter   Procedures    Change dressing     Left lateral lower leg ulcer:    Cleanse wound with:Vashe  Lidocaine: prn  Silver nitrate: prn  Periwound care: zinc barrier cream  Primary dressing: silver alginate  Secondary dressing: small Mextra  Edema control: Coflex with Zinc    Frequency: Weekly    Follow-up: Dr. Casey in 1 week        Follow up in about 1 week (around 6/28/2024) for Dr Casey.            This includes face to face time and  non-face to face time preparing to see the patient (eg, review of tests), obtaining and/or reviewing separately obtained history, documenting clinical information in the electronic or other health record, independently interpreting results and communicating results to the patient/family/caregiver, or care coordinator.

## 2024-06-25 ENCOUNTER — HOSPITAL ENCOUNTER (OUTPATIENT)
Dept: PREADMISSION TESTING | Facility: HOSPITAL | Age: 59
Discharge: HOME | End: 2024-06-25
Attending: NURSE PRACTITIONER
Payer: MEDICARE

## 2024-06-25 NOTE — DISCHARGE INSTRUCTIONS

## 2024-06-25 NOTE — PRE-PROCEDURE INSTRUCTIONS
Brother.    Allergies, medical, surgical, family and psychosocial histories reviewed with patient. Periop plan of care reviewed. Preop instructions given, including medications to take and to hold. Hibiclens soap and instructions on use given. Time allotted for questions to be addressed.        Arrival time 1130.    Instructed to take Lisinorpil the morning of surgery.

## 2024-06-28 ENCOUNTER — HOSPITAL ENCOUNTER (OUTPATIENT)
Dept: WOUND CARE | Facility: HOSPITAL | Age: 59
Discharge: HOME | End: 2024-06-28
Attending: PREVENTIVE MEDICINE
Payer: MEDICARE

## 2024-06-28 VITALS — DIASTOLIC BLOOD PRESSURE: 77 MMHG | SYSTOLIC BLOOD PRESSURE: 142 MMHG | HEART RATE: 87 BPM | RESPIRATION RATE: 18 BRPM

## 2024-06-28 DIAGNOSIS — S01.00XD OPEN WOUND OF SCALP, UNSPECIFIED OPEN WOUND TYPE, SUBSEQUENT ENCOUNTER: ICD-10-CM

## 2024-06-28 DIAGNOSIS — D04.9 BOWEN DISEASE: ICD-10-CM

## 2024-06-28 DIAGNOSIS — D04.72 SQUAMOUS CELL CARCINOMA IN SITU (SCCIS) OF SKIN OF LEFT LOWER LEG: Primary | ICD-10-CM

## 2024-06-28 PROCEDURE — 29581 APPL MULTLAYER CMPRN SYS LEG: CPT

## 2024-06-28 NOTE — PROGRESS NOTES
[facility]    Subjective:       Patient ID: Tj Trammell is a 59 y.o. male.    Chief Complaint: No chief complaint on file.    Wound care follow up for left leg ulcer. Area with clean red tissue. Patient scheduled for surgery to remove squamous cell carcinoma on 7/9. Will change dressing next week and then paitent can follow up as needed after surgery.   Review of Systems   All other systems reviewed and are negative.        Objective:     Vitals:    06/28/24 0830   BP: (!) 142/77   Pulse: 87   Resp: 18         Physical Exam       Altered Skin Integrity 07/28/23 0800 Left lateral;lower Leg (Active)   07/28/23 0800   Altered Skin Integrity Present on Admission - Did Patient arrive to the hospital with altered skin?: yes   Side: Left   Orientation: lateral;lower   Location: Leg   Wound Number:    Is this injury device related?: No   Primary Wound Type:    Description of Altered Skin Integrity:    Ankle-Brachial Index:    Pulses:    Removal Indication and Assessment:    Wound Outcome:    (Retired) Wound Length (cm):    (Retired) Wound Width (cm):    (Retired) Depth (cm):    Wound Description (Comments):    Removal Indications:    Wound Image   06/28/24 0831   Description of Altered Skin Integrity Full thickness tissue loss. Subcutaneous fat may be visible but bone, tendon or muscle are not exposed 06/28/24 0831   Dressing Appearance Moist drainage 06/28/24 0831   Drainage Amount Moderate 06/28/24 0831   Drainage Characteristics/Odor Serosanguineous 06/28/24 0831   Appearance Red 06/28/24 0831   Red (%), Wound Tissue Color 100 % 06/28/24 0831   Periwound Area Moist 06/28/24 0831   Wound Edges Open 06/28/24 0831   Wound Length (cm) 1.5 cm 06/28/24 0831   Wound Width (cm) 1.5 cm 06/28/24 0831   Wound Depth (cm) 0.1 cm 06/28/24 0831   Wound Volume (cm^3) 0.225 cm^3 06/28/24 0831   Wound Surface Area (cm^2) 2.25 cm^2 06/28/24 0831   Care Cleansed with:;Antimicrobial  agent 06/28/24 0831   Dressing Applied;Silver;Calcium alginate;Absorptive Pad;Compression wrap 06/28/24 0831   Periwound Care Absorptive dressing applied 06/28/24 0831   Compression Two layer compression 06/28/24 0831   Dressing Change Due 07/02/24 06/28/24 0831         Assessment/Plan:         ICD-10-CM ICD-9-CM   1. 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  D04.72 232.7           Tissue pathology and/or culture taken:  [] Yes [x] No   Sharp debridement performed:   [] Yes [x] No   Labs ordered this visit:   [] Yes [x] No   Imaging ordered this visit:   [] Yes [x] No           Orders Placed This Encounter   Procedures    Change dressing     Left lateral lower leg ulcer:     Cleanse wound with:Vashe   Lidocaine: prn   Silver nitrate: prn   Periwound care: zinc barrier cream   Primary dressing: silver alginate   Secondary dressing: small Mextra   Edema control: Coflex with Zinc     Frequency: Weekly    Follow-up: NV Tuesday 7/2/24,surgery scheduled for 7/9/24. Return to clinic prn after surgery.        Follow up in about 4 days (around 7/2/2024) for NV .            This includes face to face time and non-face to face time preparing to see the patient (eg, review of tests), obtaining and/or reviewing separately obtained history, documenting clinical information in the electronic or other health record, independently interpreting results and communicating results to the patient/family/caregiver, or care coordinator.

## 2024-07-02 ENCOUNTER — HOSPITAL ENCOUNTER (OUTPATIENT)
Dept: WOUND CARE | Facility: HOSPITAL | Age: 59
Discharge: HOME | End: 2024-07-02
Attending: PREVENTIVE MEDICINE
Payer: MEDICARE

## 2024-07-02 DIAGNOSIS — D04.72 SQUAMOUS CELL CARCINOMA IN SITU (SCCIS) OF SKIN OF LEFT LOWER LEG: Primary | ICD-10-CM

## 2024-07-02 PROCEDURE — 29581 APPL MULTLAYER CMPRN SYS LEG: CPT

## 2024-07-02 NOTE — PROGRESS NOTES
[facility]    Subjective:       Patient ID: Tj Trammell is a 59 y.o. male.    Chief Complaint: Non-healing Wound Follow Up    Nurse visit for dressing change. No signs of infection, small area of hyper granulated tissue noted. Compression wrap reapplied.Spoke with surgery pre admit nurse Saima. She states someone will call the patient Monday to give a surgery time and instructions. Patient aware and verbalized understanding and this was written down for him.   Review of Systems      Objective:   There were no vitals filed for this visit.      Physical Exam       Altered Skin Integrity 07/28/23 0800 Left lateral;lower Leg (Active)   07/28/23 0800   Altered Skin Integrity Present on Admission - Did Patient arrive to the hospital with altered skin?: yes   Side: Left   Orientation: lateral;lower   Location: Leg   Wound Number:    Is this injury device related?: No   Primary Wound Type:    Description of Altered Skin Integrity:    Ankle-Brachial Index:    Pulses:    Removal Indication and Assessment:    Wound Outcome:    (Retired) Wound Length (cm):    (Retired) Wound Width (cm):    (Retired) Depth (cm):    Wound Description (Comments):    Removal Indications:    Description of Altered Skin Integrity Full thickness tissue loss. Subcutaneous fat may be visible but bone, tendon or muscle are not exposed 07/02/24 0952   Dressing Appearance Moist drainage 07/02/24 0952   Drainage Amount Moderate 07/02/24 0952   Drainage Characteristics/Odor Serosanguineous 07/02/24 0952   Appearance Red;Hypergranulation 07/02/24 0952   Red (%), Wound Tissue Color 100 % 07/02/24 0952   Periwound Area Moist 07/02/24 0952   Wound Edges Open 07/02/24 0952   Wound Length (cm) 1.5 cm 07/02/24 0952   Wound Width (cm) 1.5 cm 07/02/24 0952   Wound Depth (cm) 0.1 cm 07/02/24 0952   Wound Volume (cm^3) 0.225 cm^3 07/02/24 0952   Wound Surface Area (cm^2) 2.25 cm^2 07/02/24 0952   Care Cleansed  with:;Antimicrobial agent;Sterile normal saline 07/02/24 0952   Dressing Applied;Silver;Calcium alginate;Absorptive Pad;Compression wrap 07/02/24 0952   Periwound Care Absorptive dressing applied;Moisture barrier applied 07/02/24 0952   Compression Two layer compression 07/02/24 0952   Dressing Change Due 07/09/24 07/02/24 0952         Assessment/Plan:         ICD-10-CM ICD-9-CM   1. 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 Return to clinic prn after surgery.        Follow up if symptoms worsen or fail to improve.            This includes face to face time and non-face to face time preparing to see the patient (eg, review of tests), obtaining and/or reviewing separately obtained history, documenting clinical information in the electronic or other health record, independently interpreting results and communicating results to the patient/family/caregiver, or care coordinator.

## 2024-07-08 ENCOUNTER — ANESTHESIA EVENT (OUTPATIENT)
Dept: SURGERY | Facility: HOSPITAL | Age: 59
End: 2024-07-08
Payer: MEDICARE

## 2024-07-08 RX ORDER — GLUCAGON 1 MG
1 KIT INJECTION
OUTPATIENT
Start: 2024-07-08

## 2024-07-08 RX ORDER — ACETAMINOPHEN 500 MG
1000 TABLET ORAL
OUTPATIENT
Start: 2024-07-09 | End: 2024-07-09

## 2024-07-09 ENCOUNTER — HOSPITAL ENCOUNTER (OUTPATIENT)
Facility: HOSPITAL | Age: 59
Discharge: HOME | End: 2024-07-09
Attending: STUDENT IN AN ORGANIZED HEALTH CARE EDUCATION/TRAINING PROGRAM | Admitting: STUDENT IN AN ORGANIZED HEALTH CARE EDUCATION/TRAINING PROGRAM
Payer: MEDICARE

## 2024-07-09 ENCOUNTER — ANESTHESIA (OUTPATIENT)
Dept: SURGERY | Facility: HOSPITAL | Age: 59
End: 2024-07-09
Payer: MEDICARE

## 2024-07-09 VITALS
OXYGEN SATURATION: 100 % | SYSTOLIC BLOOD PRESSURE: 135 MMHG | BODY MASS INDEX: 34.07 KG/M2 | RESPIRATION RATE: 17 BRPM | HEART RATE: 61 BPM | WEIGHT: 230 LBS | DIASTOLIC BLOOD PRESSURE: 60 MMHG | HEIGHT: 69 IN | TEMPERATURE: 98 F

## 2024-07-09 DIAGNOSIS — C44.92 SCCA (SQUAMOUS CELL CARCINOMA) OF SKIN: Primary | ICD-10-CM

## 2024-07-09 DIAGNOSIS — D04.72 SQUAMOUS CELL CARCINOMA IN SITU (SCCIS) OF SKIN OF LEFT LOWER LEG: ICD-10-CM

## 2024-07-09 DIAGNOSIS — L98.9 SKIN LESION OF LEFT LEG: ICD-10-CM

## 2024-07-09 PROCEDURE — 25000003 PHARM REV CODE 250: Performed by: STUDENT IN AN ORGANIZED HEALTH CARE EDUCATION/TRAINING PROGRAM

## 2024-07-09 PROCEDURE — 88305 TISSUE EXAM BY PATHOLOGIST: CPT | Performed by: PATHOLOGY

## 2024-07-09 PROCEDURE — 36000707: Performed by: STUDENT IN AN ORGANIZED HEALTH CARE EDUCATION/TRAINING PROGRAM

## 2024-07-09 PROCEDURE — 88305 TISSUE EXAM BY PATHOLOGIST: CPT | Mod: 26,,, | Performed by: PATHOLOGY

## 2024-07-09 PROCEDURE — 36000706: Performed by: STUDENT IN AN ORGANIZED HEALTH CARE EDUCATION/TRAINING PROGRAM

## 2024-07-09 PROCEDURE — 25000003 PHARM REV CODE 250

## 2024-07-09 PROCEDURE — 71000016 HC POSTOP RECOV ADDL HR: Performed by: STUDENT IN AN ORGANIZED HEALTH CARE EDUCATION/TRAINING PROGRAM

## 2024-07-09 PROCEDURE — 63600175 PHARM REV CODE 636 W HCPCS: Mod: JZ,JG | Performed by: STUDENT IN AN ORGANIZED HEALTH CARE EDUCATION/TRAINING PROGRAM

## 2024-07-09 PROCEDURE — 63600175 PHARM REV CODE 636 W HCPCS

## 2024-07-09 PROCEDURE — 71000015 HC POSTOP RECOV 1ST HR: Performed by: STUDENT IN AN ORGANIZED HEALTH CARE EDUCATION/TRAINING PROGRAM

## 2024-07-09 PROCEDURE — 37000009 HC ANESTHESIA EA ADD 15 MINS: Performed by: STUDENT IN AN ORGANIZED HEALTH CARE EDUCATION/TRAINING PROGRAM

## 2024-07-09 PROCEDURE — 11606 EXC TR-EXT MAL+MARG >4 CM: CPT | Mod: ,,, | Performed by: STUDENT IN AN ORGANIZED HEALTH CARE EDUCATION/TRAINING PROGRAM

## 2024-07-09 PROCEDURE — 37000008 HC ANESTHESIA 1ST 15 MINUTES: Performed by: STUDENT IN AN ORGANIZED HEALTH CARE EDUCATION/TRAINING PROGRAM

## 2024-07-09 RX ORDER — MIDAZOLAM HYDROCHLORIDE 1 MG/ML
INJECTION INTRAMUSCULAR; INTRAVENOUS
Status: DISCONTINUED | OUTPATIENT
Start: 2024-07-09 | End: 2024-07-09

## 2024-07-09 RX ORDER — BUPIVACAINE HYDROCHLORIDE 2.5 MG/ML
INJECTION, SOLUTION EPIDURAL; INFILTRATION; INTRACAUDAL
Status: DISCONTINUED | OUTPATIENT
Start: 2024-07-09 | End: 2024-07-09 | Stop reason: HOSPADM

## 2024-07-09 RX ORDER — LIDOCAINE HYDROCHLORIDE 10 MG/ML
INJECTION, SOLUTION EPIDURAL; INFILTRATION; INTRACAUDAL; PERINEURAL
Status: DISCONTINUED | OUTPATIENT
Start: 2024-07-09 | End: 2024-07-09 | Stop reason: HOSPADM

## 2024-07-09 RX ORDER — AMOXICILLIN 250 MG
1 CAPSULE ORAL 2 TIMES DAILY
COMMUNITY
Start: 2024-07-09

## 2024-07-09 RX ORDER — SODIUM CHLORIDE 0.9 % (FLUSH) 0.9 %
10 SYRINGE (ML) INJECTION
Status: DISCONTINUED | OUTPATIENT
Start: 2024-07-09 | End: 2024-07-09 | Stop reason: HOSPADM

## 2024-07-09 RX ORDER — PHENYLEPHRINE HCL IN 0.9% NACL 1 MG/10 ML
SYRINGE (ML) INTRAVENOUS
Status: DISCONTINUED | OUTPATIENT
Start: 2024-07-09 | End: 2024-07-09

## 2024-07-09 RX ORDER — CEFAZOLIN SODIUM 2 G/50ML
2 SOLUTION INTRAVENOUS
Status: DISCONTINUED | OUTPATIENT
Start: 2024-07-09 | End: 2024-07-09 | Stop reason: HOSPADM

## 2024-07-09 RX ORDER — HYDROMORPHONE HYDROCHLORIDE 2 MG/ML
0.2 INJECTION, SOLUTION INTRAMUSCULAR; INTRAVENOUS; SUBCUTANEOUS EVERY 5 MIN PRN
Status: DISCONTINUED | OUTPATIENT
Start: 2024-07-09 | End: 2024-07-09 | Stop reason: HOSPADM

## 2024-07-09 RX ORDER — CEFAZOLIN SODIUM 1 G/3ML
INJECTION, POWDER, FOR SOLUTION INTRAMUSCULAR; INTRAVENOUS
Status: DISCONTINUED | OUTPATIENT
Start: 2024-07-09 | End: 2024-07-09

## 2024-07-09 RX ORDER — OXYCODONE HYDROCHLORIDE 5 MG/1
5 TABLET ORAL
Status: DISCONTINUED | OUTPATIENT
Start: 2024-07-09 | End: 2024-07-09 | Stop reason: HOSPADM

## 2024-07-09 RX ORDER — HYDROCODONE BITARTRATE AND ACETAMINOPHEN 5; 325 MG/1; MG/1
1 TABLET ORAL EVERY 4 HOURS PRN
Status: CANCELLED | OUTPATIENT
Start: 2024-07-09

## 2024-07-09 RX ORDER — HYDROCODONE BITARTRATE AND ACETAMINOPHEN 5; 325 MG/1; MG/1
1 TABLET ORAL EVERY 4 HOURS PRN
Qty: 10 TABLET | Refills: 0 | Status: SHIPPED | OUTPATIENT
Start: 2024-07-09 | End: 2024-07-15 | Stop reason: SDUPTHER

## 2024-07-09 RX ORDER — PROPOFOL 10 MG/ML
VIAL (ML) INTRAVENOUS CONTINUOUS PRN
Status: DISCONTINUED | OUTPATIENT
Start: 2024-07-09 | End: 2024-07-09

## 2024-07-09 RX ORDER — HALOPERIDOL 5 MG/ML
0.5 INJECTION INTRAMUSCULAR EVERY 10 MIN PRN
Status: DISCONTINUED | OUTPATIENT
Start: 2024-07-09 | End: 2024-07-09 | Stop reason: HOSPADM

## 2024-07-09 RX ORDER — FENTANYL CITRATE 50 UG/ML
INJECTION, SOLUTION INTRAMUSCULAR; INTRAVENOUS
Status: DISCONTINUED | OUTPATIENT
Start: 2024-07-09 | End: 2024-07-09

## 2024-07-09 RX ADMIN — MIDAZOLAM 2 MG: 1 INJECTION INTRAMUSCULAR; INTRAVENOUS at 08:07

## 2024-07-09 RX ADMIN — Medication 100 MCG: at 09:07

## 2024-07-09 RX ADMIN — PROPOFOL 50 MCG/KG/MIN: 10 INJECTION, EMULSION INTRAVENOUS at 08:07

## 2024-07-09 RX ADMIN — FENTANYL CITRATE 25 MCG: 50 INJECTION INTRAMUSCULAR; INTRAVENOUS at 09:07

## 2024-07-09 RX ADMIN — FENTANYL CITRATE 25 MCG: 50 INJECTION INTRAMUSCULAR; INTRAVENOUS at 08:07

## 2024-07-09 RX ADMIN — CEFAZOLIN 2 G: 330 INJECTION, POWDER, FOR SOLUTION INTRAMUSCULAR; INTRAVENOUS at 08:07

## 2024-07-09 NOTE — PLAN OF CARE
Discharge criteria met. Voicing desire to go home. Discharge instructions given to patient and brother. Verbalized understanding. All lines removed. Vital signs stable. Pain, nausea, vomiting controlled. Patient discharged per wheelchair in care of brother.

## 2024-07-09 NOTE — H&P
General Surgery Office Visit   History and Physical     Patient Name: Tj Trammell  YOB: 1965 (59 y.o.)  MRN: [id number]  Today's Date: 06/05/2024     Referring Md:   No referring provider defined for this encounter.     SUBJECTIVE:      Chief Complaint: SCC of the left leg     History of Present Illness:  Tj Trammell is a 59 y.o. male with past medical history of SCC of multiple regions, including the scalp and left leg. He is s/p excision of multiple lesions with Dr. Rodriguez on 5/7. Pathology revealed positive margins of the left leg lesion as well as the scalp margins. He has been seeing wound care in the interim and his scalp excision sites have healed well, and the larger left leg excision site is healing appropriately via secondary intention.      Review of patient's allergies indicates:  No Known Allergies       Past Medical History:   Diagnosis Date    Asthma      Bilateral hearing loss 12/12/2012    Biliary acute pancreatitis      GERD (gastroesophageal reflux disease)      High cholesterol      Hypertension      Multiple sclerosis              Past Surgical History:   Procedure Laterality Date    APPENDECTOMY        CHOLECYSTECTOMY        COLONOSCOPY N/A 1/22/2019     Procedure: COLONOSCOPY 2 day  golytely;  Surgeon: Nathan Waddell MD;  Location: [facility];  Service: Endoscopy;  Laterality: N/A;    COLONOSCOPY N/A 4/25/2023     Procedure: COLONOSCOPY;  Surgeon: Jared Loredo MD;  Location: [facility];  Service: Endoscopy;  Laterality: N/A;    COLONOSCOPY N/A 10/10/2023     Procedure: COLONOSCOPY;  Surgeon: Jared Loredo MD;  Location: [facility];  Service: Endoscopy;  Laterality: N/A;    ESOPHAGOGASTRODUODENOSCOPY N/A 9/29/2021     Procedure: EGD (ESOPHAGOGASTRODUODENOSCOPY) covid test Rapid;  Surgeon: Jared Loredo MD;  Location: [facility];  Service: Endoscopy;  Laterality: N/A;    EXCISION, SUPERFICIAL MASS, HEAD Left 5/7/2024     Procedure:  "EXCISION, SUPERFICIAL MASS, HEAD;  Surgeon: Chriss Rodriguez MD;  Location: [facility] OR;  Service: General;  Laterality: Left;    JOINT REPLACEMENT         arm    SURGICAL REMOVAL OF MASS OF LOWER EXTREMITY Left 2024     Procedure: EXCISION, MASS, LOWER EXTREMITY;  Surgeon: Chriss Rodriguez MD;  Location: [facility] OR;  Service: General;  Laterality: Left;    TONSILLECTOMY        TRANSFORAMINAL EPIDURAL INJECTION OF STEROID Bilateral 10/5/2022     Procedure: Injection,steroid,epidural,transforaminal approach;  Surgeon: Soo Giles MD;  Location: [facility] PAIN [facility]GT;  Service: Pain Management;  Laterality: Bilateral;  bilateral L5 transforaminal epidural steroid injection with RN IV sedation             Family History   Problem Relation Name Age of Onset    Heart disease Mother        Heart disease Father        Heart disease Brother          Social History   Social History            Tobacco Use    Smoking status: Former       Current packs/day: 0.00       Average packs/day: 1 pack/day for 32.0 years (32.0 ttl pk-yrs)       Types: Cigarettes       Start date: 1990       Quit date: 2022       Years since quittin.4    Smokeless tobacco: Never   Substance Use Topics    Alcohol use: Not Currently    Drug use: No            Review of Systems:  Review of Systems   Constitutional:  Negative for chills and fever.   Respiratory:  Negative for cough and shortness of breath.    Cardiovascular:  Negative for chest pain.   Gastrointestinal:  Negative for abdominal pain, constipation, nausea and vomiting.   Genitourinary:  Negative for dysuria.   Neurological:  Negative for dizziness.         OBJECTIVE:      Vital Signs (Most Recent)  /81 (BP Location: Left arm, Patient Position: Sitting)   Pulse 78   Ht 5' 9" (1.753 m)   BMI 34.71 kg/m²      Physical Exam:  Physical Exam  Vitals reviewed.   Constitutional:       Appearance: Normal appearance.   Cardiovascular:      Rate and Rhythm: Normal rate and regular " rhythm.   Pulmonary:      Effort: Pulmonary effort is normal. No respiratory distress.   Abdominal:      Palpations: Abdomen is soft.      Tenderness: There is no abdominal tenderness.   Skin:     General: Skin is warm.      Comments: 2x scalp excision sites well healed with scabs. Left leg site with healthy granulation tissue present.   Neurological:      General: No focal deficit present.      Mental Status: He is alert and oriented to person, place, and time.                  Diagnostic Results:    1. Skin, left scalp anterior stitch, excision:  - RESIDUAL SQUAMOUS CELL CARCINOMA IN SITU.  - THE TUMOR EXTENDS TO THE 9:00, 12:00 AND 6:00 PERIPHERAL MARGINS.  - PREVIOUS BIOPSY SITE CHANGES.      2. Skin, medial scalp anterior stitch, excision:  - RESIDUAL SQUAMOUS CELL CARCINOMA IN SITU.  - THE TUMOR EXTENDS TO THE 12:00 AND 6:00 PERIPHERAL MARGINS, AND CLOSELY APPROACHES THE 9:00 PERIPHERAL MARGIN.  - PREVIOUS BIOPSY SITE CHANGES.      3. Skin, left calf, excision:  - RESIDUAL IN SITU AND INVASIVE SQUAMOUS CELL CARCINOMA, POORLY DIFFERENTIATED, WITH FOCAL BASALOID FEATURES.  - BOTH IN SITU AND INVASIVE SQUAMOUS CELL CARCINOMA EXTENDS TO BOTH THE BLACK (3:00 HALF) AND ORANGE (9:00 HALF) PERIPHERAL MARGINS WITHIN BLOCKS F AND G (ADJACENT TO THE 6:00 MARGIN AND INVOLVING BOTH THE 3:00 AND 9:00 SHAVE MARGINS).  - SQUAMOUS CELL CARCINOMA IN SITU EXTENDS TO BOTH THE BLACK (3:00 HALF) AND ORANGE (9:00 HALF) INKED PERIPHERAL MARGINS WITHIN BLOCK A (ADJACENT TO THE 12:00 PERIPHERAL MARGIN); SQUAMOUS CELL CARCINOMA IN SITU ALSO EXTENDS TO THE BLACK (3:00 HALF)  PERIPHERAL MARGIN WITHIN BLOCKS C, D, E, AND F.  - PREVIOUS SURGICAL SITE CHANGES.         ASSESSMENT/PLAN:      Tj Trammell is a 59 y.o. male presenting to clinic s/p excision of SCC of the scalp and left leg. Both sites with positive margins. We discussed the indication for re excision of the left leg, including the risks and benefits, and he would like to  proceed. We will refer to dermatology for mohs of the scalp lesions to minimize tissue loss.     - Referral to dermatology for scalp lesions  - Schedule for re-excision of left leg SCC site  - Consent signed in clinic    Refer to dermatology for mohs for scalp lesions

## 2024-07-09 NOTE — OP NOTE
[place] - Surgery ([facility])  General Surgery  Operative Note    SUMMARY     Date of Procedure: 7/9/2024     Procedure: Excision of skin cancer left lower leg 6cm by 8cm    Surgeons and Role:     * Chriss Rodriguez MD - Primary    Assisting Surgeon: Salo Montana    Pre-Operative Diagnosis: Skin lesion of left leg [L98.9]    Post-Operative Diagnosis: Post-Op Diagnosis Codes:     * Skin lesion of left leg [L98.9]    Anesthesia: Local MAC    Operative Findings (including complications, if any):   Wide local excision of SSC left lower leg, final area of excision 6cm by 8cm    Description of Technical Procedures:   Brought into the OR and placed supine. He was given MAC. Local anesthesia was administered around previous wound. The previous excision was marked and a 1cm margin was marked as well. The wide margin was excised using a scalpel. The full thickness skin was excised from 6cm transversely to 8cm vertically. The superior aspect was marked with short silk suture. The anterior aspect was marked with a long silk suture.   Hemostasis was obtained usign cautery. Xeroform and kerlex dressings were applied.    Significant Surgical Tasks Conducted by the Assistant(s), if Applicable:     Estimated Blood Loss (EBL): 15ml           Implants: * No implants in log *    Specimens:   Specimen (24h ago, onward)       Start     Ordered    07/09/24 0925  Specimen to Pathology, Surgery General Surgery  Once        Comments: Pre-op Diagnosis: Skin lesion of left leg [L98.9]Procedure(s):EXCISION, MASS, LOWER EXTREMITY Number of specimens: 1Name of specimens: 1. Left lower leg skin cancer- perm     References:    Click here for ordering Quick Tip   Question Answer Comment   Procedure Type: General Surgery    Release to patient Immediate        07/09/24 0925                            Condition: Stable    Disposition: PACU - hemodynamically stable.    Attestation: I was present and scrubbed for the entire procedure.

## 2024-07-09 NOTE — ANESTHESIA PREPROCEDURE EVALUATION
[facility]  Anesthesia Pre-Operative Evaluation         Patient Name: Tj Trammell  YOB: 1965  MRN: [id number]    SUBJECTIVE:     Pre-operative evaluation for Procedure(s) (LRB):  EXCISION, MASS, LOWER EXTREMITY (Left)     07/08/2024    Tj Trammell is a 59 y.o. male w/ a significant PMHx of Asthma, GERD, HLD, HTN, MS, and SCC of multiple regions, including the scalp and left leg. He is s/p excision of multiple lesions with Dr. Rodriguez on 5/7. Pathology revealed positive margins of the left leg lesion as well as the scalp margins.     Patient now presents for the above procedure(s).    Echo Summary  No results found for this or any previous visit.       Prev airway:     [easy mask; ETT 7.5, Mac4, 3 attempts lg floppy epiglottis]     LDA: None documented.       Drips: None documented.      Patient Active Problem List   Diagnosis    Multiple sclerosis    Generalized osteoarthritis of multiple sites    Bilateral hearing loss    Incontinence of urine    Deaf    Tobacco abuse    HTN (hypertension)    HLD (hyperlipidemia)    Constipation    Pain, abdominal    Biliary stricture    GERD (gastroesophageal reflux disease)    Screening for malignant neoplasm of colon    Fall off motorized mobility scooter, initial encounter    Abrasion    Wrist pain    Scapholunate dissociation of left wrist    Anxiety disorder    Chronic obstructive pulmonary disease    Metabolic syndrome    Opioid dependence    Abdominal bloating    Lumbar radiculopathy    Non-pressure ulcer of lower extremity, limited to breakdown of skin, left    SCCA (squamous cell carcinoma) of skin    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       Review of patient's allergies indicates:  No Known Allergies    Current Inpatient Medications:      No current facility-administered medications on file prior to encounter.     Current Outpatient Medications on File Prior to Encounter   Medication Sig  Dispense Refill    acetaZOLAMIDE (DIAMOX) 250 MG tablet Take 2 TABLETS BY MOUTH ONLY 8 HOURS AFTER SURGERY 2 tablet 0    albuterol (PROAIR HFA) 90 mcg/actuation inhaler Inhale 2 puffs by mouth every 4 hours. 8.5 g 3    ALPRAZolam (XANAX) 0.5 MG tablet TAKE ONE TABLET BY MOUTH TWICE DAILY AS NEEDED FOR ANXIETY 60 tablet 4    atorvastatin (LIPITOR) 40 MG tablet Take 1 tablet (40 mg total) by mouth once daily. 90 tablet 4    bacitracin 500 unit/gram Oint Apply topically 2 (two) times daily. 28 g 0    baclofen (LIORESAL) 10 MG tablet TAKE 1 TABLET BY MOUTH 3 TIMES DAILY. 90 tablet 11    buPROPion (WELLBUTRIN SR) 150 MG TBSR 12 hr tablet TAKE 1 TABLET BY MOUTH EVERY 12 HOURS DAILY. 60 tablet 11    dicyclomine (BENTYL) 10 MG capsule TAKE 1 CAPSULE BY MOUTH THREE TIMES A DAY FOR ABDOMINAL PAIN 90 capsule 3    difluprednate (DUREZOL) 0.05 % Drop ophthalmic solution Instill one drop TO SURGERY EYE four times a day AFTER SURGERY X 30 DAYS 5 mL 4    docusate sodium (COLACE) 100 MG capsule Take 1 capsule (100 mg total) by mouth once daily. 30 capsule 5    ergocalciferol (ERGOCALCIFEROL) 50,000 unit Cap TAKE 1 CAPSULE BY MOUTH Weekly 12 capsule 0    famotidine (PEPCID) 20 MG tablet Take 1 tablet (20 mg total) by mouth 2 (two) times daily. 180 tablet 3    fingolimod (GILENYA) 0.5 mg Cap Take 1 capsule (0.5 mg total) by mouth once daily. 90 capsule 2    gabapentin (NEURONTIN) 300 MG capsule TAKE 1 CAPSULE BY MOUTH AT BEDTIME 30 capsule 11    HYDROcodone-acetaminophen (NORCO) 5-325 mg per tablet Take 1 tablet by mouth every 4 (four) hours as needed for Pain. 5 tablet 0    lactulose (CHRONULAC) 10 gram/15 mL solution Take 45 mLs (30 g total) by mouth 3 (three) times daily. 3784 mL 3    linaCLOtide (LINZESS) 290 mcg Cap capsule Take 1 capsule (290 mcg total) by mouth before breakfast. 30 capsule 11    lisinopriL (PRINIVIL,ZESTRIL) 5 MG tablet TAKE 1 TABLET BY MOUTH DAILY 90 tablet 3    mupirocin (BACTROBAN) 2 % ointment Apply daily  to left hand 22 g 2    nabumetone (RELAFEN) 500 MG tablet Take 1 tablet (500 mg total) by mouth 2 (two) times daily as needed. 60 tablet 6    nebulizer and compressor (HOME NEBULIZER PLUS SIDESTREAM) Lupe use as directed 1 each 0    ofloxacin (OCUFLOX) 0.3 % ophthalmic solution Instill 1 drop TO SURGERY EYE four times a day STARTING 2 DAYS BEFORE SURGERY 5 mL 3    oxybutynin (DITROPAN-XL) 5 MG TR24 Take 1 tablet (5 mg total) by mouth once daily. 90 tablet 3    pantoprazole (PROTONIX) 40 MG tablet TAKE ONE TABLET BY MOUTH  ONCE DAILY 90 tablet 3    senna-docusate 8.6-50 mg (PERICOLACE) 8.6-50 mg per tablet Take 1 tablet by mouth 2 (two) times daily.      sodium,potassium,mag sulfates (SUPREP BOWEL PREP KIT) 17.5-3.13-1.6 gram SolR Take 177 mLs by mouth once daily. 354 mL 0    traMADoL (ULTRAM) 50 mg tablet Take 1 tablet (50 mg total) by mouth every 6 (six) hours as needed for pain 120 tablet 4    triamcinolone acetonide 0.1% (KENALOG) 0.1 % cream Apply topically 2 (two) times daily. for 10 days 80 g 0       Past Surgical History:   Procedure Laterality Date    APPENDECTOMY      CHOLECYSTECTOMY      COLONOSCOPY N/A 1/22/2019    Procedure: COLONOSCOPY 2 day  golytely;  Surgeon: Nathan Waddell MD;  Location: [facility];  Service: Endoscopy;  Laterality: N/A;    COLONOSCOPY N/A 4/25/2023    Procedure: COLONOSCOPY;  Surgeon: Jared Loredo MD;  Location: [facility];  Service: Endoscopy;  Laterality: N/A;    COLONOSCOPY N/A 10/10/2023    Procedure: COLONOSCOPY;  Surgeon: Jared Loredo MD;  Location: [facility];  Service: Endoscopy;  Laterality: N/A;    ESOPHAGOGASTRODUODENOSCOPY N/A 9/29/2021    Procedure: EGD (ESOPHAGOGASTRODUODENOSCOPY) covid test Rapid;  Surgeon: Jared Loredo MD;  Location: [facility];  Service: Endoscopy;  Laterality: N/A;    EXCISION, SUPERFICIAL MASS, HEAD Left 5/7/2024    Procedure: EXCISION, SUPERFICIAL MASS, HEAD;  Surgeon: Chriss Rodriguez MD;  Location: [facility]  OR;  Service: General;  Laterality: Left;    JOINT REPLACEMENT      arm    SURGICAL REMOVAL OF MASS OF LOWER EXTREMITY Left 5/7/2024    Procedure: EXCISION, MASS, LOWER EXTREMITY;  Surgeon: Chriss Rodriguez MD;  Location: [facility] OR;  Service: General;  Laterality: Left;    TONSILLECTOMY      TRANSFORAMINAL EPIDURAL INJECTION OF STEROID Bilateral 10/5/2022    Procedure: Injection,steroid,epidural,transforaminal approach;  Surgeon: Soo Giles MD;  Location: [facility] PAIN MGT;  Service: Pain Management;  Laterality: Bilateral;  bilateral L5 transforaminal epidural steroid injection with RN IV sedation       Social History:  Tobacco Use: Medium Risk (6/14/2024)    Patient History     Smoking Tobacco Use: Former     Smokeless Tobacco Use: Never     Passive Exposure: Not on file      Alcohol Use: Not on file        OBJECTIVE:     Vital Signs Range (Last 24H):  BP: ()/()   Arterial Line BP: ()/()       Significant Labs:  Lab Results   Component Value Date    WBC 4.48 01/08/2024    HGB 16.8 01/08/2024    HCT 52.9 01/08/2024     01/08/2024    CHOL 165 01/08/2024    TRIG 238 (H) 01/08/2024    HDL 30 (L) 01/08/2024    ALT 28 01/08/2024    AST 30 01/08/2024     (L) 06/21/2024    K 4.4 06/21/2024    CL 99 06/21/2024    CREATININE 1.0 06/21/2024    BUN 10 06/21/2024    CO2 26 06/21/2024    TSH 0.829 07/15/2014    PSA 0.14 07/11/2023    INR 1.0 07/13/2021    HGBA1C 7.1 (H) 01/08/2024       Diagnostic Studies: No relevant studies.    EKG:   Results for orders placed or performed in visit on 06/21/24   EKG 12-lead    Collection Time: 06/21/24  7:39 AM   Result Value Ref Range    QRS Duration 86 ms    [facility]HS QTC Calculation 407 ms    Narrative    Test Reason : Z01.818,I10,    Vent. Rate : 067 BPM     Atrial Rate : 067 BPM     P-R Int : 166 ms          QRS Dur : 086 ms      QT Int : 386 ms       P-R-T Axes : 056 050 050 degrees     QTc Int : 407 ms    Normal sinus rhythm  Nonspecific ST and T wave abnormality  Abnormal  ECG  When compared with ECG of 11-OCT-2019 10:24,  No significant change was found  Confirmed by Mark GOLDSTEIN, Balaji ([id number]) on 6/21/2024 5:05:06 PM    Referred By: KAUSHIK RODRIGUEZ           Confirmed By:Balaji Flores MD       2D ECHO:  TTE:  No results found. However, due to the size of the patient record, not all encounters were searched. Please check Results Review for a complete set of results.    MALIKA:  No results found. However, due to the size of the patient record, not all encounters were searched. Please check Results Review for a complete set of results.    ASSESSMENT/PLAN:       Pre-op Assessment    I have reviewed the Patient Summary Reports.     I have reviewed the Nursing Notes.    I have reviewed the Medications.     Review of Systems  Anesthesia Hx:  No problems with previous Anesthesia   History of prior surgery of interest to airway management or planning:          Denies Family Hx of Anesthesia complications.    Denies Personal Hx of Anesthesia complications.                    Cardiovascular:     Hypertension          PVD hyperlipidemia                       Hypertension         Pulmonary:   COPD         Chronic Obstructive Pulmonary Disease (COPD):                      Hepatic/GI:     GERD         Biliary Disease, Biliary Obstruction     Musculoskeletal:  Arthritis               Psych:  Psychiatric History                  Physical Exam  General: Cooperative, Alert, Well nourished and Oriented    Airway:  Mallampati: III   Mouth Opening: Normal  TM Distance: Normal  Tongue: Normal  Neck ROM: Normal ROM    Dental:  Edentulous, Dentures        Anesthesia Plan  Type of Anesthesia, risks & benefits discussed:    Anesthesia Type: MAC, Gen Natural Airway  Intra-op Monitoring Plan: Standard ASA Monitors  Post Op Pain Control Plan: multimodal analgesia and IV/PO Opioids PRN  Induction:  IV  Informed Consent: Informed consent signed with the Patient and all parties understand the risks and  agree with anesthesia plan.  All questions answered.   ASA Score: 3  Day of Surgery Review of History & Physical: H&P Update referred to the surgeon/provider.    Ready For Surgery From Anesthesia Perspective.     .

## 2024-07-09 NOTE — PLAN OF CARE
Upon pt standing to transfer to wheelchair for discharge, patient's wound/incision began bleeding. Pressure was held for 15min until bleeding stopped. MD was notified. MD instructed to hold pressure and change dressing. Bleeding stopped. Dressing was changed. Xeroform, 4x4 gauze, Kerlix, ACE wrap. Clean, dry, intact. Instructed pt and pt's brother to call MD and come back if wound/incision begins actively bleeding again. Pt verbalized understanding.

## 2024-07-09 NOTE — ANESTHESIA POSTPROCEDURE EVALUATION
Anesthesia Post Evaluation    Patient: Tj Trammell    Procedure(s) Performed: Procedure(s) (LRB):  EXCISION, MASS, LOWER EXTREMITY (Left)    Final Anesthesia Type: MAC      Patient location during evaluation: PACU  Patient participation: Yes- Able to Participate  Level of consciousness: awake and alert  Post-procedure vital signs: reviewed and stable  Pain management: adequate  Airway patency: patent  LUZ mitigation strategies: Multimodal analgesia  PONV status at discharge: No PONV  Anesthetic complications: no      Cardiovascular status: hemodynamically stable  Respiratory status: spontaneous ventilation and room air  Hydration status: euvolemic  Follow-up not needed.              Vitals Value Taken Time   /60 07/09/24 1100   Temp 36.7 °C (98 °F) 07/09/24 1100   Pulse 61 07/09/24 1100   Resp 17 07/09/24 1100   SpO2 100 % 07/09/24 1100         No case tracking events are documented in the log.      Pain/Suki Score: Suki Score: 10 (7/9/2024 11:00 AM)

## 2024-07-09 NOTE — TRANSFER OF CARE
"Anesthesia Transfer of Care Note    Patient: Tj Trammell    Procedure(s) Performed: Procedure(s) (LRB):  EXCISION, MASS, LOWER EXTREMITY (Left)    Patient location: [facility]    Anesthesia Type: MAC    Transport from OR: Transported from OR on 6-10 L/min O2 by face mask with adequate spontaneous ventilation    Post pain: adequate analgesia    Post assessment: no apparent anesthetic complications    Post vital signs: stable    Level of consciousness: awake    Complications: none    Transfer of care protocol was followed      Last vitals: Visit Vitals  BP (!) 150/78   Pulse 79   Temp 36.9 °C (98.4 °F) (Temporal)   Resp 18   Ht 5' 9" (1.753 m)   Wt 104.3 kg (230 lb)   SpO2 97%   BMI 33.97 kg/m²     "

## 2024-07-09 NOTE — DISCHARGE INSTRUCTIONS
ANESTHESIA  -For the first 24 hours after surgery:  Do not drive, use heavy equipment, make important decisions, or drink alcohol  -It is normal to feel sleepy for several hours.  Rest until you are more awake.  -Have someone stay with you, if needed.  They can watch for problems and help keep you safe.  -Some possible post anesthesia side effects include: nausea and vomiting, sore throat and hoarseness, sleepiness, and dizziness.    PAIN  -If you have pain after surgery, pain medicine will help you feel better.  Take it as directed, before pain becomes severe.  Most pain relievers taken by mouth need at least 20-30 minutes to start working.  -Do not drive or drink alcohol while taking pain medicine.  -Pain medication can upset your stomach.  Taking them with a little food may help.  -Other ways to help control pain: elevation, ice, and relaxation  -Call your surgeon if still having unmanageable pain an hour after taking pain medicine.  -Pain medicine can cause constipation.  Taking an over-the counter stool softener while on prescription pain medicine and drinking plenty of fluids can prevent this side effect.  -Call your surgeon if you have severe side effects like: breathing problems, trouble waking up, dizziness, confusion, or severe constipation.    NAUSEA  -Some people have nausea after surgery.  This is often because of anesthesia, pain, pain medicine, or the stress of surgery.  -Do not push yourself to eat.  Start off with clear liquids and soup.  Slowly move to solid foods.  Don't eat fatty, rich, spicy foods at first.  Eat smaller amounts.  -If you develop persistent nausea and vomiting please notify your surgeon immediately.    BLEEDING  -Different types of surgery require different types of care and dressing changes.  It is important to follow all instructions and advice from your surgeon.  Change dressing as directed.  Call your surgeon for any concerns regarding postop bleeding.    SIGNS OF  INFECTION  -Signs of infection include: fever, swelling, drainage, and redness  -Notify your surgeon if you have a fever of 100.4 F (38.0 C) or higher.  -Notify your surgeon if you notice redness, swelling, increased pain, pus, or a foul smell at the incision site.

## 2024-07-10 LAB
FINAL PATHOLOGIC DIAGNOSIS: NORMAL
GROSS: NORMAL
Lab: NORMAL
MICROSCOPIC EXAM: NORMAL

## 2024-07-12 ENCOUNTER — HOSPITAL ENCOUNTER (EMERGENCY)
Facility: HOSPITAL | Age: 59
Discharge: HOME | End: 2024-07-12
Attending: EMERGENCY MEDICINE
Payer: MEDICARE

## 2024-07-12 VITALS
OXYGEN SATURATION: 98 % | DIASTOLIC BLOOD PRESSURE: 84 MMHG | SYSTOLIC BLOOD PRESSURE: 169 MMHG | HEART RATE: 98 BPM | WEIGHT: 235 LBS | TEMPERATURE: 98 F | BODY MASS INDEX: 34.8 KG/M2 | RESPIRATION RATE: 18 BRPM | HEIGHT: 69 IN

## 2024-07-12 DIAGNOSIS — Z48.89 ENCOUNTER FOR POST SURGICAL WOUND CHECK: Primary | ICD-10-CM

## 2024-07-12 PROCEDURE — 99281 EMR DPT VST MAYX REQ PHY/QHP: CPT

## 2024-07-13 NOTE — ED PROVIDER NOTES
Encounter Date: 7/12/2024       History     Chief Complaint   Patient presents with    Leg Pain     Pt arrives with complaints of left leg and foot wound. Had a recently operation on same leg. States he is here because he feels the site bleeding.      HPI  This is a 59 y.o. male who presents to the Emergency Department with wound check.  Patient had surgery in his left leg recently.  He states that he went to the clinic but they would not see him, so he wants to get his leg wound checked.  States that it was bleeding through the gauze.  He currently changes of the dressing every 3 days.  He denies any fever chills, myalgias.    History is limited secondary to patient communication limitation.  He has bilateral hearing loss and is difficult to comprehend.    Review of patient's allergies indicates:  No Known Allergies  Past Medical History:   Diagnosis Date    Asthma     Bilateral hearing loss 12/12/2012    Biliary acute pancreatitis     GERD (gastroesophageal reflux disease)     High cholesterol     Hypertension     Multiple sclerosis      Past Surgical History:   Procedure Laterality Date    APPENDECTOMY      CHOLECYSTECTOMY      COLONOSCOPY N/A 1/22/2019    Procedure: COLONOSCOPY 2 day  golytely;  Surgeon: Nathan Waddell MD;  Location: [facility];  Service: Endoscopy;  Laterality: N/A;    COLONOSCOPY N/A 4/25/2023    Procedure: COLONOSCOPY;  Surgeon: Jared Loredo MD;  Location: [facility];  Service: Endoscopy;  Laterality: N/A;    COLONOSCOPY N/A 10/10/2023    Procedure: COLONOSCOPY;  Surgeon: Jared Loredo MD;  Location: [facility];  Service: Endoscopy;  Laterality: N/A;    ESOPHAGOGASTRODUODENOSCOPY N/A 9/29/2021    Procedure: EGD (ESOPHAGOGASTRODUODENOSCOPY) covid test Rapid;  Surgeon: Jared Loredo MD;  Location: [facility];  Service: Endoscopy;  Laterality: N/A;    EXCISION, SUPERFICIAL MASS, HEAD Left 5/7/2024    Procedure: EXCISION, SUPERFICIAL MASS, HEAD;  Surgeon:  Chriss Rodriguez MD;  Location: [facility] OR;  Service: General;  Laterality: Left;    JOINT REPLACEMENT      arm    SURGICAL REMOVAL OF MASS OF LOWER EXTREMITY Left 2024    Procedure: EXCISION, MASS, LOWER EXTREMITY;  Surgeon: Chriss Rodriguez MD;  Location: [facility] OR;  Service: General;  Laterality: Left;    SURGICAL REMOVAL OF MASS OF LOWER EXTREMITY Left 2024    Procedure: EXCISION, MASS, LOWER EXTREMITY;  Surgeon: Chriss Rodriguez MD;  Location: [facility] OR;  Service: General;  Laterality: Left;    TONSILLECTOMY      TRANSFORAMINAL EPIDURAL INJECTION OF STEROID Bilateral 10/5/2022    Procedure: Injection,steroid,epidural,transforaminal approach;  Surgeon: Soo Giles MD;  Location: [facility] PAIN MGT;  Service: Pain Management;  Laterality: Bilateral;  bilateral L5 transforaminal epidural steroid injection with RN IV sedation     Family History   Problem Relation Name Age of Onset    Heart disease Mother      Heart disease Father      Heart disease Brother       Social History     Tobacco Use    Smoking status: Former     Current packs/day: 0.00     Average packs/day: 1 pack/day for 32.0 years (32.0 ttl pk-yrs)     Types: Cigarettes     Start date: 1990     Quit date: 2022     Years since quittin.5    Smokeless tobacco: Never   Substance Use Topics    Alcohol use: Not Currently    Drug use: No     Review of Systems    Physical Exam     Initial Vitals [24 1030]   BP Pulse Resp Temp SpO2   (!) 202/98 98 18 98.1 °F (36.7 °C) 98 %      MAP       --         Physical Exam    Nursing note and vitals reviewed.  Constitutional: He appears well-developed and well-nourished. He is not diaphoretic. No distress.   HENT:   Head: Normocephalic and atraumatic.   Mouth/Throat: Oropharynx is clear and moist.   Eyes: Conjunctivae are normal.   Cardiovascular:  Normal rate and regular rhythm.           Pulmonary/Chest: No respiratory distress.     Neurological: He is alert and oriented to person, place,  and time.   Skin: Skin is warm and dry. Capillary refill takes less than 2 seconds. No rash noted. No pallor.   Large surgical wound to the lateral aspect of the left lower leg, proximally 6 cm in width by 8cm in length.     There is mild oozing from some of the wound edges as well as a 1 cm diameter area in the middle of the wound. There is no surround erythema, there is no purulence.       Psychiatric: He has a normal mood and affect.         ED Course   Procedures  Labs Reviewed - No data to display       Imaging Results    None          Medications - No data to display  Medical Decision Making  This is an emergent evaluation of a 59 y.o.male patient with presentation of wound check s/p surgical excision of squamous cell carcinoma of the skin of his distal left leg.     Initial differentials include but are not limited to: wound check s/p surgery, wound complication, wound infection, bleeding from wound, hematoma.     Plan: I evaluated the wound and found it to be without evidence of infection. There is mild bleeding. Wound was dressed by me with xeroform gauze, 4x4 and ace wrap to create mild pressure dressing. Pt counseled on changing dressing every 2 days similar to today, next appointment with PCP and surgeon, return instructions. Stable for dc.     Amount and/or Complexity of Data Reviewed  External Data Reviewed: notes.     Details:     Surgical operative note 07/06/2024 for excision of mass from left lower leg    Discussion of management or test interpretation with external provider(s):     Phone conversation with Dr. Gomez, general surgeon.  Agreed with my assessment and plan based upon my discussion. I did not place a formal consult as I did not believe it was required for the patients presentation today.    Risk  Diagnosis or treatment significantly limited by social determinants of health.  Risk Details:     Social determinants of health considered:  Access to healthcare including difficulty in  obtaining follow-up and difficulty in obtaining medications; health literacy.                                      Clinical Impression:  Final diagnoses:  [Z48.89] Encounter for post surgical wound check (Primary)          ED Disposition Condition    Discharge Stable          ED Prescriptions    None       Follow-up Information       Follow up With Specialties Details Why Contact Info    Bayron Ferguson MD Internal Medicine On 7/17/2024  [address] [facility]  [address]  [facility] [address]  [phone]               Nicholas Ramírez MD  07/12/24 3201

## 2024-07-15 ENCOUNTER — HOSPITAL ENCOUNTER (OUTPATIENT)
Dept: WOUND CARE | Facility: HOSPITAL | Age: 59
Discharge: HOME | End: 2024-07-15
Attending: PREVENTIVE MEDICINE
Payer: MEDICARE

## 2024-07-15 VITALS
HEIGHT: 69 IN | BODY MASS INDEX: 34.8 KG/M2 | HEART RATE: 93 BPM | RESPIRATION RATE: 18 BRPM | TEMPERATURE: 98 F | SYSTOLIC BLOOD PRESSURE: 135 MMHG | WEIGHT: 235 LBS | DIASTOLIC BLOOD PRESSURE: 75 MMHG

## 2024-07-15 DIAGNOSIS — D04.72 SQUAMOUS CELL CARCINOMA IN SITU (SCCIS) OF SKIN OF LEFT LOWER LEG: ICD-10-CM

## 2024-07-15 DIAGNOSIS — L97.922 NON-PRESSURE CHRONIC ULCER OF LEFT LOWER LEG WITH FAT LAYER EXPOSED: Primary | ICD-10-CM

## 2024-07-15 PROCEDURE — 29581 APPL MULTLAYER CMPRN SYS LEG: CPT

## 2024-07-15 PROCEDURE — 11721 DEBRIDE NAIL 6 OR MORE: CPT

## 2024-07-15 RX ORDER — HYDROCODONE BITARTRATE AND ACETAMINOPHEN 5; 325 MG/1; MG/1
1 TABLET ORAL EVERY 4 HOURS PRN
Qty: 10 TABLET | Refills: 0 | Status: SHIPPED | OUTPATIENT
Start: 2024-07-15

## 2024-07-15 NOTE — PROGRESS NOTES
Wound Care & Hyperbaric Medicine    Subjective:       Patient ID: Tj Trammell is a 59 y.o. male.    Chief Complaint: No chief complaint on file.    Wound care follow up for left leg ulcer. Patient s/p surgical excision of SCC to left leg on 7/9/24 with Dr. Rodriguez. Since then patient has come into wound care clinic multiple times over concerns of drainage from dressing - patient states he went to surgery office but was turned away, He was then seen in ED on 7/12/24 since we could not accommodate him on 7/12/24. Patient again arrived to clinic today complaining of drainage from dressing and that he needs supplies to change his dressing. Patient was brought back today and evaluated. Patient noted with ACE wrap to ulcer area only with extensive swelling to foot and leg. Wound bed clean, no signs of infection. Dressing and compression wrap applied. Patient has follow up with Dr. Rodriguez next week.    Review of Systems   All other systems reviewed and are negative.        Objective:     Vitals:    07/15/24 1021   BP: 135/75   Pulse: 93   Resp: 18   Temp: 98.3 °F (36.8 °C)         Physical Exam       Altered Skin Integrity 07/28/23 0800 Left lateral;lower Leg (Active)   07/28/23 0800   Altered Skin Integrity Present on Admission - Did Patient arrive to the hospital with altered skin?: yes   Side: Left   Orientation: lateral;lower   Location: Leg   Wound Number:    Is this injury device related?: No   Primary Wound Type:    Description of Altered Skin Integrity:    Ankle-Brachial Index:    Pulses:    Removal Indication and Assessment:    Wound Outcome:    (Retired) Wound Length (cm):    (Retired) Wound Width (cm):    (Retired) Depth (cm):    Wound Description (Comments):    Removal Indications:    Wound Image   07/15/24 1041   Description of Altered Skin Integrity Full thickness tissue loss. Subcutaneous fat may be visible but bone, tendon or muscle are not exposed 07/15/24 1041   Dressing  Appearance Moist drainage 07/15/24 1041   Drainage Amount Moderate 07/15/24 1041   Drainage Characteristics/Odor Serosanguineous 07/15/24 1041   Appearance Red;Not granulating 07/15/24 1041   Red (%), Wound Tissue Color 100 % 07/15/24 1041   Periwound Area Moist 07/15/24 1041   Wound Edges Defined 07/15/24 1041   Wound Length (cm) 8.2 cm 07/15/24 1041   Wound Width (cm) 7 cm 07/15/24 1041   Wound Depth (cm) 0.2 cm 07/15/24 1041   Wound Volume (cm^3) 11.48 cm^3 07/15/24 1041   Wound Surface Area (cm^2) 57.4 cm^2 07/15/24 1041   Care Cleansed with:;Antimicrobial agent;Sterile normal saline 07/15/24 1041   Dressing Applied;Silver;Calcium alginate;Absorptive Pad 07/15/24 1041   Periwound Care Absorptive dressing applied 07/15/24 1041   Compression Two layer compression 07/15/24 1041   Dressing Change Due 07/23/24 07/15/24 1041         Assessment/Plan:         ICD-10-CM ICD-9-CM   1. Non-pressure chronic ulcer of left lower leg with fat layer exposed  L97.922 707.10   2. 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 No signs of infection.    Tissue pathology and/or culture taken:  [] Yes [x] No   Sharp debridement performed:   [] Yes [x] No   Labs ordered this visit:   [] Yes [x] No   Imaging ordered this visit:   [] Yes [x] No           Orders Placed This Encounter   Procedures    Change dressing     Left lateral lower leg ulcer:     Cleanse wound with:Vashe   Lidocaine: prn   Silver nitrate: prn   Periwound care: zinc barrier cream   Primary dressing: silver alginate   Secondary dressing: small Mextra   Edema control: Coflex with Zinc     Frequency: Weekly    Follow-up:2 weeks with         Follow up in about 2 weeks (around 7/29/2024)            This includes face to face time and non-face to face time preparing to see the patient (eg, review of tests), obtaining and/or reviewing separately obtained history, documenting clinical information in the  electronic or other health record, independently interpreting results and communicating results to the patient/family/caregiver, or care coordinator.

## 2024-07-16 DIAGNOSIS — G35 MULTIPLE SCLEROSIS: ICD-10-CM

## 2024-07-16 RX ORDER — FINGOLIMOD HYDROCHLORIDE 0.5 MG/1
1 CAPSULE ORAL DAILY
Qty: 90 CAPSULE | Refills: 2 | Status: ACTIVE | OUTPATIENT
Start: 2024-07-16 | End: 2025-07-16

## 2024-07-16 RX ORDER — FINGOLIMOD HYDROCHLORIDE 0.5 MG/1
1 CAPSULE ORAL DAILY
Qty: 90 CAPSULE | Refills: 2 | Status: CANCELLED | OUTPATIENT
Start: 2024-07-16 | End: 2025-07-16

## 2024-07-19 ENCOUNTER — HOSPITAL ENCOUNTER (OUTPATIENT)
Dept: WOUND CARE | Facility: HOSPITAL | Age: 59
Discharge: HOME | End: 2024-07-19
Attending: PREVENTIVE MEDICINE
Payer: MEDICARE

## 2024-07-19 DIAGNOSIS — L97.922 NON-PRESSURE CHRONIC ULCER OF LEFT LOWER LEG WITH FAT LAYER EXPOSED: Primary | ICD-10-CM

## 2024-07-19 PROCEDURE — 29581 APPL MULTLAYER CMPRN SYS LEG: CPT

## 2024-07-19 NOTE — PROGRESS NOTES
Wound Care & Hyperbaric Medicine    Subjective:       Patient ID: Tj Trammell is a 59 y.o. male.    Chief Complaint: Wound Care    Nurse visit for dressing change. Large amount drainage strike through dressing, edema improved. Continued plan care as ordered.   Review of Systems      Objective:   There were no vitals filed for this visit.      Physical Exam       Altered Skin Integrity 07/28/23 0800 Left lateral;lower Leg (Active)   07/28/23 0800   Altered Skin Integrity Present on Admission - Did Patient arrive to the hospital with altered skin?: yes   Side: Left   Orientation: lateral;lower   Location: Leg   Wound Number:    Is this injury device related?: No   Primary Wound Type:    Description of Altered Skin Integrity:    Ankle-Brachial Index:    Pulses:    Removal Indication and Assessment:    Wound Outcome:    (Retired) Wound Length (cm):    (Retired) Wound Width (cm):    (Retired) Depth (cm):    Wound Description (Comments):    Removal Indications:    Description of Altered Skin Integrity Full thickness tissue loss. Subcutaneous fat may be visible but bone, tendon or muscle are not exposed 07/19/24 0822   Dressing Appearance Intact;Moist drainage 07/19/24 0822   Drainage Amount Large 07/19/24 0822   Drainage Characteristics/Odor Serosanguineous 07/19/24 0822   Appearance Red;Moist 07/19/24 0822   Tissue loss description Full thickness 07/19/24 0822   Red (%), Wound Tissue Color 100 % 07/19/24 0822   Periwound Area Dry;Intact 07/19/24 0822   Wound Edges Defined 07/19/24 0822   Wound Length (cm) 8.2 cm 07/19/24 0822   Wound Width (cm) 7 cm 07/19/24 0822   Wound Depth (cm) 0.2 cm 07/19/24 0822   Wound Volume (cm^3) 11.48 cm^3 07/19/24 0822   Wound Surface Area (cm^2) 57.4 cm^2 07/19/24 0822   Care Cleansed with:;Soap and water;Sterile normal saline 07/19/24 0822   Dressing Applied;Silver;Calcium alginate;Non-adherent;Absorptive Pad;Cast padding;Compression wrap 07/19/24 0822    Periwound Care Absorptive dressing applied;Moisture barrier applied 07/19/24 0822   Compression Two layer compression 07/19/24 0822   Dressing Change Due 07/23/24 07/19/24 0822         Assessment/Plan:         ICD-10-CM ICD-9-CM   1. Non-pressure chronic ulcer of left lower leg with fat layer exposed  L97.922 707.10           Tissue pathology and/or culture taken:  [] Yes [x] No   Sharp debridement performed:   [] Yes [x] No   Labs ordered this visit:   [] Yes [x] No   Imaging ordered this visit:   [] Yes [x] No         Md orders from 7/15/24  Left lateral lower leg ulcer:     Cleanse wound with:Vashe   Lidocaine: prn   Silver nitrate: prn   Periwound care: zinc barrier cream   Primary dressing: silver alginate   Secondary dressing: small Mextra   Edema control: Coflex with Zinc     Frequency: Weekly     Follow-up:2 weeks with , NV weekly   No orders of the defined types were placed in this encounter.       Follow up in about 1 week (around 7/26/2024) for .            This includes face to face time and non-face to face time preparing to see the patient (eg, review of tests), obtaining and/or reviewing separately obtained history, documenting clinical information in the electronic or other health record, independently interpreting results and communicating results to the patient/family/caregiver, or care coordinator.       yes

## 2024-07-22 ENCOUNTER — HOSPITAL ENCOUNTER (EMERGENCY)
Facility: HOSPITAL | Age: 59
Discharge: HOME | End: 2024-07-22
Attending: EMERGENCY MEDICINE
Payer: MEDICARE

## 2024-07-22 VITALS
SYSTOLIC BLOOD PRESSURE: 126 MMHG | HEART RATE: 79 BPM | TEMPERATURE: 98 F | BODY MASS INDEX: 31.53 KG/M2 | DIASTOLIC BLOOD PRESSURE: 88 MMHG | RESPIRATION RATE: 18 BRPM | OXYGEN SATURATION: 98 % | WEIGHT: 212.88 LBS | HEIGHT: 69 IN

## 2024-07-22 DIAGNOSIS — Z51.89 VISIT FOR WOUND CHECK: Primary | ICD-10-CM

## 2024-07-22 PROCEDURE — 99282 EMERGENCY DEPT VISIT SF MDM: CPT

## 2024-07-22 NOTE — ED NOTES
Cleaned and dressed patient's left leg with xeroform, non adherent dressing, abd pad, cast padding and ace wrap.

## 2024-07-22 NOTE — ED PROVIDER NOTES
Encounter Date: 7/22/2024       History     Chief Complaint   Patient presents with    Wound Check     Pt c/o LLE existing wound bleeding since Friday 7/19/24      59 yr old male presents to the ER for wound to the left lower ext. Pt had procedure completed per Dr Rodriguez for an excision of the left lower leg. Pt has an appmt tomorrow at 2pm for wound check however states it was oozing and he wanted to have the dressing changed. PMH of asthma, hearing loss, pancreatitis, GERD, High cholesterol, HTN, MS    The history is provided by the patient. No  was used.     Review of patient's allergies indicates:  No Known Allergies  Past Medical History:   Diagnosis Date    Asthma     Bilateral hearing loss 12/12/2012    Biliary acute pancreatitis     GERD (gastroesophageal reflux disease)     High cholesterol     Hypertension     Multiple sclerosis      Past Surgical History:   Procedure Laterality Date    APPENDECTOMY      CHOLECYSTECTOMY      COLONOSCOPY N/A 1/22/2019    Procedure: COLONOSCOPY 2 day  golytely;  Surgeon: Nathan Waddell MD;  Location: [facility];  Service: Endoscopy;  Laterality: N/A;    COLONOSCOPY N/A 4/25/2023    Procedure: COLONOSCOPY;  Surgeon: Jared Loredo MD;  Location: [facility];  Service: Endoscopy;  Laterality: N/A;    COLONOSCOPY N/A 10/10/2023    Procedure: COLONOSCOPY;  Surgeon: Jared Loredo MD;  Location: [facility];  Service: Endoscopy;  Laterality: N/A;    ESOPHAGOGASTRODUODENOSCOPY N/A 9/29/2021    Procedure: EGD (ESOPHAGOGASTRODUODENOSCOPY) covid test Rapid;  Surgeon: Jared Loredo MD;  Location: [facility];  Service: Endoscopy;  Laterality: N/A;    EXCISION, SUPERFICIAL MASS, HEAD Left 5/7/2024    Procedure: EXCISION, SUPERFICIAL MASS, HEAD;  Surgeon: Chriss Rodriguez MD;  Location: [facility];  Service: General;  Laterality: Left;    JOINT REPLACEMENT      arm    SURGICAL REMOVAL OF MASS OF LOWER EXTREMITY Left 5/7/2024     Procedure: EXCISION, MASS, LOWER EXTREMITY;  Surgeon: Chriss Rodriguez MD;  Location: [facility] OR;  Service: General;  Laterality: Left;    SURGICAL REMOVAL OF MASS OF LOWER EXTREMITY Left 2024    Procedure: EXCISION, MASS, LOWER EXTREMITY;  Surgeon: Chriss Rodriguez MD;  Location: [facility] OR;  Service: General;  Laterality: Left;    TONSILLECTOMY      TRANSFORAMINAL EPIDURAL INJECTION OF STEROID Bilateral 10/5/2022    Procedure: Injection,steroid,epidural,transforaminal approach;  Surgeon: Soo Giles MD;  Location: [facility]IN [facility];  Service: Pain Management;  Laterality: Bilateral;  bilateral L5 transforaminal epidural steroid injection with RN IV sedation     Family History   Problem Relation Name Age of Onset    Heart disease Mother      Heart disease Father      Heart disease Brother       Social History     Tobacco Use    Smoking status: Former     Current packs/day: 0.00     Average packs/day: 1 pack/day for 32.0 years (32.0 ttl pk-yrs)     Types: Cigarettes     Start date: 1990     Quit date: 2022     Years since quittin.5    Smokeless tobacco: Never   Substance Use Topics    Alcohol use: Not Currently    Drug use: No     Review of Systems   Skin:  Positive for wound.   All other systems reviewed and are negative.      Physical Exam     Initial Vitals [24 0935]   BP Pulse Resp Temp SpO2   126/88 79 18 98.2 °F (36.8 °C) 98 %      MAP       --         Physical Exam    Constitutional: He appears well-developed and well-nourished. He is not diaphoretic. No distress.   HENT:   Head: Normocephalic and atraumatic.   Right Ear: Hearing normal.   Left Ear: Hearing normal.   Nose: Nose normal.   Mouth/Throat: Uvula is midline, oropharynx is clear and moist and mucous membranes are normal.   Eyes: Lids are normal.   Cardiovascular:  Normal rate.           Pulmonary/Chest: Effort normal. No respiratory distress.   Musculoskeletal:         General: Normal range of motion.      Cervical back: No  rigidity.     Neurological: He is oriented to person, place, and time.   Skin: Skin is warm and dry. No rash noted.   Wound to the left lower leg has photos listed below. There is no purulent dc noted. There is no tenderness to the site. + PT/DP noted.    Psychiatric: He has a normal mood and affect. His behavior is normal. Judgment and thought content normal.               ED Course   Procedures  Labs Reviewed - No data to display       Imaging Results    None          Medications - No data to display  Medical Decision Making  Differential Diagnosis includes, but is not limited to:  Necrotizing fasciitis, erythema multiforme, Martin-August syndrome, toxic epidermal necrolysis, DIC, cellulitis, Staph scalded skin syndrome, toxic shock syndrome, secondary syphilis, abscess, osteomyelitis, septic joint, MRSA, DVT, superficial thrombophlebitis, varicose vein, drug eruption, allergic reaction/urticatia, irritant/contact dermatitis, viral exanthem, local trauma/contusion, abrasion.                ED Course as of 07/22/24 1011   Mon Jul 22, 2024   1010 Spoke with Dr Rodriguez who states the pt has an appmt tomorrow for 2pm and can follow up. Dressing to be changed out with vaseline gauze and bulk dressing, elevate also.  [DT]      ED Course User Index  [DT] Ritu Zamudio NP                           Clinical Impression:  Final diagnoses:  [Z51.89] Visit for wound check (Primary)          ED Disposition Condition    Discharge Stable          ED Prescriptions    None       Follow-up Information       Follow up With Specialties Details Why Contact Info    Chriss Rodriguez MD Surgery, General Surgery  Scheduled appmt for 2pm [address]  [address] [place] [id number]  [phone]               Ritu Zamudio NP  07/22/24 1011

## 2024-07-23 ENCOUNTER — OFFICE VISIT (OUTPATIENT)
Dept: SURGERY | Facility: CLINIC | Age: 59
End: 2024-07-23
Payer: MEDICARE

## 2024-07-23 VITALS — DIASTOLIC BLOOD PRESSURE: 80 MMHG | HEART RATE: 111 BPM | SYSTOLIC BLOOD PRESSURE: 118 MMHG

## 2024-07-23 DIAGNOSIS — D04.72 SQUAMOUS CELL CARCINOMA IN SITU (SCCIS) OF SKIN OF LEFT LOWER LEG: Primary | ICD-10-CM

## 2024-07-23 PROCEDURE — 4010F ACE/ARB THERAPY RXD/TAKEN: CPT | Mod: CPTII,S$GLB,, | Performed by: STUDENT IN AN ORGANIZED HEALTH CARE EDUCATION/TRAINING PROGRAM

## 2024-07-23 PROCEDURE — 3051F HG A1C>EQUAL 7.0%<8.0%: CPT | Mod: CPTII,S$GLB,, | Performed by: STUDENT IN AN ORGANIZED HEALTH CARE EDUCATION/TRAINING PROGRAM

## 2024-07-23 PROCEDURE — 1159F MED LIST DOCD IN RCRD: CPT | Mod: CPTII,S$GLB,, | Performed by: STUDENT IN AN ORGANIZED HEALTH CARE EDUCATION/TRAINING PROGRAM

## 2024-07-23 PROCEDURE — 3074F SYST BP LT 130 MM HG: CPT | Mod: CPTII,S$GLB,, | Performed by: STUDENT IN AN ORGANIZED HEALTH CARE EDUCATION/TRAINING PROGRAM

## 2024-07-23 PROCEDURE — 99024 POSTOP FOLLOW-UP VISIT: CPT | Mod: S$GLB,,, | Performed by: STUDENT IN AN ORGANIZED HEALTH CARE EDUCATION/TRAINING PROGRAM

## 2024-07-23 PROCEDURE — 3079F DIAST BP 80-89 MM HG: CPT | Mod: CPTII,S$GLB,, | Performed by: STUDENT IN AN ORGANIZED HEALTH CARE EDUCATION/TRAINING PROGRAM

## 2024-07-23 PROCEDURE — 99999 PR PBB SHADOW E&M-EST. PATIENT-LVL IV: CPT | Mod: PBBFAC,,, | Performed by: STUDENT IN AN ORGANIZED HEALTH CARE EDUCATION/TRAINING PROGRAM

## 2024-07-23 PROCEDURE — 1160F RVW MEDS BY RX/DR IN RCRD: CPT | Mod: CPTII,S$GLB,, | Performed by: STUDENT IN AN ORGANIZED HEALTH CARE EDUCATION/TRAINING PROGRAM

## 2024-07-24 RX ORDER — HYDROCODONE BITARTRATE AND ACETAMINOPHEN 5; 325 MG/1; MG/1
1 TABLET ORAL EVERY 4 HOURS PRN
Qty: 10 TABLET | Refills: 0 | Status: SHIPPED | OUTPATIENT
Start: 2024-07-24

## 2024-07-24 NOTE — PROGRESS NOTES
S/p re-excision left lateral SCC 7/9/2024  Healing well  Went to ED yesterday oozing some blood  Looks fine today  Good granulation  Margins clear  Wound care  Should heal in well without issue  RTC in a month  Mohs for scalp lesions?

## 2024-07-25 ENCOUNTER — LAB VISIT (OUTPATIENT)
Dept: LAB | Facility: HOSPITAL | Age: 59
End: 2024-07-25
Attending: INTERNAL MEDICINE
Payer: MEDICARE

## 2024-07-25 DIAGNOSIS — E11.9 DIABETES MELLITUS WITHOUT COMPLICATION: ICD-10-CM

## 2024-07-25 LAB
ALBUMIN/CREAT UR: 4.8 UG/MG (ref 0–30)
CREAT UR-MCNC: 124 MG/DL (ref 23–375)
MICROALBUMIN UR DL<=1MG/L-MCNC: 6 UG/ML

## 2024-07-25 PROCEDURE — 82570 ASSAY OF URINE CREATININE: CPT | Performed by: INTERNAL MEDICINE

## 2024-07-25 PROCEDURE — 82043 UR ALBUMIN QUANTITATIVE: CPT | Performed by: INTERNAL MEDICINE

## 2024-07-26 ENCOUNTER — HOSPITAL ENCOUNTER (OUTPATIENT)
Dept: WOUND CARE | Facility: HOSPITAL | Age: 59
Discharge: HOME | End: 2024-07-26
Attending: PREVENTIVE MEDICINE
Payer: MEDICARE

## 2024-07-26 VITALS
BODY MASS INDEX: 31.41 KG/M2 | HEART RATE: 83 BPM | WEIGHT: 212.06 LBS | SYSTOLIC BLOOD PRESSURE: 133 MMHG | HEIGHT: 69 IN | TEMPERATURE: 98 F | DIASTOLIC BLOOD PRESSURE: 64 MMHG

## 2024-07-26 DIAGNOSIS — D04.9 BOWEN DISEASE: ICD-10-CM

## 2024-07-26 DIAGNOSIS — L97.922 NON-PRESSURE CHRONIC ULCER OF LEFT LOWER LEG WITH FAT LAYER EXPOSED: Primary | ICD-10-CM

## 2024-07-26 DIAGNOSIS — L92.9 HYPERGRANULATION: ICD-10-CM

## 2024-07-26 DIAGNOSIS — D04.72 SQUAMOUS CELL CARCINOMA IN SITU (SCCIS) OF SKIN OF LEFT LOWER LEG: ICD-10-CM

## 2024-07-26 PROCEDURE — 17250 CHEM CAUT OF GRANLTJ TISSUE: CPT

## 2024-07-26 NOTE — PROGRESS NOTES
Wound Care & Hyperbaric Medicine    Subjective:       Patient ID: Tj Trammell is a 59 y.o. male.    Chief Complaint: Non-healing Wound Follow Up    Wound care follow up left leg surgical wound. Wound clean no signs infection hypergranulation tissue noted to wound bed.     Review of Systems   All other systems reviewed and are negative.        Objective:     Vitals:    07/26/24 0801   BP: 133/64   Pulse: 83   Temp: 98.1 °F (36.7 °C)         Physical Exam       Altered Skin Integrity 07/28/23 0800 Left lateral;lower Leg (Active)   07/28/23 0800   Altered Skin Integrity Present on Admission - Did Patient arrive to the hospital with altered skin?: yes   Side: Left   Orientation: lateral;lower   Location: Leg   Wound Number:    Is this injury device related?: No   Primary Wound Type:    Description of Altered Skin Integrity:    Ankle-Brachial Index:    Pulses:    Removal Indication and Assessment:    Wound Outcome:    (Retired) Wound Length (cm):    (Retired) Wound Width (cm):    (Retired) Depth (cm):    Wound Description (Comments):    Removal Indications:    Wound Image   07/26/24 0824   Description of Altered Skin Integrity Full thickness tissue loss. Subcutaneous fat may be visible but bone, tendon or muscle are not exposed 07/26/24 0824   Dressing Appearance Intact;Moist drainage 07/26/24 0824   Drainage Amount Moderate 07/26/24 0824   Drainage Characteristics/Odor Serosanguineous 07/26/24 0824   Appearance Red;Moist;Hypergranulation 07/26/24 0824   Tissue loss description Full thickness 07/26/24 0824   Red (%), Wound Tissue Color 100 % 07/26/24 0824   Periwound Area Intact;Dry;Edematous 07/26/24 0824   Wound Edges Defined 07/26/24 0824   Wound Length (cm) 8 cm 07/26/24 0824   Wound Width (cm) 7 cm 07/26/24 0824   Wound Depth (cm) 0.2 cm 07/26/24 0824   Wound Volume (cm^3) 11.2 cm^3 07/26/24 0824   Wound Surface Area (cm^2) 56 cm^2 07/26/24 0824   Care Cleansed with:;Antimicrobial agent  07/26/24 0824   Dressing Applied;Silver;Calcium alginate;Absorptive Pad;Compression wrap 07/26/24 0824   Periwound Care Absorptive dressing applied;Skin barrier film applied 07/26/24 0824   Compression Two layer compression 07/26/24 0824   Dressing Change Due 08/02/24 07/26/24 0824         Assessment/Plan:         ICD-10-CM ICD-9-CM   1. Non-pressure chronic ulcer of left lower leg with fat layer exposed  L97.922 707.10   2. Hypergranulation  L92.9 701.5   3. 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 Continued current plan of care. Plan for Apligraf application #1 next visit.     Tissue pathology and/or culture taken:  [] Yes [x] No   Sharp debridement performed:   [] Yes [x] No   Labs ordered this visit:   [] Yes [x] No   Imaging ordered this visit:   [] Yes [x] No           Orders Placed This Encounter   Procedures    Change dressing     Left lateral lower leg ulcer:     Cleanse wound with:Vashe   Lidocaine: prn   Silver nitrate: prn   Periwound care: zinc barrier cream   Primary dressing: silver alginate   Secondary dressing: Large abd x 2   Edema control: Coflex with Zinc     Frequency: Weekly   Follow-up:1 week with   Apligraf #1 next visit 8/2/24        Follow up in about 1 week (around 8/2/2024) for .            This includes face to face time and non-face to face time preparing to see the patient (eg, review of tests), obtaining and/or reviewing separately obtained history, documenting clinical information in the electronic or other health record, independently interpreting results and communicating results to the patient/family/caregiver, or care coordinator.

## 2024-07-30 RX ORDER — HYDROCODONE BITARTRATE AND ACETAMINOPHEN 5; 325 MG/1; MG/1
1 TABLET ORAL EVERY 4 HOURS PRN
Qty: 10 TABLET | Refills: 0 | Status: SHIPPED | OUTPATIENT
Start: 2024-07-30

## 2024-08-02 ENCOUNTER — HOSPITAL ENCOUNTER (OUTPATIENT)
Dept: WOUND CARE | Facility: HOSPITAL | Age: 59
Discharge: HOME | End: 2024-08-02
Attending: PREVENTIVE MEDICINE
Payer: MEDICARE

## 2024-08-02 VITALS
TEMPERATURE: 98 F | WEIGHT: 212 LBS | HEART RATE: 92 BPM | HEIGHT: 69 IN | SYSTOLIC BLOOD PRESSURE: 175 MMHG | RESPIRATION RATE: 18 BRPM | DIASTOLIC BLOOD PRESSURE: 82 MMHG | BODY MASS INDEX: 31.4 KG/M2

## 2024-08-02 DIAGNOSIS — L92.9 HYPERGRANULATION: ICD-10-CM

## 2024-08-02 DIAGNOSIS — D04.72 SQUAMOUS CELL CARCINOMA IN SITU (SCCIS) OF SKIN OF LEFT LOWER LEG: ICD-10-CM

## 2024-08-02 DIAGNOSIS — L97.922 NON-PRESSURE CHRONIC ULCER OF LEFT LOWER LEG WITH FAT LAYER EXPOSED: Primary | ICD-10-CM

## 2024-08-02 DIAGNOSIS — D04.9 BOWEN DISEASE: ICD-10-CM

## 2024-08-02 PROCEDURE — 15271 SKIN SUB GRAFT TRNK/ARM/LEG: CPT

## 2024-08-02 PROCEDURE — 15272 SKIN SUB GRAFT T/A/L ADD-ON: CPT

## 2024-08-02 NOTE — PROGRESS NOTES
Wound Care & Hyperbaric Medicine    Subjective:       Patient ID: Tj Trammell is a 59 y.o. male.    Chief Complaint: Non-healing Wound Follow Up    Wound care follow up for left leg ulcer. Patient arrived to clinic with bleeding in wound bed which he stated start this morning, silver nitrate applied - no further bleeding noted. Apligraf #1 applied (Lot # [id number], expiration 08/07/2024, no saline used, secured with steri strips. Will continue compression wrap, plan for application of Apligraf next week.    Review of Systems   All other systems reviewed and are negative.        Objective:     Vitals:    08/02/24 0842   BP: (!) 175/82   Pulse: 92   Resp: 18   Temp: 98.2 °F (36.8 °C)         Physical Exam       Altered Skin Integrity 07/28/23 0800 Left lateral;lower Leg (Active)   07/28/23 0800   Altered Skin Integrity Present on Admission - Did Patient arrive to the hospital with altered skin?: yes   Side: Left   Orientation: lateral;lower   Location: Leg   Wound Number:    Is this injury device related?: No   Primary Wound Type:    Description of Altered Skin Integrity:    Ankle-Brachial Index:    Pulses:    Removal Indication and Assessment:    Wound Outcome:    (Retired) Wound Length (cm):    (Retired) Wound Width (cm):    (Retired) Depth (cm):    Wound Description (Comments):    Removal Indications:    Wound Image   08/02/24 0902   Description of Altered Skin Integrity Full thickness tissue loss. Subcutaneous fat may be visible but bone, tendon or muscle are not exposed 08/02/24 0902   Dressing Appearance Intact;Moist drainage;Saturated 08/02/24 0902   Drainage Amount Moderate 08/02/24 0902   Drainage Characteristics/Odor Bleeding controlled;Other (see comments) 08/02/24 0902   Appearance Red 08/02/24 0902   Red (%), Wound Tissue Color 100 % 08/02/24 0902   Periwound Area Intact;Edematous 08/02/24 0902   Wound Edges Defined 08/02/24 0902   Wound Length (cm) 7.5 cm 08/02/24 0902    Wound Width (cm) 6.5 cm 08/02/24 0902   Wound Depth (cm) 0.1 cm 08/02/24 0902   Wound Volume (cm^3) 4.875 cm^3 08/02/24 0902   Wound Surface Area (cm^2) 48.75 cm^2 08/02/24 0902   Care Cleansed with:;Sterile normal saline 08/02/24 0902   Dressing Applied;Other (comment);Calcium alginate;Absorptive Pad;Compression wrap 08/02/24 0902   Periwound Care Absorptive dressing applied 08/02/24 0902   Compression Two layer compression 08/02/24 0902   Dressing Change Due 08/09/24 08/02/24 0902   Wound Location: Left leg  Skin Substitute: Apligraf  Performed By: Joe Casey  Time-out Taken: Yes  Location:     [] Genitalia/Hands/Feet/Multiple Digits    [] Scalp/Face/Neck/Ears    [x]Trunk/Arms/Legs  Application Area: (sq cm): 49   Product Applied: (sq cm): 44  Product Waste (sq cm): 0  Fenestrated: No     Secured with: Steri-strips and contact layer  Dressing Applied: Yes  Response to Treatment:Well tolerated by patient.        Assessment/Plan:         ICD-10-CM ICD-9-CM   1. Non-pressure chronic ulcer of left lower leg with fat layer exposed  L97.922 707.10   2. Hypergranulation  L92.9 701.5   3. 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           Tissue pathology and/or culture taken:  [] Yes [x] No   Sharp debridement performed:   [] Yes [x] No   Labs ordered this visit:   [] Yes [x] No   Imaging ordered this visit:   [] Yes [x] No           Orders Placed This Encounter   Procedures    Change dressing     Left lateral lower leg ulcer:     Cleanse wound with:Vashe   Lidocaine: prn   Silver nitrate: prn   Primary dressing: Apligraf #1, versatel, steristrips, calcium alginate   Secondary dressing: Large abd x 2   Edema control: Coflex with Zinc     Frequency: Weekly   Follow-up:1 week with    Other: Apligraf #2 next week        Follow up in about 1 week (around 8/9/2024) for .            This includes face to face time and non-face to  face time preparing to see the patient (eg, review of tests), obtaining and/or reviewing separately obtained history, documenting clinical information in the electronic or other health record, independently interpreting results and communicating results to the patient/family/caregiver, or care coordinator.

## 2024-08-07 RX ORDER — PANTOPRAZOLE SODIUM 40 MG/1
TABLET, DELAYED RELEASE ORAL DAILY
Qty: 90 TABLET | Refills: 3 | Status: CANCELLED | OUTPATIENT
Start: 2024-08-07 | End: 2025-08-07

## 2024-08-08 ENCOUNTER — HOSPITAL ENCOUNTER (OUTPATIENT)
Dept: WOUND CARE | Facility: HOSPITAL | Age: 59
Discharge: HOME | End: 2024-08-08
Attending: PREVENTIVE MEDICINE
Payer: MEDICARE

## 2024-08-08 VITALS
HEART RATE: 104 BPM | BODY MASS INDEX: 31.41 KG/M2 | DIASTOLIC BLOOD PRESSURE: 75 MMHG | TEMPERATURE: 98 F | SYSTOLIC BLOOD PRESSURE: 137 MMHG | WEIGHT: 212.06 LBS | HEIGHT: 69 IN

## 2024-08-08 DIAGNOSIS — L97.922 NON-PRESSURE CHRONIC ULCER OF LEFT LOWER LEG WITH FAT LAYER EXPOSED: Primary | ICD-10-CM

## 2024-08-08 DIAGNOSIS — L92.9 HYPERGRANULATION: ICD-10-CM

## 2024-08-08 DIAGNOSIS — D04.9 BOWEN DISEASE: ICD-10-CM

## 2024-08-08 DIAGNOSIS — D04.72 SQUAMOUS CELL CARCINOMA IN SITU (SCCIS) OF SKIN OF LEFT LOWER LEG: ICD-10-CM

## 2024-08-08 PROCEDURE — 15272 SKIN SUB GRAFT T/A/L ADD-ON: CPT

## 2024-08-08 PROCEDURE — 15271 SKIN SUB GRAFT TRNK/ARM/LEG: CPT

## 2024-08-08 RX ORDER — PANTOPRAZOLE SODIUM 40 MG/1
TABLET, DELAYED RELEASE ORAL DAILY
Qty: 90 TABLET | Refills: 3 | Status: SHIPPED | OUTPATIENT
Start: 2024-08-08 | End: 2025-08-08

## 2024-08-12 ENCOUNTER — HOSPITAL ENCOUNTER (OUTPATIENT)
Dept: WOUND CARE | Facility: HOSPITAL | Age: 59
Discharge: HOME | End: 2024-08-12
Attending: PREVENTIVE MEDICINE
Payer: MEDICARE

## 2024-08-12 DIAGNOSIS — L97.922 NON-PRESSURE CHRONIC ULCER OF LEFT LOWER LEG WITH FAT LAYER EXPOSED: Primary | ICD-10-CM

## 2024-08-12 PROCEDURE — 29581 APPL MULTLAYER CMPRN SYS LEG: CPT

## 2024-08-12 NOTE — PROGRESS NOTES
Wound Care & Hyperbaric Medicine    Subjective:       Patient ID: Tj Trammell is a 59 y.o. male.    Chief Complaint: Wound Care    Nurse visit for dressing change. Apilgraf remains in place -outer dressing and compression wrap changed. Patient has follow up with MD on Thursday.  Review of Systems      Objective:   There were no vitals filed for this visit.      Physical Exam       Altered Skin Integrity 07/28/23 0800 Left lateral;lower Leg (Active)   07/28/23 0800   Altered Skin Integrity Present on Admission - Did Patient arrive to the hospital with altered skin?: yes   Side: Left   Orientation: lateral;lower   Location: Leg   Wound Number:    Is this injury device related?: No   Primary Wound Type:    Description of Altered Skin Integrity:    Ankle-Brachial Index:    Pulses:    Removal Indication and Assessment:    Wound Outcome:    (Retired) Wound Length (cm):    (Retired) Wound Width (cm):    (Retired) Depth (cm):    Wound Description (Comments):    Removal Indications:    Description of Altered Skin Integrity Full thickness tissue loss. Subcutaneous fat may be visible but bone, tendon or muscle are not exposed 08/12/24 1558   Dressing Appearance Moist drainage 08/12/24 1558   Drainage Amount Moderate 08/12/24 1558   Drainage Characteristics/Odor Serosanguineous 08/12/24 1558   Appearance Dressing in place, unable to visualize 08/12/24 1558   Wound Length (cm) 7.5 cm 08/12/24 1558   Wound Width (cm) 6.5 cm 08/12/24 1558   Wound Depth (cm) 0.1 cm 08/12/24 1558   Wound Volume (cm^3) 4.875 cm^3 08/12/24 1558   Wound Surface Area (cm^2) 48.75 cm^2 08/12/24 1558   Care Cleansed with:;Soap and water 08/12/24 1558   Dressing Applied;Absorptive Pad;Compression wrap 08/12/24 1558   Periwound Care Absorptive dressing applied;Moisturizer applied 08/12/24 1558   Compression Two layer compression 08/12/24 1558   Dressing Change Due 08/15/24 08/12/24 1558         Assessment/Plan:         ICD-10-CM  ICD-9-CM   1. Non-pressure chronic ulcer of left lower leg with fat layer exposed  L97.922 707.10           Tissue pathology and/or culture taken:  [] Yes [x] No   Sharp debridement performed:   [] Yes [x] No   Labs ordered this visit:   [] Yes [x] No   Imaging ordered this visit:   [] Yes [x] No       MD orders from 8/8/24:     Left lateral lower leg ulcer:     Cleanse wound with:Vashe   Lidocaine: prn   Silver nitrate: prn   Primary dressing: Apligraf #2, versatel, steristrips, Drawtex, large mextra   Secondary dressing: Large abd x 2   Edema control: Coflex with Zinc     Frequency: Weekly   Follow-up:1 week with    Other: HOLD Apligraf #3 next week.     Follow up in about 3 days (around 8/15/2024) for Dr Casey.            This includes face to face time and non-face to face time preparing to see the patient (eg, review of tests), obtaining and/or reviewing separately obtained history, documenting clinical information in the electronic or other health record, independently interpreting results and communicating results to the patient/family/caregiver, or care coordinator.

## 2024-08-15 ENCOUNTER — HOSPITAL ENCOUNTER (OUTPATIENT)
Dept: WOUND CARE | Facility: HOSPITAL | Age: 59
Discharge: HOME | End: 2024-08-15
Attending: PREVENTIVE MEDICINE
Payer: MEDICARE

## 2024-08-15 VITALS
TEMPERATURE: 98 F | SYSTOLIC BLOOD PRESSURE: 131 MMHG | WEIGHT: 212.06 LBS | HEART RATE: 89 BPM | HEIGHT: 69 IN | BODY MASS INDEX: 31.41 KG/M2 | DIASTOLIC BLOOD PRESSURE: 62 MMHG

## 2024-08-15 DIAGNOSIS — L97.922 NON-PRESSURE CHRONIC ULCER OF LEFT LOWER LEG WITH FAT LAYER EXPOSED: Primary | ICD-10-CM

## 2024-08-15 DIAGNOSIS — L92.9 HYPERGRANULATION: ICD-10-CM

## 2024-08-15 PROCEDURE — 17250 CHEM CAUT OF GRANLTJ TISSUE: CPT

## 2024-08-15 PROCEDURE — 29581 APPL MULTLAYER CMPRN SYS LEG: CPT

## 2024-08-15 NOTE — PROGRESS NOTES
Wound Care & Hyperbaric Medicine    Subjective:       Patient ID: Tj Trammell is a 59 y.o. male.    Chief Complaint: Non-healing Wound Follow Up    Follow up LLE wound. No acute complaints. Measurements improved. Did not bleed through dressing this week.    Review of Systems   All other systems reviewed and are negative.        Objective:     Vitals:    08/15/24 1322   BP: 131/62   Pulse: 89   Temp: 98.3 °F (36.8 °C)         Physical Exam       Altered Skin Integrity 07/28/23 0800 Left lateral;lower Leg (Active)   07/28/23 0800   Altered Skin Integrity Present on Admission - Did Patient arrive to the hospital with altered skin?: yes   Side: Left   Orientation: lateral;lower   Location: Leg   Wound Number:    Is this injury device related?: No   Primary Wound Type:    Description of Altered Skin Integrity:    Ankle-Brachial Index:    Pulses:    Removal Indication and Assessment:    Wound Outcome:    (Retired) Wound Length (cm):    (Retired) Wound Width (cm):    (Retired) Depth (cm):    Wound Description (Comments):    Removal Indications:    Wound Image   08/15/24 1342   Description of Altered Skin Integrity Full thickness tissue loss. Subcutaneous fat may be visible but bone, tendon or muscle are not exposed 08/15/24 1342   Dressing Appearance Intact;Moist drainage 08/15/24 1342   Drainage Amount Moderate 08/15/24 1342   Drainage Characteristics/Odor Serosanguineous 08/15/24 1342   Appearance Red;Moist;Hypergranulation 08/15/24 1342   Red (%), Wound Tissue Color 100 % 08/15/24 1342   Periwound Area Excoriated;Redness 08/15/24 1342   Wound Edges Defined 08/15/24 1342   Wound Length (cm) 6.9 cm 08/15/24 1342   Wound Width (cm) 5.8 cm 08/15/24 1342   Wound Depth (cm) 0.1 cm 08/15/24 1342   Wound Volume (cm^3) 4.002 cm^3 08/15/24 1342   Wound Surface Area (cm^2) 40.02 cm^2 08/15/24 1342   Care Cleansed with:;Soap and water;Antimicrobial agent 08/15/24 1342   Dressing  Changed;Applied;Silver;Non-adherent;Absorptive Pad;Compression wrap 08/15/24 1342   Periwound Care Absorptive dressing applied;Moisture barrier applied 08/15/24 1342   Compression Two layer compression 08/15/24 1342   Dressing Change Due 08/22/24 08/15/24 1342         Assessment/Plan:         ICD-10-CM ICD-9-CM   1. Non-pressure chronic ulcer of left lower leg with fat layer exposed  L97.922 707.10   2. Hypergranulation  L92.9 701.5       Silver nitrate applied to hypergranulation tissue.    Tissue pathology and/or culture taken:  [] Yes [x] No   Sharp debridement performed:   [] Yes [x] No   Labs ordered this visit:   [] Yes [x] No   Imaging ordered this visit:   [] Yes [x] No           Orders Placed This Encounter   Procedures    Change dressing     Left lateral lower leg ulcer:     Cleanse wound with:Vashe   Lidocaine: prn   Silver nitrate: prn   Periwound: Zinc Barrier Cream   Primary dressing: Urgotul Ag   Secondary dressing: Large abd x 2   Edema control: Coflex with Zinc     Frequency: Weekly   Follow-up:1 week with , Nurse visit PRN  Other:  Apligraf #3  application next week.        Follow up in about 1 week (around 8/22/2024) for .            This includes face to face time and non-face to face time preparing to see the patient (eg, review of tests), obtaining and/or reviewing separately obtained history, documenting clinical information in the electronic or other health record, independently interpreting results and communicating results to the patient/family/caregiver, or care coordinator.

## 2024-08-21 ENCOUNTER — OFFICE VISIT (OUTPATIENT)
Dept: SURGERY | Facility: CLINIC | Age: 59
End: 2024-08-21
Payer: MEDICARE

## 2024-08-21 VITALS — SYSTOLIC BLOOD PRESSURE: 149 MMHG | DIASTOLIC BLOOD PRESSURE: 73 MMHG | HEART RATE: 87 BPM

## 2024-08-21 DIAGNOSIS — D04.72 SQUAMOUS CELL CARCINOMA IN SITU (SCCIS) OF SKIN OF LEFT LOWER LEG: Primary | ICD-10-CM

## 2024-08-21 PROCEDURE — 3051F HG A1C>EQUAL 7.0%<8.0%: CPT | Mod: CPTII,S$GLB,, | Performed by: STUDENT IN AN ORGANIZED HEALTH CARE EDUCATION/TRAINING PROGRAM

## 2024-08-21 PROCEDURE — 99999 PR PBB SHADOW E&M-EST. PATIENT-LVL IV: CPT | Mod: PBBFAC,,, | Performed by: STUDENT IN AN ORGANIZED HEALTH CARE EDUCATION/TRAINING PROGRAM

## 2024-08-21 PROCEDURE — 3078F DIAST BP <80 MM HG: CPT | Mod: CPTII,S$GLB,, | Performed by: STUDENT IN AN ORGANIZED HEALTH CARE EDUCATION/TRAINING PROGRAM

## 2024-08-21 PROCEDURE — 3066F NEPHROPATHY DOC TX: CPT | Mod: CPTII,S$GLB,, | Performed by: STUDENT IN AN ORGANIZED HEALTH CARE EDUCATION/TRAINING PROGRAM

## 2024-08-21 PROCEDURE — 1160F RVW MEDS BY RX/DR IN RCRD: CPT | Mod: CPTII,S$GLB,, | Performed by: STUDENT IN AN ORGANIZED HEALTH CARE EDUCATION/TRAINING PROGRAM

## 2024-08-21 PROCEDURE — 4010F ACE/ARB THERAPY RXD/TAKEN: CPT | Mod: CPTII,S$GLB,, | Performed by: STUDENT IN AN ORGANIZED HEALTH CARE EDUCATION/TRAINING PROGRAM

## 2024-08-21 PROCEDURE — 3061F NEG MICROALBUMINURIA REV: CPT | Mod: CPTII,S$GLB,, | Performed by: STUDENT IN AN ORGANIZED HEALTH CARE EDUCATION/TRAINING PROGRAM

## 2024-08-21 PROCEDURE — 3077F SYST BP >= 140 MM HG: CPT | Mod: CPTII,S$GLB,, | Performed by: STUDENT IN AN ORGANIZED HEALTH CARE EDUCATION/TRAINING PROGRAM

## 2024-08-21 PROCEDURE — 99024 POSTOP FOLLOW-UP VISIT: CPT | Mod: S$GLB,,, | Performed by: STUDENT IN AN ORGANIZED HEALTH CARE EDUCATION/TRAINING PROGRAM

## 2024-08-21 PROCEDURE — 1159F MED LIST DOCD IN RCRD: CPT | Mod: CPTII,S$GLB,, | Performed by: STUDENT IN AN ORGANIZED HEALTH CARE EDUCATION/TRAINING PROGRAM

## 2024-08-21 RX ORDER — HYDROCODONE BITARTRATE AND ACETAMINOPHEN 5; 325 MG/1; MG/1
1 TABLET ORAL EVERY 4 HOURS PRN
Qty: 10 TABLET | Refills: 0 | Status: SHIPPED | OUTPATIENT
Start: 2024-08-21

## 2024-08-21 NOTE — PROGRESS NOTES
No issues with wound  Following with wound care  Margins clear  Follow up derm regarding scalp  RTC prn  Will give one refill pain meds

## 2024-08-22 ENCOUNTER — HOSPITAL ENCOUNTER (OUTPATIENT)
Dept: WOUND CARE | Facility: HOSPITAL | Age: 59
Discharge: HOME | End: 2024-08-22
Attending: PREVENTIVE MEDICINE
Payer: MEDICARE

## 2024-08-22 VITALS
WEIGHT: 212.06 LBS | HEART RATE: 94 BPM | TEMPERATURE: 98 F | SYSTOLIC BLOOD PRESSURE: 129 MMHG | BODY MASS INDEX: 31.41 KG/M2 | HEIGHT: 69 IN | DIASTOLIC BLOOD PRESSURE: 72 MMHG

## 2024-08-22 DIAGNOSIS — L92.9 HYPERGRANULATION: ICD-10-CM

## 2024-08-22 DIAGNOSIS — L97.922 NON-PRESSURE CHRONIC ULCER OF LEFT LOWER LEG WITH FAT LAYER EXPOSED: Primary | ICD-10-CM

## 2024-08-22 DIAGNOSIS — D04.72 SQUAMOUS CELL CARCINOMA IN SITU (SCCIS) OF SKIN OF LEFT LOWER LEG: ICD-10-CM

## 2024-08-22 PROCEDURE — 15271 SKIN SUB GRAFT TRNK/ARM/LEG: CPT

## 2024-08-22 PROCEDURE — 15272 SKIN SUB GRAFT T/A/L ADD-ON: CPT

## 2024-08-22 NOTE — PROGRESS NOTES
Wound Care & Hyperbaric Medicine    Subjective:       Patient ID: Tj Trammell is a 59 y.o. male.    Chief Complaint: Non-healing Wound    Follow up left lower ext. Wound, Apligraft # 3 (lot # [id number]  exp.2024-08-30 ). Reports small drainage leaked through dressing, otherwise tolerating compression.    Review of Systems   All other systems reviewed and are negative.        Objective:     Vitals:    08/22/24 1319   BP: 129/72   Pulse: 94   Temp: 98 °F (36.7 °C)         Physical Exam       Altered Skin Integrity 07/28/23 0800 Left lateral;lower Leg (Active)   07/28/23 0800   Altered Skin Integrity Present on Admission - Did Patient arrive to the hospital with altered skin?: yes   Side: Left   Orientation: lateral;lower   Location: Leg   Wound Number:    Is this injury device related?: No   Primary Wound Type:    Description of Altered Skin Integrity:    Ankle-Brachial Index:    Pulses:    Removal Indication and Assessment:    Wound Outcome:    (Retired) Wound Length (cm):    (Retired) Wound Width (cm):    (Retired) Depth (cm):    Wound Description (Comments):    Removal Indications:    Wound Image   08/22/24 1348   Description of Altered Skin Integrity Full thickness tissue loss. Subcutaneous fat may be visible but bone, tendon or muscle are not exposed 08/22/24 1348   Dressing Appearance Intact;Moist drainage 08/22/24 1348   Drainage Amount Moderate 08/22/24 1348   Drainage Characteristics/Odor Serosanguineous 08/22/24 1348   Appearance Red;Moist;Hypergranulation 08/22/24 1348   Red (%), Wound Tissue Color 100 % 08/22/24 1348   Periwound Area Excoriated;Redness 08/22/24 1348   Wound Edges Defined 08/22/24 1348   Wound Length (cm) 6 cm 08/22/24 1348   Wound Width (cm) 5.2 cm 08/22/24 1348   Wound Depth (cm) 0.1 cm 08/22/24 1348   Wound Volume (cm^3) 3.12 cm^3 08/22/24 1348   Wound Surface Area (cm^2) 31.2 cm^2 08/22/24 1348   Care Cleansed with:;Antimicrobial agent;Soap and water  08/22/24 1348   Dressing Applied;Non-adherent;Absorptive Pad;Cast padding;Compression wrap 08/22/24 1348   Periwound Care Absorptive dressing applied;Moisture barrier applied 08/22/24 1348   Compression Two layer compression 08/22/24 1348   Dressing Change Due 08/29/24 08/22/24 1348       Wound Location: left leg  Skin Substitute: apligraf  Performed By: Joe Casey  Time-out Taken: Yes  Application Area: (sq cm): 32   Product Applied: (sq cm): 44  Product Waste (sq cm): 0  Instrument used for debridement:  [] Blade [x] Curette  []Forceps  []Nippers  [] Rongeur [] Scissors  []Others:   Secured with: Steri-strips and contact layer  Dressing Applied: Yes      Assessment/Plan:         ICD-10-CM ICD-9-CM   1. Non-pressure chronic ulcer of left lower leg with fat layer exposed  L97.922 707.10   2. Hypergranulation  L92.9 701.5   3. 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  D04.72 232.7           Tissue pathology and/or culture taken:  [] Yes [x] No   Sharp debridement performed:   [x] Yes [] No   Labs ordered this visit:   [] Yes [x] No   Imaging ordered this visit:   [] Yes [x] No           Orders Placed This Encounter   Procedures    Change dressing     Left lateral lower leg ulcer:     Cleanse wound with:Vashe   Lidocaine: prn   Silver nitrate: prn   Bety wound: Zinc barrier cream   Primary dressing: Apligraf #3, versatel, steristrips, Drawtex, large mextra   Secondary dressing: Large abd x 2   Edema control: Coflex with Zinc     Frequency: Weekly, Nurse visit weekly prn   Follow-up:1 week with    Other orders:Apligraft # 4 next MD visit.        Follow up today (on 8/22/2024) for Dr. Casey.            This includes face to face time and non-face to face time preparing to see the patient (eg, review of tests), obtaining and/or reviewing separately obtained history, documenting clinical information in the electronic or other health record, independently interpreting results and  communicating results to the patient/family/caregiver, or care coordinator.

## 2024-08-29 ENCOUNTER — HOSPITAL ENCOUNTER (OUTPATIENT)
Dept: WOUND CARE | Facility: HOSPITAL | Age: 59
Discharge: HOME | End: 2024-08-29
Attending: PREVENTIVE MEDICINE
Payer: MEDICARE

## 2024-08-29 VITALS
BODY MASS INDEX: 31.41 KG/M2 | DIASTOLIC BLOOD PRESSURE: 89 MMHG | TEMPERATURE: 98 F | HEIGHT: 69 IN | SYSTOLIC BLOOD PRESSURE: 168 MMHG | WEIGHT: 212.06 LBS | HEART RATE: 105 BPM

## 2024-08-29 DIAGNOSIS — D04.72 SQUAMOUS CELL CARCINOMA IN SITU (SCCIS) OF SKIN OF LEFT LOWER LEG: ICD-10-CM

## 2024-08-29 DIAGNOSIS — L92.9 HYPERGRANULATION: ICD-10-CM

## 2024-08-29 DIAGNOSIS — L97.922 NON-PRESSURE CHRONIC ULCER OF LEFT LOWER LEG WITH FAT LAYER EXPOSED: Primary | ICD-10-CM

## 2024-08-29 PROCEDURE — 15271 SKIN SUB GRAFT TRNK/ARM/LEG: CPT

## 2024-08-29 NOTE — PROGRESS NOTES
Wound Care & Hyperbaric Medicine    Subjective:       Patient ID: Tj Trammell is a 59 y.o. male.    Chief Complaint: Wound Care    Follow up left lateral lower leg, Apligraf # 4 applied, ( LOT # [id number], EXP. 9/5/24), no issues reported with issues of drainage on dressing this week.    Review of Systems   All other systems reviewed and are negative.        Objective:     Vitals:    08/29/24 1314   BP: (!) 168/89   Pulse: 105   Temp: 98.2 °F (36.8 °C)         Physical Exam       Altered Skin Integrity 07/28/23 0800 Left lateral;lower Leg (Active)   07/28/23 0800   Altered Skin Integrity Present on Admission - Did Patient arrive to the hospital with altered skin?: yes   Side: Left   Orientation: lateral;lower   Location: Leg   Wound Number:    Is this injury device related?: No   Primary Wound Type:    Description of Altered Skin Integrity:    Ankle-Brachial Index:    Pulses:    Removal Indication and Assessment:    Wound Outcome:    (Retired) Wound Length (cm):    (Retired) Wound Width (cm):    (Retired) Depth (cm):    Wound Description (Comments):    Removal Indications:    Wound Image   08/29/24 1326   Description of Altered Skin Integrity Full thickness tissue loss. Subcutaneous fat may be visible but bone, tendon or muscle are not exposed 08/29/24 1326   Dressing Appearance Intact;Moist drainage 08/29/24 1326   Drainage Amount Moderate 08/29/24 1326   Drainage Characteristics/Odor Serosanguineous 08/29/24 1326   Appearance Red;Moist;Hypergranulation 08/29/24 1326   Red (%), Wound Tissue Color 100 % 08/29/24 1326   Periwound Area Redness 08/29/24 1326   Wound Edges Defined 08/29/24 1326   Wound Length (cm) 4.6 cm 08/29/24 1326   Wound Width (cm) 4.6 cm 08/29/24 1326   Wound Depth (cm) 0.1 cm 08/29/24 1326   Wound Volume (cm^3) 2.116 cm^3 08/29/24 1326   Wound Surface Area (cm^2) 21.16 cm^2 08/29/24 1326   Care Cleansed with:;Soap and water;Antimicrobial agent 08/29/24 1326    Dressing Applied;Compression wrap;Absorptive Pad;Other (comment) 08/29/24 1326   Periwound Care Absorptive dressing applied;Moisture barrier applied 08/29/24 1326   Compression Two layer compression 08/29/24 1326   Dressing Change Due 09/05/24 08/29/24 1326       Wound Location: left leg  Skin Substitute: apligraf  Performed By: Joe Casey  Time-out Taken: Yes  Application Area: (sq cm): 22   Product Applied: (sq cm): 44  Product Waste (sq cm): 0  Secured with: Steri-strips and contact layer  Dressing Applied: Yes      Assessment/Plan:         ICD-10-CM ICD-9-CM   1. Non-pressure chronic ulcer of left lower leg with fat layer exposed  L97.922 707.10   2. Hypergranulation  L92.9 701.5   3. 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 No signs of infection or necrosis.      Tissue pathology and/or culture taken:  [] Yes [x] No   Sharp debridement performed:   [] Yes [x] No   Labs ordered this visit:   [] Yes [x] No   Imaging ordered this visit:   [] Yes [x] No           Orders Placed This Encounter   Procedures    Change dressing     Left lateral lower leg ulcer:    Cleanse wound with:Vashe  Lidocaine: prn  Silver nitrate: prn  Bety wound: Zinc barrier cream  Primary dressing: Apligraf #4, versatel, steristrips, Drawtex, large mextra  Secondary dressing: Large abd x 2  Edema control: Coflex with Zinc    Frequency: Weekly, Nurse visit weekly prn  Follow-up:1 week with   Other orders:Apligraft # 5 next MD visit.        Follow up in about 1 week (around 9/5/2024) for dr. Casey.            This includes face to face time and non-face to face time preparing to see the patient (eg, review of tests), obtaining and/or reviewing separately obtained history, documenting clinical information in the electronic or other health record, independently interpreting results and communicating results to the patient/family/caregiver, or care coordinator.

## 2024-09-05 ENCOUNTER — HOSPITAL ENCOUNTER (OUTPATIENT)
Dept: WOUND CARE | Facility: HOSPITAL | Age: 59
Discharge: HOME | End: 2024-09-05
Attending: PREVENTIVE MEDICINE
Payer: MEDICARE

## 2024-09-05 VITALS
DIASTOLIC BLOOD PRESSURE: 67 MMHG | BODY MASS INDEX: 31.41 KG/M2 | SYSTOLIC BLOOD PRESSURE: 103 MMHG | HEIGHT: 69 IN | TEMPERATURE: 99 F | WEIGHT: 212.06 LBS | HEART RATE: 119 BPM

## 2024-09-05 DIAGNOSIS — D04.72 SQUAMOUS CELL CARCINOMA IN SITU (SCCIS) OF SKIN OF LEFT LOWER LEG: ICD-10-CM

## 2024-09-05 DIAGNOSIS — L92.9 HYPERGRANULATION: ICD-10-CM

## 2024-09-05 DIAGNOSIS — L97.922 NON-PRESSURE CHRONIC ULCER OF LEFT LOWER LEG WITH FAT LAYER EXPOSED: Primary | ICD-10-CM

## 2024-09-05 PROCEDURE — 15272 SKIN SUB GRAFT T/A/L ADD-ON: CPT

## 2024-09-05 PROCEDURE — 15271 SKIN SUB GRAFT TRNK/ARM/LEG: CPT

## 2024-09-05 NOTE — PROGRESS NOTES
Wound Care & Hyperbaric Medicine    Subjective:       Patient ID: Tj Trammell is a 59 y.o. male.    Chief Complaint: Wound Check and Wound Care     Follow up related to left lateral lower leg wound. No acute complaints. Apligraf#5 placed, 01 [id number], ([id number], [id number] [id number], exp 2024-09-12.           Review of Systems   All other systems reviewed and are negative.        Objective:     Vitals:    09/05/24 1306   BP: 103/67   Pulse: (!) 119   Temp: 98.5 °F (36.9 °C)         Physical Exam       Altered Skin Integrity 07/28/23 0800 Left lateral;lower Leg (Active)   07/28/23 0800   Altered Skin Integrity Present on Admission - Did Patient arrive to the hospital with altered skin?: yes   Side: Left   Orientation: lateral;lower   Location: Leg   Wound Number:    Is this injury device related?: No   Primary Wound Type:    Description of Altered Skin Integrity:    Ankle-Brachial Index:    Pulses:    Removal Indication and Assessment:    Wound Outcome:    (Retired) Wound Length (cm):    (Retired) Wound Width (cm):    (Retired) Depth (cm):    Wound Description (Comments):    Removal Indications:    Wound Image   09/05/24 1301   Description of Altered Skin Integrity Full thickness tissue loss. Subcutaneous fat may be visible but bone, tendon or muscle are not exposed 09/05/24 1301   Dressing Appearance Intact;Moist drainage 09/05/24 1301   Drainage Amount Moderate 09/05/24 1301   Drainage Characteristics/Odor Serosanguineous 09/05/24 1301   Appearance Red;Moist;Hypergranulation 09/05/24 1301   Tissue loss description Full thickness 09/05/24 1301   Red (%), Wound Tissue Color 100 % 09/05/24 1301   Periwound Area Redness;Dry 09/05/24 1301   Wound Edges Defined 09/05/24 1301   Wound Length (cm) 4.2 cm 09/05/24 1301   Wound Width (cm) 3.9 cm 09/05/24 1301   Wound Depth (cm) 0.1 cm 09/05/24 1301   Wound Volume (cm^3) 1.638 cm^3 09/05/24 1301   Wound Surface Area (cm^2) 16.38 cm^2  09/05/24 1301   Care Cleansed with:;Antimicrobial agent 09/05/24 1301   Dressing Applied;Compression wrap;Absorptive Pad;Other (comment);Non-adherent 09/05/24 1301   Periwound Care Absorptive dressing applied;Skin barrier film applied 09/05/24 1301   Compression Two layer compression 09/05/24 1301   Dressing Change Due 09/12/24 09/05/24 1301   Wound Location: Left leg  Skin Substitute: Apligraf  Performed By: Joe Casey  Time-out Taken: Yes  Application Area: (sq cm): 16   Product Applied: (sq cm): 44  Product Waste (sq cm): 0  Secured with: Steri-strips and contact layer  Dressing Applied: Yes            Assessment/Plan:         ICD-10-CM ICD-9-CM   1. Non-pressure chronic ulcer of left lower leg with fat layer exposed  L97.922 707.10   2. Hypergranulation  L92.9 701.5   3. 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 No signs of infection or necrosis.      Tissue pathology and/or culture taken:  [] Yes [x] No   Sharp debridement performed:   [] Yes [x] No   Labs ordered this visit:   [] Yes [x] No   Imaging ordered this visit:   [] Yes [x] No           Orders Placed This Encounter   Procedures    Change dressing     Left lateral lower leg ulcer:    Cleanse wound with:Vashe  Lidocaine: prn  Silver nitrate: prn  Bety wound: mastisol  Primary dressing: Apligraf #5, versatel, steristrips, Drawtex, large mextra  Secondary dressing: Large abd x 2  Edema control: Coflex with Zinc    Frequency: Weekly, Nurse visit weekly prn  Follow-up:1 week with         Follow up in about 1 week (around 9/12/2024) for Dr Casey.            This includes face to face time and non-face to face time preparing to see the patient (eg, review of tests), obtaining and/or reviewing separately obtained history, documenting clinical information in the electronic or other health record, independently interpreting results and communicating results to the  patient/family/caregiver, or care coordinator.

## 2024-09-12 ENCOUNTER — HOSPITAL ENCOUNTER (OUTPATIENT)
Dept: WOUND CARE | Facility: HOSPITAL | Age: 59
Discharge: HOME | End: 2024-09-12
Attending: PREVENTIVE MEDICINE
Payer: MEDICARE

## 2024-09-12 VITALS
RESPIRATION RATE: 20 BRPM | DIASTOLIC BLOOD PRESSURE: 89 MMHG | SYSTOLIC BLOOD PRESSURE: 149 MMHG | HEART RATE: 90 BPM | TEMPERATURE: 98 F

## 2024-09-12 DIAGNOSIS — L92.9 HYPERGRANULATION: ICD-10-CM

## 2024-09-12 DIAGNOSIS — L97.922 NON-PRESSURE CHRONIC ULCER OF LEFT LOWER LEG WITH FAT LAYER EXPOSED: Primary | ICD-10-CM

## 2024-09-12 PROCEDURE — 17250 CHEM CAUT OF GRANLTJ TISSUE: CPT

## 2024-09-12 PROCEDURE — 29581 APPL MULTLAYER CMPRN SYS LEG: CPT

## 2024-09-12 NOTE — PROGRESS NOTES
Wound Care & Hyperbaric Medicine    Subjective:       Patient ID: Tj Trammell is a 59 y.o. male.    Chief Complaint: Non-healing Wound Follow Up    Follow up left leg wound. Wound improving wound bed hypergranulated, no complaints, continued coflex compression dressing.     Review of Systems   All other systems reviewed and are negative.        Objective:     Vitals:    09/12/24 1049   BP: (!) 149/89   Pulse: 90   Resp: 20   Temp: 98.2 °F (36.8 °C)         Physical Exam       Altered Skin Integrity 07/28/23 0800 Left lateral;lower Leg (Active)   07/28/23 0800   Altered Skin Integrity Present on Admission - Did Patient arrive to the hospital with altered skin?: yes   Side: Left   Orientation: lateral;lower   Location: Leg   Wound Number:    Is this injury device related?: No   Primary Wound Type:    Description of Altered Skin Integrity:    Ankle-Brachial Index:    Pulses:    Removal Indication and Assessment:    Wound Outcome:    (Retired) Wound Length (cm):    (Retired) Wound Width (cm):    (Retired) Depth (cm):    Wound Description (Comments):    Removal Indications:    Wound Image   09/12/24 1050   Description of Altered Skin Integrity Full thickness tissue loss. Subcutaneous fat may be visible but bone, tendon or muscle are not exposed 09/12/24 1050   Dressing Appearance Intact;Moist drainage 09/12/24 1050   Drainage Amount Moderate 09/12/24 1050   Drainage Characteristics/Odor Serosanguineous 09/12/24 1050   Appearance Red;Moist;Hypergranulation 09/12/24 1050   Tissue loss description Full thickness 09/12/24 1050   Red (%), Wound Tissue Color 100 % 09/12/24 1050   Periwound Area Redness;Macerated 09/12/24 1050   Wound Edges Defined 09/12/24 1050   Wound Length (cm) 3 cm 09/12/24 1050   Wound Width (cm) 2.6 cm 09/12/24 1050   Wound Depth (cm) 0.1 cm 09/12/24 1050   Wound Volume (cm^3) 0.78 cm^3 09/12/24 1050   Wound Surface Area (cm^2) 7.8 cm^2 09/12/24 1050   Care Cleansed with:;Soap  and water;Antimicrobial agent 09/12/24 1050   Dressing Applied;Silver;Non-adherent;Absorptive Pad;Compression wrap 09/12/24 1050   Periwound Care Absorptive dressing applied;Moisture barrier applied 09/12/24 1050   Compression Two layer compression 09/12/24 1050   Dressing Change Due 09/19/24 09/12/24 1050         Assessment/Plan:         ICD-10-CM ICD-9-CM   1. Non-pressure chronic ulcer of left lower leg with fat layer exposed  L97.922 707.10   2. Hypergranulation  L92.9 701.5       Silver nitrate applied to hypergranulation tissue.    Tissue pathology and/or culture taken:  [] Yes [x] No   Sharp debridement performed:   [] Yes [x] No   Labs ordered this visit:   [] Yes [x] No   Imaging ordered this visit:   [] Yes [x] No           Orders Placed This Encounter   Procedures    Change dressing     Left lateral lower leg ulcer:    Cleanse wound with:Vashe  Lidocaine: prn  Silver nitrate: prn  Bety wound:Zinc Barrier Cream   Primary dressing: Silver Contact Layer   Secondary dressing: Large abd x 2  Edema control: Coflex with Zinc    Frequency: Weekly, Nurse visit weekly prn  Follow-up:1 week with         Follow up in about 1 week (around 9/19/2024) for .            This includes face to face time and non-face to face time preparing to see the patient (eg, review of tests), obtaining and/or reviewing separately obtained history, documenting clinical information in the electronic or other health record, independently interpreting results and communicating results to the patient/family/caregiver, or care coordinator.

## 2024-09-19 ENCOUNTER — HOSPITAL ENCOUNTER (OUTPATIENT)
Dept: RADIOLOGY | Facility: HOSPITAL | Age: 59
Discharge: HOME | End: 2024-09-19
Attending: PREVENTIVE MEDICINE
Payer: MEDICARE

## 2024-09-19 ENCOUNTER — HOSPITAL ENCOUNTER (OUTPATIENT)
Dept: WOUND CARE | Facility: HOSPITAL | Age: 59
Discharge: HOME | End: 2024-09-19
Attending: PREVENTIVE MEDICINE
Payer: MEDICARE

## 2024-09-19 VITALS
DIASTOLIC BLOOD PRESSURE: 55 MMHG | BODY MASS INDEX: 31.41 KG/M2 | TEMPERATURE: 99 F | HEIGHT: 69 IN | HEART RATE: 89 BPM | WEIGHT: 212.06 LBS | SYSTOLIC BLOOD PRESSURE: 100 MMHG
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
## 2024-09-19 DIAGNOSIS — S62.634A CLOSED DISPLACED FRACTURE OF DISTAL PHALANX OF RIGHT RING FINGER, INITIAL ENCOUNTER: Primary | ICD-10-CM

## 2024-09-19 DIAGNOSIS — L92.9 HYPERGRANULATION: ICD-10-CM

## 2024-09-19 DIAGNOSIS — L97.922 NON-PRESSURE CHRONIC ULCER OF LEFT LOWER LEG WITH FAT LAYER EXPOSED: Primary | ICD-10-CM

## 2024-09-19 PROCEDURE — 17250 CHEM CAUT OF GRANLTJ TISSUE: CPT

## 2024-09-19 PROCEDURE — 29581 APPL MULTLAYER CMPRN SYS LEG: CPT

## 2024-09-19 PROCEDURE — 73140 X-RAY EXAM OF FINGER(S): CPT | Mod: 26,RT,, | Performed by: RADIOLOGY

## 2024-09-19 PROCEDURE — 73140 X-RAY EXAM OF FINGER(S): CPT | Mod: TC,FY,RT

## 2024-09-19 NOTE — PROGRESS NOTES
Wound Care & Hyperbaric Medicine    Subjective:       Patient ID: Tj Trammell is a 59 y.o. male.    Chief Complaint: Non-healing Wound Follow Up    Follow up left leg wound. Wound Improving hypergranulation tissue noted to wound bed. Tolerating compression. Reports falling out of bed yesterday complaints of pain 6/10 and limited motion to distal right 4th finger X-ray ordered.     Review of Systems   All other systems reviewed and are negative.        Objective:     Vitals:    09/19/24 1406   BP: (!) 100/55   Pulse: 89   Temp: 99.1 °F (37.3 °C)         Physical Exam       Altered Skin Integrity 07/28/23 0800 Left lateral;lower Leg (Active)   07/28/23 0800   Altered Skin Integrity Present on Admission - Did Patient arrive to the hospital with altered skin?: yes   Side: Left   Orientation: lateral;lower   Location: Leg   Wound Number:    Is this injury device related?: No   Primary Wound Type:    Description of Altered Skin Integrity:    Ankle-Brachial Index:    Pulses:    Removal Indication and Assessment:    Wound Outcome:    (Retired) Wound Length (cm):    (Retired) Wound Width (cm):    (Retired) Depth (cm):    Wound Description (Comments):    Removal Indications:    Wound Image   09/19/24 1420   Description of Altered Skin Integrity Full thickness tissue loss. Subcutaneous fat may be visible but bone, tendon or muscle are not exposed 09/19/24 1420   Dressing Appearance Intact;Moist drainage 09/19/24 1420   Drainage Amount Moderate 09/19/24 1420   Drainage Characteristics/Odor Serosanguineous 09/19/24 1420   Appearance Red;Moist;Hypergranulation 09/19/24 1420   Tissue loss description Full thickness 09/19/24 1420   Red (%), Wound Tissue Color 100 % 09/19/24 1420   Periwound Area Denuded;Macerated 09/19/24 1420   Wound Edges Defined 09/19/24 1420   Wound Length (cm) 3 cm 09/19/24 1420   Wound Width (cm) 2.6 cm 09/19/24 1420   Wound Depth (cm) 0.1 cm 09/19/24 1420   Wound Volume (cm^3)  0.78 cm^3 09/19/24 1420   Wound Surface Area (cm^2) 7.8 cm^2 09/19/24 1420   Care Cleansed with:;Soap and water;Antimicrobial agent 09/19/24 1420   Dressing Applied;Silver;Non-adherent;Absorptive Pad;Compression wrap 09/19/24 1420   Periwound Care Absorptive dressing applied;Skin barrier film applied 09/19/24 1420   Compression Two layer compression 09/19/24 1420   Dressing Change Due 09/26/24 09/19/24 1420            Wound 09/19/24 1345 Traumatic Right Finger, fourth (Active)   09/19/24 1345   Present on Original Admission: Y   Primary Wound Type: Traumatic   Side: Right   Orientation:    Location: Finger, fourth   Wound Approximate Age at First Assessment (Weeks):    Wound Number:    Is this injury device related?:    Incision Type:    Closure Method:    Wound Description (Comments):    Type:    Additional Comments:    Ankle-Brachial Index:    Pulses:    Removal Indication and Assessment:    Wound Outcome:    Wound Image   09/19/24 1420   Dressing Appearance Open to air;Dry 09/19/24 1420   Drainage Amount None 09/19/24 1420   Appearance Purple;Dry 09/19/24 1420   Tissue loss description Not applicable 09/19/24 1420   Periwound Area Edematous 09/19/24 1420   Wound Edges Rolled/closed 09/19/24 1420   Care Cleansed with:;Sterile normal saline 09/19/24 1420   Dressing Change Due 09/20/24 09/19/24 1420         Assessment/Plan:         ICD-10-CM ICD-9-CM   1. Non-pressure chronic ulcer of left lower leg with fat layer exposed  L97.922 707.10   2. Hypergranulation  L92.9 701.5   3. Fall, initial encounter  W19.XXXA E888.9       Silver nitrate applied to wound.    Tissue pathology and/or culture taken:  [] Yes [x] No   Sharp debridement performed:   [] Yes [x] No   Labs ordered this visit:   [] Yes [x] No   Imaging ordered this visit:   [] Yes [x] No           Orders Placed This Encounter   Procedures    X-Ray Finger 2 or More Views Right     Standing Status:   Future     Number of Occurrences:   1     Standing Expiration  Date:   9/19/2025     Order Specific Question:   Reason for Exam:     Answer:   4th     Order Specific Question:   May the Radiologist modify the order per protocol to meet the clinical needs of the patient?     Answer:   Yes     Order Specific Question:   Release to patient     Answer:   Immediate    Change dressing     Left lateral lower leg ulcer:     Cleanse wound with:Vashe   Lidocaine: prn   Silver nitrate: prn   Bety wound:Cavilon    Primary dressing: Silver Contact Layer  whole sheet   Secondary dressing: Large abd x 2   Edema control: Coflex with Zinc     Frequency: Weekly, Nurse visit weekly prn   Follow-up:1 week with      Other Orders: X-ray right hand ordered 9/19/24.        Follow up in about 1 week (around 9/26/2024) for .            This includes face to face time and non-face to face time preparing to see the patient (eg, review of tests), obtaining and/or reviewing separately obtained history, documenting clinical information in the electronic or other health record, independently interpreting results and communicating results to the patient/family/caregiver, or care coordinator.

## 2024-09-20 DIAGNOSIS — G35 MULTIPLE SCLEROSIS: ICD-10-CM

## 2024-09-20 RX ORDER — BACLOFEN 10 MG/1
10 TABLET ORAL 3 TIMES DAILY
Qty: 90 TABLET | Refills: 11 | Status: SHIPPED | OUTPATIENT
Start: 2024-09-20

## 2024-09-23 ENCOUNTER — TELEPHONE (OUTPATIENT)
Dept: ORTHOPEDICS | Facility: CLINIC | Age: 59
End: 2024-09-23
Payer: MEDICARE

## 2024-09-23 NOTE — TELEPHONE ENCOUNTER
Spoke with patients brother. Offered him an appointment on Thursday in [place]. He stated that he gives patients rides, but he is currently out of town this week and will be back on Sunday. Appointment scheduled for 10/1 with Dr Elio Garner's physician assistant.

## 2024-09-26 ENCOUNTER — HOSPITAL ENCOUNTER (OUTPATIENT)
Dept: WOUND CARE | Facility: HOSPITAL | Age: 59
Discharge: HOME | End: 2024-09-26
Attending: PREVENTIVE MEDICINE
Payer: MEDICARE

## 2024-09-26 VITALS
SYSTOLIC BLOOD PRESSURE: 150 MMHG | DIASTOLIC BLOOD PRESSURE: 71 MMHG | HEART RATE: 88 BPM | RESPIRATION RATE: 20 BRPM | TEMPERATURE: 98 F

## 2024-09-26 DIAGNOSIS — L97.922 NON-PRESSURE CHRONIC ULCER OF LEFT LOWER LEG WITH FAT LAYER EXPOSED: Primary | ICD-10-CM

## 2024-09-26 DIAGNOSIS — L92.9 HYPERGRANULATION: ICD-10-CM

## 2024-09-26 PROCEDURE — 17250 CHEM CAUT OF GRANLTJ TISSUE: CPT

## 2024-09-26 PROCEDURE — 29581 APPL MULTLAYER CMPRN SYS LEG: CPT

## 2024-09-26 NOTE — PROGRESS NOTES
Wound Care & Hyperbaric Medicine    Subjective:       Patient ID: Tj Trammell is a 59 y.o. male.    Chief Complaint: Non-healing Wound Follow Up    Follow up visit for his LLE wound. Wound continues to contract hypergranulation tissue to wound bed. Reports appointment 10/1/24 with ortho for right 4th finger fracture.    Review of Systems   All other systems reviewed and are negative.        Objective:     Vitals:    09/26/24 1359   BP: (!) 150/71   Pulse: 88   Resp: 20   Temp: 98.4 °F (36.9 °C)         Physical Exam       Altered Skin Integrity 07/28/23 0800 Left lateral;lower Leg (Active)   07/28/23 0800   Altered Skin Integrity Present on Admission - Did Patient arrive to the hospital with altered skin?: yes   Side: Left   Orientation: lateral;lower   Location: Leg   Wound Number:    Is this injury device related?: No   Primary Wound Type:    Description of Altered Skin Integrity:    Ankle-Brachial Index:    Pulses:    Removal Indication and Assessment:    Wound Outcome:    (Retired) Wound Length (cm):    (Retired) Wound Width (cm):    (Retired) Depth (cm):    Wound Description (Comments):    Removal Indications:    Wound Image   09/26/24 1339   Description of Altered Skin Integrity Full thickness tissue loss. Subcutaneous fat may be visible but bone, tendon or muscle are not exposed 09/26/24 1339   Dressing Appearance Intact;Moist drainage 09/26/24 1339   Drainage Amount Moderate 09/26/24 1339   Drainage Characteristics/Odor Serosanguineous 09/26/24 1339   Appearance Red;Moist;Hypergranulation 09/26/24 1339   Tissue loss description Full thickness 09/26/24 1339   Red (%), Wound Tissue Color 100 % 09/26/24 1339   Periwound Area Redness;Scar tissue 09/26/24 1339   Wound Edges Defined 09/26/24 1339   Wound Length (cm) 2.2 cm 09/26/24 1339   Wound Width (cm) 1.6 cm 09/26/24 1339   Wound Depth (cm) 0.1 cm 09/26/24 1339   Wound Volume (cm^3) 0.352 cm^3 09/26/24 1339   Wound Surface Area  (cm^2) 3.52 cm^2 09/26/24 1339   Care Cleansed with:;Soap and water;Antimicrobial agent 09/26/24 1339   Dressing Applied;Silver;Non-adherent;Absorptive Pad;Compression wrap 09/26/24 1339   Periwound Care Absorptive dressing applied;Skin barrier film applied 09/26/24 1339   Compression Two layer compression 09/26/24 1339   Dressing Change Due 10/03/24 09/26/24 1339         Assessment/Plan:         ICD-10-CM ICD-9-CM   1. Non-pressure chronic ulcer of left lower leg with fat layer exposed  L97.922 707.10   2. Hypergranulation  L92.9 701.5       Wound is improving. No signs of infection or necrosis.  Silver nitrate applied to hypergranulation.    Tissue pathology and/or culture taken:  [] Yes [x] No   Sharp debridement performed:   [] Yes [x] No   Labs ordered this visit:   [] Yes [x] No   Imaging ordered this visit:   [] Yes [x] No           Orders Placed This Encounter   Procedures    Change dressing     Left lateral lower leg ulcer:    Cleanse wound with:Vashe  Lidocaine: prn  Silver nitrate: prn  Bety wound:Cavilon    Primary dressing: Silver Contact Layer  whole sheet  Secondary dressing: Large abd x 2  Edema control: Coflex with Zinc    Frequency: Weekly, Nurse visit weekly prn  Follow-up:1 week with     Other Orders: 10/1/24 appointment with Ortho.        Follow up in about 1 week (around 10/3/2024) for .            This includes face to face time and non-face to face time preparing to see the patient (eg, review of tests), obtaining and/or reviewing separately obtained history, documenting clinical information in the electronic or other health record, independently interpreting results and communicating results to the patient/family/caregiver, or care coordinator.

## 2024-09-30 NOTE — PROGRESS NOTES
Chief Complaint: Evaluation of New Hand Pain    SUBJECTIVE:   History of Present Illness:  Tj Trammell is a 60yo RHD male who presents to the clinic with pain and deformity to the right ring digit.  Who sustained an injury about 2 weeks ago when he fell from his bed and jammed the right ring digit into the ground.  He noted immediate pain to the distal aspect of the finger.  He also reports and overlying abrasion and surrounding redness.  Since the injury his digit has been in a flexed position.  He denies pain elsewhere and/or associated numbness or tingling.    Patient is currently not splinted.    Translation assistance provided by professional services [employer].    Review of patient's allergies indicates:  No Known Allergies      Current Outpatient Medications   Medication Sig Dispense Refill    acetaZOLAMIDE (DIAMOX) 250 MG tablet Take 2 TABLETS BY MOUTH ONLY 8 HOURS AFTER SURGERY 2 tablet 0    albuterol (PROAIR HFA) 90 mcg/actuation inhaler Inhale 2 puffs by mouth every 4 hours. 8.5 g 3    ALPRAZolam (XANAX) 0.5 MG tablet TAKE ONE TABLET BY MOUTH TWICE DAILY AS NEEDED FOR ANXIETY 60 tablet 4    atorvastatin (LIPITOR) 40 MG tablet Take 1 tablet (40 mg total) by mouth once daily. 90 tablet 4    bacitracin 500 unit/gram Oint Apply topically 2 (two) times daily. 28 g 0    baclofen (LIORESAL) 10 MG tablet TAKE 1 TABLET BY MOUTH 3 TIMES DAILY. 90 tablet 11    buPROPion (WELLBUTRIN SR) 150 MG TBSR 12 hr tablet TAKE 1 TABLET BY MOUTH EVERY 12 HOURS DAILY. 60 tablet 11    dicyclomine (BENTYL) 10 MG capsule TAKE 1 CAPSULE BY MOUTH THREE TIMES A DAY FOR ABDOMINAL PAIN 90 capsule 3    difluprednate (DUREZOL) 0.05 % Drop ophthalmic solution Instill one drop TO SURGERY EYE four times a day AFTER SURGERY X 30 DAYS 5 mL 4    docusate sodium (COLACE) 100 MG capsule Take 1 capsule (100 mg total) by mouth once daily. 30 capsule 5    ergocalciferol (ERGOCALCIFEROL) 50,000 unit Cap TAKE 1 CAPSULE BY MOUTH Weekly 12 capsule 0     famotidine (PEPCID) 20 MG tablet Take 1 tablet (20 mg total) by mouth 2 (two) times daily. 180 tablet 3    fingolimod (GILENYA) 0.5 mg Cap Take 1 capsule (0.5 mg total) by mouth once daily. 90 capsule 2    fingolimod (GILENYA) 0.5 mg Cap Take 1 capsule (0.5 mg total) by mouth once daily. 90 capsule 2    gabapentin (NEURONTIN) 300 MG capsule TAKE 1 CAPSULE BY MOUTH AT BEDTIME 30 capsule 11    HYDROcodone-acetaminophen (NORCO) 5-325 mg per tablet Take 1 tablet by mouth every 4 (four) hours as needed for Pain. 10 tablet 0    HYDROcodone-acetaminophen (NORCO) 5-325 mg per tablet Take 1 tablet by mouth every 4 (four) hours as needed for Pain. 10 tablet 0    imiquimod (ALDARA) 5 % cream APPLY AT BEDTIME TO 2 SPOTS OF SCALP 5 TIMES A WEEK FOR 6 WEEKS SKIP SATURDAYS AND SUNDAYS 12 packet 0    lactulose (CHRONULAC) 10 gram/15 mL solution Take 45 mLs (30 g total) by mouth 3 (three) times daily. 3784 mL 3    lisinopriL (PRINIVIL,ZESTRIL) 5 MG tablet TAKE 1 TABLET BY MOUTH DAILY 90 tablet 3    mupirocin (BACTROBAN) 2 % ointment Apply daily to left hand 22 g 2    nabumetone (RELAFEN) 500 MG tablet Take 1 tablet (500 mg total) by mouth 2 (two) times daily as needed. 60 tablet 6    nebulizer and compressor (HOME NEBULIZER PLUS SIDESTREAM) Lupe use as directed 1 each 0    ofloxacin (OCUFLOX) 0.3 % ophthalmic solution Instill 1 drop TO SURGERY EYE four times a day STARTING 2 DAYS BEFORE SURGERY 5 mL 3    oxybutynin (DITROPAN-XL) 5 MG TR24 Take 1 tablet (5 mg total) by mouth once daily. 90 tablet 3    pantoprazole (PROTONIX) 40 MG tablet TAKE ONE TABLET BY MOUTH  ONCE DAILY 90 tablet 3    senna-docusate 8.6-50 mg (PERICOLACE) 8.6-50 mg per tablet Take 1 tablet by mouth 2 (two) times daily.      senna-docusate 8.6-50 mg (PERICOLACE) 8.6-50 mg per tablet Take 1 tablet by mouth 2 (two) times daily.      sodium,potassium,mag sulfates (SUPREP BOWEL PREP KIT) 17.5-3.13-1.6 gram SolR Take 177 mLs by mouth once daily. 354 mL 0    traMADoL  (ULTRAM) 50 mg tablet Take 1 tablet (50 mg total) by mouth every 6 (six) hours as needed for pain 120 tablet 4    triamcinolone acetonide 0.1% (KENALOG) 0.1 % cream Apply topically 2 (two) times daily. for 10 days 80 g 0     No current facility-administered medications for this visit.       Past Medical History:   Diagnosis Date    Asthma     Bilateral hearing loss 12/12/2012    Biliary acute pancreatitis     GERD (gastroesophageal reflux disease)     High cholesterol     Hypertension     Multiple sclerosis        Past Surgical History:   Procedure Laterality Date    APPENDECTOMY      CHOLECYSTECTOMY      COLONOSCOPY N/A 1/22/2019    Procedure: COLONOSCOPY 2 day  golytely;  Surgeon: Nathna Waddell MD;  Location: [facility];  Service: Endoscopy;  Laterality: N/A;    COLONOSCOPY N/A 4/25/2023    Procedure: COLONOSCOPY;  Surgeon: Jared Loredo MD;  Location: [facility];  Service: Endoscopy;  Laterality: N/A;    COLONOSCOPY N/A 10/10/2023    Procedure: COLONOSCOPY;  Surgeon: Jared Loredo MD;  Location: [facility];  Service: Endoscopy;  Laterality: N/A;    ESOPHAGOGASTRODUODENOSCOPY N/A 9/29/2021    Procedure: EGD (ESOPHAGOGASTRODUODENOSCOPY) covid test Rapid;  Surgeon: Jared Loredo MD;  Location: [facility];  Service: Endoscopy;  Laterality: N/A;    EXCISION, SUPERFICIAL MASS, HEAD Left 5/7/2024    Procedure: EXCISION, SUPERFICIAL MASS, HEAD;  Surgeon: Chriss Rodriguez MD;  Location: [facility];  Service: General;  Laterality: Left;    JOINT REPLACEMENT      arm    SURGICAL REMOVAL OF MASS OF LOWER EXTREMITY Left 5/7/2024    Procedure: EXCISION, MASS, LOWER EXTREMITY;  Surgeon: Chriss Rodriguez MD;  Location: [facility];  Service: General;  Laterality: Left;    SURGICAL REMOVAL OF MASS OF LOWER EXTREMITY Left 7/9/2024    Procedure: EXCISION, MASS, LOWER EXTREMITY;  Surgeon: Chriss Rodriguez MD;  Location: [facility] OR;  Service: General;  Laterality: Left;    TONSILLECTOMY       TRANSFORAMINAL EPIDURAL INJECTION OF STEROID Bilateral 10/5/2022    Procedure: Injection,steroid,epidural,transforaminal approach;  Surgeon: Soo Giles MD;  Location: [facility];  Service: Pain Management;  Laterality: Bilateral;  bilateral L5 transforaminal epidural steroid injection with RN IV sedation       Review of Systems:  Review of Systems   Constitutional:  Negative for chills, fever and weight loss.   HENT:  Negative for congestion, ear pain and nosebleeds.    Eyes:  Negative for blurred vision, double vision and photophobia.   Respiratory:  Negative for sputum production and shortness of breath.    Cardiovascular:  Negative for chest pain, palpitations and orthopnea.   Gastrointestinal:  Negative for abdominal pain, nausea and vomiting.   Musculoskeletal:  Positive for falls.  Positive for joint pain.  Negative for myalgias.   Skin:  Negative for rash.   Neurological:  Negative for tingling, sensory change and focal weakness.    OBJECTIVE:   Vital Signs (Most Recent)  There were no vitals filed for this visit.    PHYSICAL EXAM:  General  General: alert & oriented x 3, NAD, sitting comfortably in the exam room  Resp: breathing unlabored  GI: abdomen soft and nondistended  Psych: mood and affect appropriate  HEENT: PERRLA    Examination Right Ring Digit  Skin:  Scattered abrasions to right upper extremity.  Abrasion noted overlying right ring digit DIP.  Inspection:  Mallet finger deformity at the DIP with slight swan-neck deformity developing to PIP  Swelling: (+) to distal phalanx right ring  Palpation:   TTP (+) to distal phalanx.   Otherwise, NTTP to bony prominences and soft tissues throughout.   ROM (active and passive):  Patient unable to extend distal phalanx.  Passive extension of distal phalanx painful.  Sensation: grossly intact to radial/median/ulnar nerve distributions  NV: Pulses palpable. Cap refill brisk    Imaging:  3v XR Right hand obtained at an outside facility, and independently  reviewed, shows:   Impression:  Minimally displaced fracture of the dorsal aspect of the 4th distal phalanx with resulting flexion deformity of the DIP joint (mallet finger).    3v XR R hand obtained in the clinic today common independently reviewed, shows:  Redemonstration of fracture to the dorsal aspect of the 4th distal phalanx.  Some interval displacement is appreciated    ASSESSMENT/PLAN:     1. Mallet finger, right  Ambulatory referral/consult to Physical/Occupational Therapy      2. Closed displaced fracture of distal phalanx of right ring finger, initial encounter  Ambulatory referral/consult to Hand Surgery    Ambulatory referral/consult to Physical/Occupational Therapy        Discussion:  The diagnosis of bony mallet finger was discussed at the bedside.  Given the nature of the injury and overlying skin integrity to the ring digit, we discussed conservative management of his fracture and concomitant mallet finger.    Injury management was discussed in detail with the patient's brother, listed as his next of kin, who verbalized understanding.    OT:  Patient will be sent for formal occupational therapy for both weekly management along with custom orthotic to prevent swan-neck deformity.  Orthotic should allow for slight flexion PIP and slight extension of DIP    DME:  Patient placed into a Stax splint with DIP and extension with instructions for strict full-time use    Follow Up:  4 weeks for re-evaluation    The case was also reviewed with Dr. Ballard who agrees with current management plan

## 2024-10-01 ENCOUNTER — OFFICE VISIT (OUTPATIENT)
Dept: ORTHOPEDICS | Facility: CLINIC | Age: 59
End: 2024-10-01
Payer: MEDICARE

## 2024-10-01 ENCOUNTER — HOSPITAL ENCOUNTER (OUTPATIENT)
Dept: RADIOLOGY | Facility: HOSPITAL | Age: 59
Discharge: HOME | End: 2024-10-01
Attending: PHYSICIAN ASSISTANT
Payer: MEDICARE

## 2024-10-01 ENCOUNTER — TELEPHONE (OUTPATIENT)
Dept: ORTHOPEDICS | Facility: CLINIC | Age: 59
End: 2024-10-01
Payer: MEDICARE

## 2024-10-01 VITALS — HEIGHT: 69 IN | WEIGHT: 211.63 LBS | BODY MASS INDEX: 31.34 KG/M2

## 2024-10-01 DIAGNOSIS — M20.011 MALLET FINGER, RIGHT: Primary | ICD-10-CM

## 2024-10-01 DIAGNOSIS — S62.634A CLOSED DISPLACED FRACTURE OF DISTAL PHALANX OF RIGHT RING FINGER, INITIAL ENCOUNTER: ICD-10-CM

## 2024-10-01 DIAGNOSIS — M79.644 FINGER PAIN, RIGHT: Primary | ICD-10-CM

## 2024-10-01 DIAGNOSIS — M79.644 FINGER PAIN, RIGHT: ICD-10-CM

## 2024-10-01 PROCEDURE — 3061F NEG MICROALBUMINURIA REV: CPT | Mod: CPTII,S$GLB,, | Performed by: PHYSICIAN ASSISTANT

## 2024-10-01 PROCEDURE — 73140 X-RAY EXAM OF FINGER(S): CPT | Mod: 26,RT,, | Performed by: RADIOLOGY

## 2024-10-01 PROCEDURE — 99213 OFFICE O/P EST LOW 20 MIN: CPT | Mod: S$GLB,,, | Performed by: PHYSICIAN ASSISTANT

## 2024-10-01 PROCEDURE — 1159F MED LIST DOCD IN RCRD: CPT | Mod: CPTII,S$GLB,, | Performed by: PHYSICIAN ASSISTANT

## 2024-10-01 PROCEDURE — 3008F BODY MASS INDEX DOCD: CPT | Mod: CPTII,S$GLB,, | Performed by: PHYSICIAN ASSISTANT

## 2024-10-01 PROCEDURE — 3051F HG A1C>EQUAL 7.0%<8.0%: CPT | Mod: CPTII,S$GLB,, | Performed by: PHYSICIAN ASSISTANT

## 2024-10-01 PROCEDURE — 73140 X-RAY EXAM OF FINGER(S): CPT | Mod: TC,PN,RT

## 2024-10-01 PROCEDURE — 99999 PR PBB SHADOW E&M-EST. PATIENT-LVL V: CPT | Mod: PBBFAC,,, | Performed by: PHYSICIAN ASSISTANT

## 2024-10-01 PROCEDURE — 4010F ACE/ARB THERAPY RXD/TAKEN: CPT | Mod: CPTII,S$GLB,, | Performed by: PHYSICIAN ASSISTANT

## 2024-10-01 PROCEDURE — 1160F RVW MEDS BY RX/DR IN RCRD: CPT | Mod: CPTII,S$GLB,, | Performed by: PHYSICIAN ASSISTANT

## 2024-10-01 PROCEDURE — 3066F NEPHROPATHY DOC TX: CPT | Mod: CPTII,S$GLB,, | Performed by: PHYSICIAN ASSISTANT

## 2024-10-03 ENCOUNTER — HOSPITAL ENCOUNTER (OUTPATIENT)
Dept: WOUND CARE | Facility: HOSPITAL | Age: 59
Discharge: HOME | End: 2024-10-03
Attending: PREVENTIVE MEDICINE
Payer: MEDICARE

## 2024-10-03 VITALS — RESPIRATION RATE: 18 BRPM | SYSTOLIC BLOOD PRESSURE: 124 MMHG | DIASTOLIC BLOOD PRESSURE: 63 MMHG | HEART RATE: 88 BPM

## 2024-10-03 DIAGNOSIS — L97.922 NON-PRESSURE CHRONIC ULCER OF LEFT LOWER LEG WITH FAT LAYER EXPOSED: Primary | ICD-10-CM

## 2024-10-03 DIAGNOSIS — L92.9 HYPERGRANULATION: ICD-10-CM

## 2024-10-03 PROCEDURE — 17250 CHEM CAUT OF GRANLTJ TISSUE: CPT

## 2024-10-03 NOTE — PROGRESS NOTES
Wound Care & Hyperbaric Medicine    Subjective:       Patient ID: Tj Trammell is a 59 y.o. male.    Chief Complaint: Non-healing Wound Follow Up    Wound care follow up for left leg ulcer. Wound nilda, less drainage noted. Silver nitrate applied. Continue plan of care.     Review of Systems   All other systems reviewed and are negative.        Objective:     Vitals:    10/03/24 1232   BP: 124/63   Pulse: 88   Resp: 18         Physical Exam       Altered Skin Integrity 07/28/23 0800 Left lateral;lower Leg (Active)   07/28/23 0800   Altered Skin Integrity Present on Admission - Did Patient arrive to the hospital with altered skin?: yes   Side: Left   Orientation: lateral;lower   Location: Leg   Wound Number:    Is this injury device related?: No   Primary Wound Type:    Description of Altered Skin Integrity:    Ankle-Brachial Index:    Pulses:    Removal Indication and Assessment:    Wound Outcome:    (Retired) Wound Length (cm):    (Retired) Wound Width (cm):    (Retired) Depth (cm):    Wound Description (Comments):    Removal Indications:    Wound Image   10/03/24 1232   Description of Altered Skin Integrity Full thickness tissue loss. Subcutaneous fat may be visible but bone, tendon or muscle are not exposed 10/03/24 1232   Dressing Appearance Moist drainage 10/03/24 1232   Drainage Amount Moderate 10/03/24 1232   Drainage Characteristics/Odor Serosanguineous 10/03/24 1232   Appearance Red;Hypergranulation 10/03/24 1232   Red (%), Wound Tissue Color 100 % 10/03/24 1232   Periwound Area Intact 10/03/24 1232   Wound Edges Defined 10/03/24 1232   Wound Length (cm) 1.5 cm 10/03/24 1232   Wound Width (cm) 1 cm 10/03/24 1232   Wound Depth (cm) 0.1 cm 10/03/24 1232   Wound Volume (cm^3) 0.15 cm^3 10/03/24 1232   Wound Surface Area (cm^2) 1.5 cm^2 10/03/24 1232   Care Cleansed with:;Antimicrobial agent;Other (see comments) 10/03/24 1232   Dressing Applied;Silver;Non-adherent;Absorptive  Pad;Compression wrap 10/03/24 1232   Periwound Care Absorptive dressing applied 10/03/24 1232   Compression Two layer compression 10/03/24 1232   Dressing Change Due 10/10/24 10/03/24 1232         Assessment/Plan:         ICD-10-CM ICD-9-CM   1. Non-pressure chronic ulcer of left lower leg with fat layer exposed  L97.922 707.10   2. Hypergranulation  L92.9 701.5           Tissue pathology and/or culture taken:  [] Yes [x] No   Sharp debridement performed:   [] Yes [x] No   Labs ordered this visit:   [] Yes [x] No   Imaging ordered this visit:   [] Yes [x] No           Orders Placed This Encounter   Procedures    Change dressing     Left lateral lower leg ulcer:    Cleanse wound with:Vashe  Lidocaine: prn  Silver nitrate: prn  Bety wound:Cavilon    Primary dressing: Silver Contact Layer   Secondary dressing: Large abd  Edema control: Coflex with Zinc    Frequency: Weekly and NV if needed  Follow-up:1 week with         Follow up in about 1 week (around 10/10/2024).            This includes face to face time and non-face to face time preparing to see the patient (eg, review of tests), obtaining and/or reviewing separately obtained history, documenting clinical information in the electronic or other health record, independently interpreting results and communicating results to the patient/family/caregiver, or care coordinator.

## 2024-10-07 ENCOUNTER — CLINICAL SUPPORT (OUTPATIENT)
Dept: REHABILITATION | Facility: HOSPITAL | Age: 59
End: 2024-10-07
Attending: PREVENTIVE MEDICINE
Payer: MEDICARE

## 2024-10-07 DIAGNOSIS — M62.81 MUSCLE WEAKNESS: Primary | ICD-10-CM

## 2024-10-07 DIAGNOSIS — M25.641 FINGER STIFFNESS, RIGHT: ICD-10-CM

## 2024-10-07 DIAGNOSIS — M20.011 MALLET FINGER, RIGHT: ICD-10-CM

## 2024-10-07 DIAGNOSIS — S62.634A CLOSED DISPLACED FRACTURE OF DISTAL PHALANX OF RIGHT RING FINGER, INITIAL ENCOUNTER: ICD-10-CM

## 2024-10-07 DIAGNOSIS — M79.644 PAIN IN RIGHT FINGER(S): ICD-10-CM

## 2024-10-07 PROCEDURE — 97110 THERAPEUTIC EXERCISES: CPT | Mod: PN

## 2024-10-07 PROCEDURE — 97165 OT EVAL LOW COMPLEX 30 MIN: CPT | Mod: PN

## 2024-10-07 PROCEDURE — L3921 HFO W/JOINT(S) CF: HCPCS | Mod: PN

## 2024-10-07 NOTE — PLAN OF CARE
[facility]  Occupational Therapy Initial Evaluation     Name: Tj Trammell  [facility] Number: [id number]    Therapy Diagnosis:   Encounter Diagnoses   Name Primary?    Mallet finger, right     Closed displaced fracture of distal phalanx of right ring finger, initial encounter     Muscle weakness Yes    Finger stiffness, right     Pain in right finger(s)      Physician: Chasity Hawkins PA-C    Physician Orders: Eval and Treat  Medical Diagnosis: M20.011 (ICD-10-CM) - Mallet finger, right S62.634A (ICD-10-CM) - Closed displaced fracture of distal phalanx of right ring finger, initial encounter  Surgical Procedure and Date: n/a / Date of Injury: 9/19/2024  Evaluation Date: 10/7/2024  Insurance Authorization Period Expiration: 12/31/2024  Plan of Care Certification Period: 12/20/2024  Date of Return to MD: razia  Visit # / Visits authorized: 1 / 1  FOTO: 1/3    Precautions:  Patient will be sent for formal occupational therapy for both weekly management along with custom orthotic to prevent swan-neck deformity. Orthotic should allow for slight flexion PIP and slight extension of DIP    Time In: 1:15 pm  Time Out: 2:15 pm  Total Appointment Time (timed & untimed codes): 60 minutes    Subjective     Date of Onset: 9/19/2024    History of Current Condition/Mechanism of Injury: He sustained an injury about 3 weeks ago when he fell from his bed and jammed the right ring digit into the ground. He presents with mallet finger deformity of the RRF. He has been wearing a stax splint wrapped with coban for the time being. He demos about a 45 degree extensor lag at the RRF DIP/    Involved Side: right  Dominant Side: Right    Mechanism of Injury: see above  Imaging:  FINDINGS:  Are there is dorsal avulsion fracture of the ring finger distal phalanx base with fixed flexion deformity, increased from prior study.  There is soft tissue swelling.    Prior Therapy: not for this condition    Pain:  Functional  Pain Scale Rating 0-10:   6/10 on average  6/10 at best  8/10 at worst  Location: R finger/hand  Description: Throbbing  Aggravating Factors: activity  Easing Factors: rest    Occupation:    Working presently: disability  Duties: B/IADLs    Functional Limitations/Social History:    Previous functional status includes: Independent with all ADLs.     Current Functional Status   Home/Living environment: lives with a friend       Limitation of Functional Status as follows:   ADLs/IADLs:     - Feeding: mod I    - Bathing: mod I    - Dressing/Grooming: min A    - Home Management: needs assistance    - Driving: dep     Leisure: n/a    Patient's Goals for Therapy: correct deformity    Past Medical History/Physical Systems Review:   Tj Trammell  has a past medical history of Asthma, Bilateral hearing loss, Biliary acute pancreatitis, GERD (gastroesophageal reflux disease), High cholesterol, Hypertension, and Multiple sclerosis.    Tj Trammell  has a past surgical history that includes Joint replacement; Cholecystectomy; Colonoscopy (N/A, 1/22/2019); Tonsillectomy; Appendectomy; Esophagogastroduodenoscopy (N/A, 9/29/2021); Transforaminal epidural injection of steroid (Bilateral, 10/5/2022); Colonoscopy (N/A, 4/25/2023); Colonoscopy (N/A, 10/10/2023); excision, superficial mass, head (Left, 5/7/2024); Surgical removal of mass of lower extremity (Left, 5/7/2024); and Surgical removal of mass of lower extremity (Left, 7/9/2024).    Tj has a current medication list which includes the following prescription(s): acetazolamide, albuterol, alprazolam, atorvastatin, bacitracin, baclofen, bupropion, dicyclomine, difluprednate, docusate sodium, ergocalciferol, famotidine, fingolimod, fingolimod, gabapentin, hydrocodone-acetaminophen, hydrocodone-acetaminophen, imiquimod, lactulose, lisinopril, mupirocin, nabumetone, nebulizer and compressor, ofloxacin, oxybutynin, pantoprazole, senna-docusate 8.6-50 mg, senna-docusate  8.6-50 mg, sodium,potassium,mag sulfates, tramadol, and triamcinolone acetonide 0.1%.    Review of patient's allergies indicates:  No Known Allergies     Objective     Observation/Appearance: Patient presents with mallet finger deformity of the RRF, slight swan neck deformity developing, mild edema at the RRF DIP. He was wearing a stax splint wrapped with coban upon arrival. After this was removed, there was an irritated area on the dorsal DIPJ with clear drainage.     Special Tests: n/a    Edema. Measured in centimeters.   10/7/2024 10/7/2024      Left Right     DIP RF 5.5 cm 6 cm                Hand ROM. Measured in degrees.   10/7/2024 10/7/2024      Left Right            Long:  MP  78                PIP  +10/88                DIP  -45/55               Strength (Dynamometer) and Pinch Strength (Pinch Gauge)  Measured in pounds.   10/7/2024 10/7/2024      Left Right     Rung II deferred deferred     Key Pinch       3pt Pinch         Sensation: sensation intact      CMS Impairment/Limitation/Restriction for FOTO hand Survey    Therapist reviewed FOTO scores for Tj Trammell on 10/7/2024.   FOTO documents entered into [employer] - see Media section.    Limitation Score: 71%         Treatment     Total Treatment time (time-based codes) separate from Evaluation: 40 minutes    Orthotic fabrication x 2 (anti swan neck - 1 clamshell mallet splint with PIP flexed; 1 dorsal gutter orthosis x 30  Fabricated a static custom finger based anti-swan neck orthosis. Orthosis fabricated to  maintain joint positioning  and correct deformity Instructed to wear full time with removal for HEP, bathing/hygiene. Instructed to monitor for pain/pressure, increased edema, or redness and to contact office for adjustments as needed. Patient reported good understanding of orthotic wear and care schedule. He was able to chip and doff it independently with min cuing. ()    Therapeutic exercises for 10 minutes, including:  - HEP  review  - splint wear/care  - wound care   - written instructions given  - photos available in media for carrect donning of splint       Patient Education and Home Exercises      Education provided:   -role of OT, goals for OT, scheduling/cancellations, insurance limitations with patient.  -Additional Education provided:   - HEP in place     Written Home Exercises Provided: Patient instructed to cont prior HEP.  Exercises were reviewed and Tj was able to demonstrate them prior to the end of the session.    Tj demonstrated good  understanding of the education provided.     Pt was advised to perform these exercises free of pain, and to stop performing them if pain occurs.    See EMR under Patient Instructions for exercises provided 10/7/2024.    Assessment     Tj Trammell is a 59 y.o. male referred to outpatient occupational therapy and presents with a medical diagnosis of M20.011 (ICD-10-CM) - Mallet finger, right S62.634A (ICD-10-CM) - Closed displaced fracture of distal phalanx of right ring finger, initial encounter. ASL  used during today's tx session  Patient presents with mallet finger deformity of the RRF, slight swan neck deformity developing, mild edema at the RRF DIP. He was wearing a stax splint wrapped with coban upon arrival. After this was removed, there was an irritated area on the dorsal DIPJ with clear drainage (see media).  Regarding orthotic fabrication, the orthosis appeared to fit well with no problems noted. There were no complaints of discomfort from the patient at this time. The patient demonstrated competency with independently doffing and donning orthosis.  Patient provided with ample instruction and resources for splint wear/care, wound care, and TGEs. His brother was present for the eval as well to promote carryover of instructions.     Assessment of Occupational Performance   Performance Deficits    Physical:  Joint Mobility  Joint Stability  Muscle  Power/Strength  Muscle Endurance  Skin Integrity/Scar Formation  Edema   Strength  Pinch Strength  Gross Motor Coordination  Fine Motor Coordination  Postural Control  Pain    Cognitive:  No Deficits    Psychosocial:    No Deficits     Following medical record review it is determined that pt will benefit from occupational therapy services in order to maximize pain free and/or functional use of right hand. The following goals were discussed with the patient and patient is in agreement with them as to be addressed in the treatment plan. The patient's rehab potential is Good.     Anticipated barriers to occupational therapy: n/a    Plan of care discussed with patient: Yes  Patient's spiritual, cultural and educational needs considered and patient is agreeable to the plan of care and goals as stated below:     Medical Necessity is demonstrated by the following  Occupational Profile/History  Co-morbidities and personal factors that may impact the plan of care [x] LOW: Brief chart review  [] MODERATE: Expanded chart review   [] HIGH: Extensive chart review    Moderate / High Support Documentation: n/a     Examination  Performance deficits relating to physical, cognitive or psychosocial skills that result in activity limitations and/or participation restrictions  [x] LOW: addressing 1-3 Performance deficits  [] MODERATE: 3-5 Performance deficits  [] HIGH: 5+ Performance deficits (please support below)    Moderate / High Support Documentation: n/a     Treatment Options [x] LOW: Limited options  [] MODERATE: Several options  [] HIGH: Multiple options      Decision Making/ Complexity Score: low       Goals:   The following goals were discussed with the patient and patient is in agreement with them as to be addressed in the treatment plan.     Long Term Goals:  Goals to be met by discharge:  1) Independent with HEP  2) Pt will improve RRF extensor lag NASH by 15 degrees in order to increase functional use for grasp with  home management or work related tasks by d/c.   3) Pt will decrease edema to trace or none to increase functional ROM by d/c.   4) Pt will decrease pain to trace or none while completing light home management tasks or work related tasks by d/c.   5) Patient will report compliance with splint wear and care (at all times for 4 weeks besides bathing, hygiene, and exercises) by 4 weeks.   5) Patient will be able to achieve less than or equal to 60% on the FOTO, demonstrating overall improved functional ability with upper extremity.  (Self-care category)      Plan     Certification Period/Plan of care expiration: 10/7/2024 to 12/13/2024.    Outpatient Occupational Therapy 1 times weekly for 10 weeks to include the following interventions: Paraffin, Fluidotherapy, Manual therapy/joint mobilizations, Modalities for pain management, US 3 mhz, Therapeutic exercises/activities., Iontophoresis with 2.0 cc Dexamethasone, Strengthening, Orthotic Fabrication/Fit/Training, Edema Control, Scar Management, Wound Care, Electrical Modalities, Joint Protection, and Energy Conservation.    Taylor Leo, OT

## 2024-10-07 NOTE — PATIENT INSTRUCTIONS
See splint instructions hand written  - wear splint at all times except bathing, hygiene and exercises  - continue monitoring wound for irritation  - use provided ointment, soap, water, and keep covered with a band aid.

## 2024-10-10 ENCOUNTER — HOSPITAL ENCOUNTER (OUTPATIENT)
Dept: WOUND CARE | Facility: HOSPITAL | Age: 59
Discharge: HOME | End: 2024-10-10
Attending: PREVENTIVE MEDICINE
Payer: MEDICARE

## 2024-10-10 VITALS
HEIGHT: 69 IN | BODY MASS INDEX: 31.25 KG/M2 | SYSTOLIC BLOOD PRESSURE: 161 MMHG | DIASTOLIC BLOOD PRESSURE: 83 MMHG | WEIGHT: 211 LBS | HEART RATE: 86 BPM | TEMPERATURE: 97 F

## 2024-10-10 DIAGNOSIS — L92.9 HYPERGRANULATION: ICD-10-CM

## 2024-10-10 DIAGNOSIS — L97.922 NON-PRESSURE CHRONIC ULCER OF LEFT LOWER LEG WITH FAT LAYER EXPOSED: Primary | ICD-10-CM

## 2024-10-10 PROCEDURE — 17250 CHEM CAUT OF GRANLTJ TISSUE: CPT

## 2024-10-10 PROCEDURE — 29581 APPL MULTLAYER CMPRN SYS LEG: CPT

## 2024-10-10 NOTE — PROGRESS NOTES
Wound Care & Hyperbaric Medicine    Subjective:       Patient ID: Tj Trammell is a 59 y.o. male.    Chief Complaint: Non-healing Wound Follow Up    Follow up wound care for lle wound. Wound nilda, hypergranulated. Continued plan of care.     Review of Systems   All other systems reviewed and are negative.        Objective:     Vitals:    10/10/24 1311   BP: (!) 161/83   Pulse: 86   Temp: 97 °F (36.1 °C)         Physical Exam       Altered Skin Integrity 07/28/23 0800 Left lateral;lower Leg (Active)   07/28/23 0800   Altered Skin Integrity Present on Admission - Did Patient arrive to the hospital with altered skin?: yes   Side: Left   Orientation: lateral;lower   Location: Leg   Wound Number:    Is this injury device related?: No   Primary Wound Type:    Description of Altered Skin Integrity:    Ankle-Brachial Index:    Pulses:    Removal Indication and Assessment:    Wound Outcome:    (Retired) Wound Length (cm):    (Retired) Wound Width (cm):    (Retired) Depth (cm):    Wound Description (Comments):    Removal Indications:    Wound Image   10/10/24 1313   Description of Altered Skin Integrity Full thickness tissue loss. Subcutaneous fat may be visible but bone, tendon or muscle are not exposed 10/10/24 1313   Dressing Appearance Moist drainage 10/10/24 1313   Drainage Amount Small 10/10/24 1313   Drainage Characteristics/Odor Serosanguineous 10/10/24 1313   Appearance Red;Hypergranulation;Moist 10/10/24 1313   Tissue loss description Full thickness 10/10/24 1313   Red (%), Wound Tissue Color 100 % 10/10/24 1313   Periwound Area Intact;Dry;Scar tissue 10/10/24 1313   Wound Edges Defined 10/10/24 1313   Wound Length (cm) 1 cm 10/10/24 1313   Wound Width (cm) 0.6 cm 10/10/24 1313   Wound Depth (cm) 0.1 cm 10/10/24 1313   Wound Volume (cm^3) 0.06 cm^3 10/10/24 1313   Wound Surface Area (cm^2) 0.6 cm^2 10/10/24 1313   Care Cleansed with:;Soap and water;Antimicrobial agent 10/10/24 1313    Dressing Applied;Silver;Non-adherent;Absorptive Pad;Compression wrap 10/10/24 1313   Periwound Care Absorptive dressing applied;Topical treatment applied 10/10/24 1313   Compression Two layer compression 10/10/24 1313   Dressing Change Due 10/17/24 10/10/24 1313         Assessment/Plan:         ICD-10-CM ICD-9-CM   1. Non-pressure chronic ulcer of left lower leg with fat layer exposed  L97.922 707.10   2. Hypergranulation  L92.9 701.5       Silver nitrate applied to hypergranular tissue.    Tissue pathology and/or culture taken:  [] Yes [x] No   Sharp debridement performed:   [] Yes [x] No   Labs ordered this visit:   [] Yes [x] No   Imaging ordered this visit:   [] Yes [x] No           Orders Placed This Encounter   Procedures    Change dressing     Left lateral lower leg ulcer:    Cleanse wound with:Vashe  Lidocaine: prn  Silver nitrate: prn  Bety wound:Cavilon    Primary dressing: Silver Contact Layer  Secondary dressing: Large abd  Edema control: Coflex with Zinc    Frequency: Weekly and NV if needed  Follow-up:1 week with         Follow up in about 1 week (around 10/17/2024) for .            This includes face to face time and non-face to face time preparing to see the patient (eg, review of tests), obtaining and/or reviewing separately obtained history, documenting clinical information in the electronic or other health record, independently interpreting results and communicating results to the patient/family/caregiver, or care coordinator.

## 2024-10-14 ENCOUNTER — CLINICAL SUPPORT (OUTPATIENT)
Dept: REHABILITATION | Facility: HOSPITAL | Age: 59
End: 2024-10-14
Payer: MEDICARE

## 2024-10-14 DIAGNOSIS — M62.81 MUSCLE WEAKNESS: Primary | ICD-10-CM

## 2024-10-14 DIAGNOSIS — M25.641 FINGER STIFFNESS, RIGHT: ICD-10-CM

## 2024-10-14 DIAGNOSIS — M79.644 PAIN IN RIGHT FINGER(S): ICD-10-CM

## 2024-10-14 PROCEDURE — 97530 THERAPEUTIC ACTIVITIES: CPT | Mod: PN

## 2024-10-14 PROCEDURE — 97110 THERAPEUTIC EXERCISES: CPT | Mod: PN

## 2024-10-14 PROCEDURE — 97140 MANUAL THERAPY 1/> REGIONS: CPT | Mod: PN

## 2024-10-14 NOTE — PROGRESS NOTES
[facility]  Occupational Therapy Treatment Note     Date: 10/14/2024  Name: Tj Trammell  Clinic Number: [id number]    Therapy Diagnosis:   Encounter Diagnoses   Name Primary?    Muscle weakness Yes    Finger stiffness, right     Pain in right finger(s)      Physician: Chasity Hawkins PA-C    Physician Orders: Eval and Treat  Medical Diagnosis: M20.011 (ICD-10-CM) - Mallet finger, right S62.634A (ICD-10-CM) - Closed displaced fracture of distal phalanx of right ring finger, initial encounter  Surgical Procedure and Date: n/a / Date of Injury: 9/19/2024  Evaluation Date: 10/7/2024  Insurance Authorization Period Expiration: 12/31/2024  Plan of Care Certification Period: 12/20/2024  Date of Return to MD: razia  Visit # / Visits authorized: 2 / 1  FOTO: 1/3     Precautions:  Patient will be sent for formal occupational therapy for both weekly management along with custom orthotic to prevent swan-neck deformity. Orthotic should allow for slight flexion PIP and slight extension of DIP     Time In: 9:30 am  Time Out: 10:15 am  Total Appointment Time (timed & untimed codes): 45 minutes    Subjective     Pt reports: no complaints  he was compliant with home exercise program given last session.   Response to previous treatment:low pain  Functional change: tolerated progression of treatment well    Pain: 2/10  Location: right fingers      Objective     Objective Measures updated at progress report unless specified.   Observation/Appearance: Patient presents with mallet finger deformity of the RRF, slight swan neck deformity developing, mild edema at the RRF DIP. He was wearing a stax splint wrapped with coban upon arrival. After this was removed, there was an irritated area on the dorsal DIPJ with clear drainage.      Special Tests: n/a     Edema. Measured in centimeters.    10/7/2024 10/7/2024         Left Right       DIP RF 5.5 cm 6 cm                         Hand ROM. Measured in degrees.     10/7/2024 10/7/2024         Left Right                   Long:  MP   78                  PIP   +10/88                  DIP   -45/55                       Strength (Dynamometer) and Pinch Strength (Pinch Gauge)  Measured in pounds.    10/7/2024 10/7/2024         Left Right       Rung II deferred deferred       Key Pinch           3pt Pinch              Sensation: sensation intact        CMS Impairment/Limitation/Restriction for FOTO hand Survey     Therapist reviewed FOTO scores for Tj Trammell on 10/7/2024.   FOTO documents entered into [employer] - see Media section.     Limitation Score: 71%         Treatment     Tj received the treatments listed below:     Supervised modalities after being cleared for contradictions: Hot Pack - Right hand for pain management and tissue extensibility x 5 minutes    Manual therapy techniques: Soft tissue Mobilization were applied to the: Right ring finger for 10 minutes, including:  Pt received retrograde massage to decrease edema and stiffness for increased ROM. STM performed to decreased stiffness in surrounding musculature.     Therapeutic exercises to develop ROM for 15 minutes, including:  -Joint blocking exercises  -TGE's   -Finger spreads   -Finger lifts     Therapeutic activities to improve functional performance for 15  minutes, including:  -isospheres 2 minutes   -9 hole peg test 3 x  -pom pom pickup in hand manipulation and translation     Patient Education and Home Exercises     Education provided:   - on current condition and home exercise program   - Progress towards goals     Written Home Exercises Provided: Patient instructed to cont prior HEP.  Exercises were reviewed and Tj was able to demonstrate them prior to the end of the session.  Tj demonstrated good  understanding of the HEP provided.   .   See EMR under Patient Instructions for exercises provided prior visit.      Assessment     Patient to clinic this date for first treatment after initial  evaluation. He has his splint donned. Pain report low throughout session. Patient tolerated progression of treatment well completing all P/AA/active range of motion exercises without complaints.     Tj is progressing well towards his goals and there are no updates to goals at this time. Pt prognosis is Good.     Pt will continue to benefit from skilled outpatient occupational therapy to address the deficits listed in the problem list on initial evaluation provide pt/family education and to maximize pt's level of independence in the home and community environment.     Anticipated barriers to occupational therapy: none    Pt's spiritual, cultural and educational needs considered and pt agreeable to plan of care and goals.    Goals:  Long Term Goals:  Goals to be met by discharge:  1) Independent with home exercise program -Not met, progressing  2) Pt will improve RRF extensor lag NASH by 15 degrees in order to increase functional use for grasp with home management or work related tasks by d/c. -Not met, progressing  3) Pt will decrease edema to trace or none to increase functional ROM by d/c. -Not met, progressing  4) Pt will decrease pain to trace or none while completing light home management tasks or work related tasks by d/c.  -Not met, progressing  5) Patient will report compliance with splint wear and care (at all times for 4 weeks besides bathing, hygiene, and exercises) by 4 weeks.  -Not met, progressing  5) Patient will be able to achieve less than or equal to 60% on the FOTO, demonstrating overall improved functional ability with upper extremity.  (Self-care category) -Not met, progressing       Plan     Continue with OT plan of care   Updates/Grading for next session: progress as able    MOISE Franks

## 2024-10-17 ENCOUNTER — HOSPITAL ENCOUNTER (OUTPATIENT)
Dept: WOUND CARE | Facility: HOSPITAL | Age: 59
Discharge: HOME | End: 2024-10-17
Attending: PREVENTIVE MEDICINE
Payer: MEDICARE

## 2024-10-17 VITALS
BODY MASS INDEX: 31.25 KG/M2 | DIASTOLIC BLOOD PRESSURE: 70 MMHG | SYSTOLIC BLOOD PRESSURE: 135 MMHG | TEMPERATURE: 97 F | HEART RATE: 83 BPM | HEIGHT: 69 IN | WEIGHT: 211 LBS

## 2024-10-17 DIAGNOSIS — L97.922 NON-PRESSURE CHRONIC ULCER OF LEFT LOWER LEG WITH FAT LAYER EXPOSED: Primary | ICD-10-CM

## 2024-10-17 DIAGNOSIS — L92.9 HYPERGRANULATION: ICD-10-CM

## 2024-10-17 PROCEDURE — 29581 APPL MULTLAYER CMPRN SYS LEG: CPT

## 2024-10-17 NOTE — PROGRESS NOTES
Wound Care & Hyperbaric Medicine    Subjective:       Patient ID: Tj Trammell is a 59 y.o. male.    Chief Complaint: Non-healing Wound    Follow up for left lateral lower leg. Wound is progressing well.     Review of Systems   All other systems reviewed and are negative.        Objective:     Vitals:    10/17/24 1317   BP: 135/70   Pulse: 83   Temp: 97 °F (36.1 °C)         Physical Exam       Altered Skin Integrity 07/28/23 0800 Left lateral;lower Leg (Active)   07/28/23 0800   Altered Skin Integrity Present on Admission - Did Patient arrive to the hospital with altered skin?: yes   Side: Left   Orientation: lateral;lower   Location: Leg   Wound Number:    Is this injury device related?: No   Primary Wound Type:    Description of Altered Skin Integrity:    Ankle-Brachial Index:    Pulses:    Removal Indication and Assessment:    Wound Outcome:    (Retired) Wound Length (cm):    (Retired) Wound Width (cm):    (Retired) Depth (cm):    Wound Description (Comments):    Removal Indications:    Wound Image   10/17/24 1316   Description of Altered Skin Integrity Full thickness tissue loss. Subcutaneous fat may be visible but bone, tendon or muscle are not exposed 10/17/24 1316   Dressing Appearance Moist drainage 10/17/24 1316   Drainage Amount Small 10/17/24 1316   Drainage Characteristics/Odor Serosanguineous 10/17/24 1316   Appearance Red;Hypergranulation;Moist 10/17/24 1316   Tissue loss description Full thickness 10/17/24 1316   Red (%), Wound Tissue Color 100 % 10/17/24 1316   Periwound Area Intact;Dry;Pink;Scar tissue 10/17/24 1316   Wound Edges Defined 10/17/24 1316   Wound Length (cm) 0.2 cm 10/17/24 1316   Wound Width (cm) 0.2 cm 10/17/24 1316   Wound Depth (cm) 0.1 cm 10/17/24 1316   Wound Volume (cm^3) 0.004 cm^3 10/17/24 1316   Wound Surface Area (cm^2) 0.04 cm^2 10/17/24 1316   Care Cleansed with:;Sterile normal saline;Antimicrobial agent;Soap and water 10/17/24 1316   Dressing  Applied;Silver;Non-adherent;Absorptive Pad;Compression wrap 10/17/24 1316   Periwound Care Absorptive dressing applied 10/17/24 1316   Compression Two layer compression 10/17/24 1316   Dressing Change Due 10/24/24 10/17/24 1316         Assessment/Plan:         ICD-10-CM ICD-9-CM   1. Pain in right finger(s)  M79.644 729.5   2. Non-pressure chronic ulcer of left lower leg with fat layer exposed  L97.922 707.10       Silver nitrate applied to hypergranular tissue.    Tissue pathology and/or culture taken:  [] Yes [x] No   Sharp debridement performed:   [] Yes [x] No   Labs ordered this visit:   [] Yes [x] No   Imaging ordered this visit:   [] Yes [x] No           Orders Placed This Encounter   Procedures    Change dressing     Left lateral lower leg ulcer:     Cleanse wound with:Vashe   Lidocaine: prn   Silver nitrate: prn   Bety wound:Cavilon    Primary dressing: Silver Contact Layer   Secondary dressing: Large abd   Edema control: Coflex with Zinc     Follow-up:1 week with         Follow up in about 1 week (around 10/24/2024).            This includes face to face time and non-face to face time preparing to see the patient (eg, review of tests), obtaining and/or reviewing separately obtained history, documenting clinical information in the electronic or other health record, independently interpreting results and communicating results to the patient/family/caregiver, or care coordinator.

## 2024-10-24 ENCOUNTER — HOSPITAL ENCOUNTER (OUTPATIENT)
Dept: WOUND CARE | Facility: HOSPITAL | Age: 59
Discharge: HOME | End: 2024-10-24
Attending: PREVENTIVE MEDICINE
Payer: MEDICARE

## 2024-10-24 VITALS
TEMPERATURE: 98 F | BODY MASS INDEX: 31.25 KG/M2 | HEART RATE: 86 BPM | HEIGHT: 69 IN | DIASTOLIC BLOOD PRESSURE: 63 MMHG | SYSTOLIC BLOOD PRESSURE: 128 MMHG | WEIGHT: 211 LBS

## 2024-10-24 DIAGNOSIS — L92.9 HYPERGRANULATION: ICD-10-CM
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
## 2024-10-24 DIAGNOSIS — L97.922 NON-PRESSURE CHRONIC ULCER OF LEFT LOWER LEG WITH FAT LAYER EXPOSED: Primary | ICD-10-CM

## 2024-10-24 PROCEDURE — 99213 OFFICE O/P EST LOW 20 MIN: CPT

## 2024-10-24 PROCEDURE — 99214 OFFICE O/P EST MOD 30 MIN: CPT

## 2024-10-24 NOTE — PROGRESS NOTES
Wound Care & Hyperbaric Medicine    Subjective:       Patient ID: Tj Trammell is a 59 y.o. male.    Chief Complaint: Non-healing Wound Follow Up    Follow up left lower leg wound. Wound healed. New wound and satellites to left knee reports running into door yesterday.  Plan of care updated.     Review of Systems   All other systems reviewed and are negative.        Objective:     Vitals:    10/24/24 1301   BP: 128/63   Pulse: 86   Temp: 97.8 °F (36.6 °C)         Physical Exam       Altered Skin Integrity 07/28/23 0800 Left lateral;lower Leg (Active)   07/28/23 0800   Altered Skin Integrity Present on Admission - Did Patient arrive to the hospital with altered skin?: yes   Side: Left   Orientation: lateral;lower   Location: Leg   Wound Number:    Is this injury device related?: No   Primary Wound Type:    Description of Altered Skin Integrity:    Ankle-Brachial Index:    Pulses:    Removal Indication and Assessment:    Wound Outcome:    (Retired) Wound Length (cm):    (Retired) Wound Width (cm):    (Retired) Depth (cm):    Wound Description (Comments):    Removal Indications:    Wound Image   10/24/24 1309   Dressing Appearance Dry;Intact 10/24/24 1309   Drainage Amount None 10/24/24 1309   Appearance Closed/resurfaced 10/24/24 1309   Tissue loss description Not applicable 10/24/24 1309   Red (%), Wound Tissue Color 100 % 10/24/24 1309   Periwound Area Intact;Dry;Scar tissue 10/24/24 1309   Wound Edges Rolled/closed 10/24/24 1309   Wound Length (cm) 0 cm 10/24/24 1309   Wound Width (cm) 0 cm 10/24/24 1309   Wound Depth (cm) 0 cm 10/24/24 1309   Wound Volume (cm^3) 0 cm^3 10/24/24 1309   Wound Surface Area (cm^2) 0 cm^2 10/24/24 1309   Care Cleansed with:;Soap and water 10/24/24 1309   Dressing Applied;Island/border 10/24/24 1309   Compression Tubular elasticized bandage 10/24/24 1309   Dressing Change Due 10/26/24 10/24/24 1309            Wound 10/24/24 1310 Traumatic Left Knee (Active)    10/24/24 1310   Present on Original Admission:    Primary Wound Type: Traumatic   Side: Left   Orientation:    Location: Knee   Wound Approximate Age at First Assessment (Weeks):    Wound Number:    Is this injury device related?:    Incision Type:    Closure Method:    Wound Description (Comments):    Type:    Additional Comments:    Ankle-Brachial Index:    Pulses:    Removal Indication and Assessment:    Wound Outcome:    Wound Image   10/24/24 1309   Dressing Appearance Open to air;Dried drainage 10/24/24 1309   Drainage Amount Scant 10/24/24 1309   Drainage Characteristics/Odor Serosanguineous 10/24/24 1309   Appearance Black 10/24/24 1309   Black (%), Wound Tissue Color 100 % 10/24/24 1309   Periwound Area Intact;Dry;Satellite lesion 10/24/24 1309   Wound Edges Undefined 10/24/24 1309   Wound Length (cm) 1.5 cm 10/24/24 1309   Wound Width (cm) 1.2 cm 10/24/24 1309   Wound Depth (cm) 0.1 cm 10/24/24 1309   Wound Volume (cm^3) 0.18 cm^3 10/24/24 1309   Wound Surface Area (cm^2) 1.8 cm^2 10/24/24 1309   Care Cleansed with:;Soap and water;Sterile normal saline 10/24/24 1309   Dressing Applied;Island/border;Tubular bandage 10/24/24 1309   Compression Tubular elasticized bandage 10/24/24 1309   Dressing Change Due 10/26/24 10/24/24 1309         Assessment/Plan:         ICD-10-CM ICD-9-CM   1. Non-pressure chronic ulcer of left lower leg with fat layer exposed  L97.922 707.10   2. Hypergranulation  L92.9 701.5   3. Fall, subsequent encounter  W19.XXXD V58.89     E888.9           Tissue pathology and/or culture taken:  [] Yes [x] No   Sharp debridement performed:   [] Yes [x] No   Labs ordered this visit:   [] Yes [x] No   Imaging ordered this visit:   [] Yes [x] No           Orders Placed This Encounter   Procedures    Change dressing     Left lateral lower leg ulcer and left knee:    Cleanse wound with:Vashe  Lidocaine: prn  Silver nitrate: prn  Bety wound:Cavilon PRN   Primary dressing: Mupirocin Ointment    Secondary dressing: Band-aid or border dressing  Edema control: Tubi  E toe to knee per patient request   Frequency: Daily and PRN per patient.     Follow-up:2 weeks with         Follow up in about 2 weeks (around 11/7/2024) for .            This includes face to face time and non-face to face time preparing to see the patient (eg, review of tests), obtaining and/or reviewing separately obtained history, documenting clinical information in the electronic or other health record, independently interpreting results and communicating results to the patient/family/caregiver, or care coordinator.

## 2024-10-28 ENCOUNTER — CLINICAL SUPPORT (OUTPATIENT)
Dept: REHABILITATION | Facility: HOSPITAL | Age: 59
End: 2024-10-28
Payer: MEDICARE

## 2024-10-28 DIAGNOSIS — M62.81 MUSCLE WEAKNESS: Primary | ICD-10-CM

## 2024-10-28 DIAGNOSIS — M25.641 FINGER STIFFNESS, RIGHT: ICD-10-CM

## 2024-10-28 DIAGNOSIS — M79.644 PAIN IN RIGHT FINGER(S): ICD-10-CM

## 2024-10-28 PROCEDURE — 97110 THERAPEUTIC EXERCISES: CPT | Mod: PN

## 2024-10-28 PROCEDURE — 97530 THERAPEUTIC ACTIVITIES: CPT | Mod: PN

## 2024-10-28 PROCEDURE — 97140 MANUAL THERAPY 1/> REGIONS: CPT | Mod: PN

## 2024-11-06 ENCOUNTER — TELEPHONE (OUTPATIENT)
Dept: ORTHOPEDICS | Facility: CLINIC | Age: 59
End: 2024-11-06
Payer: MEDICARE

## 2024-11-06 DIAGNOSIS — M20.011 MALLET FINGER, RIGHT: Primary | ICD-10-CM

## 2024-11-07 ENCOUNTER — HOSPITAL ENCOUNTER (OUTPATIENT)
Dept: WOUND CARE | Facility: HOSPITAL | Age: 59
Discharge: HOME | End: 2024-11-07
Attending: PREVENTIVE MEDICINE
Payer: MEDICARE

## 2024-11-07 VITALS
WEIGHT: 211 LBS | HEIGHT: 69 IN | HEART RATE: 92 BPM | TEMPERATURE: 98 F | DIASTOLIC BLOOD PRESSURE: 80 MMHG | BODY MASS INDEX: 31.25 KG/M2 | SYSTOLIC BLOOD PRESSURE: 181 MMHG

## 2024-11-07 DIAGNOSIS — L97.521 ULCER OF GREAT TOE, LEFT, LIMITED TO BREAKDOWN OF SKIN: ICD-10-CM

## 2024-11-07 DIAGNOSIS — L97.922 NON-PRESSURE CHRONIC ULCER OF LEFT LOWER LEG WITH FAT LAYER EXPOSED: Primary | ICD-10-CM

## 2024-11-07 PROCEDURE — 99213 OFFICE O/P EST LOW 20 MIN: CPT

## 2024-11-07 NOTE — PROGRESS NOTES
Wound Care & Hyperbaric Medicine    Subjective:       Patient ID: Tj Trammell is a 59 y.o. male.    Chief Complaint: Non-healing Wound Follow Up    Follow up left leg and left knee wounds. Knee resolved. Left leg wound almost fully resolved. New wound left great toe due fall.  Reports falling off scooter 2 days ago.  Plan of care updated.     Review of Systems   All other systems reviewed and are negative.        Objective:     Vitals:    11/07/24 1340   BP: (!) 181/80   Pulse: 92   Temp: 98 °F (36.7 °C)         Physical Exam       Altered Skin Integrity 07/28/23 0800 Left lateral;lower Leg (Active)   07/28/23 0800   Altered Skin Integrity Present on Admission - Did Patient arrive to the hospital with altered skin?: yes   Side: Left   Orientation: lateral;lower   Location: Leg   Wound Number:    Is this injury device related?: No   Primary Wound Type:    Description of Altered Skin Integrity:    Ankle-Brachial Index:    Pulses:    Removal Indication and Assessment:    Wound Outcome:    (Retired) Wound Length (cm):    (Retired) Wound Width (cm):    (Retired) Depth (cm):    Wound Description (Comments):    Removal Indications:    Wound Image   11/07/24 1333   Dressing Appearance Dry;Intact 11/07/24 1333   Drainage Amount None 11/07/24 1333   Appearance Closed/resurfaced 11/07/24 1333   Tissue loss description Not applicable 11/07/24 1333   Red (%), Wound Tissue Color 100 % 11/07/24 1333   Periwound Area Intact;Dry 11/07/24 1333   Wound Edges Rolled/closed 11/07/24 1333   Wound Length (cm) 0.1 cm 11/07/24 1333   Wound Width (cm) 0.1 cm 11/07/24 1333   Wound Depth (cm) 0 cm 11/07/24 1333   Wound Volume (cm^3) 0 cm^3 11/07/24 1333   Wound Surface Area (cm^2) 0.01 cm^2 11/07/24 1333   Care Cleansed with:;Soap and water 11/07/24 1333   Dressing Applied;Tubular bandage;Island/border;Calcium alginate;Silver 11/07/24 1333   Periwound Care Dry periwound area maintained 11/07/24 1333   Compression  Tubular elasticized bandage 11/07/24 1333   Dressing Change Due 11/14/24 11/07/24 1333            Wound 11/07/24 1325 Traumatic Left Toe, first (Active)   11/07/24 1325 Toe, first   Present on Original Admission:    Primary Wound Type: Traumatic   Side: Left   Orientation:    Wound Approximate Age at First Assessment (Weeks):    Wound Number:    Is this injury device related?:    Incision Type:    Closure Method:    Wound Description (Comments):    Type:    Additional Comments:    Ankle-Brachial Index:    Pulses:    Removal Indication and Assessment:    Wound Outcome:    Wound Image   11/07/24 1333   Dressing Appearance Intact;Moist drainage 11/07/24 1333   Drainage Amount Small 11/07/24 1333   Drainage Characteristics/Odor Serous 11/07/24 1333   Appearance Red;Moist 11/07/24 1333   Tissue loss description Full thickness 11/07/24 1333   Red (%), Wound Tissue Color 100 % 11/07/24 1333   Periwound Area Macerated 11/07/24 1333   Wound Edges Defined 11/07/24 1333   Wound Length (cm) 0.5 cm 11/07/24 1333   Wound Width (cm) 0.6 cm 11/07/24 1333   Wound Depth (cm) 0.1 cm 11/07/24 1333   Wound Volume (cm^3) 0.03 cm^3 11/07/24 1333   Wound Surface Area (cm^2) 0.3 cm^2 11/07/24 1333   Care Cleansed with:;Soap and water;Sterile normal saline 11/07/24 1333   Dressing Applied;Island/border;Tubular bandage;Silver;Calcium alginate 11/07/24 1333   Periwound Care Absorptive dressing applied;Dry periwound area maintained 11/07/24 1333   Compression Tubular elasticized bandage 11/07/24 1333   Dressing Change Due 11/14/24 11/07/24 1333       [REMOVED]      Wound 10/24/24 1310 Traumatic Left Knee (Removed)   10/24/24 1310 Knee   Present on Original Admission:    Primary Wound Type: Traumatic   Side: Left   Orientation:    Wound Approximate Age at First Assessment (Weeks):    Wound Number:    Is this injury device related?:    Incision Type:    Closure Method:    Wound Description (Comments):    Type:    Additional Comments:     Ankle-Brachial Index:    Pulses:    Removal Indication and Assessment:    Wound Outcome: Healed   Removed 11/07/24 1336   Wound Image   11/07/24 1333   Dressing Appearance Open to air;Dry 11/07/24 1333   Drainage Amount None 11/07/24 1333   Appearance Pink;Epithelialization;Dry 11/07/24 1333   Red (%), Wound Tissue Color 100 % 11/07/24 1333   Periwound Area Intact;Dry 11/07/24 1333   Wound Edges Rolled/closed 11/07/24 1333   Wound Length (cm) 0 cm 11/07/24 1333   Wound Width (cm) 0 cm 11/07/24 1333   Wound Depth (cm) 0 cm 11/07/24 1333   Wound Volume (cm^3) 0 cm^3 11/07/24 1333   Wound Surface Area (cm^2) 0 cm^2 11/07/24 1333   Care Cleansed with:;Soap and water 11/07/24 1333         Assessment/Plan:         ICD-10-CM ICD-9-CM   1. Non-pressure chronic ulcer of left lower leg with fat layer exposed  L97.922 707.10   2. Ulcer of great toe, left, limited to breakdown of skin  L97.521 707.15           Tissue pathology and/or culture taken:  [] Yes [x] No   Sharp debridement performed:   [] Yes [x] No   Labs ordered this visit:   [] Yes [x] No   Imaging ordered this visit:   [] Yes [x] No           Orders Placed This Encounter   Procedures    Change dressing     Left lateral lower leg ulcer and left 1st Toe:     Cleanse wound with:Vashe   Lidocaine: prn   Silver nitrate: prn   Bety wound:Cavilon PRN   Primary dressing: Siver Alginate   Secondary dressing: Border dressing   Edema control: Tubi  E toe to knee    Frequency: Weekly and PRN nurse visit.     Follow-up:1 week with         Follow up in about 1 week (around 11/14/2024) for .            This includes face to face time and non-face to face time preparing to see the patient (eg, review of tests), obtaining and/or reviewing separately obtained history, documenting clinical information in the electronic or other health record, independently interpreting results and communicating results to the patient/family/caregiver, or care  coordinator.

## 2024-11-14 ENCOUNTER — HOSPITAL ENCOUNTER (OUTPATIENT)
Dept: WOUND CARE | Facility: HOSPITAL | Age: 59
Discharge: HOME | End: 2024-11-14
Attending: PREVENTIVE MEDICINE
Payer: MEDICARE

## 2024-11-14 VITALS — DIASTOLIC BLOOD PRESSURE: 74 MMHG | SYSTOLIC BLOOD PRESSURE: 138 MMHG | HEART RATE: 72 BPM | TEMPERATURE: 98 F

## 2024-11-14 DIAGNOSIS — L97.921 NON-PRESSURE CHRONIC ULCER LEFT LOWER LEG, LIMITED TO BREAKDOWN SKIN: ICD-10-CM

## 2024-11-14 DIAGNOSIS — L97.922 NON-PRESSURE CHRONIC ULCER OF LEFT LOWER LEG WITH FAT LAYER EXPOSED: Primary | ICD-10-CM

## 2024-11-14 DIAGNOSIS — D04.72 SQUAMOUS CELL CARCINOMA IN SITU (SCCIS) OF SKIN OF LEFT LOWER LEG: ICD-10-CM

## 2024-11-14 DIAGNOSIS — D04.9 BOWEN DISEASE: ICD-10-CM

## 2024-11-14 PROCEDURE — 99213 OFFICE O/P EST LOW 20 MIN: CPT

## 2024-11-14 NOTE — PROGRESS NOTES
Wound Care & Hyperbaric Medicine    Subjective:       Patient ID: Tj Trammell is a 59 y.o. male.    Chief Complaint: No chief complaint on file.    Follow up left leg and left great toe wounds. No complaints. Wounds resolved. Plan of care updated.     Review of Systems   All other systems reviewed and are negative.        Objective:     Vitals:    11/14/24 1250   BP: 138/74   Pulse: 72   Temp: 98.2 °F (36.8 °C)         Physical Exam  [REMOVED]      Altered Skin Integrity 07/28/23 0800 Left lateral;lower Leg (Removed)   07/28/23 0800   Altered Skin Integrity Present on Admission - Did Patient arrive to the hospital with altered skin?: yes   Side: Left   Orientation: lateral;lower   Location: Leg   Wound Number:    Is this injury device related?: No   Primary Wound Type:    Description of Altered Skin Integrity:    Ankle-Brachial Index:    Pulses:    Removal Indication and Assessment:    Wound Outcome: Healed   (Retired) Wound Length (cm):    (Retired) Wound Width (cm):    (Retired) Depth (cm):    Wound Description (Comments):    Removal Indications:    Removed 11/14/24 1345   Wound Image   11/14/24 1344   Dressing Appearance Dry;Dried drainage 11/14/24 1344   Drainage Amount None 11/14/24 1344   Appearance Closed/resurfaced 11/14/24 1344   Tissue loss description Not applicable 11/14/24 1344   Periwound Area Intact;Scar tissue 11/14/24 1344   Wound Edges Rolled/closed 11/14/24 1344   Wound Length (cm) 0 cm 11/14/24 1344   Wound Width (cm) 0 cm 11/14/24 1344   Wound Depth (cm) 0 cm 11/14/24 1344   Wound Volume (cm^3) 0 cm^3 11/14/24 1344   Wound Surface Area (cm^2) 0 cm^2 11/14/24 1344   Care Cleansed with:;Soap and water 11/14/24 1344   Dressing Applied 11/14/24 1344   Periwound Care Moisturizer applied 11/14/24 1344   Dressing Change Due 11/15/24 11/14/24 1344       [REMOVED]      Wound 11/07/24 1325 Traumatic Left Toe, first (Removed)   11/07/24 1325 Toe, first   Present on Original  Admission:    Primary Wound Type: Traumatic   Side: Left   Orientation:    Wound Approximate Age at First Assessment (Weeks):    Wound Number:    Is this injury device related?:    Incision Type:    Closure Method:    Wound Description (Comments):    Type:    Additional Comments:    Ankle-Brachial Index:    Pulses:    Removal Indication and Assessment:    Wound Outcome: Healed   Removed 11/14/24 1347   Wound Image   11/14/24 1344   Dressing Appearance Dry;Open to air 11/14/24 1344   Appearance Closed/resurfaced 11/14/24 1344   Tissue loss description Not applicable 11/14/24 1344   Periwound Area Intact;Dry 11/14/24 1344   Wound Edges Rolled/closed 11/14/24 1344   Wound Length (cm) 0 cm 11/14/24 1344   Wound Width (cm) 0 cm 11/14/24 1344   Wound Depth (cm) 0 cm 11/14/24 1344   Wound Volume (cm^3) 0 cm^3 11/14/24 1344   Wound Surface Area (cm^2) 0 cm^2 11/14/24 1344   Care Cleansed with:;Soap and water 11/14/24 1344   Dressing Applied 11/14/24 1344   Periwound Care Moisturizer applied 11/14/24 1344   Dressing Change Due 11/15/24 11/14/24 1344         Assessment/Plan:         ICD-10-CM ICD-9-CM   1. Non-pressure chronic ulcer of left lower leg with fat layer exposed  L97.922 707.10   2. 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 Non-pressure chronic ulcer left lower leg, limited to breakdown skin  L97.921 707.10           Tissue pathology and/or culture taken:  [] Yes [x] No   Sharp debridement performed:   [] Yes [x] No   Labs ordered this visit:   [] Yes [x] No   Imaging ordered this visit:   [] Yes [x] No           Orders Placed This Encounter   Procedures    Change dressing     Healed Wound Instructions:Your wound is healed, it is extremely fragile at this stage; protect it from friction, wash it gently and pat dry. Lotion to leg daily. If the wound should re-open, please call [phone] for further instructions.        Follow up if  symptoms worsen or fail to improve.            This includes face to face time and non-face to face time preparing to see the patient (eg, review of tests), obtaining and/or reviewing separately obtained history, documenting clinical information in the electronic or other health record, independently interpreting results and communicating results to the patient/family/caregiver, or care coordinator.

## 2024-11-25 ENCOUNTER — HOSPITAL ENCOUNTER (EMERGENCY)
Facility: HOSPITAL | Age: 59
Discharge: HOME | End: 2024-11-25
Attending: EMERGENCY MEDICINE
Payer: MEDICARE

## 2024-11-25 VITALS
OXYGEN SATURATION: 95 % | HEIGHT: 69 IN | DIASTOLIC BLOOD PRESSURE: 73 MMHG | TEMPERATURE: 98 F | SYSTOLIC BLOOD PRESSURE: 135 MMHG | RESPIRATION RATE: 20 BRPM | WEIGHT: 240 LBS | HEART RATE: 100 BPM | BODY MASS INDEX: 35.55 KG/M2
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
## 2024-11-25 DIAGNOSIS — S09.90XA CLOSED HEAD INJURY, INITIAL ENCOUNTER: ICD-10-CM

## 2024-11-25 DIAGNOSIS — T07.XXXA MULTIPLE ABRASIONS: Primary | ICD-10-CM

## 2024-11-25 PROCEDURE — 25000003 PHARM REV CODE 250

## 2024-11-25 PROCEDURE — 99285 EMERGENCY DEPT VISIT HI MDM: CPT | Mod: 25

## 2024-11-25 RX ORDER — ACETAMINOPHEN 500 MG
1000 TABLET ORAL
Status: COMPLETED | OUTPATIENT
Start: 2024-11-25 | End: 2024-11-25

## 2024-11-25 RX ADMIN — ACETAMINOPHEN 1000 MG: 500 TABLET ORAL at 02:11

## 2024-11-25 NOTE — DISCHARGE INSTRUCTIONS
It was nice meeting you, and I hope you feel better soon. You may return to the ER at any time for any new or concerning symptoms, worsening condition, or failure to improve.    Our goal in the ER is to always give you outstanding care and exceptional service. You may receive a survey by mail or email in the next week about your experience in our ED. We would greatly appreciate you completing and returning the survey. Your feedback provides us with a way to recognize our staff who give very good care and it helps us learn how to improve when your experience was below our aspiration of excellence.     Sincerely,     Emily Garrison PA-C  Emergency Room Physician Assistant  [facility]

## 2024-11-25 NOTE — ED PROVIDER NOTES
Encounter Date: 11/25/2024       History     Chief Complaint   Patient presents with    Fall     Pt reports falling out of his motorized scooter and landing on his right side. Pt has abrasions to R ankle, knee, forearm. Pt hit head, -LOC. Denies other sx or complaints.      Tj Trammell is a 59 y.o. male who has a past medical history of Asthma, Bilateral hearing loss, Biliary acute pancreatitis, GERD (gastroesophageal reflux disease), High cholesterol, Hypertension, and Multiple sclerosis. presenting to the Emergency Department for fall.  Patient is primarily wheelchair bound secondary to MS.  He reports he was reaching out of his motorized chair when he fell on his right side.  He did hit his head, but denies loss of consciousness.  He denies blood thinners.  He sustained abrasions to right foot, right knee.  He denies headache, dizziness, nausea, vomiting, vision changes.  He is having some pain to the right foot and knee        The history is provided by the patient.     Review of patient's allergies indicates:  No Known Allergies  Past Medical History:   Diagnosis Date    Asthma     Bilateral hearing loss 12/12/2012    Biliary acute pancreatitis     GERD (gastroesophageal reflux disease)     High cholesterol     Hypertension     Multiple sclerosis      Past Surgical History:   Procedure Laterality Date    APPENDECTOMY      CHOLECYSTECTOMY      COLONOSCOPY N/A 1/22/2019    Procedure: COLONOSCOPY 2 day  golytely;  Surgeon: Nathan Waddell MD;  Location: [facility];  Service: Endoscopy;  Laterality: N/A;    COLONOSCOPY N/A 4/25/2023    Procedure: COLONOSCOPY;  Surgeon: Jared Loredo MD;  Location: [facility];  Service: Endoscopy;  Laterality: N/A;    COLONOSCOPY N/A 10/10/2023    Procedure: COLONOSCOPY;  Surgeon: Jared Loredo MD;  Location: [facility];  Service: Endoscopy;  Laterality: N/A;    ESOPHAGOGASTRODUODENOSCOPY N/A 9/29/2021    Procedure: EGD (ESOPHAGOGASTRODUODENOSCOPY) covid  test Rapid;  Surgeon: Jared Loredo MD;  Location: [facility] END[facility];  Service: Endoscopy;  Laterality: N/A;    EXCISION, SUPERFICIAL MASS, HEAD Left 2024    Procedure: EXCISION, SUPERFICIAL MASS, HEAD;  Surgeon: Chriss Rodriguez MD;  Location: [facility] OR;  Service: General;  Laterality: Left;    JOINT REPLACEMENT      arm    SURGICAL REMOVAL OF MASS OF LOWER EXTREMITY Left 2024    Procedure: EXCISION, MASS, LOWER EXTREMITY;  Surgeon: Chriss Rodriguez MD;  Location: [facility] OR;  Service: General;  Laterality: Left;    SURGICAL REMOVAL OF MASS OF LOWER EXTREMITY Left 2024    Procedure: EXCISION, MASS, LOWER EXTREMITY;  Surgeon: Chriss Rodriguez MD;  Location: [facility] OR;  Service: General;  Laterality: Left;    TONSILLECTOMY      TRANSFORAMINAL EPIDURAL INJECTION OF STEROID Bilateral 10/5/2022    Procedure: Injection,steroid,epidural,transforaminal approach;  Surgeon: Soo Giles MD;  Location: [facility] PAIN [facility]GT;  Service: Pain Management;  Laterality: Bilateral;  bilateral L5 transforaminal epidural steroid injection with RN IV sedation     Family History   Problem Relation Name Age of Onset    Heart disease Mother      Heart disease Father      Heart disease Brother       Social History     Tobacco Use    Smoking status: Former     Current packs/day: 0.00     Average packs/day: 1 pack/day for 32.0 years (32.0 ttl pk-yrs)     Types: Cigarettes     Start date: 1990     Quit date: 2022     Years since quittin.9    Smokeless tobacco: Never   Substance Use Topics    Alcohol use: Not Currently    Drug use: No     Review of Systems   Constitutional:  Negative for chills and fever.   Eyes:  Negative for visual disturbance.   Gastrointestinal:  Negative for nausea and vomiting.   Musculoskeletal:  Positive for gait problem (Chronic). Negative for joint swelling.   Skin:  Positive for wound.   Neurological:  Negative for light-headedness, numbness and headaches.       Physical Exam     Initial  Vitals [11/25/24 1321]   BP Pulse Resp Temp SpO2   135/73 100 20 97.8 °F (36.6 °C) 95 %      MAP       --         Physical Exam    Nursing note and vitals reviewed.  Constitutional: He appears well-developed and well-nourished. He is not diaphoretic.  Non-toxic appearance. No distress.   HENT:   Head: Normocephalic and atraumatic.   Right Ear: External ear normal.   Left Ear: External ear normal.   Eyes: EOM are normal.   Neck: Neck supple.   Normal range of motion.  Cardiovascular:  Normal rate.           Pulmonary/Chest: No respiratory distress.   Abdominal: He exhibits no distension.   Musculoskeletal:         General: Normal range of motion.      Cervical back: Normal range of motion and neck supple.      Comments: No C, T, L-spine tenderness.  Mild tenderness to lateral aspect of the right knee.  No significant pain with passive range of motion of the right knee.  Mild tenderness to dorsal aspect of the right foot.  No significant swelling.  Neurovascular intact.    Superficial abrasions to right knee, right foot     Neurological: He is alert and oriented to person, place, and time. GCS score is 15. GCS eye subscore is 4. GCS verbal subscore is 5. GCS motor subscore is 6.   Skin: Skin is dry.   Psychiatric: He has a normal mood and affect. His behavior is normal. Judgment and thought content normal.         ED Course   Procedures  Labs Reviewed - No data to display       Imaging Results              CT Head Without Contrast (Final result)  Result time 11/25/24 15:54:07      Final result by Kirt Bautista MD (11/25/24 15:54:07)                   Impression:      1. No acute intracranial abnormalities noting suspected sequela of chronic microvascular ischemic change and senescent change.  2. Remote nasal bone injury.      Electronically signed by: Kirt Bautista MD  Date:    11/25/2024  Time:    15:54               Narrative:    EXAMINATION:  CT HEAD WITHOUT CONTRAST    CLINICAL HISTORY:  Head trauma,  moderate-severe;    TECHNIQUE:  Low dose axial images were obtained through the head.  Coronal and sagittal reformations were also performed. Contrast was not administered.    COMPARISON:  12/30/2022    FINDINGS:  There is generalized cerebral volume loss.  There is hypoattenuation in a periventricular fashion, likely sequela of chronic microvascular ischemic change.There are a few punctate foci of low attenuation within the corona radiata.  There is no evidence of acute major vascular territory infarct, hemorrhage, or mass.  There is no hydrocephalus.  There are no abnormal extra-axial fluid collections.  The paranasal sinuses and mastoid air cells are clear, and there is no evidence of calvarial fracture.  The visualized soft tissues are unremarkable.  There is remote appearing injury of the anterior aspect of the nasal bone.  No overlying edema.                                       X-Ray Knee 3 View Right (Final result)  Result time 11/25/24 15:55:45      Final result by Erna Tejeda MD (11/25/24 15:55:45)                   Impression:      Healing fracture of the lateral patella, no complications seen.      Electronically signed by: Erna Tejeda MD  Date:    11/25/2024  Time:    15:55               Narrative:    EXAMINATION:  XR KNEE 3 VIEW RIGHT    CLINICAL HISTORY:  Unspecified fall, initial encounter    TECHNIQUE:  AP, lateral, and Merchant views of the right knee were performed.    COMPARISON:  12/30/2022    FINDINGS:  The knee joint space is preserved.  Minimal osteophyte at the lateral knee and medial knee margin.  Healing, nondisplaced fracture of the lateral patella.  No osseous lesion seen.  No joint effusion.  There is calcified atherosclerotic plaque of the vasculature.                                       X-Ray Foot Complete Right (Final result)  Result time 11/25/24 15:57:11      Final result by Erna Tejeda MD (11/25/24 15:57:11)                   Impression:      No  definite acute fracture identified.      Electronically signed by: Erna Tejeda MD  Date:    11/25/2024  Time:    15:57               Narrative:    EXAMINATION:  XR FOOT COMPLETE 3 VIEW RIGHT    CLINICAL HISTORY:  . Unspecified fall, initial encounter    TECHNIQUE:  AP, lateral, and oblique views of the right foot were performed.    COMPARISON:  None.    FINDINGS:  The alignment appears within normal limits.  There is osseous remodeling of the distal 2nd and 3rd metatarsal bone suggestive of remote fractures, no definite acute fracture is seen.  The soft tissues appear normal.                                       Medications   acetaminophen tablet 1,000 mg (1,000 mg Oral Given 11/25/24 1444)     Medical Decision Making  59-year-old male presents to the ER after mechanical fall from motorized chair. + head trauma. Will obtain CT head, plain film right knee and foot. Wound care to minor abrasions    Differential Diagnosis includes, but is not limited to:  Fracture, dislocation, compartment syndrome, nerve injury/palsy, vascular injury, DVT, rhabdomyolysis, hemarthrosis, septic joint, cellulitis, bursitis, muscle strain, ligament tear/sprain, laceration, foreign body, abrasion, soft tissue contusion, osteoarthritis.    XRs negative for acute fracture. Healing fracture of lateral patella. Presentation consistent with contusion. Head CT without acute abnormality. NVI. Tylenol for pain prn.  ED return precautions discussed with patient. Follow up with PCP. All questions answered. Patient agreeable to treatment plan.      Problems Addressed:  Closed head injury, initial encounter: acute illness or injury  Fall: acute illness or injury  Multiple abrasions: acute illness or injury    Amount and/or Complexity of Data Reviewed  External Data Reviewed: notes.  Radiology: ordered. Decision-making details documented in ED Course.  Discussion of management or test interpretation with external provider(s):  none    Risk  OTC drugs.  Risk Details: Comorbidities taken into consideration during the patient's evaluation and treatment includeAsthma, Bilateral hearing loss (12/12/2012), Biliary acute pancreatitis, GERD (gastroesophageal reflux disease), High cholesterol, Hypertension, and Multiple sclerosis.  Social determinants of health taken into consideration during development of our treatment plan include difficulty in obtaining follow-up and obtaining medications; health literacy                 ED Course as of 11/26/24 2353   Mon Nov 25, 2024   1603 CT Head Without Contrast  1. No acute intracranial abnormalities noting suspected sequela of chronic microvascular ischemic change and senescent change.  2. Remote nasal bone injury.   [CS]      ED Course User Index  [CS] Emily Garrison PA-C                           Clinical Impression:  Final diagnoses:  [W19.XXXA] Fall  [T07.XXXA] Multiple abrasions (Primary)  [S09.90XA] Closed head injury, initial encounter          ED Disposition Condition    Discharge Stable          ED Prescriptions    None       Follow-up Information       Follow up With Specialties Details Why Contact Info    Bayron Ferguson MD Internal Medicine Schedule an appointment as soon as possible for a visit   [address] [facility]  [address]  [place] [address]  [phone]               Emily Garrison PA-C  11/26/24 9555

## 2024-11-25 NOTE — ED NOTES
Patient presents to ER complaining of 5/10 right sided body pain secondary to falling off his scooter at home and landing on his right side. Minor scrapes noted to right leg.

## 2024-11-26 PROBLEM — E11.9 DIABETES MELLITUS WITHOUT COMPLICATION: Status: ACTIVE | Noted: 2024-11-26

## 2024-11-26 PROBLEM — E66.01 SEVERE OBESITY (BMI 35.0-39.9) WITH COMORBIDITY: Status: ACTIVE | Noted: 2024-11-26

## 2024-11-26 PROBLEM — E66.01 SEVERE OBESITY (BMI 35.0-39.9) WITH COMORBIDITY: Status: RESOLVED | Noted: 2024-11-26 | Resolved: 2024-11-26

## 2024-12-24 ENCOUNTER — HOSPITAL ENCOUNTER (EMERGENCY)
Facility: HOSPITAL | Age: 59
Discharge: HOME | End: 2024-12-24
Attending: EMERGENCY MEDICINE
Payer: MEDICARE

## 2024-12-24 ENCOUNTER — TELEPHONE (OUTPATIENT)
Dept: WOUND CARE | Facility: HOSPITAL | Age: 59
End: 2024-12-24
Payer: MEDICARE

## 2024-12-24 VITALS
HEIGHT: 69 IN | WEIGHT: 210 LBS | OXYGEN SATURATION: 97 % | RESPIRATION RATE: 16 BRPM | TEMPERATURE: 98 F | HEART RATE: 88 BPM | SYSTOLIC BLOOD PRESSURE: 142 MMHG | BODY MASS INDEX: 31.1 KG/M2 | DIASTOLIC BLOOD PRESSURE: 80 MMHG

## 2024-12-24 DIAGNOSIS — L97.929 CHRONIC ULCER OF LOWER EXTREMITY, LEFT, WITH UNSPECIFIED SEVERITY: Primary | ICD-10-CM

## 2024-12-24 PROCEDURE — 99283 EMERGENCY DEPT VISIT LOW MDM: CPT

## 2024-12-24 PROCEDURE — 25000003 PHARM REV CODE 250: Performed by: EMERGENCY MEDICINE

## 2024-12-24 RX ORDER — MUPIROCIN 20 MG/G
OINTMENT TOPICAL 2 TIMES DAILY
Qty: 22 G | Refills: 0 | Status: SHIPPED | OUTPATIENT
Start: 2024-12-24 | End: 2025-01-13

## 2024-12-24 RX ORDER — MUPIROCIN 20 MG/G
1 OINTMENT TOPICAL
Status: COMPLETED | OUTPATIENT
Start: 2024-12-24 | End: 2024-12-24

## 2024-12-24 RX ADMIN — MUPIROCIN 1 TUBE: 20 OINTMENT TOPICAL at 10:12

## 2024-12-24 NOTE — ED PROVIDER NOTES
Encounter Date: 12/24/2024    History     Chief Complaint   Patient presents with    Wound Check     Patient reports to the ED complaining of a wound to his lower left leg sustained two weeks ago when he hit it against something. Wound appears with purulent discharge and redness. Patient in mobility scooter at baseline, difficult to understand.        HPI  This is a 59 y.o. male who presents to the Emergency Department for wound check. Pt has hx of chronic leg ulcers, states that he hit the left leg 2 weeks ago. Has been to wound care previously for similar    History obtained for alternative sources:none    Previous or outside records requested and reviewed:  11/14/24 Wound care visit showing wounds have healed completely.    8/21/24 gen surg visit for squamous cell carcinoma in Situ    7/25/2024 HbA1c 7.5.      Review of patient's allergies indicates:  No Known Allergies  Past Medical History:   Diagnosis Date    Asthma     Bilateral hearing loss 12/12/2012    Biliary acute pancreatitis     GERD (gastroesophageal reflux disease)     High cholesterol     Hypertension     Multiple sclerosis      Past Surgical History:   Procedure Laterality Date    APPENDECTOMY      CHOLECYSTECTOMY      COLONOSCOPY N/A 1/22/2019    Procedure: COLONOSCOPY 2 day  golytely;  Surgeon: Nathan Waddell MD;  Location: [facility];  Service: Endoscopy;  Laterality: N/A;    COLONOSCOPY N/A 4/25/2023    Procedure: COLONOSCOPY;  Surgeon: Jared Loredo MD;  Location: [facility];  Service: Endoscopy;  Laterality: N/A;    COLONOSCOPY N/A 10/10/2023    Procedure: COLONOSCOPY;  Surgeon: Jared Loredo MD;  Location: [facility];  Service: Endoscopy;  Laterality: N/A;    ESOPHAGOGASTRODUODENOSCOPY N/A 9/29/2021    Procedure: EGD (ESOPHAGOGASTRODUODENOSCOPY) covid test Rapid;  Surgeon: Jared Loredo MD;  Location: [facility];  Service: Endoscopy;  Laterality: N/A;    EXCISION, SUPERFICIAL MASS, HEAD Left 5/7/2024     Procedure: EXCISION, SUPERFICIAL MASS, HEAD;  Surgeon: Chriss Rodriguez MD;  Location: [facility] OR;  Service: General;  Laterality: Left;    JOINT REPLACEMENT      arm    SURGICAL REMOVAL OF MASS OF LOWER EXTREMITY Left 2024    Procedure: EXCISION, MASS, LOWER EXTREMITY;  Surgeon: Chriss Rodriguez MD;  Location: [facility] OR;  Service: General;  Laterality: Left;    SURGICAL REMOVAL OF MASS OF LOWER EXTREMITY Left 2024    Procedure: EXCISION, MASS, LOWER EXTREMITY;  Surgeon: Chriss Rodriguez MD;  Location: [facility] OR;  Service: General;  Laterality: Left;    TONSILLECTOMY      TRANSFORAMINAL EPIDURAL INJECTION OF STEROID Bilateral 10/5/2022    Procedure: Injection,steroid,epidural,transforaminal approach;  Surgeon: Soo Giles MD;  Location: [facility]IN [facility]GT;  Service: Pain Management;  Laterality: Bilateral;  bilateral L5 transforaminal epidural steroid injection with RN IV sedation     Family History   Problem Relation Name Age of Onset    Heart disease Mother      Heart disease Father      Heart disease Brother       Social History     Tobacco Use    Smoking status: Former     Current packs/day: 0.00     Average packs/day: 1 pack/day for 32.0 years (32.0 ttl pk-yrs)     Types: Cigarettes     Start date: 1990     Quit date: 2022     Years since quittin.9    Smokeless tobacco: Never   Substance Use Topics    Alcohol use: Not Currently    Drug use: No       Review of Systems  All other systems reviewed and otherwise negative.    Physical Exam     Initial Vitals [24 0925]   BP Pulse Resp Temp SpO2   (!) 142/80 88 18 97.8 °F (36.6 °C) 97 %      MAP       --         Physical Exam    Nursing note and vitals reviewed.  Constitutional: He appears well-developed and well-nourished. He is not diaphoretic. No distress.   HENT:   Head: Normocephalic and atraumatic. Mouth/Throat: Oropharynx is clear and moist.   Eyes: Conjunctivae are normal.   Cardiovascular:  Normal rate and regular rhythm.            Pulmonary/Chest: No respiratory distress.     Neurological: He is alert and oriented to person, place, and time.   Skin: Skin is warm and dry. Capillary refill takes less than 2 seconds. No rash noted. No pallor.   Superficial wound to lateral left leg, approx 4cm in diameter with granulation tissue. Surrounding skin appears scarred, mild reactionary changes but without cellulitis. No discharge or significant bleeding.   Psychiatric: He has a normal mood and affect.               Medical Decision Making:     Differential Diagnosis:  Chronic leg ulcer, poor wound healing, traumatic abrasion to leg, no evidence of abscess or cellulitis.    Comorbidities taken into consideration for development of diagnosis and treatment plan include MS, hx of chronic skin ulcers.      Plan:  Orders Placed This Encounter    mupirocin 2 % ointment 1 Tube    mupirocin (BACTROBAN) 2 % ointment        Social determinants of health taken into consideration during development of our treatment plan include: Limited access to healthcare, Limited transportation, Affordability of medications, and Health Literacy    Procedures  Studies:   Labs:  Labs Reviewed - No data to display    Imaging:       Imaging Results    None            ED Course:                 Wound dressed and pt given home care instructions of wound care. Instructed to f/u with wound care clinic.      Clinical Impression:   Final diagnoses:  [L97.929] Chronic ulcer of lower extremity, left, with unspecified severity (Primary)         ED Prescriptions       Medication Sig Dispense Start Date End Date Auth. Provider    mupirocin (BACTROBAN) 2 % ointment Apply topically 2 (two) times daily. Apply to affected area for 10 days 22 g 12/24/2024 1/7/2025 Nicholas Ramírez MD          Follow-up Information       Follow up With Specialties Details Why Contact Info    Joe Casey MD HYPERBARIC MEDICINE Schedule an appointment as soon as possible for a visit   [address]  [place] [address]  [phone]                DISCLAIMER: This note was prepared with [employer] voice recognition transcription software. Garbled syntax, mangled pronouns, and other bizarre constructions may be attributed to that software system.     Nicholas Ramírez MD  12/24/24 6373

## 2024-12-24 NOTE — ED NOTES
Pt arrives to ED from home for evaluation of wound to LLE. Pt has been receiving wound care, but this RN believes pt states he is unable to appropriately clean and redress appropriately. Pt is [place] and dysarthric, so hard to understand.     Wound cleaned with Vashe solution, mupirocin placed covered by Vaseline dressing, and covered with tubed gauze.

## 2024-12-24 NOTE — TELEPHONE ENCOUNTER
Pt. Called saying his right leg was bleeding, because he bumped his leg. Pt. spoke with nurse and was advised to go to urgent care if the cut continued to bleed.

## 2024-12-27 ENCOUNTER — HOSPITAL ENCOUNTER (EMERGENCY)
Facility: HOSPITAL | Age: 59
Discharge: HOME | End: 2024-12-27
Attending: EMERGENCY MEDICINE
Payer: MEDICARE

## 2024-12-27 ENCOUNTER — TELEPHONE (OUTPATIENT)
Dept: WOUND CARE | Facility: HOSPITAL | Age: 59
End: 2024-12-27
Payer: MEDICARE

## 2024-12-27 VITALS
SYSTOLIC BLOOD PRESSURE: 154 MMHG | RESPIRATION RATE: 18 BRPM | HEART RATE: 85 BPM | TEMPERATURE: 98 F | HEIGHT: 69 IN | DIASTOLIC BLOOD PRESSURE: 84 MMHG | BODY MASS INDEX: 31.1 KG/M2 | OXYGEN SATURATION: 97 % | WEIGHT: 210 LBS

## 2024-12-27 DIAGNOSIS — Z51.89 ENCOUNTER FOR WOUND RE-CHECK: Primary | ICD-10-CM

## 2024-12-27 PROCEDURE — 99282 EMERGENCY DEPT VISIT SF MDM: CPT

## 2024-12-27 NOTE — ED NOTES
Patient reports to the ED complaining of spontaneous bleeding from chronic lower left leg wound, noticed when he woke up this AM. Bleeding controlled PTA, visualized in triage and rewrapped. Patient ambulatory with a mobility scooter, dysarthric at baseline. NAD noted.

## 2024-12-27 NOTE — ED PROVIDER NOTES
Encounter Date: 12/27/2024       History     Chief Complaint   Patient presents with    Wound Check     Patient reports spontaneous bleeding from chronic wound, noticed when he woke up. Bleeding controlled PTA, visualized and rewrapped in triage. NAD noted.     The patient is a 59-year-old male who came to the emergency department because the wound on his left lower leg was bleeding during the night last night.  The patient called wound care and was told he can't get in until next week.      Review of patient's allergies indicates:  No Known Allergies  Past Medical History:   Diagnosis Date    Asthma     Bilateral hearing loss 12/12/2012    Biliary acute pancreatitis     GERD (gastroesophageal reflux disease)     High cholesterol     Hypertension     Multiple sclerosis      Past Surgical History:   Procedure Laterality Date    APPENDECTOMY      CHOLECYSTECTOMY      COLONOSCOPY N/A 1/22/2019    Procedure: COLONOSCOPY 2 day  golytely;  Surgeon: Nathan Waddell MD;  Location: [facility];  Service: Endoscopy;  Laterality: N/A;    COLONOSCOPY N/A 4/25/2023    Procedure: COLONOSCOPY;  Surgeon: Jared Loredo MD;  Location: [facility];  Service: Endoscopy;  Laterality: N/A;    COLONOSCOPY N/A 10/10/2023    Procedure: COLONOSCOPY;  Surgeon: Jared Loredo MD;  Location: [facility];  Service: Endoscopy;  Laterality: N/A;    ESOPHAGOGASTRODUODENOSCOPY N/A 9/29/2021    Procedure: EGD (ESOPHAGOGASTRODUODENOSCOPY) covid test Rapid;  Surgeon: Jared Loredo MD;  Location: [facility];  Service: Endoscopy;  Laterality: N/A;    EXCISION, SUPERFICIAL MASS, HEAD Left 5/7/2024    Procedure: EXCISION, SUPERFICIAL MASS, HEAD;  Surgeon: Chriss Rodriguez MD;  Location: [facility];  Service: General;  Laterality: Left;    JOINT REPLACEMENT      arm    SURGICAL REMOVAL OF MASS OF LOWER EXTREMITY Left 5/7/2024    Procedure: EXCISION, MASS, LOWER EXTREMITY;  Surgeon: Chriss Rodriguez MD;  Location: [facility];   Service: General;  Laterality: Left;    SURGICAL REMOVAL OF MASS OF LOWER EXTREMITY Left 2024    Procedure: EXCISION, MASS, LOWER EXTREMITY;  Surgeon: Chriss Rodriguez MD;  Location: [facility] OR;  Service: General;  Laterality: Left;    TONSILLECTOMY      TRANSFORAMINAL EPIDURAL INJECTION OF STEROID Bilateral 10/5/2022    Procedure: Injection,steroid,epidural,transforaminal approach;  Surgeon: Soo Giles MD;  Location: [facility];  Service: Pain Management;  Laterality: Bilateral;  bilateral L5 transforaminal epidural steroid injection with RN IV sedation     Family History   Problem Relation Name Age of Onset    Heart disease Mother      Heart disease Father      Heart disease Brother       Social History     Tobacco Use    Smoking status: Former     Current packs/day: 0.00     Average packs/day: 1 pack/day for 32.0 years (32.0 ttl pk-yrs)     Types: Cigarettes     Start date: 1990     Quit date: 2022     Years since quittin.9    Smokeless tobacco: Never   Substance Use Topics    Alcohol use: Not Currently    Drug use: No     Review of Systems   All other systems reviewed and are negative.      Physical Exam     Initial Vitals [24 0950]   BP Pulse Resp Temp SpO2   (!) 154/84 85 18 98.1 °F (36.7 °C) 97 %      MAP       --         Physical Exam    Nursing note and vitals reviewed.  Constitutional: He appears well-developed and well-nourished.   Musculoskeletal:      Comments: Chronic wound to the left lateral lower leg.  No erythema.  Wound is draining serous drainage     Neurological: He is alert and oriented to person, place, and time.   Skin: Skin is warm and dry.   Wound as above   Psychiatric: He has a normal mood and affect. His behavior is normal. Judgment and thought content normal.         ED Course   Procedures  Labs Reviewed - No data to display       Imaging Results    None          Medications - No data to display  Medical Decision Making  abscess, cellulitis, dermatitis,  myositis, fasciitis     MDM:  The patient has a chronic wound on his left lower leg.  He called wound care today and they told him they could not see him and for him to come to the emergency department.  The wound does not appear infected at this time, picture taken and the patient will follow up with wound care next week.  The wound was cleaned and dressed.                                      Clinical Impression:  Final diagnoses:  [Z51.89] Encounter for wound re-check (Primary)          ED Disposition Condition    Discharge Stable          ED Prescriptions    None       Follow-up Information       Follow up With Specialties Details Why Contact Info Additional Information    Bayron Ferguson MD Internal Medicine  As needed [address] [facility] [place]  [address]  [facility] [id number] [phone]       [facility] Schedule an appointment as soon as possible for a visit   [address] [address] [phone] Please park in [address] or [address] and use the [address] entrance. Check in at [facility] [address].             Dari Soto MD  12/27/24 8410

## 2024-12-27 NOTE — TELEPHONE ENCOUNTER
"Received phone call from patient, who stated that he was "bleeding all in his bed", that there was "blood everywhere" and that he was feeling dizzy, unable to get patient to describe the situation further, advised patient to apply pressure, call 911 and go to the ER, patient verbalized understanding.  "

## 2025-01-03 ENCOUNTER — HOSPITAL ENCOUNTER (OUTPATIENT)
Dept: WOUND CARE | Facility: HOSPITAL | Age: 60
Discharge: HOME | End: 2025-01-03
Attending: PREVENTIVE MEDICINE
Payer: MEDICARE

## 2025-01-03 VITALS
SYSTOLIC BLOOD PRESSURE: 148 MMHG | HEART RATE: 80 BPM | DIASTOLIC BLOOD PRESSURE: 74 MMHG | BODY MASS INDEX: 31.12 KG/M2 | HEIGHT: 69 IN | TEMPERATURE: 98 F | WEIGHT: 210.13 LBS

## 2025-01-03 DIAGNOSIS — L97.921 NON-PRESSURE CHRONIC ULCER LEFT LOWER LEG, LIMITED TO BREAKDOWN SKIN: ICD-10-CM

## 2025-01-03 DIAGNOSIS — L97.922 NON-PRESSURE CHRONIC ULCER OF LEFT LOWER LEG WITH FAT LAYER EXPOSED: Primary | ICD-10-CM

## 2025-01-03 DIAGNOSIS — Z51.89 ENCOUNTER FOR WOUND RE-CHECK: ICD-10-CM

## 2025-01-03 PROCEDURE — 29581 APPL MULTLAYER CMPRN SYS LEG: CPT

## 2025-01-03 NOTE — PROGRESS NOTES
Wound Care & Hyperbaric Medicine    Subjective:       Patient ID: Tj Trammell is a 59 y.o. male.    Chief Complaint: Non-healing Wound    Pt presents to wound care with again with an injury to the lower lateral left leg. Wound is bed is red and granular, mild drainage noted, skin around the wound is warm to the touch. Pt returns here after going to the ER after running into something again and injured his leg. Pt states ER gave him antibiotics that he is still taking. No new issues or complaints. Continue plan of care.     Review of Systems   All other systems reviewed and are negative.        Objective:     Vitals:    01/03/25 0843   BP: (!) 148/74   Pulse: 80   Temp: 97.8 °F (36.6 °C)         Physical Exam       Wound 01/03/25 0934 Other (comment) Left lower;lateral Leg #5 (Active)   01/03/25 0934 Leg   Present on Original Admission: N   Primary Wound Type: Other   Side: Left   Orientation: lower;lateral   Wound Approximate Age at First Assessment (Weeks):    Wound Number: #5   Is this injury device related?: Yes   Incision Type:    Closure Method:    Wound Description (Comments):    Type:    Additional Comments:    Ankle-Brachial Index:    Pulses:    Removal Indication and Assessment:    Wound Outcome:    Wound Image   01/03/25 0935   Dressing Appearance Moist drainage 01/03/25 0935   Drainage Amount Small 01/03/25 0935   Drainage Characteristics/Odor Serosanguineous 01/03/25 0935   Appearance Red;Intact;Moist 01/03/25 0935   Tissue loss description Partial thickness 01/03/25 0935   Red (%), Wound Tissue Color 100 % 01/03/25 0935   Periwound Area Intact;Dry 01/03/25 0935   Wound Edges Defined 01/03/25 0935   Wound Length (cm) 2 cm 01/03/25 0935   Wound Width (cm) 1 cm 01/03/25 0935   Wound Depth (cm) 0.1 cm 01/03/25 0935   Wound Volume (cm^3) 0.2 cm^3 01/03/25 0935   Wound Surface Area (cm^2) 2 cm^2 01/03/25 0935   Care Cleansed with:;Soap and water 01/03/25 0935   Dressing  Applied;Silver;Calcium alginate;Compression wrap 01/03/25 0935   Compression Two layer compression 01/03/25 0935   Dressing Change Due 01/10/25 01/03/25 0935         Assessment/Plan:         ICD-10-CM ICD-9-CM   1. Non-pressure chronic ulcer of left lower leg with fat layer exposed  L97.922 707.10   2. Encounter for wound re-check  Z51.89 V58.89   3. Non-pressure chronic ulcer left lower leg, limited to breakdown skin  L97.921 707.10       Slight erythema and localized warmth. Currently on oral abx.  Restart compression.    Tissue pathology and/or culture taken:   [] Yes    [x] No   Sharp debridement performed:    [] Yes    [x] No   Labs ordered this visit:    [] Yes    [x] No   Imaging ordered this visit:    [] Yes    [x] No     Is the wound improving?    [] Yes    [x] No   Is wound self limiting?    [] Yes    [x] No   Is the wound limb threatening?   [] Yes    [x] No  How does the wound affect patients function? [x] None [] Limited [] Significant          Orders Placed This Encounter   Procedures    Ambulatory referral/consult to Wound Clinic     Standing Status:   Standing     Number of Occurrences:   1     Referral Priority:   Routine     Referral Type:   Consultation     Referral Reason:   Specialty Services Required     Requested Specialty:   Wound Care     Number of Visits Requested:   1    Change dressing     Left lateral lower leg ulcer and left 1st Toe:     Cleanse wound with:Vashe   Lidocaine: prn   Silver nitrate: prn   Bety wound:Cavilon PRN   Primary dressing: Siver Alginate   Edema control: Cofelx from toes to back of knee  Frequency: Weekly and PRN nurse visit.     Follow-up:1 week with         Follow up in about 1 week (around 1/10/2025) for Dr Casey.            This includes face to face time and non-face to face time preparing to see the patient (eg, review of tests), obtaining and/or reviewing separately obtained history, documenting clinical information in the electronic or other  health record, independently interpreting results and communicating results to the patient/family/caregiver, or care coordinator.  Total time spent  > 20 minutes

## 2025-01-10 ENCOUNTER — TELEPHONE (OUTPATIENT)
Dept: WOUND CARE | Facility: HOSPITAL | Age: 60
End: 2025-01-10
Payer: MEDICARE

## 2025-01-10 ENCOUNTER — HOSPITAL ENCOUNTER (OUTPATIENT)
Dept: WOUND CARE | Facility: HOSPITAL | Age: 60
Discharge: HOME | End: 2025-01-10
Attending: PREVENTIVE MEDICINE
Payer: MEDICARE

## 2025-01-10 VITALS
BODY MASS INDEX: 31.12 KG/M2 | HEIGHT: 69 IN | SYSTOLIC BLOOD PRESSURE: 123 MMHG | HEART RATE: 100 BPM | DIASTOLIC BLOOD PRESSURE: 76 MMHG | WEIGHT: 210.13 LBS

## 2025-01-10 DIAGNOSIS — L97.922 NON-PRESSURE CHRONIC ULCER OF LEFT LOWER LEG WITH FAT LAYER EXPOSED: Primary | ICD-10-CM

## 2025-01-10 PROCEDURE — 29581 APPL MULTLAYER CMPRN SYS LEG: CPT

## 2025-01-10 NOTE — PROGRESS NOTES
Wound Care & Hyperbaric Medicine    Subjective:       Patient ID: Tj Trammell is a 59 y.o. male.    Chief Complaint: Non-healing Wound Follow Up    Wound care follow up for Left lateral lower leg. Wound is improving and wound measurements are smaller.No other new concerns, continue plan of care.     Review of Systems   All other systems reviewed and are negative.        Objective:      Physical Exam       Wound 01/03/25 0934 Other (comment) Left lower;lateral Leg #5 (Active)   01/03/25 0934 Leg   Present on Original Admission: N   Primary Wound Type: Other   Side: Left   Orientation: lower;lateral   Wound Approximate Age at First Assessment (Weeks):    Wound Number: #5   Is this injury device related?: Yes   Incision Type:    Closure Method:    Wound Description (Comments):    Type:    Additional Comments:    Ankle-Brachial Index:    Pulses:    Removal Indication and Assessment:    Wound Outcome:    Wound Image   01/10/25 1120   Dressing Appearance Moist drainage 01/10/25 1120   Drainage Amount Scant 01/10/25 1120   Drainage Characteristics/Odor Serosanguineous 01/10/25 1120   Appearance Intact;Pink;Moist 01/10/25 1120   Tissue loss description Partial thickness 01/10/25 1120   Red (%), Wound Tissue Color 100 % 01/10/25 1120   Periwound Area Intact;Dry 01/10/25 1120   Wound Edges Defined 01/10/25 1120   Wound Length (cm) 1.7 cm 01/10/25 1120   Wound Width (cm) 1.5 cm 01/10/25 1120   Wound Depth (cm) 0.1 cm 01/10/25 1120   Wound Volume (cm^3) 0.255 cm^3 01/10/25 1120   Wound Surface Area (cm^2) 2.55 cm^2 01/10/25 1120   Care Cleansed with:;Sterile normal saline;Soap and water 01/10/25 1120   Dressing Applied;Silver;Compression wrap 01/10/25 1120   Compression Two layer compression 01/10/25 1120   Dressing Change Due 01/17/25 01/10/25 1120         Assessment/Plan:         ICD-10-CM ICD-9-CM   1. Non-pressure chronic ulcer of left lower leg with fat layer exposed  L97.922 707.10          Tissue pathology and/or culture taken:   [] Yes    [x] No   Sharp debridement performed:    [] Yes    [x] No   Labs ordered this visit:    [] Yes    [x] No   Imaging ordered this visit:    [] Yes    [x] No     Is the wound improving?    [x] Yes    [] No   Any signs of infection?    [] Yes    [x] No             Orders Placed This Encounter   Procedures    Change dressing     Left lateral lower leg ulcer and left 1st Toe:     Cleanse wound with:Vashe   Lidocaine: prn   Silver nitrate: prn   Bety wound:Cavilon PRN   Primary dressing: Siver Alginate   Edema control: Cofelx from toes to back of knee   Frequency: Weekly and PRN nurse visit.     Follow-up:1 week with         Follow up in about 1 week (around 1/17/2025).            This includes face to face time and non-face to face time preparing to see the patient (eg, review of tests), obtaining and/or reviewing separately obtained history, documenting clinical information in the electronic or other health record, independently interpreting results and communicating results to the patient/family/caregiver, or care coordinator.  Total time spent  > 30 minutes

## 2025-01-17 ENCOUNTER — HOSPITAL ENCOUNTER (OUTPATIENT)
Dept: WOUND CARE | Facility: HOSPITAL | Age: 60
Discharge: HOME | End: 2025-01-17
Attending: PREVENTIVE MEDICINE
Payer: MEDICARE

## 2025-01-17 VITALS
DIASTOLIC BLOOD PRESSURE: 64 MMHG | WEIGHT: 210.13 LBS | HEART RATE: 61 BPM | BODY MASS INDEX: 31.12 KG/M2 | HEIGHT: 69 IN | SYSTOLIC BLOOD PRESSURE: 143 MMHG | TEMPERATURE: 98 F

## 2025-01-17 DIAGNOSIS — D04.9 BOWEN DISEASE: ICD-10-CM

## 2025-01-17 DIAGNOSIS — D04.72 SQUAMOUS CELL CARCINOMA IN SITU (SCCIS) OF SKIN OF LEFT LOWER LEG: ICD-10-CM

## 2025-01-17 DIAGNOSIS — L97.922 NON-PRESSURE CHRONIC ULCER OF LEFT LOWER LEG WITH FAT LAYER EXPOSED: Primary | ICD-10-CM

## 2025-01-17 PROCEDURE — 97597 DBRDMT OPN WND 1ST 20 CM/<: CPT

## 2025-01-17 PROCEDURE — 11042 DBRDMT SUBQ TIS 1ST 20SQCM/<: CPT

## 2025-01-17 NOTE — PROGRESS NOTES
Wound Care & Hyperbaric Medicine    Subjective:       Patient ID: Tj Trammell is a 59 y.o. male.    Chief Complaint: Non-healing Wound Follow Up    Wound care follow up for LLE wound. No complaints. Wound nilda at the edges. No acute complaints. Tolerating compression.          Objective:      Physical Exam       Wound 01/03/25 0934 Other (comment) Left lower;lateral Leg #5 (Active)   01/03/25 0934 Leg   Present on Original Admission: N   Primary Wound Type: Other   Side: Left   Orientation: lower;lateral   Wound Approximate Age at First Assessment (Weeks):    Wound Number: #5   Is this injury device related?: Yes   Incision Type:    Closure Method:    Wound Description (Comments):    Type:    Additional Comments:    Ankle-Brachial Index:    Pulses:    Removal Indication and Assessment:    Wound Outcome:    Wound Image   01/17/25 0942   Dressing Appearance Intact;Moist drainage 01/17/25 0942   Drainage Amount Small 01/17/25 0942   Drainage Characteristics/Odor Serosanguineous 01/17/25 0942   Appearance Red;Moist 01/17/25 0942   Tissue loss description Full thickness 01/17/25 0942   Red (%), Wound Tissue Color 100 % 01/17/25 0942   Periwound Area Intact;Dry 01/17/25 0942   Wound Edges Defined 01/17/25 0942   Wound Length (cm) 1.2 cm 01/17/25 0942   Wound Width (cm) 1.1 cm 01/17/25 0942   Wound Depth (cm) 0.1 cm 01/17/25 0942   Wound Volume (cm^3) 0.132 cm^3 01/17/25 0942   Wound Surface Area (cm^2) 1.32 cm^2 01/17/25 0942   Care Cleansed with:;Soap and water;Antimicrobial agent;Debrided 01/17/25 0942   Dressing Applied;Silver;Calcium alginate;Cast padding;Compression wrap 01/17/25 0942   Compression Two layer compression 01/17/25 0942   Dressing Change Due 01/24/25 01/17/25 0942         Assessment/Plan:         ICD-10-CM ICD-9-CM   1. 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    [x] Yes    [] No   Any signs of infection?    [] Yes    [x] No             Orders Placed This Encounter   Procedures    Change dressing     Left lateral lower leg ulcer and left 1st Toe:     Cleanse wound with:Vashe   Lidocaine: prn   Silver nitrate: prn   Bety wound:Cavilon PRN   Primary dressing: Siver Alginate, Large ABD Pad    Edema control: Cofelx from toes to back of knee   Frequency: Weekly and PRN nurse visit.     Follow-up:1 week with         Follow up in about 1 week (around 1/24/2025) for .            This includes face to face time and non-face to face time preparing to see the patient (eg, review of tests), obtaining and/or reviewing separately obtained history, documenting clinical information in the electronic or other health record, independently interpreting results and communicating results to the patient/family/caregiver, or care coordinator.  Total time spent  > 30 minutes

## 2025-01-17 NOTE — PROCEDURES
"Debridement    Date/Time: 1/17/2025 9:00 AM    Performed by: Joe Casey MD  Authorized by: Joe Casey MD    Time out: Immediately prior to procedure a "time out" was called to verify the correct patient, procedure, equipment, support staff and site/side marked as required.    Consent Done?:  Yes (Written)  Local anesthesia used?: Yes    Local anesthetic:  Topical anesthetic    Wound Details:    Location:  Left leg    Type of Debridement:  Excisional       Length (cm):  1.2       Width (cm):  1.1       Depth (cm):  0.1       Area (sq cm):  1.32       Percent Debrided (%):  100       Total Area Debrided (sq cm):  1.32    Depth of debridement:  Epidermis/Dermis    Devitalized tissue debrided:  Slough, Fibrin and Exudate    Instruments:  Curette  Bleeding:  Minimal  Hemostasis Achieved: Yes  Method Used:  Pressure and Silver Nitrate  Patient tolerance:  Patient tolerated the procedure well with no immediate complications    "

## 2025-01-24 ENCOUNTER — HOSPITAL ENCOUNTER (OUTPATIENT)
Dept: WOUND CARE | Facility: HOSPITAL | Age: 60
Discharge: HOME | End: 2025-01-24
Attending: PREVENTIVE MEDICINE
Payer: MEDICARE

## 2025-01-24 VITALS
TEMPERATURE: 98 F | HEART RATE: 91 BPM | RESPIRATION RATE: 20 BRPM | SYSTOLIC BLOOD PRESSURE: 130 MMHG | DIASTOLIC BLOOD PRESSURE: 70 MMHG

## 2025-01-24 DIAGNOSIS — D04.9 BOWEN DISEASE: ICD-10-CM

## 2025-01-24 DIAGNOSIS — D04.72 SQUAMOUS CELL CARCINOMA IN SITU (SCCIS) OF SKIN OF LEFT LOWER LEG: ICD-10-CM

## 2025-01-24 DIAGNOSIS — L97.922 NON-PRESSURE CHRONIC ULCER OF LEFT LOWER LEG WITH FAT LAYER EXPOSED: Primary | ICD-10-CM

## 2025-01-24 PROCEDURE — 29581 APPL MULTLAYER CMPRN SYS LEG: CPT

## 2025-01-24 NOTE — PROGRESS NOTES
Wound Care & Hyperbaric Medicine    Subjective:       Patient ID: Tj Trammell is a 59 y.o. male.    Chief Complaint: Non-healing Wound    Wound care follow up LLE wound. No complaints wound nilda. Continued current plan of care.         Objective:      Physical Exam       Wound 01/03/25 0934 Other (comment) Left lower;lateral Leg #5 (Active)   01/03/25 0934 Leg   Present on Original Admission: N   Primary Wound Type: Other   Side: Left   Orientation: lower;lateral   Wound Approximate Age at First Assessment (Weeks):    Wound Number: #5   Is this injury device related?: Yes   Incision Type:    Closure Method:    Wound Description (Comments):    Type:    Additional Comments:    Ankle-Brachial Index:    Pulses:    Removal Indication and Assessment:    Wound Outcome:    Wound Image   01/24/25 0938   Dressing Appearance Intact;Moist drainage 01/24/25 0938   Drainage Amount Small 01/24/25 0938   Drainage Characteristics/Odor Serosanguineous 01/24/25 0938   Appearance Red;Moist 01/24/25 0938   Tissue loss description Full thickness 01/24/25 0938   Red (%), Wound Tissue Color 100 % 01/24/25 0938   Periwound Area Intact;Dry 01/24/25 0938   Wound Edges Defined 01/24/25 0938   Wound Length (cm) 1.2 cm 01/24/25 0938   Wound Width (cm) 0.9 cm 01/24/25 0938   Wound Depth (cm) 0.1 cm 01/24/25 0938   Wound Volume (cm^3) 0.108 cm^3 01/24/25 0938   Wound Surface Area (cm^2) 1.08 cm^2 01/24/25 0938   Care Cleansed with:;Soap and water 01/24/25 0938   Dressing Applied;Silver;Calcium alginate;Absorptive Pad;Compression wrap 01/24/25 0938   Periwound Care Absorptive dressing applied;Topical treatment applied 01/24/25 0938   Compression Two layer compression 01/24/25 0938   Dressing Change Due 01/31/25 01/24/25 0938         Assessment/Plan:         ICD-10-CM ICD-9-CM   1. 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 Squamous cell  carcinoma in situ (SCCIS) of skin of left lower leg  D04.72 232.7         Tissue pathology and/or culture taken:   [] Yes    [x] No   Sharp debridement performed:    [] Yes    [x] No   Labs ordered this visit:    [] Yes    [x] No   Imaging ordered this visit:    [] Yes    [x] No     Is the wound improving?    [x] Yes    [] No   Any signs of infection?    [] Yes    [x] No             Orders Placed This Encounter   Procedures    Change dressing     Left lateral lower leg ulcer :     Cleanse wound with:Vashe   Lidocaine: prn   Silver nitrate: prn   Bety wound:Betamethasone PRN   Primary dressing: Siver Alginate, Large ABD Pad    Edema control: Coflex from toes to back of knee   Frequency: Weekly and PRN nurse visit.     Follow-up:1 week with         Follow up in about 1 week (around 1/31/2025) for .            This includes face to face time and non-face to face time preparing to see the patient (eg, review of tests), obtaining and/or reviewing separately obtained history, documenting clinical information in the electronic or other health record, independently interpreting results and communicating results to the patient/family/caregiver, or care coordinator.  Total time spent  > 30 minutes

## 2025-01-31 ENCOUNTER — HOSPITAL ENCOUNTER (OUTPATIENT)
Dept: WOUND CARE | Facility: HOSPITAL | Age: 60
Discharge: HOME | End: 2025-01-31
Attending: PREVENTIVE MEDICINE
Payer: MEDICARE

## 2025-01-31 VITALS
WEIGHT: 210.13 LBS | SYSTOLIC BLOOD PRESSURE: 133 MMHG | HEIGHT: 69 IN | HEART RATE: 88 BPM | BODY MASS INDEX: 31.12 KG/M2 | DIASTOLIC BLOOD PRESSURE: 69 MMHG | TEMPERATURE: 98 F

## 2025-01-31 DIAGNOSIS — D04.72 SQUAMOUS CELL CARCINOMA IN SITU (SCCIS) OF SKIN OF LEFT LOWER LEG: ICD-10-CM

## 2025-01-31 DIAGNOSIS — L97.922 NON-PRESSURE CHRONIC ULCER OF LEFT LOWER LEG WITH FAT LAYER EXPOSED: Primary | ICD-10-CM

## 2025-01-31 DIAGNOSIS — D04.9 BOWEN DISEASE: ICD-10-CM

## 2025-01-31 PROCEDURE — 97597 DBRDMT OPN WND 1ST 20 CM/<: CPT

## 2025-01-31 PROCEDURE — 11042 DBRDMT SUBQ TIS 1ST 20SQCM/<: CPT

## 2025-01-31 NOTE — PROCEDURES
"Debridement    Date/Time: 1/31/2025 9:00 AM    Performed by: Joe Casey MD  Authorized by: Joe Casey MD    Time out: Immediately prior to procedure a "time out" was called to verify the correct patient, procedure, equipment, support staff and site/side marked as required.    Consent Done?:  Yes (Written)  Local anesthesia used?: Yes    Local anesthetic:  Topical anesthetic    Wound Details:    Location:  Left leg    Type of Debridement:  Excisional       Length (cm):  1       Width (cm):  0.4       Depth (cm):  0.1       Area (sq cm):  0.4       Percent Debrided (%):  100       Total Area Debrided (sq cm):  0.4    Depth of debridement:  Epidermis/Dermis    Devitalized tissue debrided:  Slough and Fibrin    Instruments:  Curette  Bleeding:  Minimal  Hemostasis Achieved: Yes  Method Used:  Pressure and Silver Nitrate  Patient tolerance:  Patient tolerated the procedure well with no immediate complications    "

## 2025-02-07 ENCOUNTER — HOSPITAL ENCOUNTER (OUTPATIENT)
Dept: WOUND CARE | Facility: HOSPITAL | Age: 60
Discharge: HOME | End: 2025-02-07
Attending: PREVENTIVE MEDICINE
Payer: MEDICARE

## 2025-02-07 VITALS
HEIGHT: 69 IN | SYSTOLIC BLOOD PRESSURE: 138 MMHG | DIASTOLIC BLOOD PRESSURE: 73 MMHG | HEART RATE: 80 BPM | BODY MASS INDEX: 31.12 KG/M2 | TEMPERATURE: 99 F | WEIGHT: 210.13 LBS

## 2025-02-07 DIAGNOSIS — D04.72 SQUAMOUS CELL CARCINOMA IN SITU (SCCIS) OF SKIN OF LEFT LOWER LEG: ICD-10-CM

## 2025-02-07 DIAGNOSIS — L97.922 NON-PRESSURE CHRONIC ULCER OF LEFT LOWER LEG WITH FAT LAYER EXPOSED: Primary | ICD-10-CM

## 2025-02-07 PROCEDURE — 11042 DBRDMT SUBQ TIS 1ST 20SQCM/<: CPT

## 2025-02-07 PROCEDURE — 97597 DBRDMT OPN WND 1ST 20 CM/<: CPT

## 2025-02-07 NOTE — PROCEDURES
"Debridement    Date/Time: 2/7/2025 9:00 AM    Performed by: Joe Casey MD  Authorized by: Joe Casey MD    Time out: Immediately prior to procedure a "time out" was called to verify the correct patient, procedure, equipment, support staff and site/side marked as required.    Consent Done?:  Yes (Written)  Local anesthesia used?: Yes    Local anesthetic:  Topical anesthetic    Type of Debridement:  Excisional       Length (cm):  0.5       Width (cm):  0.3       Depth (cm):  0.1       Area (sq cm):  0.15       Percent Debrided (%):  100       Total Area Debrided (sq cm):  0.15    Depth of debridement:  Epidermis/Dermis    Devitalized tissue debrided:  Slough, Exudate and Clots    Instruments:  Curette  Bleeding:  Minimal  Hemostasis Achieved: Yes  Method Used:  Pressure  Patient tolerance:  Patient tolerated the procedure well with no immediate complications    "

## 2025-02-07 NOTE — PROGRESS NOTES
Wound Care & Hyperbaric Medicine    Subjective:       Patient ID: Tj Trammell is a 59 y.o. male.    Chief Complaint: Non-healing Wound Follow Up    Follow up on LLE wound. Tolerating compression. States wounds is improving.          Objective:      Physical Exam       Wound 01/03/25 0934 Other (comment) Left lower;lateral Leg #5 (Active)   01/03/25 0934 Leg   Present on Original Admission: N   Primary Wound Type: Other   Side: Left   Orientation: lower;lateral   Wound Approximate Age at First Assessment (Weeks):    Wound Number: #5   Is this injury device related?: Yes   Incision Type:    Closure Method:    Wound Description (Comments):    Type:    Additional Comments:    Ankle-Brachial Index:    Pulses:    Removal Indication and Assessment:    Wound Outcome:    Wound Image   02/07/25 0900   Dressing Appearance Moist drainage 02/07/25 0900   Drainage Amount Scant 02/07/25 0900   Drainage Characteristics/Odor Serosanguineous 02/07/25 0900   Appearance Red 02/07/25 0900   Tissue loss description Full thickness 02/07/25 0900   Red (%), Wound Tissue Color 100 % 02/07/25 0900   Periwound Area Dry;Intact;Indurated 02/07/25 0900   Wound Edges Defined;Open 02/07/25 0900   Wound Length (cm) 0.5 cm 02/07/25 0900   Wound Width (cm) 0.3 cm 02/07/25 0900   Wound Depth (cm) 0.1 cm 02/07/25 0900   Wound Volume (cm^3) 0.015 cm^3 02/07/25 0900   Wound Surface Area (cm^2) 0.15 cm^2 02/07/25 0900   Care Cleansed with:;Soap and water;Sterile normal saline;Debrided 02/07/25 0900   Dressing Applied;Calcium alginate;Silver;Absorptive Pad 02/07/25 0900   Periwound Care Topical treatment applied 02/07/25 0900   Compression Two layer compression 02/07/25 0900   Dressing Change Due 02/13/25 02/07/25 0900         Assessment/Plan:         ICD-10-CM ICD-9-CM   1. Non-pressure chronic ulcer of left lower leg with fat layer exposed  L97.922 707.10   2. 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   D04.72 232.7         Tissue pathology and/or culture taken:   [] Yes    [x] No   Sharp debridement performed:    [x] Yes    [] No   Labs ordered this visit:    [] Yes    [x] No   Imaging ordered this visit:    [] Yes    [x] No     Is the wound improving?    [x] Yes    [] No   Any signs of infection?    [] Yes    [x] No             Orders Placed This Encounter   Procedures    Change dressing     Left lateral lower leg ulcer :    Cleanse wound with:Vashe  Lidocaine: prn  Silver nitrate: prn  Bety wound: Bactroban to upper leg redness. Betamethasone PRN itching  Primary dressing: Siver Alginate+ABD Pad    Edema control: Coflex from toes to back of knee  Frequency: Weekly   Follow-up:1 week with         Follow up in about 1 week (around 2/14/2025) for Dr Casey.            This includes face to face time and non-face to face time preparing to see the patient (eg, review of tests), obtaining and/or reviewing separately obtained history, documenting clinical information in the electronic or other health record, independently interpreting results and communicating results to the patient/family/caregiver, or care coordinator.  Total time spent  > 30 minutes

## 2025-02-10 ENCOUNTER — HOSPITAL ENCOUNTER (OUTPATIENT)
Dept: WOUND CARE | Facility: HOSPITAL | Age: 60
Discharge: HOME | End: 2025-02-10
Attending: PREVENTIVE MEDICINE
Payer: MEDICARE

## 2025-02-10 VITALS
TEMPERATURE: 98 F | HEIGHT: 69 IN | SYSTOLIC BLOOD PRESSURE: 144 MMHG | HEART RATE: 96 BPM | WEIGHT: 210.13 LBS | BODY MASS INDEX: 31.12 KG/M2 | DIASTOLIC BLOOD PRESSURE: 64 MMHG

## 2025-02-10 DIAGNOSIS — L97.922 NON-PRESSURE CHRONIC ULCER OF LEFT LOWER LEG WITH FAT LAYER EXPOSED: Primary | ICD-10-CM

## 2025-02-10 PROCEDURE — 29581 APPL MULTLAYER CMPRN SYS LEG: CPT

## 2025-02-10 NOTE — PROGRESS NOTES
Wound Care & Hyperbaric Medicine    Subjective:       Patient ID: Tj Trammell is a 59 y.o. male.    Chief Complaint: Non-healing Wound    Follow up LLE wound. No acute complaints today. Tolerating compression.          Objective:      Physical Exam       Wound 01/03/25 0934 Other (comment) Left lower;lateral Leg #5 (Active)   01/03/25 0934 Leg   Present on Original Admission: N   Primary Wound Type: Other   Side: Left   Orientation: lower;lateral   Wound Approximate Age at First Assessment (Weeks):    Wound Number: #5   Is this injury device related?: Yes   Incision Type:    Closure Method:    Wound Description (Comments):    Type:    Additional Comments:    Ankle-Brachial Index:    Pulses:    Removal Indication and Assessment:    Wound Outcome:    Wound Image   02/10/25 1004   Dressing Appearance Dried drainage 02/10/25 1004   Drainage Amount None 02/10/25 1004   Appearance Epithelialization 02/10/25 1004   Tissue loss description Full thickness 02/10/25 1004   Periwound Area Dry 02/10/25 1004   Wound Edges Rolled/closed 02/10/25 1004   Wound Length (cm) 0 cm 02/10/25 1004   Wound Width (cm) 0 cm 02/10/25 1004   Wound Depth (cm) 0 cm 02/10/25 1004   Wound Volume (cm^3) 0 cm^3 02/10/25 1004   Wound Surface Area (cm^2) 0 cm^2 02/10/25 1004   Care Cleansed with:;Soap and water 02/10/25 1004   Dressing Applied 02/10/25 1004   Periwound Care Dry periwound area maintained;Moisturizer applied 02/10/25 1004   Compression Two layer compression 02/10/25 1004   Dressing Change Due 02/17/25 02/10/25 1004         Assessment/Plan:         ICD-10-CM ICD-9-CM   1. Non-pressure chronic ulcer of left lower leg with fat layer exposed  L97.922 707.10     Wound is resolved at this time.  Continue compression to allow for maturation.    Tissue pathology and/or culture taken:   [] Yes    [x] No   Sharp debridement performed:    [] Yes    [x] No   Labs ordered this visit:    [] Yes    [x] No   Imaging ordered  this visit:    [] Yes    [x] No     Is the wound improving?    [x] Yes    [] No   Any signs of infection?    [] Yes    [x] No             Orders Placed This Encounter   Procedures    Change dressing     Left lateral lower leg ulcer :     Cleanse wound with:Vashe   Lidocaine: prn   Silver nitrate: prn   Bety wound:  Betamethasone PRN itching   Primary dressing: Siver Alginate+ABD Pad PRN    Edema control: Zinc Coflex from toes to back of knee   Frequency: Weekly   Follow-up:1 week with         Follow up in about 1 week (around 2/17/2025) for .            This includes face to face time and non-face to face time preparing to see the patient (eg, review of tests), obtaining and/or reviewing separately obtained history, documenting clinical information in the electronic or other health record, independently interpreting results and communicating results to the patient/family/caregiver, or care coordinator.  Total time spent  > 30 minutes

## 2025-02-17 ENCOUNTER — HOSPITAL ENCOUNTER (OUTPATIENT)
Dept: WOUND CARE | Facility: HOSPITAL | Age: 60
Discharge: HOME | End: 2025-02-17
Attending: PREVENTIVE MEDICINE
Payer: MEDICARE

## 2025-02-17 VITALS
BODY MASS INDEX: 31.12 KG/M2 | SYSTOLIC BLOOD PRESSURE: 128 MMHG | HEART RATE: 90 BPM | HEIGHT: 69 IN | TEMPERATURE: 98 F | WEIGHT: 210.13 LBS | DIASTOLIC BLOOD PRESSURE: 80 MMHG

## 2025-02-17 DIAGNOSIS — L97.922 NON-PRESSURE CHRONIC ULCER OF LEFT LOWER LEG WITH FAT LAYER EXPOSED: Primary | ICD-10-CM

## 2025-02-17 DIAGNOSIS — D04.72 SQUAMOUS CELL CARCINOMA IN SITU (SCCIS) OF SKIN OF LEFT LOWER LEG: ICD-10-CM

## 2025-02-17 DIAGNOSIS — D04.9 BOWEN DISEASE: ICD-10-CM

## 2025-02-17 PROCEDURE — 99213 OFFICE O/P EST LOW 20 MIN: CPT

## 2025-02-17 NOTE — PROGRESS NOTES
Wound Care & Hyperbaric Medicine    Subjective:       Patient ID: Tj Trammell is a 59 y.o. male.    Chief Complaint: Non-healing Wound    LLE wound follow up. Tolerating compression no complaints. Plan of care updated follow up 1 week.        Objective:      Physical Exam       Wound 01/03/25 0934 Other (comment) Left lower;lateral Leg #5 (Active)   01/03/25 0934 Leg   Present on Original Admission: N   Primary Wound Type: Other   Side: Left   Orientation: lower;lateral   Wound Approximate Age at First Assessment (Weeks):    Wound Number: #5   Is this injury device related?: Yes   Incision Type:    Closure Method:    Wound Description (Comments):    Type:    Additional Comments:    Ankle-Brachial Index:    Pulses:    Removal Indication and Assessment:    Wound Outcome:    Wound Image   02/17/25 1015   Dressing Appearance Dry;Intact 02/17/25 1015   Drainage Amount None 02/17/25 1015   Appearance Epithelialization 02/17/25 1015   Tissue loss description Not applicable 02/17/25 1015   Periwound Area Dry 02/17/25 1015   Wound Edges Rolled/closed 02/17/25 1015   Wound Length (cm) 0 cm 02/17/25 1015   Wound Width (cm) 0 cm 02/17/25 1015   Wound Depth (cm) 0 cm 02/17/25 1015   Wound Volume (cm^3) 0 cm^3 02/17/25 1015   Wound Surface Area (cm^2) 0 cm^2 02/17/25 1015   Care Cleansed with:;Soap and water 02/17/25 1015   Dressing Applied;Island/border 02/17/25 1015   Periwound Care Absorptive dressing applied;Topical treatment applied 02/17/25 1015   Dressing Change Due 02/24/25 02/17/25 1015         Assessment/Plan:         ICD-10-CM ICD-9-CM   1. Non-pressure chronic ulcer of left lower leg with fat layer exposed  L97.922 707.10         Tissue pathology and/or culture taken:   [] Yes    [x] No   Sharp debridement performed:    [] Yes    [x] No   Labs ordered this visit:    [] Yes    [x] No   Imaging ordered this visit:    [] Yes    [x] No     Is the wound improving?    [x] Yes    [] No   Any signs  of infection?    [] Yes    [x] No             Orders Placed This Encounter   Procedures    Change dressing     Left lateral lower leg ulcer :     Cleanse wound with:Vashe   Lidocaine: prn   Silver nitrate: prn   Bety wound:  Betamethasone   Primary dressing: Mepilex Border  Frequency: Weekly   Follow-up:1 week with         Follow up in about 1 week (around 2/24/2025) for .            This includes face to face time and non-face to face time preparing to see the patient (eg, review of tests), obtaining and/or reviewing separately obtained history, documenting clinical information in the electronic or other health record, independently interpreting results and communicating results to the patient/family/caregiver, or care coordinator.  Total time spent  > 25 minutes

## 2025-03-03 ENCOUNTER — HOSPITAL ENCOUNTER (OUTPATIENT)
Dept: WOUND CARE | Facility: HOSPITAL | Age: 60
Discharge: HOME | End: 2025-03-03
Attending: PREVENTIVE MEDICINE
Payer: MEDICARE

## 2025-03-03 VITALS
HEIGHT: 69 IN | SYSTOLIC BLOOD PRESSURE: 134 MMHG | DIASTOLIC BLOOD PRESSURE: 77 MMHG | HEART RATE: 85 BPM | RESPIRATION RATE: 18 BRPM | WEIGHT: 210.13 LBS | BODY MASS INDEX: 31.12 KG/M2

## 2025-03-03 DIAGNOSIS — L97.922 NON-PRESSURE CHRONIC ULCER OF LEFT LOWER LEG WITH FAT LAYER EXPOSED: Primary | ICD-10-CM

## 2025-03-03 PROCEDURE — 99212 OFFICE O/P EST SF 10 MIN: CPT

## 2025-03-03 NOTE — PROGRESS NOTES
Wound Care & Hyperbaric Medicine    Subjective:       Patient ID: Tj Trammell is a 59 y.o. male.    Chief Complaint: Non-healing Wound    Wound care follow up for non healing ulcer to left lateal leg. Wound remains closed. Will discharge from clinic at this time. Recommend lotion to leg daily and patient to wear tall socks to protect legs.           Objective:      Physical Exam       Wound 01/03/25 0934 Other (comment) Left lower;lateral Leg #5 (Active)   01/03/25 0934 Leg   Present on Original Admission: N   Primary Wound Type: Other   Side: Left   Orientation: lower;lateral   Wound Approximate Age at First Assessment (Weeks):    Wound Number: #5   Is this injury device related?: Yes   Incision Type:    Closure Method:    Wound Description (Comments):    Type:    Additional Comments:    Ankle-Brachial Index:    Pulses:    Removal Indication and Assessment:    Wound Outcome:    Wound Image   03/03/25 0936   Dressing Appearance Dry;Intact 03/03/25 0936   Drainage Amount None 03/03/25 0936   Appearance Epithelialization 03/03/25 0936   Periwound Area Dry;Scar tissue 03/03/25 0936   Wound Edges Rolled/closed 03/03/25 0936   Wound Length (cm) 0 cm 03/03/25 0936   Wound Width (cm) 0 cm 03/03/25 0936   Wound Depth (cm) 0 cm 03/03/25 0936   Wound Volume (cm^3) 0 cm^3 03/03/25 0936   Wound Surface Area (cm^2) 0 cm^2 03/03/25 0936         Assessment/Plan:         ICD-10-CM ICD-9-CM   1. Non-pressure chronic ulcer of left lower leg with fat layer exposed  L97.922 707.10     Keep skin moist and protected at all times.    Tissue pathology and/or culture taken:   [] Yes    [x] No   Sharp debridement performed:    [] Yes    [x] No   Labs ordered this visit:    [] Yes    [x] No   Imaging ordered this visit:    [] Yes    [x] No     Is the wound improving?    [x] Yes    [] No   Any signs of infection?    [] Yes    [x] No             Orders Placed This Encounter   Procedures    Change dressing     Left  lower leg - healed  Periwound care: lotion daily  Follow up: PRN with         Follow up if symptoms worsen or fail to improve.            This includes face to face time and non-face to face time preparing to see the patient (eg, review of tests), obtaining and/or reviewing separately obtained history, documenting clinical information in the electronic or other health record, independently interpreting results and communicating results to the patient/family/caregiver, or care coordinator.  Total time spent  > 20 minutes

## 2025-04-15 ENCOUNTER — LAB VISIT (OUTPATIENT)
Dept: LAB | Facility: HOSPITAL | Age: 60
End: 2025-04-15
Attending: INTERNAL MEDICINE
Payer: MEDICARE

## 2025-04-15 DIAGNOSIS — E11.9 DIABETES MELLITUS WITHOUT COMPLICATION: ICD-10-CM

## 2025-04-15 LAB
ABSOLUTE EOSINOPHIL (OHS): 0.16 K/UL
ABSOLUTE MONOCYTE (OHS): 0.66 K/UL (ref 0.3–1)
ABSOLUTE NEUTROPHIL COUNT (OHS): 2.27 K/UL (ref 1.8–7.7)
ALBUMIN SERPL BCP-MCNC: 4 G/DL (ref 3.5–5.2)
ALBUMIN/CREAT UR: 4.9 UG/MG
ALP SERPL-CCNC: 102 UNIT/L (ref 40–150)
ALT SERPL W/O P-5'-P-CCNC: 26 UNIT/L (ref 10–44)
ANION GAP (OHS): 12 MMOL/L (ref 8–16)
AST SERPL-CCNC: 32 UNIT/L (ref 11–45)
BASOPHILS # BLD AUTO: 0.02 K/UL
BASOPHILS NFR BLD AUTO: 0.5 %
BILIRUB SERPL-MCNC: 0.7 MG/DL (ref 0.1–1)
BUN SERPL-MCNC: 17 MG/DL (ref 6–20)
CALCIUM SERPL-MCNC: 9.6 MG/DL (ref 8.7–10.5)
CHLORIDE SERPL-SCNC: 98 MMOL/L (ref 95–110)
CHOLEST SERPL-MCNC: 157 MG/DL (ref 120–199)
CHOLEST/HDLC SERPL: 5.6 {RATIO} (ref 2–5)
CO2 SERPL-SCNC: 22 MMOL/L (ref 23–29)
CREAT SERPL-MCNC: 1.1 MG/DL (ref 0.5–1.4)
CREAT UR-MCNC: 204 MG/DL (ref 23–375)
EAG (OHS): 186 MG/DL (ref 68–131)
ERYTHROCYTE [DISTWIDTH] IN BLOOD BY AUTOMATED COUNT: 18.3 % (ref 11.5–14.5)
GFR SERPLBLD CREATININE-BSD FMLA CKD-EPI: >60 ML/MIN/1.73/M2
GLUCOSE SERPL-MCNC: 96 MG/DL (ref 70–110)
HBA1C MFR BLD: 8.1 % (ref 4–5.6)
HCT VFR BLD AUTO: 52.3 % (ref 40–54)
HDLC SERPL-MCNC: 28 MG/DL (ref 40–75)
HDLC SERPL: 17.8 % (ref 20–50)
HGB BLD-MCNC: 16.3 GM/DL (ref 14–18)
IMM GRANULOCYTES # BLD AUTO: 0.01 K/UL (ref 0–0.04)
IMM GRANULOCYTES NFR BLD AUTO: 0.2 % (ref 0–0.5)
LDLC SERPL CALC-MCNC: 88.8 MG/DL (ref 63–159)
LYMPHOCYTES # BLD AUTO: 1.06 K/UL (ref 1–4.8)
MCH RBC QN AUTO: 22.4 PG (ref 27–31)
MCHC RBC AUTO-ENTMCNC: 31.2 G/DL (ref 32–36)
MCV RBC AUTO: 72 FL (ref 82–98)
MICROALBUMIN UR-MCNC: 10 UG/ML (ref ?–5000)
NONHDLC SERPL-MCNC: 129 MG/DL
NUCLEATED RBC (/100WBC) (OHS): 0 /100 WBC
PLATELET # BLD AUTO: 229 K/UL (ref 150–450)
PMV BLD AUTO: 9.6 FL (ref 9.2–12.9)
POTASSIUM SERPL-SCNC: 4.3 MMOL/L (ref 3.5–5.1)
PROT SERPL-MCNC: 7.7 GM/DL (ref 6–8.4)
RBC # BLD AUTO: 7.27 M/UL (ref 4.6–6.2)
RELATIVE EOSINOPHIL (OHS): 3.8 %
RELATIVE LYMPHOCYTE (OHS): 25.4 % (ref 18–48)
RELATIVE MONOCYTE (OHS): 15.8 % (ref 4–15)
RELATIVE NEUTROPHIL (OHS): 54.3 % (ref 38–73)
SODIUM SERPL-SCNC: 132 MMOL/L (ref 136–145)
TRIGL SERPL-MCNC: 201 MG/DL (ref 30–150)
WBC # BLD AUTO: 4.18 K/UL (ref 3.9–12.7)

## 2025-04-15 PROCEDURE — 36415 COLL VENOUS BLD VENIPUNCTURE: CPT

## 2025-04-15 PROCEDURE — 83036 HEMOGLOBIN GLYCOSYLATED A1C: CPT

## 2025-04-15 PROCEDURE — 82040 ASSAY OF SERUM ALBUMIN: CPT

## 2025-04-15 PROCEDURE — 83718 ASSAY OF LIPOPROTEIN: CPT

## 2025-04-15 PROCEDURE — 82570 ASSAY OF URINE CREATININE: CPT

## 2025-04-15 PROCEDURE — 85025 COMPLETE CBC W/AUTO DIFF WBC: CPT

## 2025-04-17 ENCOUNTER — HOSPITAL ENCOUNTER (OUTPATIENT)
Dept: RADIOLOGY | Facility: HOSPITAL | Age: 60
Discharge: HOME | End: 2025-04-17
Attending: INTERNAL MEDICINE
Payer: MEDICARE

## 2025-04-17 DIAGNOSIS — R29.6 MULTIPLE FALLS: ICD-10-CM

## 2025-04-17 DIAGNOSIS — R55 SYNCOPE AND COLLAPSE: ICD-10-CM

## 2025-04-17 PROCEDURE — A9585 GADOBUTROL INJECTION: HCPCS | Performed by: INTERNAL MEDICINE

## 2025-04-17 PROCEDURE — 70553 MRI BRAIN STEM W/O & W/DYE: CPT | Mod: TC

## 2025-04-17 PROCEDURE — 25500020 PHARM REV CODE 255: Performed by: INTERNAL MEDICINE

## 2025-04-17 PROCEDURE — 70553 MRI BRAIN STEM W/O & W/DYE: CPT | Mod: 26,,, | Performed by: STUDENT IN AN ORGANIZED HEALTH CARE EDUCATION/TRAINING PROGRAM

## 2025-04-17 RX ORDER — GADOBUTROL 604.72 MG/ML
9 INJECTION INTRAVENOUS
Status: COMPLETED | OUTPATIENT
Start: 2025-04-17 | End: 2025-04-17

## 2025-04-17 RX ADMIN — GADOBUTROL 9 ML: 604.72 INJECTION INTRAVENOUS at 08:04

## 2025-05-01 ENCOUNTER — HOSPITAL ENCOUNTER (OUTPATIENT)
Dept: WOUND CARE | Facility: HOSPITAL | Age: 60
Discharge: HOME | End: 2025-05-01
Attending: PREVENTIVE MEDICINE
Payer: MEDICARE

## 2025-05-01 VITALS
DIASTOLIC BLOOD PRESSURE: 71 MMHG | WEIGHT: 209 LBS | HEART RATE: 100 BPM | HEIGHT: 69 IN | SYSTOLIC BLOOD PRESSURE: 149 MMHG | TEMPERATURE: 98 F | BODY MASS INDEX: 30.96 KG/M2

## 2025-05-01 DIAGNOSIS — L97.922 NON-PRESSURE CHRONIC ULCER OF LEFT LOWER LEG WITH FAT LAYER EXPOSED: Primary | ICD-10-CM

## 2025-05-01 PROCEDURE — 87186 SC STD MICRODIL/AGAR DIL: CPT | Performed by: SURGERY

## 2025-05-01 PROCEDURE — 99203 OFFICE O/P NEW LOW 30 MIN: CPT

## 2025-05-01 PROCEDURE — 29581 APPL MULTLAYER CMPRN SYS LEG: CPT

## 2025-05-01 PROCEDURE — 87075 CULTR BACTERIA EXCEPT BLOOD: CPT | Performed by: SURGERY

## 2025-05-01 NOTE — PROGRESS NOTES
"                     Wound Care & Hyperbaric Medicine    Subjective:       Patient ID: Tj Trammell is a 59 y.o. male.    Chief Complaint: Non-healing Wound Follow Up    Readmit to wound care with LLE wound. Reports " it reopened 2 days ago while I was in the shower pouring blood."  Denies pain, or signs of infection. Wound cultured, plan of care initiated follow up 1 week.     ROS: c/o itching and rash lat aspect left leg, no fever or chills.        Objective:      Physical Exam  Constitutional:       Appearance: He is well-developed.   HENT:      Head: Normocephalic.   Eyes:      Conjunctiva/sclera: Conjunctivae normal.      Pupils: Pupils are equal, round, and reactive to light.   Cardiovascular:      Rate and Rhythm: Normal rate and regular rhythm.      Heart sounds: Normal heart sounds.   Pulmonary:      Effort: Pulmonary effort is normal.      Breath sounds: Normal breath sounds.   Abdominal:      General: Bowel sounds are normal.      Palpations: Abdomen is soft.   Musculoskeletal:         General: Tenderness present. Normal range of motion.      Cervical back: Normal range of motion and neck supple.      Left lower leg: Edema present.   Skin:     General: Skin is warm and dry.      Findings: Lesion and rash present.   Neurological:      Mental Status: He is alert and oriented to person, place, and time.      Deep Tendon Reflexes: Reflexes are normal and symmetric.        Wound 01/03/25 0934 Other (comment) Left lower;lateral Leg #5 (Active)   01/03/25 0934 Leg   Present on Original Admission: N   Primary Wound Type: Other   Side: Left   Orientation: lower;lateral   Wound Approximate Age at First Assessment (Weeks):    Wound Number: #5   Is this injury device related?: Yes   Incision Type:    Closure Method:    Wound Description (Comments):    Type:    Additional Comments:    Ankle-Brachial Index:    Pulses:    Removal Indication and Assessment:    Wound Outcome:    Wound Image   05/01/25 1100 "   Dressing Appearance Dry;Moist drainage 05/01/25 1100   Drainage Amount Moderate 05/01/25 1100   Drainage Characteristics/Odor Serous 05/01/25 1100   Appearance Red;Moist 05/01/25 1100   Tissue loss description Full thickness 05/01/25 1100   Red (%), Wound Tissue Color 100 % 05/01/25 1100   Periwound Area Intact;Scar tissue;Dry;Edematous;Maroon 05/01/25 1100   Wound Edges Undefined 05/01/25 1100   Wound Length (cm) 1 cm 05/01/25 1100   Wound Width (cm) 1 cm 05/01/25 1100   Wound Depth (cm) 0.1 cm 05/01/25 1100   Wound Volume (cm^3) 0.052 cm^3 05/01/25 1100   Wound Surface Area (cm^2) 0.79 cm^2 05/01/25 1100   Care Soap and water;Antimicrobial agent;Cleansed with: 05/01/25 1100   Dressing Applied;Silver;Calcium alginate;Absorptive Pad;Compression wrap 05/01/25 1100   Periwound Care Absorptive dressing applied;Topical treatment applied 05/01/25 1100   Compression Two layer compression 05/01/25 1100   Dressing Change Due 05/08/25 05/01/25 1100         Assessment/Plan:         ICD-10-CM ICD-9-CM   1. Non-pressure chronic ulcer of left lower leg with fat layer exposed  L97.922 707.10         Tissue pathology and/or culture taken:   [x] Yes    [] No   Sharp debridement performed:    [] Yes    [x] No   Labs ordered this visit:    [] Yes    [x] No   Imaging ordered this visit:    [] Yes    [x] No     Is the wound improving?    [] Yes    [x] No   Any signs of infection?    [] Yes    [x] No             Orders Placed This Encounter   Procedures    CULTURE, AEROBIC  (SPECIFY SOURCE)          CULTURE, ANAEROBE          Change dressing     Left lateral lower leg ulcer :    Cleanse wound with:Vashe  Lidocaine: prn  Silver nitrate: prn  Bety wound:  Betamethasone PRN itching  Primary dressing: Siver Alginate+ABD Pad PRN    Edema control: Zinc Coflex from toes to back of knee  Frequency: Weekly  Follow-up:1 week with Dr. Napier  Rx given for Lotrisone Cream and wound cultured 5/1/25.        Follow up in about 1 week (around  5/8/2025) for .            This includes face to face time and non-face to face time preparing to see the patient (eg, review of tests), obtaining and/or reviewing separately obtained history, documenting clinical information in the electronic or other health record, independently interpreting results and communicating results to the patient/family/caregiver, or care coordinator.  Total time spent  >30  minutes

## 2025-05-03 LAB
BACTERIA SPEC AEROBE CULT: ABNORMAL
BACTERIA SPEC ANAEROBE CULT: NORMAL

## 2025-05-08 ENCOUNTER — HOSPITAL ENCOUNTER (OUTPATIENT)
Dept: WOUND CARE | Facility: HOSPITAL | Age: 60
Discharge: HOME | End: 2025-05-08
Attending: SURGERY
Payer: MEDICARE

## 2025-05-08 VITALS
WEIGHT: 209 LBS | HEIGHT: 69 IN | BODY MASS INDEX: 30.96 KG/M2 | SYSTOLIC BLOOD PRESSURE: 147 MMHG | HEART RATE: 74 BPM | DIASTOLIC BLOOD PRESSURE: 63 MMHG | TEMPERATURE: 98 F

## 2025-05-08 DIAGNOSIS — L97.922 NON-PRESSURE CHRONIC ULCER OF LEFT LOWER LEG WITH FAT LAYER EXPOSED: Primary | ICD-10-CM

## 2025-05-08 PROCEDURE — 99212 OFFICE O/P EST SF 10 MIN: CPT

## 2025-05-08 NOTE — PROGRESS NOTES
Wound Care & Hyperbaric Medicine    Subjective:       Patient ID: Tj Trammell is a 59 y.o. male.    Chief Complaint: Non-healing Wound    Pt presents to clinic for non pressure ulcer of LLE, Wound closed, pt discharged from this clinic at this time.     ROS: no complaints doing better        Objective:      Physical Exam  Constitutional:       Appearance: He is well-developed.   HENT:      Head: Normocephalic.   Eyes:      Conjunctiva/sclera: Conjunctivae normal.      Pupils: Pupils are equal, round, and reactive to light.   Cardiovascular:      Rate and Rhythm: Normal rate and regular rhythm.      Heart sounds: Normal heart sounds.   Pulmonary:      Effort: Pulmonary effort is normal.      Breath sounds: Normal breath sounds.   Abdominal:      General: Bowel sounds are normal.      Palpations: Abdomen is soft.   Musculoskeletal:         General: Normal range of motion.      Cervical back: Normal range of motion and neck supple.   Skin:     General: Skin is warm and dry.   Neurological:      Mental Status: He is alert and oriented to person, place, and time.      Deep Tendon Reflexes: Reflexes are normal and symmetric.        Wound 01/03/25 0934 Other (comment) Left lower;lateral Leg #5 (Active)   01/03/25 0934 Leg   Present on Original Admission: N   Primary Wound Type: Other   Side: Left   Orientation: lower;lateral   Wound Approximate Age at First Assessment (Weeks):    Wound Number: #5   Is this injury device related?: Yes   Incision Type:    Closure Method:    Wound Description (Comments):    Type:    Additional Comments:    Ankle-Brachial Index:    Pulses:    Removal Indication and Assessment:    Wound Outcome:    Wound Image   05/08/25 1010   Dressing Appearance Dry 05/08/25 1010   Drainage Amount None 05/08/25 1010   Appearance Intact;Closed/resurfaced 05/08/25 1010         Assessment/Plan:         ICD-10-CM ICD-9-CM   1. Non-pressure chronic ulcer of left lower leg with fat layer  exposed  L97.922 707.10         Tissue pathology and/or culture taken:   [] Yes    [x] No   Sharp debridement performed:    [] Yes    [x] No   Labs ordered this visit:    [] Yes    [x] No   Imaging ordered this visit:    [] Yes    [x] No     Is the wound improving?    [x] Yes    [] No   Any signs of infection?    [] Yes    [x] No             Orders Placed This Encounter   Procedures    Change dressing     Pt discharged from clinic at this time.        Follow up if symptoms worsen or fail to improve, for Dr Napier.            This includes face to face time and non-face to face time preparing to see the patient (eg, review of tests), obtaining and/or reviewing separately obtained history, documenting clinical information in the electronic or other health record, independently interpreting results and communicating results to the patient/family/caregiver, or care coordinator.  Total time spent  10  minutes

## 2025-06-20 ENCOUNTER — TELEPHONE (OUTPATIENT)
Dept: SURGERY | Facility: CLINIC | Age: 60
End: 2025-06-20
Payer: MEDICARE

## 2025-06-20 NOTE — TELEPHONE ENCOUNTER
06/20/2025                                          1438  Spoke to patient and informed him about his appointment with Dr. Rodriguez is scheduled on 06/27/2025 at 2:00 pm, Patient verbalized understanding.

## 2025-06-27 ENCOUNTER — OFFICE VISIT (OUTPATIENT)
Dept: SURGERY | Facility: CLINIC | Age: 60
End: 2025-06-27
Payer: MEDICARE

## 2025-06-27 VITALS
HEIGHT: 69 IN | HEART RATE: 76 BPM | TEMPERATURE: 98 F | BODY MASS INDEX: 30.72 KG/M2 | DIASTOLIC BLOOD PRESSURE: 83 MMHG | SYSTOLIC BLOOD PRESSURE: 134 MMHG | WEIGHT: 207.44 LBS

## 2025-06-27 DIAGNOSIS — C44.92 SCCA (SQUAMOUS CELL CARCINOMA) OF SKIN: Primary | ICD-10-CM

## 2025-06-27 DIAGNOSIS — C44.629 SQUAMOUS CELL CARCINOMA OF ARM, LEFT: ICD-10-CM

## 2025-06-27 PROCEDURE — 99999 PR PBB SHADOW E&M-EST. PATIENT-LVL V: CPT | Mod: PBBFAC,,, | Performed by: STUDENT IN AN ORGANIZED HEALTH CARE EDUCATION/TRAINING PROGRAM

## 2025-06-28 NOTE — H&P (VIEW-ONLY)
Patient ID: Tj Trammell is a 60 y.o. male.    Chief Complaint: No chief complaint on file.      HPI:  HPI  60m with left arm lesion biopsied by dr bonner recently. Path SCC.   Known to me from previous skin cancer excison leg. Doing well  Hard of hearing. Relies on his brother for transport      Review of Systems   Constitutional:  Negative for chills, diaphoresis and fever.   HENT:  Negative for trouble swallowing.    Respiratory:  Negative for cough, shortness of breath, wheezing and stridor.    Cardiovascular:  Negative for chest pain and palpitations.   Gastrointestinal:  Negative for abdominal distention, abdominal pain, blood in stool, diarrhea, nausea and vomiting.   Endocrine: Negative for cold intolerance and heat intolerance.   Genitourinary:  Negative for difficulty urinating.   Musculoskeletal:  Negative for back pain.   Skin:  Negative for rash.   Allergic/Immunologic: Negative for immunocompromised state.   Neurological:  Negative for dizziness, syncope and numbness.   Hematological:  Negative for adenopathy.   Psychiatric/Behavioral:  Negative for agitation.        Current Medications[1]    Review of patient's allergies indicates:  No Known Allergies    Past Medical History:   Diagnosis Date    Asthma     Bilateral hearing loss 12/12/2012    Biliary acute pancreatitis     GERD (gastroesophageal reflux disease)     High cholesterol     Hypertension     Multiple sclerosis        Past Surgical History:   Procedure Laterality Date    APPENDECTOMY      CHOLECYSTECTOMY      COLONOSCOPY N/A 1/22/2019    Procedure: COLONOSCOPY 2 day  golytely;  Surgeon: Nathan Waddell MD;  Location: [facility];  Service: Endoscopy;  Laterality: N/A;    COLONOSCOPY N/A 4/25/2023    Procedure: COLONOSCOPY;  Surgeon: Jared Loredo MD;  Location: [facility];  Service: Endoscopy;  Laterality: N/A;    COLONOSCOPY N/A 10/10/2023    Procedure: COLONOSCOPY;  Surgeon: Jared Loredo MD;  Location: [facility]  ENDO;  Service: Endoscopy;  Laterality: N/A;    ESOPHAGOGASTRODUODENOSCOPY N/A 9/29/2021    Procedure: EGD (ESOPHAGOGASTRODUODENOSCOPY) covid test Rapid;  Surgeon: Jared Loredo MD;  Location: [facility] ENDO;  Service: Endoscopy;  Laterality: N/A;    EXCISION, SUPERFICIAL MASS, HEAD Left 5/7/2024    Procedure: EXCISION, SUPERFICIAL MASS, HEAD;  Surgeon: Chriss Rodriguez MD;  Location: [facility] OR;  Service: General;  Laterality: Left;    JOINT REPLACEMENT      arm    SURGICAL REMOVAL OF MASS OF LOWER EXTREMITY Left 5/7/2024    Procedure: EXCISION, MASS, LOWER EXTREMITY;  Surgeon: Chriss Rodriguez MD;  Location: [facility] OR;  Service: General;  Laterality: Left;    SURGICAL REMOVAL OF MASS OF LOWER EXTREMITY Left 7/9/2024    Procedure: EXCISION, MASS, LOWER EXTREMITY;  Surgeon: Chriss Rodriguez MD;  Location: [facility] OR;  Service: General;  Laterality: Left;    TONSILLECTOMY      TRANSFORAMINAL EPIDURAL INJECTION OF STEROID Bilateral 10/5/2022    Procedure: Injection,steroid,epidural,transforaminal approach;  Surgeon: Soo Giles MD;  Location: [facility] PAIN [facility];  Service: Pain Management;  Laterality: Bilateral;  bilateral L5 transforaminal epidural steroid injection with RN IV sedation       Social History[2]    Vitals:    06/27/25 1348   BP: 134/83   Pulse: 76   Temp: 98.2 °F (36.8 °C)       Physical Exam  Constitutional:       General: He is not in acute distress.  HENT:      Head: Normocephalic and atraumatic.   Eyes:      General: No scleral icterus.  Cardiovascular:      Rate and Rhythm: Normal rate.   Pulmonary:      Effort: Pulmonary effort is normal. No respiratory distress.      Breath sounds: No stridor.   Abdominal:      Palpations: Abdomen is soft.      Tenderness: There is no abdominal tenderness.   Lymphadenopathy:      Cervical: No cervical adenopathy.   Skin:     General: Skin is warm.      Findings: No erythema.          Neurological:      Mental Status: He is alert and oriented to person,  place, and time.   Psychiatric:         Behavior: Behavior normal.     Body mass index is 30.64 kg/m².  Hga1c 8    Assessment & Plan:  60m with left arm scc           [1]   Current Outpatient Medications   Medication Sig Dispense Refill    acetaZOLAMIDE (DIAMOX) 250 MG tablet Take 2 TABLETS BY MOUTH ONLY 8 HOURS AFTER SURGERY 2 tablet 0    albuterol (PROAIR HFA) 90 mcg/actuation inhaler Inhale 2 puffs by mouth every 4 hours. 8.5 g 3    ALPRAZolam (XANAX) 0.5 MG tablet TAKE ONE TABLET BY MOUTH TWICE DAILY AS NEEDED FOR ANXIETY 60 tablet 4    atorvastatin (LIPITOR) 40 MG tablet Take 1 tablet (40 mg total) by mouth once daily. 90 tablet 4    bacitracin 500 unit/gram Oint Apply topically 2 (two) times daily. 28 g 0    baclofen (LIORESAL) 10 MG tablet TAKE 1 TABLET BY MOUTH 3 TIMES DAILY. 90 tablet 11    buPROPion (WELLBUTRIN SR) 150 MG TBSR 12 hr tablet TAKE 1 TABLET BY MOUTH EVERY 12 HOURS DAILY. 60 tablet 11    clotrimazole-betamethasone 1-0.05% (LOTRISONE) cream Apply topically as directed daily 45 g 1    dicyclomine (BENTYL) 10 MG capsule TAKE 1 CAPSULE BY MOUTH THREE TIMES A DAY FOR ABDOMINAL PAIN 90 capsule 3    difluprednate (DUREZOL) 0.05 % Drop ophthalmic solution Instill one drop TO SURGERY EYE four times a day AFTER SURGERY X 30 DAYS 5 mL 4    docusate sodium (COLACE) 100 MG capsule Take 1 capsule (100 mg total) by mouth once daily. 30 capsule 5    ergocalciferol (ERGOCALCIFEROL) 50,000 unit Cap TAKE 1 CAPSULE BY MOUTH Weekly 12 capsule 0    famotidine (PEPCID) 20 MG tablet Take 1 tablet (20 mg total) by mouth 2 (two) times daily. 180 tablet 3    fingolimod (GILENYA) 0.5 mg Cap Take 1 capsule (0.5 mg total) by mouth once daily. 90 capsule 2    fingolimod (GILENYA) 0.5 mg Cap Take 1 capsule (0.5 mg total) by mouth once daily. 90 capsule 2    fingolimod (GILENYA) 0.5 mg Cap Take 1 capsule (0.5 mg total) by mouth once daily. 90 capsule 2    gabapentin (NEURONTIN) 300 MG capsule TAKE 1 CAPSULE BY MOUTH AT BEDTIME  30 capsule 11    imiquimod (ALDARA) 5 % cream APPLY AT BEDTIME TO 2 SPOTS OF SCALP 5 TIMES A WEEK FOR 6 WEEKS SKIP SATURDAYS AND SUNDAYS 12 packet 0    lactulose (CONSTULOSE) 10 gram/15 mL solution Take 45 mLs (30 g total) by mouth 3 (three) times daily. 3784 mL 3    lisinopriL (PRINIVIL,ZESTRIL) 5 MG tablet TAKE 1 TABLET BY MOUTH DAILY 90 tablet 3    mupirocin (BACTROBAN) 2 % ointment APPLY TO THE AFFECTED AREA OF CHEST TWICE DAILY 22 g 0    nabumetone (RELAFEN) 500 MG tablet Take 1 tablet (500 mg total) by mouth 2 (two) times daily as needed. 60 tablet 6    nebulizer and compressor (HOME NEBULIZER PLUS SIDESTREAM) Lupe use as directed 1 each 0    ofloxacin (OCUFLOX) 0.3 % ophthalmic solution Instill 1 drop TO SURGERY EYE four times a day STARTING 2 DAYS BEFORE SURGERY 5 mL 3    oxybutynin (DITROPAN-XL) 5 MG TR24 Take 1 tablet (5 mg total) by mouth once daily. 90 tablet 3    pantoprazole (PROTONIX) 40 MG tablet TAKE ONE TABLET BY MOUTH  ONCE DAILY 90 tablet 3    senna-docusate 8.6-50 mg (PERICOLACE) 8.6-50 mg per tablet Take 1 tablet by mouth 2 (two) times daily.      senna-docusate 8.6-50 mg (PERICOLACE) 8.6-50 mg per tablet Take 1 tablet by mouth 2 (two) times daily.      sodium,potassium,mag sulfates (SUPREP BOWEL PREP KIT) 17.5-3.13-1.6 gram SolR Take 177 mLs by mouth once daily. 354 mL 0    traMADoL (ULTRAM) 50 mg tablet Take 1 tablet (50 mg total) by mouth every 6 (six) hours as needed for Pain. 120 tablet 4    triamcinolone acetonide 0.1% (KENALOG) 0.1 % cream Apply topically 2 (two) times daily. for 10 days 80 g 0     No current facility-administered medications for this visit.   [2]   Social History  Socioeconomic History    Marital status: Single   Tobacco Use    Smoking status: Former     Current packs/day: 0.00     Average packs/day: 1 pack/day for 32.0 years (32.0 ttl pk-yrs)     Types: Cigarettes     Start date: 1/1/1990     Quit date: 1/1/2022     Years since quitting: 3.4    Smokeless tobacco: Never    Substance and Sexual Activity    Alcohol use: Not Currently    Drug use: No

## 2025-06-28 NOTE — PROGRESS NOTES
Patient ID: jT Trammell is a 60 y.o. male.    Chief Complaint: No chief complaint on file.      HPI:  HPI  60m with left arm lesion biopsied by dr bonner recently. Path SCC.   Known to me from previous skin cancer excison leg. Doing well  Hard of hearing. Relies on his brother for transport      Review of Systems   Constitutional:  Negative for chills, diaphoresis and fever.   HENT:  Negative for trouble swallowing.    Respiratory:  Negative for cough, shortness of breath, wheezing and stridor.    Cardiovascular:  Negative for chest pain and palpitations.   Gastrointestinal:  Negative for abdominal distention, abdominal pain, blood in stool, diarrhea, nausea and vomiting.   Endocrine: Negative for cold intolerance and heat intolerance.   Genitourinary:  Negative for difficulty urinating.   Musculoskeletal:  Negative for back pain.   Skin:  Negative for rash.   Allergic/Immunologic: Negative for immunocompromised state.   Neurological:  Negative for dizziness, syncope and numbness.   Hematological:  Negative for adenopathy.   Psychiatric/Behavioral:  Negative for agitation.        Current Medications[1]    Review of patient's allergies indicates:  No Known Allergies    Past Medical History:   Diagnosis Date    Asthma     Bilateral hearing loss 12/12/2012    Biliary acute pancreatitis     GERD (gastroesophageal reflux disease)     High cholesterol     Hypertension     Multiple sclerosis        Past Surgical History:   Procedure Laterality Date    APPENDECTOMY      CHOLECYSTECTOMY      COLONOSCOPY N/A 1/22/2019    Procedure: COLONOSCOPY 2 day  golytely;  Surgeon: Nathan Waddell MD;  Location: [facility];  Service: Endoscopy;  Laterality: N/A;    COLONOSCOPY N/A 4/25/2023    Procedure: COLONOSCOPY;  Surgeon: Jared Loredo MD;  Location: [facility];  Service: Endoscopy;  Laterality: N/A;    COLONOSCOPY N/A 10/10/2023    Procedure: COLONOSCOPY;  Surgeon: Jared Loredo MD;  Location: [facility]  ENDO;  Service: Endoscopy;  Laterality: N/A;    ESOPHAGOGASTRODUODENOSCOPY N/A 9/29/2021    Procedure: EGD (ESOPHAGOGASTRODUODENOSCOPY) covid test Rapid;  Surgeon: Jared Loredo MD;  Location: [facility] ENDO;  Service: Endoscopy;  Laterality: N/A;    EXCISION, SUPERFICIAL MASS, HEAD Left 5/7/2024    Procedure: EXCISION, SUPERFICIAL MASS, HEAD;  Surgeon: Chriss Rodriguez MD;  Location: [facility] OR;  Service: General;  Laterality: Left;    JOINT REPLACEMENT      arm    SURGICAL REMOVAL OF MASS OF LOWER EXTREMITY Left 5/7/2024    Procedure: EXCISION, MASS, LOWER EXTREMITY;  Surgeon: Chriss Rodriguez MD;  Location: [facility] OR;  Service: General;  Laterality: Left;    SURGICAL REMOVAL OF MASS OF LOWER EXTREMITY Left 7/9/2024    Procedure: EXCISION, MASS, LOWER EXTREMITY;  Surgeon: Chriss Rodriguez MD;  Location: [facility] OR;  Service: General;  Laterality: Left;    TONSILLECTOMY      TRANSFORAMINAL EPIDURAL INJECTION OF STEROID Bilateral 10/5/2022    Procedure: Injection,steroid,epidural,transforaminal approach;  Surgeon: Soo Giles MD;  Location: [facility] PAIN MGT;  Service: Pain Management;  Laterality: Bilateral;  bilateral L5 transforaminal epidural steroid injection with RN IV sedation       Social History[2]    Vitals:    06/27/25 1348   BP: 134/83   Pulse: 76   Temp: 98.2 °F (36.8 °C)       Physical Exam  Constitutional:       General: He is not in acute distress.  HENT:      Head: Normocephalic and atraumatic.   Eyes:      General: No scleral icterus.  Cardiovascular:      Rate and Rhythm: Normal rate.   Pulmonary:      Effort: Pulmonary effort is normal. No respiratory distress.      Breath sounds: No stridor.   Abdominal:      Palpations: Abdomen is soft.      Tenderness: There is no abdominal tenderness.   Lymphadenopathy:      Cervical: No cervical adenopathy.   Skin:     General: Skin is warm.      Findings: No erythema.          Neurological:      Mental Status: He is alert and oriented to person,  place, and time.   Psychiatric:         Behavior: Behavior normal.     Body mass index is 30.64 kg/m².  Hga1c 8    Assessment & Plan:  60m with left arm scc           [1]   Current Outpatient Medications   Medication Sig Dispense Refill    acetaZOLAMIDE (DIAMOX) 250 MG tablet Take 2 TABLETS BY MOUTH ONLY 8 HOURS AFTER SURGERY 2 tablet 0    albuterol (PROAIR HFA) 90 mcg/actuation inhaler Inhale 2 puffs by mouth every 4 hours. 8.5 g 3    ALPRAZolam (XANAX) 0.5 MG tablet TAKE ONE TABLET BY MOUTH TWICE DAILY AS NEEDED FOR ANXIETY 60 tablet 4    atorvastatin (LIPITOR) 40 MG tablet Take 1 tablet (40 mg total) by mouth once daily. 90 tablet 4    bacitracin 500 unit/gram Oint Apply topically 2 (two) times daily. 28 g 0    baclofen (LIORESAL) 10 MG tablet TAKE 1 TABLET BY MOUTH 3 TIMES DAILY. 90 tablet 11    buPROPion (WELLBUTRIN SR) 150 MG TBSR 12 hr tablet TAKE 1 TABLET BY MOUTH EVERY 12 HOURS DAILY. 60 tablet 11    clotrimazole-betamethasone 1-0.05% (LOTRISONE) cream Apply topically as directed daily 45 g 1    dicyclomine (BENTYL) 10 MG capsule TAKE 1 CAPSULE BY MOUTH THREE TIMES A DAY FOR ABDOMINAL PAIN 90 capsule 3    difluprednate (DUREZOL) 0.05 % Drop ophthalmic solution Instill one drop TO SURGERY EYE four times a day AFTER SURGERY X 30 DAYS 5 mL 4    docusate sodium (COLACE) 100 MG capsule Take 1 capsule (100 mg total) by mouth once daily. 30 capsule 5    ergocalciferol (ERGOCALCIFEROL) 50,000 unit Cap TAKE 1 CAPSULE BY MOUTH Weekly 12 capsule 0    famotidine (PEPCID) 20 MG tablet Take 1 tablet (20 mg total) by mouth 2 (two) times daily. 180 tablet 3    fingolimod (GILENYA) 0.5 mg Cap Take 1 capsule (0.5 mg total) by mouth once daily. 90 capsule 2    fingolimod (GILENYA) 0.5 mg Cap Take 1 capsule (0.5 mg total) by mouth once daily. 90 capsule 2    fingolimod (GILENYA) 0.5 mg Cap Take 1 capsule (0.5 mg total) by mouth once daily. 90 capsule 2    gabapentin (NEURONTIN) 300 MG capsule TAKE 1 CAPSULE BY MOUTH AT BEDTIME  30 capsule 11    imiquimod (ALDARA) 5 % cream APPLY AT BEDTIME TO 2 SPOTS OF SCALP 5 TIMES A WEEK FOR 6 WEEKS SKIP SATURDAYS AND SUNDAYS 12 packet 0    lactulose (CONSTULOSE) 10 gram/15 mL solution Take 45 mLs (30 g total) by mouth 3 (three) times daily. 3784 mL 3    lisinopriL (PRINIVIL,ZESTRIL) 5 MG tablet TAKE 1 TABLET BY MOUTH DAILY 90 tablet 3    mupirocin (BACTROBAN) 2 % ointment APPLY TO THE AFFECTED AREA OF CHEST TWICE DAILY 22 g 0    nabumetone (RELAFEN) 500 MG tablet Take 1 tablet (500 mg total) by mouth 2 (two) times daily as needed. 60 tablet 6    nebulizer and compressor (HOME NEBULIZER PLUS SIDESTREAM) Lupe use as directed 1 each 0    ofloxacin (OCUFLOX) 0.3 % ophthalmic solution Instill 1 drop TO SURGERY EYE four times a day STARTING 2 DAYS BEFORE SURGERY 5 mL 3    oxybutynin (DITROPAN-XL) 5 MG TR24 Take 1 tablet (5 mg total) by mouth once daily. 90 tablet 3    pantoprazole (PROTONIX) 40 MG tablet TAKE ONE TABLET BY MOUTH  ONCE DAILY 90 tablet 3    senna-docusate 8.6-50 mg (PERICOLACE) 8.6-50 mg per tablet Take 1 tablet by mouth 2 (two) times daily.      senna-docusate 8.6-50 mg (PERICOLACE) 8.6-50 mg per tablet Take 1 tablet by mouth 2 (two) times daily.      sodium,potassium,mag sulfates (SUPREP BOWEL PREP KIT) 17.5-3.13-1.6 gram SolR Take 177 mLs by mouth once daily. 354 mL 0    traMADoL (ULTRAM) 50 mg tablet Take 1 tablet (50 mg total) by mouth every 6 (six) hours as needed for Pain. 120 tablet 4    triamcinolone acetonide 0.1% (KENALOG) 0.1 % cream Apply topically 2 (two) times daily. for 10 days 80 g 0     No current facility-administered medications for this visit.   [2]   Social History  Socioeconomic History    Marital status: Single   Tobacco Use    Smoking status: Former     Current packs/day: 0.00     Average packs/day: 1 pack/day for 32.0 years (32.0 ttl pk-yrs)     Types: Cigarettes     Start date: 1/1/1990     Quit date: 1/1/2022     Years since quitting: 3.4    Smokeless tobacco: Never    Substance and Sexual Activity    Alcohol use: Not Currently    Drug use: No

## 2025-07-10 ENCOUNTER — ANESTHESIA EVENT (OUTPATIENT)
Dept: SURGERY | Facility: HOSPITAL | Age: 60
End: 2025-07-10
Payer: MEDICARE

## 2025-07-10 ENCOUNTER — HOSPITAL ENCOUNTER (OUTPATIENT)
Dept: PREADMISSION TESTING | Facility: HOSPITAL | Age: 60
Discharge: HOME | End: 2025-07-10
Attending: NURSE PRACTITIONER
Payer: MEDICARE

## 2025-07-10 ENCOUNTER — PATIENT MESSAGE (OUTPATIENT)
Dept: ANESTHESIOLOGY | Facility: HOSPITAL | Age: 60
End: 2025-07-10
Payer: MEDICARE

## 2025-07-10 NOTE — ANESTHESIA PREPROCEDURE EVALUATION
[facility]  Anesthesia Pre-Operative Evaluation         Patient Name/: Tj Trammell, 1965  MRN: [id number]    SUBJECTIVE:     Pre-operative evaluation for Procedure(s) (LRB):  EXCISION-WIDE LOCAL (Left)     2025    Tj Trammell is a 60 y.o. male w/ a significant PMHx of SCC of L arm, SCCIS and chronic ulcer of LLL s/p multiple excisions, multiple sclerosis, GERD, COPD, HTN, T2DM, bilateral hearing loss, tobacco use, stable nonspecific ST and T wave abnormality on EKG.    Patient now presents for the above procedure(s).    Patient denies any other known cardiopulmonary disease.     Functional status: Uses walker 2/2 MS    NPO status: Appropriate    Previous Anesthetics: Reviewed, no hx of anesthetic complications    Previous Airway: None documented      ________________________________________  Pharmacologic Stress Echo 2013:      PRE-TEST DATA  EKG: Resting electrocardiogram reveals normal sinus rhythm at a rate of 85 bpm.    TEST DESCRIPTION  The patient received a graduated infusion of Dobutamine beginning at 10.00 mcg/Kg/min to a peak dose of 40 mcg/Kg/min, achieving a peak heart rate of 142 bpm, which is 83% of the age predicted maximum heart rate. ST sagging noted in the inferolateral  leads up to .5 mm    EKG Conclusions:    1. The EKG portion of this study is suspicious for ischemia at a peak heart rate of 142 bpm (83% of predicted).  2. Blood pressure remained stable throughout the protocol  (Presenting BP: 114/88 Peak BP: 182/59).  3. No significant arrhythmias were present.  4. There were no symptoms of chest discomfort or significant dyspnea throughout the protocol.     CONCLUSIONS     1 - Normal left ventricular function (EF 55%).     2 - Negative dobutamine stress echo by echocardiographic criteria.  .   ________________________________________        Problem List[1]    Review of patient's allergies indicates:  No Known Allergies    Current Inpatient Medications:        Medications Ordered Prior to Encounter[2]    Past Surgical History:   Procedure Laterality Date    APPENDECTOMY      CHOLECYSTECTOMY      COLONOSCOPY N/A 1/22/2019    Procedure: COLONOSCOPY 2 day  golytely;  Surgeon: Nathan Waddell MD;  Location: [facility] ENDO;  Service: Endoscopy;  Laterality: N/A;    COLONOSCOPY N/A 4/25/2023    Procedure: COLONOSCOPY;  Surgeon: Jared Loredo MD;  Location: [facility] ENDO;  Service: Endoscopy;  Laterality: N/A;    COLONOSCOPY N/A 10/10/2023    Procedure: COLONOSCOPY;  Surgeon: Jared Loredo MD;  Location: [facility] ENDO;  Service: Endoscopy;  Laterality: N/A;    ESOPHAGOGASTRODUODENOSCOPY N/A 9/29/2021    Procedure: EGD (ESOPHAGOGASTRODUODENOSCOPY) covid test Rapid;  Surgeon: Jared Loredo MD;  Location: [facility] ENDO;  Service: Endoscopy;  Laterality: N/A;    EXCISION, SUPERFICIAL MASS, HEAD Left 5/7/2024    Procedure: EXCISION, SUPERFICIAL MASS, HEAD;  Surgeon: Chriss Rodriguez MD;  Location: [facility] OR;  Service: General;  Laterality: Left;    JOINT REPLACEMENT      arm    SURGICAL REMOVAL OF MASS OF LOWER EXTREMITY Left 5/7/2024    Procedure: EXCISION, MASS, LOWER EXTREMITY;  Surgeon: Chriss Rodriguez MD;  Location: [facility] OR;  Service: General;  Laterality: Left;    SURGICAL REMOVAL OF MASS OF LOWER EXTREMITY Left 7/9/2024    Procedure: EXCISION, MASS, LOWER EXTREMITY;  Surgeon: Chriss Rodriguez MD;  Location: [facility] OR;  Service: General;  Laterality: Left;    TONSILLECTOMY      TRANSFORAMINAL EPIDURAL INJECTION OF STEROID Bilateral 10/5/2022    Procedure: Injection,steroid,epidural,transforaminal approach;  Surgeon: Soo Giles MD;  Location: [facility] PAIN MGT;  Service: Pain Management;  Laterality: Bilateral;  bilateral L5 transforaminal epidural steroid injection with RN IV sedation       Social History:  Tobacco Use: Medium Risk (6/27/2025)    Patient History     Smoking Tobacco Use: Former     Smokeless Tobacco Use: Never      Passive Exposure: Not on file       Alcohol Use: Not on file       OBJECTIVE:     Vital Signs Range:  BMI Readings from Last 1 Encounters:   06/27/25 30.64 kg/m²               Significant Labs:        Component Value Date/Time    WBC 4.18 04/15/2025 1211    HGB 16.3 04/15/2025 1211    HGB 15.2 07/25/2024 1338    HCT 52.3 04/15/2025 1211    HCT 48.3 07/25/2024 1338     04/15/2025 1211     07/25/2024 1338     (L) 04/15/2025 1211     (L) 07/25/2024 1338    K 4.3 04/15/2025 1211    K 4.3 07/25/2024 1338    CL 98 04/15/2025 1211    CL 99 07/25/2024 1338    CO2 22 (L) 04/15/2025 1211    CO2 24 07/25/2024 1338    GLU 96 04/15/2025 1211    GLU 99 07/25/2024 1338    BUN 17 04/15/2025 1211    CREATININE 1.1 04/15/2025 1211    MG 2.3 07/18/2014 0310    PHOS 2.9 07/18/2014 0310    CALCIUM 9.6 04/15/2025 1211    CALCIUM 9.5 07/25/2024 1338    ALBUMIN 4.0 04/15/2025 1211    ALBUMIN 3.7 07/25/2024 1338    PROT 7.7 04/15/2025 1211    PROT 7.4 07/25/2024 1338    ALKPHOS 102 04/15/2025 1211    ALKPHOS 141 (H) 07/25/2024 1338    BILITOT 0.7 04/15/2025 1211    BILITOT 0.6 07/25/2024 1338    AST 32 04/15/2025 1211    AST 35 07/25/2024 1338    ALT 26 04/15/2025 1211    ALT 26 07/25/2024 1338    INR 1.0 07/13/2021 1137    HGBA1C 8.1 (H) 04/15/2025 1211    HGBA1C 7.5 (H) 07/25/2024 1338        Please see Results Review for additional labs.     Diagnostic Studies: No relevant studies.    EKG:   Results for orders placed or performed in visit on 06/21/24   EKG 12-lead    Collection Time: 06/21/24  7:39 AM   Result Value Ref Range    QRS Duration 86 ms    [facility] QTC Calculation 407 ms    Narrative    Test Reason : Z01.818,I10,    Vent. Rate : 067 BPM     Atrial Rate : 067 BPM     P-R Int : 166 ms          QRS Dur : 086 ms      QT Int : 386 ms       P-R-T Axes : 056 050 050 degrees     QTc Int : 407 ms    Normal sinus rhythm  Nonspecific ST and T wave abnormality  Abnormal ECG  When compared with ECG of 11-OCT-2019  10:24,  No significant change was found  Confirmed by Balaji Flores MD ([id number]) on 6/21/2024 5:05:06 PM    Referred By: KAUSHIK RODRIGUEZ           Confirmed By:Balaji Flores MD       ECHO:  See subjective, if available.      ASSESSMENT/PLAN:           Pre-op Assessment    I have reviewed the Patient Summary Reports.     I have reviewed the Nursing Notes.    I have reviewed the Medications.     Review of Systems  Anesthesia Hx:  No problems with previous Anesthesia               Denies Personal Hx of Anesthesia complications.                    Social:  Former Smoker, No Alcohol Use       EENT/Dental:     Ears General/Symptom(s) Hearing Impairment: deaf - left, deaf- right            Cardiovascular:  Exercise tolerance: good   Denies Pacemaker. Hypertension       Denies Angina.     hyperlipidemia                               Pulmonary:   COPD, mild Asthma mild  Denies Shortness of breath.   Denies Sleep Apnea.                Hepatic/GI:     GERD, well controlled                Musculoskeletal:  Arthritis          Spine Disorders: lumbar            Neurological:  Neurology Normal Denies TIA.  Denies CVA.    Denies Seizures.                              Neuromuscular Disease, Multiple Sclerosis   Endocrine:  Diabetes (a1c 8.1 4/2025), type 2         Obesity / BMI > 30  Psych:  Psychiatric History anxiety               Physical Exam  General: Cooperative, Oriented, Alert and Well nourished    Airway:  Mallampati: III   Mouth Opening: Normal  TM Distance: Normal  Neck ROM: Normal ROM    Dental:  Edentulous    Chest/Lungs:  Normal Respiratory Rate    Heart:  Rate: Normal      Anesthesia Plan  Type of Anesthesia, risks & benefits discussed:    Anesthesia Type: Gen Natural Airway, Gen Supraglottic Airway, MAC  Intra-op Monitoring Plan: Standard ASA Monitors  Post Op Pain Control Plan: multimodal analgesia and IV/PO Opioids PRN  Induction:  IV  Informed Consent: Informed consent signed with the Patient and all  parties understand the risks and agree with anesthesia plan.  All questions answered.   ASA Score: 3  Day of Surgery Review of History & Physical: H&P Update referred to the surgeon/provider.  Anesthesia Plan Notes: Patient is deaf but can read lips. Brother Alon will be present day of surgery to assist him. Patient declined .      Ready For Surgery From Anesthesia Perspective.     .             [1]  Patient Active Problem List  Diagnosis    Multiple sclerosis    Generalized osteoarthritis of multiple sites    Bilateral hearing loss    Incontinence of urine    Deaf    Tobacco abuse    HTN (hypertension)    HLD (hyperlipidemia)    Constipation    Pain, abdominal    Biliary stricture    GERD (gastroesophageal reflux disease)    Screening for malignant neoplasm of colon    Fall    Abrasion    Wrist pain    Scapholunate dissociation of left wrist    Anxiety disorder    Chronic obstructive pulmonary disease    Metabolic syndrome    Opioid dependence    Abdominal bloating    Lumbar radiculopathy    Non-pressure ulcer of lower extremity, limited to breakdown of skin, left    SCCA (squamous cell carcinoma) of skin    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    Non-pressure chronic ulcer left lower leg, limited to breakdown skin    Muscle weakness    Finger stiffness, right    Pain in right finger(s)    Diabetes mellitus without complication    Encounter for wound re-check    Non-pressure chronic ulcer of left lower leg with fat layer exposed   [2]  No current facility-administered medications on file prior to encounter.     Current Outpatient Medications on File Prior to Encounter   Medication Sig Dispense Refill    ALPRAZolam (XANAX) 0.5 MG tablet TAKE ONE TABLET BY MOUTH TWICE DAILY AS NEEDED FOR ANXIETY 60 tablet 4    atorvastatin (LIPITOR) 40 MG tablet Take 1 tablet (40 mg total) by mouth once daily. 90 tablet 4    baclofen (LIORESAL)  10 MG tablet TAKE 1 TABLET BY MOUTH 3 TIMES DAILY. 90 tablet 11    dicyclomine (BENTYL) 10 MG capsule TAKE 1 CAPSULE BY MOUTH THREE TIMES A DAY FOR ABDOMINAL PAIN 90 capsule 3    acetaZOLAMIDE (DIAMOX) 250 MG tablet Take 2 TABLETS BY MOUTH ONLY 8 HOURS AFTER SURGERY 2 tablet 0    albuterol (PROAIR HFA) 90 mcg/actuation inhaler Inhale 2 puffs by mouth every 4 hours. 8.5 g 3    bacitracin 500 unit/gram Oint Apply topically 2 (two) times daily. 28 g 0    clotrimazole-betamethasone 1-0.05% (LOTRISONE) cream Apply topically as directed daily 45 g 1    difluprednate (DUREZOL) 0.05 % Drop ophthalmic solution Instill one drop TO SURGERY EYE four times a day AFTER SURGERY X 30 DAYS 5 mL 4    docusate sodium (COLACE) 100 MG capsule Take 1 capsule (100 mg total) by mouth once daily. 30 capsule 5    ergocalciferol (ERGOCALCIFEROL) 50,000 unit Cap TAKE 1 CAPSULE BY MOUTH Weekly 12 capsule 0    famotidine (PEPCID) 20 MG tablet Take 1 tablet (20 mg total) by mouth 2 (two) times daily. 180 tablet 3    fingolimod (GILENYA) 0.5 mg Cap Take 1 capsule (0.5 mg total) by mouth once daily. 90 capsule 2    fingolimod (GILENYA) 0.5 mg Cap Take 1 capsule (0.5 mg total) by mouth once daily. 90 capsule 2    fingolimod (GILENYA) 0.5 mg Cap Take 1 capsule (0.5 mg total) by mouth once daily. 90 capsule 2    gabapentin (NEURONTIN) 300 MG capsule TAKE 1 CAPSULE BY MOUTH AT BEDTIME 30 capsule 11    imiquimod (ALDARA) 5 % cream APPLY AT BEDTIME TO 2 SPOTS OF SCALP 5 TIMES A WEEK FOR 6 WEEKS SKIP SATURDAYS AND SUNDAYS 12 packet 0    lactulose (CONSTULOSE) 10 gram/15 mL solution Take 45 mLs (30 g total) by mouth 3 (three) times daily. 3784 mL 3    lisinopriL (PRINIVIL,ZESTRIL) 5 MG tablet TAKE 1 TABLET BY MOUTH DAILY 90 tablet 3    mupirocin (BACTROBAN) 2 % ointment APPLY TO THE AFFECTED AREA OF CHEST TWICE DAILY 22 g 0    nabumetone (RELAFEN) 500 MG tablet Take 1 tablet (500 mg total) by mouth 2 (two) times daily as needed. 60 tablet  6    nebulizer and compressor (HOME NEBULIZER PLUS SIDESTREAM) Lupe use as directed 1 each 0    ofloxacin (OCUFLOX) 0.3 % ophthalmic solution Instill 1 drop TO SURGERY EYE four times a day STARTING 2 DAYS BEFORE SURGERY 5 mL 3    oxybutynin (DITROPAN-XL) 5 MG TR24 Take 1 tablet (5 mg total) by mouth once daily. 90 tablet 3    pantoprazole (PROTONIX) 40 MG tablet TAKE ONE TABLET BY MOUTH  ONCE DAILY 90 tablet 3    senna-docusate 8.6-50 mg (PERICOLACE) 8.6-50 mg per tablet Take 1 tablet by mouth 2 (two) times daily.      senna-docusate 8.6-50 mg (PERICOLACE) 8.6-50 mg per tablet Take 1 tablet by mouth 2 (two) times daily.      sodium,potassium,mag sulfates (SUPREP BOWEL PREP KIT) 17.5-3.13-1.6 gram SolR Take 177 mLs by mouth once daily. 354 mL 0    traMADoL (ULTRAM) 50 mg tablet Take 1 tablet (50 mg total) by mouth every 6 (six) hours as needed for Pain. 120 tablet 4    triamcinolone acetonide 0.1% (KENALOG) 0.1 % cream Apply topically 2 (two) times daily. for 10 days 80 g 0

## 2025-07-14 RX ORDER — GLUCAGON 1 MG
1 KIT INJECTION
OUTPATIENT
Start: 2025-07-14

## 2025-07-14 RX ORDER — SODIUM CHLORIDE 0.9 % (FLUSH) 0.9 %
10 SYRINGE (ML) INJECTION
OUTPATIENT
Start: 2025-07-14

## 2025-07-14 RX ORDER — ONDANSETRON HYDROCHLORIDE 2 MG/ML
4 INJECTION, SOLUTION INTRAVENOUS DAILY PRN
OUTPATIENT
Start: 2025-07-14

## 2025-07-14 RX ORDER — HALOPERIDOL LACTATE 5 MG/ML
0.5 INJECTION, SOLUTION INTRAMUSCULAR EVERY 10 MIN PRN
OUTPATIENT
Start: 2025-07-14

## 2025-07-14 RX ORDER — HYDROMORPHONE HYDROCHLORIDE 2 MG/ML
0.2 INJECTION, SOLUTION INTRAMUSCULAR; INTRAVENOUS; SUBCUTANEOUS EVERY 5 MIN PRN
Refills: 0 | OUTPATIENT
Start: 2025-07-14

## 2025-07-14 RX ORDER — OXYCODONE HYDROCHLORIDE 5 MG/1
5 TABLET ORAL
Refills: 0 | OUTPATIENT
Start: 2025-07-14

## 2025-07-15 ENCOUNTER — ANESTHESIA (OUTPATIENT)
Dept: SURGERY | Facility: HOSPITAL | Age: 60
End: 2025-07-15
Payer: MEDICARE

## 2025-07-15 ENCOUNTER — HOSPITAL ENCOUNTER (OUTPATIENT)
Facility: HOSPITAL | Age: 60
Discharge: HOME | End: 2025-07-15
Attending: STUDENT IN AN ORGANIZED HEALTH CARE EDUCATION/TRAINING PROGRAM | Admitting: STUDENT IN AN ORGANIZED HEALTH CARE EDUCATION/TRAINING PROGRAM
Payer: MEDICARE

## 2025-07-15 VITALS
SYSTOLIC BLOOD PRESSURE: 155 MMHG | WEIGHT: 220 LBS | OXYGEN SATURATION: 97 % | DIASTOLIC BLOOD PRESSURE: 82 MMHG | BODY MASS INDEX: 32.58 KG/M2 | TEMPERATURE: 98 F | HEIGHT: 69 IN | RESPIRATION RATE: 16 BRPM | HEART RATE: 76 BPM

## 2025-07-15 DIAGNOSIS — C44.629 SQUAMOUS CELL CARCINOMA OF ARM, LEFT: Primary | ICD-10-CM

## 2025-07-15 DIAGNOSIS — I10 PRIMARY HYPERTENSION: ICD-10-CM

## 2025-07-15 DIAGNOSIS — C44.92 SCCA (SQUAMOUS CELL CARCINOMA) OF SKIN: ICD-10-CM

## 2025-07-15 DIAGNOSIS — Z01.818 PRE-OP TESTING: ICD-10-CM

## 2025-07-15 PROCEDURE — 88305 TISSUE EXAM BY PATHOLOGIST: CPT | Mod: TC | Performed by: STUDENT IN AN ORGANIZED HEALTH CARE EDUCATION/TRAINING PROGRAM

## 2025-07-15 PROCEDURE — 36000707: Performed by: STUDENT IN AN ORGANIZED HEALTH CARE EDUCATION/TRAINING PROGRAM

## 2025-07-15 PROCEDURE — 36000706: Performed by: STUDENT IN AN ORGANIZED HEALTH CARE EDUCATION/TRAINING PROGRAM

## 2025-07-15 PROCEDURE — 63600175 PHARM REV CODE 636 W HCPCS: Performed by: STUDENT IN AN ORGANIZED HEALTH CARE EDUCATION/TRAINING PROGRAM

## 2025-07-15 PROCEDURE — 37000008 HC ANESTHESIA 1ST 15 MINUTES: Performed by: STUDENT IN AN ORGANIZED HEALTH CARE EDUCATION/TRAINING PROGRAM

## 2025-07-15 PROCEDURE — 71000015 HC POSTOP RECOV 1ST HR: Performed by: STUDENT IN AN ORGANIZED HEALTH CARE EDUCATION/TRAINING PROGRAM

## 2025-07-15 PROCEDURE — 11606 EXC TR-EXT MAL+MARG >4 CM: CPT | Mod: ,,, | Performed by: STUDENT IN AN ORGANIZED HEALTH CARE EDUCATION/TRAINING PROGRAM

## 2025-07-15 PROCEDURE — 25000003 PHARM REV CODE 250

## 2025-07-15 PROCEDURE — 63600175 PHARM REV CODE 636 W HCPCS

## 2025-07-15 PROCEDURE — 37000009 HC ANESTHESIA EA ADD 15 MINS: Performed by: STUDENT IN AN ORGANIZED HEALTH CARE EDUCATION/TRAINING PROGRAM

## 2025-07-15 PROCEDURE — 12032 INTMD RPR S/A/T/EXT 2.6-7.5: CPT | Mod: 51,,, | Performed by: STUDENT IN AN ORGANIZED HEALTH CARE EDUCATION/TRAINING PROGRAM

## 2025-07-15 RX ORDER — HYDROCODONE BITARTRATE AND ACETAMINOPHEN 5; 325 MG/1; MG/1
1 TABLET ORAL EVERY 4 HOURS PRN
Qty: 10 TABLET | Refills: 0 | Status: SHIPPED | OUTPATIENT
Start: 2025-07-15

## 2025-07-15 RX ORDER — LIDOCAINE HYDROCHLORIDE 10 MG/ML
INJECTION, SOLUTION EPIDURAL; INFILTRATION; INTRACAUDAL; PERINEURAL
Status: DISCONTINUED | OUTPATIENT
Start: 2025-07-15 | End: 2025-07-15 | Stop reason: HOSPADM

## 2025-07-15 RX ORDER — MIDAZOLAM HYDROCHLORIDE 5 MG/ML
INJECTION INTRAMUSCULAR; INTRAVENOUS
Status: DISCONTINUED | OUTPATIENT
Start: 2025-07-15 | End: 2025-07-15

## 2025-07-15 RX ORDER — PHENYLEPHRINE HYDROCHLORIDE 10 MG/ML
INJECTION INTRAVENOUS
Status: DISCONTINUED | OUTPATIENT
Start: 2025-07-15 | End: 2025-07-15

## 2025-07-15 RX ORDER — ACETAMINOPHEN 500 MG
1000 TABLET ORAL
Status: COMPLETED | OUTPATIENT
Start: 2025-07-15 | End: 2025-07-15

## 2025-07-15 RX ORDER — AMOXICILLIN 250 MG
1 CAPSULE ORAL 2 TIMES DAILY
COMMUNITY
Start: 2025-07-15

## 2025-07-15 RX ORDER — MIDAZOLAM HYDROCHLORIDE 1 MG/ML
INJECTION INTRAMUSCULAR; INTRAVENOUS
Status: DISCONTINUED | OUTPATIENT
Start: 2025-07-15 | End: 2025-07-15

## 2025-07-15 RX ORDER — KETOROLAC TROMETHAMINE 30 MG/ML
INJECTION, SOLUTION INTRAMUSCULAR; INTRAVENOUS
Status: DISCONTINUED | OUTPATIENT
Start: 2025-07-15 | End: 2025-07-15

## 2025-07-15 RX ORDER — PROPOFOL 10 MG/ML
VIAL (ML) INTRAVENOUS CONTINUOUS PRN
Status: DISCONTINUED | OUTPATIENT
Start: 2025-07-15 | End: 2025-07-15

## 2025-07-15 RX ORDER — FENTANYL CITRATE 50 UG/ML
INJECTION, SOLUTION INTRAMUSCULAR; INTRAVENOUS
Status: DISCONTINUED | OUTPATIENT
Start: 2025-07-15 | End: 2025-07-15

## 2025-07-15 RX ORDER — HYDROCODONE BITARTRATE AND ACETAMINOPHEN 5; 325 MG/1; MG/1
1 TABLET ORAL EVERY 4 HOURS PRN
Refills: 0 | Status: CANCELLED | OUTPATIENT
Start: 2025-07-15

## 2025-07-15 RX ORDER — LIDOCAINE HYDROCHLORIDE 10 MG/ML
1 INJECTION, SOLUTION EPIDURAL; INFILTRATION; INTRACAUDAL; PERINEURAL ONCE
Status: DISCONTINUED | OUTPATIENT
Start: 2025-07-15 | End: 2025-07-15 | Stop reason: HOSPADM

## 2025-07-15 RX ORDER — CEFAZOLIN 2 G/1
2 INJECTION, POWDER, FOR SOLUTION INTRAMUSCULAR; INTRAVENOUS
Status: COMPLETED | OUTPATIENT
Start: 2025-07-15 | End: 2025-07-15

## 2025-07-15 RX ADMIN — CEFAZOLIN 2 G: 2 INJECTION, POWDER, FOR SOLUTION INTRAMUSCULAR; INTRAVENOUS at 11:07

## 2025-07-15 RX ADMIN — GLYCOPYRROLATE 0.2 MG: 0.2 INJECTION, SOLUTION INTRAMUSCULAR; INTRAVITREAL at 11:07

## 2025-07-15 RX ADMIN — PROPOFOL 100 MCG/KG/MIN: 10 INJECTION, EMULSION INTRAVENOUS at 11:07

## 2025-07-15 RX ADMIN — PHENYLEPHRINE HYDROCHLORIDE 100 MCG: 10 INJECTION INTRAVENOUS at 11:07

## 2025-07-15 RX ADMIN — KETOROLAC TROMETHAMINE 15 MG: 30 INJECTION, SOLUTION INTRAMUSCULAR; INTRAVENOUS at 11:07

## 2025-07-15 RX ADMIN — FENTANYL CITRATE 25 MCG: 50 INJECTION INTRAMUSCULAR; INTRAVENOUS at 11:07

## 2025-07-15 RX ADMIN — MIDAZOLAM HYDROCHLORIDE 2 MG: 1 INJECTION, SOLUTION INTRAMUSCULAR; INTRAVENOUS at 10:07

## 2025-07-15 RX ADMIN — PROPOFOL 20 MG: 10 INJECTION, EMULSION INTRAVENOUS at 11:07

## 2025-07-15 RX ADMIN — SODIUM CHLORIDE, SODIUM LACTATE, POTASSIUM CHLORIDE, AND CALCIUM CHLORIDE: .6; .31; .03; .02 INJECTION, SOLUTION INTRAVENOUS at 11:07

## 2025-07-15 RX ADMIN — PROPOFOL 30 MG: 10 INJECTION, EMULSION INTRAVENOUS at 11:07

## 2025-07-15 RX ADMIN — ACETAMINOPHEN 1000 MG: 500 TABLET ORAL at 08:07

## 2025-07-15 NOTE — OP NOTE
[facility] Surgery ([facility])  General Surgery  Operative Note    SUMMARY     Date of Procedure: 7/15/2025     Procedure: Procedure(s) (LRB):  EXCISION-WIDE LOCAL (Left)   left arm     Surgeons and Role:     * Chriss Rodriguez MD         Pre-Operative Diagnosis: SCCA (squamous cell carcinoma) of skin [C44.92]    Post-Operative Diagnosis: Post-Op Diagnosis Codes:     * SCCA (squamous cell carcinoma) of skin [C44.92]    Anesthesia: Choice    Operative Findings (including complications, if any):   Left arm squamous cell excised to grossly clear margins  Final excision 6cm by 3.5cm    Description of Technical Procedures:   Brought into the OR and placed supine. His left arm was prepped and draped. The left arm lesion was identified. It was obliquely oriented. A marking pen was use to jadiel out 5-10mm margin. An elliptical incision was made around the lesion oriented longitudinally. It was sharply excised using a scalpel. The specimen was sent to pathology with a silk suture at the distal apex. Hemostasis was obtained using cautery. Flaps of skin and subcutaneous tissue were created bilaterally to allow reduction of tension at the skin. The skin was closed using interrupted 0 nylon sutures in a vertical mattress fashion. Dresings were applied.     Significant Surgical Tasks Conducted by the Assistant(s), if Applicable:     Estimated Blood Loss (EBL): 10ml           Implants: * No implants in log *    Specimens:   Specimen (24h ago, onward)       Start     Ordered    07/15/25 1136  Specimen to Pathology  RELEASE UPON ORDERING        References:    Click here for ordering Quick Tip   Question:  Release to patient  Answer:  Immediate    07/15/25 9589                            Condition: Stable    Disposition: PACU - hemodynamically stable.    Attestation: I was present and scrubbed for the entire procedure.

## 2025-07-15 NOTE — TRANSFER OF CARE
"Anesthesia Transfer of Care Note    Patient: Tj Trammell    Procedure(s) Performed: Procedure(s) (LRB):  EXCISION-WIDE LOCAL (Left)    Patient location: PACU    Anesthesia Type: MAC    Transport from OR: Transported from OR on 6-10 L/min O2 by face mask with adequate spontaneous ventilation    Post pain: adequate analgesia    Post assessment: no apparent anesthetic complications    Post vital signs: stable    Level of consciousness: awake and alert    Nausea/Vomiting: no nausea/vomiting    Complications: none    Transfer of care protocol was followed      Last vitals: Visit Vitals  BP (!) 180/84 (BP Location: Right arm, Patient Position: Lying)   Pulse 83   Temp 36.7 °C (98.1 °F) (Skin)   Resp 17   Ht 5' 9" (1.753 m)   Wt 99.8 kg (220 lb)   SpO2 97%   BMI 32.49 kg/m²     "

## 2025-07-15 NOTE — DISCHARGE INSTRUCTIONS
After Hand Surgery  After surgery, the better you take care of yourself--especially your hand--the sooner it will heal. Follow your surgeons instructions. Try not to bump your hand, and dont move or lift anything while youre still wearing bandages, a splint, or a cast.  Care for your hand    Keep your hand elevated above heart level as much as possible for the first several days after surgery. This helps reduce swelling and pain.  To help prevent infection and speed healing, take care not to get your cast or bandages wet.    Relieve pain as directed  Your surgeon may prescribe pain medicine or suggest you take an anti-inflammatory medicine. You might also be instructed to apply ice (or another cold source) to your hand. If you use ice cubes, put them in a plastic bag and rest it on top of your bandages. Leave the cold source on your hand for as long as its comfortable. Do this several times a day for the first few days after surgery. It may take several minutes before you can feel the cold through the cast or bandages.    Follow up with your surgeon  During a follow-up visit after surgery, your surgeon will check your progress. The stitches, bandages, splint, or cast may be removed. A new cast or splint may be placed. If your hand has healed enough, your surgeon may prescribe exercises.    Call your surgeon if you have...  A fever higher than 100.4°F (38.0°C) taken by mouth  Side effects from your medicine, such as prolonged nausea  A wet or loose dressing, or a dressing that is too tight  Excessive bleeding  Increased, ongoing pain or numbness  Signs of infection (such as drainage, warmth, or redness) at the incision site    ANESTHESIA  -For the first 24 hours after surgery:  Do not drive, use heavy equipment, make important decisions, or drink alcohol  -It is normal to feel sleepy for several hours.  Rest until you are more awake.  -Have someone stay with you, if needed.  They can watch for problems and help  keep you safe.  -Some possible post anesthesia side effects include: nausea and vomiting, sore throat and hoarseness, sleepiness, and dizziness.    PAIN  -If you have pain after surgery, pain medicine will help you feel better.  Take it as directed, before pain becomes severe.  Most pain relievers taken by mouth need at least 20-30 minutes to start working.  -Do not drive or drink alcohol while taking pain medicine.  -Pain medication can upset your stomach.  Taking them with a little food may help.  -Other ways to help control pain: elevation, ice, and relaxation  -Call your surgeon if still having unmanageable pain an hour after taking pain medicine.  -Pain medicine can cause constipation.  Taking an over-the counter stool softener while on prescription pain medicine and drinking plenty of fluids can prevent this side effect.  -Call your surgeon if you have severe side effects like: breathing problems, trouble waking up, dizziness, confusion, or severe constipation.    NAUSEA  -Some people have nausea after surgery.  This is often because of anesthesia, pain, pain medicine, or the stress of surgery.  -Do not push yourself to eat.  Start off with clear liquids and soup.  Slowly move to solid foods.  Don't eat fatty, rich, spicy foods at first.  Eat smaller amounts.  -If you develop persistent nausea and vomiting please notify your surgeon immediately.    BLEEDING  -Different types of surgery require different types of care and dressing changes.  It is important to follow all instructions and advice from your surgeon.  Change dressing as directed.  Call your surgeon for any concerns regarding postop bleeding.    SIGNS OF INFECTION  -Signs of infection include: fever, swelling, drainage, and redness  -Notify your surgeon if you have a fever of 100.4 F (38.0 C) or higher.  -Notify your surgeon if you notice redness, swelling, increased pain, pus, or a foul smell at the incision site.    Notify Dr. Rodriguez for any  questions or concerns

## 2025-07-15 NOTE — ANESTHESIA POSTPROCEDURE EVALUATION
Anesthesia Post Evaluation    Patient: Tj Trammell    Procedure(s) Performed: Procedure(s) (LRB):  EXCISION-WIDE LOCAL (Left)    Final Anesthesia Type: general      Patient location during evaluation: PACU  Patient participation: Yes- Able to Participate  Level of consciousness: awake and alert, oriented and awake  Post-procedure vital signs: reviewed and stable  Pain management: adequate  Airway patency: patent    PONV status at discharge: No PONV  Anesthetic complications: no      Cardiovascular status: stable  Respiratory status: unassisted and room air  Hydration status: euvolemic  Follow-up not needed.              Vitals Value Taken Time   /82 07/15/25 12:40   Temp 36.7 °C (98 °F) 07/15/25 12:40   Pulse 76 07/15/25 12:40   Resp 16 07/15/25 12:40   SpO2 97 % 07/15/25 12:40         No case tracking events are documented in the log.      Pain/Suki Score: Pain Rating Prior to Med Admin: 0 (7/15/2025  8:12 AM)  Suki Score: 10 (7/15/2025 12:40 PM)

## 2025-07-18 ENCOUNTER — HOSPITAL ENCOUNTER (EMERGENCY)
Facility: HOSPITAL | Age: 60
Discharge: HOME | End: 2025-07-18
Attending: EMERGENCY MEDICINE
Payer: MEDICARE

## 2025-07-18 VITALS
TEMPERATURE: 98 F | HEIGHT: 69 IN | OXYGEN SATURATION: 97 % | BODY MASS INDEX: 32.58 KG/M2 | WEIGHT: 220 LBS | HEART RATE: 81 BPM | RESPIRATION RATE: 18 BRPM | SYSTOLIC BLOOD PRESSURE: 137 MMHG | DIASTOLIC BLOOD PRESSURE: 72 MMHG

## 2025-07-18 DIAGNOSIS — Z51.89 VISIT FOR WOUND CHECK: Primary | ICD-10-CM

## 2025-07-18 LAB
ESTROGEN SERPL-MCNC: NORMAL PG/ML
INSULIN SERPL-ACNC: NORMAL U[IU]/ML
LAB AP CLINICAL INFORMATION: NORMAL
LAB AP GROSS DESCRIPTION: NORMAL
LAB AP PERFORMING LOCATION(S): NORMAL
LAB AP REPORT FOOTNOTES: NORMAL
T3RU NFR SERPL: NORMAL %

## 2025-07-18 PROCEDURE — 99283 EMERGENCY DEPT VISIT LOW MDM: CPT

## 2025-07-18 PROCEDURE — 25000003 PHARM REV CODE 250

## 2025-07-18 RX ORDER — DOXYCYCLINE 100 MG/1
100 CAPSULE ORAL 2 TIMES DAILY
Qty: 10 CAPSULE | Refills: 0 | Status: SHIPPED | OUTPATIENT
Start: 2025-07-18 | End: 2025-07-23

## 2025-07-18 RX ORDER — DOXYCYCLINE HYCLATE 100 MG
100 TABLET ORAL
Status: COMPLETED | OUTPATIENT
Start: 2025-07-18 | End: 2025-07-18

## 2025-07-18 RX ADMIN — DOXYCYCLINE HYCLATE 100 MG: 100 TABLET, COATED ORAL at 10:07

## 2025-07-18 NOTE — DISCHARGE INSTRUCTIONS
Thank you for letting me care for you today - it was nice to meet you and I hope you feel better soon.     Our goal at [facility] is to always give you outstanding care and exceptional service. You may receive a survey by mail or email in the next week about your experience in our ED. We would greatly appreciate you completing and returning the survey. Your feedback provides us with a way to recognize our staff who give very good care and it helps us learn how to improve when your experience was below our aspiration of excellence.     All the best,     Brook Campo PA-C  Emergency Department Physician Assistant  [facility], [facility]

## 2025-07-18 NOTE — DISCHARGE SUMMARY
[facility] ([facility])  Short Stay  Discharge Summary    Admit Date: 7/15/2025    Discharge Date and Time: 7/15/2025 12:43 PM      Discharge Attending Physician: Velma att. providers found     Hospital Course (synopsis of major diagnoses, care, treatment, and services provided during the course of the hospital stay): Patient brought in for elective surgery. Underwent procedure without complication and was discharged.       Final Diagnoses:    Principal Problem: SCCA (squamous cell carcinoma) of skin   Secondary Diagnoses:   Active Hospital Problems    Diagnosis  POA    *SCCA (squamous cell carcinoma) of skin [C44.92]  Yes      Resolved Hospital Problems   No resolved problems to display.       Discharged Condition: stable    Disposition: Home or Self Care    Follow up/Patient Instructions:    Medications:  Reconciled Home Medications:      Medication List        START taking these medications      HYDROcodone-acetaminophen 5-325 mg per tablet  Commonly known as: NORCO  Take 1 tablet by mouth every 4 (four) hours as needed for Pain.            CHANGE how you take these medications      * senna-docusate 8.6-50 mg per tablet  Commonly known as: PERICOLACE  Take 1 tablet by mouth 2 (two) times daily.  What changed: Another medication with the same name was added. Make sure you understand how and when to take each.     * senna-docusate 8.6-50 mg per tablet  Commonly known as: PERICOLACE  Take 1 tablet by mouth 2 (two) times daily.  What changed: Another medication with the same name was added. Make sure you understand how and when to take each.     * senna-docusate 8.6-50 mg per tablet  Commonly known as: PERICOLACE  Take 1 tablet by mouth 2 (two) times daily.  What changed: You were already taking a medication with the same name, and this prescription was added. Make sure you understand how and when to take each.           * This list has 3 medication(s) that are the same as other medications prescribed for you. Read  the directions carefully, and ask your doctor or other care provider to review them with you.                CONTINUE taking these medications      acetaZOLAMIDE 250 MG tablet  Commonly known as: DIAMOX  Take 2 TABLETS BY MOUTH ONLY 8 HOURS AFTER SURGERY     albuterol 90 mcg/actuation inhaler  Commonly known as: PROAIR HFA  Inhale 2 puffs by mouth every 4 hours.     ALPRAZolam 0.5 MG tablet  Commonly known as: XANAX  TAKE ONE TABLET BY MOUTH TWICE DAILY AS NEEDED FOR ANXIETY     atorvastatin 40 MG tablet  Commonly known as: LIPITOR  Take 1 tablet (40 mg total) by mouth once daily.     bacitracin 500 unit/gram Oint  Apply topically 2 (two) times daily.     baclofen 10 MG tablet  Commonly known as: LIORESAL  TAKE 1 TABLET BY MOUTH 3 TIMES DAILY.     buPROPion 150 MG TBSR 12 hr tablet  Commonly known as: WELLBUTRIN SR  TAKE 1 TABLET BY MOUTH EVERY 12 HOURS DAILY.     clotrimazole-betamethasone 1-0.05% cream  Commonly known as: LOTRISONE  Apply topically as directed daily     CONSTULOSE 10 gram/15 mL solution  Generic drug: lactulose  Take 45 mLs (30 g total) by mouth 3 (three) times daily.     dicyclomine 10 MG capsule  Commonly known as: BENTYL  TAKE 1 CAPSULE BY MOUTH THREE TIMES A DAY FOR ABDOMINAL PAIN     docusate sodium 100 MG capsule  Commonly known as: COLACE  Take 1 capsule (100 mg total) by mouth once daily.     DurezoL 0.05 % Drop ophthalmic solution  Generic drug: difluprednate  Instill one drop TO SURGERY EYE four times a day AFTER SURGERY X 30 DAYS     famotidine 20 MG tablet  Commonly known as: PEPCID  Take 1 tablet (20 mg total) by mouth 2 (two) times daily.     * fingolimod 0.5 mg Cap  Commonly known as: GILENYA  Take 1 capsule (0.5 mg total) by mouth once daily.     * fingolimod 0.5 mg Cap  Commonly known as: GILENYA  Take 1 capsule (0.5 mg total) by mouth once daily.     * fingolimod 0.5 mg Cap  Commonly known as: GILENYA  Take 1 capsule (0.5 mg total) by mouth once daily.     gabapentin 300 MG  capsule  Commonly known as: NEURONTIN  TAKE 1 CAPSULE BY MOUTH AT BEDTIME     HOME NEBULIZER PLUS SIDESTREAM Lupe  Generic drug: nebulizer and compressor  use as directed     imiquimod 5 % cream  Commonly known as: ALDARA  APPLY AT BEDTIME TO 2 SPOTS OF SCALP 5 TIMES A WEEK FOR 6 WEEKS SKIP SATURDAYS AND SUNDAYS     lisinopriL 5 MG tablet  Commonly known as: PRINIVIL,ZESTRIL  TAKE 1 TABLET BY MOUTH DAILY     mupirocin 2 % ointment  Commonly known as: BACTROBAN  APPLY TO THE AFFECTED AREA OF CHEST TWICE DAILY     nabumetone 500 MG tablet  Commonly known as: RELAFEN  Take 1 tablet (500 mg total) by mouth 2 (two) times daily as needed.     ofloxacin 0.3 % ophthalmic solution  Commonly known as: OCUFLOX  Instill 1 drop TO SURGERY EYE four times a day STARTING 2 DAYS BEFORE SURGERY     oxybutynin 5 MG Tr24  Commonly known as: DITROPAN-XL  Take 1 tablet (5 mg total) by mouth once daily.     pantoprazole 40 MG tablet  Commonly known as: PROTONIX  TAKE ONE TABLET BY MOUTH  ONCE DAILY     sodium,potassium,mag sulfates 17.5-3.13-1.6 gram Solr  Commonly known as: SUPREP BOWEL PREP KIT  Take 177 mLs by mouth once daily.     traMADoL 50 mg tablet  Commonly known as: ULTRAM  Take 1 tablet (50 mg total) by mouth every 6 (six) hours as needed for Pain.     triamcinolone acetonide 0.1% 0.1 % cream  Commonly known as: KENALOG  Apply topically 2 (two) times daily. for 10 days     VITAMIN D2 1,250 mcg (50,000 unit) capsule  Generic drug: ergocalciferol  TAKE 1 CAPSULE BY MOUTH Weekly           * This list has 3 medication(s) that are the same as other medications prescribed for you. Read the directions carefully, and ask your doctor or other care provider to review them with you.                Discharge Procedure Orders   Diet general     Remove dressing in 48 hours     Call MD for:  temperature >100.4     Call MD for:  persistent nausea and vomiting     Call MD for:  severe uncontrolled pain     EKG 12-lead   Standing Status: Future  Standing Exp. Date: 07/01/26     Shower on day dressing removed (No bath)      Follow-up Information       Chriss Rodriguez MD Follow up in 2 week(s).    Specialties: Surgery, General Surgery  Contact information:  [address]  [address]  [facility] [address] [phone]

## 2025-07-18 NOTE — ED PROVIDER NOTES
Encounter Date: 7/18/2025       History     Chief Complaint   Patient presents with    Wound Check     Left arm excision done 7/15/25. Complaining of continued pain and problems with bandaging.      60-year-old male with PMH of bilateral hearing loss, asthma, GERD, pancreatitis, HTN, multiple sclerosis presenting to the ED with concerns of left forearm pain.  He had a squamous cell carcinoma excision on 07/15.  He has been taking Norco as prescribed for pain.  He states his brother change his dressing recently and applied the gauze to tight.  He also notes some bleeding to the area.  He denies any fever, chills, numbness, tingling, or any other symptoms at this time.    The history is provided by the patient. No  was used.     Review of patient's allergies indicates:  No Known Allergies  Past Medical History:   Diagnosis Date    Asthma     Bilateral hearing loss 12/12/2012    Biliary acute pancreatitis     GERD (gastroesophageal reflux disease)     High cholesterol     Hypertension     Multiple sclerosis      Past Surgical History:   Procedure Laterality Date    APPENDECTOMY      CHOLECYSTECTOMY      COLONOSCOPY N/A 1/22/2019    Procedure: COLONOSCOPY 2 day  golytely;  Surgeon: Nathan Waddell MD;  Location: [facility];  Service: Endoscopy;  Laterality: N/A;    COLONOSCOPY N/A 4/25/2023    Procedure: COLONOSCOPY;  Surgeon: Jared Loredo MD;  Location: [facility];  Service: Endoscopy;  Laterality: N/A;    COLONOSCOPY N/A 10/10/2023    Procedure: COLONOSCOPY;  Surgeon: Jared Loredo MD;  Location: [facility];  Service: Endoscopy;  Laterality: N/A;    ESOPHAGOGASTRODUODENOSCOPY N/A 9/29/2021    Procedure: EGD (ESOPHAGOGASTRODUODENOSCOPY) covid test Rapid;  Surgeon: Jared Loredo MD;  Location: [facility];  Service: Endoscopy;  Laterality: N/A;    EXCISION, SUPERFICIAL MASS, HEAD Left 5/7/2024    Procedure: EXCISION, SUPERFICIAL MASS, HEAD;  Surgeon: Chriss Rodriguez  MD LISA;  Location: [facility] OR;  Service: General;  Laterality: Left;    EXCISION-WIDE LOCAL Left 7/15/2025    Procedure: EXCISION-WIDE LOCAL;  Surgeon: Chriss Rodriguez MD;  Location: [facility] OR;  Service: General;  Laterality: Left;    JOINT REPLACEMENT      arm    SURGICAL REMOVAL OF MASS OF LOWER EXTREMITY Left 5/7/2024    Procedure: EXCISION, MASS, LOWER EXTREMITY;  Surgeon: Chriss Rodriguez MD;  Location: [facility] OR;  Service: General;  Laterality: Left;    SURGICAL REMOVAL OF MASS OF LOWER EXTREMITY Left 7/9/2024    Procedure: EXCISION, MASS, LOWER EXTREMITY;  Surgeon: Chriss Rodriguez MD;  Location: [facility] OR;  Service: General;  Laterality: Left;    TONSILLECTOMY      TRANSFORAMINAL EPIDURAL INJECTION OF STEROID Bilateral 10/5/2022    Procedure: Injection,steroid,epidural,transforaminal approach;  Surgeon: Soo Giles MD;  Location: [facility] PAIN MGT;  Service: Pain Management;  Laterality: Bilateral;  bilateral L5 transforaminal epidural steroid injection with RN IV sedation     Family History   Problem Relation Name Age of Onset    Heart disease Mother      Heart disease Father      Heart disease Brother       Social History[1]  Review of Systems   Constitutional:  Negative for chills and fever.   HENT:  Negative for congestion and sore throat.    Eyes:  Negative for visual disturbance.   Respiratory:  Negative for shortness of breath.    Cardiovascular:  Negative for chest pain.   Gastrointestinal:  Negative for abdominal pain, nausea and vomiting.   Genitourinary:  Negative for dysuria.   Musculoskeletal:  Negative for neck pain and neck stiffness.   Skin:  Positive for wound.   Neurological:  Negative for weakness and numbness.   All other systems reviewed and are negative.      Physical Exam     Initial Vitals [07/18/25 0903]   BP Pulse Resp Temp SpO2   137/72 81 18 98 °F (36.7 °C) 97 %      MAP       --         Physical Exam    Vitals reviewed.  Constitutional: Vital signs are normal. He appears  well-developed and well-nourished. He is not diaphoretic. He is active and cooperative.  Non-toxic appearance. He does not appear ill. No distress.   HENT:   Head: Normocephalic and atraumatic.   Eyes: Conjunctivae are normal.   Neck: Neck supple.   Normal range of motion.  Cardiovascular:  Normal rate.           Pulses:       Radial pulses are 2+ on the left side.   Pulmonary/Chest: No respiratory distress.   Musculoskeletal:      Cervical back: Normal range of motion and neck supple.     Neurological: He is alert and oriented to person, place, and time. GCS score is 15. GCS eye subscore is 4. GCS verbal subscore is 5. GCS motor subscore is 6.   Skin:   Well-healing surgical incision to left posterior forearm.  Mild erythema to distal wound.  Minimal bleeding present.  No fluctuance, induration, warmth.  Sensation intact.  Radial pulse 2 +.  Full ROM throughout extremity.  See image below   Psychiatric: He has a normal mood and affect. His behavior is normal. Judgment and thought content normal.         ED Course   Procedures  Labs Reviewed - No data to display       Imaging Results    None          Medications   doxycycline tablet 100 mg (100 mg Oral Given 7/18/25 1012)     Medical Decision Making  Well-appearing 60-year-old male in no acute distress.  Alert and oriented.  Afebrile.  Vitals WNL.  Surgical incision appears to be healing appropriately.  Mild erythema and edema noted to the distal end of incision.  No fluctuance, warmth, induration.  No purulent drainage present.  Minimal bleeding.  Wound was re-dressed.  Doxycycline given in ED for infection prophylaxis.  Remainder of prescription sent to pharmacy.  Patient has follow up appointment with surgery on 07/29.  Advised continued use of prescribed Norco and tramadol at home for pain.  Instructed to keep wound clean and dry.  Strict ED return precautions were discussed.  Patient verbalized understanding and agreement with plan.  All questions answered.  Case reviewed with ED attending, Dr. Hunter.     Differential Diagnosis includes, but is not limited to:  Cellulitis, abscess, necrotizing fasciitis, osteomyelitis, septic joint, MRSA, DVT, drug eruption, allergic/contact dermatitis, EM/SJS, viral exanthem, local trauma/contusion     Risk  Prescription drug management.               ED Course as of 07/18/25 1354   Fri Jul 18, 2025   0922 BP: 137/72 [KG]   0922 Temp: 98 °F (36.7 °C) [KG]   0922 Pulse: 81 [KG]   0922 Resp: 18 [KG]   0922 SpO2: 97 % [KG]      ED Course User Index  [KG] Brook Campo PA-C                               Clinical Impression:  Final diagnoses:  [Z51.89] Visit for wound check (Primary)          ED Disposition Condition    Discharge Stable          ED Prescriptions       Medication Sig Dispense Start Date End Date Auth. Provider    doxycycline (VIBRAMYCIN) 100 MG Cap Take 1 capsule (100 mg total) by mouth 2 (two) times daily. for 5 days 10 capsule 7/18/2025 7/23/2025 Brook Campo PA-C          Follow-up Information       Follow up With Specialties Details Why Contact Info    Bayron Ferguson MD Internal Medicine   [address]  [address]  [facility] [address]  [phone]      Chriss Rodriguez MD Surgery, General Surgery   [address]  [address]  [place] [address]  [phone]                     [1]   Social History  Tobacco Use    Smoking status: Former     Current packs/day: 0.00     Average packs/day: 1 pack/day for 32.0 years (32.0 ttl pk-yrs)     Types: Cigarettes     Start date: 1/1/1990     Quit date: 1/1/2022     Years since quitting: 3.5    Smokeless tobacco: Never   Substance Use Topics    Alcohol use: Not Currently    Drug use: No        Brook Campo PA-C  07/18/25 1355

## 2025-07-21 ENCOUNTER — TELEPHONE (OUTPATIENT)
Dept: SURGERY | Facility: CLINIC | Age: 60
End: 2025-07-21
Payer: MEDICARE

## 2025-07-21 DIAGNOSIS — C44.629 SQUAMOUS CELL CARCINOMA OF ARM, LEFT: ICD-10-CM

## 2025-07-21 RX ORDER — HYDROCODONE BITARTRATE AND ACETAMINOPHEN 5; 325 MG/1; MG/1
1 TABLET ORAL EVERY 4 HOURS PRN
Qty: 10 TABLET | Refills: 0 | OUTPATIENT
Start: 2025-07-21

## 2025-07-21 NOTE — TELEPHONE ENCOUNTER
07/21/2025    1208  Spoke to patient brother about above message. Informed patient brother Dr. Rodriguez didn't refill medication. Patient brother verbalized understanding.

## 2025-07-21 NOTE — TELEPHONE ENCOUNTER
07/21/2025                                     11:44  Spoke to patient brother about above message. Informed patient

## 2025-07-29 ENCOUNTER — OFFICE VISIT (OUTPATIENT)
Dept: SURGERY | Facility: CLINIC | Age: 60
End: 2025-07-29
Payer: MEDICARE

## 2025-07-29 VITALS
SYSTOLIC BLOOD PRESSURE: 131 MMHG | HEART RATE: 87 BPM | TEMPERATURE: 98 F | BODY MASS INDEX: 32.49 KG/M2 | HEIGHT: 69 IN | DIASTOLIC BLOOD PRESSURE: 72 MMHG

## 2025-07-29 DIAGNOSIS — C44.629 SQUAMOUS CELL CARCINOMA OF ARM, LEFT: Primary | ICD-10-CM

## 2025-07-29 PROCEDURE — 3078F DIAST BP <80 MM HG: CPT | Mod: CPTII,S$GLB,, | Performed by: STUDENT IN AN ORGANIZED HEALTH CARE EDUCATION/TRAINING PROGRAM

## 2025-07-29 PROCEDURE — 1159F MED LIST DOCD IN RCRD: CPT | Mod: CPTII,S$GLB,, | Performed by: STUDENT IN AN ORGANIZED HEALTH CARE EDUCATION/TRAINING PROGRAM

## 2025-07-29 PROCEDURE — 99999 PR PBB SHADOW E&M-EST. PATIENT-LVL IV: CPT | Mod: PBBFAC,,, | Performed by: STUDENT IN AN ORGANIZED HEALTH CARE EDUCATION/TRAINING PROGRAM

## 2025-07-29 PROCEDURE — 3061F NEG MICROALBUMINURIA REV: CPT | Mod: CPTII,S$GLB,, | Performed by: STUDENT IN AN ORGANIZED HEALTH CARE EDUCATION/TRAINING PROGRAM

## 2025-07-29 PROCEDURE — 4010F ACE/ARB THERAPY RXD/TAKEN: CPT | Mod: CPTII,S$GLB,, | Performed by: STUDENT IN AN ORGANIZED HEALTH CARE EDUCATION/TRAINING PROGRAM

## 2025-07-29 PROCEDURE — 99024 POSTOP FOLLOW-UP VISIT: CPT | Mod: S$GLB,,, | Performed by: STUDENT IN AN ORGANIZED HEALTH CARE EDUCATION/TRAINING PROGRAM

## 2025-07-29 PROCEDURE — 3066F NEPHROPATHY DOC TX: CPT | Mod: CPTII,S$GLB,, | Performed by: STUDENT IN AN ORGANIZED HEALTH CARE EDUCATION/TRAINING PROGRAM

## 2025-07-29 PROCEDURE — 1160F RVW MEDS BY RX/DR IN RCRD: CPT | Mod: CPTII,S$GLB,, | Performed by: STUDENT IN AN ORGANIZED HEALTH CARE EDUCATION/TRAINING PROGRAM

## 2025-07-29 PROCEDURE — 3052F HG A1C>EQUAL 8.0%<EQUAL 9.0%: CPT | Mod: CPTII,S$GLB,, | Performed by: STUDENT IN AN ORGANIZED HEALTH CARE EDUCATION/TRAINING PROGRAM

## 2025-07-29 PROCEDURE — 3075F SYST BP GE 130 - 139MM HG: CPT | Mod: CPTII,S$GLB,, | Performed by: STUDENT IN AN ORGANIZED HEALTH CARE EDUCATION/TRAINING PROGRAM

## 2025-08-01 NOTE — PROGRESS NOTES
S/p excision left arm scc  Path completely exicsed  Sutures removed in office  No drainage  Minimal pain  RTC prn

## 2025-08-07 ENCOUNTER — HOSPITAL ENCOUNTER (EMERGENCY)
Facility: HOSPITAL | Age: 60
Discharge: HOME | End: 2025-08-07
Attending: EMERGENCY MEDICINE
Payer: MEDICARE

## 2025-08-07 VITALS
DIASTOLIC BLOOD PRESSURE: 86 MMHG | HEIGHT: 69 IN | TEMPERATURE: 98 F | OXYGEN SATURATION: 98 % | RESPIRATION RATE: 18 BRPM | BODY MASS INDEX: 32.58 KG/M2 | WEIGHT: 220 LBS | SYSTOLIC BLOOD PRESSURE: 136 MMHG | HEART RATE: 89 BPM

## 2025-08-07 DIAGNOSIS — S99.922A FOOT INJURY, LEFT, INITIAL ENCOUNTER: ICD-10-CM

## 2025-08-07 DIAGNOSIS — S90.32XA CONTUSION OF LEFT FOOT, INITIAL ENCOUNTER: Primary | ICD-10-CM

## 2025-08-07 PROCEDURE — 99283 EMERGENCY DEPT VISIT LOW MDM: CPT | Mod: 25

## 2025-08-08 ENCOUNTER — PATIENT OUTREACH (OUTPATIENT)
Facility: OTHER | Age: 60
End: 2025-08-08
Payer: MEDICARE

## 2025-08-13 ENCOUNTER — TELEPHONE (OUTPATIENT)
Dept: NEUROSURGERY | Facility: CLINIC | Age: 60
End: 2025-08-13
Payer: MEDICARE

## (undated) DEVICE — ELECTRODE REM PLYHSV RETURN 9

## (undated) DEVICE — DRESSING TRANS 4X4 3/4

## (undated) DEVICE — COVER OVERHEAD SURG LT BLUE

## (undated) DEVICE — DRAPE THYROID SOFT STERILE

## (undated) DEVICE — DRESSING LEUKOPLAST FLEX 1X3IN

## (undated) DEVICE — MANIFOLD 4 PORT

## (undated) DEVICE — TOWEL OR XRAY BLUE 17X26IN

## (undated) DEVICE — GLOVE SURGICAL LATEX SZ 7

## (undated) DEVICE — APPLICATOR CHLORAPREP ORN 26ML

## (undated) DEVICE — GOWN POLY REINF BRTH SLV XL

## (undated) DEVICE — GOWN POLY REINF BRTH SLV LG

## (undated) DEVICE — SYR 10CC LUER LOCK

## (undated) DEVICE — SPONGE COTTON TRAY 4X4IN

## (undated) DEVICE — PACK ECLIPSE BASIC III SURG

## (undated) DEVICE — NDL HYPO REG 25G X 1 1/2

## (undated) DEVICE — SUT 0 VICRYL / UR6 (J603)

## (undated) DEVICE — SUT VICRYL 3-0 27 SH

## (undated) DEVICE — Device

## (undated) DEVICE — PACK SURGERY START

## (undated) DEVICE — SUT VICRYL CTD 2-0 GI 27 SH

## (undated) DEVICE — PAD PREP CUFFED NS 24X48IN

## (undated) DEVICE — SUT MCRYL PLUS 4-0 PS2 27IN

## (undated) DEVICE — DRESSING XEROFORM 1X8IN

## (undated) DEVICE — TOWEL OR DISP STRL BLUE 4/PK

## (undated) DEVICE — SUT 2-0 ETHILON 18 FS

## (undated) DEVICE — SUT 2/0 30IN SILK BLK BRAI